# Patient Record
Sex: MALE | Race: BLACK OR AFRICAN AMERICAN | NOT HISPANIC OR LATINO | Employment: OTHER | URBAN - NONMETROPOLITAN AREA
[De-identification: names, ages, dates, MRNs, and addresses within clinical notes are randomized per-mention and may not be internally consistent; named-entity substitution may affect disease eponyms.]

---

## 2017-01-16 ENCOUNTER — HOSPITAL ENCOUNTER (OUTPATIENT)
Dept: GENERAL RADIOLOGY | Facility: HOSPITAL | Age: 62
Discharge: HOME | End: 2017-01-16
Admitting: ORTHOPAEDIC SURGERY

## 2017-01-16 ENCOUNTER — APPOINTMENT (OUTPATIENT)
Dept: PREADMISSION TESTING | Facility: HOSPITAL | Age: 62
End: 2017-01-16

## 2017-01-16 VITALS
BODY MASS INDEX: 38.04 KG/M2 | HEIGHT: 68 IN | SYSTOLIC BLOOD PRESSURE: 183 MMHG | HEART RATE: 73 BPM | WEIGHT: 251 LBS | OXYGEN SATURATION: 99 % | DIASTOLIC BLOOD PRESSURE: 92 MMHG

## 2017-01-16 LAB
ALBUMIN SERPL-MCNC: 4.2 G/DL (ref 3.5–5)
ALBUMIN/GLOB SERPL: 1.1 G/DL (ref 1.1–2.5)
ALP SERPL-CCNC: 77 U/L (ref 24–120)
ALT SERPL W P-5'-P-CCNC: 31 U/L (ref 0–54)
ANION GAP SERPL CALCULATED.3IONS-SCNC: 15 MMOL/L (ref 4–13)
APTT PPP: 30.9 SECONDS (ref 24.1–34.8)
AST SERPL-CCNC: 25 U/L (ref 7–45)
BACTERIA UR QL AUTO: ABNORMAL /HPF
BASOPHILS # BLD AUTO: 0.04 10*3/MM3 (ref 0–0.2)
BASOPHILS NFR BLD AUTO: 0.6 % (ref 0–2)
BILIRUB SERPL-MCNC: 0.5 MG/DL (ref 0.1–1)
BILIRUB UR QL STRIP: NEGATIVE
BUN BLD-MCNC: 27 MG/DL (ref 5–21)
BUN/CREAT SERPL: 12.9 (ref 7–25)
CALCIUM SPEC-SCNC: 9.5 MG/DL (ref 8.4–10.4)
CHLORIDE SERPL-SCNC: 97 MMOL/L (ref 98–110)
CLARITY UR: CLEAR
CO2 SERPL-SCNC: 25 MMOL/L (ref 24–31)
COLOR UR: ABNORMAL
CREAT BLD-MCNC: 2.09 MG/DL (ref 0.5–1.4)
DEPRECATED RDW RBC AUTO: 48.3 FL (ref 40–54)
EOSINOPHIL # BLD AUTO: 0.14 10*3/MM3 (ref 0–0.7)
EOSINOPHIL NFR BLD AUTO: 2.1 % (ref 0–4)
ERYTHROCYTE [DISTWIDTH] IN BLOOD BY AUTOMATED COUNT: 16 % (ref 12–15)
GFR SERPL CREATININE-BSD FRML MDRD: 39 ML/MIN/1.73
GLOBULIN UR ELPH-MCNC: 4 GM/DL
GLUCOSE BLD-MCNC: 152 MG/DL (ref 70–100)
GLUCOSE UR STRIP-MCNC: NEGATIVE MG/DL
HCT VFR BLD AUTO: 39.6 % (ref 40–52)
HGB BLD-MCNC: 13.2 G/DL (ref 14–18)
HGB UR QL STRIP.AUTO: NEGATIVE
HYALINE CASTS UR QL AUTO: ABNORMAL /LPF
HYPOCHROMIA BLD QL: NORMAL
IMM GRANULOCYTES # BLD: 0.05 10*3/MM3 (ref 0–0.03)
IMM GRANULOCYTES NFR BLD: 0.8 % (ref 0–5)
INR PPP: 0.91 (ref 0.91–1.09)
KETONES UR QL STRIP: ABNORMAL
LEUKOCYTE ESTERASE UR QL STRIP.AUTO: ABNORMAL
LYMPHOCYTES # BLD AUTO: 2.41 10*3/MM3 (ref 0.72–4.86)
LYMPHOCYTES NFR BLD AUTO: 36.8 % (ref 15–45)
MCH RBC QN AUTO: 27.4 PG (ref 28–32)
MCHC RBC AUTO-ENTMCNC: 33.3 G/DL (ref 33–36)
MCV RBC AUTO: 82.2 FL (ref 82–95)
MONOCYTES # BLD AUTO: 0.5 10*3/MM3 (ref 0.19–1.3)
MONOCYTES NFR BLD AUTO: 7.6 % (ref 4–12)
NEUTROPHILS # BLD AUTO: 3.41 10*3/MM3 (ref 1.87–8.4)
NEUTROPHILS NFR BLD AUTO: 52.1 % (ref 39–78)
NITRITE UR QL STRIP: NEGATIVE
NRBC BLD MANUAL-RTO: 0 /100 WBC (ref 0–0)
PH UR STRIP.AUTO: <=5 [PH] (ref 5–8)
PLAT MORPH BLD: NORMAL
PLATELET # BLD AUTO: 201 10*3/MM3 (ref 130–400)
POTASSIUM BLD-SCNC: 4.8 MMOL/L (ref 3.5–5.3)
PROT SERPL-MCNC: 8.2 G/DL (ref 6.3–8.7)
PROT UR QL STRIP: NEGATIVE
PROTHROMBIN TIME: 12.5 SECONDS (ref 11.9–14.6)
RBC # BLD AUTO: 4.82 10*6/MM3 (ref 4.8–5.9)
RBC # UR: ABNORMAL /HPF
REF LAB TEST METHOD: ABNORMAL
SCHISTOCYTES BLD QL SMEAR: NORMAL
SODIUM BLD-SCNC: 137 MMOL/L (ref 135–145)
SP GR UR STRIP: 1.02 (ref 1–1.03)
SQUAMOUS #/AREA URNS HPF: ABNORMAL /HPF
UROBILINOGEN UR QL STRIP: ABNORMAL
WBC MORPH BLD: NORMAL
WBC NRBC COR # BLD: 6.55 10*3/MM3 (ref 4.8–10.8)
WBC UR QL AUTO: ABNORMAL /HPF

## 2017-01-16 PROCEDURE — 87086 URINE CULTURE/COLONY COUNT: CPT | Performed by: ORTHOPAEDIC SURGERY

## 2017-01-16 PROCEDURE — 71010 HC CHEST PA OR AP: CPT

## 2017-01-16 PROCEDURE — 81001 URINALYSIS AUTO W/SCOPE: CPT | Performed by: ORTHOPAEDIC SURGERY

## 2017-01-16 PROCEDURE — 93010 ELECTROCARDIOGRAM REPORT: CPT | Performed by: INTERNAL MEDICINE

## 2017-01-16 PROCEDURE — 85730 THROMBOPLASTIN TIME PARTIAL: CPT | Performed by: ORTHOPAEDIC SURGERY

## 2017-01-16 PROCEDURE — 85610 PROTHROMBIN TIME: CPT | Performed by: ORTHOPAEDIC SURGERY

## 2017-01-16 PROCEDURE — 80053 COMPREHEN METABOLIC PANEL: CPT | Performed by: ORTHOPAEDIC SURGERY

## 2017-01-16 PROCEDURE — 36415 COLL VENOUS BLD VENIPUNCTURE: CPT

## 2017-01-16 PROCEDURE — 85025 COMPLETE CBC W/AUTO DIFF WBC: CPT | Performed by: ORTHOPAEDIC SURGERY

## 2017-01-16 PROCEDURE — 85007 BL SMEAR W/DIFF WBC COUNT: CPT | Performed by: ORTHOPAEDIC SURGERY

## 2017-01-16 PROCEDURE — 93005 ELECTROCARDIOGRAM TRACING: CPT

## 2017-01-16 RX ORDER — GABAPENTIN 600 MG/1
1800 TABLET ORAL 2 TIMES DAILY
COMMUNITY
Start: 2016-10-27 | End: 2020-09-09

## 2017-01-16 RX ORDER — HYDROCHLOROTHIAZIDE 12.5 MG/1
12.5 CAPSULE, GELATIN COATED ORAL DAILY
COMMUNITY

## 2017-01-16 RX ORDER — GLIMEPIRIDE 4 MG/1
TABLET ORAL
COMMUNITY
Start: 2016-07-01 | End: 2018-11-19

## 2017-01-16 RX ORDER — ASPIRIN 325 MG
TABLET ORAL
COMMUNITY
Start: 2010-09-21 | End: 2020-11-16

## 2017-01-16 RX ORDER — OMEPRAZOLE 40 MG/1
CAPSULE, DELAYED RELEASE ORAL
COMMUNITY
End: 2017-10-23

## 2017-01-16 RX ORDER — GABAPENTIN 600 MG/1
600 TABLET ORAL NIGHTLY
COMMUNITY
End: 2018-11-19 | Stop reason: SDUPTHER

## 2017-01-16 RX ORDER — LISINOPRIL 10 MG/1
TABLET ORAL
Status: ON HOLD | COMMUNITY
Start: 2016-10-20 | End: 2019-01-30

## 2017-01-16 RX ORDER — HYDROCODONE BITARTRATE AND ACETAMINOPHEN 10; 325 MG/1; MG/1
TABLET ORAL 4 TIMES DAILY
Status: ON HOLD | COMMUNITY
Start: 2016-11-24 | End: 2017-01-25

## 2017-01-16 NOTE — MR AVS SNAPSHOT
Edy Cosby   2017 11:30 AM   Appointment    Provider:  [facility] PAT 30 RM 1   Department:  [facility] PREADMISSION T   Dept Phone:  [phone]                Your Full Care Plan              Your Updated Medication List          This list is accurate as of: 17 12:13 PM.  Always use your most recent med list.                aspirin 325 MG tablet       carisoprodol 250 MG tablet   Commonly known as:  SOMA       * gabapentin 600 MG tablet   Commonly known as:  NEURONTIN       * gabapentin 600 MG tablet   Commonly known as:  NEURONTIN       glimepiride 4 MG tablet   Commonly known as:  AMARYL       hydrochlorothiazide 25 MG tablet   Commonly known as:  HYDRODIURIL       HYDROcodone-acetaminophen  MG per tablet   Commonly known as:  NORCO       lisinopril 10 MG tablet   Commonly known as:  PRINIVIL,ZESTRIL       metoprolol tartrate 50 MG tablet   Commonly known as:  LOPRESSOR       PRILOSEC 40 MG capsule   Generic drug:  omeprazole       ZOLPIDEM TARTRATE PO       * Notice:  This list has 2 medication(s) that are the same as other medications prescribed for you. Read the directions carefully, and ask your doctor or other care provider to review them with you.            [employer] Signup     [facility] [employer] allows you to send messages to your doctor, view your test results, renew your prescriptions, schedule appointments, and more. To sign up, go to [employer] and click on the Sign Up Now link in the New User? box. Enter your [employer] Activation Code exactly as it appears below along with the last four digits of your Social Security Number and your Date of Birth () to complete the sign-up process. If you do not sign up before the expiration date, you must request a new code.    [employer] Activation Code: [email or web address]  Expires: 2017 12:12 PM    If you have questions, you can email [email or web address] or call [phone] to  "talk to our MyChart staff. Remember, MyChart is NOT to be used for urgent needs. For medical emergencies, dial 911.               Other Info from Your Visit           Allergies     No Known Allergies      Vital Signs     Blood Pressure Pulse Height Weight Oxygen Saturation Body Mass Index    183/92 (BP Location: Left arm, Patient Position: Sitting) 73 67.75\" (172.1 cm) 251 lb (114 kg) 99% 38.45 kg/m2    Smoking Status                   Former Smoker             Discharge Instructions         DAY OF SURGERY INSTRUCTIONS        YOUR SURGEON: TONAE    PROCEDURE: SPINAL SURGERY WITH FUSION AND HARDWARE REMOVAL    DATE OF SURGERY: JAN 23RD    ARRIVAL TIME: AS DIRECTED BY OFFICE    DAY OF SURGERY TAKE ONLY THESE MEDICATIONS: METOPROLOL        BEFORE YOU COME TO THE HOSPITAL  (Pre-op instructions)  • Do not eat, drink, smoke or chew gum after midnight the night before surgery.  This also includes no mints.  • Morning of surgery take only the medicines you have been instructed with a sip of water unless otherwise instructed  by your physician.  • Do not shave, wear makeup or dark nail polish.  • Remove all jewelry including rings.  • Leave anything you consider valuable at home.  • Leave your suitcase in the car until after your surgery.  • Bring the following with you if applicable:  o Picture ID and insurance, Medicare or Medicaid cards  o Co-pay/deductible required by insurance (cash, check, credit card)  o Medications (no narcotics) in original bottles (not a list) including all over-the-counter medications.  o Copy of advance directive, living will or power-of- documents if not brought to PAT  o CPAP or BIPAP mask and tubing  o Skin prep instruction sheet  o Relaxation aids (MP3 player, book, magazine)  • Confirm your arrival time with you surgeon the day before your surgery (surgery times are subject to change)  • On the day of surgery check in at registration located at the main entrance of the hospital. "       Outpatient Surgery Guidelines, Adult  Outpatient procedures are those for which the person having the procedure is allowed to go home the same day as the procedure. Various procedures are done on an outpatient basis. You should follow some general guidelines if you will be having an outpatient procedure.  LET YOUR HEALTH CARE PROVIDER KNOW ABOUT:  · Any allergies you have.  · All medicines you are taking, including vitamins, herbs, eye drops, creams, and over-the-counter medicines.  · Previous problems you or members of your family have had with the use of anesthetics.  · Any blood disorders you have.  · Previous surgeries you have had.  · Medical conditions you have.  RISKS AND COMPLICATIONS  Your health care provider will discuss possible risks and complications with you before surgery. Common risks and complications include:    · Problems due to the use of anesthetics.  · Blood loss and replacement (does not apply to minor surgical procedures).  · Temporary increase in pain due to surgery.  · Uncorrected pain or problems that the surgery was meant to correct.  · Infection.  · New damage.  BEFORE THE PROCEDURE  · Ask your health care provider about changing or stopping your regular medicines. You may need to stop taking certain medicines in the days or weeks before the procedure.  · Stop smoking at least 2 weeks before surgery. This lowers your risk for complications during and after surgery. Ask your health care provider for help with this if needed.  · Eat your usual meals and a light supper the day before surgery. Continue fluid intake. Do not drink alcohol.  · Do not eat or drink after midnight the night before your surgery.   · Arrange for someone to take you home and to stay with you for 24 hours after the procedure. Medicine given for your procedure may affect your ability to drive or to care for yourself.  · Call your health care provider's office if you develop an illness or problem that may prevent  you from safely having your procedure.  AFTER THE PROCEDURE  After surgery, you will be taken to a recovery area, where your progress will be monitored. If there are no complications, you will be allowed to go home when you are awake, stable, and taking fluids well. You may have numbness around the surgical site. Healing will take some time. You will have tenderness at the surgical site and may have some swelling and bruising. You may also have some nausea.  HOME CARE INSTRUCTIONS  · Do not drive for 24 hours, or as directed by your health care provider. Do not drive while taking prescription pain medicines.  · Do not drink alcohol for 24 hours.  · Do not make important decisions or sign legal documents for 24 hours.  · You may resume a normal diet and activities as directed.  · Do not lift anything heavier than 10 pounds (4.5 kg) or play contact sports until your health care provider says it is okay.  · Change your bandages (dressings) as directed.  · Only take over-the-counter or prescription medicines as directed by your health care provider.  · Follow up with your health care provider as directed.  SEEK MEDICAL CARE IF:  · You have increased bleeding (more than a small spot) from the surgical site.  · You have redness, swelling, or increasing pain in the wound.  · You see pus coming from the wound.  · You have a fever.  · You notice a bad smell coming from the wound or dressing.  · You feel lightheaded or faint.  · You develop a rash.  · You have trouble breathing.  · You develop allergies.  MAKE SURE YOU:  · Understand these instructions.  · Will watch your condition.  · Will get help right away if you are not doing well or get worse.     This information is not intended to replace advice given to you by your health care provider. Make sure you discuss any questions you have with your health care provider.     Document Released: 09/12/2002 Document Revised: 05/03/2016 Document Reviewed: 05/22/2014  Elsevier  Interactive Patient Education ©2016 [employer]       Fall Prevention in Hospitals, Adult  As a hospital patient, your condition and the treatments you receive can increase your risk for falls. Some additional risk factors for falls in a hospital include:  · Being in an unfamiliar environment.  · Being on bed rest.  · Your surgery.  · Taking certain medicines.  · Your tubing requirements, such as intravenous (IV) therapy or catheters.  It is important that you learn how to decrease fall risks while at the hospital. Below are important tips that can help prevent falls.  SAFETY TIPS FOR PREVENTING FALLS  Talk about your risk of falling.  · Ask your health care provider why you are at risk for falling. Is it your medicine, illness, tubing placement, or something else?  · Make a plan with your health care provider to keep you safe from falls.  · Ask your health care provider or pharmacist about side effects of your medicines. Some medicines can make you dizzy or affect your coordination.  Ask for help.  · Ask for help before getting out of bed. You may need to press your call button.  · Ask for assistance in getting safely to the toilet.  · Ask for a walker or cane to be put at your bedside. Ask that most of the side rails on your bed be placed up before your health care provider leaves the room.  · Ask family or friends to sit with you.  · Ask for things that are out of your reach, such as your glasses, hearing aids, telephone, bedside table, or call button.  Follow these tips to avoid falling:  · Stay lying or seated, rather than standing, while waiting for help.  · Wear rubber-soled slippers or shoes whenever you walk in the hospital.  · Avoid quick, sudden movements.  ¨ Change positions slowly.  ¨ Sit on the side of your bed before standing.  ¨ Stand up slowly and wait before you start to walk.  · Let your health care provider know if there is a spill on the floor.  · Pay careful attention to the medical  equipment, electrical cords, and tubes around you.  · When you need help, use your call button by your bed or in the bathroom. Wait for one of your health care providers to help you.  · If you feel dizzy or unsure of your footing, return to bed and wait for assistance.  · Avoid being distracted by the TV, telephone, or another person in your room.  · Do not lean or support yourself on rolling objects, such as IV poles or bedside tables.     This information is not intended to replace advice given to you by your health care provider. Make sure you discuss any questions you have with your health care provider.     Document Released: 12/15/2001 Document Revised: 01/08/2016 Document Reviewed: 08/25/2013  [employer] Interactive Patient Education ©2016 Elsevier Inc.       Surgical Site Infections FAQs  What is a Surgical Site Infection (SSI)?  A surgical site infection is an infection that occurs after surgery in the part of the body where the surgery took place. Most patients who have surgery do not develop an infection. However, infections develop in about 1 to 3 out of every 100 patients who have surgery.  Some of the common symptoms of a surgical site infection are:  · Redness and pain around the area where you had surgery  · Drainage of cloudy fluid from your surgical wound  · Fever  Can SSIs be treated?  Yes. Most surgical site infections can be treated with antibiotics. The antibiotic given to you depends on the bacteria (germs) causing the infection. Sometimes patients with SSIs also need another surgery to treat the infection.  What are some of the things that hospitals are doing to prevent SSIs?  To prevent SSIs, doctors, nurses, and other healthcare providers:  · Clean their hands and arms up to their elbows with an antiseptic agent just before the surgery.  · Clean their hands with soap and water or an alcohol-based hand rub before and after caring for each patient.  · May remove some of your hair immediately  before your surgery using electric clippers if the hair is in the same area where the procedure will occur. They should not shave you with a razor.  · Wear special hair covers, masks, gowns, and gloves during surgery to keep the surgery area clean.  · Give you antibiotics before your surgery starts. In most cases, you should get antibiotics within 60 minutes before the surgery starts and the antibiotics should be stopped within 24 hours after surgery.  · Clean the skin at the site of your surgery with a special soap that kills germs.  What can I do to help prevent SSIs?  Before your surgery:  · Tell your doctor about other medical problems you may have. Health problems such as allergies, diabetes, and obesity could affect your surgery and your treatment.  · Quit smoking. Patients who smoke get more infections. Talk to your doctor about how you can quit before your surgery.  · Do not shave near where you will have surgery. Shaving with a razor can irritate your skin and make it easier to develop an infection.  At the time of your surgery:  · Speak up if someone tries to shave you with a razor before surgery. Ask why you need to be shaved and talk with your surgeon if you have any concerns.  · Ask if you will get antibiotics before surgery.  After your surgery:  · Make sure that your healthcare providers clean their hands before examining you, either with soap and water or an alcohol-based hand rub.  · If you do not see your providers clean their hands, please ask them to do so.  · Family and friends who visit you should not touch the surgical wound or dressings.  · Family and friends should clean their hands with soap and water or an alcohol-based hand rub before and after visiting you. If you do not see them clean their hands, ask them to clean their hands.  What do I need to do when I go home from the hospital?  · Before you go home, your doctor or nurse should explain everything you need to know about taking care  of your wound. Make sure you understand how to care for your wound before you leave the hospital.  · Always clean your hands before and after caring for your wound.  · Before you go home, make sure you know who to contact if you have questions or problems after you get home.  · If you have any symptoms of an infection, such as redness and pain at the surgery site, drainage, or fever, call your doctor immediately.  If you have additional questions, please ask your doctor or nurse.  Developed and co-sponsored by The Society for Healthcare Epidemiology of [place] (SHEA); Infectious Diseases Society of [place] (IDSA); American Hospital Association; Association for Professionals in Infection Control and Epidemiology (APIC); Centers for Disease Control and Prevention (CDC); and The Joint Commission.     This information is not intended to replace advice given to you by your health care provider. Make sure you discuss any questions you have with your health care provider.     Document Released: 12/23/2014 Document Revised: 01/08/2016 Document Reviewed: 03/02/2016  [employer] Interactive Patient Education ©2016 Elsevier Inc.       [facility]  CHG 4% Patient Instruction Sheet    Preparing the Skin Before Surgery  Preparing or “prepping” skin before surgery can reduce the risk of infection at the surgical site. To make the process easier,[facility] has chosen 4% Chlorhexidine Gluconate (CHG) antiseptic solution.   The steps below outline the prepping process and should be carefully followed.                                                                                                                                                      Prep the skin at the following time(s):                                                      We recommend you shower the night before surgery, and again the morning of surgery with the 4% CHG antiseptic solution using half of the bottle and a cloth each time.  Dress in clean  clothes/sleepwear after showering.  See instructions below for application.          Do not apply any lotions or moisturizers.       Do not shave the area to be prepped for at least 2 days prior to surgery.    Clipping the hair may be done immediately prior to your surgery at the hospital    if needed.    Directions:  Thoroughly rinse your body with water.  Apply 4% CHG to a cloth and wash skin gently, paying special attention to the operative site.  Rinse again thoroughly.  Once you have begun using this product do not apply anything else to your skin. If itching or redness persists, rinse affected areas and discontinue use.    When using this product:  • Keep out of eyes, ears, and mouth.  • If solution should contact these areas, rinse out promptly and thoroughly with water.  • For external use only.  • Do not use in genital area, on your face or head.      PATIENT/FAMILY/RESPONSIBLE PARTY VERBALIZES UNDERSTANDING OF ABOVE EDUCATION       SYMPTOMS OF A STROKE    Call 911 or have someone take you to the Emergency Department if you have any of the following:    · Sudden numbness or weakness of your face, arm or leg especially on one side of the body  · Sudden confusion, diffiiculty speaking or trouble understanding   · Changes in your vision or loss of sight in one eye  · Sudden severe headache with no known cause  · sudden dizziness, trouble walking, loss of balance or coordination    It is important to seek emergency care right away if you have further stroke symptoms. If you get emergency help quickly, the powerful clot-dissolving medicines can reduce the disabilities caused by a stroke.     For more information:    American Stroke Association  0-256-5-STROKE  www.strokeassociation.org           IF YOU SMOKE OR USE TOBACCO PLEASE READ THE FOLLOWING:    Why is smoking bad for me?  Smoking increases the risk of heart disease, lung disease, vascular disease, stroke, and cancer.     If you smoke, STOP!    If you  would like more information on quitting smoking, please visit the [employer] website: [email or web address]   This link will provide additional resources including the QUIT line and the [employer] support groups.     For more information:    American Cancer Society  ([phone] 281-7653    American Heart Association  1-446.830.1840

## 2017-01-16 NOTE — DISCHARGE INSTRUCTIONS
DAY OF SURGERY INSTRUCTIONS        YOUR SURGEON: ARIN    PROCEDURE: SPINAL SURGERY WITH FUSION AND HARDWARE REMOVAL    DATE OF SURGERY: JAN 23RD    ARRIVAL TIME: AS DIRECTED BY OFFICE    DAY OF SURGERY TAKE ONLY THESE MEDICATIONS: METOPROLOL        BEFORE YOU COME TO THE HOSPITAL  (Pre-op instructions)  • Do not eat, drink, smoke or chew gum after midnight the night before surgery.  This also includes no mints.  • Morning of surgery take only the medicines you have been instructed with a sip of water unless otherwise instructed  by your physician.  • Do not shave, wear makeup or dark nail polish.  • Remove all jewelry including rings.  • Leave anything you consider valuable at home.  • Leave your suitcase in the car until after your surgery.  • Bring the following with you if applicable:  o Picture ID and insurance, Medicare or Medicaid cards  o Co-pay/deductible required by insurance (cash, check, credit card)  o Medications (no narcotics) in original bottles (not a list) including all over-the-counter medications.  o Copy of advance directive, living will or power-of- documents if not brought to PAT  o CPAP or BIPAP mask and tubing  o Skin prep instruction sheet  o Relaxation aids (MP3 player, book, magazine)  • Confirm your arrival time with you surgeon the day before your surgery (surgery times are subject to change)  • On the day of surgery check in at registration located at the main entrance of the hospital.       Outpatient Surgery Guidelines, Adult  Outpatient procedures are those for which the person having the procedure is allowed to go home the same day as the procedure. Various procedures are done on an outpatient basis. You should follow some general guidelines if you will be having an outpatient procedure.  LET YOUR HEALTH CARE PROVIDER KNOW ABOUT:  · Any allergies you have.  · All medicines you are taking, including vitamins, herbs, eye drops, creams, and over-the-counter  medicines.  · Previous problems you or members of your family have had with the use of anesthetics.  · Any blood disorders you have.  · Previous surgeries you have had.  · Medical conditions you have.  RISKS AND COMPLICATIONS  Your health care provider will discuss possible risks and complications with you before surgery. Common risks and complications include:    · Problems due to the use of anesthetics.  · Blood loss and replacement (does not apply to minor surgical procedures).  · Temporary increase in pain due to surgery.  · Uncorrected pain or problems that the surgery was meant to correct.  · Infection.  · New damage.  BEFORE THE PROCEDURE  · Ask your health care provider about changing or stopping your regular medicines. You may need to stop taking certain medicines in the days or weeks before the procedure.  · Stop smoking at least 2 weeks before surgery. This lowers your risk for complications during and after surgery. Ask your health care provider for help with this if needed.  · Eat your usual meals and a light supper the day before surgery. Continue fluid intake. Do not drink alcohol.  · Do not eat or drink after midnight the night before your surgery.   · Arrange for someone to take you home and to stay with you for 24 hours after the procedure. Medicine given for your procedure may affect your ability to drive or to care for yourself.  · Call your health care provider's office if you develop an illness or problem that may prevent you from safely having your procedure.  AFTER THE PROCEDURE  After surgery, you will be taken to a recovery area, where your progress will be monitored. If there are no complications, you will be allowed to go home when you are awake, stable, and taking fluids well. You may have numbness around the surgical site. Healing will take some time. You will have tenderness at the surgical site and may have some swelling and bruising. You may also have some nausea.  HOME CARE  INSTRUCTIONS  · Do not drive for 24 hours, or as directed by your health care provider. Do not drive while taking prescription pain medicines.  · Do not drink alcohol for 24 hours.  · Do not make important decisions or sign legal documents for 24 hours.  · You may resume a normal diet and activities as directed.  · Do not lift anything heavier than 10 pounds (4.5 kg) or play contact sports until your health care provider says it is okay.  · Change your bandages (dressings) as directed.  · Only take over-the-counter or prescription medicines as directed by your health care provider.  · Follow up with your health care provider as directed.  SEEK MEDICAL CARE IF:  · You have increased bleeding (more than a small spot) from the surgical site.  · You have redness, swelling, or increasing pain in the wound.  · You see pus coming from the wound.  · You have a fever.  · You notice a bad smell coming from the wound or dressing.  · You feel lightheaded or faint.  · You develop a rash.  · You have trouble breathing.  · You develop allergies.  MAKE SURE YOU:  · Understand these instructions.  · Will watch your condition.  · Will get help right away if you are not doing well or get worse.     This information is not intended to replace advice given to you by your health care provider. Make sure you discuss any questions you have with your health care provider.     Document Released: 09/12/2002 Document Revised: 05/03/2016 Document Reviewed: 05/22/2014  [employer] Interactive Patient Education ©2016 [employer]       Fall Prevention in Hospitals, Adult  As a hospital patient, your condition and the treatments you receive can increase your risk for falls. Some additional risk factors for falls in a hospital include:  · Being in an unfamiliar environment.  · Being on bed rest.  · Your surgery.  · Taking certain medicines.  · Your tubing requirements, such as intravenous (IV) therapy or catheters.  It is important that you learn how  to decrease fall risks while at the hospital. Below are important tips that can help prevent falls.  SAFETY TIPS FOR PREVENTING FALLS  Talk about your risk of falling.  · Ask your health care provider why you are at risk for falling. Is it your medicine, illness, tubing placement, or something else?  · Make a plan with your health care provider to keep you safe from falls.  · Ask your health care provider or pharmacist about side effects of your medicines. Some medicines can make you dizzy or affect your coordination.  Ask for help.  · Ask for help before getting out of bed. You may need to press your call button.  · Ask for assistance in getting safely to the toilet.  · Ask for a walker or cane to be put at your bedside. Ask that most of the side rails on your bed be placed up before your health care provider leaves the room.  · Ask family or friends to sit with you.  · Ask for things that are out of your reach, such as your glasses, hearing aids, telephone, bedside table, or call button.  Follow these tips to avoid falling:  · Stay lying or seated, rather than standing, while waiting for help.  · Wear rubber-soled slippers or shoes whenever you walk in the hospital.  · Avoid quick, sudden movements.  ¨ Change positions slowly.  ¨ Sit on the side of your bed before standing.  ¨ Stand up slowly and wait before you start to walk.  · Let your health care provider know if there is a spill on the floor.  · Pay careful attention to the medical equipment, electrical cords, and tubes around you.  · When you need help, use your call button by your bed or in the bathroom. Wait for one of your health care providers to help you.  · If you feel dizzy or unsure of your footing, return to bed and wait for assistance.  · Avoid being distracted by the TV, telephone, or another person in your room.  · Do not lean or support yourself on rolling objects, such as IV poles or bedside tables.     This information is not intended to  replace advice given to you by your health care provider. Make sure you discuss any questions you have with your health care provider.     Document Released: 12/15/2001 Document Revised: 01/08/2016 Document Reviewed: 08/25/2013  [employer] Interactive Patient Education ©2016 [employer]       Surgical Site Infections FAQs  What is a Surgical Site Infection (SSI)?  A surgical site infection is an infection that occurs after surgery in the part of the body where the surgery took place. Most patients who have surgery do not develop an infection. However, infections develop in about 1 to 3 out of every 100 patients who have surgery.  Some of the common symptoms of a surgical site infection are:  · Redness and pain around the area where you had surgery  · Drainage of cloudy fluid from your surgical wound  · Fever  Can SSIs be treated?  Yes. Most surgical site infections can be treated with antibiotics. The antibiotic given to you depends on the bacteria (germs) causing the infection. Sometimes patients with SSIs also need another surgery to treat the infection.  What are some of the things that hospitals are doing to prevent SSIs?  To prevent SSIs, doctors, nurses, and other healthcare providers:  · Clean their hands and arms up to their elbows with an antiseptic agent just before the surgery.  · Clean their hands with soap and water or an alcohol-based hand rub before and after caring for each patient.  · May remove some of your hair immediately before your surgery using electric clippers if the hair is in the same area where the procedure will occur. They should not shave you with a razor.  · Wear special hair covers, masks, gowns, and gloves during surgery to keep the surgery area clean.  · Give you antibiotics before your surgery starts. In most cases, you should get antibiotics within 60 minutes before the surgery starts and the antibiotics should be stopped within 24 hours after surgery.  · Clean the skin at the  site of your surgery with a special soap that kills germs.  What can I do to help prevent SSIs?  Before your surgery:  · Tell your doctor about other medical problems you may have. Health problems such as allergies, diabetes, and obesity could affect your surgery and your treatment.  · Quit smoking. Patients who smoke get more infections. Talk to your doctor about how you can quit before your surgery.  · Do not shave near where you will have surgery. Shaving with a razor can irritate your skin and make it easier to develop an infection.  At the time of your surgery:  · Speak up if someone tries to shave you with a razor before surgery. Ask why you need to be shaved and talk with your surgeon if you have any concerns.  · Ask if you will get antibiotics before surgery.  After your surgery:  · Make sure that your healthcare providers clean their hands before examining you, either with soap and water or an alcohol-based hand rub.  · If you do not see your providers clean their hands, please ask them to do so.  · Family and friends who visit you should not touch the surgical wound or dressings.  · Family and friends should clean their hands with soap and water or an alcohol-based hand rub before and after visiting you. If you do not see them clean their hands, ask them to clean their hands.  What do I need to do when I go home from the hospital?  · Before you go home, your doctor or nurse should explain everything you need to know about taking care of your wound. Make sure you understand how to care for your wound before you leave the hospital.  · Always clean your hands before and after caring for your wound.  · Before you go home, make sure you know who to contact if you have questions or problems after you get home.  · If you have any symptoms of an infection, such as redness and pain at the surgery site, drainage, or fever, call your doctor immediately.  If you have additional questions, please ask your doctor or  nurse.  Developed and co-sponsored by The Society for Healthcare Epidemiology of [place] (SHEA); Infectious Diseases Society of [place] (IDSA); American Hospital Association; Association for Professionals in Infection Control and Epidemiology (APIC); Centers for Disease Control and Prevention (CDC); and The Joint Commission.     This information is not intended to replace advice given to you by your health care provider. Make sure you discuss any questions you have with your health care provider.     Document Released: 12/23/2014 Document Revised: 01/08/2016 Document Reviewed: 03/02/2016  [employer] Interactive Patient Education ©2016 [employer].       [facility]  CHG 4% Patient Instruction Sheet    Preparing the Skin Before Surgery  Preparing or “prepping” skin before surgery can reduce the risk of infection at the surgical site. To make the process easier,[facility] has chosen 4% Chlorhexidine Gluconate (CHG) antiseptic solution.   The steps below outline the prepping process and should be carefully followed.                                                                                                                                                      Prep the skin at the following time(s):                                                      We recommend you shower the night before surgery, and again the morning of surgery with the 4% CHG antiseptic solution using half of the bottle and a cloth each time.  Dress in clean clothes/sleepwear after showering.  See instructions below for application.          Do not apply any lotions or moisturizers.       Do not shave the area to be prepped for at least 2 days prior to surgery.    Clipping the hair may be done immediately prior to your surgery at the hospital    if needed.    Directions:  Thoroughly rinse your body with water.  Apply 4% CHG to a cloth and wash skin gently, paying special attention to the operative site.  Rinse again thoroughly.  Once you  have begun using this product do not apply anything else to your skin. If itching or redness persists, rinse affected areas and discontinue use.    When using this product:  • Keep out of eyes, ears, and mouth.  • If solution should contact these areas, rinse out promptly and thoroughly with water.  • For external use only.  • Do not use in genital area, on your face or head.      PATIENT/FAMILY/RESPONSIBLE PARTY VERBALIZES UNDERSTANDING OF ABOVE EDUCATION

## 2017-01-18 ENCOUNTER — OFFICE VISIT (OUTPATIENT)
Dept: PRIMARY CARE CLINIC | Age: 62
End: 2017-01-18
Payer: MEDICARE

## 2017-01-18 VITALS
RESPIRATION RATE: 22 BRPM | HEART RATE: 88 BPM | BODY MASS INDEX: 40.81 KG/M2 | SYSTOLIC BLOOD PRESSURE: 138 MMHG | OXYGEN SATURATION: 97 % | HEIGHT: 67 IN | TEMPERATURE: 98 F | WEIGHT: 260 LBS | DIASTOLIC BLOOD PRESSURE: 80 MMHG

## 2017-01-18 DIAGNOSIS — E66.01 MORBID OBESITY WITH BMI OF 40.0-44.9, ADULT (HCC): ICD-10-CM

## 2017-01-18 DIAGNOSIS — I10 ESSENTIAL HYPERTENSION: Primary | ICD-10-CM

## 2017-01-18 DIAGNOSIS — F17.200 TOBACCO USE DISORDER: ICD-10-CM

## 2017-01-18 LAB — BACTERIA SPEC AEROBE CULT: NORMAL

## 2017-01-18 PROCEDURE — 99407 BEHAV CHNG SMOKING > 10 MIN: CPT | Performed by: FAMILY MEDICINE

## 2017-01-18 PROCEDURE — G8427 DOCREV CUR MEDS BY ELIG CLIN: HCPCS | Performed by: FAMILY MEDICINE

## 2017-01-18 PROCEDURE — 1036F TOBACCO NON-USER: CPT | Performed by: FAMILY MEDICINE

## 2017-01-18 PROCEDURE — 99213 OFFICE O/P EST LOW 20 MIN: CPT | Performed by: FAMILY MEDICINE

## 2017-01-18 PROCEDURE — G8419 CALC BMI OUT NRM PARAM NOF/U: HCPCS | Performed by: FAMILY MEDICINE

## 2017-01-18 PROCEDURE — 3017F COLORECTAL CA SCREEN DOC REV: CPT | Performed by: FAMILY MEDICINE

## 2017-01-18 PROCEDURE — G8484 FLU IMMUNIZE NO ADMIN: HCPCS | Performed by: FAMILY MEDICINE

## 2017-01-18 RX ORDER — ATORVASTATIN CALCIUM 20 MG/1
20 TABLET, FILM COATED ORAL DAILY
Qty: 90 TABLET | Refills: 1 | Status: SHIPPED | OUTPATIENT
Start: 2017-01-18 | End: 2017-03-15 | Stop reason: SDUPTHER

## 2017-01-18 RX ORDER — HYDROCHLOROTHIAZIDE 25 MG/1
25 TABLET ORAL DAILY
Qty: 30 TABLET | Refills: 1 | Status: SHIPPED | OUTPATIENT
Start: 2017-01-18 | End: 2017-03-06 | Stop reason: SDUPTHER

## 2017-01-18 ASSESSMENT — ENCOUNTER SYMPTOMS
CHEST TIGHTNESS: 0
COUGH: 0
ABDOMINAL PAIN: 0
BACK PAIN: 1
SHORTNESS OF BREATH: 0
WHEEZING: 0
VOMITING: 0
CONSTIPATION: 0
DIARRHEA: 0
NAUSEA: 0

## 2017-01-23 ENCOUNTER — APPOINTMENT (OUTPATIENT)
Dept: GENERAL RADIOLOGY | Facility: HOSPITAL | Age: 62
End: 2017-01-23

## 2017-01-23 ENCOUNTER — ANESTHESIA EVENT (OUTPATIENT)
Dept: PERIOP | Facility: HOSPITAL | Age: 62
End: 2017-01-23

## 2017-01-23 ENCOUNTER — ANESTHESIA (OUTPATIENT)
Dept: PERIOP | Facility: HOSPITAL | Age: 62
End: 2017-01-23

## 2017-01-23 PROBLEM — E11.9 TYPE 2 DIABETES MELLITUS WITHOUT COMPLICATION, WITHOUT LONG-TERM CURRENT USE OF INSULIN (HCC): Chronic | Status: ACTIVE | Noted: 2017-01-23

## 2017-01-23 PROBLEM — T40.2X5A CONSTIPATION DUE TO OPIOID THERAPY: Chronic | Status: ACTIVE | Noted: 2017-01-23

## 2017-01-23 PROBLEM — K59.03 CONSTIPATION DUE TO OPIOID THERAPY: Chronic | Status: ACTIVE | Noted: 2017-01-23

## 2017-01-23 PROBLEM — M48.061 SPINAL STENOSIS, LUMBAR: Status: ACTIVE | Noted: 2017-01-23

## 2017-01-23 PROBLEM — I10 ESSENTIAL HYPERTENSION: Status: ACTIVE | Noted: 2017-01-18

## 2017-01-23 PROBLEM — K21.9 GASTROESOPHAGEAL REFLUX DISEASE WITHOUT ESOPHAGITIS: Chronic | Status: ACTIVE | Noted: 2017-01-23

## 2017-01-23 PROBLEM — J43.1 PANLOBULAR EMPHYSEMA: Chronic | Status: ACTIVE | Noted: 2017-01-23

## 2017-01-23 PROCEDURE — 25010000002 SUCCINYLCHOLINE PER 20 MG: Performed by: NURSE ANESTHETIST, CERTIFIED REGISTERED

## 2017-01-23 PROCEDURE — 76000 FLUOROSCOPY <1 HR PHYS/QHP: CPT

## 2017-01-23 PROCEDURE — 25010000002 ONDANSETRON PER 1 MG: Performed by: NURSE ANESTHETIST, CERTIFIED REGISTERED

## 2017-01-23 PROCEDURE — 25010000003 CEFAZOLIN PER 500 MG: Performed by: ORTHOPAEDIC SURGERY

## 2017-01-23 PROCEDURE — 25010000002 PROPOFOL 10 MG/ML EMULSION: Performed by: NURSE ANESTHETIST, CERTIFIED REGISTERED

## 2017-01-23 PROCEDURE — 25010000002 HYDROMORPHONE PER 4 MG: Performed by: NURSE ANESTHETIST, CERTIFIED REGISTERED

## 2017-01-23 PROCEDURE — 25010000002 NEOSTIGMINE PER 0.5 MG: Performed by: NURSE ANESTHETIST, CERTIFIED REGISTERED

## 2017-01-23 PROCEDURE — 72020 X-RAY EXAM OF SPINE 1 VIEW: CPT

## 2017-01-23 RX ORDER — ROCURONIUM BROMIDE 10 MG/ML
INJECTION, SOLUTION INTRAVENOUS AS NEEDED
Status: DISCONTINUED | OUTPATIENT
Start: 2017-01-23 | End: 2017-01-23 | Stop reason: SURG

## 2017-01-23 RX ORDER — SODIUM CHLORIDE, SODIUM LACTATE, POTASSIUM CHLORIDE, CALCIUM CHLORIDE 600; 310; 30; 20 MG/100ML; MG/100ML; MG/100ML; MG/100ML
INJECTION, SOLUTION INTRAVENOUS CONTINUOUS PRN
Status: DISCONTINUED | OUTPATIENT
Start: 2017-01-23 | End: 2017-01-23 | Stop reason: SURG

## 2017-01-23 RX ORDER — HYDROMORPHONE HCL 110MG/55ML
PATIENT CONTROLLED ANALGESIA SYRINGE INTRAVENOUS AS NEEDED
Status: DISCONTINUED | OUTPATIENT
Start: 2017-01-23 | End: 2017-01-23 | Stop reason: SURG

## 2017-01-23 RX ORDER — VECURONIUM BROMIDE 1 MG/ML
INJECTION, POWDER, LYOPHILIZED, FOR SOLUTION INTRAVENOUS AS NEEDED
Status: DISCONTINUED | OUTPATIENT
Start: 2017-01-23 | End: 2017-01-23 | Stop reason: SURG

## 2017-01-23 RX ORDER — SUFENTANIL CITRATE 50 UG/ML
INJECTION EPIDURAL; INTRAVENOUS AS NEEDED
Status: DISCONTINUED | OUTPATIENT
Start: 2017-01-23 | End: 2017-01-23 | Stop reason: SURG

## 2017-01-23 RX ORDER — GLYCOPYRROLATE 0.2 MG/ML
INJECTION INTRAMUSCULAR; INTRAVENOUS AS NEEDED
Status: DISCONTINUED | OUTPATIENT
Start: 2017-01-23 | End: 2017-01-23 | Stop reason: SURG

## 2017-01-23 RX ORDER — SUCCINYLCHOLINE CHLORIDE 20 MG/ML
INJECTION INTRAMUSCULAR; INTRAVENOUS AS NEEDED
Status: DISCONTINUED | OUTPATIENT
Start: 2017-01-23 | End: 2017-01-23 | Stop reason: SURG

## 2017-01-23 RX ORDER — LIDOCAINE HYDROCHLORIDE 20 MG/ML
INJECTION, SOLUTION INFILTRATION; PERINEURAL AS NEEDED
Status: DISCONTINUED | OUTPATIENT
Start: 2017-01-23 | End: 2017-01-23 | Stop reason: SURG

## 2017-01-23 RX ORDER — PROPOFOL 10 MG/ML
VIAL (ML) INTRAVENOUS AS NEEDED
Status: DISCONTINUED | OUTPATIENT
Start: 2017-01-23 | End: 2017-01-23 | Stop reason: SURG

## 2017-01-23 RX ORDER — ONDANSETRON 2 MG/ML
INJECTION INTRAMUSCULAR; INTRAVENOUS AS NEEDED
Status: DISCONTINUED | OUTPATIENT
Start: 2017-01-23 | End: 2017-01-23 | Stop reason: SURG

## 2017-01-23 RX ORDER — PHENYLEPHRINE HCL IN 0.9% NACL 0.8MG/10ML
SYRINGE (ML) INTRAVENOUS AS NEEDED
Status: DISCONTINUED | OUTPATIENT
Start: 2017-01-23 | End: 2017-01-23 | Stop reason: SURG

## 2017-01-23 RX ORDER — LIDOCAINE HYDROCHLORIDE 20 MG/ML
INJECTION, SOLUTION INFILTRATION; PERINEURAL AS NEEDED
Status: DISCONTINUED | OUTPATIENT
Start: 2017-01-23 | End: 2017-01-23

## 2017-01-23 RX ADMIN — ROCURONIUM BROMIDE 20 MG: 10 INJECTION INTRAVENOUS at 10:09

## 2017-01-23 RX ADMIN — HYDROMORPHONE HYDROCHLORIDE 0.5 MG: 2 INJECTION, SOLUTION INTRAMUSCULAR; INTRAVENOUS; SUBCUTANEOUS at 13:40

## 2017-01-23 RX ADMIN — SUFENTANIL CITRATE 10 MCG: 50 INJECTION, SOLUTION EPIDURAL; INTRAVENOUS at 09:50

## 2017-01-23 RX ADMIN — Medication 160 MCG: at 10:05

## 2017-01-23 RX ADMIN — Medication 160 MCG: at 10:16

## 2017-01-23 RX ADMIN — Medication 3 MG: at 13:17

## 2017-01-23 RX ADMIN — ROCURONIUM BROMIDE 40 MG: 10 INJECTION INTRAVENOUS at 09:28

## 2017-01-23 RX ADMIN — LIDOCAINE HYDROCHLORIDE 80 MG: 20 INJECTION, SOLUTION INFILTRATION; PERINEURAL at 09:13

## 2017-01-23 RX ADMIN — SODIUM CHLORIDE, POTASSIUM CHLORIDE, SODIUM LACTATE AND CALCIUM CHLORIDE: 600; 310; 30; 20 INJECTION, SOLUTION INTRAVENOUS at 11:20

## 2017-01-23 RX ADMIN — EPHEDRINE SULFATE 10 MG: 50 INJECTION INTRAMUSCULAR; INTRAVENOUS; SUBCUTANEOUS at 10:15

## 2017-01-23 RX ADMIN — VECURONIUM BROMIDE 1 MG: 1 INJECTION, POWDER, LYOPHILIZED, FOR SOLUTION INTRAVENOUS at 12:30

## 2017-01-23 RX ADMIN — Medication 160 MCG: at 09:35

## 2017-01-23 RX ADMIN — SUCCINYLCHOLINE CHLORIDE 160 MG: 20 INJECTION, SOLUTION INTRAMUSCULAR; INTRAVENOUS at 09:13

## 2017-01-23 RX ADMIN — HYDROMORPHONE HYDROCHLORIDE 0.5 MG: 2 INJECTION, SOLUTION INTRAMUSCULAR; INTRAVENOUS; SUBCUTANEOUS at 13:28

## 2017-01-23 RX ADMIN — VECURONIUM BROMIDE 1 MG: 1 INJECTION, POWDER, LYOPHILIZED, FOR SOLUTION INTRAVENOUS at 10:48

## 2017-01-23 RX ADMIN — Medication 160 MCG: at 09:26

## 2017-01-23 RX ADMIN — ROCURONIUM BROMIDE 10 MG: 10 INJECTION INTRAVENOUS at 09:13

## 2017-01-23 RX ADMIN — EPHEDRINE SULFATE 5 MG: 50 INJECTION INTRAMUSCULAR; INTRAVENOUS; SUBCUTANEOUS at 10:40

## 2017-01-23 RX ADMIN — Medication 100 MCG: at 11:37

## 2017-01-23 RX ADMIN — GLYCOPYRROLATE 0.2 MG: 0.2 INJECTION, SOLUTION INTRAMUSCULAR; INTRAVENOUS at 13:20

## 2017-01-23 RX ADMIN — SUFENTANIL CITRATE 10 MCG: 50 INJECTION, SOLUTION EPIDURAL; INTRAVENOUS at 09:58

## 2017-01-23 RX ADMIN — ONDANSETRON HYDROCHLORIDE 4 MG: 2 SOLUTION INTRAMUSCULAR; INTRAVENOUS at 13:06

## 2017-01-23 RX ADMIN — SUFENTANIL CITRATE 20 MCG: 50 INJECTION, SOLUTION EPIDURAL; INTRAVENOUS at 09:12

## 2017-01-23 RX ADMIN — PROPOFOL 200 MG: 10 INJECTION, EMULSION INTRAVENOUS at 09:13

## 2017-01-23 RX ADMIN — SODIUM CHLORIDE, POTASSIUM CHLORIDE, SODIUM LACTATE AND CALCIUM CHLORIDE: 600; 310; 30; 20 INJECTION, SOLUTION INTRAVENOUS at 09:55

## 2017-01-23 RX ADMIN — HYDROMORPHONE HYDROCHLORIDE 0.5 MG: 2 INJECTION, SOLUTION INTRAMUSCULAR; INTRAVENOUS; SUBCUTANEOUS at 13:35

## 2017-01-23 RX ADMIN — Medication 100 MCG: at 11:26

## 2017-01-23 RX ADMIN — Medication 100 MCG: at 11:52

## 2017-01-23 RX ADMIN — HYDROMORPHONE HYDROCHLORIDE 0.5 MG: 2 INJECTION, SOLUTION INTRAMUSCULAR; INTRAVENOUS; SUBCUTANEOUS at 13:25

## 2017-01-23 RX ADMIN — CEFAZOLIN 3 G: 1 INJECTION, POWDER, FOR SOLUTION INTRAVENOUS at 09:25

## 2017-01-23 RX ADMIN — Medication 100 MCG: at 12:13

## 2017-01-23 RX ADMIN — Medication 160 MCG: at 09:41

## 2017-01-23 RX ADMIN — SUFENTANIL CITRATE 10 MCG: 50 INJECTION, SOLUTION EPIDURAL; INTRAVENOUS at 09:59

## 2017-01-23 RX ADMIN — GLYCOPYRROLATE 0.3 MG: 0.2 INJECTION, SOLUTION INTRAMUSCULAR; INTRAVENOUS at 13:17

## 2017-01-23 NOTE — ANESTHESIA PROCEDURE NOTES
Airway    General Information and Staff    CRNA: ERICKSON SANFORD    Indications and Patient Condition  Indications for airway management: airway protection    Preoxygenated: yes  MILS maintained throughout  Mask difficulty assessment: 0 - not attempted    Final Airway Details  Final airway type: endotracheal airway      Successful airway: ETT  Cuffed: yes   Successful intubation technique: direct laryngoscopy  Facilitating devices/methods: cricoid pressure, intubating stylet and Bougie  Endotracheal tube insertion site: oral  Blade: Dougie  Blade size: #4 (3.5)  ETT size: 8.0 mm  Cormack-Lehane Classification: grade III - view of epiglottis only  Placement verified by: chest auscultation and capnometry   Cuff volume (mL): 8  Measured from: lips  ETT to lips (cm): 22  Number of attempts at approach: 1

## 2017-01-23 NOTE — ANESTHESIA PREPROCEDURE EVALUATION
Anesthesia Evaluation     Patient summary reviewed    No history of anesthetic complications   Airway   Mallampati: II  TM distance: >3 FB  Neck ROM: full  Dental      Pulmonary    (+) hx of smoking, COPD, sleep apnea,   Cardiovascular   (+) hypertension,   (-) pacemaker, past MI, angina, cardiac stents    ECG reviewed  Patient on routine beta blocker and Beta blocker given within 24 hours of surgery    Neuro/Psych  (-) seizures, TIA, CVA  GI/Hepatic/Renal/Endo    (+) obesity,  GERD (severe), chronic renal disease, diabetes mellitus,     Musculoskeletal     Abdominal    Substance History      OB/GYN          Other                             Anesthesia Plan    ASA 2     general     intravenous induction   Anesthetic plan and risks discussed with patient.

## 2017-01-24 ENCOUNTER — APPOINTMENT (OUTPATIENT)
Dept: CARDIOLOGY | Facility: HOSPITAL | Age: 62
End: 2017-01-24
Attending: INTERNAL MEDICINE

## 2017-01-24 PROCEDURE — 93306 TTE W/DOPPLER COMPLETE: CPT | Performed by: INTERNAL MEDICINE

## 2017-01-24 PROCEDURE — C8929 TTE W OR WO FOL WCON,DOPPLER: HCPCS

## 2017-01-25 NOTE — ANESTHESIA POSTPROCEDURE EVALUATION
Patient: Edy Cosby    Procedure Summary     Date Anesthesia Start Anesthesia Stop Room / Location    01/23/17 0908 1409 [facility] / [facility]       Procedure Diagnosis Surgeon Provider    REMOVAL OF INSTRUMENTATION, EXPLORATION OF FUSION L4-S1, REVISION LEFT L5-S1 HEMILAMINECTOMY, FACETECTOMY DECOMPRESSION, REVISION UNINSTRUMENTED POSTERIOR SPINAL FUSION L5-S1 (N/A Spine Lumbar) (M48.06) MD Wagner Anne CRNA          Anesthesia Type: general  Last vitals  BP      Temp      Pulse     Resp      SpO2        Post Anesthesia Care and Evaluation      Comments: Patient discharged from PACU without documented anesthesia post-evaluation

## 2017-02-27 ENCOUNTER — OFFICE VISIT (OUTPATIENT)
Dept: CARDIOLOGY | Facility: CLINIC | Age: 62
End: 2017-02-27

## 2017-02-27 VITALS
DIASTOLIC BLOOD PRESSURE: 85 MMHG | HEIGHT: 67 IN | HEART RATE: 101 BPM | RESPIRATION RATE: 18 BRPM | WEIGHT: 254 LBS | SYSTOLIC BLOOD PRESSURE: 150 MMHG | BODY MASS INDEX: 39.87 KG/M2

## 2017-02-27 DIAGNOSIS — I48.0 PAROXYSMAL ATRIAL FIBRILLATION (HCC): Primary | ICD-10-CM

## 2017-02-27 DIAGNOSIS — I10 ESSENTIAL HYPERTENSION: ICD-10-CM

## 2017-02-27 NOTE — PROGRESS NOTES
"    Subjective:     Encounter Date:02/27/2017      Patient ID: Edy Cosby is a 61 y.o. male.    Chief Complaint:  HPI Comments: Pt is feeling well. Reports he is working on strengthening his legs after recent surgery. Denies chest pain, edema, palpitations, dyspnea.     Atrial Fibrillation   Presents for follow-up visit. Symptoms include weakness (lower extremity weakness ). Symptoms are negative for chest pain, dizziness, palpitations, shortness of breath and syncope. The symptoms have been resolved. Past medical history includes atrial fibrillation.       The following portions of the patient's history were reviewed and updated as appropriate: allergies, current medications, past family history, past medical history, past social history, past surgical history and problem list.  Visit Vitals   • /85 (BP Location: Left arm, Patient Position: Sitting, Cuff Size: Large Adult)   • Pulse 101   • Resp 18   • Ht 67\" (170.2 cm)   • Wt 254 lb (115 kg)   • BMI 39.78 kg/m2     No Known Allergies    Current Outpatient Prescriptions:   •  aspirin 325 MG tablet, Take 325 mg by mouth daily., Disp: , Rfl:   •  carisoprodol (SOMA) 250 MG tablet, Take 1 tablet by mouth daily., Disp: , Rfl:   •  gabapentin (NEURONTIN) 600 MG tablet, Take 1,200 mg by mouth Daily., Disp: , Rfl:   •  gabapentin (NEURONTIN) 600 MG tablet, Take 600 mg by mouth Every Night., Disp: , Rfl:   •  glimepiride (AMARYL) 4 MG tablet, Take 1 tablet by mouth every morning (before breakfast), Disp: , Rfl:   •  hydrochlorothiazide (HYDRODIURIL) 25 MG tablet, Take 25 mg by mouth Daily., Disp: , Rfl:   •  HYDROcodone-acetaminophen (NORCO)  MG per tablet, Take 2 tablets by mouth Every 4 (Four) Hours As Needed for moderate pain (4-6)., Disp: , Rfl: 0  •  lisinopril (PRINIVIL,ZESTRIL) 10 MG tablet, Take 1 tablet by mouth 2 times daily, Disp: , Rfl:   •  metoprolol tartrate (LOPRESSOR) 50 MG tablet, Take 50 mg by mouth 2 (Two) Times a Day., Disp: , Rfl: "   •  omeprazole (PRILOSEC) 40 MG capsule, Take 40 mg by mouth 2 times daily, Disp: , Rfl:   •  ZOLPIDEM TARTRATE PO, Take 10 mg by mouth At Night As Needed., Disp: , Rfl:   Past Medical History   Diagnosis Date   • A-fib    • Chronic back pain    • Constipation    • Diabetes mellitus      TYPE II   • Dysphagia    • Emphysema of lung    • Gastroparesis    • GERD (gastroesophageal reflux disease)    • Hypertension      ESSENTIAL   • Leg pain    • Osteoarthritis    • PONV (postoperative nausea and vomiting)    • Visit for monitoring Plavix therapy      DOES NOT TAKE PLAVIX     Past Surgical History   Procedure Laterality Date   • Cholecystectomy     • Other surgical history       LUMBAR SACRAL SURGERY WITH FUSION X2   • Pilonidal cyst / sinus excision       PILONIDAL CYST REMOVAL   • Endoscopy  06/19/2012     [facility]   • Colonoscopy  09/01/2011     TICS RECALL 5YR   • Endoscopy  08/25/2009     HIATAL HERNIA, DILATED WITH 50 FR MASCORRO   • Colonoscopy  09/30/2008     ENTER RESULTS: STOOL THROUGHTOUT THE COLON POOR PREP REC 3 YEAR RECALL   • Endoscopy  06/19/2007     WITHIN NORMAL LIMITS UREA NEG   • Back surgery     • Colonoscopy N/A 11/9/2016     Procedure: COLONOSCOPY;  Surgeon: León Zepeda MD;  Location: [facility] ENDOSCOPY;  Service:    • Lumbar laminectomy with fusion N/A 1/23/2017     Procedure: REMOVAL OF INSTRUMENTATION, EXPLORATION OF FUSION L4-S1, REVISION LEFT L5-S1 HEMILAMINECTOMY, FACETECTOMY DECOMPRESSION, REVISION UNINSTRUMENTED POSTERIOR SPINAL FUSION L5-S1;  Surgeon: RHONDA Lopes MD;  Location: [facility] OR;  Service:      Social History     Social History   • Marital status: Single     Spouse name: N/A   • Number of children: N/A   • Years of education: N/A     Occupational History   • Not on file.     Social History Main Topics   • Smoking status: Former Smoker     Years: 30.00     Types: Cigarettes     Quit date: 1/9/2017   • Smokeless tobacco: Never Used   • Alcohol use No   • Drug use: No   • Sexual  activity: Defer     Other Topics Concern   • Not on file     Social History Narrative     Family History   Problem Relation Age of Onset   • Heart attack Father    • Diabetes Brother    • Stomach cancer Neg Hx      GI CNACERS OR DISEASE       Review of Systems   Constitution: Positive for weakness (lower extremity weakness ). Negative for chills, diaphoresis and fever.   HENT: Negative for nosebleeds.    Eyes: Negative for visual disturbance.   Cardiovascular: Negative for chest pain, claudication, cyanosis, dyspnea on exertion, irregular heartbeat, leg swelling, near-syncope, orthopnea, palpitations, paroxysmal nocturnal dyspnea and syncope.   Respiratory: Negative for cough, hemoptysis, shortness of breath, sputum production and wheezing.    Hematologic/Lymphatic: Negative for bleeding problem.   Skin: Negative for color change and flushing.   Musculoskeletal: Positive for back pain. Negative for falls.   Gastrointestinal: Negative for bloating, abdominal pain, hematemesis, hematochezia, melena, nausea and vomiting.   Genitourinary: Negative for hematuria.   Neurological: Negative for dizziness and light-headedness.   Psychiatric/Behavioral: Negative for altered mental status.         ECG 12 Lead  Date/Time: 2/27/2017 9:53 AM  Performed by: EMMANUEL DALTON  Authorized by: EMMANUEL DALTON   Comparison: compared with previous ECG from 1/23/2017  Similar to previous ECG  Rhythm: sinus tachycardia               Objective:     Physical Exam   Constitutional: He is oriented to person, place, and time. He appears well-developed and well-nourished. No distress.   HENT:   Head: Normocephalic and atraumatic.   Eyes: Pupils are equal, round, and reactive to light.   Neck: Normal range of motion. Neck supple. No JVD present. No thyromegaly present.   Cardiovascular: Normal rate, regular rhythm, normal heart sounds and intact distal pulses.  Exam reveals no gallop and no friction rub.    No murmur heard.  Pulses:       Dorsalis  pedis pulses are 2+ on the right side, and 2+ on the left side.   Pulmonary/Chest: Effort normal and breath sounds normal. No respiratory distress. He has no wheezes. He has no rales. He exhibits no tenderness.   Abdominal: Soft. Bowel sounds are normal. He exhibits no distension. There is no tenderness.   Musculoskeletal: Normal range of motion. He exhibits no edema.   Back brace in place    Neurological: He is alert and oriented to person, place, and time. No cranial nerve deficit.   Skin: Skin is warm and dry. He is not diaphoretic.   Psychiatric: He has a normal mood and affect. His behavior is normal.       Lab Review:       Assessment:          Diagnosis Plan   1. Paroxysmal atrial fibrillation  NSR today; reported intra operative afib during recent back surgery in January. None since. No evidence of afib on EKGs or telemetry strips. Continue beta blocker   2. Essential hypertension  Elevated today; Pt reports he just took his bp medications and his bp is normally WNL. Instructed to monitor and call pcp with persistent elevations.           Plan:       Follow up 1 year.

## 2017-03-06 RX ORDER — HYDROCHLOROTHIAZIDE 25 MG/1
25 TABLET ORAL DAILY
Qty: 30 TABLET | Refills: 11 | Status: SHIPPED | OUTPATIENT
Start: 2017-03-06 | End: 2017-03-10 | Stop reason: SDUPTHER

## 2017-03-07 ENCOUNTER — OFFICE VISIT (OUTPATIENT)
Dept: NEUROLOGY | Age: 62
End: 2017-03-07
Payer: MEDICARE

## 2017-03-07 VITALS
HEIGHT: 67 IN | RESPIRATION RATE: 20 BRPM | SYSTOLIC BLOOD PRESSURE: 134 MMHG | HEART RATE: 84 BPM | BODY MASS INDEX: 40.18 KG/M2 | DIASTOLIC BLOOD PRESSURE: 74 MMHG | WEIGHT: 256 LBS

## 2017-03-07 DIAGNOSIS — R06.81 WITNESSED APNEIC SPELLS: ICD-10-CM

## 2017-03-07 DIAGNOSIS — G47.33 OBSTRUCTIVE SLEEP APNEA (ADULT) (PEDIATRIC): Primary | ICD-10-CM

## 2017-03-07 DIAGNOSIS — R06.83 SNORING: ICD-10-CM

## 2017-03-07 PROCEDURE — 1036F TOBACCO NON-USER: CPT | Performed by: PHYSICIAN ASSISTANT

## 2017-03-07 PROCEDURE — G8427 DOCREV CUR MEDS BY ELIG CLIN: HCPCS | Performed by: PHYSICIAN ASSISTANT

## 2017-03-07 PROCEDURE — G8417 CALC BMI ABV UP PARAM F/U: HCPCS | Performed by: PHYSICIAN ASSISTANT

## 2017-03-07 PROCEDURE — 99203 OFFICE O/P NEW LOW 30 MIN: CPT | Performed by: PHYSICIAN ASSISTANT

## 2017-03-07 PROCEDURE — G8484 FLU IMMUNIZE NO ADMIN: HCPCS | Performed by: PHYSICIAN ASSISTANT

## 2017-03-07 PROCEDURE — 3017F COLORECTAL CA SCREEN DOC REV: CPT | Performed by: PHYSICIAN ASSISTANT

## 2017-03-10 RX ORDER — HYDROCHLOROTHIAZIDE 25 MG/1
25 TABLET ORAL DAILY
Qty: 30 TABLET | Refills: 11 | Status: SHIPPED | OUTPATIENT
Start: 2017-03-10 | End: 2017-03-15 | Stop reason: SDUPTHER

## 2017-03-14 ENCOUNTER — HOSPITAL ENCOUNTER (OUTPATIENT)
Dept: SLEEP CENTER | Age: 62
Discharge: HOME | End: 2017-03-14
Payer: MEDICARE

## 2017-03-14 PROCEDURE — 95811 POLYSOM 6/>YRS CPAP 4/> PARM: CPT | Performed by: PSYCHIATRY & NEUROLOGY

## 2017-03-14 PROCEDURE — 95811 POLYSOM 6/>YRS CPAP 4/> PARM: CPT

## 2017-03-15 ENCOUNTER — OFFICE VISIT (OUTPATIENT)
Dept: PRIMARY CARE CLINIC | Age: 62
End: 2017-03-15
Payer: MEDICARE

## 2017-03-15 VITALS
RESPIRATION RATE: 22 BRPM | BODY MASS INDEX: 39.94 KG/M2 | OXYGEN SATURATION: 94 % | WEIGHT: 255 LBS | TEMPERATURE: 98 F | HEART RATE: 115 BPM | DIASTOLIC BLOOD PRESSURE: 80 MMHG | SYSTOLIC BLOOD PRESSURE: 148 MMHG

## 2017-03-15 DIAGNOSIS — Z11.59 NEED FOR HEPATITIS C SCREENING TEST: ICD-10-CM

## 2017-03-15 DIAGNOSIS — Z71.89 PAIN MEDICATION AGREEMENT DISCUSSED: ICD-10-CM

## 2017-03-15 DIAGNOSIS — E11.21 DIABETIC NEPHROPATHY ASSOCIATED WITH TYPE 2 DIABETES MELLITUS (HCC): ICD-10-CM

## 2017-03-15 DIAGNOSIS — E66.01 MORBID OBESITY DUE TO EXCESS CALORIES (HCC): ICD-10-CM

## 2017-03-15 DIAGNOSIS — E11.9 TYPE 2 DIABETES MELLITUS WITHOUT COMPLICATION, WITHOUT LONG-TERM CURRENT USE OF INSULIN (HCC): Primary | ICD-10-CM

## 2017-03-15 DIAGNOSIS — N18.30 CHRONIC KIDNEY DISEASE, STAGE III (MODERATE) (HCC): ICD-10-CM

## 2017-03-15 DIAGNOSIS — M51.16 LUMBAR DISC DISEASE WITH RADICULOPATHY: Chronic | ICD-10-CM

## 2017-03-15 LAB — HBA1C MFR BLD: 7.2 %

## 2017-03-15 PROCEDURE — 3045F PR MOST RECENT HEMOGLOBIN A1C LEVEL 7.0-9.0%: CPT | Performed by: FAMILY MEDICINE

## 2017-03-15 PROCEDURE — 3017F COLORECTAL CA SCREEN DOC REV: CPT | Performed by: FAMILY MEDICINE

## 2017-03-15 PROCEDURE — G8427 DOCREV CUR MEDS BY ELIG CLIN: HCPCS | Performed by: FAMILY MEDICINE

## 2017-03-15 PROCEDURE — G8484 FLU IMMUNIZE NO ADMIN: HCPCS | Performed by: FAMILY MEDICINE

## 2017-03-15 PROCEDURE — 83036 HEMOGLOBIN GLYCOSYLATED A1C: CPT | Performed by: FAMILY MEDICINE

## 2017-03-15 PROCEDURE — G8417 CALC BMI ABV UP PARAM F/U: HCPCS | Performed by: FAMILY MEDICINE

## 2017-03-15 PROCEDURE — 1036F TOBACCO NON-USER: CPT | Performed by: FAMILY MEDICINE

## 2017-03-15 PROCEDURE — 99214 OFFICE O/P EST MOD 30 MIN: CPT | Performed by: FAMILY MEDICINE

## 2017-03-15 RX ORDER — METOPROLOL TARTRATE 50 MG/1
TABLET, FILM COATED ORAL
Qty: 270 TABLET | Refills: 1 | Status: SHIPPED | OUTPATIENT
Start: 2017-03-15 | End: 2017-05-01

## 2017-03-15 RX ORDER — ATORVASTATIN CALCIUM 40 MG/1
40 TABLET, FILM COATED ORAL DAILY
Qty: 90 TABLET | Refills: 3 | Status: SHIPPED | OUTPATIENT
Start: 2017-03-15 | End: 2018-03-27 | Stop reason: SDUPTHER

## 2017-03-15 RX ORDER — HYDROCHLOROTHIAZIDE 25 MG/1
25 TABLET ORAL DAILY
Qty: 90 TABLET | Refills: 3 | Status: SHIPPED | OUTPATIENT
Start: 2017-03-15 | End: 2018-03-12 | Stop reason: SDUPTHER

## 2017-03-16 ENCOUNTER — CARE COORDINATION (OUTPATIENT)
Dept: PRIMARY CARE CLINIC | Age: 62
End: 2017-03-16

## 2017-03-16 PROBLEM — R06.83 SNORING: Status: RESOLVED | Noted: 2017-03-07 | Resolved: 2017-03-16

## 2017-03-16 PROBLEM — N18.30 CHRONIC KIDNEY DISEASE, STAGE III (MODERATE) (HCC): Status: ACTIVE | Noted: 2017-03-16

## 2017-03-16 PROBLEM — R06.81 WITNESSED APNEIC SPELLS: Status: RESOLVED | Noted: 2017-03-07 | Resolved: 2017-03-16

## 2017-03-21 ENCOUNTER — TELEPHONE (OUTPATIENT)
Dept: PRIMARY CARE CLINIC | Age: 62
End: 2017-03-21

## 2017-03-22 ENCOUNTER — CARE COORDINATION (OUTPATIENT)
Dept: PRIMARY CARE CLINIC | Age: 62
End: 2017-03-22

## 2017-03-26 DIAGNOSIS — G47.33 SLEEP APNEA, OBSTRUCTIVE: Primary | ICD-10-CM

## 2017-04-19 ENCOUNTER — TELEPHONE (OUTPATIENT)
Dept: NEUROLOGY | Age: 62
End: 2017-04-19

## 2017-05-01 ENCOUNTER — OFFICE VISIT (OUTPATIENT)
Dept: PRIMARY CARE CLINIC | Age: 62
End: 2017-05-01
Payer: MEDICARE

## 2017-05-01 VITALS
BODY MASS INDEX: 39.08 KG/M2 | WEIGHT: 249 LBS | RESPIRATION RATE: 20 BRPM | OXYGEN SATURATION: 91 % | DIASTOLIC BLOOD PRESSURE: 70 MMHG | SYSTOLIC BLOOD PRESSURE: 146 MMHG | TEMPERATURE: 97.3 F | HEIGHT: 67 IN | HEART RATE: 68 BPM

## 2017-05-01 DIAGNOSIS — N18.30 CHRONIC KIDNEY DISEASE, STAGE III (MODERATE) (HCC): ICD-10-CM

## 2017-05-01 DIAGNOSIS — E11.59 TYPE 2 DIABETES MELLITUS WITH OTHER CIRCULATORY COMPLICATION, WITHOUT LONG-TERM CURRENT USE OF INSULIN (HCC): ICD-10-CM

## 2017-05-01 DIAGNOSIS — G47.33 OBSTRUCTIVE SLEEP APNEA (ADULT) (PEDIATRIC): ICD-10-CM

## 2017-05-01 DIAGNOSIS — I10 ESSENTIAL HYPERTENSION: ICD-10-CM

## 2017-05-01 DIAGNOSIS — M54.10 BACK PAIN WITH LEFT-SIDED RADICULOPATHY: Chronic | ICD-10-CM

## 2017-05-01 DIAGNOSIS — Z00.00 ROUTINE GENERAL MEDICAL EXAMINATION AT A HEALTH CARE FACILITY: Primary | ICD-10-CM

## 2017-05-01 PROCEDURE — G0009 ADMIN PNEUMOCOCCAL VACCINE: HCPCS | Performed by: FAMILY MEDICINE

## 2017-05-01 PROCEDURE — 1036F TOBACCO NON-USER: CPT | Performed by: FAMILY MEDICINE

## 2017-05-01 PROCEDURE — G0439 PPPS, SUBSEQ VISIT: HCPCS | Performed by: FAMILY MEDICINE

## 2017-05-01 PROCEDURE — 3017F COLORECTAL CA SCREEN DOC REV: CPT | Performed by: FAMILY MEDICINE

## 2017-05-01 PROCEDURE — G8417 CALC BMI ABV UP PARAM F/U: HCPCS | Performed by: FAMILY MEDICINE

## 2017-05-01 PROCEDURE — 90732 PPSV23 VACC 2 YRS+ SUBQ/IM: CPT | Performed by: FAMILY MEDICINE

## 2017-05-01 PROCEDURE — G8427 DOCREV CUR MEDS BY ELIG CLIN: HCPCS | Performed by: FAMILY MEDICINE

## 2017-05-01 PROCEDURE — 99214 OFFICE O/P EST MOD 30 MIN: CPT | Performed by: FAMILY MEDICINE

## 2017-05-01 PROCEDURE — 3045F PR MOST RECENT HEMOGLOBIN A1C LEVEL 7.0-9.0%: CPT | Performed by: FAMILY MEDICINE

## 2017-05-01 RX ORDER — LISINOPRIL 10 MG/1
10 TABLET ORAL 2 TIMES DAILY
Qty: 180 TABLET | Refills: 3 | Status: SHIPPED | OUTPATIENT
Start: 2017-05-01 | End: 2017-09-06 | Stop reason: SDUPTHER

## 2017-05-01 RX ORDER — METOPROLOL TARTRATE 100 MG/1
100 TABLET ORAL 2 TIMES DAILY
Qty: 60 TABLET | Refills: 3 | Status: SHIPPED | OUTPATIENT
Start: 2017-05-01 | End: 2018-04-25 | Stop reason: SDUPTHER

## 2017-05-01 RX ORDER — GLIMEPIRIDE 4 MG/1
4 TABLET ORAL
Qty: 90 TABLET | Refills: 5 | Status: SHIPPED | OUTPATIENT
Start: 2017-05-01 | End: 2017-07-27 | Stop reason: SDUPTHER

## 2017-05-01 ASSESSMENT — ENCOUNTER SYMPTOMS
VOMITING: 0
SHORTNESS OF BREATH: 0
SORE THROAT: 0
DIARRHEA: 0
WHEEZING: 0
ABDOMINAL PAIN: 0
COUGH: 0
CHEST TIGHTNESS: 0
TROUBLE SWALLOWING: 0
COLOR CHANGE: 0
CONSTIPATION: 0
PHOTOPHOBIA: 0
BACK PAIN: 1
EYE PAIN: 0
RHINORRHEA: 0
NAUSEA: 0

## 2017-06-02 ENCOUNTER — TELEPHONE (OUTPATIENT)
Dept: NEUROLOGY | Age: 62
End: 2017-06-02

## 2017-06-23 RX ORDER — CYCLOBENZAPRINE HCL 5 MG
TABLET ORAL
Qty: 90 TABLET | Refills: 0 | Status: SHIPPED | OUTPATIENT
Start: 2017-06-23 | End: 2017-08-11 | Stop reason: SDUPTHER

## 2017-06-29 ENCOUNTER — TELEPHONE (OUTPATIENT)
Dept: NEUROLOGY | Age: 62
End: 2017-06-29

## 2017-07-27 DIAGNOSIS — K21.9 GASTROESOPHAGEAL REFLUX DISEASE, ESOPHAGITIS PRESENCE NOT SPECIFIED: Primary | ICD-10-CM

## 2017-07-27 RX ORDER — GLIMEPIRIDE 4 MG/1
4 TABLET ORAL
Qty: 90 TABLET | Refills: 5 | Status: SHIPPED | OUTPATIENT
Start: 2017-07-27 | End: 2017-08-04

## 2017-07-27 RX ORDER — OMEPRAZOLE 20 MG/1
20 CAPSULE, DELAYED RELEASE ORAL 2 TIMES DAILY
Qty: 60 CAPSULE | Refills: 11 | Status: SHIPPED | OUTPATIENT
Start: 2017-07-27 | End: 2017-10-23 | Stop reason: SDUPTHER

## 2017-08-02 ENCOUNTER — OFFICE VISIT (OUTPATIENT)
Dept: PRIMARY CARE CLINIC | Age: 62
End: 2017-08-02
Payer: MEDICARE

## 2017-08-02 VITALS
SYSTOLIC BLOOD PRESSURE: 136 MMHG | TEMPERATURE: 95.8 F | HEART RATE: 112 BPM | OXYGEN SATURATION: 98 % | WEIGHT: 240.6 LBS | BODY MASS INDEX: 36.46 KG/M2 | HEIGHT: 68 IN | DIASTOLIC BLOOD PRESSURE: 76 MMHG

## 2017-08-02 DIAGNOSIS — Z11.59 NEED FOR HEPATITIS C SCREENING TEST: ICD-10-CM

## 2017-08-02 DIAGNOSIS — Z91.81 AT HIGH RISK FOR FALLS: ICD-10-CM

## 2017-08-02 DIAGNOSIS — N18.30 CHRONIC KIDNEY DISEASE, STAGE III (MODERATE) (HCC): ICD-10-CM

## 2017-08-02 DIAGNOSIS — E11.51 TYPE 2 DIABETES MELLITUS WITH DIABETIC PERIPHERAL ANGIOPATHY WITHOUT GANGRENE, WITHOUT LONG-TERM CURRENT USE OF INSULIN (HCC): ICD-10-CM

## 2017-08-02 DIAGNOSIS — E66.01 MORBID OBESITY DUE TO EXCESS CALORIES (HCC): ICD-10-CM

## 2017-08-02 DIAGNOSIS — M51.16 LUMBAR DISC DISEASE WITH RADICULOPATHY: Chronic | ICD-10-CM

## 2017-08-02 DIAGNOSIS — E11.51 TYPE 2 DIABETES MELLITUS WITH DIABETIC PERIPHERAL ANGIOPATHY WITHOUT GANGRENE, WITHOUT LONG-TERM CURRENT USE OF INSULIN (HCC): Primary | ICD-10-CM

## 2017-08-02 PROBLEM — G47.33 OBSTRUCTIVE SLEEP APNEA (ADULT) (PEDIATRIC): Chronic | Status: ACTIVE | Noted: 2017-03-07

## 2017-08-02 PROBLEM — E11.59 TYPE 2 DIABETES MELLITUS WITH CIRCULATORY DISORDER, WITHOUT LONG-TERM CURRENT USE OF INSULIN (HCC): Chronic | Status: ACTIVE | Noted: 2017-05-01

## 2017-08-02 LAB
ALBUMIN SERPL-MCNC: 3.7 G/DL (ref 3.5–5.2)
ALP BLD-CCNC: 86 U/L (ref 40–130)
ALT SERPL-CCNC: 12 U/L (ref 5–41)
ANION GAP SERPL CALCULATED.3IONS-SCNC: 21 MMOL/L (ref 7–19)
AST SERPL-CCNC: 15 U/L (ref 5–40)
BILIRUB SERPL-MCNC: 0.3 MG/DL (ref 0.2–1.2)
BUN BLDV-MCNC: 23 MG/DL (ref 8–23)
CALCIUM SERPL-MCNC: 10.2 MG/DL (ref 8.8–10.2)
CHLORIDE BLD-SCNC: 96 MMOL/L (ref 98–111)
CO2: 22 MMOL/L (ref 22–29)
CREAT SERPL-MCNC: 1.9 MG/DL (ref 0.5–1.2)
GFR NON-AFRICAN AMERICAN: 36
GLUCOSE BLD-MCNC: 332 MG/DL (ref 74–109)
HBA1C MFR BLD: 14.1 %
HEPATITIS C ANTIBODY INTERPRETATION: NORMAL
POTASSIUM SERPL-SCNC: 4.2 MMOL/L (ref 3.5–5)
SODIUM BLD-SCNC: 139 MMOL/L (ref 136–145)
TOTAL PROTEIN: 8 G/DL (ref 6.6–8.7)

## 2017-08-02 PROCEDURE — G8417 CALC BMI ABV UP PARAM F/U: HCPCS | Performed by: FAMILY MEDICINE

## 2017-08-02 PROCEDURE — 99214 OFFICE O/P EST MOD 30 MIN: CPT | Performed by: FAMILY MEDICINE

## 2017-08-02 PROCEDURE — 1036F TOBACCO NON-USER: CPT | Performed by: FAMILY MEDICINE

## 2017-08-02 PROCEDURE — 3046F HEMOGLOBIN A1C LEVEL >9.0%: CPT | Performed by: FAMILY MEDICINE

## 2017-08-02 PROCEDURE — G8427 DOCREV CUR MEDS BY ELIG CLIN: HCPCS | Performed by: FAMILY MEDICINE

## 2017-08-02 PROCEDURE — 3017F COLORECTAL CA SCREEN DOC REV: CPT | Performed by: FAMILY MEDICINE

## 2017-08-02 RX ORDER — PIOGLITAZONEHYDROCHLORIDE 30 MG/1
30 TABLET ORAL DAILY
Qty: 30 TABLET | Refills: 3 | Status: SHIPPED | OUTPATIENT
Start: 2017-08-02 | End: 2017-09-06

## 2017-08-02 RX ORDER — LANCETS 30 GAUGE
EACH MISCELLANEOUS
Qty: 100 EACH | Refills: 3 | Status: SHIPPED | OUTPATIENT
Start: 2017-08-02 | End: 2017-10-10 | Stop reason: SDUPTHER

## 2017-08-02 ASSESSMENT — ENCOUNTER SYMPTOMS
NAUSEA: 0
TROUBLE SWALLOWING: 0
EYE ITCHING: 0
RHINORRHEA: 0
COUGH: 0
DIARRHEA: 0
SHORTNESS OF BREATH: 0
BACK PAIN: 1
CONSTIPATION: 0
SORE THROAT: 0
ABDOMINAL PAIN: 0
COLOR CHANGE: 0

## 2017-08-07 ENCOUNTER — TELEPHONE (OUTPATIENT)
Dept: PRIMARY CARE CLINIC | Age: 62
End: 2017-08-07

## 2017-08-11 ENCOUNTER — OFFICE VISIT (OUTPATIENT)
Dept: NEUROLOGY | Age: 62
End: 2017-08-11
Payer: MEDICARE

## 2017-08-11 VITALS
HEIGHT: 68 IN | DIASTOLIC BLOOD PRESSURE: 81 MMHG | WEIGHT: 242 LBS | OXYGEN SATURATION: 96 % | HEART RATE: 99 BPM | BODY MASS INDEX: 36.68 KG/M2 | SYSTOLIC BLOOD PRESSURE: 158 MMHG

## 2017-08-11 DIAGNOSIS — G47.33 OBSTRUCTIVE SLEEP APNEA: Primary | ICD-10-CM

## 2017-08-11 DIAGNOSIS — Z99.89 BIPAP (BIPHASIC POSITIVE AIRWAY PRESSURE) DEPENDENCE: ICD-10-CM

## 2017-08-11 PROCEDURE — 99213 OFFICE O/P EST LOW 20 MIN: CPT | Performed by: PHYSICIAN ASSISTANT

## 2017-08-11 PROCEDURE — G8417 CALC BMI ABV UP PARAM F/U: HCPCS | Performed by: PHYSICIAN ASSISTANT

## 2017-08-11 PROCEDURE — 1036F TOBACCO NON-USER: CPT | Performed by: PHYSICIAN ASSISTANT

## 2017-08-11 PROCEDURE — 3017F COLORECTAL CA SCREEN DOC REV: CPT | Performed by: PHYSICIAN ASSISTANT

## 2017-08-11 PROCEDURE — G8427 DOCREV CUR MEDS BY ELIG CLIN: HCPCS | Performed by: PHYSICIAN ASSISTANT

## 2017-09-06 ENCOUNTER — OFFICE VISIT (OUTPATIENT)
Dept: PRIMARY CARE CLINIC | Age: 62
End: 2017-09-06
Payer: MEDICARE

## 2017-09-06 VITALS
WEIGHT: 242 LBS | TEMPERATURE: 98 F | BODY MASS INDEX: 36.68 KG/M2 | DIASTOLIC BLOOD PRESSURE: 80 MMHG | OXYGEN SATURATION: 95 % | HEART RATE: 102 BPM | RESPIRATION RATE: 20 BRPM | SYSTOLIC BLOOD PRESSURE: 148 MMHG | HEIGHT: 68 IN

## 2017-09-06 DIAGNOSIS — I10 ESSENTIAL HYPERTENSION: ICD-10-CM

## 2017-09-06 DIAGNOSIS — G47.33 OSA (OBSTRUCTIVE SLEEP APNEA): ICD-10-CM

## 2017-09-06 DIAGNOSIS — E11.51 TYPE 2 DIABETES MELLITUS WITH DIABETIC PERIPHERAL ANGIOPATHY WITHOUT GANGRENE, WITHOUT LONG-TERM CURRENT USE OF INSULIN (HCC): Primary | Chronic | ICD-10-CM

## 2017-09-06 PROCEDURE — G8417 CALC BMI ABV UP PARAM F/U: HCPCS | Performed by: FAMILY MEDICINE

## 2017-09-06 PROCEDURE — G8427 DOCREV CUR MEDS BY ELIG CLIN: HCPCS | Performed by: FAMILY MEDICINE

## 2017-09-06 PROCEDURE — 3017F COLORECTAL CA SCREEN DOC REV: CPT | Performed by: FAMILY MEDICINE

## 2017-09-06 PROCEDURE — 1036F TOBACCO NON-USER: CPT | Performed by: FAMILY MEDICINE

## 2017-09-06 PROCEDURE — 3046F HEMOGLOBIN A1C LEVEL >9.0%: CPT | Performed by: FAMILY MEDICINE

## 2017-09-06 PROCEDURE — 99214 OFFICE O/P EST MOD 30 MIN: CPT | Performed by: FAMILY MEDICINE

## 2017-09-06 RX ORDER — LANCETS 30 GAUGE
EACH MISCELLANEOUS
Qty: 100 EACH | Refills: 3 | Status: SHIPPED | OUTPATIENT
Start: 2017-09-06 | End: 2017-11-01 | Stop reason: SDUPTHER

## 2017-09-06 RX ORDER — LISINOPRIL 20 MG/1
20 TABLET ORAL 2 TIMES DAILY
Qty: 60 TABLET | Refills: 1 | Status: SHIPPED | OUTPATIENT
Start: 2017-09-06 | End: 2017-10-04 | Stop reason: SDUPTHER

## 2017-09-06 ASSESSMENT — ENCOUNTER SYMPTOMS
WHEEZING: 0
SHORTNESS OF BREATH: 0
COUGH: 0
CHEST TIGHTNESS: 0
VOMITING: 0
ABDOMINAL PAIN: 0
CONSTIPATION: 0
NAUSEA: 0
DIARRHEA: 0

## 2017-09-07 ENCOUNTER — TELEPHONE (OUTPATIENT)
Dept: PRIMARY CARE CLINIC | Age: 62
End: 2017-09-07

## 2017-09-12 ENCOUNTER — CARE COORDINATION (OUTPATIENT)
Dept: PRIMARY CARE CLINIC | Age: 62
End: 2017-09-12

## 2017-09-12 ENCOUNTER — CARE COORDINATION (OUTPATIENT)
Dept: CARE COORDINATION | Age: 62
End: 2017-09-12

## 2017-09-13 ENCOUNTER — CARE COORDINATOR VISIT (OUTPATIENT)
Dept: PRIMARY CARE CLINIC | Age: 62
End: 2017-09-13

## 2017-09-13 NOTE — CARE COORDINATION
PATIENT ATTEND APPOINTMENT SCHEDULED WITH ME AT 10:00AM.  REVIEWED [employer] PATIENT ASSISTANCE APPLICATION WITH THE PATIENT. PATIENT SIGNED THE APPLICATION.     Submitted by Mary/DEBBIE

## 2017-10-04 ENCOUNTER — OFFICE VISIT (OUTPATIENT)
Dept: PRIMARY CARE CLINIC | Age: 62
End: 2017-10-04
Payer: MEDICARE

## 2017-10-04 VITALS
HEART RATE: 68 BPM | SYSTOLIC BLOOD PRESSURE: 122 MMHG | HEIGHT: 68 IN | RESPIRATION RATE: 20 BRPM | BODY MASS INDEX: 36.07 KG/M2 | TEMPERATURE: 96 F | WEIGHT: 238 LBS | DIASTOLIC BLOOD PRESSURE: 60 MMHG | OXYGEN SATURATION: 98 %

## 2017-10-04 DIAGNOSIS — R06.6 HICCUP: ICD-10-CM

## 2017-10-04 DIAGNOSIS — R49.0 HOARSENESS: ICD-10-CM

## 2017-10-04 DIAGNOSIS — I10 ESSENTIAL HYPERTENSION: ICD-10-CM

## 2017-10-04 DIAGNOSIS — E11.51 TYPE 2 DIABETES MELLITUS WITH DIABETIC PERIPHERAL ANGIOPATHY WITHOUT GANGRENE, WITHOUT LONG-TERM CURRENT USE OF INSULIN (HCC): Primary | Chronic | ICD-10-CM

## 2017-10-04 PROCEDURE — 3017F COLORECTAL CA SCREEN DOC REV: CPT | Performed by: FAMILY MEDICINE

## 2017-10-04 PROCEDURE — 3046F HEMOGLOBIN A1C LEVEL >9.0%: CPT | Performed by: FAMILY MEDICINE

## 2017-10-04 PROCEDURE — 99214 OFFICE O/P EST MOD 30 MIN: CPT | Performed by: FAMILY MEDICINE

## 2017-10-04 PROCEDURE — 1036F TOBACCO NON-USER: CPT | Performed by: FAMILY MEDICINE

## 2017-10-04 PROCEDURE — G8417 CALC BMI ABV UP PARAM F/U: HCPCS | Performed by: FAMILY MEDICINE

## 2017-10-04 PROCEDURE — G8484 FLU IMMUNIZE NO ADMIN: HCPCS | Performed by: FAMILY MEDICINE

## 2017-10-04 PROCEDURE — G8427 DOCREV CUR MEDS BY ELIG CLIN: HCPCS | Performed by: FAMILY MEDICINE

## 2017-10-04 RX ORDER — BLOOD-GLUCOSE METER
1 KIT MISCELLANEOUS DAILY PRN
Qty: 1 KIT | Refills: 0 | Status: SHIPPED | OUTPATIENT
Start: 2017-10-04 | End: 2017-10-10 | Stop reason: SDUPTHER

## 2017-10-04 RX ORDER — LANCETS 30 GAUGE
EACH MISCELLANEOUS
Qty: 100 EACH | Refills: 3 | Status: SHIPPED | OUTPATIENT
Start: 2017-10-04

## 2017-10-04 RX ORDER — LISINOPRIL 20 MG/1
20 TABLET ORAL 2 TIMES DAILY
Qty: 60 TABLET | Refills: 1 | Status: SHIPPED | OUTPATIENT
Start: 2017-10-04 | End: 2018-03-27

## 2017-10-04 NOTE — MR AVS SNAPSHOT
What changed:  Another medication with the same name was added. Make sure you understand how and when to take each. Changed by:  Megan Tejeda MD       * Insulin Pen Needle 31G X 6 MM Misc   Instructions:  1 each by Does not apply route daily May substitute to generic for insurance   Quantity:  100 each   Refills:  3   What changed: You were already taking a medication with the same name, and this prescription was added. Make sure you understand how and when to take each. Changed by:  Megan Tejeda MD       * Lancets Misc   Instructions:  Daily Accu-Chek   Quantity:  100 each   Refills:  3   What changed:  Another medication with the same name was added. Make sure you understand how and when to take each. Changed by:  MD Penny Olson Misc   Instructions:  Daily Accu-Chek   Quantity:  100 each   Refills:  3   What changed:  Another medication with the same name was added. Make sure you understand how and when to take each. Changed by:  MD Penny Olson Misc   Instructions:  Daily Accu-Chek   Quantity:  100 each   Refills:  3   What changed: You were already taking a medication with the same name, and this prescription was added. Make sure you understand how and when to take each. Changed by:  Megan Tejeda MD       * Notice: This list has 5 medication(s) that are the same as other medications prescribed for you. Read the directions carefully, and ask your doctor or other care provider to review them with you.       STOP taking these medications           buPROPion 150 MG extended release tablet   Commonly known as:  WELLBUTRIN SR   Stopped by:  Megan Tejeda MD       HYDROcodone-acetaminophen  MG per tablet   Commonly known as:  [address]   Stopped by:  Megan Tejeda MD       NEURONTIN 600 MG tablet   Generic drug:  gabapentin   Stopped by:  Megan Tejeda MD       nystatin 176730 UNIT/GM cream   Commonly known as:  MYCOSTATIN   Stopped by:  Megan Tejeda MD [employer] allows you to send messages to your doctor, view your test results, renew your prescriptions, schedule appointments, view visit notes, and more. How Do I Sign Up? 1. In your Internet browser, go to [email or web address]  2. Click on the Sign Up Now link in the Sign In box. You will see the New Member Sign Up page. 3. Enter your [employer] Access Code exactly as it appears below. You will not need to use this code after youve completed the sign-up process. If you do not sign up before the expiration date, you must request a new code. End[employer] Access Code: [email or web address]  Expires: 12/3/2017 11:42 AM    4. Enter your Social Security Number ([id number]) and Date of Birth (mm/dd/yyyy) as indicated and click Submit. You will be taken to the next sign-up page. 5. Create a EndPlay ID. This will be your EndPlay login ID and cannot be changed, so think of one that is secure and easy to remember. 6. Create a EndPlay password. You can change your password at any time. 7. Enter your Password Reset Question and Answer. This can be used at a later time if you forget your password. 8. Enter your e-mail address. You will receive e-mail notification when new information is available in [address] 9. Click Sign Up. You can now view your medical record. Additional Information  If you have questions, please contact the physician practice where you receive care. Remember, [employer] is NOT to be used for urgent needs. For medical emergencies, dial 911. For questions regarding your End[employer] account call [phone]. If you have a clinical question, please call your doctor's office.

## 2017-10-04 NOTE — PATIENT INSTRUCTIONS
Take your insulin 40 units at night even though in the morning. Then check your blood sugar in the morning before you eat. Follow up in 1 months or sooner if needed.

## 2017-10-06 ASSESSMENT — ENCOUNTER SYMPTOMS
CONSTIPATION: 0
WHEEZING: 0
CHEST TIGHTNESS: 0
NAUSEA: 0
DIARRHEA: 0
VOMITING: 0
ABDOMINAL PAIN: 0
COUGH: 0
SHORTNESS OF BREATH: 0

## 2017-10-06 NOTE — PROGRESS NOTES
these and if these will not be available by the end of the day. I will continue him on Cipro for hypertension in the setting of diabetes  Orders Placed This Encounter   Procedures    [facility] - Georgette Olivarez MD     Referral Priority:   Routine     Referral Type:   Consult for Advice and Opinion     Referral Reason:   Specialty Services Required     Referred to Provider:   Johnnie Tafoya MD     Requested Specialty:   Otolaryngology     Number of Visits Requested:   1     Orders Placed This Encounter   Medications    glucose blood VI test strips (EXACTECH TEST) strip     Si each by In Vitro route daily As needed. Dispense:  100 each     Refill:  3    lisinopril (PRINIVIL;ZESTRIL) 20 MG tablet     Sig: Take 1 tablet by mouth 2 times daily     Dispense:  60 tablet     Refill:  1    glucose monitoring kit (FREESTYLE) monitoring kit     Si kit by Does not apply route daily as needed (morning before breakfast)     Dispense:  1 kit     Refill:  0    Lancets MISC     Sig: Daily Accu-Chek     Dispense:  100 each     Refill:  3    Insulin Pen Needle 31G X 6 MM MISC     Si each by Does not apply route daily May substitute to generic for insurance     Dispense:  100 each     Refill:  3    insulin glargine (LANTUS SOLOSTAR) 100 UNIT/ML injection pen     Sig: Inject 40 Units into the skin nightly     Dispense:  5 Pen     Refill:  3     Medications Discontinued During This Encounter   Medication Reason    NEURONTIN 600 MG tablet     nystatin (MYCOSTATIN) 201015 UNIT/GM cream     HYDROcodone-acetaminophen (NORCO)  MG per tablet     buPROPion (WELLBUTRIN SR) 150 MG extended release tablet     glucose blood VI test strips (EXACTECH TEST) strip Reorder    lisinopril (PRINIVIL;ZESTRIL) 20 MG tablet Reorder    insulin glargine (LANTUS SOLOSTAR) 100 UNIT/ML injection pen Reorder     Patient Instructions   Take your insulin 40 units at night even though in the morning.   Then check your blood sugar

## 2017-10-10 RX ORDER — LANCETS 30 GAUGE
EACH MISCELLANEOUS
Qty: 100 EACH | Refills: 3 | Status: SHIPPED
Start: 2017-10-10 | End: 2020-03-16 | Stop reason: SDUPTHER

## 2017-10-10 RX ORDER — BLOOD-GLUCOSE METER
KIT MISCELLANEOUS
Qty: 1 KIT | Refills: 0 | Status: SHIPPED | OUTPATIENT
Start: 2017-10-10 | End: 2017-11-01 | Stop reason: SDUPTHER

## 2017-10-13 ENCOUNTER — CARE COORDINATION (OUTPATIENT)
Dept: CARE COORDINATION | Age: 62
End: 2017-10-13

## 2017-10-13 NOTE — CARE COORDINATION
PER NELLY, ONLY THE SUMMARY OF BENEFITS HAS BEEN COMPLETED. THE PATIENT HAS PAID $449 OUT OF POCKET. IF HE HAS SPENT 5% OF HIS YEARLY INCOME ON MEDICATION, HE \"SHOULD BE APPROVED\". A FINAL DETERMINATION WILL BE MADE BY Wednesday, 10.18.2017, OF NEXT WEEK. NELLY STATED DR FAGAN WILL RECEIVE A FAX FROM [facility] WHEN DETERMINATION MADE. THE PATIENT WILL RECEIVE A LETTER.     Submitted by Mary/DEBBIE

## 2017-10-16 ENCOUNTER — OFFICE VISIT (OUTPATIENT)
Dept: OTOLARYNGOLOGY | Age: 62
End: 2017-10-16
Payer: MEDICARE

## 2017-10-16 VITALS
OXYGEN SATURATION: 99 % | RESPIRATION RATE: 20 BRPM | HEART RATE: 108 BPM | DIASTOLIC BLOOD PRESSURE: 72 MMHG | WEIGHT: 237 LBS | TEMPERATURE: 93.9 F | SYSTOLIC BLOOD PRESSURE: 130 MMHG | HEIGHT: 67 IN | BODY MASS INDEX: 37.2 KG/M2

## 2017-10-16 DIAGNOSIS — R49.9 CHANGE IN VOICE: Primary | ICD-10-CM

## 2017-10-16 DIAGNOSIS — K21.9 GASTROESOPHAGEAL REFLUX DISEASE, ESOPHAGITIS PRESENCE NOT SPECIFIED: ICD-10-CM

## 2017-10-16 DIAGNOSIS — J38.00 VOCAL CORD PARESIS: ICD-10-CM

## 2017-10-16 PROCEDURE — 1036F TOBACCO NON-USER: CPT | Performed by: OTOLARYNGOLOGY

## 2017-10-16 PROCEDURE — G8484 FLU IMMUNIZE NO ADMIN: HCPCS | Performed by: OTOLARYNGOLOGY

## 2017-10-16 PROCEDURE — G8427 DOCREV CUR MEDS BY ELIG CLIN: HCPCS | Performed by: OTOLARYNGOLOGY

## 2017-10-16 PROCEDURE — 3017F COLORECTAL CA SCREEN DOC REV: CPT | Performed by: OTOLARYNGOLOGY

## 2017-10-16 PROCEDURE — G8417 CALC BMI ABV UP PARAM F/U: HCPCS | Performed by: OTOLARYNGOLOGY

## 2017-10-16 PROCEDURE — 99203 OFFICE O/P NEW LOW 30 MIN: CPT | Performed by: OTOLARYNGOLOGY

## 2017-10-16 NOTE — PROGRESS NOTES
SOLOSTAR) 100 UNIT/ML injection pen Inject 40 Units into the skin nightly 5 Pen 3    Insulin Pen Needle 31G X 6 MM MISC 1 each by Does not apply route daily May substitute to generic for insurance 100 each 3    Lancets MISC Daily Accu-Chek 100 each 3    glucose blood VI test strips (EXACTECH TEST) strip 1 each by In Vitro route daily As needed. 100 each 3    cyclobenzaprine (FLEXERIL) 5 MG tablet TAKE 1 TABLET BY MOUTH EVERY 8 HOURS AS NEEDED FOR MUSCLE SPASMS 90 tablet 5    metoprolol (LOPRESSOR) 100 MG tablet Take 1 tablet by mouth 2 times daily 60 tablet 3    hydrochlorothiazide (HYDRODIURIL) 25 MG tablet Take 1 tablet by mouth daily 90 tablet 3    atorvastatin (LIPITOR) 40 MG tablet Take 1 tablet by mouth daily 90 tablet 3    omeprazole (PRILOSEC) 40 MG delayed release capsule Take 40 mg by mouth 2 times daily      aspirin 325 MG tablet Take 325 mg by mouth daily. No current facility-administered medications for this visit. No Known Allergies    Subjective:    Voice changes over months in prior smoker. No wt loss with history bad GERD. No real dysphagia complaint but sometimes GERD symptoms. Review of Systems  A 12 point review of systems was completed, reviewed, and scanned to chart per staff. Patient medical history reviewed. Objective:     Physical Exam   Constitutional: He is oriented to person, place, and time. He appears well-developed and well-nourished. No distress. HENT:   Head: Normocephalic and atraumatic. Right Ear: Hearing, tympanic membrane, external ear and ear canal normal.   Left Ear: Hearing, tympanic membrane, external ear and ear canal normal.   Nose: Nose normal. No mucosal edema, rhinorrhea or nasal deformity. Right sinus exhibits no maxillary sinus tenderness and no frontal sinus tenderness. Left sinus exhibits no maxillary sinus tenderness and no frontal sinus tenderness.    Mouth/Throat: Oropharynx is clear and moist and mucous membranes are normal. No oropharyngeal exudate. Due to the nature of the patient's complaints and/or differential diagnosis, a fiberoptic exam to evaluate the nasopharynx, hypopharynx and larynx is warranted. Anesthesia of topical lidocaine and aracelis-synephrine was administered intranasally and a fiberoptic scope was introduced transnasnasally to evaluate the anatomy. The nasopharynx, vallecula, tongue base, and pharyngeal mucosa were visualized as well. The patient was asked to phonate and perform maneuvers to facilitate the exam.    Fiberoptic findings:  Interarytenoid and post glottic edema consistent with LPR. Also left true vocal cord paresis and secretion pooling hypopharynx. Neck: Neck supple. No thyromegaly present. Pulmonary/Chest: No stridor. No respiratory distress. He has no wheezes. Abdominal: He exhibits no distension. Lymphadenopathy:     He has no cervical adenopathy. Neurological: He is alert and oriented to person, place, and time. No cranial nerve deficit. Skin: Skin is warm and dry. Psychiatric: He has a normal mood and affect. His behavior is normal. Judgment normal.     /72   Pulse 108   Temp 93.9 °F (34.4 °C) (Temporal)   Resp 20   Ht 5' 7\" (1.702 m)   Wt 237 lb (107.5 kg)   SpO2 99%   BMI 37.12 kg/m²     Assessment:       ICD-10-CM ICD-9-CM    1. Change in voice R49.9 784.49    2. Vocal cord paresis J38.00 478.30    3. Gastroesophageal reflux disease, esophagitis presence not specified K21.9 530.81          Plan:     History:  Voice complaints    The patient presents with vocal changes. Fiberoptic Exam:  Due to the nature of the patient's complaints and/or differential diagnosis, a fiberoptic exam to evaluate the nasopharynx, hypopharynx and larynx is warranted. Anesthesia of topical lidocaine and aracelis-synephrine was administered intranasally and a fiberoptic scope was introduced transnasnasally to evaluate the anatomy.   The nasopharynx, vallecula, tongue base, and

## 2017-10-18 ENCOUNTER — HOSPITAL ENCOUNTER (OUTPATIENT)
Dept: CT IMAGING | Age: 62
Discharge: HOME | End: 2017-10-18
Payer: MEDICARE

## 2017-10-18 DIAGNOSIS — R49.9 CHANGE IN VOICE: ICD-10-CM

## 2017-10-18 DIAGNOSIS — J38.00 VOCAL CORD PARESIS: ICD-10-CM

## 2017-10-18 LAB
GFR NON-AFRICAN AMERICAN: 29
PERFORMED ON: ABNORMAL
POC CREATININE: 2.3 MG/DL (ref 0.3–1.3)
POC SAMPLE TYPE: ABNORMAL

## 2017-10-18 PROCEDURE — 70490 CT SOFT TISSUE NECK W/O DYE: CPT

## 2017-10-18 PROCEDURE — 82565 ASSAY OF CREATININE: CPT

## 2017-10-18 PROCEDURE — 71250 CT THORAX DX C-: CPT

## 2017-10-23 ENCOUNTER — OFFICE VISIT (OUTPATIENT)
Dept: GASTROENTEROLOGY | Facility: CLINIC | Age: 62
End: 2017-10-23

## 2017-10-23 VITALS
HEIGHT: 67 IN | DIASTOLIC BLOOD PRESSURE: 72 MMHG | HEART RATE: 84 BPM | OXYGEN SATURATION: 96 % | WEIGHT: 237 LBS | SYSTOLIC BLOOD PRESSURE: 120 MMHG | BODY MASS INDEX: 37.2 KG/M2

## 2017-10-23 DIAGNOSIS — Z86.010 HX OF COLONIC POLYPS: Primary | ICD-10-CM

## 2017-10-23 DIAGNOSIS — K21.9 GASTROESOPHAGEAL REFLUX DISEASE, ESOPHAGITIS PRESENCE NOT SPECIFIED: ICD-10-CM

## 2017-10-23 DIAGNOSIS — I10 HTN (HYPERTENSION), BENIGN: ICD-10-CM

## 2017-10-23 PROBLEM — Z86.0100 HX OF COLONIC POLYPS: Status: ACTIVE | Noted: 2017-10-23

## 2017-10-23 PROCEDURE — 99212 OFFICE O/P EST SF 10 MIN: CPT | Performed by: CLINICAL NURSE SPECIALIST

## 2017-10-23 RX ORDER — OMEPRAZOLE 20 MG/1
20 CAPSULE, DELAYED RELEASE ORAL 2 TIMES DAILY
Qty: 60 CAPSULE | Refills: 11 | Status: SHIPPED | OUTPATIENT
Start: 2017-10-23 | End: 2018-11-08 | Stop reason: SDUPTHER

## 2017-10-23 NOTE — PROGRESS NOTES
Edy Cosby  1955      10/23/2017  Chief Complaint   Patient presents with   • Colonoscopy         HPI    Edy Cosby is a  62 y.o. male here for a follow up visit for hx of colon polyps with poor prep last colonoscopy in 2016. He has no current problems. No change in bowels. No rectal bleeding. No wt loss. He denies any fever chills or sweats. He is aware that he had stool throughout his colon the last exam. His reflux is stable ongoing for years. He says it is stable at  Current with his Omeprazole. He denies any burning nausea or vomiting. No difficulty swallowing.     Past Medical History:   Diagnosis Date   • A-fib    • Chronic back pain    • Constipation    • Diabetes mellitus     TYPE II   • Dysphagia    • Emphysema of lung    • Gastroparesis    • GERD (gastroesophageal reflux disease)    • Hypertension     ESSENTIAL   • Leg pain    • Osteoarthritis    • PONV (postoperative nausea and vomiting)    • Visit for monitoring Plavix therapy     DOES NOT TAKE PLAVIX     Past Surgical History:   Procedure Laterality Date   • BACK SURGERY     • CHOLECYSTECTOMY     • COLONOSCOPY  09/01/2011    TICS RECALL 5YR   • COLONOSCOPY  09/30/2008    ENTER RESULTS: STOOL THROUGHTOUT THE COLON POOR PREP REC 3 YEAR RECALL   • COLONOSCOPY N/A 11/9/2016    Procedure: COLONOSCOPY;  Surgeon: León Zepeda MD;  Location: [facility] ENDOSCOPY;  Service:    • ENDOSCOPY  06/19/2012    HH   • ENDOSCOPY  08/25/2009    HIATAL HERNIA, DILATED WITH 50 FR MASCORRO   • ENDOSCOPY  06/19/2007    WITHIN NORMAL LIMITS UREA NEG   • LUMBAR LAMINECTOMY WITH FUSION N/A 1/23/2017    Procedure: REMOVAL OF INSTRUMENTATION, EXPLORATION OF FUSION L4-S1, REVISION LEFT L5-S1 HEMILAMINECTOMY, FACETECTOMY DECOMPRESSION, REVISION UNINSTRUMENTED POSTERIOR SPINAL FUSION L5-S1;  Surgeon: RHONDA Lopes MD;  Location: [facility] OR;  Service:    • OTHER SURGICAL HISTORY      LUMBAR SACRAL SURGERY WITH FUSION X2   • PILONIDAL CYST / SINUS EXCISION       PILONIDAL CYST REMOVAL       Outpatient Prescriptions Marked as Taking for the 10/23/17 encounter (Office Visit) with PRATIMA Rodriguez   Medication Sig Dispense Refill   • aspirin 325 MG tablet Take 325 mg by mouth daily.     • carisoprodol (SOMA) 250 MG tablet Take 1 tablet by mouth daily.     • gabapentin (NEURONTIN) 600 MG tablet Take 1,200 mg by mouth Daily.     • gabapentin (NEURONTIN) 600 MG tablet Take 600 mg by mouth Every Night.     • glimepiride (AMARYL) 4 MG tablet Take 1 tablet by mouth every morning (before breakfast)     • hydrochlorothiazide (HYDRODIURIL) 25 MG tablet Take 25 mg by mouth Daily.     • lisinopril (PRINIVIL,ZESTRIL) 10 MG tablet Take 1 tablet by mouth 2 times daily     • metoprolol tartrate (LOPRESSOR) 50 MG tablet Take 50 mg by mouth 2 (Two) Times a Day.     • omeprazole (priLOSEC) 20 MG capsule Take 1 capsule by mouth 2 (Two) Times a Day. 60 capsule 11   • ZOLPIDEM TARTRATE PO Take 10 mg by mouth At Night As Needed.     • [DISCONTINUED] omeprazole (priLOSEC) 20 MG capsule Take 1 capsule by mouth 2 (Two) Times a Day. 60 capsule 11       No Known Allergies    Social History     Social History   • Marital status: Single     Spouse name: N/A   • Number of children: N/A   • Years of education: N/A     Occupational History   • Not on file.     Social History Main Topics   • Smoking status: Former Smoker     Years: 30.00     Types: Cigarettes     Quit date: 1/9/2017   • Smokeless tobacco: Never Used   • Alcohol use No   • Drug use: No   • Sexual activity: Defer     Other Topics Concern   • Not on file     Social History Narrative       Family History   Problem Relation Age of Onset   • Heart attack Father    • Diabetes Brother    • Colon polyps Sister    • Stomach cancer Neg Hx      GI CNACERS OR DISEASE   • Colon cancer Neg Hx        Review of Systems   Constitutional: Negative for chills, diaphoresis and fever.   Respiratory: Negative for cough, choking, chest tightness and  "shortness of breath.    Cardiovascular: Negative for chest pain and palpitations.   Gastrointestinal: Negative for abdominal pain, nausea and vomiting.   Musculoskeletal: Negative for joint swelling.   Skin: Negative for color change and rash.   Neurological: Negative for headaches.   Psychiatric/Behavioral: Negative for confusion.       /72  Pulse 84  Ht 67\" (170.2 cm)  Wt 237 lb (108 kg)  SpO2 96%  BMI 37.12 kg/m2  Body mass index is 37.12 kg/(m^2).    Physical Exam   Constitutional: He is oriented to person, place, and time. He appears well-developed and well-nourished.   HENT:   Head: Normocephalic and atraumatic.   Eyes: Pupils are equal, round, and reactive to light.   Neck: Normal range of motion. Neck supple. No tracheal deviation present.   Cardiovascular: Normal rate, regular rhythm and normal heart sounds.  Exam reveals no gallop and no friction rub.    No murmur heard.  Pulmonary/Chest: Effort normal and breath sounds normal. No respiratory distress. He has no wheezes. He has no rales. He exhibits no tenderness.   Abdominal: Soft. Bowel sounds are normal. He exhibits no distension. There is no hepatosplenomegaly. There is no tenderness. There is no rigidity, no rebound and no guarding.   Musculoskeletal: Normal range of motion. He exhibits no edema, tenderness or deformity.   Neurological: He is alert and oriented to person, place, and time. He has normal reflexes.   Skin: Skin is warm and dry. No rash noted. No pallor.   Psychiatric: He has a normal mood and affect. His behavior is normal. Judgment and thought content normal.       ASSESSMENT AND PLAN    Edy was seen today for colonoscopy.    Diagnoses and all orders for this visit:    Hx of colonic polyps  Comments:  poor prep in 11/2016 stool throughout colon hx of polyps prior  Orders:  -     polyethylene glycol (GoLYTELY) 236 g solution; Take as directed by office instructions.  -     Case Request; Standing  -     Implement " Anesthesia Orders Day of Procedure; Standing  -     Obtain Informed Consent; Standing  -     Verify bowel prep was successful; Standing  -     Case Request    Gastroesophageal reflux disease, esophagitis presence not specified  -     omeprazole (priLOSEC) 20 MG capsule; Take 1 capsule by mouth 2 (Two) Times a Day.    HTN (hypertension), benign  Comments:  cont BP medication the day of procedure      All risks, benefits, alternatives, and indications of colonoscopy procedure have been discussed with the patient. Risks to include perforation of the colon requiring possible surgery or colostomy, risk of bleeding from biopsies or removal of colon tissue, possibility of missing a colon polyp or cancer, or adverse drug reaction.  Benefits to include the diagnosis and management of disease of the colon and rectum. Alternatives to include barium enema, radiographic evaluation, lab testing or no intervention. Pt verbalizes understanding and agrees.         There are no Patient Instructions on file for this visit.

## 2017-10-25 ENCOUNTER — HOSPITAL ENCOUNTER (OUTPATIENT)
Dept: PREADMISSION TESTING | Age: 62
Setting detail: OUTPATIENT SURGERY
Discharge: HOME | End: 2017-10-25

## 2017-10-25 ENCOUNTER — HOSPITAL ENCOUNTER (OUTPATIENT)
Dept: NON INVASIVE DIAGNOSTICS | Age: 62
Discharge: HOME | End: 2017-10-25
Payer: MEDICARE

## 2017-10-25 ENCOUNTER — OFFICE VISIT (OUTPATIENT)
Dept: OTOLARYNGOLOGY | Age: 62
End: 2017-10-25
Payer: MEDICARE

## 2017-10-25 ENCOUNTER — HOSPITAL ENCOUNTER (OUTPATIENT)
Dept: LAB | Age: 62
Discharge: HOME | End: 2017-10-25
Payer: MEDICARE

## 2017-10-25 ENCOUNTER — ANESTHESIA EVENT (OUTPATIENT)
Dept: OPERATING ROOM | Age: 62
End: 2017-10-25

## 2017-10-25 VITALS
HEART RATE: 96 BPM | BODY MASS INDEX: 37.2 KG/M2 | HEIGHT: 67 IN | DIASTOLIC BLOOD PRESSURE: 76 MMHG | OXYGEN SATURATION: 98 % | TEMPERATURE: 96.2 F | RESPIRATION RATE: 20 BRPM | WEIGHT: 237 LBS | SYSTOLIC BLOOD PRESSURE: 130 MMHG

## 2017-10-25 DIAGNOSIS — R49.9 CHANGE IN VOICE: Primary | ICD-10-CM

## 2017-10-25 DIAGNOSIS — J38.00 VOCAL CORD WEAKNESS: ICD-10-CM

## 2017-10-25 DIAGNOSIS — E11.9 TYPE 2 DIABETES MELLITUS WITHOUT COMPLICATION, WITHOUT LONG-TERM CURRENT USE OF INSULIN (HCC): ICD-10-CM

## 2017-10-25 DIAGNOSIS — Z11.4 SCREENING FOR HIV WITHOUT PRESENCE OF RISK FACTORS: ICD-10-CM

## 2017-10-25 DIAGNOSIS — Z11.59 NEED FOR HEPATITIS C SCREENING TEST: ICD-10-CM

## 2017-10-25 LAB
ANION GAP SERPL CALCULATED.3IONS-SCNC: 12 MMOL/L (ref 7–19)
BUN BLDV-MCNC: 29 MG/DL (ref 8–23)
CALCIUM SERPL-MCNC: 9.8 MG/DL (ref 8.8–10.2)
CHLORIDE BLD-SCNC: 99 MMOL/L (ref 98–111)
CO2: 28 MMOL/L (ref 22–29)
CREAT SERPL-MCNC: 1.8 MG/DL (ref 0.5–1.2)
GFR NON-AFRICAN AMERICAN: 38
GLUCOSE BLD-MCNC: 229 MG/DL (ref 74–109)
HEPATITIS C ANTIBODY INTERPRETATION: NORMAL
POTASSIUM SERPL-SCNC: 5.1 MMOL/L (ref 3.5–5)
RAPID HIV 1&2: NORMAL
SODIUM BLD-SCNC: 139 MMOL/L (ref 136–145)

## 2017-10-25 PROCEDURE — 93005 ELECTROCARDIOGRAM TRACING: CPT

## 2017-10-25 PROCEDURE — G8427 DOCREV CUR MEDS BY ELIG CLIN: HCPCS | Performed by: OTOLARYNGOLOGY

## 2017-10-25 PROCEDURE — 1036F TOBACCO NON-USER: CPT | Performed by: OTOLARYNGOLOGY

## 2017-10-25 PROCEDURE — 3017F COLORECTAL CA SCREEN DOC REV: CPT | Performed by: OTOLARYNGOLOGY

## 2017-10-25 PROCEDURE — G8484 FLU IMMUNIZE NO ADMIN: HCPCS | Performed by: OTOLARYNGOLOGY

## 2017-10-25 PROCEDURE — G8417 CALC BMI ABV UP PARAM F/U: HCPCS | Performed by: OTOLARYNGOLOGY

## 2017-10-25 PROCEDURE — 99214 OFFICE O/P EST MOD 30 MIN: CPT | Performed by: OTOLARYNGOLOGY

## 2017-10-25 NOTE — PROGRESS NOTES
Subjective:      Patient ID: Kamari Durham is a 65 y.o. male. HPI   Persistent hoarseness in prior smoker with ? TVC mobility issue Paresis andnegative CT neck and chest.    Review of Systems    Objective:   Physical Exam    Assessment:        1. Change in voice     2. Vocal cord weakness             Plan: To OR for eval with direct Lx Bx possible esophagoscopy. Risk benefits explained.

## 2017-11-01 ENCOUNTER — CARE COORDINATOR VISIT (OUTPATIENT)
Dept: CARE COORDINATION | Age: 62
End: 2017-11-01

## 2017-11-01 ENCOUNTER — OFFICE VISIT (OUTPATIENT)
Dept: PRIMARY CARE CLINIC | Age: 62
End: 2017-11-01
Payer: MEDICARE

## 2017-11-01 VITALS
DIASTOLIC BLOOD PRESSURE: 78 MMHG | RESPIRATION RATE: 20 BRPM | WEIGHT: 237 LBS | BODY MASS INDEX: 37.12 KG/M2 | OXYGEN SATURATION: 92 % | TEMPERATURE: 98 F | SYSTOLIC BLOOD PRESSURE: 140 MMHG | HEART RATE: 82 BPM

## 2017-11-01 DIAGNOSIS — E11.51 TYPE 2 DIABETES MELLITUS WITH DIABETIC PERIPHERAL ANGIOPATHY WITHOUT GANGRENE, WITHOUT LONG-TERM CURRENT USE OF INSULIN (HCC): Primary | Chronic | ICD-10-CM

## 2017-11-01 DIAGNOSIS — J38.00 VOCAL CORD WEAKNESS: ICD-10-CM

## 2017-11-01 DIAGNOSIS — I10 BENIGN ESSENTIAL HTN: ICD-10-CM

## 2017-11-01 DIAGNOSIS — Z59.86 DIFFICULTY WITH MONEY MANAGEMENT: ICD-10-CM

## 2017-11-01 DIAGNOSIS — Z71.89 DIABETES EDUCATION, ENCOUNTER FOR: ICD-10-CM

## 2017-11-01 LAB — HBA1C MFR BLD: 14 %

## 2017-11-01 PROCEDURE — G8427 DOCREV CUR MEDS BY ELIG CLIN: HCPCS | Performed by: FAMILY MEDICINE

## 2017-11-01 PROCEDURE — 1036F TOBACCO NON-USER: CPT | Performed by: FAMILY MEDICINE

## 2017-11-01 PROCEDURE — G8417 CALC BMI ABV UP PARAM F/U: HCPCS | Performed by: FAMILY MEDICINE

## 2017-11-01 PROCEDURE — 3046F HEMOGLOBIN A1C LEVEL >9.0%: CPT | Performed by: FAMILY MEDICINE

## 2017-11-01 PROCEDURE — 83036 HEMOGLOBIN GLYCOSYLATED A1C: CPT | Performed by: FAMILY MEDICINE

## 2017-11-01 PROCEDURE — 99401 PREV MED CNSL INDIV APPRX 15: CPT | Performed by: FAMILY MEDICINE

## 2017-11-01 PROCEDURE — 99214 OFFICE O/P EST MOD 30 MIN: CPT | Performed by: FAMILY MEDICINE

## 2017-11-01 PROCEDURE — 3017F COLORECTAL CA SCREEN DOC REV: CPT | Performed by: FAMILY MEDICINE

## 2017-11-01 PROCEDURE — G8484 FLU IMMUNIZE NO ADMIN: HCPCS | Performed by: FAMILY MEDICINE

## 2017-11-01 RX ORDER — AMLODIPINE BESYLATE 5 MG/1
5 TABLET ORAL DAILY
Qty: 30 TABLET | Refills: 3 | Status: SHIPPED | OUTPATIENT
Start: 2017-11-01 | End: 2018-03-21 | Stop reason: SDUPTHER

## 2017-11-01 SDOH — ECONOMIC STABILITY - INCOME SECURITY: FINANCIAL INSECURITY: Z59.86

## 2017-11-01 NOTE — CARE COORDINATION
Ambulatory Care Coordination Note  11/1/2017  CM Risk Score: 4  Oskar Mortality Risk Score:      ACC: Rachael Moore RN    Summary Note: Introduction into the [facility] program, review of DM including diet, checking blood sugars, as well as dosing and giving insulin    Ambulatory Care Coordination Assessment    Care Coordination Protocol  Program Enrollment:  Rising Risk  Referral from Primary Care Provider:  Yes  Week 1 - Initial Assessment     Do you have all of your prescriptions and are they filled?:  Yes  Barriers to medication adherence:  Forgets to take, Other  Other barriers to medication adherence:  Affording some of the higher priced medications,  Are you able to afford your medications?:  With Assistance  Medication Assistance Program:  Other  Other Assistance Program:   pt given phone number to MIL[facility] [address]  How often do you have trouble taking your medications the way you have been told to take them?:  Sometimes I take them as prescribed. Do you have Home O2 Therapy?:  No      Is patient able to live independently?:  Yes                 Are you experiencing loss of meaning?:  No  Are you experiencing loss of hope and peace?:  No     Thinking about your patient's physical health needs, are there any symptoms or problems (risk indicators) you are unsure about that require further investigation?:  Mild vague physical symptoms or problems; but do not impact on daily life or are not of concern to patient   Are the patients physical health problems impacting on their mental well-being?:  Mild impact on mental well-being e.g. \"\"feeling fed-up\"\", \"\"reduced enjoyment\"\"   Are there any problems with your patients lifestyle behaviors (alcohol, drugs, diet, exercise) that are impacting on physical or mental well-being?:  Some mild concern of potential negative impact on well-being   Do you have any other concerns about your patients mental well-being?  How would you rate their severity and impact on the patient?: a1c, diet, how to self-administer insulin, how to check and record blood sugar, pt given food idea handouts from the ADA website    11/1/17-Pt to set an alarm to go off every evening to remind him to take Lantus               Prior to Admission medications    Medication Sig Start Date End Date Taking? Authorizing Provider   Insulin Pen Needle 31G X 6 MM MISC 1 each by Does not apply route 4 times daily (before meals and nightly) May substitute to generic for insurance 11/1/17   Karlo Mansfield MD   amLODIPine (NORVASC) 5 MG tablet Take 1 tablet by mouth daily 11/1/17   Karlo Mansfield MD   Lancets MISC Accu-Chek TID,  DX: E11.59 10/10/17   Karlo Mansfield MD   glucose blood VI test strips (EXACTECH TEST) strip 1 each by In Vitro route 3 times daily As needed. E11.59 10/10/17   Karlo Mansfield MD   lisinopril (PRINIVIL;ZESTRIL) 20 MG tablet Take 1 tablet by mouth 2 times daily 10/4/17   Karlo Mansfield MD   Lancets MISC Daily Accu-Chek 10/4/17   Karlo Mansfield MD   Insulin Pen Needle 31G X 6 MM MISC 1 each by Does not apply route daily May substitute to generic for insurance 10/4/17   Karlo Mansfield MD   insulin glargine (LANTUS SOLOSTAR) 100 UNIT/ML injection pen Inject 40 Units into the skin nightly 10/4/17   Karlo Mansfield MD   cyclobenzaprine (FLEXERIL) 5 MG tablet TAKE 1 TABLET BY MOUTH EVERY 8 HOURS AS NEEDED FOR MUSCLE SPASMS 8/11/17   Karlo Mansfield MD   metoprolol (LOPRESSOR) 100 MG tablet Take 1 tablet by mouth 2 times daily 5/1/17   Karlo Mansfield MD   hydrochlorothiazide (HYDRODIURIL) 25 MG tablet Take 1 tablet by mouth daily 3/15/17   Karlo Mansfield MD   atorvastatin (LIPITOR) 40 MG tablet Take 1 tablet by mouth daily 3/15/17   Karlo Mansfield MD   omeprazole (PRILOSEC) 40 MG delayed release capsule Take 40 mg by mouth 2 times daily    Historical Provider, MD   aspirin 325 MG tablet Take 325 mg by mouth daily.  9/21/10   Historical Provider, MD       Future Appointments  Date Time Provider Diego Pepper

## 2017-11-01 NOTE — PROGRESS NOTES
Delories Mohs is a 65 y.o. male who presents today for   Chief Complaint   Patient presents with    Diabetes    Foot Swelling     left leg    Other     ENT is doing surgery on Nov 10 @ 8       HPI  Patient is here for f/u for insulin and diabetes. He has been trying to lose weight. He is seeing Dr. Travis Benavides for hoarseness. He is going to have surgery on November 10 for biopsy of vocal cords. Patient is unable to afford insulin as it has been more expensive than metformin. His A1c drastically changed from controlled uncontrolled substantially and a short period of time. We have been trying to use free samples to supply him. No change in PMH, family, social, or surgical history unless mentioned above. Review of Systems   Constitutional: Negative for chills and fever. Respiratory: Negative for cough, chest tightness, shortness of breath and wheezing. Cardiovascular: Negative for chest pain, palpitations and leg swelling. Gastrointestinal: Negative for abdominal pain, constipation, diarrhea, nausea and vomiting. Genitourinary: Negative for difficulty urinating, dysuria and frequency.        Past Medical History:   Diagnosis Date    Arthritis     BiPAP (biphasic positive airway pressure) dependence     10cm to 20cm    Chronic back pain     Emphysema/COPD (HCC)     GERD (gastroesophageal reflux disease)     History of anemia     History of blood transfusion     Hyperlipidemia     Hypertension     Neuropathy (HCC)     Obstructive sleep apnea     AHI:  95.8 per PSG, 3/2017    Peripheral vascular disease ([facility] [address]     S/P angioplasty     Type II or unspecified type diabetes mellitus without mention of complication, not stated as uncontrolled        Current Outpatient Prescriptions   Medication Sig Dispense Refill    Insulin Pen Needle 31G X 6 MM MISC 1 each by Does not apply route 4 times daily (before meals and nightly) May substitute to generic for insurance 120 each 3    mashed potatoes, etc)     Learning About Diabetes Food Guidelines  Your Care Instructions  Meal planning is important to manage diabetes. It helps keep your blood sugar at a target level (which you set with your doctor). You don't have to eat special foods. You can eat what your family eats, including sweets once in a while. But you do have to pay attention to how often you eat and how much you eat of certain foods. You may want to work with a dietitian or a certified diabetes educator (CDE) to help you plan meals and snacks. A dietitian or CDE can also help you lose weight if that is one of your goals. What should you know about eating carbs? Managing the amount of carbohydrate (carbs) you eat is an important part of healthy meals when you have diabetes. Carbohydrate is found in many foods. · Learn which foods have carbs. And learn the amounts of carbs in different foods. ¨ Bread, cereal, pasta, and rice have about 15 grams of carbs in a serving. A serving is 1 slice of bread (1 ounce), ½ cup of cooked cereal, or 1/3 cup of cooked pasta or rice. ¨ Fruits have 15 grams of carbs in a serving. A serving is 1 small fresh fruit, such as an apple or orange; ½ of a banana; ½ cup of cooked or canned fruit; ½ cup of fruit juice; 1 cup of melon or raspberries; or 2 tablespoons of dried fruit. ¨ Milk and no-sugar-added yogurt have 15 grams of carbs in a serving. A serving is 1 cup of milk or 2/3 cup of no-sugar-added yogurt. ¨ Starchy vegetables have 15 grams of carbs in a serving. A serving is ½ cup of mashed potatoes or sweet potato; 1 cup winter squash; ½ of a small baked potato; ½ cup of cooked beans; or ½ cup cooked corn or green peas. · Learn how much carbs to eat each day and at each meal. A dietitian or CDE can teach you how to keep track of the amount of carbs you eat. This is called carbohydrate counting. · If you are not sure how to count carbohydrate grams, use the Plate Method to plan meals.  It is a

## 2017-11-01 NOTE — PATIENT INSTRUCTIONS
2/3 cup of no-sugar-added yogurt. ¨ Starchy vegetables have 15 grams of carbs in a serving. A serving is ½ cup of mashed potatoes or sweet potato; 1 cup winter squash; ½ of a small baked potato; ½ cup of cooked beans; or ½ cup cooked corn or green peas. · Learn how much carbs to eat each day and at each meal. A dietitian or CDE can teach you how to keep track of the amount of carbs you eat. This is called carbohydrate counting. · If you are not sure how to count carbohydrate grams, use the Plate Method to plan meals. It is a good, quick way to make sure that you have a balanced meal. It also helps you spread carbs throughout the day. ¨ Divide your plate by types of foods. Put non-starchy vegetables on half the plate, meat or other protein food on one-quarter of the plate, and a grain or starchy vegetable in the final quarter of the plate. To this you can add a small piece of fruit and 1 cup of milk or yogurt, depending on how many carbs you are supposed to eat at a meal.  · Try to eat about the same amount of carbs at each meal. Do not \"save up\" your daily allowance of carbs to eat at one meal.  · Proteins have very little or no carbs per serving. Examples of proteins are beef, chicken, turkey, fish, eggs, tofu, cheese, cottage cheese, and peanut butter. A serving size of meat is 3 ounces, which is about the size of a deck of cards. Examples of meat substitute serving sizes (equal to 1 ounce of meat) are 1/4 cup of cottage cheese, 1 egg, 1 tablespoon of peanut butter, and ½ cup of tofu. How can you eat out and still eat healthy? · Learn to estimate the serving sizes of foods that have carbohydrate. If you measure food at home, it will be easier to estimate the amount in a serving of restaurant food. · If the meal you order has too much carbohydrate (such as potatoes, corn, or baked beans), ask to have a low-carbohydrate food instead. Ask for a salad or green vegetables.   · If you use insulin, check your blood

## 2017-11-06 ENCOUNTER — CARE COORDINATION (OUTPATIENT)
Dept: CARE COORDINATION | Age: 62
End: 2017-11-06

## 2017-11-06 ASSESSMENT — ENCOUNTER SYMPTOMS
COUGH: 0
CHEST TIGHTNESS: 0
VOMITING: 0
WHEEZING: 0
CONSTIPATION: 0
DIARRHEA: 0
NAUSEA: 0
ABDOMINAL PAIN: 0
SHORTNESS OF BREATH: 0

## 2017-11-06 NOTE — CARE COORDINATION
Ambulatory Care Coordination Note  11/6/2017  CM Risk Score: 4  Oskar Mortality Risk Score:      ACC: [place], RN    Summary Note: Follow up call with pt, reviewed blood sugars, pt was unable to check sugar on 11/3, 11/4 because he did not have any testing strips, when questioned about the amount of insulin he gives pt correctly stated 15units before meals and 45 units at hs, he states he has not missed any doses of insulin,   Reviewed his diet, he said he has not mad any real changes in diet because he hasn't been to the store to get better food options, pt was encouraged to review the food suggestions he received during his last visit. He did state that sometimes he just does not feel like eating much. Blood Sugars reviewed with Dr Wendy Phipps, medication changes were made. .... Lantus 60 units at bed time          Chart below is patient reported blood sugars  Blood Glucose Tracker 11/6/2017 11/5/2017 11/2/2017   Before breakfast blood glucose [phone]   Insulin dose - Breakfast 15 15 15   Before lunch blood glucose 218 243 264   Insulin dose - Lunch 15 15 15   Before dinner blood glucose - [phone]   Insulin dose - Dinner - 15 15   Before bed blood glucose - 340 407   Evening insulin dose - 45 45           Care Coordination Interventions    Program Enrollment:  Rising Risk  Referral from Primary Care Provider:  Yes  Suggested Interventions and Community Resources  Medi Set or Pill Pack:  Declined  Pharmacist:  Declined  Zone Management Tools: In Process (Comment: 11/6/17-Review of diabetes, diet, insulin dose, record blood sugar, pt ran out of test strips for a few days and was unable to check sugar, but has strips available now.   Pt states he has taken all insulin like he was suppose to, dose was reviewed   )  Other Services or Interventions:  11/7/17-Blood sugar was reviewed with Dr Wendy Phipps, he ordered the patient to increase is meal time insulin to 20 units with each meal, and increased Lantus to 60units (PRILOSEC) 40 MG delayed release capsule Take 40 mg by mouth 2 times daily    Historical Provider, MD   aspirin 325 MG tablet Take 325 mg by mouth daily.  9/21/10   Historical Provider, MD       Future Appointments  Date Time Provider Diego Pepper   11/13/2017 10:30 AM ASHLEY Cole [facility] NEURO [facility]   11/22/2017 10:00 AM Berna Maldonado MD [facility] ENT [facility]   11/27/2017 8:45 AM Megan Tejeda MD [facility]   11/29/2017 9:00 PM [facility] SLEEP BED 5 [facility]

## 2017-11-09 ENCOUNTER — CARE COORDINATION (OUTPATIENT)
Dept: CARE COORDINATION | Age: 62
End: 2017-11-09

## 2017-11-10 ENCOUNTER — ANESTHESIA (OUTPATIENT)
Dept: OPERATING ROOM | Age: 62
End: 2017-11-10

## 2017-11-10 ENCOUNTER — HOSPITAL ENCOUNTER (OUTPATIENT)
Age: 62
Setting detail: OUTPATIENT SURGERY
Discharge: HOME | End: 2017-11-10
Attending: OTOLARYNGOLOGY | Admitting: OTOLARYNGOLOGY
Payer: MEDICARE

## 2017-11-10 VITALS
WEIGHT: 237 LBS | DIASTOLIC BLOOD PRESSURE: 68 MMHG | TEMPERATURE: 97.5 F | HEIGHT: 68 IN | HEART RATE: 72 BPM | BODY MASS INDEX: 35.92 KG/M2 | OXYGEN SATURATION: 95 % | RESPIRATION RATE: 18 BRPM | SYSTOLIC BLOOD PRESSURE: 145 MMHG

## 2017-11-10 VITALS — SYSTOLIC BLOOD PRESSURE: 129 MMHG | OXYGEN SATURATION: 99 % | DIASTOLIC BLOOD PRESSURE: 73 MMHG

## 2017-11-10 PROCEDURE — G8907 PT DOC NO EVENTS ON DISCHARG: HCPCS | Performed by: NURSE PRACTITIONER

## 2017-11-10 PROCEDURE — G8918 PT W/O PREOP ORDER IV AB PRO: HCPCS | Performed by: NURSE PRACTITIONER

## 2017-11-10 PROCEDURE — G8918 PT W/O PREOP ORDER IV AB PRO: HCPCS

## 2017-11-10 PROCEDURE — G8907 PT DOC NO EVENTS ON DISCHARG: HCPCS

## 2017-11-10 PROCEDURE — 31575 DIAGNOSTIC LARYNGOSCOPY: CPT | Performed by: OTOLARYNGOLOGY

## 2017-11-10 PROCEDURE — 31525 DX LARYNGOSCOPY EXCL NB: CPT

## 2017-11-10 RX ORDER — PROPOFOL 10 MG/ML
INJECTION, EMULSION INTRAVENOUS PRN
Status: DISCONTINUED | OUTPATIENT
Start: 2017-11-10 | End: 2017-11-10 | Stop reason: SDUPTHER

## 2017-11-10 RX ORDER — SODIUM CHLORIDE, SODIUM LACTATE, POTASSIUM CHLORIDE, CALCIUM CHLORIDE 600; 310; 30; 20 MG/100ML; MG/100ML; MG/100ML; MG/100ML
INJECTION, SOLUTION INTRAVENOUS CONTINUOUS
Status: DISCONTINUED | OUTPATIENT
Start: 2017-11-10 | End: 2017-11-10 | Stop reason: HOSPADM

## 2017-11-10 RX ORDER — FENTANYL CITRATE 50 UG/ML
INJECTION, SOLUTION INTRAMUSCULAR; INTRAVENOUS PRN
Status: DISCONTINUED | OUTPATIENT
Start: 2017-11-10 | End: 2017-11-10 | Stop reason: SDUPTHER

## 2017-11-10 RX ORDER — SUCCINYLCHOLINE CHLORIDE 20 MG/ML
INJECTION INTRAMUSCULAR; INTRAVENOUS PRN
Status: DISCONTINUED | OUTPATIENT
Start: 2017-11-10 | End: 2017-11-10 | Stop reason: SDUPTHER

## 2017-11-10 RX ORDER — LIDOCAINE HYDROCHLORIDE 10 MG/ML
INJECTION, SOLUTION INFILTRATION; PERINEURAL PRN
Status: DISCONTINUED | OUTPATIENT
Start: 2017-11-10 | End: 2017-11-10 | Stop reason: SDUPTHER

## 2017-11-10 RX ORDER — ROCURONIUM BROMIDE 10 MG/ML
INJECTION, SOLUTION INTRAVENOUS PRN
Status: DISCONTINUED | OUTPATIENT
Start: 2017-11-10 | End: 2017-11-10 | Stop reason: SDUPTHER

## 2017-11-10 RX ADMIN — SODIUM CHLORIDE, SODIUM LACTATE, POTASSIUM CHLORIDE, CALCIUM CHLORIDE: 600; 310; 30; 20 INJECTION, SOLUTION INTRAVENOUS at 08:30

## 2017-11-10 RX ADMIN — LIDOCAINE HYDROCHLORIDE 5 ML: 10 INJECTION, SOLUTION INFILTRATION; PERINEURAL at 11:13

## 2017-11-10 RX ADMIN — PROPOFOL 200 MG: 10 INJECTION, EMULSION INTRAVENOUS at 11:13

## 2017-11-10 RX ADMIN — ROCURONIUM BROMIDE 5 MG: 10 INJECTION, SOLUTION INTRAVENOUS at 11:13

## 2017-11-10 RX ADMIN — FENTANYL CITRATE 50 MCG: 50 INJECTION, SOLUTION INTRAMUSCULAR; INTRAVENOUS at 11:13

## 2017-11-10 RX ADMIN — FENTANYL CITRATE 50 MCG: 50 INJECTION, SOLUTION INTRAMUSCULAR; INTRAVENOUS at 11:29

## 2017-11-10 RX ADMIN — SUCCINYLCHOLINE CHLORIDE 120 MG: 20 INJECTION INTRAMUSCULAR; INTRAVENOUS at 11:13

## 2017-11-10 ASSESSMENT — PAIN SCALES - GENERAL: PAINLEVEL_OUTOF10: 0

## 2017-11-10 ASSESSMENT — COPD QUESTIONNAIRES: CAT_SEVERITY: MODERATE

## 2017-11-10 NOTE — BRIEF OP NOTE
Brief Postoperative Note  ______________________________________________________________    Patient: Kati Sullivan  YOB: 1955  MRN: [id number]  Date of Procedure: 11/10/2017    Pre-Op Diagnosis: HORSENESS VOICE CHANGES    Post-Op Diagnosis: Same       Procedure(s):  LARYNGOSCOPY    Anesthesia: General    Surgeon(s):  Merlin Seats, MD    Staff:  [employer]: Nita Hernandez     Estimated Blood Loss: * No values recorded between 11/10/2017 11:07 AM and 11/10/2017 22:66 AM *    Complications: None    Specimens: none  * No specimens in log *      Findings: no lesions with LPR    Merlin Seats, MD  Date: 11/10/2017  Time: 11:43 AM

## 2017-11-10 NOTE — ANESTHESIA POSTPROCEDURE EVALUATION
Department of Anesthesiology  Postprocedure Note    Patient: Matthew Gomez  MRN: [id number]  YOB: 1955  Date of evaluation: 11/10/2017  Time:  11:53 AM     Procedure Summary     Date:  11/10/17 Room / Location:  [facility] OR  / [facility] OR    Anesthesia Start:  1107 Anesthesia Stop:      Procedure:  LARYNGOSCOPY (N/A Throat) Diagnosis:  (HORSENESS VOICE CHANGES)    Surgeon:  Afia Booth MD Responsible Provider:  Kristel Quintana CRNA    Anesthesia Type:  general ASA Status:  3          Anesthesia Type: general    Shilpa Phase I: Shilpa Score: 9    Shilpa Phase II:      Last vitals: Reviewed and per EMR flowsheets.        Anesthesia Post Evaluation    Patient location during evaluation: PACU  Patient participation: waiting for patient participation  Level of consciousness: awake and lethargic  Pain score: 0  Airway patency: patent  Nausea & Vomiting: no nausea and no vomiting  Complications: no  Cardiovascular status: blood pressure returned to baseline  Respiratory status: acceptable  Hydration status: euvolemic  Comments: Report to RN

## 2017-11-10 NOTE — ANESTHESIA PRE PROCEDURE
Department of Anesthesiology  Preprocedure Note       Name:  Nayely Sharma   Age:  65 y.o.  :  1955                                          MRN:  [id number]         Date:  11/10/2017      Surgeon: Agnieszka Rose):  Carly De Leon MD    Procedure: Procedure(s):  LARYNGOSCOPY MICRO POSSIBLE BIOPSY POSSIBLE ESOPHAGOSCOPY    Medications prior to admission:   Prior to Admission medications    Medication Sig Start Date End Date Taking? Authorizing Provider   amLODIPine (NORVASC) 5 MG tablet Take 1 tablet by mouth daily 17  Yes Stephanie Bajwa MD   lisinopril (PRINIVIL;ZESTRIL) 20 MG tablet Take 1 tablet by mouth 2 times daily 10/4/17  Yes Stephanie Bajwa MD   metoprolol (LOPRESSOR) 100 MG tablet Take 1 tablet by mouth 2 times daily 17  Yes Stephanie Bajwa MD   Insulin Glargine (TOUJEO SOLOSTAR SC) Inject 60 Units into the skin nightly    Historical Provider, MD   Insulin Lispro (HUMALOG KWIKPEN SC) Inject 20 Units into the skin 3 times daily (with meals)    Historical Provider, MD   glucose blood VI test strips (EXACTECH TEST) strip Test BS three times daily .  E11.59 11/3/17   Stephanie Bajwa MD   Insulin Pen Needle 31G X 6 MM MISC 1 each by Does not apply route 4 times daily (before meals and nightly) May substitute to generic for insurance 17   Stephanie Bajwa MD   Lancets MISC Accu-Chek TID,  DX: E11.59 10/10/17   Stephanie Bajwa MD   Lancets MISC Daily Accu-Chek 10/4/17   Stephanie Bajwa MD   Insulin Pen Needle 31G X 6 MM MISC 1 each by Does not apply route daily May substitute to generic for insurance 10/4/17   Stephanie Bajwa MD   cyclobenzaprine (FLEXERIL) 5 MG tablet TAKE 1 TABLET BY MOUTH EVERY 8 HOURS AS NEEDED FOR MUSCLE SPASMS 17   Stephanie Bajwa MD   hydrochlorothiazide (HYDRODIURIL) 25 MG tablet Take 1 tablet by mouth daily 3/15/17   Stephanie Bajwa MD   atorvastatin (LIPITOR) 40 MG tablet Take 1 tablet by mouth daily 3/15/17   Stephanie Bajwa MD   omeprazole (PRILOSEC) 40 MG delayed release capsule Take 40 mg by mouth 2 times daily    Historical Provider, MD   aspirin 325 MG tablet Take 325 mg by mouth daily.  9/21/10   Historical Provider, MD       Current medications:    Current Facility-Administered Medications   Medication Dose Route Frequency Provider Last Rate Last Dose    lactated ringers infusion   Intravenous Continuous Merlin Seats,  mL/hr at 11/10/17 0830         Allergies:  No Known Allergies    Problem List:    Patient Active Problem List   Diagnosis Code    Spontaneous pneumothorax J93.83    Degeneration of lumbar or lumbosacral intervertebral disc M51.37    Spinal stenosis, lumbar region, without neurogenic claudication M48.061    Back pain with left-sided radiculopathy M54.10    Lumbar disc disease with radiculopathy M51.16    Essential hypertension I10    Tobacco use disorder F17.200    Obstructive sleep apnea G47.33    Chronic kidney disease, stage III (moderate) N18.3    Type 2 diabetes mellitus with circulatory disorder, without long-term current use of insulin ([facility]) E11.59    Obstructive sleep apnea G47.33    BiPAP (biphasic positive airway pressure) dependence Z99.89    Morbid obesity due to excess calories ([facility]) E66.01       Past Medical History:        Diagnosis Date    Arthritis     BiPAP (biphasic positive airway pressure) dependence     10cm to 20cm    Chronic back pain     Emphysema/COPD ([facility])     GERD (gastroesophageal reflux disease)     History of anemia     History of blood transfusion     Hyperlipidemia     Hypertension     Neuropathy ([facility] [address]     Obstructive sleep apnea     AHI:  95.8 per PSG, 3/2017    Peripheral vascular disease ([facility] [address]     S/P angioplasty     Type II or unspecified type diabetes mellitus without mention of complication, not stated as uncontrolled        Past Surgical History:        Procedure Laterality Date    CHOLECYSTECTOMY      SPINE SURGERY      x 2 in past, around 47       Social History:    Social History   Substance Use Topics    Smoking status: Former Smoker     Packs/day: 0.25     Types: Cigarettes     Quit date: 1/9/2017    Smokeless tobacco: Never Used    Alcohol use No                                Counseling given: Not Answered      Vital Signs (Current):   Vitals:    11/10/17 0821   BP: 135/68   Pulse: 75   Resp: 20   Temp: 97 °F (36.1 °C)   TempSrc: Temporal   SpO2: 95%   Weight: 237 lb (107.5 kg)   Height: 5' 7.5\" (1.715 m)                                              BP Readings from Last 3 Encounters:   11/10/17 135/68   11/01/17 (!) 140/78   10/25/17 130/76       NPO Status: Time of last liquid consumption: 1830                        Time of last solid consumption: 1830                        Date of last liquid consumption: 11/09/17                        Date of last solid food consumption: 11/09/17    BMI:   Wt Readings from Last 3 Encounters:   11/10/17 237 lb (107.5 kg)   11/01/17 237 lb (107.5 kg)   10/25/17 237 lb (107.5 kg)     Body mass index is 36.57 kg/m². CBC:   Lab Results   Component Value Date    WBC 8.0 09/23/2016    RBC 4.57 09/23/2016    HGB 12.7 09/23/2016    HCT 39.5 09/23/2016    MCV 86.4 09/23/2016    RDW 15.9 09/23/2016     09/23/2016       CMP:   Lab Results   Component Value Date     10/25/2017    K 5.1 10/25/2017    CL 99 10/25/2017    CO2 28 10/25/2017    BUN 29 10/25/2017    CREATININE 1.8 10/25/2017    LABGLOM 38 10/25/2017    GLUCOSE 229 10/25/2017    PROT 8.0 08/02/2017    CALCIUM 9.8 10/25/2017    BILITOT 0.3 08/02/2017    ALKPHOS 86 08/02/2017    AST 15 08/02/2017    ALT 12 08/02/2017       POC Tests: No results for input(s): POCGLU, POCNA, POCK, POCCL, POCBUN, POCHEMO, POCHCT in the last 72 hours.     Coags: No results found for: PROTIME, INR, APTT    HCG (If Applicable): No results found for: PREGTESTUR, PREGSERUM, HCG, HCGQUANT     ABGs: No results found for: PHART, PO2ART, [id number], [id number], BEART, [id number]     Type & Screen (If Applicable):  No results found for: [facility]    Anesthesia Evaluation  Patient summary reviewed and Nursing notes reviewed no history of anesthetic complications:   Airway: Mallampati: II  TM distance: >3 FB   Neck ROM: full  Mouth opening: > = 3 FB Dental:          Pulmonary:normal exam    (+) COPD: moderate,  sleep apnea: on CPAP,                             Cardiovascular:    (+) hypertension: moderate,         Rhythm: regular             Beta Blocker:  Dose within 24 Hrs         Neuro/Psych:   {neg ROS              GI/Hepatic/Renal:   (+) GERD: well controlled,           Endo/Other: negative ROS   (+) Type II DM, poorly controlled, using insulin,                  Abdominal:       Abdomen: soft. Vascular:                                    Anesthesia Plan      general     ASA 3       Induction: intravenous. MIPS: Prophylactic antiemetics administered. Anesthetic plan and risks discussed with patient.                       Asia Parada CRNA   11/10/2017

## 2017-11-10 NOTE — H&P
H&P Validation  I have reviewed chief complaint, present illness, and indications for planned procedure. Physical findings and indications for procedure remain unchanged from those previously recorded.

## 2017-11-13 ENCOUNTER — OFFICE VISIT (OUTPATIENT)
Dept: NEUROLOGY | Age: 62
End: 2017-11-13
Payer: MEDICARE

## 2017-11-13 VITALS
WEIGHT: 239.2 LBS | DIASTOLIC BLOOD PRESSURE: 66 MMHG | HEART RATE: 102 BPM | BODY MASS INDEX: 36.25 KG/M2 | HEIGHT: 68 IN | SYSTOLIC BLOOD PRESSURE: 121 MMHG

## 2017-11-13 DIAGNOSIS — G47.33 OBSTRUCTIVE SLEEP APNEA: Primary | ICD-10-CM

## 2017-11-13 DIAGNOSIS — Z78.9 DIFFICULTY WITH BIPAP USE: ICD-10-CM

## 2017-11-13 PROCEDURE — G8427 DOCREV CUR MEDS BY ELIG CLIN: HCPCS | Performed by: PHYSICIAN ASSISTANT

## 2017-11-13 PROCEDURE — G8484 FLU IMMUNIZE NO ADMIN: HCPCS | Performed by: PHYSICIAN ASSISTANT

## 2017-11-13 PROCEDURE — 99213 OFFICE O/P EST LOW 20 MIN: CPT | Performed by: PHYSICIAN ASSISTANT

## 2017-11-13 PROCEDURE — G8417 CALC BMI ABV UP PARAM F/U: HCPCS | Performed by: PHYSICIAN ASSISTANT

## 2017-11-13 PROCEDURE — 3017F COLORECTAL CA SCREEN DOC REV: CPT | Performed by: PHYSICIAN ASSISTANT

## 2017-11-13 PROCEDURE — 1036F TOBACCO NON-USER: CPT | Performed by: PHYSICIAN ASSISTANT

## 2017-11-13 NOTE — PROGRESS NOTES
[facility] Sleep Follow Up Encounter      Information:   Patient Name: Kaye Zapata  :   1955  Age:   65 y.o. MRN:   [id number]  Account #:  [de-identified]  Today:                17    Provider:  Youlanda Babinski, PA-C    Chief Complaint   Patient presents with    Sleep Apnea     patiet has a new study 17 for new machine        Subjective:   Kaye Zapata is a 65 y.o. man  with a history of severe CASIS who comes in for a sleep clinic follow up. The PSG, 3/14/2017 revealed an AHI of 95.8. He is prescribed auto BiPAP therapy with a pressure range of 10cm to 20cm. He had to return the BiPAP due to non-compliance. He averaged 9/30 days usage at the last office visit, 2017. He has been advised per Chucky De Luna, ELIGIO at [facility] that he will need to have a repeat PSG to obtain a new device. He was intolerant to the FFM. He only tried 1 FFM. He does open his mouth when he sleeps. He is scheduled for a split-night PSG, 2017. Location or symptom:  CASSI  Onset:  PSG:  3/2017  Timing:  q hs  Severity:  Severe  Associated:  Snoring, witnessed apneas, and excessive daytime somnolence  Alleviated:   BiPAP      Objective:     Past Medical History:   Diagnosis Date    Arthritis     BiPAP (biphasic positive airway pressure) dependence     10cm to 20cm    Chronic back pain     Emphysema/COPD (HCC)     GERD (gastroesophageal reflux disease)     History of anemia     History of blood transfusion     Hyperlipidemia     Hypertension     Neuropathy ([facility] [address]     Obstructive sleep apnea     AHI:  95.8 per PSG, 3/2017    Peripheral vascular disease ([facility] [address]     S/P angioplasty     Type II or unspecified type diabetes mellitus without mention of complication, not stated as uncontrolled        Past Surgical History:   Procedure Laterality Date    CHOLECYSTECTOMY      LARYNGOSCOPY N/A 11/10/2017    Direct laryngoscopy with assessment of hypopharynx, larynx, and post-cricoid areas performed by Yogesh OCHOA Darren Chris MD at [address]      x 2 in past, around 1989       Recent Hospitalizations  ·     Significant Injuries  ·     Family History   Problem Relation Age of Onset    Diabetes Father     Heart Disease Maternal Grandmother        Social History  History   Smoking Status    Former Smoker    Packs/day: 0.25    Types: Cigarettes    Quit date: 1/9/2017   Smokeless Tobacco    Never Used     History   Alcohol Use No     History   Drug Use No         Current Outpatient Prescriptions   Medication Sig Dispense Refill    Insulin Glargine (TOUJEO SOLOSTAR SC) Inject 60 Units into the skin nightly      Insulin Lispro (HUMALOG KWIKPEN SC) Inject 20 Units into the skin 3 times daily (with meals)      glucose blood VI test strips (EXACTECH TEST) strip Test BS three times daily . E11.59 100 each 3    Insulin Pen Needle 31G X 6 MM MISC 1 each by Does not apply route 4 times daily (before meals and nightly) May substitute to generic for insurance 120 each 3    amLODIPine (NORVASC) 5 MG tablet Take 1 tablet by mouth daily 30 tablet 3    Lancets MISC Accu-Chek TID,  DX: E11.59 100 each 3    lisinopril (PRINIVIL;ZESTRIL) 20 MG tablet Take 1 tablet by mouth 2 times daily 60 tablet 1    Lancets MISC Daily Accu-Chek 100 each 3    Insulin Pen Needle 31G X 6 MM MISC 1 each by Does not apply route daily May substitute to generic for insurance 100 each 3    cyclobenzaprine (FLEXERIL) 5 MG tablet TAKE 1 TABLET BY MOUTH EVERY 8 HOURS AS NEEDED FOR MUSCLE SPASMS 90 tablet 5    metoprolol (LOPRESSOR) 100 MG tablet Take 1 tablet by mouth 2 times daily 60 tablet 3    hydrochlorothiazide (HYDRODIURIL) 25 MG tablet Take 1 tablet by mouth daily 90 tablet 3    atorvastatin (LIPITOR) 40 MG tablet Take 1 tablet by mouth daily 90 tablet 3    omeprazole (PRILOSEC) 40 MG delayed release capsule Take 40 mg by mouth 2 times daily      aspirin 325 MG tablet Take 325 mg by mouth daily.        No current facility-administered medications for this visit. Allergies:  Review of patient's allergies indicates no known allergies.     REVIEW OF SYSTEMS     Constitutional: []Fever []Sweats []Chills [] Recent Injury   [x] Denies all unless marked  HENT:[]Headache  [] Head Injury  [] Sore Throat  [] Ear Pain  [] Dizziness [] Hearing Loss   [x] Denies all unless marked  Spine:  [] Neck pain  [x] Back pain  [] Sciaticia  [] Denies all unless marked  Cardiovascular:[]Chest Pain []Palpitations [] Heart Disease  [x] Denies all unless marked  Pulmonary: []Shortness of Breath []Cough   [x] Denies all unless marked  Gastrointestinal:  []Abdominal Pain  []Blood in Stool  []Diarrhea [x]Constipation []Nausea  []Vomiting  [x] Denies all unless marked  Genitourinary:  [] Dysuria [] Frequency  [] Incontinence [] Urgency   [x] Denies all unless marked  Musculoskeletal: [] Arthralgia  [] Myalgias [] Muscle cramps  [] Muscle twitches   [x] Denies all unless marked   Extremities:   [] Pain   [] Swelling   [x] Denies all unless marked  Skin:[] Rash  [] Color Change  [x] Denies all unless marked  Neurological:[] Visual Disturbance [] Double Vision [] Slurred Speech [] Trouble swallowing  [] Vertigo [] Tingling [] Numbness [] Weakness [] Loss of Balance   [] Loss of Consciousness [] Memory Loss  [x] Denies all unless marked  Psychiatric/Behavioral:[] Depression [] Anxiety  [x] Denies all unless marked  Sleep: []  Insomnia [] Sleep Disturbance [] Snoring [] Restless Legs [] Daytime Sleepiness [x] Sleep Apnea  [] Denies all unless marked    Examination:  Vitals:  /66   Pulse 102   Ht 5' 7.5\" (1.715 m)   Wt 239 lb 3.2 oz (108.5 kg)   BMI 36.91 kg/m²   General appearance:  alert and cooperative with exam  HEENT:  PERRLA, EOMI and Neck supple with midline trachea  Heart[de-identified]  regular rate and rhythm, S1, S2 normal, no murmur, click, rub or gallop  Lungs:  clear to auscultation bilaterally  Extremities:  extremities normal, atraumatic, no cyanosis or edema  Neurologic:  Extraocular movements are intact without nystagmus. Visual fields are full to confrontation. Facial movements are symmetrical and normal.  Speech is precise. Extremity strength is normal in both uppers and lowers. Deep tendon reflexes are intact and symmetrical.  Rapid alternating movements are unimpaired. Finger-to-nose testing is performed well, without dysmetria. Gait is normal.    I reviewed the following studies:  N/A      Assessment:       ICD-10-CM ICD-9-CM    1. Obstructive sleep apnea G47.33 327.23    2. Difficulty with BiPAP use Z78.9 V49.89           []  :  Stable     []  :  Improved                       []  :  Well controlled              []  :  Resolving     []  :  Resolved     [x]  :  Inadequately controlled     []  :  Worsening      Plan:     No orders of the defined types were placed in this encounter. 1.   Patient advised of the etiology,  pathophysiology, diagnosis, treatment options, and risks of untreated CASSI. Risks may include, but are not limited to  hypertension, coronary artery disease, diabetes, stroke, weight gain, impaired cognition, daytime somnolence,  and motor vehicle accidents. Advised to abstain from driving or operating heavy machinery when drowsy and the use of respiratory suppressants. Encouraged PAP device use. 2.  The following educational material has been included in this visit after visit summary for your review: CASSI/PAP guidelines/CPAP/sleep studies-Discussed with the patient and all questions fully answered. 3.  Proceed with pre-scheduled PS2017  4. Try an WatchGuard FFM  5. Follow up per protocol      Note:  A total of >50% (>8 minutes) of 15 minutes was spent discussing the pathophysiology and treatment and/or coordination of care of the above diagnoses.

## 2017-11-13 NOTE — PROGRESS NOTES
REVIEW OF SYSTEMS    Constitutional: []Fever []Sweats []Chills [] Recent Injury   [x] Denies all unless marked  HENT:[]Headache  [] Head Injury  [] Sore Throat  [] Ear Pain  [] Dizziness [] Hearing Loss   [x] Denies all unless marked  Spine:  [] Neck pain  [x] Back pain  [] Sciaticia  [] Denies all unless marked  Cardiovascular:[]Chest Pain []Palpitations [] Heart Disease  [x] Denies all unless marked  Pulmonary: []Shortness of Breath []Cough   [x] Denies all unless marked  Gastrointestinal:  []Abdominal Pain  []Blood in Stool  []Diarrhea [x]Constipation []Nausea  []Vomiting  [x] Denies all unless marked  Genitourinary:  [] Dysuria [] Frequency  [] Incontinence [] Urgency   [x] Denies all unless marked  Musculoskeletal: [] Arthralgia  [] Myalgias [] Muscle cramps  [] Muscle twitches   [x] Denies all unless marked   Extremities:   [] Pain   [] Swelling   [x] Denies all unless marked  Skin:[] Rash  [] Color Change  [x] Denies all unless marked  Neurological:[] Visual Disturbance [] Double Vision [] Slurred Speech [] Trouble swallowing  [] Vertigo [] Tingling [] Numbness [] Weakness [] Loss of Balance   [] Loss of Consciousness [] Memory Loss  [x] Denies all unless marked  Psychiatric/Behavioral:[] Depression [] Anxiety  [x] Denies all unless marked  Sleep: []  Insomnia [] Sleep Disturbance [] Snoring [] Restless Legs [] Daytime Sleepiness [x] Sleep Apnea  [] Denies all unless marked

## 2017-11-13 NOTE — PATIENT INSTRUCTIONS
Instructions:  Try an [employer] or one of the other masks listed below. What is sleep apnea? -- Sleep apnea is a condition that makes you stop breathing for short periods while you are asleep. There are 2 types of sleep apnea. One is called \"obstructive sleep apnea,\" and the other is called \"central sleep apnea. \"  In obstructive sleep apnea, you stop breathing because your throat narrows or closes. In central sleep apnea, you stop breathing because your brain does not send the right signals to your muscles to make you breathe. When people talk about sleep apnea, they are usually referring to obstructive sleep apnea, which is what this article is about. People with sleep apnea do not know that they stop breathing when they are asleep. But they do sometimes wake up startled or gasping for breath. They also often hear from loved ones that they snore. What are the symptoms of sleep apnea? -- The main symptoms of sleep apnea are loud snoring, tiredness, and daytime sleepiness. Other symptoms can include:  ?Restless sleep  ? Waking up choking or gasping  ? Morning headaches, dry mouth, or sore throat  ? Waking up often to urinate  ? Waking up feeling unrested or groggy  ? Trouble thinking clearly or remembering things  Some people with sleep apnea don't have symptoms, or they don't know they have them. They might figure that it's normal to be tired or to snore a lot. Should I see a doctor or nurse? -- Yes. If you think you might have sleep apnea, see your doctor. Is there a test for sleep apnea? -- Yes. If your doctor or nurse suspects you have sleep apnea, he or she might send you for a \"sleep study. \" Sleep studies can sometimes be done at home, but they are usually done in a sleep lab. For the study, you spend the night in the lab, and you are hooked up to different machines that monitor your heart rate, breathing, and other body functions.  The results of the test will tell your doctor or nurse if you marks on the bridge of your nose when compared to other Full Face Masks        Organizations  American Sleep Apnea Association  Provides information about sleep apnea to the public, publishes a newsletter, and serves as an advocate for people with the disorder. Lexie, [address]   Mari@[employer]. org   [email or web address].[email or web address]. org   Tel: [phone]   Fax: [facility] organization that works to [employer] and safety by promoting public understanding of sleep and sleep disorders. Supports sleep-related education, research, and advocacy; produces and distributes educational materials to the public and healthcare professionals; and offers postdoctoral fellowships and grants for sleep researchers. [address]   Nymphas@[employer]. org   [email or web address].[email or web address] org   Tel: [phone]   Fax: [phone]  Patient Education   Patient Education        Learning About CPAP for Sleep Apnea  What is CPAP? CPAP is a small machine that you use at home every night while you sleep. It increases air pressure in your throat to keep your airway open. When you have sleep apnea, this can help you sleep better so you feel much better. CPAP stands for \"continuous positive airway pressure. \"  The CPAP machine will have one of the following:  · A mask that covers your nose and mouth  · Prongs that fit into your nose  · A mask that covers your nose only, the most common type. This type is called NCPAP. The N stands for \"nasal.\"  Why is it done? CPAP is usually the best treatment for obstructive sleep apnea. It is the first treatment choice and the most widely used. Your doctor may suggest CPAP if you have:  · Moderate to severe sleep apnea. · Sleep apnea and coronary artery disease (CAD) or heart failure. How does it help?   · CPAP can help you have more normal sleep, so you feel less during sleep. These studies usually are done in a sleep lab. Sleep labs are often located in hospitals. But sleep studies also can be done with portable equipment that you use at home. Why is this test done? Sleep studies are done to find sleep problems, including:  · Sleep apnea or excessive snoring. · Narcolepsy. · Nocturnal seizures. · REM behavior disorder (RBD). · Repeated muscle twitching of the feet, arms, or legs while you sleep. How can you prepare for the test?  · You may be asked to keep a sleep diary for 1 to 2 weeks before your sleep study. · Don't take any naps for 2 to 3 days before your test.  · You may be asked to avoid food or drinks with caffeine for a day or two before your test.  · Take a shower or bath before your test, but don't use sprays, oils, or gels on your hair. Don't wear makeup, fingernail polish, or fake nails. · Pack and take along a small overnight bag with personal items, such as a toothbrush, a comb, favorite pillows or blankets, and a book. You can wear your own nightclothes. What happens during the test?  · In the sleep lab, you will be in a private room, much like a hotel room. · Small pads or patches called electrodes will be placed on your head and body with a small amount of glue and tape. These will record things like brain activity, eye movement, oxygen levels, and snoring. · Soft elastic belts will be placed around your chest and belly to measure your breathing. · Your blood oxygen levels will be checked by a small clip (oximeter) placed either on the tip of your index finger or on your earlobe. · If you have sleep apnea, you may wear a mask that is connected to a continuous positive airway pressure (CPAP) machine. · Depending on the type of test, you will be allowed to sleep through the night or you will be awakened periodically and asked to stay awake for a while.   What else should you know about the test?  · It may feel odd to be hooked to the sleep

## 2017-11-16 ENCOUNTER — CARE COORDINATION (OUTPATIENT)
Dept: CARE COORDINATION | Age: 62
End: 2017-11-16

## 2017-11-20 ENCOUNTER — CARE COORDINATION (OUTPATIENT)
Dept: CARE COORDINATION | Age: 62
End: 2017-11-20

## 2017-11-27 ENCOUNTER — CARE COORDINATOR VISIT (OUTPATIENT)
Dept: CARE COORDINATION | Age: 62
End: 2017-11-27

## 2017-11-27 ENCOUNTER — OFFICE VISIT (OUTPATIENT)
Dept: PRIMARY CARE CLINIC | Age: 62
End: 2017-11-27
Payer: MEDICARE

## 2017-11-27 VITALS
WEIGHT: 239 LBS | BODY MASS INDEX: 37.51 KG/M2 | RESPIRATION RATE: 20 BRPM | DIASTOLIC BLOOD PRESSURE: 70 MMHG | HEART RATE: 88 BPM | OXYGEN SATURATION: 99 % | SYSTOLIC BLOOD PRESSURE: 132 MMHG | TEMPERATURE: 98 F | HEIGHT: 67 IN

## 2017-11-27 DIAGNOSIS — E11.51 TYPE 2 DIABETES MELLITUS WITH DIABETIC PERIPHERAL ANGIOPATHY WITHOUT GANGRENE, WITHOUT LONG-TERM CURRENT USE OF INSULIN (HCC): Primary | Chronic | ICD-10-CM

## 2017-11-27 PROCEDURE — G8484 FLU IMMUNIZE NO ADMIN: HCPCS | Performed by: FAMILY MEDICINE

## 2017-11-27 PROCEDURE — G8427 DOCREV CUR MEDS BY ELIG CLIN: HCPCS | Performed by: FAMILY MEDICINE

## 2017-11-27 PROCEDURE — 3017F COLORECTAL CA SCREEN DOC REV: CPT | Performed by: FAMILY MEDICINE

## 2017-11-27 PROCEDURE — G8417 CALC BMI ABV UP PARAM F/U: HCPCS | Performed by: FAMILY MEDICINE

## 2017-11-27 PROCEDURE — 99213 OFFICE O/P EST LOW 20 MIN: CPT | Performed by: FAMILY MEDICINE

## 2017-11-27 PROCEDURE — 1036F TOBACCO NON-USER: CPT | Performed by: FAMILY MEDICINE

## 2017-11-27 PROCEDURE — 3046F HEMOGLOBIN A1C LEVEL >9.0%: CPT | Performed by: FAMILY MEDICINE

## 2017-11-27 ASSESSMENT — ENCOUNTER SYMPTOMS
NAUSEA: 0
CONSTIPATION: 0
SHORTNESS OF BREATH: 0
WHEEZING: 0
CHEST TIGHTNESS: 0
ABDOMINAL PAIN: 0
DIARRHEA: 0
VOMITING: 0
COUGH: 0

## 2017-11-27 NOTE — PATIENT INSTRUCTIONS
Still continue nighttime insulin at 60 units. Check your fasting blood sugar before breakfast, lunch, and dinner. Take 20 units of meal time/short acting insulin plus the additional sliding scale amount for your blood sugar before that meal.    <150: 0  151-200:  2  201-250: 4  251-300: 6  301-350: 8  351-400: 10  >400: 12  Example: If your pre-meal blood sugar is 175, take 20 + 2 unit = 22 units. Keep a log of your sugars.

## 2017-11-28 NOTE — CARE COORDINATION
Ambulatory Care Coordination Note  11/27/2017  CM Risk Score: 4  Oskar Mortality Risk Score:      ACC: Radha Vazquez, RN    Summary Note ACC saw pt during PCP visit, pt to start on sliding scale meal insulin, pt wants to get back to swimming as a form of exercise, pt is currently in the process of moving and has not made any adjustments to his diet yet        Care Coordination Interventions    Program Enrollment:  Rising Risk  Referral from Primary Care Provider:  Yes  Suggested Interventions and Community Resources  Medi Set or Pill Pack:  Declined  Pharmacist:  Declined  Other Services: In Process (Comment: 11/27/17-pt is going to try to get back to swimming as a form of exercies)  Zone Management Tools: In Process (Comment: 11/27/17-Review of diabetes, diet, insulin dose, record blood sugar,Pt states he has taken all insulin like he was suppose to, dose was reviewed   )  Other Services or Interventions:  pt to call 11/28 with blood sugar record         Goals Addressed     None          Prior to Admission medications    Medication Sig Start Date End Date Taking? Authorizing Provider   Insulin Glargine (TOUJEO SOLOSTAR SC) Inject 60 Units into the skin nightly    Historical Provider, MD   Insulin Lispro (HUMALOG KWIKPEN SC) Inject 20 Units into the skin 3 times daily (with meals)    Historical Provider, MD   glucose blood VI test strips (EXACTECH TEST) strip Test BS three times daily .  E11.59 11/3/17   Annetta Israel MD   Insulin Pen Needle 31G X 6 MM MISC 1 each by Does not apply route 4 times daily (before meals and nightly) May substitute to generic for insurance 11/1/17   Annetta Israel MD   [facility]) 5 MG tablet Take 1 tablet by mouth daily 11/1/17   MD Lisa Tamayo [facility] Accu-Chek TID,  DX: E11.59 10/10/17   Annetta Israel MD   lisinopril (PRINIVIL;ZESTRIL) 20 MG tablet Take 1 tablet by mouth 2 times daily 10/4/17   MD Lisa Tamayo [facility] Daily Accu-Chek 10/4/17   Saul Forrest Cain Thomas MD   Insulin Pen Needle 31G X 6 MM MISC 1 each by Does not apply route daily May substitute to generic for insurance 10/4/17   Bravo Zamora MD   cyclobenzaprine (FLEXERIL) 5 MG tablet TAKE 1 TABLET BY MOUTH EVERY 8 HOURS AS NEEDED FOR MUSCLE SPASMS 8/11/17   Bravo Zamora MD   metoprolol (LOPRESSOR) 100 MG tablet Take 1 tablet by mouth 2 times daily 5/1/17   Bravo Zamora MD   hydrochlorothiazide (HYDRODIURIL) 25 MG tablet Take 1 tablet by mouth daily 3/15/17   Bravo Zamora MD   atorvastatin (LIPITOR) 40 MG tablet Take 1 tablet by mouth daily 3/15/17   Bravo Zamora MD   omeprazole (PRILOSEC) 40 MG delayed release capsule Take 40 mg by mouth 2 times daily    Historical Provider, MD   aspirin 325 MG tablet Take 325 mg by mouth daily.  9/21/10   Historical Provider, MD       Future Appointments  Date Time Provider Diego Pepper   11/29/2017 8:45 AM Sonia Morelos MD [facility] [place]   11/29/2017 9:00 PM [facility] SLEEP BED 5 [facility] SLEEP [facility]   1/15/2018 9:45 AM Bravo Zamora MD [address]

## 2017-11-28 NOTE — CARE COORDINATION
Blood Glucose Tracker 11/28/2017 11/27/2017 11/26/2017 11/25/2017 11/24/2017 11/23/2017 11/22/2017   Before breakfast blood glucose 366 233 240 175 235 253 233   Insulin dose - Breakfast - - - - - - -   Before lunch blood glucose - - - - - - -   Insulin dose - Lunch - - - - - - -   Before dinner blood glucose - - - - - - -   Insulin dose - Dinner - - - - - - -   Before bed blood glucose - 243 341 231 200 200 243   Evening insulin dose - - - - - - -

## 2017-11-29 ENCOUNTER — HOSPITAL ENCOUNTER (OUTPATIENT)
Dept: SLEEP CENTER | Age: 62
Discharge: HOME | End: 2017-11-29
Payer: MEDICARE

## 2017-11-29 PROCEDURE — 95811 POLYSOM 6/>YRS CPAP 4/> PARM: CPT

## 2017-11-29 PROCEDURE — 95811 POLYSOM 6/>YRS CPAP 4/> PARM: CPT | Performed by: PSYCHIATRY & NEUROLOGY

## 2017-11-30 PROCEDURE — 95811 POLYSOM 6/>YRS CPAP 4/> PARM: CPT

## 2017-11-30 NOTE — PROCEDURES
[facility]  [address]  Phone [phone]  Fax [phone]     Sleep Study Technician Review    Patient Name:  Tiarra Diaz  :   1955  Referring Provider: Rea Fournier MD    Brief History:  Tiarra Diaz is a 65 y.o. man with a history of diabetes and severe CASSI who comes to the sleep lab for a repeat PSG due to CPAP non-compliance. The PSG, 3/14/2017 revealed an AHI of 95.8. He is prescribed auto BiPAP therapy with a pressure range of 10cm to 20cm. He had to return the BiPAP due to non-compliance. He averaged 9/ days usage at the last office visit, 2017. He has been advised per Rozina Henderson CRT at [facility] that he will need to have a repeat PSG to obtain a new device. He was intolerant to the FFM. He only tried 1 FFM. He does open his mouth when he sleeps. Pt reports a 20 lb weigh loss since his last PSG. Height:  5' 7\"  Weight: 239 lbs  BMI:  37.4  Neck Circ: 18.0\"  Mallampati  4  ESS:  10/24    Type of Study: Split Night PSG  Time Stage Position Snore Hypopnea Obs Apnea Catrina Apnea PAP O2   2200 2 left lateral Yes No No No 0 97   2300 2 left lateral Yes No Yes No 0 81   2400 1 left lateral Yes No Yes No 7 96   0100 2 left lateral Yes No No No  96   0200 W left lateral No No No No 19/15 97   0300 W left lateral No No No No  98   0400 REM left lateral No No No No  92   0500        RA   0600        RA     Summary:  Difficult titration due to pt's severe coughing and heavy mucus. Pt reports that it started after his vocal cord surgery 2 weeks prior to his PSG. Tech may have over titrated. Severe respiratory events with O2 desat's to the low 80's. Moderate snoring. Limb movements observed. Began CPAP at 4 cmH2O. Switched to BIPAP @2441 for pt intolerance to pressure. A final BIPAP pressure of 23/19 cmH2O. No arrhythmia.       DME:  [facility]  Mask Type: FFM  Mask:  Anny View  Mask Size: Large, pt needs

## 2017-12-03 DIAGNOSIS — G47.33 OBSTRUCTIVE SLEEP APNEA: Primary | ICD-10-CM

## 2017-12-03 NOTE — PROGRESS NOTES
[facility]  [place] [address]  [address]  Phone [phone]  Fax [phone]     Patient Name: Sukumar Roger 2017  : 1955  Age: 65 y.o.   Patient Address: [place]  [address] [place] [id number]       Patient Phone: [phone] (home)     REFERRAL  Referred to: DME provider of patient's choice  Sukumar Roger is referred for the following:    DME Equipment HPCPS Code Setting   Auto Adjusting BiPAP device with flex or comparable pressure relief per comfort  Min EPAP: 8cm to   Max IPAP: 20cm   Heated Humidifier  Patient Choice       Replinishible PAP Supplies, 1 year supply  Item HPCPS Code Frequency   Mask of choice  or  1 per 3 months   Nasal Mask cushion/pillows  or  2 per 30 days   Full Face Mask Interface  1 per 30 days   Headgear  1 per 6 months   Tubing, length of choice  or  1 per 3 months   Water Chamber  1 per 6 months   Chinstrap  1 per 6 months   Disposable Filters  2 per 30 days   Reusable Filters  1 per 6 months     Diagnoses:  Obstructive sleep apnea (G47.33)  Length of Need: Lifetime, 99    Ordering Provider: VENTURA Haynes Ill: [id number]         Signature:       Date: 2017      Electronically Signed by Marita Ramires M.D.

## 2017-12-06 ENCOUNTER — OFFICE VISIT (OUTPATIENT)
Dept: OTOLARYNGOLOGY | Age: 62
End: 2017-12-06
Payer: MEDICARE

## 2017-12-06 VITALS
BODY MASS INDEX: 35.63 KG/M2 | RESPIRATION RATE: 20 BRPM | OXYGEN SATURATION: 99 % | HEIGHT: 67 IN | WEIGHT: 227 LBS | TEMPERATURE: 96.5 F | DIASTOLIC BLOOD PRESSURE: 64 MMHG | SYSTOLIC BLOOD PRESSURE: 122 MMHG | HEART RATE: 95 BPM

## 2017-12-06 DIAGNOSIS — R49.9 CHANGE IN VOICE: Primary | ICD-10-CM

## 2017-12-06 PROCEDURE — 3017F COLORECTAL CA SCREEN DOC REV: CPT | Performed by: OTOLARYNGOLOGY

## 2017-12-06 PROCEDURE — G8427 DOCREV CUR MEDS BY ELIG CLIN: HCPCS | Performed by: OTOLARYNGOLOGY

## 2017-12-06 PROCEDURE — 1036F TOBACCO NON-USER: CPT | Performed by: OTOLARYNGOLOGY

## 2017-12-06 PROCEDURE — 99214 OFFICE O/P EST MOD 30 MIN: CPT | Performed by: OTOLARYNGOLOGY

## 2017-12-06 PROCEDURE — G8484 FLU IMMUNIZE NO ADMIN: HCPCS | Performed by: OTOLARYNGOLOGY

## 2017-12-06 PROCEDURE — G8417 CALC BMI ABV UP PARAM F/U: HCPCS | Performed by: OTOLARYNGOLOGY

## 2017-12-06 NOTE — PROGRESS NOTES
PATIENT NOTE:    Wallace Liu is a 65 y.o. male who presents today for Post-Op Check (pt is here for post-op microlaryngoscopy w/poss biopsy )      Past Medical History:   Diagnosis Date    Arthritis     BiPAP (biphasic positive airway pressure) dependence     10cm to 20cm    Chronic back pain     Emphysema/COPD (HCC)     GERD (gastroesophageal reflux disease)     History of anemia     History of blood transfusion     Hyperlipidemia     Hypertension     Neuropathy ([facility] [address]     Obstructive sleep apnea     AHI:  95.8 per PSG, 3/2017    Peripheral vascular disease ([facility] [address]     S/P angioplasty     Type II or unspecified type diabetes mellitus without mention of complication, not stated as uncontrolled       Past Surgical History:   Procedure Laterality Date    CHOLECYSTECTOMY      LARYNGOSCOPY N/A 11/10/2017    Direct laryngoscopy with assessment of hypopharynx, larynx, and post-cricoid areas performed by Margarita Perkins MD at [address]      x 2 in past, around 47       Family History   Problem Relation Age of Onset    Diabetes Father     Heart Disease Maternal Grandmother        Social History   Substance Use Topics    Smoking status: Former Smoker     Packs/day: 0.25     Types: Cigarettes     Quit date: 1/9/2017    Smokeless tobacco: Never Used    Alcohol use No      Current Outpatient Prescriptions   Medication Sig Dispense Refill    Insulin Glargine (TOUJEO SOLOSTAR SC) Inject 60 Units into the skin nightly      Insulin Lispro (HUMALOG KWIKPEN SC) Inject 20 Units into the skin 3 times daily (with meals)      glucose blood VI test strips (EXACTECH TEST) strip Test BS three times daily .  E11.59 100 each 3    Insulin Pen Needle 31G X 6 MM MISC 1 each by Does not apply route 4 times daily (before meals and nightly) May substitute to generic for insurance 120 each 3    amLODIPine (NORVASC) 5 MG tablet Take 1 tablet by mouth daily 30 tablet 3    Lancets MISC Accu-Chek

## 2017-12-07 ENCOUNTER — CARE COORDINATION (OUTPATIENT)
Dept: CARE COORDINATION | Age: 62
End: 2017-12-07

## 2017-12-20 ENCOUNTER — CARE COORDINATION (OUTPATIENT)
Dept: CARE COORDINATION | Age: 62
End: 2017-12-20

## 2018-01-22 ENCOUNTER — CARE COORDINATION (OUTPATIENT)
Dept: CARE COORDINATION | Age: 63
End: 2018-01-22

## 2018-01-29 ENCOUNTER — CARE COORDINATION (OUTPATIENT)
Dept: CARE COORDINATION | Age: 63
End: 2018-01-29

## 2018-02-07 ENCOUNTER — OFFICE VISIT (OUTPATIENT)
Dept: OTOLARYNGOLOGY | Age: 63
End: 2018-02-07
Payer: MEDICARE

## 2018-02-07 VITALS
TEMPERATURE: 97.9 F | BODY MASS INDEX: 35.16 KG/M2 | SYSTOLIC BLOOD PRESSURE: 130 MMHG | DIASTOLIC BLOOD PRESSURE: 85 MMHG | HEIGHT: 67 IN | HEART RATE: 70 BPM | OXYGEN SATURATION: 16 % | WEIGHT: 224 LBS

## 2018-02-07 DIAGNOSIS — J38.00 VOCAL CORD PARESIS: ICD-10-CM

## 2018-02-07 DIAGNOSIS — K21.9 GASTROESOPHAGEAL REFLUX DISEASE, ESOPHAGITIS PRESENCE NOT SPECIFIED: ICD-10-CM

## 2018-02-07 DIAGNOSIS — J38.00 VOCAL CORD WEAKNESS: Primary | ICD-10-CM

## 2018-02-07 DIAGNOSIS — R49.9 CHANGE IN VOICE: ICD-10-CM

## 2018-02-07 PROCEDURE — G8484 FLU IMMUNIZE NO ADMIN: HCPCS | Performed by: OTOLARYNGOLOGY

## 2018-02-07 PROCEDURE — G8427 DOCREV CUR MEDS BY ELIG CLIN: HCPCS | Performed by: OTOLARYNGOLOGY

## 2018-02-07 PROCEDURE — 31575 DIAGNOSTIC LARYNGOSCOPY: CPT | Performed by: OTOLARYNGOLOGY

## 2018-02-07 PROCEDURE — 1036F TOBACCO NON-USER: CPT | Performed by: OTOLARYNGOLOGY

## 2018-02-07 PROCEDURE — 99214 OFFICE O/P EST MOD 30 MIN: CPT | Performed by: OTOLARYNGOLOGY

## 2018-02-07 PROCEDURE — 3017F COLORECTAL CA SCREEN DOC REV: CPT | Performed by: OTOLARYNGOLOGY

## 2018-02-07 PROCEDURE — G8417 CALC BMI ABV UP PARAM F/U: HCPCS | Performed by: OTOLARYNGOLOGY

## 2018-02-12 ENCOUNTER — CARE COORDINATOR VISIT (OUTPATIENT)
Dept: CARE COORDINATION | Age: 63
End: 2018-02-12

## 2018-02-12 ENCOUNTER — OFFICE VISIT (OUTPATIENT)
Dept: PRIMARY CARE CLINIC | Age: 63
End: 2018-02-12
Payer: MEDICARE

## 2018-02-12 VITALS
WEIGHT: 212 LBS | SYSTOLIC BLOOD PRESSURE: 123 MMHG | HEART RATE: 85 BPM | TEMPERATURE: 97.6 F | RESPIRATION RATE: 18 BRPM | BODY MASS INDEX: 33.27 KG/M2 | HEIGHT: 67 IN | DIASTOLIC BLOOD PRESSURE: 68 MMHG | OXYGEN SATURATION: 97 %

## 2018-02-12 DIAGNOSIS — E11.59 TYPE 2 DIABETES MELLITUS WITH OTHER CIRCULATORY COMPLICATION, WITH LONG-TERM CURRENT USE OF INSULIN (HCC): ICD-10-CM

## 2018-02-12 DIAGNOSIS — E11.51 TYPE 2 DIABETES MELLITUS WITH DIABETIC PERIPHERAL ANGIOPATHY WITHOUT GANGRENE, WITHOUT LONG-TERM CURRENT USE OF INSULIN (HCC): Primary | Chronic | ICD-10-CM

## 2018-02-12 DIAGNOSIS — N18.30 DIABETES MELLITUS DUE TO UNDERLYING CONDITION WITH STAGE 3 CHRONIC KIDNEY DISEASE, WITH LONG-TERM CURRENT USE OF INSULIN (HCC): ICD-10-CM

## 2018-02-12 DIAGNOSIS — E08.22 DIABETES MELLITUS DUE TO UNDERLYING CONDITION WITH STAGE 3 CHRONIC KIDNEY DISEASE, WITH LONG-TERM CURRENT USE OF INSULIN (HCC): ICD-10-CM

## 2018-02-12 DIAGNOSIS — Z79.4 TYPE 2 DIABETES MELLITUS WITH OTHER CIRCULATORY COMPLICATION, WITH LONG-TERM CURRENT USE OF INSULIN (HCC): ICD-10-CM

## 2018-02-12 DIAGNOSIS — Z71.89 GOALS OF CARE, COUNSELING/DISCUSSION: ICD-10-CM

## 2018-02-12 DIAGNOSIS — R25.2 MUSCLE CRAMPS: ICD-10-CM

## 2018-02-12 DIAGNOSIS — Z79.4 DIABETES MELLITUS DUE TO UNDERLYING CONDITION WITH STAGE 3 CHRONIC KIDNEY DISEASE, WITH LONG-TERM CURRENT USE OF INSULIN (HCC): ICD-10-CM

## 2018-02-12 DIAGNOSIS — J38.3 VOCAL CORD DYSFUNCTION: ICD-10-CM

## 2018-02-12 LAB — HBA1C MFR BLD: 14.7 %

## 2018-02-12 PROCEDURE — 1036F TOBACCO NON-USER: CPT | Performed by: FAMILY MEDICINE

## 2018-02-12 PROCEDURE — 99214 OFFICE O/P EST MOD 30 MIN: CPT | Performed by: FAMILY MEDICINE

## 2018-02-12 PROCEDURE — G8417 CALC BMI ABV UP PARAM F/U: HCPCS | Performed by: FAMILY MEDICINE

## 2018-02-12 PROCEDURE — 3046F HEMOGLOBIN A1C LEVEL >9.0%: CPT | Performed by: FAMILY MEDICINE

## 2018-02-12 PROCEDURE — G8484 FLU IMMUNIZE NO ADMIN: HCPCS | Performed by: FAMILY MEDICINE

## 2018-02-12 PROCEDURE — 3017F COLORECTAL CA SCREEN DOC REV: CPT | Performed by: FAMILY MEDICINE

## 2018-02-12 PROCEDURE — G8427 DOCREV CUR MEDS BY ELIG CLIN: HCPCS | Performed by: FAMILY MEDICINE

## 2018-02-12 RX ORDER — CYCLOBENZAPRINE HCL 5 MG
TABLET ORAL
Qty: 90 TABLET | Refills: 0 | Status: SHIPPED | OUTPATIENT
Start: 2018-02-12 | End: 2018-02-12 | Stop reason: SDUPTHER

## 2018-02-12 RX ORDER — CYCLOBENZAPRINE HCL 5 MG
TABLET ORAL
Qty: 90 TABLET | Refills: 0 | Status: SHIPPED | OUTPATIENT
Start: 2018-02-12 | End: 2018-03-18 | Stop reason: SDUPTHER

## 2018-02-12 NOTE — PROGRESS NOTES
Tim Richmond is a 65 y.o. male who presents today for   Chief Complaint   Patient presents with    Diabetes       HPI  Patient is here for f/u DM. He has not been compliant with meds. He lost his needles and hasn't been using any meal time insulin. His A1c has worsened to 14.7 from 14. He has underlying chronic kidney disease with his most recent GFR being chronic kidney disease stage IIIB. He also has worsening neuropathy. No change in PMH, family, social, or surgical history unless mentioned above. Review of Systems   Constitutional: Positive for unexpected weight change. Negative for chills and fever. Respiratory: Negative for cough, chest tightness, shortness of breath and wheezing. Cardiovascular: Negative for chest pain, palpitations and leg swelling. Gastrointestinal: Negative for abdominal pain, constipation, diarrhea, nausea and vomiting. Genitourinary: Negative for difficulty urinating, dysuria and frequency. Musculoskeletal: Positive for myalgias.        Past Medical History:   Diagnosis Date    Arthritis     BiPAP (biphasic positive airway pressure) dependence     10cm to 20cm    Chronic back pain     Emphysema/COPD (HCC)     GERD (gastroesophageal reflux disease)     History of anemia     History of blood transfusion     Hyperlipidemia     Hypertension     Neuropathy (HCC)     Obstructive sleep apnea     AHI:  95.8 per PSG, 3/2017    Peripheral vascular disease ([facility] [address]     S/P angioplasty     Type II or unspecified type diabetes mellitus without mention of complication, not stated as uncontrolled        Current Outpatient Prescriptions   Medication Sig Dispense Refill    cyclobenzaprine (FLEXERIL) 5 MG tablet TAKE 1 TABLET BY MOUTH EVERY 8 HOURS AS NEEDED FOR MUSCLE SPASMS 90 tablet 0    insulin lispro (HUMALOG KWIKPEN) 100 UNIT/ML pen Inject 20 Units into the skin 3 times daily (with meals) 5 pen 3    insulin glargine (TOUJEO SOLOSTAR) 300 UNIT/ML injection BMI 33.20 kg/m²     Physical Exam   Constitutional: He is oriented to person, place, and time. He appears well-developed and well-nourished. Non-toxic appearance. No distress. HENT:   Head: Normocephalic and atraumatic. Cardiovascular: Normal rate, regular rhythm, normal heart sounds and intact distal pulses. Exam reveals no gallop and no friction rub. No murmur heard. Pulmonary/Chest: Effort normal and breath sounds normal. No respiratory distress. He has no wheezes. He has no rales. He exhibits no tenderness. Abdominal: Soft. Bowel sounds are normal. He exhibits no distension and no mass. There is no tenderness. There is no rebound and no guarding. Musculoskeletal:        Right lower leg: He exhibits no edema. Left lower leg: He exhibits no edema. Neurological: He is alert and oriented to person, place, and time. Coordination and gait normal.   Skin: Skin is warm and dry. He is not diaphoretic. No cyanosis. Nails show no clubbing. Psychiatric: His speech is not rapid and/or pressured and not slurred (speech has improved with current therapy with your nose and throat for paralysis of vocal cord). Nursing note and vitals reviewed. Assessment:    ICD-10-CM ICD-9-CM    1. Type 2 diabetes mellitus with diabetic peripheral angiopathy without gangrene, without long-term current use of insulin ([facility]) E11.51 250.70 Hemoglobin A1C     443.81 CANCELED: POCT glycosylated hemoglobin (Hb A1C)   2. Muscle cramps R25.2 729.82    3. Diabetes mellitus due to underlying condition with stage 3 chronic kidney disease, with long-term current use of insulin ([facility]) E08.22 249.40     N18.3 585.3     Z79.4 V58.67    4. Type 2 diabetes mellitus with other circulatory complication, with long-term current use of insulin ([facility]) E11.59 250.70     Z79.4 V58.67    5. Vocal cord dysfunction J38.3 478.5    6. Goals of care, counseling/discussion Z71.89 V65.49        Plan:   Goals to have patient just use insulin.

## 2018-02-13 ENCOUNTER — CARE COORDINATION (OUTPATIENT)
Dept: CARE COORDINATION | Age: 63
End: 2018-02-13

## 2018-02-14 ENCOUNTER — CARE COORDINATION (OUTPATIENT)
Dept: CARE COORDINATION | Age: 63
End: 2018-02-14

## 2018-02-15 ASSESSMENT — ENCOUNTER SYMPTOMS
NAUSEA: 0
SHORTNESS OF BREATH: 0
WHEEZING: 0
CHEST TIGHTNESS: 0
COUGH: 0
ABDOMINAL PAIN: 0
VOMITING: 0
CONSTIPATION: 0
DIARRHEA: 0

## 2018-02-19 ENCOUNTER — CARE COORDINATION (OUTPATIENT)
Dept: CARE COORDINATION | Age: 63
End: 2018-02-19

## 2018-02-22 ENCOUNTER — CARE COORDINATION (OUTPATIENT)
Dept: CARE COORDINATION | Age: 63
End: 2018-02-22

## 2018-02-27 ENCOUNTER — TELEPHONE (OUTPATIENT)
Dept: NEUROLOGY | Age: 63
End: 2018-02-27

## 2018-03-12 ENCOUNTER — CARE COORDINATION (OUTPATIENT)
Dept: CARE COORDINATION | Age: 63
End: 2018-03-12

## 2018-03-12 ENCOUNTER — OFFICE VISIT (OUTPATIENT)
Dept: PRIMARY CARE CLINIC | Age: 63
End: 2018-03-12
Payer: MEDICARE

## 2018-03-12 VITALS
DIASTOLIC BLOOD PRESSURE: 78 MMHG | TEMPERATURE: 97.6 F | WEIGHT: 224 LBS | OXYGEN SATURATION: 94 % | HEIGHT: 68 IN | SYSTOLIC BLOOD PRESSURE: 124 MMHG | BODY MASS INDEX: 33.95 KG/M2 | HEART RATE: 94 BPM

## 2018-03-12 DIAGNOSIS — E11.22 CKD STAGE 3 DUE TO TYPE 2 DIABETES MELLITUS (HCC): ICD-10-CM

## 2018-03-12 DIAGNOSIS — E11.22 TYPE 2 DIABETES MELLITUS WITH STAGE 3 CHRONIC KIDNEY DISEASE, WITH LONG-TERM CURRENT USE OF INSULIN (HCC): Primary | ICD-10-CM

## 2018-03-12 DIAGNOSIS — I10 ESSENTIAL HYPERTENSION: ICD-10-CM

## 2018-03-12 DIAGNOSIS — N18.30 CKD STAGE 3 DUE TO TYPE 2 DIABETES MELLITUS (HCC): ICD-10-CM

## 2018-03-12 DIAGNOSIS — R20.8 DECREASED SENSATION OF FOOT: ICD-10-CM

## 2018-03-12 DIAGNOSIS — Z79.4 TYPE 2 DIABETES MELLITUS WITH STAGE 3 CHRONIC KIDNEY DISEASE, WITH LONG-TERM CURRENT USE OF INSULIN (HCC): Primary | ICD-10-CM

## 2018-03-12 DIAGNOSIS — N18.30 TYPE 2 DIABETES MELLITUS WITH STAGE 3 CHRONIC KIDNEY DISEASE, WITH LONG-TERM CURRENT USE OF INSULIN (HCC): Primary | ICD-10-CM

## 2018-03-12 LAB
ANION GAP SERPL CALCULATED.3IONS-SCNC: 15 MMOL/L (ref 7–19)
BUN BLDV-MCNC: 38 MG/DL (ref 8–23)
CALCIUM SERPL-MCNC: 9.6 MG/DL (ref 8.8–10.2)
CHLORIDE BLD-SCNC: 106 MMOL/L (ref 98–111)
CO2: 24 MMOL/L (ref 22–29)
CREAT SERPL-MCNC: 1.9 MG/DL (ref 0.5–1.2)
GFR NON-AFRICAN AMERICAN: 36
GLUCOSE BLD-MCNC: 117 MG/DL (ref 74–109)
POTASSIUM SERPL-SCNC: 4.5 MMOL/L (ref 3.5–5)
SODIUM BLD-SCNC: 145 MMOL/L (ref 136–145)

## 2018-03-12 PROCEDURE — G8484 FLU IMMUNIZE NO ADMIN: HCPCS | Performed by: FAMILY MEDICINE

## 2018-03-12 PROCEDURE — 1036F TOBACCO NON-USER: CPT | Performed by: FAMILY MEDICINE

## 2018-03-12 PROCEDURE — 99214 OFFICE O/P EST MOD 30 MIN: CPT | Performed by: FAMILY MEDICINE

## 2018-03-12 PROCEDURE — G8427 DOCREV CUR MEDS BY ELIG CLIN: HCPCS | Performed by: FAMILY MEDICINE

## 2018-03-12 PROCEDURE — 3046F HEMOGLOBIN A1C LEVEL >9.0%: CPT | Performed by: FAMILY MEDICINE

## 2018-03-12 PROCEDURE — 3017F COLORECTAL CA SCREEN DOC REV: CPT | Performed by: FAMILY MEDICINE

## 2018-03-12 PROCEDURE — G8417 CALC BMI ABV UP PARAM F/U: HCPCS | Performed by: FAMILY MEDICINE

## 2018-03-12 RX ORDER — HYDROCHLOROTHIAZIDE 25 MG/1
25 TABLET ORAL DAILY
Qty: 90 TABLET | Refills: 3 | Status: SHIPPED | OUTPATIENT
Start: 2018-03-12 | End: 2018-05-22 | Stop reason: SDUPTHER

## 2018-03-12 ASSESSMENT — ENCOUNTER SYMPTOMS
DIARRHEA: 0
COUGH: 0
CHEST TIGHTNESS: 0
CONSTIPATION: 0
SHORTNESS OF BREATH: 0
WHEEZING: 0
VOMITING: 0
NAUSEA: 0
ABDOMINAL PAIN: 0

## 2018-03-12 ASSESSMENT — PATIENT HEALTH QUESTIONNAIRE - PHQ9
SUM OF ALL RESPONSES TO PHQ9 QUESTIONS 1 & 2: 0
2. FEELING DOWN, DEPRESSED OR HOPELESS: 0
1. LITTLE INTEREST OR PLEASURE IN DOING THINGS: 0
SUM OF ALL RESPONSES TO PHQ QUESTIONS 1-9: 0

## 2018-03-12 NOTE — PROGRESS NOTES
Dipti Reaves is a 65 y.o. male who presents today for   Chief Complaint   Patient presents with    Follow-up     4 wks       HPI  Patient is here for f/u for DM. Has had issues for compliance but we had grave discussion about his health and DM. He is now doing DM education classes. He has had some sugars from 61 to 190s but improving. He does have a history of peripheral neuropathy as well as a callus on the left large toe. He is seeing a podiatrist for this. He has a history of anesthesia of the left lower extremity and foot drop from prior injury. Still unable to afford his medications. He is a  and he is going to start looking into veterans benefits. No change in PMH, family, social, or surgical history unless mentioned above. Review of Systems   Constitutional: Negative for chills and fever. Respiratory: Negative for cough, chest tightness, shortness of breath and wheezing. Cardiovascular: Negative for chest pain, palpitations and leg swelling. Gastrointestinal: Negative for abdominal pain, constipation, diarrhea, nausea and vomiting. Genitourinary: Negative for difficulty urinating, dysuria and frequency. Neurological: Positive for numbness (with burning). Psychiatric/Behavioral: Negative for dysphoric mood, self-injury, sleep disturbance and suicidal ideas. The patient is not nervous/anxious.         Past Medical History:   Diagnosis Date    Arthritis     BiPAP (biphasic positive airway pressure) dependence     10cm to 20cm    Chronic back pain     Emphysema/COPD (HCC)     GERD (gastroesophageal reflux disease)     History of anemia     History of blood transfusion     Hyperlipidemia     Hypertension     Neuropathy ([facility] [address]     Obstructive sleep apnea     AHI:  95.8 per PSG, 3/2017    Peripheral vascular disease ([facility] [address]     S/P angioplasty     Type II or unspecified type diabetes mellitus without mention of complication, not stated as uncontrolled Current Outpatient Prescriptions   Medication Sig Dispense Refill    hydrochlorothiazide (HYDRODIURIL) 25 MG tablet Take 1 tablet by mouth daily 90 tablet 3    glucose blood VI test strips (EXACTECH TEST) strip Test BS three times daily . E11.59 Use Easy Touch 100 each 3    cyclobenzaprine (FLEXERIL) 5 MG tablet TAKE 1 TABLET BY MOUTH EVERY 8 HOURS AS NEEDED FOR MUSCLE SPASMS 90 tablet 0    insulin lispro (HUMALOG KWIKPEN) 100 UNIT/ML pen Inject 20 Units into the skin 3 times daily (with meals) 5 pen 3    insulin glargine (TOUJEO SOLOSTAR) 300 UNIT/ML injection pen Inject 60 Units into the skin nightly 3 pen 3    Insulin Pen Needle 31G X 6 MM MISC 1 each by Does not apply route 4 times daily (before meals and nightly) May substitute to generic for insurance 120 each 3    amLODIPine (NORVASC) 5 MG tablet Take 1 tablet by mouth daily 30 tablet 3    Lancets MISC Accu-Chek TID,  DX: E11.59 100 each 3    lisinopril (PRINIVIL;ZESTRIL) 20 MG tablet Take 1 tablet by mouth 2 times daily 60 tablet 1    Lancets MISC Daily Accu-Chek 100 each 3    Insulin Pen Needle 31G X 6 MM MISC 1 each by Does not apply route daily May substitute to generic for insurance 100 each 3    metoprolol (LOPRESSOR) 100 MG tablet Take 1 tablet by mouth 2 times daily 60 tablet 3    atorvastatin (LIPITOR) 40 MG tablet Take 1 tablet by mouth daily 90 tablet 3    omeprazole (PRILOSEC) 40 MG delayed release capsule Take 40 mg by mouth 2 times daily      aspirin 325 MG tablet Take 325 mg by mouth daily. No current facility-administered medications for this visit.         No Known Allergies    Past Surgical History:   Procedure Laterality Date    CHOLECYSTECTOMY      LARYNGOSCOPY N/A 11/10/2017    Direct laryngoscopy with assessment of hypopharynx, larynx, and post-cricoid areas performed by Carolina Paul MD at [address]      x 2 in past, around 1989       Social History   Substance Use Topics    Smoking status: Former Smoker     Packs/day: 0.25     Types: Cigarettes     Quit date: 1/9/2017    Smokeless tobacco: Never Used    Alcohol use No       Family History   Problem Relation Age of Onset    Diabetes Father     Heart Disease Maternal Grandmother        /78   Pulse 94   Temp 97.6 °F (36.4 °C)   Ht 5' 7.5\" (1.715 m)   Wt 224 lb (101.6 kg)   SpO2 94%   BMI 34.57 kg/m²      Monofilament Exam Reveals:  Pulses: normal  Edema:normal  Skin Lesions:normal  Right Foot:    Left Foot:  Normal sensation at 0   Normal sensation at 0   Diminished sensation at 1-10   Diminished sensation at 0   No sensation at 0    No sensation at 1-10           Physical Exam   Constitutional: He is oriented to person, place, and time. He appears well-developed and well-nourished. Non-toxic appearance. No distress. HENT:   Head: Normocephalic and atraumatic. Cardiovascular: Normal rate, regular rhythm, normal heart sounds and intact distal pulses. Exam reveals no gallop and no friction rub. No murmur heard. Pulmonary/Chest: Effort normal and breath sounds normal. No respiratory distress. He has no wheezes. He has no rales. He exhibits no tenderness. Abdominal: Soft. Bowel sounds are normal. He exhibits no distension and no mass. There is no tenderness. There is no rebound and no guarding. Musculoskeletal:        Right lower leg: He exhibits no edema. Left lower leg: He exhibits no edema. Neurological: He is alert and oriented to person, place, and time. Coordination and gait normal.   Skin: Skin is warm and dry. He is not diaphoretic. No cyanosis. Nails show no clubbing. Psychiatric: His speech is normal and behavior is normal. Judgment normal. His mood appears not anxious. Cognition and memory are normal. He does not exhibit a depressed mood. He expresses no homicidal and no suicidal ideation. Nursing note and vitals reviewed. Assessment:    ICD-10-CM ICD-9-CM    1.  Type 2 diabetes mellitus with stage 3 chronic kidney disease, with long-term current use of insulin (HCC) E11.22 250.40  DIABETES FOOT EXAM    N18.3 585.3     Z79.4 V58.67    2. CKD stage 3 due to type 2 diabetes mellitus (HCC) E11.22 790.69 Basic Metabolic Panel    G51.9 250.1    3. Decreased sensation of foot R20.8 782.0    4. Essential hypertension I10 401.9        Plan:   Improving DM today. Continue current DM regimen. We'll follow-up in 6 weeks for A1c. He is to continue seeing podiatry. Patient would benefit from diabetic shoes if he needs these. Patient needs to have kidney labs checked today. Hypertension controlled, refill hydrochlorothiazide  Orders Placed This Encounter   Procedures    Basic Metabolic Panel     Standing Status:   Future     Standing Expiration Date:   3/12/2019     DIABETES FOOT EXAM     Orders Placed This Encounter   Medications    hydrochlorothiazide (HYDRODIURIL) 25 MG tablet     Sig: Take 1 tablet by mouth daily     Dispense:  90 tablet     Refill:  3     Medications Discontinued During This Encounter   Medication Reason    hydrochlorothiazide (HYDRODIURIL) 25 MG tablet Reorder     Patient Instructions   Get orange slices and eat just 1 when your sugar is under 70 or under 100 with symptoms. Do not use if over 100. Take meal time insulin even if you missed before meal, just don't take more than 15 minutes after meal    Get your labs checked in [address] of [address]. KEEP UP THE GOODWORK!!! I AM VERY PROUD OF YOU     Patient given educational handouts and has had all questions answered. Patient voices understanding and agrees to plans along with risks and benefits of plan. Patient is instructed to continue prior meds, diet, and exercise plans unless instructed otherwise. Patient agrees to follow up as instructed and sooner if needed. Patient agrees to go to ER if condition becomes emergent. Notes may be completed with dictation device and spelling errors may occur.       Return in

## 2018-03-21 RX ORDER — AMLODIPINE BESYLATE 5 MG/1
TABLET ORAL
Qty: 30 TABLET | Refills: 5 | Status: SHIPPED | OUTPATIENT
Start: 2018-03-21 | End: 2018-09-23 | Stop reason: SDUPTHER

## 2018-03-27 DIAGNOSIS — I10 ESSENTIAL HYPERTENSION: ICD-10-CM

## 2018-03-27 RX ORDER — ATORVASTATIN CALCIUM 40 MG/1
TABLET, FILM COATED ORAL
Qty: 90 TABLET | Refills: 3 | Status: SHIPPED | OUTPATIENT
Start: 2018-03-27 | End: 2019-05-02 | Stop reason: SDUPTHER

## 2018-03-27 RX ORDER — LISINOPRIL 20 MG/1
TABLET ORAL
Qty: 60 TABLET | Refills: 1 | Status: SHIPPED | OUTPATIENT
Start: 2018-03-27 | End: 2018-07-09 | Stop reason: SDUPTHER

## 2018-04-04 ENCOUNTER — CARE COORDINATION (OUTPATIENT)
Dept: CARE COORDINATION | Age: 63
End: 2018-04-04

## 2018-04-19 ENCOUNTER — HOSPITAL ENCOUNTER (OUTPATIENT)
Facility: HOSPITAL | Age: 63
Setting detail: HOSPITAL OUTPATIENT SURGERY
Discharge: HOME | End: 2018-04-19
Attending: INTERNAL MEDICINE | Admitting: INTERNAL MEDICINE

## 2018-04-19 ENCOUNTER — ANESTHESIA (OUTPATIENT)
Dept: GASTROENTEROLOGY | Facility: HOSPITAL | Age: 63
End: 2018-04-19

## 2018-04-19 ENCOUNTER — ANESTHESIA EVENT (OUTPATIENT)
Dept: GASTROENTEROLOGY | Facility: HOSPITAL | Age: 63
End: 2018-04-19

## 2018-04-19 VITALS
HEIGHT: 67 IN | WEIGHT: 220 LBS | OXYGEN SATURATION: 97 % | DIASTOLIC BLOOD PRESSURE: 65 MMHG | SYSTOLIC BLOOD PRESSURE: 146 MMHG | RESPIRATION RATE: 22 BRPM | BODY MASS INDEX: 34.53 KG/M2 | TEMPERATURE: 97.3 F | HEART RATE: 108 BPM

## 2018-04-19 DIAGNOSIS — Z86.010 HX OF COLONIC POLYPS: ICD-10-CM

## 2018-04-19 LAB — GLUCOSE BLDC GLUCOMTR-MCNC: 185 MG/DL (ref 70–130)

## 2018-04-19 PROCEDURE — 88305 TISSUE EXAM BY PATHOLOGIST: CPT | Performed by: INTERNAL MEDICINE

## 2018-04-19 PROCEDURE — 45385 COLONOSCOPY W/LESION REMOVAL: CPT | Performed by: INTERNAL MEDICINE

## 2018-04-19 PROCEDURE — 82962 GLUCOSE BLOOD TEST: CPT

## 2018-04-19 PROCEDURE — 25010000002 PROPOFOL 10 MG/ML EMULSION: Performed by: NURSE ANESTHETIST, CERTIFIED REGISTERED

## 2018-04-19 DEVICE — DEV CLIP ENDO RESOLUTION360 CONTRL ROT 235CM: Type: IMPLANTABLE DEVICE | Status: FUNCTIONAL

## 2018-04-19 RX ORDER — PROPOFOL 10 MG/ML
VIAL (ML) INTRAVENOUS AS NEEDED
Status: DISCONTINUED | OUTPATIENT
Start: 2018-04-19 | End: 2018-04-19 | Stop reason: SURG

## 2018-04-19 RX ORDER — SODIUM CHLORIDE 0.9 % (FLUSH) 0.9 %
1-10 SYRINGE (ML) INJECTION AS NEEDED
Status: CANCELLED | OUTPATIENT
Start: 2018-04-19

## 2018-04-19 RX ORDER — SODIUM CHLORIDE 9 MG/ML
100 INJECTION, SOLUTION INTRAVENOUS CONTINUOUS
Status: CANCELLED | OUTPATIENT
Start: 2018-04-19

## 2018-04-19 RX ORDER — METOPROLOL TARTRATE 5 MG/5ML
2 INJECTION INTRAVENOUS ONCE AS NEEDED
Status: COMPLETED | OUTPATIENT
Start: 2018-04-19 | End: 2018-04-19

## 2018-04-19 RX ORDER — SODIUM CHLORIDE 0.9 % (FLUSH) 0.9 %
3 SYRINGE (ML) INJECTION AS NEEDED
Status: DISCONTINUED | OUTPATIENT
Start: 2018-04-19 | End: 2018-04-19 | Stop reason: HOSPADM

## 2018-04-19 RX ORDER — SODIUM CHLORIDE 9 MG/ML
500 INJECTION, SOLUTION INTRAVENOUS CONTINUOUS PRN
Status: DISCONTINUED | OUTPATIENT
Start: 2018-04-19 | End: 2018-04-19 | Stop reason: HOSPADM

## 2018-04-19 RX ADMIN — PROPOFOL 20 MG: 10 INJECTION, EMULSION INTRAVENOUS at 10:29

## 2018-04-19 RX ADMIN — PROPOFOL 60 MG: 10 INJECTION, EMULSION INTRAVENOUS at 10:28

## 2018-04-19 RX ADMIN — METOROPROLOL TARTRATE 2 MG: 5 INJECTION, SOLUTION INTRAVENOUS at 10:06

## 2018-04-19 RX ADMIN — SODIUM CHLORIDE: 0.9 INJECTION, SOLUTION INTRAVENOUS at 10:15

## 2018-04-19 RX ADMIN — PROPOFOL 170 MG: 10 INJECTION, EMULSION INTRAVENOUS at 10:30

## 2018-04-19 NOTE — ANESTHESIA PREPROCEDURE EVALUATION
Anesthesia Evaluation     Patient summary reviewed   no history of anesthetic complications:  NPO Solid Status: > 8 hours             Airway   Mallampati: II  TM distance: >3 FB  Neck ROM: full  Dental      Pulmonary    (+) COPD, sleep apnea on CPAP,   Cardiovascular     Patient on routine beta blocker and Beta blocker given within 24 hours of surgery    (+) hypertension, dysrhythmias Atrial Fib,       Neuro/Psych- negative ROS  GI/Hepatic/Renal/Endo    (+) obesity,  GERD,  diabetes mellitus using insulin,     Musculoskeletal     Abdominal    Substance History      OB/GYN          Other                        Anesthesia Plan    ASA 3     general     intravenous induction   Anesthetic plan and risks discussed with patient.

## 2018-04-19 NOTE — H&P
[facility] Gastroenterology  Pre Procedure History & Physical    Chief Complaint:   History of polyps    Subjective     HPI:   Here for colonoscopy.  History of polyps.    Past Medical History:   Past Medical History:   Diagnosis Date   • A-fib    • Chronic back pain    • Constipation    • Diabetes mellitus     TYPE II   • Dysphagia    • Emphysema of lung    • Gastroparesis    • GERD (gastroesophageal reflux disease)    • Hypertension     ESSENTIAL   • Leg pain    • Osteoarthritis    • Visit for monitoring Plavix therapy     DOES NOT TAKE PLAVIX       Past Surgical History:  Past Surgical History:   Procedure Laterality Date   • BACK SURGERY     • CHOLECYSTECTOMY     • COLONOSCOPY  09/01/2011    TICS RECALL 5YR   • COLONOSCOPY  09/30/2008    ENTER RESULTS: STOOL THROUGHTOUT THE COLON POOR PREP REC 3 YEAR RECALL   • COLONOSCOPY N/A 11/9/2016    Procedure: COLONOSCOPY;  Surgeon: León Zepeda MD;  Location: [facility] ENDOSCOPY;  Service:    • ENDOSCOPY  06/19/2012    HH   • ENDOSCOPY  08/25/2009    HIATAL HERNIA, DILATED WITH 50 FR MASCORRO   • ENDOSCOPY  06/19/2007    WITHIN NORMAL LIMITS UREA NEG   • LUMBAR LAMINECTOMY WITH FUSION N/A 1/23/2017    Procedure: REMOVAL OF INSTRUMENTATION, EXPLORATION OF FUSION L4-S1, REVISION LEFT L5-S1 HEMILAMINECTOMY, FACETECTOMY DECOMPRESSION, REVISION UNINSTRUMENTED POSTERIOR SPINAL FUSION L5-S1;  Surgeon: RHONDA Lopes MD;  Location: [facility] OR;  Service:    • OTHER SURGICAL HISTORY      LUMBAR SACRAL SURGERY WITH FUSION X2   • PILONIDAL CYST / SINUS EXCISION      PILONIDAL CYST REMOVAL       Family History:  Family History   Problem Relation Age of Onset   • Heart attack Father    • Diabetes Brother    • Colon polyps Sister    • Stomach cancer Neg Hx      GI CNACERS OR DISEASE   • Colon cancer Neg Hx        Social History:   reports that he quit smoking about 15 months ago. His smoking use included Cigarettes. He quit after 30.00 years of use. He has never used smokeless  "tobacco. He reports that he does not drink alcohol or use drugs.    Medications:   Prior to Admission medications    Medication Sig Start Date End Date Taking? Authorizing Provider   aspirin 325 MG tablet Take 325 mg by mouth daily. 9/21/10  Yes Historical Provider, MD   carisoprodol (SOMA) 250 MG tablet Take 1 tablet by mouth daily.   Yes Historical Provider, MD   glimepiride (AMARYL) 4 MG tablet Take 1 tablet by mouth every morning (before breakfast) 7/1/16  Yes Historical Provider, MD   hydrochlorothiazide (HYDRODIURIL) 25 MG tablet Take 25 mg by mouth Daily.   Yes Historical Provider, MD   HYDROcodone-acetaminophen (NORCO)  MG per tablet Take 2 tablets by mouth Every 4 (Four) Hours As Needed for moderate pain (4-6). 1/25/17  Yes RHONDA Lopes MD   lisinopril (PRINIVIL,ZESTRIL) 10 MG tablet Take 1 tablet by mouth 2 times daily 10/20/16  Yes Historical Provider, MD   metoprolol tartrate (LOPRESSOR) 50 MG tablet Take 50 mg by mouth 2 (Two) Times a Day.   Yes Historical Provider, MD   omeprazole (priLOSEC) 20 MG capsule Take 1 capsule by mouth 2 (Two) Times a Day. 10/23/17  Yes PRATIMA Rodriguez   ZOLPIDEM TARTRATE PO Take 10 mg by mouth At Night As Needed.   Yes Historical Provider, MD   gabapentin (NEURONTIN) 600 MG tablet Take 1,200 mg by mouth Daily. 10/27/16   Historical Provider, MD   gabapentin (NEURONTIN) 600 MG tablet Take 600 mg by mouth Every Night.    Historical Provider, MD   polyethylene glycol (GoLYTELY) 236 g solution Take as directed by office instructions. 10/23/17   PRATIMA Rodriguez       Allergies:  Review of patient's allergies indicates no known allergies.    Objective     Blood pressure 165/74, pulse 118, temperature 97.3 °F (36.3 °C), temperature source Temporal Artery , resp. rate 20, height 170.2 cm (67\"), weight 99.8 kg (220 lb), SpO2 97 %.    Physical Exam   Constitutional: Pt is oriented to person, place, and in no distress.   HENT: Mouth/Throat: Oropharynx is " clear.   Cardiovascular: Normal rate, regular rhythm.    Pulmonary/Chest: Effort normal. No respiratory distress. No  wheezes.   Abdominal: Soft. Non-distended.  Skin: Skin is warm and dry.   Psychiatric: Mood, memory, affect and judgment appear normal.     Assessment/Plan     Diagnosis:  History of polyps    Anticipated Surgical Procedure:    Proceed with colonoscopy as scheduled    The following major R/B/A were discussed with the patient, however the list is not all inclusive . Risk:  Bleeding (immediate and delayed), perforation (rupture or tear), reaction to medication, missed lesion/cancer, pain during the procedure, infection, need for surgery, need for ostomy, need for mechanical ventilation (breathing machine), death.  Benefits: removal of polyp/tissue, burn/clip/or inject to stop bleeding, removal of foreign body, dilate any stricture.  Alternatives: Xray or CT, surgery, do nothing with associated risk   The patient was given time to ask question and received explanation, and agrees to proceed as per History and Physical.   No guarantee given or expressed.    EMR Dragon/transcription disclaimer: Much of this encounter note is an electronic transcription/translation of spoken language to printed text.  The electronic translation of spoken language may permit erroneous, or at times, nonsensical words or phrases to be inadvertently transcribed.  Although I have reviewed the note for such errors, some may still exist.    León Zepeda MD  10:22 AM  4/19/2018

## 2018-04-19 NOTE — ANESTHESIA POSTPROCEDURE EVALUATION
"Patient: Edy Cosby    Procedure Summary     Date:  04/19/18 Room / Location:   [facility] /  [facility]    Anesthesia Start:  1026 Anesthesia Stop:  1054    Procedure:  COLONOSCOPY WITH ANESTHESIA (N/A ) Diagnosis:       Hx of colonic polyps      (Hx of colonic polyps [Z86.010])    Surgeon:  León Zepeda MD Provider:  Junito Augustin CRNA    Anesthesia Type:  general ASA Status:  3          Anesthesia Type: general  Last vitals  BP   165/74 (04/19/18 0937)   Temp   97.3 °F (36.3 °C) (04/19/18 0937)   Pulse   118 (04/19/18 0937)   Resp   20 (04/19/18 0937)     SpO2   97 % (04/19/18 0937)     Post Anesthesia Care and Evaluation    Patient location during evaluation: PACU  Patient participation: complete - patient participated  Level of consciousness: awake and awake and alert  Pain score: 0  Pain management: adequate  Airway patency: patent  Anesthetic complications: No anesthetic complications    Cardiovascular status: acceptable and stable  Respiratory status: acceptable and unassisted  Hydration status: acceptable    Comments: Blood pressure 165/74, pulse 118, temperature 97.3 °F (36.3 °C), temperature source Temporal Artery , resp. rate 20, height 170.2 cm (67\"), weight 99.8 kg (220 lb), SpO2 97 %.      "

## 2018-04-20 LAB
CYTO UR: NORMAL
LAB AP CASE REPORT: NORMAL
LAB AP CLINICAL INFORMATION: NORMAL
Lab: NORMAL
PATH REPORT.FINAL DX SPEC: NORMAL
PATH REPORT.GROSS SPEC: NORMAL

## 2018-04-25 ENCOUNTER — CARE COORDINATION (OUTPATIENT)
Dept: CARE COORDINATION | Age: 63
End: 2018-04-25

## 2018-04-25 ENCOUNTER — OFFICE VISIT (OUTPATIENT)
Dept: PRIMARY CARE CLINIC | Age: 63
End: 2018-04-25
Payer: MEDICARE

## 2018-04-25 VITALS
WEIGHT: 220 LBS | SYSTOLIC BLOOD PRESSURE: 140 MMHG | DIASTOLIC BLOOD PRESSURE: 80 MMHG | TEMPERATURE: 98 F | HEART RATE: 68 BPM | HEIGHT: 68 IN | RESPIRATION RATE: 18 BRPM | BODY MASS INDEX: 33.34 KG/M2 | OXYGEN SATURATION: 99 %

## 2018-04-25 DIAGNOSIS — E11.21 DIABETIC NEPHROPATHY ASSOCIATED WITH TYPE 2 DIABETES MELLITUS (HCC): ICD-10-CM

## 2018-04-25 DIAGNOSIS — E11.51 TYPE 2 DIABETES MELLITUS WITH DIABETIC PERIPHERAL ANGIOPATHY WITHOUT GANGRENE, WITHOUT LONG-TERM CURRENT USE OF INSULIN (HCC): Chronic | ICD-10-CM

## 2018-04-25 DIAGNOSIS — Z13.220 SCREENING FOR HYPERLIPIDEMIA: ICD-10-CM

## 2018-04-25 DIAGNOSIS — N18.30 CHRONIC KIDNEY DISEASE, STAGE III (MODERATE) (HCC): Chronic | ICD-10-CM

## 2018-04-25 DIAGNOSIS — E11.51 TYPE 2 DIABETES MELLITUS WITH DIABETIC PERIPHERAL ANGIOPATHY WITHOUT GANGRENE, WITHOUT LONG-TERM CURRENT USE OF INSULIN (HCC): Primary | Chronic | ICD-10-CM

## 2018-04-25 LAB
ALBUMIN SERPL-MCNC: 4.3 G/DL (ref 3.5–5.2)
ALP BLD-CCNC: 83 U/L (ref 40–130)
ALT SERPL-CCNC: 9 U/L (ref 5–41)
ANION GAP SERPL CALCULATED.3IONS-SCNC: 17 MMOL/L (ref 7–19)
AST SERPL-CCNC: 12 U/L (ref 5–40)
BILIRUB SERPL-MCNC: 0.3 MG/DL (ref 0.2–1.2)
BUN BLDV-MCNC: 27 MG/DL (ref 8–23)
CALCIUM SERPL-MCNC: 10.1 MG/DL (ref 8.8–10.2)
CHLORIDE BLD-SCNC: 100 MMOL/L (ref 98–111)
CHOLESTEROL, FASTING: 141 MG/DL (ref 160–199)
CO2: 26 MMOL/L (ref 22–29)
CREAT SERPL-MCNC: 1.4 MG/DL (ref 0.5–1.2)
GFR NON-AFRICAN AMERICAN: 51
GLUCOSE BLD-MCNC: 259 MG/DL (ref 74–109)
HBA1C MFR BLD: 8.9 %
HDLC SERPL-MCNC: 44 MG/DL (ref 55–121)
LDL CHOLESTEROL CALCULATED: 79 MG/DL
POTASSIUM SERPL-SCNC: 4.8 MMOL/L (ref 3.5–5)
SODIUM BLD-SCNC: 143 MMOL/L (ref 136–145)
TOTAL PROTEIN: 8 G/DL (ref 6.6–8.7)
TRIGLYCERIDE, FASTING: 90 MG/DL (ref 0–149)

## 2018-04-25 PROCEDURE — 3045F PR MOST RECENT HEMOGLOBIN A1C LEVEL 7.0-9.0%: CPT | Performed by: FAMILY MEDICINE

## 2018-04-25 PROCEDURE — G8417 CALC BMI ABV UP PARAM F/U: HCPCS | Performed by: FAMILY MEDICINE

## 2018-04-25 PROCEDURE — 2022F DILAT RTA XM EVC RTNOPTHY: CPT | Performed by: FAMILY MEDICINE

## 2018-04-25 PROCEDURE — 1036F TOBACCO NON-USER: CPT | Performed by: FAMILY MEDICINE

## 2018-04-25 PROCEDURE — 99214 OFFICE O/P EST MOD 30 MIN: CPT | Performed by: FAMILY MEDICINE

## 2018-04-25 PROCEDURE — G8427 DOCREV CUR MEDS BY ELIG CLIN: HCPCS | Performed by: FAMILY MEDICINE

## 2018-04-25 PROCEDURE — 3017F COLORECTAL CA SCREEN DOC REV: CPT | Performed by: FAMILY MEDICINE

## 2018-04-25 RX ORDER — METOPROLOL TARTRATE 100 MG/1
TABLET ORAL
Qty: 270 TABLET | Refills: 3 | Status: SHIPPED | OUTPATIENT
Start: 2018-04-25 | End: 2019-02-11

## 2018-04-27 ENCOUNTER — TELEPHONE (OUTPATIENT)
Dept: PRIMARY CARE CLINIC | Age: 63
End: 2018-04-27

## 2018-05-03 PROBLEM — E11.21 DIABETIC NEPHROPATHY ASSOCIATED WITH TYPE 2 DIABETES MELLITUS (HCC): Status: ACTIVE | Noted: 2018-05-03

## 2018-05-03 ASSESSMENT — ENCOUNTER SYMPTOMS
VOMITING: 0
CONSTIPATION: 0
NAUSEA: 0
WHEEZING: 0
SHORTNESS OF BREATH: 0
DIARRHEA: 0
COUGH: 0
CHEST TIGHTNESS: 0
ABDOMINAL PAIN: 0

## 2018-05-07 ENCOUNTER — OFFICE VISIT (OUTPATIENT)
Dept: OTOLARYNGOLOGY | Age: 63
End: 2018-05-07
Payer: MEDICARE

## 2018-05-07 VITALS
HEIGHT: 68 IN | RESPIRATION RATE: 20 BRPM | TEMPERATURE: 97.3 F | HEART RATE: 95 BPM | OXYGEN SATURATION: 99 % | BODY MASS INDEX: 33.04 KG/M2 | DIASTOLIC BLOOD PRESSURE: 74 MMHG | SYSTOLIC BLOOD PRESSURE: 138 MMHG | WEIGHT: 218 LBS

## 2018-05-07 DIAGNOSIS — J38.00 VOCAL CORD PARESIS: ICD-10-CM

## 2018-05-07 DIAGNOSIS — J38.00 VOCAL CORD WEAKNESS: Primary | ICD-10-CM

## 2018-05-07 PROCEDURE — 31575 DIAGNOSTIC LARYNGOSCOPY: CPT | Performed by: OTOLARYNGOLOGY

## 2018-05-07 PROCEDURE — G8427 DOCREV CUR MEDS BY ELIG CLIN: HCPCS | Performed by: OTOLARYNGOLOGY

## 2018-05-07 PROCEDURE — 1036F TOBACCO NON-USER: CPT | Performed by: OTOLARYNGOLOGY

## 2018-05-07 PROCEDURE — 3017F COLORECTAL CA SCREEN DOC REV: CPT | Performed by: OTOLARYNGOLOGY

## 2018-05-07 PROCEDURE — G8417 CALC BMI ABV UP PARAM F/U: HCPCS | Performed by: OTOLARYNGOLOGY

## 2018-05-07 PROCEDURE — 99215 OFFICE O/P EST HI 40 MIN: CPT | Performed by: OTOLARYNGOLOGY

## 2018-05-07 RX ORDER — HYDROCODONE BITARTRATE AND ACETAMINOPHEN 10; 325 MG/1; MG/1
TABLET ORAL
Refills: 0 | COMMUNITY
Start: 2018-04-20 | End: 2022-01-20 | Stop reason: ALTCHOICE

## 2018-05-07 RX ORDER — OMEPRAZOLE 20 MG/1
CAPSULE, DELAYED RELEASE ORAL
COMMUNITY
Start: 2018-04-25 | End: 2018-05-07

## 2018-05-15 ENCOUNTER — HOSPITAL ENCOUNTER (OUTPATIENT)
Dept: PREADMISSION TESTING | Age: 63
Setting detail: OUTPATIENT SURGERY
Discharge: HOME | End: 2018-05-19

## 2018-05-15 VITALS — BODY MASS INDEX: 33.04 KG/M2 | WEIGHT: 218 LBS | HEIGHT: 68 IN

## 2018-05-16 ENCOUNTER — HOSPITAL ENCOUNTER (OUTPATIENT)
Dept: LAB | Age: 63
Discharge: HOME | End: 2018-05-16
Payer: MEDICARE

## 2018-05-16 ENCOUNTER — HOSPITAL ENCOUNTER (OUTPATIENT)
Dept: NON INVASIVE DIAGNOSTICS | Age: 63
Discharge: HOME | End: 2018-05-16
Payer: MEDICARE

## 2018-05-16 LAB
BASOPHILS ABSOLUTE: 0 K/UL (ref 0–0.2)
BASOPHILS RELATIVE PERCENT: 0.6 % (ref 0–1)
EOSINOPHILS ABSOLUTE: 0.1 K/UL (ref 0–0.6)
EOSINOPHILS RELATIVE PERCENT: 2 % (ref 0–5)
HCT VFR BLD CALC: 33.2 % (ref 42–52)
HEMOGLOBIN: 10.5 G/DL (ref 14–18)
LYMPHOCYTES ABSOLUTE: 2.1 K/UL (ref 1.1–4.5)
LYMPHOCYTES RELATIVE PERCENT: 32.9 % (ref 20–40)
MCH RBC QN AUTO: 27.4 PG (ref 27–31)
MCHC RBC AUTO-ENTMCNC: 31.6 G/DL (ref 33–37)
MCV RBC AUTO: 86.7 FL (ref 80–94)
MONOCYTES ABSOLUTE: 0.4 K/UL (ref 0–0.9)
MONOCYTES RELATIVE PERCENT: 6.1 % (ref 0–10)
NEUTROPHILS ABSOLUTE: 3.7 K/UL (ref 1.5–7.5)
NEUTROPHILS RELATIVE PERCENT: 57.8 % (ref 50–65)
PDW BLD-RTO: 15.2 % (ref 11.5–14.5)
PLATELET # BLD: 202 K/UL (ref 130–400)
PMV BLD AUTO: 12.8 FL (ref 9.4–12.4)
RBC # BLD: 3.83 M/UL (ref 4.7–6.1)
WBC # BLD: 6.4 K/UL (ref 4.8–10.8)

## 2018-05-16 PROCEDURE — 93005 ELECTROCARDIOGRAM TRACING: CPT

## 2018-05-17 ENCOUNTER — ANESTHESIA EVENT (OUTPATIENT)
Dept: OPERATING ROOM | Age: 63
End: 2018-05-17

## 2018-05-22 ENCOUNTER — CARE COORDINATION (OUTPATIENT)
Dept: CARE COORDINATION | Age: 63
End: 2018-05-22

## 2018-05-22 RX ORDER — HYDROCHLOROTHIAZIDE 25 MG/1
25 TABLET ORAL DAILY
Qty: 90 TABLET | Refills: 3 | Status: SHIPPED | OUTPATIENT
Start: 2018-05-22 | End: 2020-07-05

## 2018-05-24 ENCOUNTER — CARE COORDINATION (OUTPATIENT)
Dept: CARE COORDINATION | Age: 63
End: 2018-05-24

## 2018-05-25 ENCOUNTER — ANESTHESIA (OUTPATIENT)
Dept: OPERATING ROOM | Age: 63
End: 2018-05-25

## 2018-05-25 ENCOUNTER — HOSPITAL ENCOUNTER (OUTPATIENT)
Age: 63
Setting detail: OUTPATIENT SURGERY
Discharge: HOME | End: 2018-05-25
Attending: OTOLARYNGOLOGY | Admitting: OTOLARYNGOLOGY
Payer: MEDICARE

## 2018-05-25 ENCOUNTER — HOSPITAL ENCOUNTER (OUTPATIENT)
Age: 63
Setting detail: SPECIMEN
Discharge: HOME | End: 2018-05-25
Payer: MEDICARE

## 2018-05-25 VITALS
SYSTOLIC BLOOD PRESSURE: 139 MMHG | BODY MASS INDEX: 33.04 KG/M2 | WEIGHT: 218 LBS | RESPIRATION RATE: 20 BRPM | OXYGEN SATURATION: 94 % | TEMPERATURE: 96.9 F | DIASTOLIC BLOOD PRESSURE: 72 MMHG | HEIGHT: 68 IN | HEART RATE: 69 BPM

## 2018-05-25 VITALS
SYSTOLIC BLOOD PRESSURE: 84 MMHG | OXYGEN SATURATION: 99 % | DIASTOLIC BLOOD PRESSURE: 31 MMHG | RESPIRATION RATE: 1 BRPM

## 2018-05-25 PROCEDURE — 31541 LARYNSCOP W/TUMR EXC + SCOPE: CPT | Performed by: OTOLARYNGOLOGY

## 2018-05-25 PROCEDURE — 31535 LARYNGOSCOPY W/BIOPSY: CPT

## 2018-05-25 PROCEDURE — G8907 PT DOC NO EVENTS ON DISCHARG: HCPCS

## 2018-05-25 PROCEDURE — G8918 PT W/O PREOP ORDER IV AB PRO: HCPCS

## 2018-05-25 PROCEDURE — 88305 TISSUE EXAM BY PATHOLOGIST: CPT

## 2018-05-25 RX ORDER — HYDROMORPHONE HCL 110MG/55ML
0.5 PATIENT CONTROLLED ANALGESIA SYRINGE INTRAVENOUS EVERY 5 MIN PRN
Status: DISCONTINUED | OUTPATIENT
Start: 2018-05-25 | End: 2018-05-25 | Stop reason: HOSPADM

## 2018-05-25 RX ORDER — SODIUM CHLORIDE, SODIUM LACTATE, POTASSIUM CHLORIDE, CALCIUM CHLORIDE 600; 310; 30; 20 MG/100ML; MG/100ML; MG/100ML; MG/100ML
INJECTION, SOLUTION INTRAVENOUS CONTINUOUS
Status: DISCONTINUED | OUTPATIENT
Start: 2018-05-25 | End: 2018-05-25 | Stop reason: HOSPADM

## 2018-05-25 RX ORDER — LABETALOL HYDROCHLORIDE 5 MG/ML
5 INJECTION, SOLUTION INTRAVENOUS EVERY 10 MIN PRN
Status: DISCONTINUED | OUTPATIENT
Start: 2018-05-25 | End: 2018-05-25 | Stop reason: HOSPADM

## 2018-05-25 RX ORDER — LIDOCAINE HYDROCHLORIDE 10 MG/ML
1 INJECTION, SOLUTION EPIDURAL; INFILTRATION; INTRACAUDAL; PERINEURAL
Status: DISCONTINUED | OUTPATIENT
Start: 2018-05-25 | End: 2018-05-25 | Stop reason: HOSPADM

## 2018-05-25 RX ORDER — METOCLOPRAMIDE HYDROCHLORIDE 5 MG/ML
10 INJECTION INTRAMUSCULAR; INTRAVENOUS
Status: DISCONTINUED | OUTPATIENT
Start: 2018-05-25 | End: 2018-05-25 | Stop reason: HOSPADM

## 2018-05-25 RX ORDER — ENALAPRILAT 2.5 MG/2ML
1.25 INJECTION INTRAVENOUS
Status: DISCONTINUED | OUTPATIENT
Start: 2018-05-25 | End: 2018-05-25 | Stop reason: HOSPADM

## 2018-05-25 RX ORDER — PROPOFOL 10 MG/ML
INJECTION, EMULSION INTRAVENOUS PRN
Status: DISCONTINUED | OUTPATIENT
Start: 2018-05-25 | End: 2018-05-25 | Stop reason: SDUPTHER

## 2018-05-25 RX ORDER — MORPHINE SULFATE 10 MG/ML
4 INJECTION, SOLUTION INTRAMUSCULAR; INTRAVENOUS EVERY 5 MIN PRN
Status: DISCONTINUED | OUTPATIENT
Start: 2018-05-25 | End: 2018-05-25 | Stop reason: HOSPADM

## 2018-05-25 RX ORDER — DEXAMETHASONE SODIUM PHOSPHATE 4 MG/ML
INJECTION, SOLUTION INTRA-ARTICULAR; INTRALESIONAL; INTRAMUSCULAR; INTRAVENOUS; SOFT TISSUE PRN
Status: DISCONTINUED | OUTPATIENT
Start: 2018-05-25 | End: 2018-05-25 | Stop reason: SDUPTHER

## 2018-05-25 RX ORDER — GLYCOPYRROLATE 0.2 MG/ML
INJECTION INTRAMUSCULAR; INTRAVENOUS PRN
Status: DISCONTINUED | OUTPATIENT
Start: 2018-05-25 | End: 2018-05-25 | Stop reason: SDUPTHER

## 2018-05-25 RX ORDER — MORPHINE SULFATE 10 MG/ML
2 INJECTION, SOLUTION INTRAMUSCULAR; INTRAVENOUS EVERY 5 MIN PRN
Status: DISCONTINUED | OUTPATIENT
Start: 2018-05-25 | End: 2018-05-25 | Stop reason: HOSPADM

## 2018-05-25 RX ORDER — EPHEDRINE SULFATE 50 MG/ML
INJECTION, SOLUTION INTRAVENOUS PRN
Status: DISCONTINUED | OUTPATIENT
Start: 2018-05-25 | End: 2018-05-25 | Stop reason: SDUPTHER

## 2018-05-25 RX ORDER — SUCCINYLCHOLINE CHLORIDE 20 MG/ML
INJECTION INTRAMUSCULAR; INTRAVENOUS PRN
Status: DISCONTINUED | OUTPATIENT
Start: 2018-05-25 | End: 2018-05-25 | Stop reason: SDUPTHER

## 2018-05-25 RX ORDER — LIDOCAINE HYDROCHLORIDE 10 MG/ML
INJECTION, SOLUTION INFILTRATION; PERINEURAL PRN
Status: DISCONTINUED | OUTPATIENT
Start: 2018-05-25 | End: 2018-05-25 | Stop reason: SDUPTHER

## 2018-05-25 RX ORDER — HYDRALAZINE HYDROCHLORIDE 20 MG/ML
5 INJECTION INTRAMUSCULAR; INTRAVENOUS EVERY 10 MIN PRN
Status: DISCONTINUED | OUTPATIENT
Start: 2018-05-25 | End: 2018-05-25 | Stop reason: HOSPADM

## 2018-05-25 RX ORDER — PROMETHAZINE HYDROCHLORIDE 25 MG/ML
6.25 INJECTION, SOLUTION INTRAMUSCULAR; INTRAVENOUS
Status: DISCONTINUED | OUTPATIENT
Start: 2018-05-25 | End: 2018-05-25 | Stop reason: HOSPADM

## 2018-05-25 RX ORDER — ROCURONIUM BROMIDE 10 MG/ML
INJECTION, SOLUTION INTRAVENOUS PRN
Status: DISCONTINUED | OUTPATIENT
Start: 2018-05-25 | End: 2018-05-25 | Stop reason: SDUPTHER

## 2018-05-25 RX ORDER — HYDROMORPHONE HCL 110MG/55ML
0.25 PATIENT CONTROLLED ANALGESIA SYRINGE INTRAVENOUS EVERY 5 MIN PRN
Status: DISCONTINUED | OUTPATIENT
Start: 2018-05-25 | End: 2018-05-25 | Stop reason: HOSPADM

## 2018-05-25 RX ORDER — DIPHENHYDRAMINE HYDROCHLORIDE 50 MG/ML
12.5 INJECTION INTRAMUSCULAR; INTRAVENOUS
Status: DISCONTINUED | OUTPATIENT
Start: 2018-05-25 | End: 2018-05-25 | Stop reason: HOSPADM

## 2018-05-25 RX ORDER — MEPERIDINE HYDROCHLORIDE 25 MG/ML
12.5 INJECTION INTRAMUSCULAR; INTRAVENOUS; SUBCUTANEOUS EVERY 5 MIN PRN
Status: DISCONTINUED | OUTPATIENT
Start: 2018-05-25 | End: 2018-05-25 | Stop reason: HOSPADM

## 2018-05-25 RX ADMIN — EPHEDRINE SULFATE 10 MG: 50 INJECTION, SOLUTION INTRAVENOUS at 11:00

## 2018-05-25 RX ADMIN — ROCURONIUM BROMIDE 20 MG: 10 INJECTION, SOLUTION INTRAVENOUS at 10:58

## 2018-05-25 RX ADMIN — ROCURONIUM BROMIDE 5 MG: 10 INJECTION, SOLUTION INTRAVENOUS at 10:36

## 2018-05-25 RX ADMIN — SODIUM CHLORIDE, SODIUM LACTATE, POTASSIUM CHLORIDE, CALCIUM CHLORIDE: 600; 310; 30; 20 INJECTION, SOLUTION INTRAVENOUS at 11:04

## 2018-05-25 RX ADMIN — Medication 0.5 MG: at 10:54

## 2018-05-25 RX ADMIN — SUCCINYLCHOLINE CHLORIDE 140 MG: 20 INJECTION INTRAMUSCULAR; INTRAVENOUS at 10:36

## 2018-05-25 RX ADMIN — LIDOCAINE HYDROCHLORIDE 50 MG: 10 INJECTION, SOLUTION INFILTRATION; PERINEURAL at 10:36

## 2018-05-25 RX ADMIN — SODIUM CHLORIDE, SODIUM LACTATE, POTASSIUM CHLORIDE, CALCIUM CHLORIDE: 600; 310; 30; 20 INJECTION, SOLUTION INTRAVENOUS at 07:48

## 2018-05-25 RX ADMIN — GLYCOPYRROLATE 0.8 MG: 0.2 INJECTION INTRAMUSCULAR; INTRAVENOUS at 11:07

## 2018-05-25 RX ADMIN — DEXAMETHASONE SODIUM PHOSPHATE 8 MG: 4 INJECTION, SOLUTION INTRA-ARTICULAR; INTRALESIONAL; INTRAMUSCULAR; INTRAVENOUS; SOFT TISSUE at 10:52

## 2018-05-25 RX ADMIN — PROPOFOL 100 MG: 10 INJECTION, EMULSION INTRAVENOUS at 10:42

## 2018-05-25 RX ADMIN — Medication 0.5 MG: at 10:52

## 2018-05-25 RX ADMIN — GLYCOPYRROLATE 0.2 MG: 0.2 INJECTION INTRAMUSCULAR; INTRAVENOUS at 10:55

## 2018-05-25 RX ADMIN — PROPOFOL 200 MG: 10 INJECTION, EMULSION INTRAVENOUS at 10:36

## 2018-05-25 ASSESSMENT — LIFESTYLE VARIABLES: SMOKING_STATUS: 0

## 2018-05-25 ASSESSMENT — COPD QUESTIONNAIRES: CAT_SEVERITY: MODERATE

## 2018-06-06 ENCOUNTER — OFFICE VISIT (OUTPATIENT)
Dept: OTOLARYNGOLOGY | Age: 63
End: 2018-06-06

## 2018-06-06 VITALS
WEIGHT: 218 LBS | SYSTOLIC BLOOD PRESSURE: 138 MMHG | HEIGHT: 68 IN | OXYGEN SATURATION: 98 % | DIASTOLIC BLOOD PRESSURE: 70 MMHG | BODY MASS INDEX: 33.04 KG/M2 | TEMPERATURE: 97.7 F | HEART RATE: 86 BPM

## 2018-06-06 DIAGNOSIS — Z98.890 POST-OPERATIVE STATE: ICD-10-CM

## 2018-06-06 DIAGNOSIS — J38.00 VOCAL CORD PARESIS: ICD-10-CM

## 2018-06-06 DIAGNOSIS — J38.00 VOCAL CORD WEAKNESS: Primary | ICD-10-CM

## 2018-06-06 PROCEDURE — 99024 POSTOP FOLLOW-UP VISIT: CPT | Performed by: OTOLARYNGOLOGY

## 2018-06-27 ENCOUNTER — CARE COORDINATION (OUTPATIENT)
Dept: CARE COORDINATION | Age: 63
End: 2018-06-27

## 2018-06-27 ENCOUNTER — OFFICE VISIT (OUTPATIENT)
Dept: PRIMARY CARE CLINIC | Age: 63
End: 2018-06-27
Payer: MEDICARE

## 2018-06-27 VITALS
HEART RATE: 88 BPM | SYSTOLIC BLOOD PRESSURE: 150 MMHG | BODY MASS INDEX: 33.19 KG/M2 | DIASTOLIC BLOOD PRESSURE: 70 MMHG | TEMPERATURE: 98 F | WEIGHT: 219 LBS | RESPIRATION RATE: 20 BRPM | OXYGEN SATURATION: 94 % | HEIGHT: 68 IN

## 2018-06-27 DIAGNOSIS — E11.42 DIABETIC PERIPHERAL NEUROPATHY ASSOCIATED WITH TYPE 2 DIABETES MELLITUS (HCC): ICD-10-CM

## 2018-06-27 DIAGNOSIS — Z23 NEED FOR PROPHYLACTIC VACCINATION AND INOCULATION AGAINST VARICELLA: ICD-10-CM

## 2018-06-27 DIAGNOSIS — Z00.00 ROUTINE GENERAL MEDICAL EXAMINATION AT A HEALTH CARE FACILITY: Primary | ICD-10-CM

## 2018-06-27 DIAGNOSIS — E11.51 TYPE 2 DIABETES MELLITUS WITH DIABETIC PERIPHERAL ANGIOPATHY WITHOUT GANGRENE, WITHOUT LONG-TERM CURRENT USE OF INSULIN (HCC): Chronic | ICD-10-CM

## 2018-06-27 LAB — HBA1C MFR BLD: 9.3 %

## 2018-06-27 PROCEDURE — 2022F DILAT RTA XM EVC RTNOPTHY: CPT | Performed by: FAMILY MEDICINE

## 2018-06-27 PROCEDURE — 3046F HEMOGLOBIN A1C LEVEL >9.0%: CPT | Performed by: FAMILY MEDICINE

## 2018-06-27 PROCEDURE — G0438 PPPS, INITIAL VISIT: HCPCS | Performed by: FAMILY MEDICINE

## 2018-06-27 PROCEDURE — 3017F COLORECTAL CA SCREEN DOC REV: CPT | Performed by: FAMILY MEDICINE

## 2018-06-27 PROCEDURE — 83036 HEMOGLOBIN GLYCOSYLATED A1C: CPT | Performed by: FAMILY MEDICINE

## 2018-06-27 PROCEDURE — G8427 DOCREV CUR MEDS BY ELIG CLIN: HCPCS | Performed by: FAMILY MEDICINE

## 2018-06-27 PROCEDURE — 99213 OFFICE O/P EST LOW 20 MIN: CPT | Performed by: FAMILY MEDICINE

## 2018-06-27 PROCEDURE — 1036F TOBACCO NON-USER: CPT | Performed by: FAMILY MEDICINE

## 2018-06-27 PROCEDURE — G8417 CALC BMI ABV UP PARAM F/U: HCPCS | Performed by: FAMILY MEDICINE

## 2018-06-27 ASSESSMENT — ENCOUNTER SYMPTOMS
VOMITING: 0
DIARRHEA: 0
CONSTIPATION: 0
CHEST TIGHTNESS: 0
ABDOMINAL PAIN: 0
NAUSEA: 0
WHEEZING: 0
COUGH: 0
SHORTNESS OF BREATH: 0

## 2018-06-27 ASSESSMENT — LIFESTYLE VARIABLES: HOW OFTEN DO YOU HAVE A DRINK CONTAINING ALCOHOL: 0

## 2018-06-27 ASSESSMENT — PATIENT HEALTH QUESTIONNAIRE - PHQ9: SUM OF ALL RESPONSES TO PHQ QUESTIONS 1-9: 0

## 2018-06-27 ASSESSMENT — ANXIETY QUESTIONNAIRES: GAD7 TOTAL SCORE: 0

## 2018-07-09 DIAGNOSIS — I10 ESSENTIAL HYPERTENSION: ICD-10-CM

## 2018-07-09 RX ORDER — LISINOPRIL 20 MG/1
TABLET ORAL
Qty: 60 TABLET | Refills: 1 | Status: SHIPPED | OUTPATIENT
Start: 2018-07-09 | End: 2018-10-09 | Stop reason: SDUPTHER

## 2018-07-11 NOTE — CARE COORDINATION
Ambulatory Care Coordination Note  6/27/2018  CM Risk Score: 3  Oskar Mortality Risk Score:      ACC: Mimi Slaughter RN    Summary Note: ACC met with the pt while in the office to see provider. Pt is now receiving his insulin from the [place], this will help the pt to be able to be more compliant with medications as he now can afford the medications. Now that the pt has them routinely will work with pt on remembering to take daily and to monitor blood sugar levels daily. Review of exercise, reminded pt that exercise is good and will help to lower his blood sugar, pt states he knows he needs to exercise and will look into getting back to [facility] Coordination Interventions    Program Enrollment:  Rising Risk  Referral from Primary Care Provider:  Yes  Suggested Interventions and Community Resources  Diabetes Education: In Process (Comment: 5/22/18-DM-diet )  Medi Set or Pill Pack:  Declined  Pharmacist:  Declined  Zone Management Tools: In Process (Comment: 6/27/18-DM review of insulin and diet-[facility] covering cost of insulin now)  Other Services or Interventions:  6/27/18 Pt is getting medications from [place] now         Goals Addressed             Most Recent     Self Monitoring   No change (5/24/2018)             Self-Monitored Blood Glucose - I will check my blood sugar Fasting blood sugar    Patient Reported Blood Sugar No flowsheet data found. Barriers: financial  Plan for overcoming my barriers: Call MILIND Nayak to see if he qualifies for medication help  Confidence: 5/10  Anticipated Goal Completion Date: 9/1/18              Prior to Admission medications    Medication Sig Start Date End Date Taking?  Authorizing Provider   lisinopril (PRINIVIL;ZESTRIL) 20 MG tablet TAKE 1 TABLET BY MOUTH TWICE DAILY 7/9/18   Maria Alejandra Pruitt MD   insulin glargine (LANTUS SOLOSTAR) 100 UNIT/ML injection pen Inject 80 Units into the skin nightly 6/27/18   Maria Alejandra Pruitt MD   insulin aspart (Jim Spanglerks

## 2018-08-13 ENCOUNTER — OFFICE VISIT (OUTPATIENT)
Dept: OTOLARYNGOLOGY | Age: 63
End: 2018-08-13
Payer: MEDICARE

## 2018-08-13 VITALS
BODY MASS INDEX: 32.13 KG/M2 | OXYGEN SATURATION: 98 % | TEMPERATURE: 97.2 F | SYSTOLIC BLOOD PRESSURE: 134 MMHG | WEIGHT: 212 LBS | RESPIRATION RATE: 18 BRPM | HEART RATE: 74 BPM | HEIGHT: 68 IN | DIASTOLIC BLOOD PRESSURE: 70 MMHG

## 2018-08-13 DIAGNOSIS — J38.00 VOCAL CORD PARESIS: ICD-10-CM

## 2018-08-13 DIAGNOSIS — J38.00 VOCAL CORD WEAKNESS: Primary | ICD-10-CM

## 2018-08-13 PROCEDURE — 99214 OFFICE O/P EST MOD 30 MIN: CPT | Performed by: OTOLARYNGOLOGY

## 2018-08-13 PROCEDURE — G8427 DOCREV CUR MEDS BY ELIG CLIN: HCPCS | Performed by: OTOLARYNGOLOGY

## 2018-08-13 PROCEDURE — 1036F TOBACCO NON-USER: CPT | Performed by: OTOLARYNGOLOGY

## 2018-08-13 PROCEDURE — 3017F COLORECTAL CA SCREEN DOC REV: CPT | Performed by: OTOLARYNGOLOGY

## 2018-08-13 PROCEDURE — G8417 CALC BMI ABV UP PARAM F/U: HCPCS | Performed by: OTOLARYNGOLOGY

## 2018-08-13 PROCEDURE — 31575 DIAGNOSTIC LARYNGOSCOPY: CPT | Performed by: OTOLARYNGOLOGY

## 2018-08-13 NOTE — PROGRESS NOTES
meals and nightly) May substitute to generic for insurance 120 each 3    Lancets MISC Accu-Chek TID,  DX: E11.59 100 each 3    Lancets MISC Daily Accu-Chek 100 each 3    Insulin Pen Needle 31G X 6 MM MISC 1 each by Does not apply route daily May substitute to generic for insurance 100 each 3    omeprazole (PRILOSEC) 40 MG delayed release capsule Take 40 mg by mouth 2 times daily      aspirin 325 MG tablet Take 325 mg by mouth daily. No current facility-administered medications for this visit. No Known Allergies    Subjective:      FINAL DIAGNOSIS:   Larynx, left true vocal cord, biopsy:  1.  Consistent with vocal cord polyp. 2.  No histologic evidence of malignancy.      Previous laryngoscopy for vocal paresis negative for malignancy but continued muffled voice changes cause concern etiology unclear. Diabetes x 8 years. Questionable neurologic. Patient feels better than 5 months ago. 5/7/18. Last note above prior to Lx Bx. Laryngoscopy negative and no new clinical complaints. No dysphagia. CT chest and neck negative and part of work up for [employer]. Also can have EGD recommended after CT chest but had only colonoscopy when at [facility] per GI. May return for EGD to endo esophagus but no new or progression of any symptoms over last 10 months but still advised. PREOP DIAGNOSIS: [address] PARESIS    Patient medical history reviewed. Objective:     Physical Exam   Constitutional: He is oriented to person, place, and time. He appears well-developed and well-nourished. HENT:   Head: Normocephalic and atraumatic. Right Ear: Tympanic membrane, external ear and ear canal normal. No drainage. No decreased hearing is noted. Left Ear: Tympanic membrane, external ear and ear canal normal. No drainage. No decreased hearing is noted. Nose: Nose normal. No mucosal edema, rhinorrhea or septal deviation.  Right sinus exhibits no maxillary sinus tenderness and no frontal

## 2018-09-05 ENCOUNTER — OFFICE VISIT (OUTPATIENT)
Dept: PRIMARY CARE CLINIC | Age: 63
End: 2018-09-05
Payer: MEDICARE

## 2018-09-05 ENCOUNTER — CARE COORDINATION (OUTPATIENT)
Dept: CARE COORDINATION | Age: 63
End: 2018-09-05

## 2018-09-05 VITALS
SYSTOLIC BLOOD PRESSURE: 138 MMHG | WEIGHT: 212 LBS | TEMPERATURE: 97.3 F | HEART RATE: 88 BPM | HEIGHT: 67 IN | BODY MASS INDEX: 33.27 KG/M2 | RESPIRATION RATE: 20 BRPM | DIASTOLIC BLOOD PRESSURE: 68 MMHG

## 2018-09-05 DIAGNOSIS — F17.200 TOBACCO USE DISORDER: ICD-10-CM

## 2018-09-05 DIAGNOSIS — E11.51 TYPE 2 DIABETES MELLITUS WITH DIABETIC PERIPHERAL ANGIOPATHY WITHOUT GANGRENE, WITHOUT LONG-TERM CURRENT USE OF INSULIN (HCC): Primary | Chronic | ICD-10-CM

## 2018-09-05 DIAGNOSIS — Z71.3 DIETARY COUNSELING: ICD-10-CM

## 2018-09-05 DIAGNOSIS — M51.37 DEGENERATION OF LUMBAR OR LUMBOSACRAL INTERVERTEBRAL DISC: ICD-10-CM

## 2018-09-05 DIAGNOSIS — E66.01 MORBID OBESITY DUE TO EXCESS CALORIES (HCC): ICD-10-CM

## 2018-09-05 LAB — HBA1C MFR BLD: 10 %

## 2018-09-05 PROCEDURE — 83036 HEMOGLOBIN GLYCOSYLATED A1C: CPT | Performed by: FAMILY MEDICINE

## 2018-09-05 PROCEDURE — 99406 BEHAV CHNG SMOKING 3-10 MIN: CPT | Performed by: FAMILY MEDICINE

## 2018-09-05 PROCEDURE — 3046F HEMOGLOBIN A1C LEVEL >9.0%: CPT | Performed by: FAMILY MEDICINE

## 2018-09-05 PROCEDURE — G8427 DOCREV CUR MEDS BY ELIG CLIN: HCPCS | Performed by: FAMILY MEDICINE

## 2018-09-05 PROCEDURE — 2022F DILAT RTA XM EVC RTNOPTHY: CPT | Performed by: FAMILY MEDICINE

## 2018-09-05 PROCEDURE — 99214 OFFICE O/P EST MOD 30 MIN: CPT | Performed by: FAMILY MEDICINE

## 2018-09-05 PROCEDURE — G8417 CALC BMI ABV UP PARAM F/U: HCPCS | Performed by: FAMILY MEDICINE

## 2018-09-05 PROCEDURE — 1036F TOBACCO NON-USER: CPT | Performed by: FAMILY MEDICINE

## 2018-09-05 PROCEDURE — 3017F COLORECTAL CA SCREEN DOC REV: CPT | Performed by: FAMILY MEDICINE

## 2018-09-05 PROCEDURE — 99401 PREV MED CNSL INDIV APPRX 15: CPT | Performed by: FAMILY MEDICINE

## 2018-09-05 RX ORDER — MELOXICAM 15 MG/1
15 TABLET ORAL DAILY
COMMUNITY
End: 2020-09-30

## 2018-09-05 RX ORDER — GABAPENTIN 300 MG/1
900 CAPSULE ORAL 3 TIMES DAILY
COMMUNITY
End: 2022-01-20 | Stop reason: ALTCHOICE

## 2018-09-05 ASSESSMENT — ENCOUNTER SYMPTOMS
CHEST TIGHTNESS: 0
DIARRHEA: 0
NAUSEA: 0
BACK PAIN: 1
VOMITING: 0
SHORTNESS OF BREATH: 0
ABDOMINAL PAIN: 0
COUGH: 0
CONSTIPATION: 0
WHEEZING: 0

## 2018-09-05 NOTE — PROGRESS NOTES
Dotty Owen is a 59 y.o. male who presents today for   Chief Complaint   Patient presents with    1 Month Follow-Up    Diabetes       HPI  Patient is here for f/u DM. Doing well he states. Has lost some weight with walking. He states he has issues remembering to eat meals and to use meal time insulin. Pt notes as well that he relapsed on smoking and that he is trying to get back to no smoking. Still has back pain that is uncontrolled and needs pain tx. Sees pain mgt tomorrow. No change in PMH, family, social, or surgical history unless mentioned above. Review of Systems   Constitutional: Negative for chills, fever and unexpected weight change. Respiratory: Negative for cough, chest tightness, shortness of breath and wheezing. Cardiovascular: Negative for chest pain, palpitations and leg swelling. Gastrointestinal: Negative for abdominal pain, constipation, diarrhea, nausea and vomiting. Genitourinary: Negative for difficulty urinating, dysuria, enuresis and frequency. Musculoskeletal: Positive for back pain. Negative for gait problem. Neurological: Negative for weakness and numbness. Past Medical History:   Diagnosis Date    Arthritis     BiPAP (biphasic positive airway pressure) dependence     8cm to 20cm    Chronic back pain     Emphysema/COPD (HCC)     GERD (gastroesophageal reflux disease)     History of anemia     History of blood transfusion     Hyperlipidemia     Hypertension     Neuropathy     Obstructive sleep apnea     AHI:  95.8 per PSG, 3/2017; repeat PSG, 11/2017 revealed an AHI of 65.5    Peripheral vascular disease ([facility] [address]     S/P angioplasty     Type II or unspecified type diabetes mellitus without mention of complication, not stated as uncontrolled        Current Outpatient Prescriptions   Medication Sig Dispense Refill    gabapentin (NEURONTIN) 300 MG capsule Take 900 mg by mouth 3 times daily. Jorge L Catena meloxicam (MOBIC) 15 MG tablet Take 15 mg tablet Take 325 mg by mouth daily. No current facility-administered medications for this visit. No Known Allergies    Past Surgical History:   Procedure Laterality Date    CHOLECYSTECTOMY      LARYNGOSCOPY N/A 11/10/2017    Direct laryngoscopy with assessment of hypopharynx, larynx, and post-cricoid areas performed by Danyelle Cowan MD at [facility] N/A 5/25/2018    MICRO DIRECT LARYNGOSCOPY WITH BIOPSY performed by Danyelle Cowan MD at [address]      x 2 in past, around 1989       Social History   Substance Use Topics    Smoking status: Former Smoker     Packs/day: 0.25     Years: 20.00     Types: Cigarettes     Quit date: 1/9/2017    Smokeless tobacco: Never Used    Alcohol use No       Family History   Problem Relation Age of Onset    Diabetes Father     Heart Disease Maternal Grandmother        /68   Pulse 88   Temp 97.3 °F (36.3 °C) (Temporal)   Resp 20   Ht 5' 7\" (1.702 m)   Wt 212 lb (96.2 kg)   BMI 33.20 kg/m²     Physical Exam   Constitutional: He is oriented to person, place, and time. He appears well-developed and well-nourished. Non-toxic appearance. No distress. HENT:   Head: Normocephalic and atraumatic. Not macrocephalic and not microcephalic. Chronic hoarseness   Cardiovascular: Normal rate, regular rhythm, normal heart sounds and intact distal pulses. Exam reveals no gallop and no friction rub. No murmur heard. Pulmonary/Chest: Effort normal and breath sounds normal. No respiratory distress. He has no wheezes. He has no rales. He exhibits no tenderness. Abdominal: Soft. Bowel sounds are normal. He exhibits no distension and no mass. There is no tenderness. There is no rebound and no guarding. Musculoskeletal:        Lumbar back: He exhibits decreased range of motion and tenderness. He exhibits no bony tenderness. Right lower leg: He exhibits no edema.         Left lower leg: He exhibits no edema. Neurological: He is alert and oriented to person, place, and time. Coordination and gait normal.   Skin: Skin is warm and dry. He is not diaphoretic. No cyanosis. Nails show no clubbing. Psychiatric: His speech is normal and behavior is normal. Judgment normal. His mood appears not anxious. Cognition and memory are normal. He does not exhibit a depressed mood. He expresses no homicidal and no suicidal ideation. Nursing note and vitals reviewed. Assessment:    ICD-10-CM ICD-9-CM    1. Type 2 diabetes mellitus with diabetic peripheral angiopathy without gangrene, without long-term current use of insulin ([facility]) E11.51 250.70 POCT glycosylated hemoglobin (Hb A1C)     443.81    2. Morbid obesity due to excess calories ([facility]) E66.01 278.01    3. Dietary counseling Z71.3 V65.3    4. Tobacco use disorder F17.200 305.1    5. Degeneration of lumbar or lumbosacral intervertebral disc M51.37 722.52        Plan:   Obesity is improving, cont exercise and add GLP1 to help unstable and worsening DM and to assist in weight loss. Pt was given approximately 15 minutes of counseling about diet and exercise including education on what calories are, where calories come from, the need for portion control, and healthy snacks along side an active lifestyle with supplementary exercise of approx 30 minutes a week, 5 days a week of moderate to high intensity exercise for weight loss. The patient voiced increased understanding of the topics discussed. Approximately 3 minutes of education was provided about quit smoking and the harms of tobacco.  Patient does show understanding. Patient has  the desire to quit smoking in the near future. Continue meds and tapering and counseling.   Pt tofollow up for stable but uncontrolled back pain w/ pain meanagement       Orders Placed This Encounter   Procedures    POCT glycosylated hemoglobin (Hb A1C)     Orders Placed This Encounter   Medications    Exenatide ER (BYDUREON BCISE) 2 MG/0.85ML AUIJ     Sig: Inject 2 mg into the skin every morning (before breakfast)     Dispense:  4 pen     Refill:  5     Medications Discontinued During This Encounter   Medication Reason    insulin glargine (TOUJEO SOLOSTAR) 300 UNIT/ML injection pen      Patient Instructions   Start bydureon once per week on Wednesday morning. Continue 80 units of lantus at night. Check your fasting blood sugar before breakfast, lunch, and dinner. Take 25 units of humalog/novolgo plus the additional sliding scale amount for your blood sugar before that meal.    <150: 0  151-200:  2  201-250: 4  251-300: 6  301-350: 8  351-400: 10  >400: 12   Example: If your pre-meal blood sugar is 175, take 25 + 2 unit = 27 units. Keep a log of your sugars. Patient given educational handouts and has had all questions answered. Patient voices understanding and agrees to plans along with risks and benefits of plan. Patient is instructed to continue prior meds, diet, and exercise plans unless instructed otherwise. Patient agrees to follow up as instructed and sooner if needed. Patient agrees to go to ER if condition becomes emergent. Notes may be completed with dictation device and spelling errors may occur. Return in about 2 months (around 11/5/2018) for medicare AWV, POCT A1C.

## 2018-09-06 NOTE — CARE COORDINATION
Ambulatory Care Coordination Note  9/5/2018  CM Risk Score: 3  Oskar Mortality Risk Score:      ACC: Thi Odell RN    Summary Note: ACC met with the pt while in the office to see PCP,  Pt reports that sometimes he does forget to take his medication/insulin, pt does not have a log of blood sugars to review, states when he checks its always 100-200  Review of needing to incorporate exercise, pt states he has not started swimming yet and states he knows he needs to, states he is not able to walk long distances  Review of diet, foods he is eating and drinking, pt states that he is only usually eating 1 meal a day        Care Coordination Interventions    Program Enrollment:  Rising Risk  Referral from Primary Care Provider:  Yes  Suggested Interventions and Community Resources  Diabetes Education: In Process (Comment: 9/5/18-DM-diet )  Medi Set or Pill Pack:  Declined  Pharmacist:  Declined  Zone Management Tools: In Process (Comment: 9/6/18-DM review of insulin and diet)  Other Services or Interventions:  9/5/18 Glucerna samples given          Goals Addressed             Most Recent     Self Monitoring   No change (9/5/2018)             Self-Monitored Blood Glucose - I will check my blood sugar Fasting blood sugar    Patient Reported Blood Sugar No flowsheet data found. Barriers: financial  Plan for overcoming my barriers: Call MILIND Nayak to see if he qualifies for medication help  Confidence: 5/10  Anticipated Goal Completion Date: 9/1/18              Prior to Admission medications    Medication Sig Start Date End Date Taking? Authorizing Provider   gabapentin (NEURONTIN) 300 MG capsule Take 900 mg by mouth 3 times daily. Eddie Brown     Historical Provider, MD   meloxicam (MOBIC) 15 MG tablet Take 15 mg by mouth daily    Historical Provider, MD   Exenatide ER (BYDUREON BCISE) 2 MG/0.85ML AURAULITO Inject 2 mg into the skin every morning (before breakfast) 9/5/18   Aliyah Charlton MD   lisinopril (PRINIVIL;ZESTRIL) 20 MG

## 2018-09-20 ENCOUNTER — CARE COORDINATION (OUTPATIENT)
Dept: CARE COORDINATION | Age: 63
End: 2018-09-20

## 2018-09-20 NOTE — CARE COORDINATION
I hope this letter finds you doing well. Danielle Talley just wanted to take a moment and reach out to you as a quick reminder that I care about your overall health and I am here to support you.  Please don't hesitate to reach out to me for any type of assistance you may need. As a reminder here are the different ways you can contact me:     Viv Baca RN [address] Office   [phone] [email or web address]   [place]

## 2018-09-23 RX ORDER — AMLODIPINE BESYLATE 5 MG/1
TABLET ORAL
Qty: 30 TABLET | Refills: 5 | Status: SHIPPED | OUTPATIENT
Start: 2018-09-23 | End: 2019-03-31 | Stop reason: SDUPTHER

## 2018-10-09 DIAGNOSIS — I10 ESSENTIAL HYPERTENSION: ICD-10-CM

## 2018-10-09 RX ORDER — LISINOPRIL 20 MG/1
TABLET ORAL
Qty: 60 TABLET | Refills: 1 | Status: SHIPPED | OUTPATIENT
Start: 2018-10-09 | End: 2018-12-03 | Stop reason: SDUPTHER

## 2018-10-17 ENCOUNTER — OFFICE VISIT (OUTPATIENT)
Dept: OTOLARYNGOLOGY | Age: 63
End: 2018-10-17
Payer: MEDICARE

## 2018-10-17 VITALS
SYSTOLIC BLOOD PRESSURE: 142 MMHG | BODY MASS INDEX: 32.13 KG/M2 | DIASTOLIC BLOOD PRESSURE: 74 MMHG | TEMPERATURE: 97.4 F | WEIGHT: 212 LBS | RESPIRATION RATE: 16 BRPM | OXYGEN SATURATION: 98 % | HEART RATE: 103 BPM | HEIGHT: 68 IN

## 2018-10-17 DIAGNOSIS — J38.00 VOCAL CORD PARESIS: Primary | ICD-10-CM

## 2018-10-17 PROCEDURE — G8417 CALC BMI ABV UP PARAM F/U: HCPCS | Performed by: OTOLARYNGOLOGY

## 2018-10-17 PROCEDURE — G8484 FLU IMMUNIZE NO ADMIN: HCPCS | Performed by: OTOLARYNGOLOGY

## 2018-10-17 PROCEDURE — 3017F COLORECTAL CA SCREEN DOC REV: CPT | Performed by: OTOLARYNGOLOGY

## 2018-10-17 PROCEDURE — 99213 OFFICE O/P EST LOW 20 MIN: CPT | Performed by: OTOLARYNGOLOGY

## 2018-10-17 PROCEDURE — 1036F TOBACCO NON-USER: CPT | Performed by: OTOLARYNGOLOGY

## 2018-10-17 PROCEDURE — 31575 DIAGNOSTIC LARYNGOSCOPY: CPT | Performed by: OTOLARYNGOLOGY

## 2018-10-17 PROCEDURE — G8427 DOCREV CUR MEDS BY ELIG CLIN: HCPCS | Performed by: OTOLARYNGOLOGY

## 2018-10-21 RX ORDER — CYCLOBENZAPRINE HCL 5 MG
TABLET ORAL
Qty: 90 TABLET | Refills: 0 | Status: SHIPPED | OUTPATIENT
Start: 2018-10-21 | End: 2018-11-21 | Stop reason: SDUPTHER

## 2018-11-01 ENCOUNTER — TELEPHONE (OUTPATIENT)
Dept: PODIATRY | Facility: CLINIC | Age: 63
End: 2018-11-01

## 2018-11-01 NOTE — PROGRESS NOTES
[facility] - PODIATRY    Today's Date: 11/05/18    Patient Name: Edy Cosby  MRN: [id number]  CSN: [id number]  PCP: Mark Villela MD  Referring Provider: No ref. provider found    SUBJECTIVE     Chief Complaint   Patient presents with   • Establish Care     Patient is here to establish care. PT c/o callus on great left toe, thickened nails, drop foot and bilateral foot pain. Admits to callus bleeding. Pain scale: 5/10.   • Diabetes     190mg/dl. PCP: Dr. Mark Villela last visit 09/05/2018     HPI: Edy Cosby, a 63 y.o.male, comes to clinic as a(n) new patient presenting for diabetic foot exam and complaining of thickened toenails and large bleeding callus on bottom of his left foot. Patient has h/o AFib, back pain, DM2, Emphysema lung, GERD, HTN, OA. Patient is IDDM with last stated BG level of 190mg/dl. Admits to numbness and tingling in feet. Admits to left drop foot. Relates thick, discolored, fungal toenails to both feet. He is unable to cut them himself. Notes a thick callus to the bottom of his left great toe. Notes occasional bleeding and drainage. Denies pus. Admits pain at 5/10 level and described as burning, aching, numbness and tingling. Relates previous treatment(s) including foot care by podiatrist. Denies any constitutional symptoms. No other pedal complaints at this time.    Past Medical History:   Diagnosis Date   • A-fib (CMS/HCC)    • Chronic back pain    • Constipation    • Diabetes mellitus (CMS/HCC)     TYPE II   • Dysphagia    • Emphysema of lung (CMS/HCC)    • Gastroparesis    • GERD (gastroesophageal reflux disease)    • Hypertension     ESSENTIAL   • Leg pain    • Osteoarthritis    • Visit for monitoring Plavix therapy     DOES NOT TAKE PLAVIX     Past Surgical History:   Procedure Laterality Date   • BACK SURGERY     • CHOLECYSTECTOMY     • COLONOSCOPY  09/01/2011    TICS RECALL 5YR   • COLONOSCOPY  09/30/2008    ENTER RESULTS: STOOL THROUGHTOUT THE COLON  POOR PREP REC 3 YEAR RECALL   • COLONOSCOPY N/A 11/9/2016    Procedure: COLONOSCOPY;  Surgeon: León Zepeda MD;  Location:  [facility];  Service:    • COLONOSCOPY N/A 4/19/2018    Procedure: COLONOSCOPY WITH ANESTHESIA;  Surgeon: León Zepeda MD;  Location:  [facility];  Service: Gastroenterology   • ENDOSCOPY  06/19/2012    HH   • ENDOSCOPY  08/25/2009    HIATAL HERNIA, DILATED WITH 50 FR MASCORRO   • ENDOSCOPY  06/19/2007    WITHIN NORMAL LIMITS UREA NEG   • LUMBAR LAMINECTOMY WITH FUSION N/A 1/23/2017    Procedure: REMOVAL OF INSTRUMENTATION, EXPLORATION OF FUSION L4-S1, REVISION LEFT L5-S1 HEMILAMINECTOMY, FACETECTOMY DECOMPRESSION, REVISION UNINSTRUMENTED POSTERIOR SPINAL FUSION L5-S1;  Surgeon: RHONDA Lopes MD;  Location: [facility] OR;  Service:    • OTHER SURGICAL HISTORY      LUMBAR SACRAL SURGERY WITH FUSION X2   • PILONIDAL CYST / SINUS EXCISION      PILONIDAL CYST REMOVAL     Family History   Problem Relation Age of Onset   • Heart attack Father    • Diabetes Brother    • Colon polyps Sister    • Stomach cancer Neg Hx         GI CNACERS OR DISEASE   • Colon cancer Neg Hx      Social History     Social History   • Marital status:      Spouse name: N/A   • Number of children: N/A   • Years of education: N/A     Occupational History   • Not on file.     Social History Main Topics   • Smoking status: Former Smoker     Years: 30.00     Types: Cigarettes     Quit date: 1/9/2017   • Smokeless tobacco: Never Used   • Alcohol use No   • Drug use: No   • Sexual activity: Defer     Other Topics Concern   • Not on file     Social History Narrative   • No narrative on file     No Known Allergies  Current Outpatient Prescriptions   Medication Sig Dispense Refill   • aspirin 325 MG tablet Take 325 mg by mouth daily.     • gabapentin (NEURONTIN) 600 MG tablet Take 1,200 mg by mouth Daily.     • gabapentin (NEURONTIN) 600 MG tablet Take 600 mg by mouth Every Night.     • hydrochlorothiazide  (HYDRODIURIL) 25 MG tablet Take 25 mg by mouth Daily.     • HYDROcodone-acetaminophen (NORCO)  MG per tablet Take 2 tablets by mouth Every 4 (Four) Hours As Needed for moderate pain (4-6).  0   • lisinopril (PRINIVIL,ZESTRIL) 10 MG tablet Take 1 tablet by mouth 2 times daily     • METFORMIN HCL PO Take  by mouth.     • metoprolol tartrate (LOPRESSOR) 50 MG tablet Take 50 mg by mouth 2 (Two) Times a Day.     • omeprazole (priLOSEC) 20 MG capsule Take 1 capsule by mouth 2 (Two) Times a Day. 60 capsule 11   • polyethylene glycol (GoLYTELY) 236 g solution Take as directed by office instructions. 4000 mL 0   • ZOLPIDEM TARTRATE PO Take 10 mg by mouth At Night As Needed.     • carisoprodol (SOMA) 250 MG tablet Take 1 tablet by mouth daily.     • glimepiride (AMARYL) 4 MG tablet Take 1 tablet by mouth every morning (before breakfast)       No current facility-administered medications for this visit.      Review of Systems   Constitutional: Negative for chills and fever.   HENT: Negative for congestion.    Respiratory: Negative for shortness of breath.    Cardiovascular: Negative for chest pain and leg swelling.   Gastrointestinal: Negative for constipation, diarrhea, nausea and vomiting.   Musculoskeletal: Positive for gait problem.   Skin: Negative for wound.   Neurological: Positive for numbness.       OBJECTIVE     Vitals:    11/05/18 0824   BP: 150/88   Pulse: 99   SpO2: 95%       PHYSICAL EXAM  GEN:   Accompanied by none.     General Exam  Diabetic Foot Exam: performed  Appearance: appears stated age and healthy   Orientation: alert and oriented to person, place, and time   Affect: appropriate   Gait: (Drop foot left)  Shoe Gear: casual shoes (Left AFO)      Foot/Ankle Exam  Inspection and Palpation  Ecchymosis - Right: none Left: none  Tenderness - Right: none   Left: none   Arch - Right: normal Left: normal  Hammertoes - Right: absent Left: absent  Claw Toes - Right: absent Left: absent  Mallet Toes - Right:  absent Left: absent  Hallux Valgus - Right: no Left: no  Hallux Limitus - Right: no Left: no  Skin - Right: skin intact  Left: callus   Nails -  Right: abnormally thick and onychomycosis Left: abnormally thick and onychomycosis     Vascular  Dorsalis Pedis - Right: 2+ Left: 2+  Posterior Tibial - Right: 2+ Left: 2+  Skin Temperature -  Right: warm  Left: warm    CFT - Right: 3 Left: 3  Pedal Hair Growth - Right: present Left: present  Varicosities -  Right: no varicosities  Left: no varicosities      Neurologic  Protective Sensation using [place] Monofilament - Right: 0 Left: 0    Muscle Strength  Dorsiflexion - Right: 5 Left: 2  Plantar Flexion - Right: 5 Left: 3  Eversion - Right: 5 Left: 3  Inversion - Right: 5 Left: 3  Great Toe Extension - Right: 5   Great Toe Flexion - Right: 5     Range of Motion  Normal right ankle ROM  Normal left ankle ROM  Passive  Passive 1st IP Extension - Left: 0  Passive 1st IP Flexion - Left: 10     Left Foot/Ankle Comments  After debridement of hyperkeratotic tissue, small ulceration with macerated border and light biofilm remains. Mostly granular wound base with healthy bleeding. No probe to bone.        RADIOLOGY/NUCLEAR:  No results found.    LABORATORY/CULTURE RESULTS:      PATHOLOGY RESULTS:       ASSESSMENT/PLAN     Edy was seen today for establish care and diabetes.    Diagnoses and all orders for this visit:    Onychomycosis    Diabetes mellitus type 2 with neurological manifestations (CMS/HCC)    Foot drop, left    Non-pressure chronic ulcer of other part of left foot with fat layer exposed (CMS/HCC)  -     Ambulatory Referral to Wound Clinic      Comprehensive lower extremity examination and evaluation was performed.  Discussed findings and treatment plan including risks, benefits, and treatment options with patient in detail. Patient agreed with treatment plan.  After verbal consent obtained, nail(s) x10 debrided of length and thickness with nail nipper  without incidence  Patient may maintain nails and calluses at home utilizing emery board or pumice stone between visits as needed  Reviewed at home diabetic foot care including daily foot checks  After verbal consent obtained, excisional debridement performed to remove skin, slough, non-viable, and subQ tissue down to level of healthy bleeding tissue with dermal curette and/or sharp instrumentation. Hemostasis achieved with compression.   Applied betadine and gauze dressing. To be reapplied daily.    Referral to [facility] for continued healing of wound.   An After Visit Summary was printed and given to the patient at discharge, including (if requested) any available informative/educational handouts regarding diagnosis, treatment, or medications. All questions were answered to patient/family satisfaction. Should symptoms fail to improve or worsen they agree to call or return to clinic or to go to the Emergency Department. Discussed the importance of following up with any needed screening tests/labs/specialist appointments and any requested follow-up recommended by me today. Importance of maintaining follow-up discussed and patient accepts that missed appointments can delay diagnosis and potentially lead to worsening of conditions.  Return in about 3 months (around 2/5/2019)., or sooner if acute issues arise.    Lab Frequency Next Occurrence   Diet: Once 04/19/2018   Advance Diet as Tolerated Once 04/19/2018       This document has been electronically signed by Mandeep Beyer DPM on November 5, 2018 9:26 AM

## 2018-11-01 NOTE — TELEPHONE ENCOUNTER
Called and reminded patient of appointment with Dr. Dain Beyer on November 5, 2018 at 8:30 am. Patient gave verbal confirmation of appointment at this time.

## 2018-11-05 ENCOUNTER — CARE COORDINATION (OUTPATIENT)
Dept: CARE COORDINATION | Age: 63
End: 2018-11-05

## 2018-11-05 ENCOUNTER — OFFICE VISIT (OUTPATIENT)
Dept: PRIMARY CARE CLINIC | Age: 63
End: 2018-11-05
Payer: MEDICARE

## 2018-11-05 ENCOUNTER — OFFICE VISIT (OUTPATIENT)
Dept: PODIATRY | Facility: CLINIC | Age: 63
End: 2018-11-05

## 2018-11-05 VITALS
HEART RATE: 98 BPM | DIASTOLIC BLOOD PRESSURE: 78 MMHG | OXYGEN SATURATION: 98 % | RESPIRATION RATE: 20 BRPM | SYSTOLIC BLOOD PRESSURE: 128 MMHG | TEMPERATURE: 96.7 F | WEIGHT: 222 LBS | HEIGHT: 68 IN | BODY MASS INDEX: 33.65 KG/M2

## 2018-11-05 VITALS
HEART RATE: 99 BPM | DIASTOLIC BLOOD PRESSURE: 88 MMHG | OXYGEN SATURATION: 95 % | BODY MASS INDEX: 37.2 KG/M2 | SYSTOLIC BLOOD PRESSURE: 150 MMHG | HEIGHT: 67 IN | WEIGHT: 237 LBS

## 2018-11-05 DIAGNOSIS — L97.522 NON-PRESSURE CHRONIC ULCER OF OTHER PART OF LEFT FOOT WITH FAT LAYER EXPOSED (HCC): ICD-10-CM

## 2018-11-05 DIAGNOSIS — B35.1 ONYCHOMYCOSIS: Primary | ICD-10-CM

## 2018-11-05 DIAGNOSIS — E11.49 DIABETES MELLITUS TYPE 2 WITH NEUROLOGICAL MANIFESTATIONS (HCC): ICD-10-CM

## 2018-11-05 DIAGNOSIS — M21.372 FOOT DROP, LEFT: ICD-10-CM

## 2018-11-05 DIAGNOSIS — E66.09 CLASS 1 OBESITY DUE TO EXCESS CALORIES WITH SERIOUS COMORBIDITY AND BODY MASS INDEX (BMI) OF 34.0 TO 34.9 IN ADULT: ICD-10-CM

## 2018-11-05 DIAGNOSIS — E11.51 TYPE 2 DIABETES MELLITUS WITH DIABETIC PERIPHERAL ANGIOPATHY WITHOUT GANGRENE, WITHOUT LONG-TERM CURRENT USE OF INSULIN (HCC): Primary | Chronic | ICD-10-CM

## 2018-11-05 PROCEDURE — G8482 FLU IMMUNIZE ORDER/ADMIN: HCPCS | Performed by: FAMILY MEDICINE

## 2018-11-05 PROCEDURE — 3017F COLORECTAL CA SCREEN DOC REV: CPT | Performed by: FAMILY MEDICINE

## 2018-11-05 PROCEDURE — 11042 DBRDMT SUBQ TIS 1ST 20SQCM/<: CPT | Performed by: PODIATRIST

## 2018-11-05 PROCEDURE — 1036F TOBACCO NON-USER: CPT | Performed by: FAMILY MEDICINE

## 2018-11-05 PROCEDURE — 3046F HEMOGLOBIN A1C LEVEL >9.0%: CPT | Performed by: FAMILY MEDICINE

## 2018-11-05 PROCEDURE — 90686 IIV4 VACC NO PRSV 0.5 ML IM: CPT | Performed by: FAMILY MEDICINE

## 2018-11-05 PROCEDURE — G8427 DOCREV CUR MEDS BY ELIG CLIN: HCPCS | Performed by: FAMILY MEDICINE

## 2018-11-05 PROCEDURE — 2022F DILAT RTA XM EVC RTNOPTHY: CPT | Performed by: FAMILY MEDICINE

## 2018-11-05 PROCEDURE — 99203 OFFICE O/P NEW LOW 30 MIN: CPT | Performed by: PODIATRIST

## 2018-11-05 PROCEDURE — 99213 OFFICE O/P EST LOW 20 MIN: CPT | Performed by: FAMILY MEDICINE

## 2018-11-05 PROCEDURE — 11721 DEBRIDE NAIL 6 OR MORE: CPT | Performed by: PODIATRIST

## 2018-11-05 PROCEDURE — G8417 CALC BMI ABV UP PARAM F/U: HCPCS | Performed by: FAMILY MEDICINE

## 2018-11-05 PROCEDURE — G0008 ADMIN INFLUENZA VIRUS VAC: HCPCS | Performed by: FAMILY MEDICINE

## 2018-11-05 ASSESSMENT — ENCOUNTER SYMPTOMS
WHEEZING: 0
DIARRHEA: 0
SHORTNESS OF BREATH: 0
ABDOMINAL PAIN: 0
VOMITING: 0
CONSTIPATION: 0
NAUSEA: 0
CHEST TIGHTNESS: 0
COUGH: 0

## 2018-11-05 NOTE — PROGRESS NOTES
3 times daily. Aime Roque meloxicam (MOBIC) 15 MG tablet Take 15 mg by mouth daily      Exenatide ER (BYDUREON BCISE) 2 MG/0.85ML AUIJ Inject 2 mg into the skin every morning (before breakfast) 4 pen 5    insulin glargine (LANTUS SOLOSTAR) 100 UNIT/ML injection pen Inject 80 Units into the skin nightly 5 pen 3    insulin aspart (NOVOLOG FLEXPEN) 100 UNIT/ML injection pen 20 units breakfast, 30 units lunch, 30 units dinner + sliding scale each meal (Total dose is 20-32 breakfast, 30-42 lunch and dinner) 5 pen 3    hydrochlorothiazide (HYDRODIURIL) 25 MG tablet Take 1 tablet by mouth daily 90 tablet 3    HYDROcodone-acetaminophen (NORCO)  MG per tablet TK 1 T PO Q 6 H PRN FOR PAIN  0    insulin lispro (HUMALOG KWIKPEN) 100 UNIT/ML pen Inject 25 Units into the skin 3 times daily (with meals) 5 pen 3    metoprolol (LOPRESSOR) 100 MG tablet 1 tab in am, 2 in pm 270 tablet 3    atorvastatin (LIPITOR) 40 MG tablet TAKE ONE TABLET BY MOUTH ONCE DAILY 90 tablet 3    Insulin Syringe-Needle U-100 30G X 1/2\" 0.5 ML MISC 1 each by Does not apply route 3 times daily 100 each 3    glucose blood VI test strips (EXACTECH TEST) strip Test BS three times daily . E11.59 Use Easy Touch 100 each 3    Insulin Pen Needle 31G X 6 MM MISC 1 each by Does not apply route 4 times daily (before meals and nightly) May substitute to generic for insurance 120 each 3    Lancets MISC Accu-Chek TID,  DX: E11.59 100 each 3    Lancets MISC Daily Accu-Chek 100 each 3    Insulin Pen Needle 31G X 6 MM MISC 1 each by Does not apply route daily May substitute to generic for insurance 100 each 3    omeprazole (PRILOSEC) 40 MG delayed release capsule Take 40 mg by mouth 2 times daily      aspirin 325 MG tablet Take 325 mg by mouth daily. No current facility-administered medications for this visit.         No Known Allergies    Past Surgical History:   Procedure Laterality Date    CHOLECYSTECTOMY      LARYNGOSCOPY N/A 11/10/2017

## 2018-11-08 DIAGNOSIS — K21.9 GASTROESOPHAGEAL REFLUX DISEASE, ESOPHAGITIS PRESENCE NOT SPECIFIED: ICD-10-CM

## 2018-11-08 RX ORDER — OMEPRAZOLE 20 MG/1
CAPSULE, DELAYED RELEASE ORAL
Qty: 60 CAPSULE | Refills: 11 | Status: ON HOLD | OUTPATIENT
Start: 2018-11-08 | End: 2019-01-30

## 2018-11-09 ENCOUNTER — OFFICE VISIT (OUTPATIENT)
Dept: WOUND CARE | Facility: HOSPITAL | Age: 63
End: 2018-11-09
Attending: NURSE PRACTITIONER

## 2018-11-09 ENCOUNTER — CARE COORDINATION (OUTPATIENT)
Dept: CARE COORDINATION | Age: 63
End: 2018-11-09

## 2018-11-09 PROCEDURE — G0463 HOSPITAL OUTPT CLINIC VISIT: HCPCS

## 2018-11-12 ENCOUNTER — HOSPITAL ENCOUNTER (OUTPATIENT)
Dept: GENERAL RADIOLOGY | Facility: HOSPITAL | Age: 63
Discharge: HOME | End: 2018-11-12
Attending: NURSE PRACTITIONER | Admitting: NURSE PRACTITIONER

## 2018-11-12 ENCOUNTER — TRANSCRIBE ORDERS (OUTPATIENT)
Dept: ADMINISTRATIVE | Facility: HOSPITAL | Age: 63
End: 2018-11-12

## 2018-11-12 DIAGNOSIS — E11.621 DIABETIC FOOT ULCER WITH OSTEOMYELITIS (HCC): ICD-10-CM

## 2018-11-12 DIAGNOSIS — E11.69 DIABETIC FOOT ULCER WITH OSTEOMYELITIS (HCC): ICD-10-CM

## 2018-11-12 DIAGNOSIS — M86.9 OSTEOMYELITIS OF ANKLE AND FOOT (HCC): ICD-10-CM

## 2018-11-12 DIAGNOSIS — M86.9 DIABETIC FOOT ULCER WITH OSTEOMYELITIS (HCC): ICD-10-CM

## 2018-11-12 DIAGNOSIS — L97.509 DIABETIC FOOT ULCER WITH OSTEOMYELITIS (HCC): ICD-10-CM

## 2018-11-12 DIAGNOSIS — M86.9 OSTEOMYELITIS OF ANKLE AND FOOT (HCC): Primary | ICD-10-CM

## 2018-11-12 PROCEDURE — 73630 X-RAY EXAM OF FOOT: CPT

## 2018-11-13 ENCOUNTER — CARE COORDINATION (OUTPATIENT)
Dept: CARE COORDINATION | Age: 63
End: 2018-11-13

## 2018-11-15 ENCOUNTER — TELEPHONE (OUTPATIENT)
Dept: PRIMARY CARE CLINIC | Age: 63
End: 2018-11-15

## 2018-11-15 ENCOUNTER — OFFICE VISIT (OUTPATIENT)
Dept: WOUND CARE | Facility: HOSPITAL | Age: 63
End: 2018-11-15
Attending: PODIATRIST

## 2018-11-15 ENCOUNTER — CARE COORDINATION (OUTPATIENT)
Dept: CARE COORDINATION | Age: 63
End: 2018-11-15

## 2018-11-15 DIAGNOSIS — E11.21 DIABETIC NEPHROPATHY ASSOCIATED WITH TYPE 2 DIABETES MELLITUS (HCC): Primary | ICD-10-CM

## 2018-11-16 ENCOUNTER — CARE COORDINATION (OUTPATIENT)
Dept: CARE COORDINATION | Age: 63
End: 2018-11-16

## 2018-11-16 NOTE — CARE COORDINATION
them.   Trini Spire your nose and mouth with a tissue when you cough or sneeze. Throw the tissue in the trash after you use it. [facility] your hands often with soap and water. If soap and water are not available, use an alcohol-based hand rub. Avoid touching your eyes, nose and mouth. Germs spread this way. Clean and disinfect surfaces and objects that may be contaminated with germs like the flu. If an outbreak of flu or another illness occurs, follow public health advice. This may include information about how to increase distance between people and other measures.      For more information, visit:    cdc.gov/flu   or call 7-911-RBW-INFO      If you have any questions or have flu-like symptoms please call your Care Coordinator, Reynold, [phone]

## 2018-11-19 ENCOUNTER — OFFICE VISIT (OUTPATIENT)
Dept: VASCULAR SURGERY | Facility: CLINIC | Age: 63
End: 2018-11-19

## 2018-11-19 VITALS
BODY MASS INDEX: 33.65 KG/M2 | HEIGHT: 68 IN | DIASTOLIC BLOOD PRESSURE: 66 MMHG | HEART RATE: 97 BPM | WEIGHT: 222 LBS | SYSTOLIC BLOOD PRESSURE: 148 MMHG | OXYGEN SATURATION: 99 %

## 2018-11-19 DIAGNOSIS — E66.01 MORBIDLY OBESE (HCC): ICD-10-CM

## 2018-11-19 DIAGNOSIS — I70.25 ATHEROSCLEROSIS OF NATIVE ARTERIES OF THE EXTREMITIES WITH ULCERATION (HCC): Primary | ICD-10-CM

## 2018-11-19 DIAGNOSIS — I10 HTN (HYPERTENSION), BENIGN: ICD-10-CM

## 2018-11-19 DIAGNOSIS — E11.9 TYPE 2 DIABETES MELLITUS WITHOUT COMPLICATION, WITHOUT LONG-TERM CURRENT USE OF INSULIN (HCC): Chronic | ICD-10-CM

## 2018-11-19 DIAGNOSIS — I10 ESSENTIAL HYPERTENSION: ICD-10-CM

## 2018-11-19 PROCEDURE — 99204 OFFICE O/P NEW MOD 45 MIN: CPT | Performed by: SURGERY

## 2018-11-19 RX ORDER — MELOXICAM 15 MG/1
15 TABLET ORAL AS NEEDED
COMMUNITY
End: 2020-11-16

## 2018-11-19 RX ORDER — ATORVASTATIN CALCIUM 40 MG/1
40 TABLET, FILM COATED ORAL 2 TIMES DAILY
COMMUNITY
Start: 2018-10-12 | End: 2021-05-14

## 2018-11-19 NOTE — PATIENT INSTRUCTIONS
For more information:    Quit Now [place]  1-800-QUIT-NOW  https://[place].quitlogix.org/en-[place]/  Steps to Quit Smoking  Smoking tobacco can be harmful to your health and can affect almost every organ in your body. Smoking puts you, and those around you, at risk for developing many serious chronic diseases. Quitting smoking is difficult, but it is one of the best things that you can do for your health. It is never too late to quit.  What are the benefits of quitting smoking?  When you quit smoking, you lower your risk of developing serious diseases and conditions, such as:  · Lung cancer or lung disease, such as COPD.  · Heart disease.  · Stroke.  · Heart attack.  · Infertility.  · Osteoporosis and bone fractures.  Additionally, symptoms such as coughing, wheezing, and shortness of breath may get better when you quit. You may also find that you get sick less often because your body is stronger at fighting off colds and infections. If you are pregnant, quitting smoking can help to reduce your chances of having a baby of low birth weight.  How do I get ready to quit?  When you decide to quit smoking, create a plan to make sure that you are successful. Before you quit:  · Pick a date to quit. Set a date within the next two weeks to give you time to prepare.  · Write down the reasons why you are quitting. Keep this list in places where you will see it often, such as on your bathroom mirror or in your car or wallet.  · Identify the people, places, things, and activities that make you want to smoke (triggers) and avoid them. Make sure to take these actions:  ¨ Throw away all cigarettes at home, at work, and in your car.  ¨ Throw away smoking accessories, such as ashtrays and lighters.  ¨ Clean your car and make sure to empty the ashtray.  ¨ Clean your home, including curtains and carpets.  · Tell your family, friends, and coworkers that you are quitting. Support from your loved ones can make quitting easier.  · Talk with  your health care provider about your options for quitting smoking.  · Find out what treatment options are covered by your health insurance.  What strategies can I use to quit smoking?  Talk with your healthcare provider about different strategies to quit smoking. Some strategies include:  · Quitting smoking altogether instead of gradually lessening how much you smoke over a period of time. Research shows that quitting “cold turkey” is more successful than gradually quitting.  · Attending in-person counseling to help you build problem-solving skills. You are more likely to have success in quitting if you attend several counseling sessions. Even short sessions of 10 minutes can be effective.  · Finding resources and support systems that can help you to quit smoking and remain smoke-free after you quit. These resources are most helpful when you use them often. They can include:  ¨ Online chats with a counselor.  ¨ Telephone quitlines.  ¨ Printed self-help materials.  ¨ Support groups or group counseling.  ¨ Text messaging programs.  ¨ Mobile phone applications.  · Taking medicines to help you quit smoking. (If you are pregnant or breastfeeding, talk with your health care provider first.) Some medicines contain nicotine and some do not. Both types of medicines help with cravings, but the medicines that include nicotine help to relieve withdrawal symptoms. Your health care provider may recommend:  ¨ Nicotine patches, gum, or lozenges.  ¨ Nicotine inhalers or sprays.  ¨ Non-nicotine medicine that is taken by mouth.  Talk with your health care provider about combining strategies, such as taking medicines while you are also receiving in-person counseling. Using these two strategies together makes you more likely to succeed in quitting than if you used either strategy on its own.  If you are pregnant or breastfeeding, talk with your health care provider about finding counseling or other support strategies to quit smoking. Do  not take medicine to help you quit smoking unless told to do so by your health care provider.  What things can I do to make it easier to quit?  Quitting smoking might feel overwhelming at first, but there is a lot that you can do to make it easier. Take these important actions:  · Reach out to your family and friends and ask that they support and encourage you during this time. Call telephone quitlines, reach out to support groups, or work with a counselor for support.  · Ask people who smoke to avoid smoking around you.  · Avoid places that trigger you to smoke, such as bars, parties, or smoke-break areas at work.  · Spend time around people who do not smoke.  · Lessen stress in your life, because stress can be a smoking trigger for some people. To lessen stress, try:  ¨ Exercising regularly.  ¨ Deep-breathing exercises.  ¨ Yoga.  ¨ Meditating.  ¨ Performing a body scan. This involves closing your eyes, scanning your body from head to toe, and noticing which parts of your body are particularly tense. Purposefully relax the muscles in those areas.  · Download or purchase mobile phone or tablet apps (applications) that can help you stick to your quit plan by providing reminders, tips, and encouragement. There are many free apps, such as QuitGuide from the CDC (Centers for Disease Control and Prevention). You can find other support for quitting smoking (smoking cessation) through smokefree.gov and other websites.  How will I feel when I quit smoking?  Within the first 24 hours of quitting smoking, you may start to feel some withdrawal symptoms. These symptoms are usually most noticeable 2-3 days after quitting, but they usually do not last beyond 2-3 weeks. Changes or symptoms that you might experience include:  · Mood swings.  · Restlessness, anxiety, or irritation.  · Difficulty concentrating.  · Dizziness.  · Strong cravings for sugary foods in addition to nicotine.  · Mild weight  gain.  · Constipation.  · Nausea.  · Coughing or a sore throat.  · Changes in how your medicines work in your body.  · A depressed mood.  · Difficulty sleeping (insomnia).  After the first 2-3 weeks of quitting, you may start to notice more positive results, such as:  · Improved sense of smell and taste.  · Decreased coughing and sore throat.  · Slower heart rate.  · Lower blood pressure.  · Clearer skin.  · The ability to breathe more easily.  · Fewer sick days.  Quitting smoking is very challenging for most people. Do not get discouraged if you are not successful the first time. Some people need to make many attempts to quit before they achieve long-term success. Do your best to stick to your quit plan, and talk with your health care provider if you have any questions or concerns.  This information is not intended to replace advice given to you by your health care provider. Make sure you discuss any questions you have with your health care provider.  Document Released: 12/12/2002 Document Revised: 08/15/2017   Document Reviewed: 05/03/2016  [employer] Interactive Patient Education © 2017 [employer]        BMI for Adults  Body mass index (BMI) is a number that is calculated from a person's weight and height. In most adults, the number is used to find how much of an adult's weight is made up of fat. BMI is not as accurate as a direct measure of body fat.  How is BMI calculated?  BMI is calculated by dividing weight in kilograms by height in meters squared. It can also be calculated by dividing weight in pounds by height in inches squared, then multiplying the resulting number by 703. Charts are available to help you find your BMI quickly and easily without doing this calculation.  How is BMI interpreted?  Health care professionals use BMI charts to identify whether an adult is underweight, at a normal weight, or overweight based on the following guidelines:  · Underweight: BMI less than 18.5.  · Normal weight: BMI  between 18.5 and 24.9.  · Overweight: BMI between 25 and 29.9.  · Obese: BMI of 30 and above.    BMI is usually interpreted the same for males and females.  Weight includes both fat and muscle, so someone with a muscular build, such as an athlete, may have a BMI that is higher than 24.9. In cases like these, BMI may not accurately depict body fat. To determine if excess body fat is the cause of a BMI of 25 or higher, further assessments may need to be done by a health care provider.  Why is BMI a useful tool?  BMI is used to identify a possible weight problem that may be related to a medical problem or may increase the risk for medical problems. BMI can also be used to promote changes to reach a healthy weight.  This information is not intended to replace advice given to you by your health care provider. Make sure you discuss any questions you have with your health care provider.  Document Released: 08/29/2005 Document Revised: 04/27/2017 Document Reviewed: 05/15/2015  [employer] Interactive Patient Education © 2018 [employer]

## 2018-11-19 NOTE — PROGRESS NOTES
11/19/2018      Mandeep Beyer DPM  [address]  [place] [address]    Edy Cosby  1955    Chief Complaint   Patient presents with   • Establish Care     Abnormal NIC in office in wound care.  Referred by Dr. Dain Beyer.  Pt has shooting pains in both feet       Dear Mandeep Beyer DPM:      HPI  I had the pleasure of seeing your patient Edy Cosby in the office today.  Thank you kindly for this consultation.  As you recall, Edy Cosby is a 63 y.o.  male who you are currently following for left foot plantar wound at the base of the first toe.  He now follows up at the wound care center and as per reports was noted to have an abnormal NIC which was done there.  The patient reports the wound started as a callus that was chronic in nature at the base of the first toe was recently debrided.  On further discussion he does report a history of one block claudication to bilateral lower extremities and also reports intermittent cramping feelings of the bilateral lower extremities more so at night than during the day.  However he does deny any foot pain and these findings are not consistent with ischemic rest pain.  He does recall what he believes to be a left leg angiogram that was performed in the distant past however is unsure of the details of this procedure and I am unable to find any documentation.  He otherwise denies any fevers or chills.  He is otherwise without complaint.    Past Medical History:   Diagnosis Date   • A-fib (CMS/HCC)    • Chronic back pain    • Constipation    • Diabetes mellitus (CMS/HCC)     TYPE II   • Dysphagia    • Emphysema of lung (CMS/HCC)    • Gastroparesis    • GERD (gastroesophageal reflux disease)    • Hypertension     ESSENTIAL   • Leg pain    • Osteoarthritis    • Visit for monitoring Plavix therapy     DOES NOT TAKE PLAVIX       Past Surgical History:   Procedure Laterality Date   • BACK SURGERY     • CHOLECYSTECTOMY     • COLONOSCOPY   2011    TICS RECALL 5YR   • COLONOSCOPY  2008    ENTER RESULTS: STOOL THROUGHTOUT THE COLON POOR PREP REC 3 YEAR RECALL   • ENDOSCOPY  2012    [facility]   • ENDOSCOPY  2009    HIATAL HERNIA, DILATED WITH 50 FR MASCORRO   • ENDOSCOPY  2007    WITHIN NORMAL LIMITS UREA NEG   • OTHER SURGICAL HISTORY      LUMBAR SACRAL SURGERY WITH FUSION X2   • PILONIDAL CYST / SINUS EXCISION      PILONIDAL CYST REMOVAL       Family History   Problem Relation Age of Onset   • Heart attack Father    • Diabetes Brother    • Colon polyps Sister    • Stomach cancer Neg Hx         GI CNACERS OR DISEASE   • Colon cancer Neg Hx        Social History     Socioeconomic History   • Marital status:      Spouse name: Not on file   • Number of children: Not on file   • Years of education: Not on file   • Highest education level: Not on file   Social Needs   • Financial resource strain: Not on file   • Food insecurity - worry: Not on file   • Food insecurity - inability: Not on file   • Transportation needs - medical: Not on file   • Transportation needs - non-medical: Not on file   Occupational History   • Not on file   Tobacco Use   • Smoking status: Former Smoker     Years: 30.00     Types: Cigarettes     Last attempt to quit: 2017     Years since quittin.8   • Smokeless tobacco: Never Used   Substance and Sexual Activity   • Alcohol use: No   • Drug use: No   • Sexual activity: Defer   Other Topics Concern   • Not on file   Social History Narrative   • Not on file       No Known Allergies    Prior to Admission medications    Medication Sig Start Date End Date Taking? Authorizing Provider   aspirin 325 MG tablet Take 325 mg by mouth daily. 9/21/10  Yes ProviderAntonella MD   carisoprodol (SOMA) 250 MG tablet Take 1 tablet by mouth daily.   Yes ProviderAntonella MD   gabapentin (NEURONTIN) 600 MG tablet Take 1,200 mg by mouth Daily. 10/27/16  Yes ProviderAntonella MD   hydrochlorothiazide  (HYDRODIURIL) 25 MG tablet Take 25 mg by mouth Daily.   Yes Antonella Thomason MD   HYDROcodone-acetaminophen (NORCO)  MG per tablet Take 2 tablets by mouth Every 4 (Four) Hours As Needed for moderate pain (4-6). 1/25/17  Yes RHONDA Lopes MD   insulin aspart (novoLOG FLEXPEN) 100 UNIT/ML solution pen-injector sc pen 20 units breakfast, 30 units lunch, 30 units dinner + sliding scale each meal (Total dose is 20-32 breakfast, 30-42 lunch and dinner) 6/27/18  Yes Antonella Thomason MD   Insulin Glargine (LANTUS SOLOSTAR) 100 UNIT/ML injection pen Inject  under the skin into the appropriate area as directed. 6/27/18  Yes Antonella Thomason MD   Insulin Lispro (HUMALOG) 100 UNIT/ML solution pen-injector Inject  under the skin into the appropriate area as directed. 4/27/18  Yes Antonella Thomason MD   lisinopril (PRINIVIL,ZESTRIL) 10 MG tablet Take 1 tablet by mouth 2 times daily 10/20/16  Yes Antonella Thomason MD   metoprolol tartrate (LOPRESSOR) 50 MG tablet Take 50 mg by mouth 2 (Two) Times a Day.   Yes Antonella Thomason MD   omeprazole (priLOSEC) 20 MG capsule TAKE ONE CAPSULE BY MOUTH TWICE DAILY 11/8/18  Yes Norah Carey APRN   polyethylene glycol (GoLYTELY) 236 g solution Take as directed by office instructions. 10/23/17  Yes Norah Carey APRN   ZOLPIDEM TARTRATE PO Take 10 mg by mouth At Night As Needed.   Yes Antonella Thomason MD   atorvastatin (LIPITOR) 40 MG tablet  10/12/18   Antonella Thomason MD   meloxicam (MOBIC) 15 MG tablet Take 15 mg by mouth.    Antonella Thomason MD   gabapentin (NEURONTIN) 600 MG tablet Take 600 mg by mouth Every Night.  11/19/18  Antonella Thomason MD   glimepiride (AMARYL) 4 MG tablet Take 1 tablet by mouth every morning (before breakfast) 7/1/16 11/19/18  Antonella Thomason MD   METFORMIN HCL PO Take  by mouth.  11/19/18  Antonella Thomason MD       Review of Systems   Constitutional: Negative.  Negative for  "activity change, appetite change, chills and fever.   HENT: Negative.  Negative for congestion, sneezing and sore throat.    Eyes: Negative.    Respiratory: Negative.  Negative for chest tightness and shortness of breath.    Cardiovascular: Negative.  Negative for chest pain, palpitations and leg swelling.   Gastrointestinal: Negative.    Endocrine: Negative.    Genitourinary: Negative.    Musculoskeletal: Negative.         He complains of claudication to the bilateral lower extremities, left worse than right at 1 block distance   Skin:        He has a wound on the plantar aspect of the foot at the base of the first toe which was recently debrided by Dr. Beyer and is now followed at the wound care center   Allergic/Immunologic: Negative.    Neurological: Negative.    Hematological: Negative.    Psychiatric/Behavioral: Negative.        Ht 171.5 cm (67.5\")   Wt 101 kg (222 lb)   BMI 34.26 kg/m²   Physical Exam   Constitutional: He is oriented to person, place, and time. He appears well-developed and well-nourished.   HENT:   Head: Normocephalic and atraumatic.   Eyes: EOM are normal. Pupils are equal, round, and reactive to light.   Neck: Normal range of motion. Neck supple. No JVD present.   Cardiovascular: Normal rate and regular rhythm.   Pulses:       Carotid pulses are 2+ on the right side, and 2+ on the left side.       Radial pulses are 2+ on the right side, and 2+ on the left side.        Femoral pulses are 2+ on the right side, and 2+ on the left side.       Popliteal pulses are 0 on the right side, and 0 on the left side.        Dorsalis pedis pulses are 0 on the right side, and 0 on the left side.        Posterior tibial pulses are 0 on the right side, and 0 on the left side.   He has Doppler signals in the bilateral popliteal and dorsalis pedis areas.  The bilateral posterior tibial Doppler signals are monophasic and quite weak.   Pulmonary/Chest: Effort normal. No respiratory distress.   Abdominal: " Soft. He exhibits no distension and no mass. There is no tenderness.   Musculoskeletal: He exhibits no edema or tenderness.   He has a left footdrop present due to a previous spine surgery.   Neurological: He is alert and oriented to person, place, and time. He exhibits abnormal muscle tone (Left footdrop present).   Skin: Skin is warm and dry. Capillary refill takes 2 to 3 seconds.   There is a wound present at the base of the first toe on the plantar aspect of the left foot.  Has a dressing in place which is clean and dry.  No wounds present on the right foot   Psychiatric: He has a normal mood and affect. His behavior is normal. Judgment and thought content normal.   Vitals reviewed.      Xr Foot 3+ View Left    Result Date: 11/12/2018  Narrative: EXAMINATION: XR FOOT 3+ VW LEFT- 11/12/2018 9:37 AM CST  HISTORY: Chronic plantar ulcer of the great toe, possible osteomyelitis  COMPARISON: None  FINDINGS: There is normal bony alignment throughout the foot without evidence of acute fracture or dislocation. When allowing for limitations of nonweight bearing images, the Lisfranc joint appears grossly normal. The base of the fifth metatarsal appears intact. Lucent areas are noted within the proximal and distal phalanges of the great toe without definite cortical disruption or resorption. These findings may be due to resorption along the plantar aspect, particularly at the distal aspect of the proximal phalanx, though evaluation on the lateral view is limited. Minor degenerative plantar calcaneal spurring is noted. Degenerative spurring is also seen in the mid foot. If flatfoot deformity is suspected.      Impression: Cannot exclude osteomyelitis at the plantar aspect of the proximal phalanx of the great toe. See above for discussion. Consider MRI exam for further evaluation. This report was finalized on 11/12/2018 09:41 by Dr. Junito Adams MD.      Patient Active Problem List   Diagnosis   • Spinal stenosis of lumbar  region   • Back pain   • Degeneration of intervertebral disc of lumbosacral region   • Essential hypertension   • Lumbar disc herniation with radiculopathy   • Type 2 diabetes mellitus without complication, without long-term current use of insulin (CMS/HCC)   • Constipation due to opioid therapy   • Gastroesophageal reflux disease without esophagitis   • Panlobular emphysema (CMS/HCC)   • A-fib (CMS/HCC)   • Paroxysmal atrial fibrillation (CMS/HCC)   • Gastroesophageal reflux disease   • Hx of colonic polyps   • HTN (hypertension), benign       No diagnosis found.    Lab Frequency Next Occurrence   Diet: Once 04/19/2018   Advance Diet as Tolerated Once 04/19/2018       Plan: After thoroughly evaluating Edy Cosby, I believe the best course of action is to proceed with formal noninvasive arterial testing of the bilateral lower extremities.  Given his clinical exam of nonpalpable pedal pulses as well as this new wound to the plantar aspect of the left first toe we will check NIC/PVR.  Based on findings of this study the patient may ultimately require an angiogram if perfusion defect is noted.  I will see the patient back in the office in 2 weeks following the NIC to further discuss the results and further recommendations. I did discuss vascular risk factors as they pertain to the progression of vascular disease including controlling BMI, hypertension, and cholesterol. I did  extensively on smoking cessation, and the patient was advised of the continued risks of smoking.  I provided over 10 minutes counseling on this matter.  The patient can continue taking their current medication regimen as previously planned.  This was all discussed in full with complete understanding.    Thank you for allowing me to participate in the care of your patient.  Please do not hesitate with any questions or concerns.  I will keep you aware of any further encounters with Edy Cosby.        Sincerely yours,          Duncan Lucio MD

## 2018-11-21 ENCOUNTER — OFFICE VISIT (OUTPATIENT)
Dept: WOUND CARE | Facility: HOSPITAL | Age: 63
End: 2018-11-21
Attending: SURGERY

## 2018-11-21 PROCEDURE — G0463 HOSPITAL OUTPT CLINIC VISIT: HCPCS

## 2018-11-26 ENCOUNTER — TELEPHONE (OUTPATIENT)
Dept: VASCULAR SURGERY | Facility: CLINIC | Age: 63
End: 2018-11-26

## 2018-11-26 ENCOUNTER — HOSPITAL ENCOUNTER (OUTPATIENT)
Dept: ULTRASOUND IMAGING | Facility: HOSPITAL | Age: 63
Discharge: HOME | End: 2018-11-26
Attending: SURGERY | Admitting: SURGERY

## 2018-11-26 DIAGNOSIS — I70.25 ATHEROSCLEROSIS OF NATIVE ARTERIES OF THE EXTREMITIES WITH ULCERATION (HCC): ICD-10-CM

## 2018-11-26 PROCEDURE — 93924 LWR XTR VASC STDY BILAT: CPT | Performed by: SURGERY

## 2018-11-26 PROCEDURE — 93923 UPR/LXTR ART STDY 3+ LVLS: CPT

## 2018-11-26 NOTE — TELEPHONE ENCOUNTER
LEFT VOICEMAIL ON PTS PHONE STATING DR. CABRERA WOULD BE OUT OF THE OFFICE ON 12/03. I DID LET THE PT KNOW THEY WERE RESCHEDULED FOR 12/07 AT 1:15 PM, AND IF THERE WERE ANY QUESTIONS, THEY COULD GIVE US A CALL.

## 2018-12-04 ENCOUNTER — CARE COORDINATION (OUTPATIENT)
Dept: CARE COORDINATION | Age: 63
End: 2018-12-04

## 2018-12-05 ENCOUNTER — TELEPHONE (OUTPATIENT)
Dept: VASCULAR SURGERY | Facility: CLINIC | Age: 63
End: 2018-12-05

## 2018-12-05 ENCOUNTER — TELEPHONE (OUTPATIENT)
Dept: PRIMARY CARE CLINIC | Age: 63
End: 2018-12-05

## 2018-12-05 NOTE — TELEPHONE ENCOUNTER
Pt was contacted reminding him of his appt on Friday with Dr. Lucio.  He was aware of this appt and confirmed.

## 2018-12-05 NOTE — CARE COORDINATION
Ambulatory Care Coordination Note  12/4/2018  CM Risk Score: 3  Oskar Mortality Risk Score:      ACC: Jane Rice RN    Summary Note: Pt called stating that he is having issues with getting diabetic shoes. Will have to look into this further, I an see where everything has been mailed/faxed in for the shoes. Care Coordination Interventions    Program Enrollment:  Rising Risk  Referral from Primary Care Provider:  Yes  Suggested Interventions and Community Resources  Diabetes Education: In Process (Comment: 9/5/18-DM-diet )  Medi Set or Pill Pack:  Declined  Pharmacist:  Declined  Zone Management Tools: In Process (Comment: 9/6/18-DM review of insulin and diet)  Other Services or Interventions:  9/5/18 Glucerna samples given          Goals Addressed     None          Prior to Admission medications    Medication Sig Start Date End Date Taking? Authorizing Provider   lisinopril (PRINIVIL;ZESTRIL) 20 MG tablet TAKE 1 TABLET BY MOUTH TWICE DAILY 12/8/18 1/7/19  Matthew Baum MD   cyclobenzaprine (FLEXERIL) 5 MG tablet TAKE ONE TABLET BY MOUTH EVERY EIGHT HOURS AS NEEDED FOR MUSCLE SPASMS 11/21/18   Matthew Baum MD   Diabetic Shoe MISC by Does not apply route 11/15/18   Matthew Baum MD   Diabetic Shoe MISC by Does not apply route With inserts Ell.9 11/15/18   Matthew Baum MD   Insulin Pen Needle 31G X 6 MM MISC 1 each by Does not apply route daily May substitute to generic for insurance 11/13/18   Matthew Baum MD   amLODIPine (NORVASC) 5 MG tablet TAKE 1 TABLET BY MOUTH ONCE DAILY 9/23/18   Matthew Baum MD   gabapentin (NEURONTIN) 300 MG capsule Take 900 mg by mouth 3 times daily. Remigio Brothers     Historical Provider, MD   meloxicam (MOBIC) 15 MG tablet Take 15 mg by mouth daily    Historical Provider, MD   Exenatide ER (BYDUREON BCISE) 2 MG/0.85ML AUIJ Inject 2 mg into the skin every morning (before breakfast) 9/5/18   Matthew Baum MD   insulin glargine (LANTUS SOLOSTAR) 100 UNIT/ML injection pen Inject 80 Units into the skin nightly 6/27/18   Bridgette Saldana MD   insulin aspart (NOVOLOG FLEXPEN) 100 UNIT/ML injection pen 20 units breakfast, 30 units lunch, 30 units dinner + sliding scale each meal (Total dose is 20-32 breakfast, 30-42 lunch and dinner) 6/27/18   Bridgette Saldana MD   hydrochlorothiazide (HYDRODIURIL) 25 MG tablet Take 1 tablet by mouth daily 5/22/18   Bridgette Saldana MD   HYDROcodone-acetaminophen (NORCO)  MG per tablet TK 1 T PO Q 6 H PRN FOR PAIN 4/20/18   Historical Provider, MD   insulin lispro (HUMALOG KWIKPEN) 100 UNIT/ML pen Inject 25 Units into the skin 3 times daily (with meals) 4/27/18   Bridgette Saldana MD   metoprolol (LOPRESSOR) 100 MG tablet 1 tab in am, 2 in pm 4/25/18   Bridgette Saldana MD   atorvastatin (LIPITOR) 40 MG tablet TAKE ONE TABLET BY MOUTH ONCE DAILY 3/27/18   Bridgette Saldana MD   Insulin Syringe-Needle U-100 30G X 1/2\" 0.5 ML MISC 1 each by Does not apply route 3 times daily 3/12/18   Bridgette Saldana MD   glucose blood VI test strips (EXACTECH TEST) strip Test BS three times daily . E11.59 Use Easy Touch 3/1/18   Bridgette Saldana MD   Insulin Pen Needle 31G X 6 MM MISC 1 each by Does not apply route 4 times daily (before meals and nightly) May substitute to generic for insurance 11/1/17   MD Lisa Guadalupe MISC Accu-Chek TID,  DX: E11.59 10/10/17   Bridgette Saldana MD   Lancchad MISC Daily Accu-Chek 10/4/17   Bridgette Saldana MD   omeprazole (PRILOSEC) 40 MG delayed release capsule Take 40 mg by mouth 2 times daily    Historical Provider, MD   aspirin 325 MG tablet Take 325 mg by mouth daily.  9/21/10   Historical Provider, MD       Future Appointments  Date Time Provider Diego Pepper   1/7/2019 10:45 AM MD IVAN Guadalupe [id number]   1/17/2019 10:45 AM MD IVAN Le [id number]

## 2018-12-07 ENCOUNTER — OFFICE VISIT (OUTPATIENT)
Dept: VASCULAR SURGERY | Facility: CLINIC | Age: 63
End: 2018-12-07

## 2018-12-07 ENCOUNTER — TELEPHONE (OUTPATIENT)
Dept: PRIMARY CARE CLINIC | Age: 63
End: 2018-12-07

## 2018-12-07 VITALS
OXYGEN SATURATION: 96 % | HEART RATE: 89 BPM | HEIGHT: 68 IN | BODY MASS INDEX: 33.34 KG/M2 | DIASTOLIC BLOOD PRESSURE: 80 MMHG | SYSTOLIC BLOOD PRESSURE: 156 MMHG | WEIGHT: 220 LBS

## 2018-12-07 DIAGNOSIS — I70.245 ATHEROSCLEROSIS OF NATIVE ARTERY OF LEFT LOWER EXTREMITY WITH ULCERATION OF OTHER PART OF FOOT (HCC): Primary | ICD-10-CM

## 2018-12-07 PROCEDURE — 99214 OFFICE O/P EST MOD 30 MIN: CPT | Performed by: SURGERY

## 2018-12-07 NOTE — TELEPHONE ENCOUNTER
[facility] will [facility] it out and have Dr Young Crews sign

## 2018-12-07 NOTE — PROGRESS NOTES
12/7/2018      No referring provider defined for this encounter.    Edy Cosby  1955    Chief Complaint   Patient presents with   • Follow-up     2 wk f/u with NIC       Dear No ref. provider found:      HPI  I had the pleasure of seeing your patient Edy Cosby in the office today. As you recall, Edy Cosby is a 63 y.o.  male who you are currently following for left foot plantar wound at the base of the first toe.  He was previously seen here in the office and did not have palpable pedal pulses.  As such she was sent for NIC/PVR with a left leg showing an NIC of 0.7.  He also does report symptoms of severe claudication in the left lower extremity with walking less than 1 block.  He otherwise feels well and is without complaint.  He reports that he continues to be seen at the wound care center with some improvement in his wound although very slow.    Past Medical History:   Diagnosis Date   • A-fib (CMS/HCC)    • Chronic back pain    • Constipation    • Diabetes mellitus (CMS/HCC)     TYPE II   • Dysphagia    • Emphysema of lung (CMS/HCC)    • Gastroparesis    • GERD (gastroesophageal reflux disease)    • Hypertension     ESSENTIAL   • Leg pain    • Osteoarthritis    • Visit for monitoring Plavix therapy     DOES NOT TAKE PLAVIX       Past Surgical History:   Procedure Laterality Date   • BACK SURGERY     • CHOLECYSTECTOMY     • COLONOSCOPY  09/01/2011    TICS RECALL 5YR   • COLONOSCOPY  09/30/2008    ENTER RESULTS: STOOL THROUGHTOUT THE COLON POOR PREP REC 3 YEAR RECALL   • ENDOSCOPY  06/19/2012    [facility]   • ENDOSCOPY  08/25/2009    HIATAL HERNIA, DILATED WITH 50 FR MASCORRO   • ENDOSCOPY  06/19/2007    WITHIN NORMAL LIMITS UREA NEG   • OTHER SURGICAL HISTORY      LUMBAR SACRAL SURGERY WITH FUSION X2   • PILONIDAL CYST / SINUS EXCISION      PILONIDAL CYST REMOVAL       Family History   Problem Relation Age of Onset   • Heart attack Father    • Diabetes Brother    • Colon polyps Sister    •  Stomach cancer Neg Hx         GI CNACERS OR DISEASE   • Colon cancer Neg Hx        Social History     Socioeconomic History   • Marital status:      Spouse name: Not on file   • Number of children: Not on file   • Years of education: Not on file   • Highest education level: Not on file   Social Needs   • Financial resource strain: Not on file   • Food insecurity - worry: Not on file   • Food insecurity - inability: Not on file   • Transportation needs - medical: Not on file   • Transportation needs - non-medical: Not on file   Occupational History   • Not on file   Tobacco Use   • Smoking status: Former Smoker     Packs/day: 0.25     Years: 30.00     Pack years: 7.50     Types: Cigarettes   • Smokeless tobacco: Never Used   Substance and Sexual Activity   • Alcohol use: No   • Drug use: No   • Sexual activity: Defer   Other Topics Concern   • Not on file   Social History Narrative   • Not on file       No Known Allergies    Prior to Admission medications    Medication Sig Start Date End Date Taking? Authorizing Provider   aspirin 325 MG tablet Take 325 mg by mouth daily. 9/21/10  Yes ProviderAntonella MD   carisoprodol (SOMA) 250 MG tablet Take 1 tablet by mouth daily.   Yes ProviderAntonella MD   gabapentin (NEURONTIN) 600 MG tablet Take 1,200 mg by mouth Daily. 10/27/16  Yes Antonella Thomason MD   hydrochlorothiazide (HYDRODIURIL) 25 MG tablet Take 25 mg by mouth Daily.   Yes ProviderAntonella MD   HYDROcodone-acetaminophen (NORCO)  MG per tablet Take 2 tablets by mouth Every 4 (Four) Hours As Needed for moderate pain (4-6). 1/25/17  Yes RHONDA Lopes MD   insulin aspart (novoLOG FLEXPEN) 100 UNIT/ML solution pen-injector sc pen 20 units breakfast, 30 units lunch, 30 units dinner + sliding scale each meal (Total dose is 20-32 breakfast, 30-42 lunch and dinner) 6/27/18  Yes Antonella Thomason MD   Insulin Glargine (LANTUS SOLOSTAR) 100 UNIT/ML injection pen Inject  under the  skin into the appropriate area as directed. 6/27/18  Yes Antonella Thomason MD   Insulin Lispro (HUMALOG) 100 UNIT/ML solution pen-injector Inject  under the skin into the appropriate area as directed. 4/27/18  Yes Antonella Thomason MD   lisinopril (PRINIVIL,ZESTRIL) 10 MG tablet Take 1 tablet by mouth 2 times daily 10/20/16  Yes Antonella Thomason MD   metoprolol tartrate (LOPRESSOR) 50 MG tablet Take 50 mg by mouth 2 (Two) Times a Day.   Yes Antonella Thomason MD   omeprazole (priLOSEC) 20 MG capsule TAKE ONE CAPSULE BY MOUTH TWICE DAILY 11/8/18  Yes Norah Carey APRN   polyethylene glycol (GoLYTELY) 236 g solution Take as directed by office instructions. 10/23/17  Yes Norah Carey APRN   ZOLPIDEM TARTRATE PO Take 10 mg by mouth At Night As Needed.   Yes Antonella Thomason MD   atorvastatin (LIPITOR) 40 MG tablet  10/12/18   Antonella Thomason MD   meloxicam (MOBIC) 15 MG tablet Take 15 mg by mouth.    Antonella Thomason MD   gabapentin (NEURONTIN) 600 MG tablet Take 600 mg by mouth Every Night.  11/19/18  Antonella Thomason MD   glimepiride (AMARYL) 4 MG tablet Take 1 tablet by mouth every morning (before breakfast) 7/1/16 11/19/18  Antonella Thomason MD   METFORMIN HCL PO Take  by mouth.  11/19/18  Antonella Thomason MD       Review of Systems   Constitutional: Negative.  Negative for activity change, appetite change, chills and fever.   HENT: Negative.  Negative for congestion, sneezing and sore throat.    Eyes: Negative.    Respiratory: Negative.  Negative for chest tightness and shortness of breath.    Cardiovascular: Negative.  Negative for chest pain, palpitations and leg swelling.   Gastrointestinal: Negative.    Endocrine: Negative.    Genitourinary: Negative.    Musculoskeletal: Negative.         He complains of claudication to the bilateral lower extremities, left worse than right at 1 block distance   Skin:        He has a wound on the plantar aspect of  "the foot at the base of the first toe   Allergic/Immunologic: Negative.    Neurological: Negative.    Hematological: Negative.    Psychiatric/Behavioral: Negative.        /80   Pulse 89   Ht 171.5 cm (67.5\")   Wt 99.8 kg (220 lb)   SpO2 96%   BMI 33.95 kg/m²   Physical Exam   Constitutional: He is oriented to person, place, and time. He appears well-developed and well-nourished.   HENT:   Head: Normocephalic and atraumatic.   Eyes: EOM are normal. Pupils are equal, round, and reactive to light.   Neck: Normal range of motion. Neck supple. No JVD present.   Cardiovascular: Normal rate and regular rhythm.   Pulses:       Carotid pulses are 2+ on the right side, and 2+ on the left side.       Radial pulses are 2+ on the right side, and 2+ on the left side.        Femoral pulses are 2+ on the right side, and 2+ on the left side.       Popliteal pulses are 0 on the right side, and 0 on the left side.        Dorsalis pedis pulses are 0 on the right side, and 0 on the left side.        Posterior tibial pulses are 0 on the right side, and 0 on the left side.   He has Doppler signals in the bilateral popliteal and dorsalis pedis areas.  The bilateral posterior tibial Doppler signals are monophasic and quite weak.   Pulmonary/Chest: Effort normal. No respiratory distress.   Abdominal: Soft. He exhibits no distension and no mass. There is no tenderness.   Musculoskeletal: He exhibits no edema or tenderness.   He has a left footdrop present due to a previous spine surgery.   Neurological: He is alert and oriented to person, place, and time. He exhibits abnormal muscle tone (Left footdrop present).   Skin: Skin is warm and dry. Capillary refill takes 2 to 3 seconds.   There is a wound present at the base of the first toe on the plantar aspect of the left foot.  At present the wound is open with no drainage.  No wounds present on the right foot   Psychiatric: He has a normal mood and affect. His behavior is normal. " Judgment and thought content normal.   Vitals reviewed.      Xr Foot 3+ View Left    Result Date: 11/12/2018  Narrative: EXAMINATION: XR FOOT 3+ VW LEFT- 11/12/2018 9:37 AM CST  HISTORY: Chronic plantar ulcer of the great toe, possible osteomyelitis  COMPARISON: None  FINDINGS: There is normal bony alignment throughout the foot without evidence of acute fracture or dislocation. When allowing for limitations of nonweight bearing images, the Lisfranc joint appears grossly normal. The base of the fifth metatarsal appears intact. Lucent areas are noted within the proximal and distal phalanges of the great toe without definite cortical disruption or resorption. These findings may be due to resorption along the plantar aspect, particularly at the distal aspect of the proximal phalanx, though evaluation on the lateral view is limited. Minor degenerative plantar calcaneal spurring is noted. Degenerative spurring is also seen in the mid foot. If flatfoot deformity is suspected.      Impression: Cannot exclude osteomyelitis at the plantar aspect of the proximal phalanx of the great toe. See above for discussion. Consider MRI exam for further evaluation. This report was finalized on 11/12/2018 09:41 by Dr. Junito Adams MD.    Us Ankle / Brachial Indices Extremity Complete    Result Date: 11/26/2018  Narrative:  History: PAD  Comments: Bilateral lower extremity arterial with multi-level pulse volume recordings and segmental pressures were performed at rest and stress.  The right ankle/brachial index is 0.84. The waveforms are biphasic and dampened at the ankle.These findings are consistent with mild arterial insufficiency of the right lower extremity at rest.  The toe brachial index is 0.57.  The left ankle/brachial index is 0.72. The waveforms are biphasic and dampened at the ankle. These findings are consistent with moderate arterial insufficiency of the left lower extremity at rest.    The toe brachial index is 0.56.       Impression: Impression: 1. Mild arterial insufficiency of the right lower extremity at rest. 2. Moderate arterial insufficiency of the left lower extremity at rest. 3. There is evidence of distal forefoot small vessel disease bilaterally.   This report was finalized on 11/26/2018 17:30 by Dr. Constantino Metzger MD.      Patient Active Problem List   Diagnosis   • Spinal stenosis of lumbar region   • Back pain   • Degeneration of intervertebral disc of lumbosacral region   • Essential hypertension   • Lumbar disc herniation with radiculopathy   • Type 2 diabetes mellitus without complication, without long-term current use of insulin (CMS/[facility])   • Constipation due to opioid therapy   • Gastroesophageal reflux disease without esophagitis   • Panlobular emphysema (CMS/[facility])   • A-fib (CMS/[facility])   • Paroxysmal atrial fibrillation (CMS/[facility])   • Gastroesophageal reflux disease   • Hx of colonic polyps   • HTN (hypertension), benign   • Morbidly obese (CMS/[facility])         ICD-10-CM ICD-9-CM   1. Atherosclerosis of native artery of left lower extremity with ulceration of other part of foot (CMS/[facility]) I70.245 440.23     707.9       Lab Frequency Next Occurrence   Diet: Once 04/19/2018   Advance Diet as Tolerated Once 04/19/2018       Plan: After thoroughly evaluating Edy Cosby, I believe the best course of action is to proceed with left lower extremity angiogram.  Given his decreased NIC as well as present wound at the base of the left first toe that is slow to heal I believe this is indicated.  Risks of angiogram were discussed.  These include, but are not limited to, bleeding, infection, vessel damage, nerve damage, embolus, and loss of limb.  The patient understands these risks and wishes to proceed with procedure.  He should otherwise continue to follow at the wound care center for further care of this wound. I did discuss vascular risk factors as they pertain to the progression of vascular disease including  controlling diabetes, hypertension, hyperlipidemia, and BMI. I did  extensively on smoking cessation, and the patient was advised of the continued risks of smoking.  I provided over 10 minutes counseling on this matter. The patient can continue taking their current medication regimen as previously planned.  This was all discussed in full with complete understanding.    Thank you for allowing me to participate in the care of your patient.  Please do not hesitate with any questions or concerns.  I will keep you aware of any further encounters with Edy Cosby.        Sincerely yours,         Duncan Lucio MD

## 2018-12-07 NOTE — PATIENT INSTRUCTIONS
For more information:    Quit Now [place]  1-800-QUIT-NOW  https://[place].quitlogix.org/en-[place]/  Steps to Quit Smoking  Smoking tobacco can be harmful to your health and can affect almost every organ in your body. Smoking puts you, and those around you, at risk for developing many serious chronic diseases. Quitting smoking is difficult, but it is one of the best things that you can do for your health. It is never too late to quit.  What are the benefits of quitting smoking?  When you quit smoking, you lower your risk of developing serious diseases and conditions, such as:  · Lung cancer or lung disease, such as COPD.  · Heart disease.  · Stroke.  · Heart attack.  · Infertility.  · Osteoporosis and bone fractures.  Additionally, symptoms such as coughing, wheezing, and shortness of breath may get better when you quit. You may also find that you get sick less often because your body is stronger at fighting off colds and infections. If you are pregnant, quitting smoking can help to reduce your chances of having a baby of low birth weight.  How do I get ready to quit?  When you decide to quit smoking, create a plan to make sure that you are successful. Before you quit:  · Pick a date to quit. Set a date within the next two weeks to give you time to prepare.  · Write down the reasons why you are quitting. Keep this list in places where you will see it often, such as on your bathroom mirror or in your car or wallet.  · Identify the people, places, things, and activities that make you want to smoke (triggers) and avoid them. Make sure to take these actions:  ¨ Throw away all cigarettes at home, at work, and in your car.  ¨ Throw away smoking accessories, such as ashtrays and lighters.  ¨ Clean your car and make sure to empty the ashtray.  ¨ Clean your home, including curtains and carpets.  · Tell your family, friends, and coworkers that you are quitting. Support from your loved ones can make quitting easier.  · Talk with  your health care provider about your options for quitting smoking.  · Find out what treatment options are covered by your health insurance.  What strategies can I use to quit smoking?  Talk with your healthcare provider about different strategies to quit smoking. Some strategies include:  · Quitting smoking altogether instead of gradually lessening how much you smoke over a period of time. Research shows that quitting “cold turkey” is more successful than gradually quitting.  · Attending in-person counseling to help you build problem-solving skills. You are more likely to have success in quitting if you attend several counseling sessions. Even short sessions of 10 minutes can be effective.  · Finding resources and support systems that can help you to quit smoking and remain smoke-free after you quit. These resources are most helpful when you use them often. They can include:  ¨ Online chats with a counselor.  ¨ Telephone quitlines.  ¨ Printed self-help materials.  ¨ Support groups or group counseling.  ¨ Text messaging programs.  ¨ Mobile phone applications.  · Taking medicines to help you quit smoking. (If you are pregnant or breastfeeding, talk with your health care provider first.) Some medicines contain nicotine and some do not. Both types of medicines help with cravings, but the medicines that include nicotine help to relieve withdrawal symptoms. Your health care provider may recommend:  ¨ Nicotine patches, gum, or lozenges.  ¨ Nicotine inhalers or sprays.  ¨ Non-nicotine medicine that is taken by mouth.  Talk with your health care provider about combining strategies, such as taking medicines while you are also receiving in-person counseling. Using these two strategies together makes you more likely to succeed in quitting than if you used either strategy on its own.  If you are pregnant or breastfeeding, talk with your health care provider about finding counseling or other support strategies to quit smoking. Do  not take medicine to help you quit smoking unless told to do so by your health care provider.  What things can I do to make it easier to quit?  Quitting smoking might feel overwhelming at first, but there is a lot that you can do to make it easier. Take these important actions:  · Reach out to your family and friends and ask that they support and encourage you during this time. Call telephone quitlines, reach out to support groups, or work with a counselor for support.  · Ask people who smoke to avoid smoking around you.  · Avoid places that trigger you to smoke, such as bars, parties, or smoke-break areas at work.  · Spend time around people who do not smoke.  · Lessen stress in your life, because stress can be a smoking trigger for some people. To lessen stress, try:  ¨ Exercising regularly.  ¨ Deep-breathing exercises.  ¨ Yoga.  ¨ Meditating.  ¨ Performing a body scan. This involves closing your eyes, scanning your body from head to toe, and noticing which parts of your body are particularly tense. Purposefully relax the muscles in those areas.  · Download or purchase mobile phone or tablet apps (applications) that can help you stick to your quit plan by providing reminders, tips, and encouragement. There are many free apps, such as QuitGuide from the CDC (Centers for Disease Control and Prevention). You can find other support for quitting smoking (smoking cessation) through smokefree.gov and other websites.  How will I feel when I quit smoking?  Within the first 24 hours of quitting smoking, you may start to feel some withdrawal symptoms. These symptoms are usually most noticeable 2-3 days after quitting, but they usually do not last beyond 2-3 weeks. Changes or symptoms that you might experience include:  · Mood swings.  · Restlessness, anxiety, or irritation.  · Difficulty concentrating.  · Dizziness.  · Strong cravings for sugary foods in addition to nicotine.  · Mild weight  gain.  · Constipation.  · Nausea.  · Coughing or a sore throat.  · Changes in how your medicines work in your body.  · A depressed mood.  · Difficulty sleeping (insomnia).  After the first 2-3 weeks of quitting, you may start to notice more positive results, such as:  · Improved sense of smell and taste.  · Decreased coughing and sore throat.  · Slower heart rate.  · Lower blood pressure.  · Clearer skin.  · The ability to breathe more easily.  · Fewer sick days.  Quitting smoking is very challenging for most people. Do not get discouraged if you are not successful the first time. Some people need to make many attempts to quit before they achieve long-term success. Do your best to stick to your quit plan, and talk with your health care provider if you have any questions or concerns.  This information is not intended to replace advice given to you by your health care provider. Make sure you discuss any questions you have with your health care provider.  Document Released: 12/12/2002 Document Revised: 08/15/2017 Document Reviewed: 05/03/2016  [employer] Interactive Patient Education © 2017 [employer]      BMI for Adults  Body mass index (BMI) is a number that is calculated from a person's weight and height. In most adults, the number is used to find how much of an adult's weight is made up of fat. BMI is not as accurate as a direct measure of body fat.  How is BMI calculated?  BMI is calculated by dividing weight in kilograms by height in meters squared. It can also be calculated by dividing weight in pounds by height in inches squared, then multiplying the resulting number by 703. Charts are available to help you find your BMI quickly and easily without doing this calculation.  How is BMI interpreted?  Health care professionals use BMI charts to identify whether an adult is underweight, at a normal weight, or overweight based on the following guidelines:  · Underweight: BMI less than 18.5.  · Normal weight: BMI  between 18.5 and 24.9.  · Overweight: BMI between 25 and 29.9.  · Obese: BMI of 30 and above.    BMI is usually interpreted the same for males and females.  Weight includes both fat and muscle, so someone with a muscular build, such as an athlete, may have a BMI that is higher than 24.9. In cases like these, BMI may not accurately depict body fat. To determine if excess body fat is the cause of a BMI of 25 or higher, further assessments may need to be done by a health care provider.  Why is BMI a useful tool?  BMI is used to identify a possible weight problem that may be related to a medical problem or may increase the risk for medical problems. BMI can also be used to promote changes to reach a healthy weight.  This information is not intended to replace advice given to you by your health care provider. Make sure you discuss any questions you have with your health care provider.  Document Released: 08/29/2005 Document Revised: 04/27/2017 Document Reviewed: 05/15/2015  [employer] Interactive Patient Education © 2018 [employer]

## 2018-12-07 NOTE — H&P
HPI  I had the pleasure of seeing your patient Edy Cosby in the office today. As you recall, Edy Cosby is a 63 y.o.  male who you are currently following for left foot plantar wound at the base of the first toe.  He was previously seen here in the office and did not have palpable pedal pulses.  As such she was sent for NIC/PVR with a left leg showing an NIC of 0.7.  He also does report symptoms of severe claudication in the left lower extremity with walking less than 1 block.  He otherwise feels well and is without complaint.  He reports that he continues to be seen at the wound care center with some improvement in his wound although very slow.    Past Medical History:   Diagnosis Date   • A-fib (CMS/HCC)    • Chronic back pain    • Constipation    • Diabetes mellitus (CMS/HCC)     TYPE II   • Dysphagia    • Emphysema of lung (CMS/HCC)    • Gastroparesis    • GERD (gastroesophageal reflux disease)    • Hypertension     ESSENTIAL   • Leg pain    • Osteoarthritis    • Visit for monitoring Plavix therapy     DOES NOT TAKE PLAVIX       Past Surgical History:   Procedure Laterality Date   • BACK SURGERY     • CHOLECYSTECTOMY     • COLONOSCOPY  09/01/2011    TICS RECALL 5YR   • COLONOSCOPY  09/30/2008    ENTER RESULTS: STOOL THROUGHTOUT THE COLON POOR PREP REC 3 YEAR RECALL   • ENDOSCOPY  06/19/2012    [facility]   • ENDOSCOPY  08/25/2009    HIATAL HERNIA, DILATED WITH 50 FR MASCORRO   • ENDOSCOPY  06/19/2007    WITHIN NORMAL LIMITS UREA NEG   • OTHER SURGICAL HISTORY      LUMBAR SACRAL SURGERY WITH FUSION X2   • PILONIDAL CYST / SINUS EXCISION      PILONIDAL CYST REMOVAL       Family History   Problem Relation Age of Onset   • Heart attack Father    • Diabetes Brother    • Colon polyps Sister    • Stomach cancer Neg Hx         GI CNACERS OR DISEASE   • Colon cancer Neg Hx        Social History     Socioeconomic History   • Marital status:      Spouse name: Not on file   • Number of children: Not on file    • Years of education: Not on file   • Highest education level: Not on file   Social Needs   • Financial resource strain: Not on file   • Food insecurity - worry: Not on file   • Food insecurity - inability: Not on file   • Transportation needs - medical: Not on file   • Transportation needs - non-medical: Not on file   Occupational History   • Not on file   Tobacco Use   • Smoking status: Former Smoker     Packs/day: 0.25     Years: 30.00     Pack years: 7.50     Types: Cigarettes   • Smokeless tobacco: Never Used   Substance and Sexual Activity   • Alcohol use: No   • Drug use: No   • Sexual activity: Defer   Other Topics Concern   • Not on file   Social History Narrative   • Not on file       No Known Allergies    Prior to Admission medications    Medication Sig Start Date End Date Taking? Authorizing Provider   aspirin 325 MG tablet Take 325 mg by mouth daily. 9/21/10  Yes Antonella Thomason MD   carisoprodol (SOMA) 250 MG tablet Take 1 tablet by mouth daily.   Yes Antonella Thomason MD   gabapentin (NEURONTIN) 600 MG tablet Take 1,200 mg by mouth Daily. 10/27/16  Yes Antonella Thomason MD   hydrochlorothiazide (HYDRODIURIL) 25 MG tablet Take 25 mg by mouth Daily.   Yes ProviderAntonella MD   HYDROcodone-acetaminophen (NORCO)  MG per tablet Take 2 tablets by mouth Every 4 (Four) Hours As Needed for moderate pain (4-6). 1/25/17  Yes RHONDA Lopes MD   insulin aspart (novoLOG FLEXPEN) 100 UNIT/ML solution pen-injector sc pen 20 units breakfast, 30 units lunch, 30 units dinner + sliding scale each meal (Total dose is 20-32 breakfast, 30-42 lunch and dinner) 6/27/18  Yes Antonella Thomason MD   Insulin Glargine (LANTUS SOLOSTAR) 100 UNIT/ML injection pen Inject  under the skin into the appropriate area as directed. 6/27/18  Yes Antonella Thomason MD   Insulin Lispro (HUMALOG) 100 UNIT/ML solution pen-injector Inject  under the skin into the appropriate area as directed. 4/27/18  Yes  "Antonella Thomason MD   lisinopril (PRINIVIL,ZESTRIL) 10 MG tablet Take 1 tablet by mouth 2 times daily 10/20/16  Yes Antonella Thomason MD   metoprolol tartrate (LOPRESSOR) 50 MG tablet Take 50 mg by mouth 2 (Two) Times a Day.   Yes Antonella Thomason MD   omeprazole (priLOSEC) 20 MG capsule TAKE ONE CAPSULE BY MOUTH TWICE DAILY 11/8/18  Yes Norah Carey APRN   polyethylene glycol (GoLYTELY) 236 g solution Take as directed by office instructions. 10/23/17  Yes Norah Carey APRN   ZOLPIDEM TARTRATE PO Take 10 mg by mouth At Night As Needed.   Yes Antonella Thomason MD   atorvastatin (LIPITOR) 40 MG tablet  10/12/18   Antonella Thomason MD   meloxicam (MOBIC) 15 MG tablet Take 15 mg by mouth.    Antonella Thomason MD   gabapentin (NEURONTIN) 600 MG tablet Take 600 mg by mouth Every Night.  11/19/18  Antonella Thomason MD   glimepiride (AMARYL) 4 MG tablet Take 1 tablet by mouth every morning (before breakfast) 7/1/16 11/19/18  Antonella Thomason MD   METFORMIN HCL PO Take  by mouth.  11/19/18  Antonella Thomason MD       Review of Systems   Constitutional: Negative.  Negative for activity change, appetite change, chills and fever.   HENT: Negative.  Negative for congestion, sneezing and sore throat.    Eyes: Negative.    Respiratory: Negative.  Negative for chest tightness and shortness of breath.    Cardiovascular: Negative.  Negative for chest pain, palpitations and leg swelling.   Gastrointestinal: Negative.    Endocrine: Negative.    Genitourinary: Negative.    Musculoskeletal: Negative.         He complains of claudication to the bilateral lower extremities, left worse than right at 1 block distance   Skin:        He has a wound on the plantar aspect of the foot at the base of the first toe   Allergic/Immunologic: Negative.    Neurological: Negative.    Hematological: Negative.    Psychiatric/Behavioral: Negative.        /80   Pulse 89   Ht 171.5 cm (67.5\")   " Wt 99.8 kg (220 lb)   SpO2 96%   BMI 33.95 kg/m²   Physical Exam   Constitutional: He is oriented to person, place, and time. He appears well-developed and well-nourished.   HENT:   Head: Normocephalic and atraumatic.   Eyes: EOM are normal. Pupils are equal, round, and reactive to light.   Neck: Normal range of motion. Neck supple. No JVD present.   Cardiovascular: Normal rate and regular rhythm.   Pulses:       Carotid pulses are 2+ on the right side, and 2+ on the left side.       Radial pulses are 2+ on the right side, and 2+ on the left side.        Femoral pulses are 2+ on the right side, and 2+ on the left side.       Popliteal pulses are 0 on the right side, and 0 on the left side.        Dorsalis pedis pulses are 0 on the right side, and 0 on the left side.        Posterior tibial pulses are 0 on the right side, and 0 on the left side.   He has Doppler signals in the bilateral popliteal and dorsalis pedis areas.  The bilateral posterior tibial Doppler signals are monophasic and quite weak.   Pulmonary/Chest: Effort normal. No respiratory distress.   Abdominal: Soft. He exhibits no distension and no mass. There is no tenderness.   Musculoskeletal: He exhibits no edema or tenderness.   He has a left footdrop present due to a previous spine surgery.   Neurological: He is alert and oriented to person, place, and time. He exhibits abnormal muscle tone (Left footdrop present).   Skin: Skin is warm and dry. Capillary refill takes 2 to 3 seconds.   There is a wound present at the base of the first toe on the plantar aspect of the left foot.  At present the wound is open with no drainage.  No wounds present on the right foot   Psychiatric: He has a normal mood and affect. His behavior is normal. Judgment and thought content normal.   Vitals reviewed.      Xr Foot 3+ View Left    Result Date: 11/12/2018  Narrative: EXAMINATION: XR FOOT 3+ VW LEFT- 11/12/2018 9:37 AM CST  HISTORY: Chronic plantar ulcer of the great  toe, possible osteomyelitis  COMPARISON: None  FINDINGS: There is normal bony alignment throughout the foot without evidence of acute fracture or dislocation. When allowing for limitations of nonweight bearing images, the Lisfranc joint appears grossly normal. The base of the fifth metatarsal appears intact. Lucent areas are noted within the proximal and distal phalanges of the great toe without definite cortical disruption or resorption. These findings may be due to resorption along the plantar aspect, particularly at the distal aspect of the proximal phalanx, though evaluation on the lateral view is limited. Minor degenerative plantar calcaneal spurring is noted. Degenerative spurring is also seen in the mid foot. If flatfoot deformity is suspected.      Impression: Cannot exclude osteomyelitis at the plantar aspect of the proximal phalanx of the great toe. See above for discussion. Consider MRI exam for further evaluation. This report was finalized on 11/12/2018 09:41 by Dr. Junito Adams MD.    Us Ankle / Brachial Indices Extremity Complete    Result Date: 11/26/2018  Narrative:  History: PAD  Comments: Bilateral lower extremity arterial with multi-level pulse volume recordings and segmental pressures were performed at rest and stress.  The right ankle/brachial index is 0.84. The waveforms are biphasic and dampened at the ankle.These findings are consistent with mild arterial insufficiency of the right lower extremity at rest.  The toe brachial index is 0.57.  The left ankle/brachial index is 0.72. The waveforms are biphasic and dampened at the ankle. These findings are consistent with moderate arterial insufficiency of the left lower extremity at rest.    The toe brachial index is 0.56.      Impression: Impression: 1. Mild arterial insufficiency of the right lower extremity at rest. 2. Moderate arterial insufficiency of the left lower extremity at rest. 3. There is evidence of distal forefoot small vessel  disease bilaterally.   This report was finalized on 11/26/2018 17:30 by Dr. Constantino Metzger MD.      Patient Active Problem List   Diagnosis   • Spinal stenosis of lumbar region   • Back pain   • Degeneration of intervertebral disc of lumbosacral region   • Essential hypertension   • Lumbar disc herniation with radiculopathy   • Type 2 diabetes mellitus without complication, without long-term current use of insulin (CMS/[facility])   • Constipation due to opioid therapy   • Gastroesophageal reflux disease without esophagitis   • Panlobular emphysema (CMS/HCC)   • A-fib (CMS/HCC)   • Paroxysmal atrial fibrillation (CMS/HCC)   • Gastroesophageal reflux disease   • Hx of colonic polyps   • HTN (hypertension), benign   • Morbidly obese (CMS/[facility])         ICD-10-CM ICD-9-CM   1. Atherosclerosis of native artery of left lower extremity with ulceration of other part of foot (CMS/[facility]) I70.245 440.23     707.9       Lab Frequency Next Occurrence   Diet: Once 04/19/2018   Advance Diet as Tolerated Once 04/19/2018       Plan: After thoroughly evaluating Edy Cosby, I believe the best course of action is to proceed with left lower extremity angiogram.  Given his decreased NIC as well as present wound at the base of the left first toe that is slow to heal I believe this is indicated.  Risks of angiogram were discussed.  These include, but are not limited to, bleeding, infection, vessel damage, nerve damage, embolus, and loss of limb.  The patient understands these risks and wishes to proceed with procedure.  He should otherwise continue to follow at the wound care center for further care of this wound. I did discuss vascular risk factors as they pertain to the progression of vascular disease including controlling diabetes, hypertension, hyperlipidemia, and BMI. I did  extensively on smoking cessation, and the patient was advised of the continued risks of smoking.  I provided over 10 minutes counseling on this matter. The  patient can continue taking their current medication regimen as previously planned.  This was all discussed in full with complete understanding.

## 2018-12-07 NOTE — H&P (VIEW-ONLY)
HPI  I had the pleasure of seeing your patient Edy Cosby in the office today. As you recall, Edy Cosby is a 63 y.o.  male who you are currently following for left foot plantar wound at the base of the first toe.  He was previously seen here in the office and did not have palpable pedal pulses.  As such she was sent for NIC/PVR with a left leg showing an NIC of 0.7.  He also does report symptoms of severe claudication in the left lower extremity with walking less than 1 block.  He otherwise feels well and is without complaint.  He reports that he continues to be seen at the wound care center with some improvement in his wound although very slow.    Past Medical History:   Diagnosis Date   • A-fib (CMS/HCC)    • Chronic back pain    • Constipation    • Diabetes mellitus (CMS/HCC)     TYPE II   • Dysphagia    • Emphysema of lung (CMS/HCC)    • Gastroparesis    • GERD (gastroesophageal reflux disease)    • Hypertension     ESSENTIAL   • Leg pain    • Osteoarthritis    • Visit for monitoring Plavix therapy     DOES NOT TAKE PLAVIX       Past Surgical History:   Procedure Laterality Date   • BACK SURGERY     • CHOLECYSTECTOMY     • COLONOSCOPY  09/01/2011    TICS RECALL 5YR   • COLONOSCOPY  09/30/2008    ENTER RESULTS: STOOL THROUGHTOUT THE COLON POOR PREP REC 3 YEAR RECALL   • ENDOSCOPY  06/19/2012    HH   • ENDOSCOPY  08/25/2009    HIATAL HERNIA, DILATED WITH 50 FR MASCORRO   • ENDOSCOPY  06/19/2007    WITHIN NORMAL LIMITS UREA NEG   • OTHER SURGICAL HISTORY      LUMBAR SACRAL SURGERY WITH FUSION X2   • PILONIDAL CYST / SINUS EXCISION      PILONIDAL CYST REMOVAL       Family History   Problem Relation Age of Onset   • Heart attack Father    • Diabetes Brother    • Colon polyps Sister    • Stomach cancer Neg Hx         GI CNACERS OR DISEASE   • Colon cancer Neg Hx        Social History     Socioeconomic History   • Marital status:      Spouse name: Not on file   • Number of children: Not on file    • Years of education: Not on file   • Highest education level: Not on file   Social Needs   • Financial resource strain: Not on file   • Food insecurity - worry: Not on file   • Food insecurity - inability: Not on file   • Transportation needs - medical: Not on file   • Transportation needs - non-medical: Not on file   Occupational History   • Not on file   Tobacco Use   • Smoking status: Former Smoker     Packs/day: 0.25     Years: 30.00     Pack years: 7.50     Types: Cigarettes   • Smokeless tobacco: Never Used   Substance and Sexual Activity   • Alcohol use: No   • Drug use: No   • Sexual activity: Defer   Other Topics Concern   • Not on file   Social History Narrative   • Not on file       No Known Allergies    Prior to Admission medications    Medication Sig Start Date End Date Taking? Authorizing Provider   aspirin 325 MG tablet Take 325 mg by mouth daily. 9/21/10  Yes Antonella Thomason MD   carisoprodol (SOMA) 250 MG tablet Take 1 tablet by mouth daily.   Yes Antonella Thomason MD   gabapentin (NEURONTIN) 600 MG tablet Take 1,200 mg by mouth Daily. 10/27/16  Yes Antonella Thomason MD   hydrochlorothiazide (HYDRODIURIL) 25 MG tablet Take 25 mg by mouth Daily.   Yes ProviderAntonella MD   HYDROcodone-acetaminophen (NORCO)  MG per tablet Take 2 tablets by mouth Every 4 (Four) Hours As Needed for moderate pain (4-6). 1/25/17  Yes RHONDA Lopes MD   insulin aspart (novoLOG FLEXPEN) 100 UNIT/ML solution pen-injector sc pen 20 units breakfast, 30 units lunch, 30 units dinner + sliding scale each meal (Total dose is 20-32 breakfast, 30-42 lunch and dinner) 6/27/18  Yes Antonella Thomason MD   Insulin Glargine (LANTUS SOLOSTAR) 100 UNIT/ML injection pen Inject  under the skin into the appropriate area as directed. 6/27/18  Yes Antonella Thomason MD   Insulin Lispro (HUMALOG) 100 UNIT/ML solution pen-injector Inject  under the skin into the appropriate area as directed. 4/27/18  Yes  "Antonella Thomason MD   lisinopril (PRINIVIL,ZESTRIL) 10 MG tablet Take 1 tablet by mouth 2 times daily 10/20/16  Yes Antonella Thomason MD   metoprolol tartrate (LOPRESSOR) 50 MG tablet Take 50 mg by mouth 2 (Two) Times a Day.   Yes Antonella Thomason MD   omeprazole (priLOSEC) 20 MG capsule TAKE ONE CAPSULE BY MOUTH TWICE DAILY 11/8/18  Yes Norah Carey APRN   polyethylene glycol (GoLYTELY) 236 g solution Take as directed by office instructions. 10/23/17  Yes Norah Carey APRN   ZOLPIDEM TARTRATE PO Take 10 mg by mouth At Night As Needed.   Yes Antonella Thomason MD   atorvastatin (LIPITOR) 40 MG tablet  10/12/18   Antonella Thomason MD   meloxicam (MOBIC) 15 MG tablet Take 15 mg by mouth.    Antonella Thomason MD   gabapentin (NEURONTIN) 600 MG tablet Take 600 mg by mouth Every Night.  11/19/18  Antonella Thomason MD   glimepiride (AMARYL) 4 MG tablet Take 1 tablet by mouth every morning (before breakfast) 7/1/16 11/19/18  Antonella Thomason MD   METFORMIN HCL PO Take  by mouth.  11/19/18  Antonella Thomason MD       Review of Systems   Constitutional: Negative.  Negative for activity change, appetite change, chills and fever.   HENT: Negative.  Negative for congestion, sneezing and sore throat.    Eyes: Negative.    Respiratory: Negative.  Negative for chest tightness and shortness of breath.    Cardiovascular: Negative.  Negative for chest pain, palpitations and leg swelling.   Gastrointestinal: Negative.    Endocrine: Negative.    Genitourinary: Negative.    Musculoskeletal: Negative.         He complains of claudication to the bilateral lower extremities, left worse than right at 1 block distance   Skin:        He has a wound on the plantar aspect of the foot at the base of the first toe   Allergic/Immunologic: Negative.    Neurological: Negative.    Hematological: Negative.    Psychiatric/Behavioral: Negative.        /80   Pulse 89   Ht 171.5 cm (67.5\")   " Wt 99.8 kg (220 lb)   SpO2 96%   BMI 33.95 kg/m²   Physical Exam   Constitutional: He is oriented to person, place, and time. He appears well-developed and well-nourished.   HENT:   Head: Normocephalic and atraumatic.   Eyes: EOM are normal. Pupils are equal, round, and reactive to light.   Neck: Normal range of motion. Neck supple. No JVD present.   Cardiovascular: Normal rate and regular rhythm.   Pulses:       Carotid pulses are 2+ on the right side, and 2+ on the left side.       Radial pulses are 2+ on the right side, and 2+ on the left side.        Femoral pulses are 2+ on the right side, and 2+ on the left side.       Popliteal pulses are 0 on the right side, and 0 on the left side.        Dorsalis pedis pulses are 0 on the right side, and 0 on the left side.        Posterior tibial pulses are 0 on the right side, and 0 on the left side.   He has Doppler signals in the bilateral popliteal and dorsalis pedis areas.  The bilateral posterior tibial Doppler signals are monophasic and quite weak.   Pulmonary/Chest: Effort normal. No respiratory distress.   Abdominal: Soft. He exhibits no distension and no mass. There is no tenderness.   Musculoskeletal: He exhibits no edema or tenderness.   He has a left footdrop present due to a previous spine surgery.   Neurological: He is alert and oriented to person, place, and time. He exhibits abnormal muscle tone (Left footdrop present).   Skin: Skin is warm and dry. Capillary refill takes 2 to 3 seconds.   There is a wound present at the base of the first toe on the plantar aspect of the left foot.  At present the wound is open with no drainage.  No wounds present on the right foot   Psychiatric: He has a normal mood and affect. His behavior is normal. Judgment and thought content normal.   Vitals reviewed.      Xr Foot 3+ View Left    Result Date: 11/12/2018  Narrative: EXAMINATION: XR FOOT 3+ VW LEFT- 11/12/2018 9:37 AM CST  HISTORY: Chronic plantar ulcer of the great  toe, possible osteomyelitis  COMPARISON: None  FINDINGS: There is normal bony alignment throughout the foot without evidence of acute fracture or dislocation. When allowing for limitations of nonweight bearing images, the Lisfranc joint appears grossly normal. The base of the fifth metatarsal appears intact. Lucent areas are noted within the proximal and distal phalanges of the great toe without definite cortical disruption or resorption. These findings may be due to resorption along the plantar aspect, particularly at the distal aspect of the proximal phalanx, though evaluation on the lateral view is limited. Minor degenerative plantar calcaneal spurring is noted. Degenerative spurring is also seen in the mid foot. If flatfoot deformity is suspected.      Impression: Cannot exclude osteomyelitis at the plantar aspect of the proximal phalanx of the great toe. See above for discussion. Consider MRI exam for further evaluation. This report was finalized on 11/12/2018 09:41 by Dr. Junito Adams MD.    Us Ankle / Brachial Indices Extremity Complete    Result Date: 11/26/2018  Narrative:  History: PAD  Comments: Bilateral lower extremity arterial with multi-level pulse volume recordings and segmental pressures were performed at rest and stress.  The right ankle/brachial index is 0.84. The waveforms are biphasic and dampened at the ankle.These findings are consistent with mild arterial insufficiency of the right lower extremity at rest.  The toe brachial index is 0.57.  The left ankle/brachial index is 0.72. The waveforms are biphasic and dampened at the ankle. These findings are consistent with moderate arterial insufficiency of the left lower extremity at rest.    The toe brachial index is 0.56.      Impression: Impression: 1. Mild arterial insufficiency of the right lower extremity at rest. 2. Moderate arterial insufficiency of the left lower extremity at rest. 3. There is evidence of distal forefoot small vessel  disease bilaterally.   This report was finalized on 11/26/2018 17:30 by Dr. Constantino Metzger MD.      Patient Active Problem List   Diagnosis   • Spinal stenosis of lumbar region   • Back pain   • Degeneration of intervertebral disc of lumbosacral region   • Essential hypertension   • Lumbar disc herniation with radiculopathy   • Type 2 diabetes mellitus without complication, without long-term current use of insulin (CMS/[facility])   • Constipation due to opioid therapy   • Gastroesophageal reflux disease without esophagitis   • Panlobular emphysema (CMS/HCC)   • A-fib (CMS/HCC)   • Paroxysmal atrial fibrillation (CMS/HCC)   • Gastroesophageal reflux disease   • Hx of colonic polyps   • HTN (hypertension), benign   • Morbidly obese (CMS/[facility])         ICD-10-CM ICD-9-CM   1. Atherosclerosis of native artery of left lower extremity with ulceration of other part of foot (CMS/[facility]) I70.245 440.23     707.9       Lab Frequency Next Occurrence   Diet: Once 04/19/2018   Advance Diet as Tolerated Once 04/19/2018       Plan: After thoroughly evaluating Edy Cosby, I believe the best course of action is to proceed with left lower extremity angiogram.  Given his decreased NIC as well as present wound at the base of the left first toe that is slow to heal I believe this is indicated.  Risks of angiogram were discussed.  These include, but are not limited to, bleeding, infection, vessel damage, nerve damage, embolus, and loss of limb.  The patient understands these risks and wishes to proceed with procedure.  He should otherwise continue to follow at the wound care center for further care of this wound. I did discuss vascular risk factors as they pertain to the progression of vascular disease including controlling diabetes, hypertension, hyperlipidemia, and BMI. I did  extensively on smoking cessation, and the patient was advised of the continued risks of smoking.  I provided over 10 minutes counseling on this matter. The  patient can continue taking their current medication regimen as previously planned.  This was all discussed in full with complete understanding.

## 2018-12-10 ENCOUNTER — TELEPHONE (OUTPATIENT)
Dept: VASCULAR SURGERY | Facility: CLINIC | Age: 63
End: 2018-12-10

## 2018-12-10 PROBLEM — I70.209 ATHEROSCLEROSIS OF NATIVE ARTERY OF EXTREMITY (HCC): Status: ACTIVE | Noted: 2018-12-10

## 2018-12-10 NOTE — CARE COORDINATION
Ambulatory Care Coordination Note  12/10/2018  CM Risk Score: 3  Oskar Mortality Risk Score:      ACC: Francia Castro RN    Summary Note: ACC met with the pt while in the office to see provider, pt reports he has been having ongoing issues with sinuses, he has been to see Dr Sudhir Gallardo today, and he wants pt to go to wound care about foot. Review of blood sugars, pt did not have a log, but states his sugars are usually under 140, seldom will he see one over 250. Review of medications, all questions answered        Care Coordination Interventions    Program Enrollment:  Rising Risk  Referral from Primary Care Provider:  Yes  Suggested Interventions and Community Resources  Diabetes Education: In Process (Comment: 9/5/18-DM-diet )  Medi Set or Pill Pack:  Declined  Pharmacist:  Declined  Zone Management Tools: In Process (Comment: 9/6/18-DM review of insulin and diet)  Other Services or Interventions:  9/5/18 Glucerna samples given          Goals Addressed     None          Prior to Admission medications    Medication Sig Start Date End Date Taking? Authorizing Provider   lisinopril (PRINIVIL;ZESTRIL) 20 MG tablet TAKE 1 TABLET BY MOUTH TWICE DAILY 12/8/18 1/7/19  Juana Hills MD   cyclobenzaprine (FLEXERIL) 5 MG tablet TAKE ONE TABLET BY MOUTH EVERY EIGHT HOURS AS NEEDED FOR MUSCLE SPASMS 11/21/18   Juana Hills MD   Diabetic Shoe MISC by Does not apply route 11/15/18   Juana Hills MD   Diabetic Shoe MISC by Does not apply route With inserts Ell.9 11/15/18   Juana Hills MD   Insulin Pen Needle 31G X 6 MM MISC 1 each by Does not apply route daily May substitute to generic for insurance 11/13/18   Juana Hills MD   amLODIPine (NORVASC) 5 MG tablet TAKE 1 TABLET BY MOUTH ONCE DAILY 9/23/18   Juana Hills MD   gabapentin (NEURONTIN) 300 MG capsule Take 900 mg by mouth 3 times daily. [place] Route     Historical Provider, MD   meloxicam (MOBIC) 15 MG tablet Take 15 mg by mouth daily    Historical Provider, MD   Exenatide

## 2018-12-10 NOTE — TELEPHONE ENCOUNTER
Spoke with patient and advised of upcoming procedure and prep work.  Patient is scheduled for 12/13/2018 with an arrival time of 6:00 am.  His pre work is scheduled for 12/11/2018 with an arrival time of 8:15 am.  Patient advised of location, time and prep.  Patient expressed understanding for all that was discussed.

## 2018-12-11 ENCOUNTER — APPOINTMENT (OUTPATIENT)
Dept: PREADMISSION TESTING | Facility: HOSPITAL | Age: 63
End: 2018-12-11

## 2018-12-11 ENCOUNTER — CARE COORDINATION (OUTPATIENT)
Dept: CARE COORDINATION | Age: 63
End: 2018-12-11

## 2018-12-11 ENCOUNTER — HOSPITAL ENCOUNTER (OUTPATIENT)
Dept: GENERAL RADIOLOGY | Facility: HOSPITAL | Age: 63
Discharge: HOME | End: 2018-12-11
Admitting: SURGERY

## 2018-12-11 VITALS
OXYGEN SATURATION: 95 % | DIASTOLIC BLOOD PRESSURE: 69 MMHG | WEIGHT: 223.33 LBS | HEART RATE: 92 BPM | BODY MASS INDEX: 33.85 KG/M2 | HEIGHT: 68 IN | SYSTOLIC BLOOD PRESSURE: 178 MMHG

## 2018-12-11 DIAGNOSIS — I70.245 ATHEROSCLEROSIS OF NATIVE ARTERY OF LEFT LOWER EXTREMITY WITH ULCERATION OF OTHER PART OF FOOT (HCC): ICD-10-CM

## 2018-12-11 LAB
ANION GAP SERPL CALCULATED.3IONS-SCNC: 10 MMOL/L (ref 4–13)
BASOPHILS # BLD AUTO: 0.04 10*3/MM3 (ref 0–0.2)
BASOPHILS NFR BLD AUTO: 0.6 % (ref 0–2)
BUN BLD-MCNC: 35 MG/DL (ref 5–21)
BUN/CREAT SERPL: 25 (ref 7–25)
CALCIUM SPEC-SCNC: 9.4 MG/DL (ref 8.4–10.4)
CHLORIDE SERPL-SCNC: 103 MMOL/L (ref 98–110)
CO2 SERPL-SCNC: 29 MMOL/L (ref 24–31)
CREAT BLD-MCNC: 1.4 MG/DL (ref 0.5–1.4)
DEPRECATED RDW RBC AUTO: 49.7 FL (ref 40–54)
EOSINOPHIL # BLD AUTO: 0.16 10*3/MM3 (ref 0–0.7)
EOSINOPHIL NFR BLD AUTO: 2.2 % (ref 0–4)
ERYTHROCYTE [DISTWIDTH] IN BLOOD BY AUTOMATED COUNT: 16.5 % (ref 12–15)
GFR SERPL CREATININE-BSD FRML MDRD: 62 ML/MIN/1.73
GLUCOSE BLD-MCNC: 156 MG/DL (ref 70–100)
HCT VFR BLD AUTO: 33.6 % (ref 40–52)
HGB BLD-MCNC: 11 G/DL (ref 14–18)
IMM GRANULOCYTES # BLD: 0.03 10*3/MM3 (ref 0–0.03)
IMM GRANULOCYTES NFR BLD: 0.4 % (ref 0–5)
INR PPP: 0.94 (ref 0.91–1.09)
LYMPHOCYTES # BLD AUTO: 2.14 10*3/MM3 (ref 0.72–4.86)
LYMPHOCYTES NFR BLD AUTO: 29.7 % (ref 15–45)
MCH RBC QN AUTO: 27.4 PG (ref 28–32)
MCHC RBC AUTO-ENTMCNC: 32.7 G/DL (ref 33–36)
MCV RBC AUTO: 83.6 FL (ref 82–95)
MONOCYTES # BLD AUTO: 0.41 10*3/MM3 (ref 0.19–1.3)
MONOCYTES NFR BLD AUTO: 5.7 % (ref 4–12)
NEUTROPHILS # BLD AUTO: 4.42 10*3/MM3 (ref 1.87–8.4)
NEUTROPHILS NFR BLD AUTO: 61.4 % (ref 39–78)
NRBC BLD MANUAL-RTO: 0 /100 WBC (ref 0–0)
PLATELET # BLD AUTO: 164 10*3/MM3 (ref 130–400)
PMV BLD AUTO: 12.2 FL (ref 6–12)
POTASSIUM BLD-SCNC: 4.1 MMOL/L (ref 3.5–5.3)
PROTHROMBIN TIME: 12.8 SECONDS (ref 11.9–14.6)
RBC # BLD AUTO: 4.02 10*6/MM3 (ref 4.8–5.9)
SODIUM BLD-SCNC: 142 MMOL/L (ref 135–145)
WBC NRBC COR # BLD: 7.2 10*3/MM3 (ref 4.8–10.8)

## 2018-12-11 PROCEDURE — 80048 BASIC METABOLIC PNL TOTAL CA: CPT | Performed by: SURGERY

## 2018-12-11 PROCEDURE — 36415 COLL VENOUS BLD VENIPUNCTURE: CPT

## 2018-12-11 PROCEDURE — 85610 PROTHROMBIN TIME: CPT | Performed by: SURGERY

## 2018-12-11 PROCEDURE — 93005 ELECTROCARDIOGRAM TRACING: CPT

## 2018-12-11 PROCEDURE — 93010 ELECTROCARDIOGRAM REPORT: CPT | Performed by: INTERNAL MEDICINE

## 2018-12-11 PROCEDURE — 85025 COMPLETE CBC W/AUTO DIFF WBC: CPT | Performed by: SURGERY

## 2018-12-11 PROCEDURE — 71045 X-RAY EXAM CHEST 1 VIEW: CPT

## 2018-12-11 NOTE — DISCHARGE INSTRUCTIONS
DAY OF SURGERY INSTRUCTIONS    Aortogram, Left leg angiogram, possible angioplasty, possible stent    DR CABRERA    DATE OF SURGERY: DEC 13 2018    ARRIVAL TIME: AS DIRECTED BY OFFICE    YOU MAY TAKE THE FOLLOWING MEDICATION(S) THE MORNING OF SURGERY WITH A SIP OF WATER: GABAPENTIN, HYDROCODONE AND METOPROLOL                MANAGING PAIN AFTER SURGERY    We know you are probably wondering what your pain will be like after surgery.  Following surgery it is unrealistic to expect you will not have pain.   Pain is how our bodies let us know that something is wrong or cautions us to be careful.  That said, our goal is to make your pain tolerable.    Methods we may use to treat your pain include (oral or IV medications, PCAs, epidurals, nerve blocks, etc.)   While some procedures require IV pain medications for a short time after surgery, transitioning to pain medications by mouth allows for better management of pain.   Your nurse will encourage you to take oral pain medications whenever possible.  IV medications work almost immediately, but only last a short while.  Taking medications by mouth allows for a more constant level of medication in your blood stream for a longer period of time.      Once your pain is out of control it is harder to get back under control.  It is important you are aware when your next dose of pain medication is due.  If you are admitted, your nurse may write the time of your next dose on the white board in your room to help you remember.      We are interested in your pain and encourage you to inform us about aggravating factors during your visit.   Many times a simple repositioning every few hours can make a big difference.    If your physician says it is okay, do not let your pain prevent you from getting out of bed. Be sure to call your nurse for assistance prior to getting up so you do not fall.      Before surgery, please decide your tolerable pain goal.  These faces help describe the  pain ratings we use on a 0-10 scale.   Be prepared to tell us your goal and whether or not you take pain or anxiety medications at home.          BEFORE YOU COME TO THE HOSPITAL  (Pre-op instructions)  • Do not eat, drink, smoke or chew gum after midnight the night before surgery.  This also includes no mints.  • Morning of surgery take only the medicines you have been instructed with a sip of water unless otherwise instructed  by your physician.  • Do not shave, wear makeup or dark nail polish.  • Remove all jewelry including rings.  • Leave anything you consider valuable at home.  • Leave your suitcase in the car until after your surgery.  • Bring the following with you if applicable:  o Picture ID and insurance, Medicare or Medicaid cards  o Co-pay/deductible required by insurance (cash, check, credit card)  o Copy of advance directive, living will or power-of- documents if not brought to PAT  o CPAP or BIPAP mask and tubing  o Relaxation aids (MP3 player, book, magazine)  • On the day of surgery check in at registration located at the main entrance of the hospital.       Outpatient Surgery Guidelines, Adult  Outpatient procedures are those for which the person having the procedure is allowed to go home the same day as the procedure. Various procedures are done on an outpatient basis. You should follow some general guidelines if you will be having an outpatient procedure.  LET YOUR HEALTH CARE PROVIDER KNOW ABOUT:  · Any allergies you have.  · All medicines you are taking, including vitamins, herbs, eye drops, creams, and over-the-counter medicines.  · Previous problems you or members of your family have had with the use of anesthetics.  · Any blood disorders you have.  · Previous surgeries you have had.  · Medical conditions you have.  RISKS AND COMPLICATIONS  Your health care provider will discuss possible risks and complications with you before surgery. Common risks and complications include:     · Problems due to the use of anesthetics.  · Blood loss and replacement (does not apply to minor surgical procedures).  · Temporary increase in pain due to surgery.  · Uncorrected pain or problems that the surgery was meant to correct.  · Infection.  · New damage.  BEFORE THE PROCEDURE  · Ask your health care provider about changing or stopping your regular medicines. You may need to stop taking certain medicines in the days or weeks before the procedure.  · Stop smoking at least 2 weeks before surgery. This lowers your risk for complications during and after surgery. Ask your health care provider for help with this if needed.  · Eat your usual meals and a light supper the day before surgery. Continue fluid intake. Do not drink alcohol.  · Do not eat or drink after midnight the night before your surgery.   · Arrange for someone to take you home and to stay with you for 24 hours after the procedure. Medicine given for your procedure may affect your ability to drive or to care for yourself.  · Call your health care provider's office if you develop an illness or problem that may prevent you from safely having your procedure.  AFTER THE PROCEDURE  After surgery, you will be taken to a recovery area, where your progress will be monitored. If there are no complications, you will be allowed to go home when you are awake, stable, and taking fluids well. You may have numbness around the surgical site. Healing will take some time. You will have tenderness at the surgical site and may have some swelling and bruising. You may also have some nausea.  HOME CARE INSTRUCTIONS  · Do not drive for 24 hours, or as directed by your health care provider. Do not drive while taking prescription pain medicines.  · Do not drink alcohol for 24 hours.  · Do not make important decisions or sign legal documents for 24 hours.  · You may resume a normal diet and activities as directed.  · Do not lift anything heavier than 10 pounds (4.5 kg) or  play contact sports until your health care provider says it is okay.  · Change your bandages (dressings) as directed.  · Only take over-the-counter or prescription medicines as directed by your health care provider.  · Follow up with your health care provider as directed.  SEEK MEDICAL CARE IF:  · You have increased bleeding (more than a small spot) from the surgical site.  · You have redness, swelling, or increasing pain in the wound.  · You see pus coming from the wound.  · You have a fever.  · You notice a bad smell coming from the wound or dressing.  · You feel lightheaded or faint.  · You develop a rash.  · You have trouble breathing.  · You develop allergies.  MAKE SURE YOU:  · Understand these instructions.  · Will watch your condition.  · Will get help right away if you are not doing well or get worse.     This information is not intended to replace advice given to you by your health care provider. Make sure you discuss any questions you have with your health care provider.     Document Released: 09/12/2002 Document Revised: 05/03/2016 Document Reviewed: 05/22/2014  [employer] Interactive Patient Education ©2016 [employer]       Fall Prevention in Hospitals, Adult  As a hospital patient, your condition and the treatments you receive can increase your risk for falls. Some additional risk factors for falls in a hospital include:  · Being in an unfamiliar environment.  · Being on bed rest.  · Your surgery.  · Taking certain medicines.  · Your tubing requirements, such as intravenous (IV) therapy or catheters.  It is important that you learn how to decrease fall risks while at the hospital. Below are important tips that can help prevent falls.  SAFETY TIPS FOR PREVENTING FALLS  Talk about your risk of falling.  · Ask your health care provider why you are at risk for falling. Is it your medicine, illness, tubing placement, or something else?  · Make a plan with your health care provider to keep you safe from  falls.  · Ask your health care provider or pharmacist about side effects of your medicines. Some medicines can make you dizzy or affect your coordination.  Ask for help.  · Ask for help before getting out of bed. You may need to press your call button.  · Ask for assistance in getting safely to the toilet.  · Ask for a walker or cane to be put at your bedside. Ask that most of the side rails on your bed be placed up before your health care provider leaves the room.  · Ask family or friends to sit with you.  · Ask for things that are out of your reach, such as your glasses, hearing aids, telephone, bedside table, or call button.  Follow these tips to avoid falling:  · Stay lying or seated, rather than standing, while waiting for help.  · Wear rubber-soled slippers or shoes whenever you walk in the hospital.  · Avoid quick, sudden movements.  ¨ Change positions slowly.  ¨ Sit on the side of your bed before standing.  ¨ Stand up slowly and wait before you start to walk.  · Let your health care provider know if there is a spill on the floor.  · Pay careful attention to the medical equipment, electrical cords, and tubes around you.  · When you need help, use your call button by your bed or in the bathroom. Wait for one of your health care providers to help you.  · If you feel dizzy or unsure of your footing, return to bed and wait for assistance.  · Avoid being distracted by the TV, telephone, or another person in your room.  · Do not lean or support yourself on rolling objects, such as IV poles or bedside tables.     This information is not intended to replace advice given to you by your health care provider. Make sure you discuss any questions you have with your health care provider.     Document Released: 12/15/2001 Document Revised: 01/08/2016 Document Reviewed: 08/25/2013  [employer] Interactive Patient Education ©2016 [employer]       Surgical Site Infections FAQs  What is a Surgical Site Infection (SSI)?  A  surgical site infection is an infection that occurs after surgery in the part of the body where the surgery took place. Most patients who have surgery do not develop an infection. However, infections develop in about 1 to 3 out of every 100 patients who have surgery.  Some of the common symptoms of a surgical site infection are:  · Redness and pain around the area where you had surgery  · Drainage of cloudy fluid from your surgical wound  · Fever  Can SSIs be treated?  Yes. Most surgical site infections can be treated with antibiotics. The antibiotic given to you depends on the bacteria (germs) causing the infection. Sometimes patients with SSIs also need another surgery to treat the infection.  What are some of the things that hospitals are doing to prevent SSIs?  To prevent SSIs, doctors, nurses, and other healthcare providers:  · Clean their hands and arms up to their elbows with an antiseptic agent just before the surgery.  · Clean their hands with soap and water or an alcohol-based hand rub before and after caring for each patient.  · May remove some of your hair immediately before your surgery using electric clippers if the hair is in the same area where the procedure will occur. They should not shave you with a razor.  · Wear special hair covers, masks, gowns, and gloves during surgery to keep the surgery area clean.  · Give you antibiotics before your surgery starts. In most cases, you should get antibiotics within 60 minutes before the surgery starts and the antibiotics should be stopped within 24 hours after surgery.  · Clean the skin at the site of your surgery with a special soap that kills germs.  What can I do to help prevent SSIs?  Before your surgery:  · Tell your doctor about other medical problems you may have. Health problems such as allergies, diabetes, and obesity could affect your surgery and your treatment.  · Quit smoking. Patients who smoke get more infections. Talk to your doctor about how  you can quit before your surgery.  · Do not shave near where you will have surgery. Shaving with a razor can irritate your skin and make it easier to develop an infection.  At the time of your surgery:  · Speak up if someone tries to shave you with a razor before surgery. Ask why you need to be shaved and talk with your surgeon if you have any concerns.  · Ask if you will get antibiotics before surgery.  After your surgery:  · Make sure that your healthcare providers clean their hands before examining you, either with soap and water or an alcohol-based hand rub.  · If you do not see your providers clean their hands, please ask them to do so.  · Family and friends who visit you should not touch the surgical wound or dressings.  · Family and friends should clean their hands with soap and water or an alcohol-based hand rub before and after visiting you. If you do not see them clean their hands, ask them to clean their hands.  What do I need to do when I go home from the hospital?  · Before you go home, your doctor or nurse should explain everything you need to know about taking care of your wound. Make sure you understand how to care for your wound before you leave the hospital.  · Always clean your hands before and after caring for your wound.  · Before you go home, make sure you know who to contact if you have questions or problems after you get home.  · If you have any symptoms of an infection, such as redness and pain at the surgery site, drainage, or fever, call your doctor immediately.  If you have additional questions, please ask your doctor or nurse.  Developed and co-sponsored by The Society for Healthcare Epidemiology of [place] (SHEA); Infectious Diseases Society of [place] (IDSA); American Hospital Association; Association for Professionals in Infection Control and Epidemiology (APIC); Centers for Disease Control and Prevention (CDC); and The Joint Commission.     This information is not intended to replace  advice given to you by your health care provider. Make sure you discuss any questions you have with your health care provider.     Document Released: 12/23/2014 Document Revised: 01/08/2016 Document Reviewed: 03/02/2016  [employer] Interactive Patient Education ©2016 [employer]       [facility]  CHG 4% Patient Instruction Sheet    Preparing the Skin Before Surgery  Preparing or “prepping” skin before surgery can reduce the risk of infection at the surgical site. To make the process easier,[facility] has chosen 4% Chlorhexidine Gluconate (CHG) antiseptic solution.   The steps below outline the prepping process and should be carefully followed.                                                                                                                                                      Prep the skin at the following time(s):                                                      We recommend you shower the night before surgery, and again the morning of surgery with the 4% CHG antiseptic solution using half of the bottle and a cloth each time.  Dress in clean clothes/sleepwear after showering.  See instructions below for application.          Do not apply any lotions or moisturizers.       Do not shave the area to be prepped for at least 2 days prior to surgery.    Clipping the hair may be done immediately prior to your surgery at the hospital    if needed.    Directions:  Thoroughly rinse your body with water.  Apply 4% CHG to a cloth and wash skin gently, paying special attention to the operative site.  Rinse again thoroughly.  Once you have begun using this product do not apply anything else to your skin. If itching or redness persists, rinse affected areas and discontinue use.    When using this product:  • Keep out of eyes, ears, and mouth.  • If solution should contact these areas, rinse out promptly and thoroughly with water.  • For external use only.  • Do not use in genital area, on your face or  head.      PATIENT/FAMILY/RESPONSIBLE PARTY VERBALIZES UNDERSTANDING OF ABOVE EDUCATION.  COPY OF PAIN SCALE GIVEN AND REVIEWED WITH VERBALIZED UNDERSTANDING.

## 2018-12-12 ENCOUNTER — TELEPHONE (OUTPATIENT)
Dept: PRIMARY CARE CLINIC | Age: 63
End: 2018-12-12

## 2018-12-12 ENCOUNTER — CARE COORDINATION (OUTPATIENT)
Dept: CARE COORDINATION | Age: 63
End: 2018-12-12

## 2018-12-13 ENCOUNTER — APPOINTMENT (OUTPATIENT)
Dept: INTERVENTIONAL RADIOLOGY/VASCULAR | Facility: HOSPITAL | Age: 63
End: 2018-12-13

## 2018-12-13 ENCOUNTER — HOSPITAL ENCOUNTER (OUTPATIENT)
Facility: HOSPITAL | Age: 63
Discharge: HOME | End: 2018-12-13
Attending: SURGERY | Admitting: SURGERY

## 2018-12-13 VITALS
TEMPERATURE: 97.7 F | OXYGEN SATURATION: 98 % | DIASTOLIC BLOOD PRESSURE: 59 MMHG | HEART RATE: 72 BPM | SYSTOLIC BLOOD PRESSURE: 166 MMHG | RESPIRATION RATE: 16 BRPM

## 2018-12-13 DIAGNOSIS — I70.245 ATHEROSCLEROSIS OF NATIVE ARTERY OF LEFT LOWER EXTREMITY WITH ULCERATION OF OTHER PART OF FOOT (HCC): ICD-10-CM

## 2018-12-13 PROBLEM — I70.25: Status: ACTIVE | Noted: 2018-12-13

## 2018-12-13 LAB — GLUCOSE BLDC GLUCOMTR-MCNC: 303 MG/DL (ref 70–130)

## 2018-12-13 PROCEDURE — G0463 HOSPITAL OUTPT CLINIC VISIT: HCPCS | Performed by: SURGERY

## 2018-12-13 PROCEDURE — 82962 GLUCOSE BLOOD TEST: CPT

## 2018-12-13 RX ORDER — SODIUM CHLORIDE 0.9 % (FLUSH) 0.9 %
3 SYRINGE (ML) INJECTION AS NEEDED
Status: DISCONTINUED | OUTPATIENT
Start: 2018-12-13 | End: 2018-12-14 | Stop reason: HOSPADM

## 2018-12-13 RX ORDER — SODIUM CHLORIDE, SODIUM LACTATE, POTASSIUM CHLORIDE, CALCIUM CHLORIDE 600; 310; 30; 20 MG/100ML; MG/100ML; MG/100ML; MG/100ML
1000 INJECTION, SOLUTION INTRAVENOUS CONTINUOUS
Status: DISCONTINUED | OUTPATIENT
Start: 2018-12-13 | End: 2018-12-14 | Stop reason: HOSPADM

## 2018-12-13 RX ORDER — BUPIVACAINE HCL/0.9 % NACL/PF 0.1 %
2 PLASTIC BAG, INJECTION (ML) EPIDURAL ONCE
Status: DISCONTINUED | OUTPATIENT
Start: 2018-12-13 | End: 2018-12-14 | Stop reason: HOSPADM

## 2018-12-13 RX ADMIN — LIDOCAINE HYDROCHLORIDE 0.5 ML: 10 INJECTION, SOLUTION EPIDURAL; INFILTRATION; INTRACAUDAL; PERINEURAL at 06:40

## 2018-12-13 RX ADMIN — SODIUM CHLORIDE, POTASSIUM CHLORIDE, SODIUM LACTATE AND CALCIUM CHLORIDE 1000 ML: 600; 310; 30; 20 INJECTION, SOLUTION INTRAVENOUS at 06:41

## 2018-12-17 ENCOUNTER — PREP FOR SURGERY (OUTPATIENT)
Dept: OTHER | Facility: HOSPITAL | Age: 63
End: 2018-12-17

## 2018-12-17 DIAGNOSIS — I70.25 ATHEROSCLEROSIS OF NATIVE ARTERIES OF THE EXTREMITIES WITH ULCERATION (HCC): Primary | ICD-10-CM

## 2018-12-18 ENCOUNTER — APPOINTMENT (OUTPATIENT)
Dept: INTERVENTIONAL RADIOLOGY/VASCULAR | Facility: HOSPITAL | Age: 63
End: 2018-12-18

## 2018-12-18 ENCOUNTER — HOSPITAL ENCOUNTER (OUTPATIENT)
Facility: HOSPITAL | Age: 63
Setting detail: HOSPITAL OUTPATIENT SURGERY
Discharge: HOME | End: 2018-12-18
Attending: SURGERY | Admitting: SURGERY

## 2018-12-18 ENCOUNTER — ANESTHESIA (OUTPATIENT)
Dept: PERIOP | Facility: HOSPITAL | Age: 63
End: 2018-12-18

## 2018-12-18 ENCOUNTER — ANESTHESIA EVENT (OUTPATIENT)
Dept: PERIOP | Facility: HOSPITAL | Age: 63
End: 2018-12-18

## 2018-12-18 VITALS
OXYGEN SATURATION: 96 % | DIASTOLIC BLOOD PRESSURE: 79 MMHG | RESPIRATION RATE: 16 BRPM | TEMPERATURE: 98 F | HEART RATE: 90 BPM | SYSTOLIC BLOOD PRESSURE: 161 MMHG

## 2018-12-18 LAB
GLUCOSE BLDC GLUCOMTR-MCNC: 154 MG/DL (ref 70–130)
GLUCOSE BLDC GLUCOMTR-MCNC: 259 MG/DL (ref 70–130)

## 2018-12-18 PROCEDURE — 25010000002 HEPARIN (PORCINE) PER 1000 UNITS: Performed by: SURGERY

## 2018-12-18 PROCEDURE — 94640 AIRWAY INHALATION TREATMENT: CPT

## 2018-12-18 PROCEDURE — 76000 FLUOROSCOPY <1 HR PHYS/QHP: CPT

## 2018-12-18 PROCEDURE — 36246 INS CATH ABD/L-EXT ART 2ND: CPT | Performed by: SURGERY

## 2018-12-18 PROCEDURE — 75710 ARTERY X-RAYS ARM/LEG: CPT | Performed by: SURGERY

## 2018-12-18 PROCEDURE — 25010000002 HEPARIN (PORCINE) PER 1000 UNITS: Performed by: NURSE ANESTHETIST, CERTIFIED REGISTERED

## 2018-12-18 PROCEDURE — 25010000002 PROPOFOL 10 MG/ML EMULSION: Performed by: NURSE ANESTHETIST, CERTIFIED REGISTERED

## 2018-12-18 PROCEDURE — 25010000002 PROPOFOL 1000 MG/ML EMULSION: Performed by: NURSE ANESTHETIST, CERTIFIED REGISTERED

## 2018-12-18 PROCEDURE — 63710000001 INSULIN REGULAR HUMAN PER 5 UNITS: Performed by: ANESTHESIOLOGY

## 2018-12-18 PROCEDURE — 25010000002 IOPAMIDOL 61 % SOLUTION: Performed by: SURGERY

## 2018-12-18 PROCEDURE — C1894 INTRO/SHEATH, NON-LASER: HCPCS | Performed by: SURGERY

## 2018-12-18 PROCEDURE — 36140 INTRO NDL ICATH UPR/LXTR ART: CPT | Performed by: SURGERY

## 2018-12-18 PROCEDURE — C1769 GUIDE WIRE: HCPCS | Performed by: SURGERY

## 2018-12-18 PROCEDURE — C1760 CLOSURE DEV, VASC: HCPCS | Performed by: SURGERY

## 2018-12-18 PROCEDURE — 82962 GLUCOSE BLOOD TEST: CPT

## 2018-12-18 PROCEDURE — 25010000002 SUCCINYLCHOLINE PER 20 MG: Performed by: NURSE ANESTHETIST, CERTIFIED REGISTERED

## 2018-12-18 PROCEDURE — 25010000002 FENTANYL CITRATE (PF) 250 MCG/5ML SOLUTION: Performed by: NURSE ANESTHETIST, CERTIFIED REGISTERED

## 2018-12-18 PROCEDURE — 75716 ARTERY X-RAYS ARMS/LEGS: CPT

## 2018-12-18 RX ORDER — IPRATROPIUM BROMIDE AND ALBUTEROL SULFATE 2.5; .5 MG/3ML; MG/3ML
3 SOLUTION RESPIRATORY (INHALATION) ONCE AS NEEDED
Status: COMPLETED | OUTPATIENT
Start: 2018-12-18 | End: 2018-12-18

## 2018-12-18 RX ORDER — MEPERIDINE HYDROCHLORIDE 25 MG/ML
12.5 INJECTION INTRAMUSCULAR; INTRAVENOUS; SUBCUTANEOUS
Status: DISCONTINUED | OUTPATIENT
Start: 2018-12-18 | End: 2018-12-18 | Stop reason: HOSPADM

## 2018-12-18 RX ORDER — SODIUM CHLORIDE, SODIUM LACTATE, POTASSIUM CHLORIDE, CALCIUM CHLORIDE 600; 310; 30; 20 MG/100ML; MG/100ML; MG/100ML; MG/100ML
100 INJECTION, SOLUTION INTRAVENOUS CONTINUOUS
Status: DISCONTINUED | OUTPATIENT
Start: 2018-12-18 | End: 2018-12-18 | Stop reason: HOSPADM

## 2018-12-18 RX ORDER — FAMOTIDINE 10 MG/ML
20 INJECTION, SOLUTION INTRAVENOUS
Status: DISCONTINUED | OUTPATIENT
Start: 2018-12-18 | End: 2018-12-18 | Stop reason: HOSPADM

## 2018-12-18 RX ORDER — ONDANSETRON 2 MG/ML
4 INJECTION INTRAMUSCULAR; INTRAVENOUS ONCE AS NEEDED
Status: DISCONTINUED | OUTPATIENT
Start: 2018-12-18 | End: 2018-12-18 | Stop reason: HOSPADM

## 2018-12-18 RX ORDER — SODIUM CHLORIDE 0.9 % (FLUSH) 0.9 %
3 SYRINGE (ML) INJECTION AS NEEDED
Status: DISCONTINUED | OUTPATIENT
Start: 2018-12-18 | End: 2018-12-18 | Stop reason: HOSPADM

## 2018-12-18 RX ORDER — BUPIVACAINE HCL/0.9 % NACL/PF 0.1 %
2 PLASTIC BAG, INJECTION (ML) EPIDURAL ONCE
Status: COMPLETED | OUTPATIENT
Start: 2018-12-18 | End: 2018-12-18

## 2018-12-18 RX ORDER — LABETALOL HYDROCHLORIDE 5 MG/ML
5 INJECTION, SOLUTION INTRAVENOUS
Status: DISCONTINUED | OUTPATIENT
Start: 2018-12-18 | End: 2018-12-18 | Stop reason: HOSPADM

## 2018-12-18 RX ORDER — SODIUM CHLORIDE, SODIUM LACTATE, POTASSIUM CHLORIDE, CALCIUM CHLORIDE 600; 310; 30; 20 MG/100ML; MG/100ML; MG/100ML; MG/100ML
1000 INJECTION, SOLUTION INTRAVENOUS CONTINUOUS
Status: DISCONTINUED | OUTPATIENT
Start: 2018-12-18 | End: 2018-12-18 | Stop reason: HOSPADM

## 2018-12-18 RX ORDER — LIDOCAINE HYDROCHLORIDE 10 MG/ML
INJECTION, SOLUTION INFILTRATION; PERINEURAL AS NEEDED
Status: DISCONTINUED | OUTPATIENT
Start: 2018-12-18 | End: 2018-12-18 | Stop reason: HOSPADM

## 2018-12-18 RX ORDER — IBUPROFEN 600 MG/1
600 TABLET ORAL ONCE AS NEEDED
Status: DISCONTINUED | OUTPATIENT
Start: 2018-12-18 | End: 2018-12-18 | Stop reason: HOSPADM

## 2018-12-18 RX ORDER — NALOXONE HCL 0.4 MG/ML
0.4 VIAL (ML) INJECTION AS NEEDED
Status: DISCONTINUED | OUTPATIENT
Start: 2018-12-18 | End: 2018-12-18 | Stop reason: HOSPADM

## 2018-12-18 RX ORDER — IPRATROPIUM BROMIDE AND ALBUTEROL SULFATE 2.5; .5 MG/3ML; MG/3ML
3 SOLUTION RESPIRATORY (INHALATION) ONCE
Status: COMPLETED | OUTPATIENT
Start: 2018-12-18 | End: 2018-12-18

## 2018-12-18 RX ORDER — LIDOCAINE HYDROCHLORIDE 20 MG/ML
INJECTION, SOLUTION INFILTRATION; PERINEURAL AS NEEDED
Status: DISCONTINUED | OUTPATIENT
Start: 2018-12-18 | End: 2018-12-18 | Stop reason: SURG

## 2018-12-18 RX ORDER — SODIUM CHLORIDE 0.9 % (FLUSH) 0.9 %
3 SYRINGE (ML) INJECTION EVERY 12 HOURS SCHEDULED
Status: DISCONTINUED | OUTPATIENT
Start: 2018-12-18 | End: 2018-12-18 | Stop reason: HOSPADM

## 2018-12-18 RX ORDER — SODIUM CHLORIDE 0.9 % (FLUSH) 0.9 %
1-10 SYRINGE (ML) INJECTION AS NEEDED
Status: DISCONTINUED | OUTPATIENT
Start: 2018-12-18 | End: 2018-12-18 | Stop reason: HOSPADM

## 2018-12-18 RX ORDER — ROCURONIUM BROMIDE 10 MG/ML
INJECTION, SOLUTION INTRAVENOUS AS NEEDED
Status: DISCONTINUED | OUTPATIENT
Start: 2018-12-18 | End: 2018-12-18 | Stop reason: SURG

## 2018-12-18 RX ORDER — OXYCODONE HYDROCHLORIDE AND ACETAMINOPHEN 5; 325 MG/1; MG/1
1 TABLET ORAL ONCE AS NEEDED
Status: DISCONTINUED | OUTPATIENT
Start: 2018-12-18 | End: 2018-12-18 | Stop reason: HOSPADM

## 2018-12-18 RX ORDER — SUCCINYLCHOLINE CHLORIDE 20 MG/ML
INJECTION INTRAMUSCULAR; INTRAVENOUS AS NEEDED
Status: DISCONTINUED | OUTPATIENT
Start: 2018-12-18 | End: 2018-12-18 | Stop reason: SURG

## 2018-12-18 RX ORDER — PROPOFOL 10 MG/ML
VIAL (ML) INTRAVENOUS AS NEEDED
Status: DISCONTINUED | OUTPATIENT
Start: 2018-12-18 | End: 2018-12-18 | Stop reason: SURG

## 2018-12-18 RX ORDER — OXYCODONE AND ACETAMINOPHEN 10; 325 MG/1; MG/1
1 TABLET ORAL ONCE AS NEEDED
Status: DISCONTINUED | OUTPATIENT
Start: 2018-12-18 | End: 2018-12-18 | Stop reason: HOSPADM

## 2018-12-18 RX ORDER — FENTANYL CITRATE 50 UG/ML
25 INJECTION, SOLUTION INTRAMUSCULAR; INTRAVENOUS AS NEEDED
Status: DISCONTINUED | OUTPATIENT
Start: 2018-12-18 | End: 2018-12-18 | Stop reason: HOSPADM

## 2018-12-18 RX ORDER — METOCLOPRAMIDE HYDROCHLORIDE 5 MG/ML
5 INJECTION INTRAMUSCULAR; INTRAVENOUS
Status: DISCONTINUED | OUTPATIENT
Start: 2018-12-18 | End: 2018-12-18 | Stop reason: HOSPADM

## 2018-12-18 RX ORDER — HEPARIN SODIUM 1000 [USP'U]/ML
INJECTION, SOLUTION INTRAVENOUS; SUBCUTANEOUS AS NEEDED
Status: DISCONTINUED | OUTPATIENT
Start: 2018-12-18 | End: 2018-12-18 | Stop reason: SURG

## 2018-12-18 RX ORDER — OXYCODONE AND ACETAMINOPHEN 7.5; 325 MG/1; MG/1
2 TABLET ORAL EVERY 4 HOURS PRN
Status: DISCONTINUED | OUTPATIENT
Start: 2018-12-18 | End: 2018-12-18 | Stop reason: HOSPADM

## 2018-12-18 RX ORDER — FENTANYL CITRATE 50 UG/ML
INJECTION, SOLUTION INTRAMUSCULAR; INTRAVENOUS AS NEEDED
Status: DISCONTINUED | OUTPATIENT
Start: 2018-12-18 | End: 2018-12-18 | Stop reason: SURG

## 2018-12-18 RX ADMIN — FENTANYL CITRATE 150 MCG: 50 INJECTION INTRAMUSCULAR; INTRAVENOUS at 10:20

## 2018-12-18 RX ADMIN — FENTANYL CITRATE 100 MCG: 50 INJECTION INTRAMUSCULAR; INTRAVENOUS at 10:12

## 2018-12-18 RX ADMIN — FAMOTIDINE 20 MG: 10 INJECTION, SOLUTION INTRAVENOUS at 09:46

## 2018-12-18 RX ADMIN — IPRATROPIUM BROMIDE AND ALBUTEROL SULFATE 3 ML: 2.5; .5 SOLUTION RESPIRATORY (INHALATION) at 09:46

## 2018-12-18 RX ADMIN — SUCCINYLCHOLINE CHLORIDE 140 MG: 20 INJECTION, SOLUTION INTRAMUSCULAR; INTRAVENOUS at 10:17

## 2018-12-18 RX ADMIN — PROPOFOL 150 MCG/KG/MIN: 10 INJECTION, EMULSION INTRAVENOUS at 10:12

## 2018-12-18 RX ADMIN — IPRATROPIUM BROMIDE AND ALBUTEROL SULFATE 3 ML: 2.5; .5 SOLUTION RESPIRATORY (INHALATION) at 11:12

## 2018-12-18 RX ADMIN — ROCURONIUM BROMIDE 30 MG: 10 INJECTION INTRAVENOUS at 10:24

## 2018-12-18 RX ADMIN — PROPOFOL 50 MG: 10 INJECTION, EMULSION INTRAVENOUS at 10:17

## 2018-12-18 RX ADMIN — HEPARIN SODIUM 1000 UNITS: 1000 INJECTION, SOLUTION INTRAVENOUS; SUBCUTANEOUS at 10:28

## 2018-12-18 RX ADMIN — SUGAMMADEX 200 MG: 100 INJECTION, SOLUTION INTRAVENOUS at 10:49

## 2018-12-18 RX ADMIN — INSULIN HUMAN 5 UNITS: 100 INJECTION, SOLUTION PARENTERAL at 09:57

## 2018-12-18 RX ADMIN — LIDOCAINE HYDROCHLORIDE 0.5 ML: 10 INJECTION, SOLUTION EPIDURAL; INFILTRATION; INTRACAUDAL; PERINEURAL at 08:06

## 2018-12-18 RX ADMIN — Medication 2 G: at 10:15

## 2018-12-18 RX ADMIN — LIDOCAINE HYDROCHLORIDE 50 MG: 20 INJECTION, SOLUTION INFILTRATION; PERINEURAL at 10:12

## 2018-12-18 RX ADMIN — SODIUM CHLORIDE, POTASSIUM CHLORIDE, SODIUM LACTATE AND CALCIUM CHLORIDE 1000 ML: 600; 310; 30; 20 INJECTION, SOLUTION INTRAVENOUS at 08:07

## 2018-12-18 NOTE — DISCHARGE INSTRUCTIONS

## 2018-12-18 NOTE — PERIOPERATIVE NURSING NOTE
Right groin puncture site, guaze, tegaderm dry and intact, MYNX closure, surrounding tissue soft. Right leg straight, HOB up 15 degrees.

## 2018-12-18 NOTE — ANESTHESIA PREPROCEDURE EVALUATION
Anesthesia Evaluation     Patient summary reviewed and Nursing notes reviewed   no history of anesthetic complications:  NPO Solid Status: > 8 hours  NPO Liquid Status: > 8 hours           Airway   Mallampati: III  TM distance: >3 FB  Possible difficult intubation  Dental      Pulmonary    (+) a smoker (down to 3-4 cigarettes per day) Current, COPD,   Cardiovascular   Exercise tolerance: poor (<4 METS)    ECG reviewed  Patient on routine beta blocker and Beta blocker given within 24 hours of surgery    (+) hypertension, dysrhythmias Atrial Fib, PVD, hyperlipidemia,     ROS comment: Echo 1/2017  · All left ventricular wall segments contract normally.  · Left ventricular wall thickness is consistent with moderate concentric hypertrophy.     GROSSLY NORMAL LV AND RV SYSTOLIC FUNCTION  GROSSLY NORMAL VALVE FUNCTION  MODERATE LVH    Neuro/Psych  (-) seizures, TIA, CVA  GI/Hepatic/Renal/Endo    (+) obesity,  GERD,  diabetes mellitus type 2 using insulin,   (-) liver disease, no renal disease    Musculoskeletal     Abdominal    Substance History      OB/GYN          Other   (+) arthritis                     Anesthesia Plan    ASA 3     general   (Pt with new t wave inversion lead 3. Reports no chest pain, no dyspnea. He has pain in legs which limits functional status. 1 risk factor for RCRI. Pt on beta blocker .Will proceed without any further w/u. He took bb today.     Will order iv insulin for preop. )  intravenous induction   Anesthetic plan, all risks, benefits, and alternatives have been provided, discussed and informed consent has been obtained with: patient.

## 2018-12-18 NOTE — OP NOTE
Edy Cosby  12/18/2018     PREOPERATIVE DIAGNOSIS: Atherosclerosis 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 (CMS/HCC) [I70.25]     POSTOPERATIVE DIAGNOSIS: Post-Op Diagnosis Codes:     * Atherosclerosis 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 (CMS/HCC) [I70.25]     PROCEDURE PERFORMED:   1.  Ultrasound-guided right common femoral artery access  2.  Placement of catheter in abdominal aorta  3.  Aortoiliac angiogram  4.  Placement of catheter in the left common femoral artery  5.  Left lower extremity angiogram  6.  Deployment of the 5 Prydeinig Mynx closure device in the right common femoral artery  7.  Radiologic supervision and interpretation     SURGEON: Duncan Lucio MD     ANESTHESIA: General.    PREPARATION: Routine.    STAFF: Circulator: Jade Hsu RN  Scrub Person: Priyanka Antonio  Assistant: Cecy Guzman  Vascular Radiology Technician: Mitra Jenkins    ESTIMATED BLOOD LOSS: Less than 10 mL    SPECIMENS: None    COMPLICATIONS: None apparent    INDICATIONS: Edy Cosby is a 63 y.o. male who initially was seen in the wound care center with a poorly healing wound to the base of the left first toe.  Subsequent noninvasive arterial studies were done which showed a perfusion defect.  As such she was indicated for angiogram. The indications, risks, and possible complications of the procedure were explained to the patient, who voiced understanding and wished to proceed with surgery.     PROCEDURE IN DETAIL: The patient was taken to the operating room and placed on the operating table in a supine position. After adequate sedation was obtained, the bilateral groins were prepped and draped in a sterile manner.  Following this under direct ultrasound guidance the right common femoral artery was accessed using a micropuncture needle.  Microwire was advanced into the distal aorta.  Over the wire the micro-sheath was inserted.  1000 units of IV heparin were then  given.  Following this a Glidewire advantage was advanced through the micro-sheath and placed in the proximal abdominal aorta.  The micro-sheath was then exchanged over the wire for a 5 [place] 11 cm sheath.  Omni Flush catheter was advanced over the wire and placed in the proximal abdominal aorta.  Aortoiliac angiogram performed at this point did show a patent aorta.  The right iliac system was noted to be widely patent with no significant stenosis.  There was noted to be an approximately 80% stenosis of the proximal left common iliac artery.  At this point the Glidewire advantage and the Omni Flush catheter were used to traverse the bifurcation and the tip of the catheter was placed in the left common femoral artery.  Angiogram of the left lower extreme he performed from this point showed a significant approximately 80-90% stenosis of the common femoral artery.  The profunda appeared to be patent.  The superficial femoral artery had diffuse areas of disease throughout its course but was patent.  There is also noted to be a significant stenosis of approximately 80-90% in the mid popliteal artery.  The anterior tibial artery appeared to be occluded at its origin and runoff to the ankle was via a peroneal artery.  The anterior tibial artery reconstituted at the ankle from collaterals from the peroneal and filled into the foot.  The posterior tibial appeared to be occluded.  At this point given the multiple levels of disease as well as the significant disease in the left common femoral artery no further intervention was deemed prudent at this time.  The patient will likely need to return for left groin exploration and common femoral artery endarterectomy with retrograde stenting of the common iliac artery lesion as well as possible distal intervention.  As such at this point the Glidewire was reintroduced and the Omni Flush catheter pulled back to the bifurcation.  Further more detailed views of the iliac artery  stenosis were obtained at this point which again showed approximately 80-90% stenosis of the proximal common iliac artery on the left.  The external iliac and hypogastric appeared to be patent with no flow limiting stenosis present.  At this point over the wire the Omni Flush catheter was removed and the wire was also removed.  A limited right lower extremity angiogram was performed via the 5 Lebanese sheath which showed an artery suitable for closure device.  As such a 5 Lebanese minx closure device was deployed in the right common femoral artery.  Manual pressure was held for 10 minutes following device deployment to ensure hemostasis.  Sterile dressings were applied. The patient tolerated the procedure well. Sponge and needle counts were correct. The patient was then awakened and extubated in the operating room and taken to the recovery room in good condition.    Duncan Lucio MD  Date: 12/18/2018 Time: 10:47 AM

## 2018-12-18 NOTE — ANESTHESIA POSTPROCEDURE EVALUATION
Patient: Edy Cosby    Procedure Summary     Date:  12/18/18 Room / Location:  [facility] OR  /  PAD HYBRID OR 12    Anesthesia Start:  1009 Anesthesia Stop:  1109    Procedure:  AORTOGRAM, LEFT LEG ANGIOGRAM, MYNX CLOSURE (Left Leg Lower) Diagnosis:       Atherosclerosis 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 (CMS/HCC)      (Atherosclerosis 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 (CMS/HCC) [I70.25])    Surgeon:  Duncan Lucio MD Provider:  Anish Rogers CRNA    Anesthesia Type:  general ASA Status:  3          Anesthesia Type: general  Last vitals  BP   133/64 (12/18/18 1139)   Temp   98 °F (36.7 °C) (12/18/18 1130)   Pulse   87 (12/18/18 1139)   Resp   16 (12/18/18 1139)     SpO2   94 % (12/18/18 1139)     Post Anesthesia Care and Evaluation    Patient location during evaluation: PACU  Patient participation: complete - patient participated  Level of consciousness: awake and alert  Pain management: adequate  Airway patency: patent  Anesthetic complications: No anesthetic complications    Cardiovascular status: acceptable  Respiratory status: acceptable  Hydration status: acceptable    Comments: Blood pressure 133/64, pulse 87, temperature 98 °F (36.7 °C), temperature source Temporal, resp. rate 16, SpO2 94 %.    Pt discharged from PACU based on dino score >8

## 2018-12-18 NOTE — INTERVAL H&P NOTE
H&P reviewed. The patient was examined and there are no changes to the H&P.   For LLE angio. All questions answered.

## 2019-01-03 ENCOUNTER — TELEPHONE (OUTPATIENT)
Dept: VASCULAR SURGERY | Facility: CLINIC | Age: 64
End: 2019-01-03

## 2019-01-03 NOTE — PROGRESS NOTES
01/04/2019      Mark Villela MD  [address] [place] [address] [address]  [place] [address]        Edy A Harjeet  1955    Chief Complaint   Patient presents with   • Post-op     2 wk po f/u.  Pt states that he has been doing okay.       Dear Mark Villela MD:    HPI     I had the pleasure of seeing you patient in the office today for follow up.  As you recall, the patient is a 63 y.o. male who we are currently following for left lower extremity ischemia with third toe ulcer.  He recently underwent aortogram with left lower extremity angiogram which showed multilevel disease including left common iliac artery stenosis as well as common femoral artery focal stenosis, superficial femoral artery diffuse disease and single-vessel runoff to the foot via a peroneal artery.  There was no option for endovascular treatment given the multilevel nature of the disease and so angiogram was only diagnostic.  Today patient returns to discuss further options for revascularization.  He feels well after the procedure no swelling or pain in the right groin access site.  He is otherwise without complaint.      Review of Systems   Constitutional: Negative.  Negative for activity change, appetite change, chills and fever.   HENT: Negative.  Negative for congestion, sneezing and sore throat.    Eyes: Negative.    Respiratory: Negative.  Negative for chest tightness and shortness of breath.    Cardiovascular: Negative.  Negative for chest pain, palpitations and leg swelling.   Gastrointestinal: Negative.    Endocrine: Negative.    Genitourinary: Negative.    Musculoskeletal: Negative.         He complains of claudication to the bilateral lower extremities, left worse than right at 1 block distance   Skin:        He has a wound on the plantar aspect of the foot at the base of the first toe   Allergic/Immunologic: Negative.    Neurological: Negative.    Hematological: Negative.    Psychiatric/Behavioral: Negative.        /80    "Pulse 89   Ht 171.5 cm (67.5\")   Wt 102 kg (225 lb)   SpO2 99%   BMI 34.72 kg/m²   Physical Exam   Constitutional: He is oriented to person, place, and time. He appears well-developed and well-nourished.   HENT:   Head: Normocephalic and atraumatic.   Eyes: EOM are normal. Pupils are equal, round, and reactive to light.   Neck: Normal range of motion. Neck supple. No JVD present.   Cardiovascular: Normal rate and regular rhythm.   Pulses:       Carotid pulses are 2+ on the right side, and 2+ on the left side.       Radial pulses are 2+ on the right side, and 2+ on the left side.        Femoral pulses are 2+ on the right side, and 2+ on the left side.       Popliteal pulses are 0 on the right side, and 0 on the left side.        Dorsalis pedis pulses are 0 on the right side, and 0 on the left side.        Posterior tibial pulses are 0 on the right side, and 0 on the left side.   He has Doppler signals in the bilateral popliteal and dorsalis pedis areas.  The bilateral posterior tibial Doppler signals are monophasic and quite weak.   Pulmonary/Chest: Effort normal. No respiratory distress.   Abdominal: Soft. He exhibits no distension and no mass. There is no tenderness.   Musculoskeletal: He exhibits no edema or tenderness.   He has a left footdrop present due to a previous spine surgery.   Neurological: He is alert and oriented to person, place, and time. He exhibits abnormal muscle tone (Left footdrop present).   Skin: Skin is warm and dry. Capillary refill takes 2 to 3 seconds.   There is a wound present at the base of the first toe on the plantar aspect of the left foot.  At present the wound is open with no drainage.  No wounds present on the right foot   Psychiatric: He has a normal mood and affect. His behavior is normal. Judgment and thought content normal.   Vitals reviewed.      DIAGNOSTIC DATA:    Ir Angiogram Extremity Bilateral    Result Date: 12/18/2018  Narrative: Performed by Dr. Lucio. Please " see procedure note. This report was finalized on 12/18/2018 14:00 by Dr. Duncan Lucio MD.    Xr Chest 1 View    Result Date: 12/11/2018  Narrative: EXAMINATION: XR CHEST 1 VW-. 12/11/2018 9:43 AM CST  CHEST, ONE VIEW:  HISTORY: Hypertension  COMPARISON: 1/16/2017  A single frontal chest radiograph was obtained.  FINDINGS:  Calcified mediastinal and hilar lymph nodes appreciated. Calcified pulmonary granuloma noted. Lungs are clear without acute infiltrates.  The heart is within the upper limits of normal in size. The pulmonary circulation appropriate, without heart failure.  Bony structures are intact without acute osseous                                  Impression: 1. No acute cardiopulmonary process.  This report was finalized on 12/11/2018 09:44 by Dr. Jerry Antonio MD.    Fl C Arm During Surgery    Result Date: 12/18/2018  Narrative: Performed by Dr. Lucio. Please see procedure note. This report was finalized on 12/18/2018 14:00 by Dr. Duncan Lucio MD.      Patient Active Problem List   Diagnosis   • Spinal stenosis of lumbar region   • Back pain   • Degeneration of intervertebral disc of lumbosacral region   • Essential hypertension   • Lumbar disc herniation with radiculopathy   • Type 2 diabetes mellitus without complication, without long-term current use of insulin (CMS/HCC)   • Constipation due to opioid therapy   • Gastroesophageal reflux disease without esophagitis   • Panlobular emphysema (CMS/HCC)   • A-fib (CMS/HCC)   • Paroxysmal atrial fibrillation (CMS/HCC)   • Gastroesophageal reflux disease   • Hx of colonic polyps   • HTN (hypertension), benign   • Morbidly obese (CMS/HCC)   • Atherosclerosis of native artery of extremity (CMS/HCC)   • Atherosclerosis of lower extremity with ulceration (CMS/HCC)   • 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 Atherosclerosis 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 (CMS/HCC)  I70.25 440.23     707.9   2. Essential hypertension I10 401.9       Lab Frequency Next Occurrence   Diet: Once 04/19/2018   Advance Diet as Tolerated Once 04/19/2018   Follow Anesthesia Guidelines / Standing Orders Once 12/07/2018   Obtain Informed Consent Once 12/12/2018   Follow Anesthesia Guidelines / Standing Orders Once 12/17/2018   Obtain Informed Consent Once 12/22/2018       PLAN: After thoroughly evaluating Edy Cosby, I believe the best course of action is to proceed with left common femoral artery endarterectomy with retrograde angioplasty and stenting of the left common iliac artery.  Given the focal atherosclerotic disease noted in the common femoral artery I believe an open approach is prudent.  At this time we can also address the common iliac artery stenosis as well as his distal SFA disease to ultimately improve his perfusion to the foot.  As such I will arrange for Mr. Cosby to undergo cardiac evaluation for risk stratification prior to the OR.  Once this is complete we will plan to book him for the OR as soon as possible. Risks of the procedure were discussed.  These include, but are not limited to, bleeding, infection, vessel damage, nerve damage, embolus, and loss of limb.  The patient understands these risks and wishes to proceed with procedure.  The patient is to continue taking their medications as previously discussed.   I did discuss vascular risk factors as they pertain to the progression of vascular disease including controlling diabetes, hypertension, and hyperlipidemia. This was all discussed in full with complete understanding.  Thank you for allowing me to participate in the care of your patient.  Please do not hesitate to call with any questions or concerns.  We will keep you aware of any further encounters with Edy Cosby.      Sincerely Yours,      Duncan Lucio MD

## 2019-01-04 ENCOUNTER — OFFICE VISIT (OUTPATIENT)
Dept: VASCULAR SURGERY | Facility: CLINIC | Age: 64
End: 2019-01-04

## 2019-01-04 VITALS
OXYGEN SATURATION: 99 % | DIASTOLIC BLOOD PRESSURE: 80 MMHG | HEIGHT: 68 IN | BODY MASS INDEX: 34.1 KG/M2 | SYSTOLIC BLOOD PRESSURE: 132 MMHG | HEART RATE: 89 BPM | WEIGHT: 225 LBS

## 2019-01-04 DIAGNOSIS — I10 ESSENTIAL HYPERTENSION: ICD-10-CM

## 2019-01-04 DIAGNOSIS — I70.25 ATHEROSCLEROSIS OF NATIVE ARTERIES OF THE EXTREMITIES WITH ULCERATION (HCC): Primary | ICD-10-CM

## 2019-01-04 PROCEDURE — 99214 OFFICE O/P EST MOD 30 MIN: CPT | Performed by: SURGERY

## 2019-01-04 NOTE — PATIENT INSTRUCTIONS

## 2019-01-07 ENCOUNTER — OFFICE VISIT (OUTPATIENT)
Dept: PRIMARY CARE CLINIC | Age: 64
End: 2019-01-07
Payer: MEDICARE

## 2019-01-07 ENCOUNTER — CARE COORDINATION (OUTPATIENT)
Dept: PRIMARY CARE CLINIC | Age: 64
End: 2019-01-07

## 2019-01-07 VITALS
DIASTOLIC BLOOD PRESSURE: 80 MMHG | WEIGHT: 229 LBS | HEART RATE: 48 BPM | OXYGEN SATURATION: 88 % | HEIGHT: 67 IN | BODY MASS INDEX: 35.94 KG/M2 | SYSTOLIC BLOOD PRESSURE: 136 MMHG | TEMPERATURE: 97.3 F

## 2019-01-07 DIAGNOSIS — F17.210 CIGARETTE SMOKER: ICD-10-CM

## 2019-01-07 DIAGNOSIS — I73.9 PVD (PERIPHERAL VASCULAR DISEASE) WITH CLAUDICATION (HCC): ICD-10-CM

## 2019-01-07 DIAGNOSIS — E11.51 TYPE 2 DIABETES MELLITUS WITH DIABETIC PERIPHERAL ANGIOPATHY WITHOUT GANGRENE, WITHOUT LONG-TERM CURRENT USE OF INSULIN (HCC): Primary | Chronic | ICD-10-CM

## 2019-01-07 PROCEDURE — G8482 FLU IMMUNIZE ORDER/ADMIN: HCPCS | Performed by: FAMILY MEDICINE

## 2019-01-07 PROCEDURE — G8417 CALC BMI ABV UP PARAM F/U: HCPCS | Performed by: FAMILY MEDICINE

## 2019-01-07 PROCEDURE — 83036 HEMOGLOBIN GLYCOSYLATED A1C: CPT | Performed by: FAMILY MEDICINE

## 2019-01-07 PROCEDURE — 3017F COLORECTAL CA SCREEN DOC REV: CPT | Performed by: FAMILY MEDICINE

## 2019-01-07 PROCEDURE — G8427 DOCREV CUR MEDS BY ELIG CLIN: HCPCS | Performed by: FAMILY MEDICINE

## 2019-01-07 PROCEDURE — 99214 OFFICE O/P EST MOD 30 MIN: CPT | Performed by: FAMILY MEDICINE

## 2019-01-07 PROCEDURE — 1036F TOBACCO NON-USER: CPT | Performed by: FAMILY MEDICINE

## 2019-01-07 PROCEDURE — 3046F HEMOGLOBIN A1C LEVEL >9.0%: CPT | Performed by: FAMILY MEDICINE

## 2019-01-07 PROCEDURE — 2022F DILAT RTA XM EVC RTNOPTHY: CPT | Performed by: FAMILY MEDICINE

## 2019-01-07 ASSESSMENT — ENCOUNTER SYMPTOMS
SHORTNESS OF BREATH: 0
WHEEZING: 0
COUGH: 0
ABDOMINAL PAIN: 0
CHEST TIGHTNESS: 0
CONSTIPATION: 0
VOMITING: 0
DIARRHEA: 0
NAUSEA: 0

## 2019-01-17 ENCOUNTER — OFFICE VISIT (OUTPATIENT)
Dept: CARDIOLOGY | Facility: CLINIC | Age: 64
End: 2019-01-17

## 2019-01-17 VITALS
HEIGHT: 68 IN | WEIGHT: 220 LBS | OXYGEN SATURATION: 99 % | HEART RATE: 93 BPM | BODY MASS INDEX: 33.34 KG/M2 | DIASTOLIC BLOOD PRESSURE: 72 MMHG | SYSTOLIC BLOOD PRESSURE: 160 MMHG

## 2019-01-17 DIAGNOSIS — Z01.818 PRE-OP EVALUATION: ICD-10-CM

## 2019-01-17 DIAGNOSIS — I48.0 PAROXYSMAL ATRIAL FIBRILLATION (HCC): Primary | ICD-10-CM

## 2019-01-17 DIAGNOSIS — I10 ESSENTIAL HYPERTENSION: ICD-10-CM

## 2019-01-17 PROCEDURE — 99214 OFFICE O/P EST MOD 30 MIN: CPT | Performed by: INTERNAL MEDICINE

## 2019-01-17 PROCEDURE — 93000 ELECTROCARDIOGRAM COMPLETE: CPT | Performed by: INTERNAL MEDICINE

## 2019-01-21 ENCOUNTER — OFFICE VISIT (OUTPATIENT)
Dept: OTOLARYNGOLOGY | Age: 64
End: 2019-01-21
Payer: MEDICARE

## 2019-01-21 ENCOUNTER — PREP FOR SURGERY (OUTPATIENT)
Dept: OTHER | Facility: HOSPITAL | Age: 64
End: 2019-01-21

## 2019-01-21 VITALS
RESPIRATION RATE: 18 BRPM | BODY MASS INDEX: 35.94 KG/M2 | HEIGHT: 67 IN | TEMPERATURE: 96.9 F | OXYGEN SATURATION: 99 % | DIASTOLIC BLOOD PRESSURE: 78 MMHG | HEART RATE: 89 BPM | WEIGHT: 229 LBS | SYSTOLIC BLOOD PRESSURE: 150 MMHG

## 2019-01-21 DIAGNOSIS — I73.9: ICD-10-CM

## 2019-01-21 DIAGNOSIS — I70.25 ATHEROSCLEROSIS OF NATIVE ARTERIES OF THE EXTREMITIES WITH ULCERATION (HCC): Primary | ICD-10-CM

## 2019-01-21 DIAGNOSIS — J38.00 VOCAL CORD PARESIS: Primary | ICD-10-CM

## 2019-01-21 PROCEDURE — 1036F TOBACCO NON-USER: CPT | Performed by: OTOLARYNGOLOGY

## 2019-01-21 PROCEDURE — G8427 DOCREV CUR MEDS BY ELIG CLIN: HCPCS | Performed by: OTOLARYNGOLOGY

## 2019-01-21 PROCEDURE — 31575 DIAGNOSTIC LARYNGOSCOPY: CPT | Performed by: OTOLARYNGOLOGY

## 2019-01-21 PROCEDURE — 3017F COLORECTAL CA SCREEN DOC REV: CPT | Performed by: OTOLARYNGOLOGY

## 2019-01-21 PROCEDURE — G8482 FLU IMMUNIZE ORDER/ADMIN: HCPCS | Performed by: OTOLARYNGOLOGY

## 2019-01-21 PROCEDURE — 99214 OFFICE O/P EST MOD 30 MIN: CPT | Performed by: OTOLARYNGOLOGY

## 2019-01-21 PROCEDURE — G8417 CALC BMI ABV UP PARAM F/U: HCPCS | Performed by: OTOLARYNGOLOGY

## 2019-01-21 RX ORDER — BUPIVACAINE HCL/0.9 % NACL/PF 0.1 %
2 PLASTIC BAG, INJECTION (ML) EPIDURAL ONCE
Status: CANCELLED | OUTPATIENT
Start: 2019-01-21 | End: 2019-01-21

## 2019-01-21 NOTE — H&P
"HPI      I had the pleasure of seeing you patient in the office today for follow up.  As you recall, the patient is a 63 y.o. male who we are currently following for left lower extremity ischemia with third toe ulcer.  He recently underwent aortogram with left lower extremity angiogram which showed multilevel disease including left common iliac artery stenosis as well as common femoral artery focal stenosis, superficial femoral artery diffuse disease and single-vessel runoff to the foot via a peroneal artery.  There was no option for endovascular treatment given the multilevel nature of the disease and so angiogram was only diagnostic.  Today patient returns to discuss further options for revascularization.  He feels well after the procedure no swelling or pain in the right groin access site.  He is otherwise without complaint.       Review of Systems   Constitutional: Negative.  Negative for activity change, appetite change, chills and fever.   HENT: Negative.  Negative for congestion, sneezing and sore throat.    Eyes: Negative.    Respiratory: Negative.  Negative for chest tightness and shortness of breath.    Cardiovascular: Negative.  Negative for chest pain, palpitations and leg swelling.   Gastrointestinal: Negative.    Endocrine: Negative.    Genitourinary: Negative.    Musculoskeletal: Negative.         He complains of claudication to the bilateral lower extremities, left worse than right at 1 block distance   Skin:        He has a wound on the plantar aspect of the foot at the base of the first toe   Allergic/Immunologic: Negative.    Neurological: Negative.    Hematological: Negative.    Psychiatric/Behavioral: Negative.          /80   Pulse 89   Ht 171.5 cm (67.5\")   Wt 102 kg (225 lb)   SpO2 99%   BMI 34.72 kg/m²   Physical Exam   Constitutional: He is oriented to person, place, and time. He appears well-developed and well-nourished.   HENT:   Head: Normocephalic and atraumatic.   Eyes: EOM " are normal. Pupils are equal, round, and reactive to light.   Neck: Normal range of motion. Neck supple. No JVD present.   Cardiovascular: Normal rate and regular rhythm.   Pulses:       Carotid pulses are 2+ on the right side, and 2+ on the left side.       Radial pulses are 2+ on the right side, and 2+ on the left side.        Femoral pulses are 2+ on the right side, and 2+ on the left side.       Popliteal pulses are 0 on the right side, and 0 on the left side.        Dorsalis pedis pulses are 0 on the right side, and 0 on the left side.        Posterior tibial pulses are 0 on the right side, and 0 on the left side.   He has Doppler signals in the bilateral popliteal and dorsalis pedis areas.  The bilateral posterior tibial Doppler signals are monophasic and quite weak.   Pulmonary/Chest: Effort normal. No respiratory distress.   Abdominal: Soft. He exhibits no distension and no mass. There is no tenderness.   Musculoskeletal: He exhibits no edema or tenderness.   He has a left footdrop present due to a previous spine surgery.   Neurological: He is alert and oriented to person, place, and time. He exhibits abnormal muscle tone (Left footdrop present).   Skin: Skin is warm and dry. Capillary refill takes 2 to 3 seconds.   There is a wound present at the base of the first toe on the plantar aspect of the left foot.  At present the wound is open with no drainage.  No wounds present on the right foot   Psychiatric: He has a normal mood and affect. His behavior is normal. Judgment and thought content normal.   Vitals reviewed.        DIAGNOSTIC DATA:     Ir Angiogram Extremity Bilateral     Result Date: 12/18/2018  Narrative: Performed by Dr. Lucio. Please see procedure note. This report was finalized on 12/18/2018 14:00 by Dr. uDncan Lucio MD.     Xr Chest 1 View     Result Date: 12/11/2018  Narrative: EXAMINATION: XR CHEST 1 VW-. 12/11/2018 9:43 AM CST  CHEST, ONE VIEW:  HISTORY: Hypertension  COMPARISON:  1/16/2017  A single frontal chest radiograph was obtained.  FINDINGS:  Calcified mediastinal and hilar lymph nodes appreciated. Calcified pulmonary granuloma noted. Lungs are clear without acute infiltrates.  The heart is within the upper limits of normal in size. The pulmonary circulation appropriate, without heart failure.  Bony structures are intact without acute osseous                                   Impression: 1. No acute cardiopulmonary process.  This report was finalized on 12/11/2018 09:44 by Dr. Jerry Antonio MD.     Fl C Arm During Surgery     Result Date: 12/18/2018  Narrative: Performed by Dr. Lucio. Please see procedure note. This report was finalized on 12/18/2018 14:00 by Dr. Duncan Lucio MD.            Patient Active Problem List   Diagnosis   • Spinal stenosis of lumbar region   • Back pain   • Degeneration of intervertebral disc of lumbosacral region   • Essential hypertension   • Lumbar disc herniation with radiculopathy   • Type 2 diabetes mellitus without complication, without long-term current use of insulin (CMS/HCC)   • Constipation due to opioid therapy   • Gastroesophageal reflux disease without esophagitis   • Panlobular emphysema (CMS/HCC)   • A-fib (CMS/HCC)   • Paroxysmal atrial fibrillation (CMS/HCC)   • Gastroesophageal reflux disease   • Hx of colonic polyps   • HTN (hypertension), benign   • Morbidly obese (CMS/HCC)   • Atherosclerosis of native artery of extremity (CMS/HCC)   • Atherosclerosis of lower extremity with ulceration (CMS/HCC)   • 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 Atherosclerosis 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 (CMS/HCC) I70.25 440.23       707.9   2. Essential hypertension I10 401.9         Lab Frequency Next Occurrence   Diet: Once 04/19/2018   Advance Diet as Tolerated Once 04/19/2018   Follow Anesthesia Guidelines / Standing Orders Once 12/07/2018   Obtain  Informed Consent Once 12/12/2018   Follow Anesthesia Guidelines / Standing Orders Once 12/17/2018   Obtain Informed Consent Once 12/22/2018         PLAN: After thoroughly evaluating Edy Cosby, I believe the best course of action is to proceed with left common femoral artery endarterectomy with retrograde angioplasty and stenting of the left common iliac artery.  Given the focal atherosclerotic disease noted in the common femoral artery I believe an open approach is prudent.  At this time we can also address the common iliac artery stenosis as well as his distal SFA disease to ultimately improve his perfusion to the foot.  He has undergone cardiac risk stratification and is cleared for surgery with no further testing required. Risks of the procedure were discussed.  These include, but are not limited to, bleeding, infection, vessel damage, nerve damage, embolus, and loss of limb.  The patient understands these risks and wishes to proceed with procedure.  The patient is to continue taking their medications as previously discussed.   I did discuss vascular risk factors as they pertain to the progression of vascular disease

## 2019-01-22 ENCOUNTER — TELEPHONE (OUTPATIENT)
Dept: VASCULAR SURGERY | Facility: CLINIC | Age: 64
End: 2019-01-22

## 2019-01-22 NOTE — TELEPHONE ENCOUNTER
Patient returned my phone call in reference to his upcoming procedure.  Patient is scheduled for pre work on 1/23/2019 with an arrival time of 11:45 am.  His procedure is scheduled for 1/29/2019 with an arrival time of 6:00 am.  Patient was advised of time, location and prep.  Patient advised that he would stay at least one night over night.  Patient expressed understanding for all that was discussed.

## 2019-01-22 NOTE — TELEPHONE ENCOUNTER
Left message requesting that patient return my call in reference to his upcoming procedure.  Left all my contact information .

## 2019-01-23 ENCOUNTER — APPOINTMENT (OUTPATIENT)
Dept: PREADMISSION TESTING | Facility: HOSPITAL | Age: 64
End: 2019-01-23

## 2019-01-23 VITALS
HEIGHT: 69 IN | DIASTOLIC BLOOD PRESSURE: 68 MMHG | WEIGHT: 233.25 LBS | HEART RATE: 91 BPM | SYSTOLIC BLOOD PRESSURE: 161 MMHG | OXYGEN SATURATION: 97 % | BODY MASS INDEX: 34.55 KG/M2

## 2019-01-23 DIAGNOSIS — I70.25 ATHEROSCLEROSIS OF NATIVE ARTERIES OF THE EXTREMITIES WITH ULCERATION (HCC): ICD-10-CM

## 2019-01-23 DIAGNOSIS — I73.9: ICD-10-CM

## 2019-01-23 LAB
ANION GAP SERPL CALCULATED.3IONS-SCNC: 8 MMOL/L (ref 4–13)
APTT PPP: 28.8 SECONDS (ref 24.1–34.8)
BASOPHILS # BLD AUTO: 0.05 10*3/MM3 (ref 0–0.2)
BASOPHILS NFR BLD AUTO: 0.8 % (ref 0–2)
BUN BLD-MCNC: 34 MG/DL (ref 5–21)
BUN/CREAT SERPL: 21.8 (ref 7–25)
CALCIUM SPEC-SCNC: 9.3 MG/DL (ref 8.4–10.4)
CHLORIDE SERPL-SCNC: 99 MMOL/L (ref 98–110)
CO2 SERPL-SCNC: 31 MMOL/L (ref 24–31)
CREAT BLD-MCNC: 1.56 MG/DL (ref 0.5–1.4)
DEPRECATED RDW RBC AUTO: 54.4 FL (ref 40–54)
EOSINOPHIL # BLD AUTO: 0.25 10*3/MM3 (ref 0–0.7)
EOSINOPHIL NFR BLD AUTO: 3.8 % (ref 0–4)
ERYTHROCYTE [DISTWIDTH] IN BLOOD BY AUTOMATED COUNT: 17 % (ref 12–15)
GFR SERPL CREATININE-BSD FRML MDRD: 55 ML/MIN/1.73
GLUCOSE BLD-MCNC: 211 MG/DL (ref 70–100)
HCT VFR BLD AUTO: 35.7 % (ref 40–52)
HGB BLD-MCNC: 11.3 G/DL (ref 14–18)
IMM GRANULOCYTES # BLD AUTO: 0.03 10*3/MM3 (ref 0–0.03)
IMM GRANULOCYTES NFR BLD AUTO: 0.5 % (ref 0–5)
LYMPHOCYTES # BLD AUTO: 2.23 10*3/MM3 (ref 0.72–4.86)
LYMPHOCYTES NFR BLD AUTO: 33.8 % (ref 15–45)
MCH RBC QN AUTO: 27.7 PG (ref 28–32)
MCHC RBC AUTO-ENTMCNC: 31.7 G/DL (ref 33–36)
MCV RBC AUTO: 87.5 FL (ref 82–95)
MONOCYTES # BLD AUTO: 0.45 10*3/MM3 (ref 0.19–1.3)
MONOCYTES NFR BLD AUTO: 6.8 % (ref 4–12)
NEUTROPHILS # BLD AUTO: 3.59 10*3/MM3 (ref 1.87–8.4)
NEUTROPHILS NFR BLD AUTO: 54.3 % (ref 39–78)
NRBC BLD AUTO-RTO: 0 /100 WBC (ref 0–0)
PLATELET # BLD AUTO: 192 10*3/MM3 (ref 130–400)
PMV BLD AUTO: 12.3 FL (ref 6–12)
POTASSIUM BLD-SCNC: 4.9 MMOL/L (ref 3.5–5.3)
RBC # BLD AUTO: 4.08 10*6/MM3 (ref 4.8–5.9)
SODIUM BLD-SCNC: 138 MMOL/L (ref 135–145)
WBC NRBC COR # BLD: 6.6 10*3/MM3 (ref 4.8–10.8)

## 2019-01-23 PROCEDURE — 85730 THROMBOPLASTIN TIME PARTIAL: CPT | Performed by: SURGERY

## 2019-01-23 PROCEDURE — 85025 COMPLETE CBC W/AUTO DIFF WBC: CPT | Performed by: SURGERY

## 2019-01-23 PROCEDURE — 36415 COLL VENOUS BLD VENIPUNCTURE: CPT

## 2019-01-23 PROCEDURE — 80048 BASIC METABOLIC PNL TOTAL CA: CPT | Performed by: SURGERY

## 2019-01-23 NOTE — DISCHARGE INSTRUCTIONS
DAY OF SURGERY INSTRUCTIONS        YOUR SURGEON: ***    PROCEDURE: ***left femoral endarterectomy, left lower extremity angiogram possible stent    DATE OF SURGERY: ***1/29/19    ARRIVAL TIME: AS DIRECTED BY OFFICE    YOU MAY TAKE THE FOLLOWING MEDICATION(S) THE MORNING OF SURGERY WITH A SIP OF WATER: ***norco, metoprolol, gabapentin or as directed by the doctor                MANAGING PAIN AFTER SURGERY    We know you are probably wondering what your pain will be like after surgery.  Following surgery it is unrealistic to expect you will not have pain.   Pain is how our bodies let us know that something is wrong or cautions us to be careful.  That said, our goal is to make your pain tolerable.    Methods we may use to treat your pain include (oral or IV medications, PCAs, epidurals, nerve blocks, etc.)   While some procedures require IV pain medications for a short time after surgery, transitioning to pain medications by mouth allows for better management of pain.   Your nurse will encourage you to take oral pain medications whenever possible.  IV medications work almost immediately, but only last a short while.  Taking medications by mouth allows for a more constant level of medication in your blood stream for a longer period of time.      Once your pain is out of control it is harder to get back under control.  It is important you are aware when your next dose of pain medication is due.  If you are admitted, your nurse may write the time of your next dose on the white board in your room to help you remember.      We are interested in your pain and encourage you to inform us about aggravating factors during your visit.   Many times a simple repositioning every few hours can make a big difference.    If your physician says it is okay, do not let your pain prevent you from getting out of bed. Be sure to call your nurse for assistance prior to getting up so you do not fall.      Before surgery, please  decide your tolerable pain goal.  These faces help describe the pain ratings we use on a 0-10 scale.   Be prepared to tell us your goal and whether or not you take pain or anxiety medications at home.          BEFORE YOU COME TO THE HOSPITAL  (Pre-op instructions)  • Do not eat, drink, smoke or chew gum after midnight the night before surgery.  This also includes no mints.  • Morning of surgery take only the medicines you have been instructed with a sip of water unless otherwise instructed  by your physician.  • Do not shave, wear makeup or dark nail polish.  • Remove all jewelry including rings.  • Leave anything you consider valuable at home.  • Leave your suitcase in the car until after your surgery.  • Bring the following with you if applicable:  o Picture ID and insurance, Medicare or Medicaid cards  o Co-pay/deductible required by insurance (cash, check, credit card)  o Copy of advance directive, living will or power-of- documents if not brought to PAT  o CPAP or BIPAP mask and tubing  o Relaxation aids (MP3 player, book, magazine)  • On the day of surgery check in at registration located at the main entrance of the hospital.       Outpatient Surgery Guidelines, Adult  Outpatient procedures are those for which the person having the procedure is allowed to go home the same day as the procedure. Various procedures are done on an outpatient basis. You should follow some general guidelines if you will be having an outpatient procedure.  LET YOUR HEALTH CARE PROVIDER KNOW ABOUT:  · Any allergies you have.  · All medicines you are taking, including vitamins, herbs, eye drops, creams, and over-the-counter medicines.  · Previous problems you or members of your family have had with the use of anesthetics.  · Any blood disorders you have.  · Previous surgeries you have had.  · Medical conditions you have.  RISKS AND COMPLICATIONS  Your health care provider will discuss possible risks and complications with you  before surgery. Common risks and complications include:    · Problems due to the use of anesthetics.  · Blood loss and replacement (does not apply to minor surgical procedures).  · Temporary increase in pain due to surgery.  · Uncorrected pain or problems that the surgery was meant to correct.  · Infection.  · New damage.  BEFORE THE PROCEDURE  · Ask your health care provider about changing or stopping your regular medicines. You may need to stop taking certain medicines in the days or weeks before the procedure.  · Stop smoking at least 2 weeks before surgery. This lowers your risk for complications during and after surgery. Ask your health care provider for help with this if needed.  · Eat your usual meals and a light supper the day before surgery. Continue fluid intake. Do not drink alcohol.  · Do not eat or drink after midnight the night before your surgery.   · Arrange for someone to take you home and to stay with you for 24 hours after the procedure. Medicine given for your procedure may affect your ability to drive or to care for yourself.  · Call your health care provider's office if you develop an illness or problem that may prevent you from safely having your procedure.  AFTER THE PROCEDURE  After surgery, you will be taken to a recovery area, where your progress will be monitored. If there are no complications, you will be allowed to go home when you are awake, stable, and taking fluids well. You may have numbness around the surgical site. Healing will take some time. You will have tenderness at the surgical site and may have some swelling and bruising. You may also have some nausea.  HOME CARE INSTRUCTIONS  · Do not drive for 24 hours, or as directed by your health care provider. Do not drive while taking prescription pain medicines.  · Do not drink alcohol for 24 hours.  · Do not make important decisions or sign legal documents for 24 hours.  · You may resume a normal diet and activities as  directed.  · Do not lift anything heavier than 10 pounds (4.5 kg) or play contact sports until your health care provider says it is okay.  · Change your bandages (dressings) as directed.  · Only take over-the-counter or prescription medicines as directed by your health care provider.  · Follow up with your health care provider as directed.  SEEK MEDICAL CARE IF:  · You have increased bleeding (more than a small spot) from the surgical site.  · You have redness, swelling, or increasing pain in the wound.  · You see pus coming from the wound.  · You have a fever.  · You notice a bad smell coming from the wound or dressing.  · You feel lightheaded or faint.  · You develop a rash.  · You have trouble breathing.  · You develop allergies.  MAKE SURE YOU:  · Understand these instructions.  · Will watch your condition.  · Will get help right away if you are not doing well or get worse.     This information is not intended to replace advice given to you by your health care provider. Make sure you discuss any questions you have with your health care provider.     Document Released: 09/12/2002 Document Revised: 05/03/2016 Document Reviewed: 05/22/2014  [employer] Interactive Patient Education ©2016 [employer]       Fall Prevention in Hospitals, Adult  As a hospital patient, your condition and the treatments you receive can increase your risk for falls. Some additional risk factors for falls in a hospital include:  · Being in an unfamiliar environment.  · Being on bed rest.  · Your surgery.  · Taking certain medicines.  · Your tubing requirements, such as intravenous (IV) therapy or catheters.  It is important that you learn how to decrease fall risks while at the hospital. Below are important tips that can help prevent falls.  SAFETY TIPS FOR PREVENTING FALLS  Talk about your risk of falling.  · Ask your health care provider why you are at risk for falling. Is it your medicine, illness, tubing placement, or something  else?  · Make a plan with your health care provider to keep you safe from falls.  · Ask your health care provider or pharmacist about side effects of your medicines. Some medicines can make you dizzy or affect your coordination.  Ask for help.  · Ask for help before getting out of bed. You may need to press your call button.  · Ask for assistance in getting safely to the toilet.  · Ask for a walker or cane to be put at your bedside. Ask that most of the side rails on your bed be placed up before your health care provider leaves the room.  · Ask family or friends to sit with you.  · Ask for things that are out of your reach, such as your glasses, hearing aids, telephone, bedside table, or call button.  Follow these tips to avoid falling:  · Stay lying or seated, rather than standing, while waiting for help.  · Wear rubber-soled slippers or shoes whenever you walk in the hospital.  · Avoid quick, sudden movements.  ¨ Change positions slowly.  ¨ Sit on the side of your bed before standing.  ¨ Stand up slowly and wait before you start to walk.  · Let your health care provider know if there is a spill on the floor.  · Pay careful attention to the medical equipment, electrical cords, and tubes around you.  · When you need help, use your call button by your bed or in the bathroom. Wait for one of your health care providers to help you.  · If you feel dizzy or unsure of your footing, return to bed and wait for assistance.  · Avoid being distracted by the TV, telephone, or another person in your room.  · Do not lean or support yourself on rolling objects, such as IV poles or bedside tables.     This information is not intended to replace advice given to you by your health care provider. Make sure you discuss any questions you have with your health care provider.     Document Released: 12/15/2001 Document Revised: 01/08/2016 Document Reviewed: 08/25/2013  [employer] Interactive Patient Education ©2016 [employer]       Surgical Site Infections FAQs  What is a Surgical Site Infection (SSI)?  A surgical site infection is an infection that occurs after surgery in the part of the body where the surgery took place. Most patients who have surgery do not develop an infection. However, infections develop in about 1 to 3 out of every 100 patients who have surgery.  Some of the common symptoms of a surgical site infection are:  · Redness and pain around the area where you had surgery  · Drainage of cloudy fluid from your surgical wound  · Fever  Can SSIs be treated?  Yes. Most surgical site infections can be treated with antibiotics. The antibiotic given to you depends on the bacteria (germs) causing the infection. Sometimes patients with SSIs also need another surgery to treat the infection.  What are some of the things that hospitals are doing to prevent SSIs?  To prevent SSIs, doctors, nurses, and other healthcare providers:  · Clean their hands and arms up to their elbows with an antiseptic agent just before the surgery.  · Clean their hands with soap and water or an alcohol-based hand rub before and after caring for each patient.  · May remove some of your hair immediately before your surgery using electric clippers if the hair is in the same area where the procedure will occur. They should not shave you with a razor.  · Wear special hair covers, masks, gowns, and gloves during surgery to keep the surgery area clean.  · Give you antibiotics before your surgery starts. In most cases, you should get antibiotics within 60 minutes before the surgery starts and the antibiotics should be stopped within 24 hours after surgery.  · Clean the skin at the site of your surgery with a special soap that kills germs.  What can I do to help prevent SSIs?  Before your surgery:  · Tell your doctor about other medical problems you may have. Health problems such as allergies, diabetes, and obesity could affect your surgery and your  treatment.  · Quit smoking. Patients who smoke get more infections. Talk to your doctor about how you can quit before your surgery.  · Do not shave near where you will have surgery. Shaving with a razor can irritate your skin and make it easier to develop an infection.  At the time of your surgery:  · Speak up if someone tries to shave you with a razor before surgery. Ask why you need to be shaved and talk with your surgeon if you have any concerns.  · Ask if you will get antibiotics before surgery.  After your surgery:  · Make sure that your healthcare providers clean their hands before examining you, either with soap and water or an alcohol-based hand rub.  · If you do not see your providers clean their hands, please ask them to do so.  · Family and friends who visit you should not touch the surgical wound or dressings.  · Family and friends should clean their hands with soap and water or an alcohol-based hand rub before and after visiting you. If you do not see them clean their hands, ask them to clean their hands.  What do I need to do when I go home from the hospital?  · Before you go home, your doctor or nurse should explain everything you need to know about taking care of your wound. Make sure you understand how to care for your wound before you leave the hospital.  · Always clean your hands before and after caring for your wound.  · Before you go home, make sure you know who to contact if you have questions or problems after you get home.  · If you have any symptoms of an infection, such as redness and pain at the surgery site, drainage, or fever, call your doctor immediately.  If you have additional questions, please ask your doctor or nurse.  Developed and co-sponsored by The Society for Healthcare Epidemiology of [place] (SHEA); Infectious Diseases Society of [place] (IDSA); American Hospital Association; Association for Professionals in Infection Control and Epidemiology (APIC); Centers for Disease  Control and Prevention (CDC); and The Joint Commission.     This information is not intended to replace advice given to you by your health care provider. Make sure you discuss any questions you have with your health care provider.     Document Released: 12/23/2014 Document Revised: 01/08/2016 Document Reviewed: 03/02/2016  [employer] Interactive Patient Education ©2016 [employer].       [facility]  CHG 4% Patient Instruction Sheet    Preparing the Skin Before Surgery  Preparing or “prepping” skin before surgery can reduce the risk of infection at the surgical site. To make the process easier,[facility] has chosen 4% Chlorhexidine Gluconate (CHG) antiseptic solution.   The steps below outline the prepping process and should be carefully followed.                                                                                                                                                      Prep the skin at the following time(s):                                                      We recommend you shower the night before surgery, and again the morning of surgery with the 4% CHG antiseptic solution using half of the bottle and a cloth each time.  Dress in clean clothes/sleepwear after showering.  See instructions below for application.          Do not apply any lotions or moisturizers.       Do not shave the area to be prepped for at least 2 days prior to surgery.    Clipping the hair may be done immediately prior to your surgery at the hospital    if needed.    Directions:  Thoroughly rinse your body with water.  Apply 4% CHG to a cloth and wash skin gently, paying special attention to the operative site.  Rinse again thoroughly.  Once you have begun using this product do not apply anything else to your skin. If itching or redness persists, rinse affected areas and discontinue use.    When using this product:  • Keep out of eyes, ears, and mouth.  • If solution should contact these areas, rinse out promptly  and thoroughly with water.  • For external use only.  • Do not use in genital area, on your face or head.      PATIENT/FAMILY/RESPONSIBLE PARTY VERBALIZES UNDERSTANDING OF ABOVE EDUCATION.  COPY OF PAIN SCALE GIVEN AND REVIEWED WITH VERBALIZED UNDERSTANDING.

## 2019-01-28 ENCOUNTER — ANESTHESIA EVENT (OUTPATIENT)
Dept: PERIOP | Facility: HOSPITAL | Age: 64
End: 2019-01-28

## 2019-01-29 ENCOUNTER — ANESTHESIA (OUTPATIENT)
Dept: PERIOP | Facility: HOSPITAL | Age: 64
End: 2019-01-29

## 2019-01-29 ENCOUNTER — APPOINTMENT (OUTPATIENT)
Dept: INTERVENTIONAL RADIOLOGY/VASCULAR | Facility: HOSPITAL | Age: 64
End: 2019-01-29

## 2019-01-29 ENCOUNTER — HOSPITAL ENCOUNTER (INPATIENT)
Facility: HOSPITAL | Age: 64
LOS: 2 days | Discharge: HOME | End: 2019-01-31
Attending: SURGERY | Admitting: SURGERY

## 2019-01-29 DIAGNOSIS — I70.25 ATHEROSCLEROSIS OF NATIVE ARTERIES OF THE EXTREMITIES WITH ULCERATION (HCC): ICD-10-CM

## 2019-01-29 PROBLEM — I99.8 ISCHEMIA OF EXTREMITY: Status: ACTIVE | Noted: 2019-01-29

## 2019-01-29 LAB
ABO GROUP BLD: NORMAL
BLD GP AB SCN SERPL QL: NEGATIVE
GLUCOSE BLDC GLUCOMTR-MCNC: 210 MG/DL (ref 70–130)
GLUCOSE BLDC GLUCOMTR-MCNC: 226 MG/DL (ref 70–130)
GLUCOSE BLDC GLUCOMTR-MCNC: 250 MG/DL (ref 70–130)
GLUCOSE BLDC GLUCOMTR-MCNC: 266 MG/DL (ref 70–130)
RH BLD: POSITIVE
T&S EXPIRATION DATE: NORMAL

## 2019-01-29 PROCEDURE — 25010000002 PROPOFOL 10 MG/ML EMULSION: Performed by: NURSE ANESTHETIST, CERTIFIED REGISTERED

## 2019-01-29 PROCEDURE — 82962 GLUCOSE BLOOD TEST: CPT

## 2019-01-29 PROCEDURE — 25010000002 FENTANYL CITRATE (PF) 100 MCG/2ML SOLUTION: Performed by: NURSE ANESTHETIST, CERTIFIED REGISTERED

## 2019-01-29 PROCEDURE — 25010000002 ONDANSETRON PER 1 MG: Performed by: NURSE ANESTHETIST, CERTIFIED REGISTERED

## 2019-01-29 PROCEDURE — 25010000002 HEPARIN (PORCINE) PER 1000 UNITS: Performed by: NURSE ANESTHETIST, CERTIFIED REGISTERED

## 2019-01-29 PROCEDURE — C1769 GUIDE WIRE: HCPCS | Performed by: SURGERY

## 2019-01-29 PROCEDURE — 63710000001 INSULIN LISPRO (HUMAN) PER 5 UNITS: Performed by: SURGERY

## 2019-01-29 PROCEDURE — 25010000002 SUCCINYLCHOLINE PER 20 MG: Performed by: NURSE ANESTHETIST, CERTIFIED REGISTERED

## 2019-01-29 PROCEDURE — P9041 ALBUMIN (HUMAN),5%, 50ML: HCPCS | Performed by: NURSE ANESTHETIST, CERTIFIED REGISTERED

## 2019-01-29 PROCEDURE — 88311 DECALCIFY TISSUE: CPT | Performed by: SURGERY

## 2019-01-29 PROCEDURE — 88304 TISSUE EXAM BY PATHOLOGIST: CPT | Performed by: SURGERY

## 2019-01-29 PROCEDURE — 25010000002 MORPHINE PER 10 MG: Performed by: SURGERY

## 2019-01-29 PROCEDURE — 94760 N-INVAS EAR/PLS OXIMETRY 1: CPT

## 2019-01-29 PROCEDURE — 75710 ARTERY X-RAYS ARM/LEG: CPT | Performed by: SURGERY

## 2019-01-29 PROCEDURE — 63710000001 INSULIN REGULAR HUMAN PER 5 UNITS: Performed by: ANESTHESIOLOGY

## 2019-01-29 PROCEDURE — 86901 BLOOD TYPING SEROLOGIC RH(D): CPT | Performed by: SURGERY

## 2019-01-29 PROCEDURE — 76000 FLUOROSCOPY <1 HR PHYS/QHP: CPT

## 2019-01-29 PROCEDURE — C1876 STENT, NON-COA/NON-COV W/DEL: HCPCS | Performed by: SURGERY

## 2019-01-29 PROCEDURE — 86900 BLOOD TYPING SEROLOGIC ABO: CPT | Performed by: SURGERY

## 2019-01-29 PROCEDURE — 36245 INS CATH ABD/L-EXT ART 1ST: CPT | Performed by: SURGERY

## 2019-01-29 PROCEDURE — C1894 INTRO/SHEATH, NON-LASER: HCPCS | Performed by: SURGERY

## 2019-01-29 PROCEDURE — 04UL0KZ SUPPLEMENT LEFT FEMORAL ARTERY WITH NONAUTOLOGOUS TISSUE SUBSTITUTE, OPEN APPROACH: ICD-10-PCS | Performed by: SURGERY

## 2019-01-29 PROCEDURE — 25010000002 HEPARIN (PORCINE) PER 1000 UNITS: Performed by: SURGERY

## 2019-01-29 PROCEDURE — 25010000002 ALBUMIN HUMAN 5% PER 50 ML: Performed by: NURSE ANESTHETIST, CERTIFIED REGISTERED

## 2019-01-29 PROCEDURE — 04CL0ZZ EXTIRPATION OF MATTER FROM LEFT FEMORAL ARTERY, OPEN APPROACH: ICD-10-PCS | Performed by: SURGERY

## 2019-01-29 PROCEDURE — 75710 ARTERY X-RAYS ARM/LEG: CPT

## 2019-01-29 PROCEDURE — 37221 PR REVSC OPN/PRQ ILIAC ART W/STNT PLMT & ANGIOPLSTY: CPT | Performed by: SURGERY

## 2019-01-29 PROCEDURE — 25010000002 PHENYLEPHRINE PER 1 ML: Performed by: NURSE ANESTHETIST, CERTIFIED REGISTERED

## 2019-01-29 PROCEDURE — 94799 UNLISTED PULMONARY SVC/PX: CPT

## 2019-01-29 PROCEDURE — 25010000002 NEOSTIGMINE PER 0.5 MG: Performed by: NURSE ANESTHETIST, CERTIFIED REGISTERED

## 2019-01-29 PROCEDURE — 047D0DZ DILATION OF LEFT COMMON ILIAC ARTERY WITH INTRALUMINAL DEVICE, OPEN APPROACH: ICD-10-PCS | Performed by: SURGERY

## 2019-01-29 PROCEDURE — 86850 RBC ANTIBODY SCREEN: CPT | Performed by: SURGERY

## 2019-01-29 PROCEDURE — 25010000002 IOPAMIDOL 61 % SOLUTION: Performed by: SURGERY

## 2019-01-29 PROCEDURE — C1768 GRAFT, VASCULAR: HCPCS | Performed by: SURGERY

## 2019-01-29 PROCEDURE — 25010000002 FENTANYL CITRATE (PF) 100 MCG/2ML SOLUTION: Performed by: ANESTHESIOLOGY

## 2019-01-29 PROCEDURE — 35371 RECHANNELING OF ARTERY: CPT | Performed by: SURGERY

## 2019-01-29 DEVICE — PTCH VASC XENOSURE BIO 0.8X8CM: Type: IMPLANTABLE DEVICE | Site: GROIN | Status: FUNCTIONAL

## 2019-01-29 DEVICE — OMNILINK ELITE VASCULAR BALLOON-EXPANDABLE STENT SYSTEM 9.0 MM X 29 MM X 80 CM OVER-THE-WIRE
Type: IMPLANTABLE DEVICE | Site: GROIN | Status: FUNCTIONAL
Brand: OMNILINK ELITE

## 2019-01-29 RX ORDER — SUCCINYLCHOLINE CHLORIDE 20 MG/ML
INJECTION INTRAMUSCULAR; INTRAVENOUS AS NEEDED
Status: DISCONTINUED | OUTPATIENT
Start: 2019-01-29 | End: 2019-01-29 | Stop reason: SURG

## 2019-01-29 RX ORDER — GABAPENTIN 400 MG/1
800 CAPSULE ORAL EVERY 12 HOURS SCHEDULED
Status: DISCONTINUED | OUTPATIENT
Start: 2019-01-29 | End: 2019-01-31 | Stop reason: HOSPADM

## 2019-01-29 RX ORDER — SODIUM CHLORIDE, SODIUM LACTATE, POTASSIUM CHLORIDE, CALCIUM CHLORIDE 600; 310; 30; 20 MG/100ML; MG/100ML; MG/100ML; MG/100ML
100 INJECTION, SOLUTION INTRAVENOUS CONTINUOUS
Status: DISCONTINUED | OUTPATIENT
Start: 2019-01-29 | End: 2019-01-31 | Stop reason: HOSPADM

## 2019-01-29 RX ORDER — GLYCOPYRROLATE 0.2 MG/ML
INJECTION INTRAMUSCULAR; INTRAVENOUS AS NEEDED
Status: DISCONTINUED | OUTPATIENT
Start: 2019-01-29 | End: 2019-01-29 | Stop reason: SURG

## 2019-01-29 RX ORDER — MEPERIDINE HYDROCHLORIDE 25 MG/ML
12.5 INJECTION INTRAMUSCULAR; INTRAVENOUS; SUBCUTANEOUS
Status: DISCONTINUED | OUTPATIENT
Start: 2019-01-29 | End: 2019-01-29 | Stop reason: HOSPADM

## 2019-01-29 RX ORDER — METOCLOPRAMIDE HYDROCHLORIDE 5 MG/ML
5 INJECTION INTRAMUSCULAR; INTRAVENOUS
Status: DISCONTINUED | OUTPATIENT
Start: 2019-01-29 | End: 2019-01-29 | Stop reason: HOSPADM

## 2019-01-29 RX ORDER — ATORVASTATIN CALCIUM 40 MG/1
40 TABLET, FILM COATED ORAL NIGHTLY
Status: DISCONTINUED | OUTPATIENT
Start: 2019-01-29 | End: 2019-01-31 | Stop reason: HOSPADM

## 2019-01-29 RX ORDER — ALBUMIN, HUMAN INJ 5% 5 %
SOLUTION INTRAVENOUS CONTINUOUS PRN
Status: DISCONTINUED | OUTPATIENT
Start: 2019-01-29 | End: 2019-01-29 | Stop reason: SURG

## 2019-01-29 RX ORDER — FENTANYL CITRATE 50 UG/ML
INJECTION, SOLUTION INTRAMUSCULAR; INTRAVENOUS AS NEEDED
Status: DISCONTINUED | OUTPATIENT
Start: 2019-01-29 | End: 2019-01-29 | Stop reason: SURG

## 2019-01-29 RX ORDER — NALOXONE HCL 0.4 MG/ML
0.4 VIAL (ML) INJECTION
Status: DISCONTINUED | OUTPATIENT
Start: 2019-01-29 | End: 2019-01-31 | Stop reason: HOSPADM

## 2019-01-29 RX ORDER — NALOXONE HCL 0.4 MG/ML
0.04 VIAL (ML) INJECTION AS NEEDED
Status: DISCONTINUED | OUTPATIENT
Start: 2019-01-29 | End: 2019-01-29 | Stop reason: HOSPADM

## 2019-01-29 RX ORDER — LIDOCAINE HYDROCHLORIDE 40 MG/ML
SOLUTION TOPICAL AS NEEDED
Status: DISCONTINUED | OUTPATIENT
Start: 2019-01-29 | End: 2019-01-29 | Stop reason: SURG

## 2019-01-29 RX ORDER — IPRATROPIUM BROMIDE AND ALBUTEROL SULFATE 2.5; .5 MG/3ML; MG/3ML
3 SOLUTION RESPIRATORY (INHALATION) ONCE
Status: COMPLETED | OUTPATIENT
Start: 2019-01-29 | End: 2019-01-29

## 2019-01-29 RX ORDER — HYDRALAZINE HYDROCHLORIDE 20 MG/ML
5 INJECTION INTRAMUSCULAR; INTRAVENOUS
Status: DISCONTINUED | OUTPATIENT
Start: 2019-01-29 | End: 2019-01-29 | Stop reason: HOSPADM

## 2019-01-29 RX ORDER — MORPHINE SULFATE 2 MG/ML
2 INJECTION, SOLUTION INTRAMUSCULAR; INTRAVENOUS
Status: DISCONTINUED | OUTPATIENT
Start: 2019-01-29 | End: 2019-01-29 | Stop reason: HOSPADM

## 2019-01-29 RX ORDER — OXYCODONE AND ACETAMINOPHEN 10; 325 MG/1; MG/1
1 TABLET ORAL EVERY 4 HOURS PRN
Status: DISCONTINUED | OUTPATIENT
Start: 2019-01-29 | End: 2019-01-31 | Stop reason: HOSPADM

## 2019-01-29 RX ORDER — VASOPRESSIN 20 U/ML
INJECTION PARENTERAL AS NEEDED
Status: DISCONTINUED | OUTPATIENT
Start: 2019-01-29 | End: 2019-01-29 | Stop reason: SURG

## 2019-01-29 RX ORDER — SODIUM CHLORIDE 0.9 % (FLUSH) 0.9 %
3 SYRINGE (ML) INJECTION EVERY 12 HOURS SCHEDULED
Status: DISCONTINUED | OUTPATIENT
Start: 2019-01-29 | End: 2019-01-29 | Stop reason: HOSPADM

## 2019-01-29 RX ORDER — METOPROLOL TARTRATE 50 MG/1
50 TABLET, FILM COATED ORAL EVERY 12 HOURS SCHEDULED
Status: DISCONTINUED | OUTPATIENT
Start: 2019-01-29 | End: 2019-01-31 | Stop reason: HOSPADM

## 2019-01-29 RX ORDER — ONDANSETRON 2 MG/ML
4 INJECTION INTRAMUSCULAR; INTRAVENOUS EVERY 6 HOURS PRN
Status: DISCONTINUED | OUTPATIENT
Start: 2019-01-29 | End: 2019-01-31 | Stop reason: HOSPADM

## 2019-01-29 RX ORDER — SODIUM CHLORIDE 0.9 % (FLUSH) 0.9 %
3 SYRINGE (ML) INJECTION AS NEEDED
Status: DISCONTINUED | OUTPATIENT
Start: 2019-01-29 | End: 2019-01-29 | Stop reason: HOSPADM

## 2019-01-29 RX ORDER — BUPIVACAINE HCL/0.9 % NACL/PF 0.1 %
2 PLASTIC BAG, INJECTION (ML) EPIDURAL ONCE
Status: COMPLETED | OUTPATIENT
Start: 2019-01-29 | End: 2019-01-29

## 2019-01-29 RX ORDER — BUPIVACAINE HYDROCHLORIDE 5 MG/ML
INJECTION, SOLUTION EPIDURAL; INTRACAUDAL AS NEEDED
Status: DISCONTINUED | OUTPATIENT
Start: 2019-01-29 | End: 2019-01-29 | Stop reason: HOSPADM

## 2019-01-29 RX ORDER — FENTANYL CITRATE 50 UG/ML
25 INJECTION, SOLUTION INTRAMUSCULAR; INTRAVENOUS AS NEEDED
Status: DISCONTINUED | OUTPATIENT
Start: 2019-01-29 | End: 2019-01-29 | Stop reason: HOSPADM

## 2019-01-29 RX ORDER — ACETAMINOPHEN 500 MG
1000 TABLET ORAL ONCE
Status: COMPLETED | OUTPATIENT
Start: 2019-01-29 | End: 2019-01-29

## 2019-01-29 RX ORDER — HYDROCHLOROTHIAZIDE 25 MG/1
25 TABLET ORAL DAILY
Status: DISCONTINUED | OUTPATIENT
Start: 2019-01-29 | End: 2019-01-31 | Stop reason: HOSPADM

## 2019-01-29 RX ORDER — FLUMAZENIL 0.1 MG/ML
0.2 INJECTION INTRAVENOUS AS NEEDED
Status: DISCONTINUED | OUTPATIENT
Start: 2019-01-29 | End: 2019-01-29 | Stop reason: HOSPADM

## 2019-01-29 RX ORDER — LISINOPRIL 10 MG/1
10 TABLET ORAL 2 TIMES DAILY
Status: DISCONTINUED | OUTPATIENT
Start: 2019-01-29 | End: 2019-01-31 | Stop reason: HOSPADM

## 2019-01-29 RX ORDER — DEXTROSE MONOHYDRATE 25 G/50ML
25 INJECTION, SOLUTION INTRAVENOUS
Status: DISCONTINUED | OUTPATIENT
Start: 2019-01-29 | End: 2019-01-31 | Stop reason: HOSPADM

## 2019-01-29 RX ORDER — HEPARIN SODIUM 1000 [USP'U]/ML
INJECTION, SOLUTION INTRAVENOUS; SUBCUTANEOUS AS NEEDED
Status: DISCONTINUED | OUTPATIENT
Start: 2019-01-29 | End: 2019-01-29 | Stop reason: SURG

## 2019-01-29 RX ORDER — SODIUM CHLORIDE, SODIUM LACTATE, POTASSIUM CHLORIDE, CALCIUM CHLORIDE 600; 310; 30; 20 MG/100ML; MG/100ML; MG/100ML; MG/100ML
1000 INJECTION, SOLUTION INTRAVENOUS CONTINUOUS
Status: DISCONTINUED | OUTPATIENT
Start: 2019-01-29 | End: 2019-01-29

## 2019-01-29 RX ORDER — SODIUM CHLORIDE, SODIUM LACTATE, POTASSIUM CHLORIDE, CALCIUM CHLORIDE 600; 310; 30; 20 MG/100ML; MG/100ML; MG/100ML; MG/100ML
100 INJECTION, SOLUTION INTRAVENOUS CONTINUOUS
Status: DISCONTINUED | OUTPATIENT
Start: 2019-01-29 | End: 2019-01-29

## 2019-01-29 RX ORDER — ASPIRIN 325 MG
325 TABLET ORAL DAILY
Status: DISCONTINUED | OUTPATIENT
Start: 2019-01-29 | End: 2019-01-31 | Stop reason: HOSPADM

## 2019-01-29 RX ORDER — IPRATROPIUM BROMIDE AND ALBUTEROL SULFATE 2.5; .5 MG/3ML; MG/3ML
3 SOLUTION RESPIRATORY (INHALATION) ONCE AS NEEDED
Status: DISCONTINUED | OUTPATIENT
Start: 2019-01-29 | End: 2019-01-29 | Stop reason: HOSPADM

## 2019-01-29 RX ORDER — PROPOFOL 10 MG/ML
VIAL (ML) INTRAVENOUS AS NEEDED
Status: DISCONTINUED | OUTPATIENT
Start: 2019-01-29 | End: 2019-01-29 | Stop reason: SURG

## 2019-01-29 RX ORDER — SODIUM CHLORIDE 0.9 % (FLUSH) 0.9 %
3-10 SYRINGE (ML) INJECTION AS NEEDED
Status: DISCONTINUED | OUTPATIENT
Start: 2019-01-29 | End: 2019-01-29 | Stop reason: HOSPADM

## 2019-01-29 RX ORDER — NICOTINE POLACRILEX 4 MG
15 LOZENGE BUCCAL
Status: DISCONTINUED | OUTPATIENT
Start: 2019-01-29 | End: 2019-01-31 | Stop reason: HOSPADM

## 2019-01-29 RX ORDER — MORPHINE SULFATE 2 MG/ML
2 INJECTION, SOLUTION INTRAMUSCULAR; INTRAVENOUS EVERY 4 HOURS PRN
Status: DISCONTINUED | OUTPATIENT
Start: 2019-01-29 | End: 2019-01-31 | Stop reason: HOSPADM

## 2019-01-29 RX ORDER — ONDANSETRON 2 MG/ML
INJECTION INTRAMUSCULAR; INTRAVENOUS AS NEEDED
Status: DISCONTINUED | OUTPATIENT
Start: 2019-01-29 | End: 2019-01-29 | Stop reason: SURG

## 2019-01-29 RX ORDER — PANTOPRAZOLE SODIUM 40 MG/1
40 TABLET, DELAYED RELEASE ORAL EVERY MORNING
Status: DISCONTINUED | OUTPATIENT
Start: 2019-01-30 | End: 2019-01-31 | Stop reason: HOSPADM

## 2019-01-29 RX ORDER — OXYCODONE AND ACETAMINOPHEN 10; 325 MG/1; MG/1
1 TABLET ORAL ONCE AS NEEDED
Status: COMPLETED | OUTPATIENT
Start: 2019-01-29 | End: 2019-01-29

## 2019-01-29 RX ORDER — ONDANSETRON 4 MG/1
4 TABLET, FILM COATED ORAL EVERY 6 HOURS PRN
Status: DISCONTINUED | OUTPATIENT
Start: 2019-01-29 | End: 2019-01-31 | Stop reason: HOSPADM

## 2019-01-29 RX ORDER — ONDANSETRON 4 MG/1
4 TABLET, ORALLY DISINTEGRATING ORAL EVERY 6 HOURS PRN
Status: DISCONTINUED | OUTPATIENT
Start: 2019-01-29 | End: 2019-01-31 | Stop reason: HOSPADM

## 2019-01-29 RX ORDER — LABETALOL HYDROCHLORIDE 5 MG/ML
5 INJECTION, SOLUTION INTRAVENOUS
Status: DISCONTINUED | OUTPATIENT
Start: 2019-01-29 | End: 2019-01-29 | Stop reason: HOSPADM

## 2019-01-29 RX ORDER — SODIUM CHLORIDE 0.9 % (FLUSH) 0.9 %
1-10 SYRINGE (ML) INJECTION AS NEEDED
Status: DISCONTINUED | OUTPATIENT
Start: 2019-01-29 | End: 2019-01-29 | Stop reason: HOSPADM

## 2019-01-29 RX ORDER — ROCURONIUM BROMIDE 10 MG/ML
INJECTION, SOLUTION INTRAVENOUS AS NEEDED
Status: DISCONTINUED | OUTPATIENT
Start: 2019-01-29 | End: 2019-01-29 | Stop reason: SURG

## 2019-01-29 RX ORDER — SODIUM CHLORIDE, SODIUM LACTATE, POTASSIUM CHLORIDE, CALCIUM CHLORIDE 600; 310; 30; 20 MG/100ML; MG/100ML; MG/100ML; MG/100ML
30 INJECTION, SOLUTION INTRAVENOUS CONTINUOUS
Status: DISCONTINUED | OUTPATIENT
Start: 2019-01-29 | End: 2019-01-29

## 2019-01-29 RX ORDER — ONDANSETRON 2 MG/ML
4 INJECTION INTRAMUSCULAR; INTRAVENOUS AS NEEDED
Status: DISCONTINUED | OUTPATIENT
Start: 2019-01-29 | End: 2019-01-29 | Stop reason: HOSPADM

## 2019-01-29 RX ADMIN — MORPHINE SULFATE 2 MG: 2 INJECTION, SOLUTION INTRAMUSCULAR; INTRAVENOUS at 21:12

## 2019-01-29 RX ADMIN — LISINOPRIL 10 MG: 10 TABLET ORAL at 21:12

## 2019-01-29 RX ADMIN — SODIUM CHLORIDE, POTASSIUM CHLORIDE, SODIUM LACTATE AND CALCIUM CHLORIDE 1000 ML: 600; 310; 30; 20 INJECTION, SOLUTION INTRAVENOUS at 06:30

## 2019-01-29 RX ADMIN — DESMOPRESSIN ACETATE 40 MG: 0.2 TABLET ORAL at 21:12

## 2019-01-29 RX ADMIN — ONDANSETRON HYDROCHLORIDE 4 MG: 2 SOLUTION INTRAMUSCULAR; INTRAVENOUS at 13:55

## 2019-01-29 RX ADMIN — HEPARIN SODIUM 1000 UNITS: 1000 INJECTION, SOLUTION INTRAVENOUS; SUBCUTANEOUS at 12:20

## 2019-01-29 RX ADMIN — FENTANYL CITRATE 25 MCG: 50 INJECTION, SOLUTION INTRAMUSCULAR; INTRAVENOUS at 14:46

## 2019-01-29 RX ADMIN — ALBUMIN HUMAN: 0.05 INJECTION, SOLUTION INTRAVENOUS at 10:48

## 2019-01-29 RX ADMIN — FENTANYL CITRATE 25 MCG: 50 INJECTION, SOLUTION INTRAMUSCULAR; INTRAVENOUS at 14:53

## 2019-01-29 RX ADMIN — INSULIN HUMAN 5 UNITS: 100 INJECTION, SOLUTION PARENTERAL at 08:46

## 2019-01-29 RX ADMIN — Medication 4 MG: at 13:55

## 2019-01-29 RX ADMIN — METOPROLOL TARTRATE 50 MG: 50 TABLET ORAL at 17:30

## 2019-01-29 RX ADMIN — PHENYLEPHRINE HYDROCHLORIDE 0.3 MCG/KG/MIN: 10 INJECTION INTRAVENOUS at 10:19

## 2019-01-29 RX ADMIN — HYDROCHLOROTHIAZIDE 25 MG: 25 TABLET ORAL at 17:30

## 2019-01-29 RX ADMIN — FENTANYL CITRATE 25 MCG: 50 INJECTION, SOLUTION INTRAMUSCULAR; INTRAVENOUS at 14:50

## 2019-01-29 RX ADMIN — IPRATROPIUM BROMIDE AND ALBUTEROL SULFATE 3 ML: 2.5; .5 SOLUTION RESPIRATORY (INHALATION) at 08:46

## 2019-01-29 RX ADMIN — HEPARIN SODIUM 1000 UNITS: 1000 INJECTION, SOLUTION INTRAVENOUS; SUBCUTANEOUS at 13:20

## 2019-01-29 RX ADMIN — GLYCOPYRROLATE 0.4 MG: 0.2 INJECTION, SOLUTION INTRAMUSCULAR; INTRAVENOUS at 13:55

## 2019-01-29 RX ADMIN — ROCURONIUM BROMIDE 20 MG: 10 INJECTION INTRAVENOUS at 12:55

## 2019-01-29 RX ADMIN — VASOPRESSIN 0.5 UNITS: 20 INJECTION INTRAVENOUS at 10:36

## 2019-01-29 RX ADMIN — OXYCODONE HYDROCHLORIDE AND ACETAMINOPHEN 1 TABLET: 10; 325 TABLET ORAL at 14:40

## 2019-01-29 RX ADMIN — LIDOCAINE HYDROCHLORIDE 1 EACH: 40 SOLUTION TOPICAL at 10:20

## 2019-01-29 RX ADMIN — SODIUM CHLORIDE, POTASSIUM CHLORIDE, SODIUM LACTATE AND CALCIUM CHLORIDE 30 ML/HR: 600; 310; 30; 20 INJECTION, SOLUTION INTRAVENOUS at 07:08

## 2019-01-29 RX ADMIN — VASOPRESSIN 0.25 UNITS: 20 INJECTION INTRAVENOUS at 10:29

## 2019-01-29 RX ADMIN — METOPROLOL TARTRATE 50 MG: 50 TABLET ORAL at 21:12

## 2019-01-29 RX ADMIN — GABAPENTIN 800 MG: 400 CAPSULE ORAL at 16:29

## 2019-01-29 RX ADMIN — FENTANYL CITRATE 100 MCG: 50 INJECTION, SOLUTION INTRAMUSCULAR; INTRAVENOUS at 10:20

## 2019-01-29 RX ADMIN — HEPARIN SODIUM 6000 UNITS: 1000 INJECTION, SOLUTION INTRAVENOUS; SUBCUTANEOUS at 11:29

## 2019-01-29 RX ADMIN — ROCURONIUM BROMIDE 10 MG: 10 INJECTION INTRAVENOUS at 13:27

## 2019-01-29 RX ADMIN — SUCCINYLCHOLINE CHLORIDE 120 MG: 20 INJECTION, SOLUTION INTRAMUSCULAR; INTRAVENOUS at 10:20

## 2019-01-29 RX ADMIN — SODIUM CHLORIDE, POTASSIUM CHLORIDE, SODIUM LACTATE AND CALCIUM CHLORIDE 100 ML/HR: 600; 310; 30; 20 INJECTION, SOLUTION INTRAVENOUS at 16:30

## 2019-01-29 RX ADMIN — ROCURONIUM BROMIDE 30 MG: 10 INJECTION INTRAVENOUS at 10:40

## 2019-01-29 RX ADMIN — FENTANYL CITRATE 25 MCG: 50 INJECTION, SOLUTION INTRAMUSCULAR; INTRAVENOUS at 14:43

## 2019-01-29 RX ADMIN — MORPHINE SULFATE 2 MG: 2 INJECTION, SOLUTION INTRAMUSCULAR; INTRAVENOUS at 16:30

## 2019-01-29 RX ADMIN — Medication 2 G: at 10:24

## 2019-01-29 RX ADMIN — PROPOFOL 100 MG: 10 INJECTION, EMULSION INTRAVENOUS at 10:20

## 2019-01-29 RX ADMIN — ASPIRIN 325 MG: 325 TABLET ORAL at 16:29

## 2019-01-29 RX ADMIN — INSULIN LISPRO 4 UNITS: 100 INJECTION, SOLUTION INTRAVENOUS; SUBCUTANEOUS at 17:51

## 2019-01-29 RX ADMIN — INSULIN LISPRO 2 UNITS: 100 INJECTION, SOLUTION INTRAVENOUS; SUBCUTANEOUS at 21:12

## 2019-01-29 RX ADMIN — ROCURONIUM BROMIDE 20 MG: 10 INJECTION INTRAVENOUS at 10:30

## 2019-01-29 RX ADMIN — ACETAMINOPHEN 1000 MG: 500 TABLET, FILM COATED ORAL at 08:46

## 2019-01-29 RX ADMIN — GABAPENTIN 800 MG: 400 CAPSULE ORAL at 21:12

## 2019-01-29 NOTE — ANESTHESIA POSTPROCEDURE EVALUATION
Patient: Edy Cosby    Procedure Summary     Date:  01/29/19 Room / Location:  [facility] OR  /  PAD HYBRID OR 12    Anesthesia Start:  1013 Anesthesia Stop:      Procedures:       LEFT FEMORAL ENDARTERECTOMY (Left Groin)      ANGIOPLASTY ILIAC ARTERY, STENT PLACEMENT (Left Thigh) Diagnosis:       Atherosclerosis 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 (CMS/HCC)      (Atherosclerosis 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 (CMS/HCC) [I70.25])    Surgeon:  Duncan Lucio MD Provider:  Anish Riley CRNA    Anesthesia Type:  general ASA Status:  3          Anesthesia Type: general  Last vitals  BP   120/55 (01/29/19 1530)   Temp   98.7 °F (37.1 °C) (01/29/19 1515)   Pulse   79 (01/29/19 1530)   Resp   15 (01/29/19 1530)     SpO2   100 % (01/29/19 1530)     Post Anesthesia Care and Evaluation    Patient location during evaluation: PACU  Patient participation: complete - patient participated  Level of consciousness: awake and alert  Pain management: adequate  Airway patency: patent  Anesthetic complications: No anesthetic complications    Cardiovascular status: acceptable  Respiratory status: acceptable  Hydration status: acceptable    Comments: Blood pressure 120/55, pulse 79, temperature 98.7 °F (37.1 °C), temperature source Temporal, resp. rate 15, SpO2 100 %.    Pt discharged from PACU based on dino score >8

## 2019-01-29 NOTE — ANESTHESIA PROCEDURE NOTES
Arterial Line    Pre-sedation assessment completed: 1/29/2019 8:57 AM    Patient reassessed immediately prior to procedure    Patient location during procedure: pre-op  Start time: 1/29/2019 8:57 AM  Stop Time:1/29/2019 8:57 AM       Line placed for hemodynamic monitoring.  Performed By   Anesthesiologist: Christina Soria MD  Preanesthetic Checklist  Completed: patient identified, site marked, surgical consent, pre-op evaluation, timeout performed, IV checked, risks and benefits discussed and monitors and equipment checked  Arterial Line Prep   Sterile Tech: cap and gloves  Prep: ChloraPrep  Patient monitoring: blood pressure monitoring, continuous pulse oximetry and EKG  Arterial Line Procedure   Laterality:right  Location:  radial artery  Catheter size: 20 G   Guidance: ultrasound guided  PROCEDURE NOTE/ULTRASOUND INTERPRETATION.  Using ultrasound guidance the potential vascular sites for insertion of the catheter were visualized to determine the patency of the vessel to be used for vascular access.  After selecting the appropriate site for insertion, the needle was visualized under ultrasound being inserted into the radial artery, followed by ultrasound confirmation of wire and catheter placement. There were no abnormalities seen on ultrasound; an image was taken; and the patient tolerated the procedure with no complications.   Number of attempts: 2  Successful placement: yes          Post Assessment   Dressing Type: secured with tape and wrist guard applied.   Complications no  Circ/Move/Sens Assessment: normal and unchanged.   Patient Tolerance: patient tolerated the procedure well with no apparent complications

## 2019-01-29 NOTE — ANESTHESIA PROCEDURE NOTES
Airway  Difficult airway: Glidescope with #4 blade recommended but has been intubted by DL with bougie.    General Information and Staff    Patient location during procedure: OR  CRNA: Anish Riley CRNA    Indications and Patient Condition  Indications for airway management: airway protection    Preoxygenated: yes  Mask difficulty assessment: 0 - not attempted    Final Airway Details  Final airway type: endotracheal airway      Successful airway: ETT  Cuffed: yes   Successful intubation technique: video laryngoscopy  Facilitating devices/methods: intubating stylet  Blade size: 3 (Glidescope )  ETT size (mm): 8.0  Cormack-Lehane Classification: grade IIa - partial view of glottis  Placement verified by: chest auscultation and capnometry   Cuff volume (mL): 6  Measured from: lips  ETT to lips (cm): 23  Number of attempts at approach: 1    Additional Comments  ATRAUMATIC INTUBATION, pt has large tongue and excessive tissue around glottic opening with lots of secretions, large epiglottis. Suggest using Glidescope #4 blade on next anesthetic, DL not suggested.

## 2019-01-29 NOTE — ANESTHESIA POSTPROCEDURE EVALUATION
Patient: Edy Cosby    Procedure Summary     Date:  01/29/19 Room / Location:  [facility] OR  /  PAD HYBRID OR 12    Anesthesia Start:  1013 Anesthesia Stop:  1411    Procedures:       LEFT FEMORAL ENDARTERECTOMY (Left Groin)      ANGIOPLASTY ILIAC ARTERY, STENT PLACEMENT (Left Thigh) Diagnosis:       Atherosclerosis 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 (CMS/HCC)      (Atherosclerosis 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 (CMS/HCC) [I70.25])    Surgeon:  Duncan Lucio MD Provider:  Anish Riley CRNA    Anesthesia Type:  general ASA Status:  3          Anesthesia Type: general  Last vitals  BP   127/48 (01/29/19 1550)   Temp   98.7 °F (37.1 °C) (01/29/19 1550)   Pulse   84 (01/29/19 1550)   Resp   16 (01/29/19 1550)     SpO2   100 % (01/29/19 1550)     Post Anesthesia Care and Evaluation    PONV Status: none  Comments: Patient d/c from PACU prior to anes eval based on Stefanie score.  Please see RN notes for details of d/c criteria.    Blood pressure 127/48, pulse 84, temperature 98.7 °F (37.1 °C), temperature source Oral, resp. rate 16, SpO2 100 %.

## 2019-01-29 NOTE — ANESTHESIA PREPROCEDURE EVALUATION
Anesthesia Evaluation     Patient summary reviewed and Nursing notes reviewed   no history of anesthetic complications:  NPO Solid Status: > 8 hours  NPO Liquid Status: > 8 hours           Airway   Mallampati: III  TM distance: >3 FB  Possible difficult intubation  Dental      Pulmonary    (+) a smoker (down to 1 per day) Current, COPD,   Cardiovascular   Exercise tolerance: poor (<4 METS)    ECG reviewed  Patient on routine beta blocker and Beta blocker given within 24 hours of surgery    (+) hypertension, dysrhythmias Atrial Fib, PVD, hyperlipidemia,     ROS comment: Echo 1/2017  · All left ventricular wall segments contract normally.  · Left ventricular wall thickness is consistent with moderate concentric hypertrophy.     GROSSLY NORMAL LV AND RV SYSTOLIC FUNCTION  GROSSLY NORMAL VALVE FUNCTION  MODERATE LVH    Neuro/Psych  (-) seizures, TIA, CVA  GI/Hepatic/Renal/Endo    (+) obesity,  GERD,  renal disease CRI, diabetes mellitus type 2 using insulin,   (-) liver disease    Musculoskeletal     Abdominal    Substance History      OB/GYN          Other   (+) arthritis         Phys Exam Other: Previously gr 3 view with mac 4, used bougie                  Anesthesia Plan    ASA 3     general     intravenous induction   Anesthetic plan, all risks, benefits, and alternatives have been provided, discussed and informed consent has been obtained with: patient.

## 2019-01-30 LAB
ANION GAP SERPL CALCULATED.3IONS-SCNC: 7 MMOL/L (ref 4–13)
BASOPHILS # BLD AUTO: 0.03 10*3/MM3 (ref 0–0.2)
BASOPHILS NFR BLD AUTO: 0.4 % (ref 0–2)
BUN BLD-MCNC: 28 MG/DL (ref 5–21)
BUN/CREAT SERPL: 18.8 (ref 7–25)
CALCIUM SPEC-SCNC: 9.1 MG/DL (ref 8.4–10.4)
CHLORIDE SERPL-SCNC: 99 MMOL/L (ref 98–110)
CO2 SERPL-SCNC: 30 MMOL/L (ref 24–31)
CREAT BLD-MCNC: 1.49 MG/DL (ref 0.5–1.4)
DEPRECATED RDW RBC AUTO: 50 FL (ref 40–54)
EOSINOPHIL # BLD AUTO: 0.13 10*3/MM3 (ref 0–0.7)
EOSINOPHIL NFR BLD AUTO: 1.6 % (ref 0–4)
ERYTHROCYTE [DISTWIDTH] IN BLOOD BY AUTOMATED COUNT: 16.3 % (ref 12–15)
GFR SERPL CREATININE-BSD FRML MDRD: 58 ML/MIN/1.73
GLUCOSE BLD-MCNC: 173 MG/DL (ref 70–100)
GLUCOSE BLDC GLUCOMTR-MCNC: 195 MG/DL (ref 70–130)
GLUCOSE BLDC GLUCOMTR-MCNC: 279 MG/DL (ref 70–130)
GLUCOSE BLDC GLUCOMTR-MCNC: 285 MG/DL (ref 70–130)
GLUCOSE BLDC GLUCOMTR-MCNC: 362 MG/DL (ref 70–130)
HCT VFR BLD AUTO: 27.1 % (ref 40–52)
HGB BLD-MCNC: 8.7 G/DL (ref 14–18)
IMM GRANULOCYTES # BLD AUTO: 0.02 10*3/MM3 (ref 0–0.03)
IMM GRANULOCYTES NFR BLD AUTO: 0.3 % (ref 0–5)
LYMPHOCYTES # BLD AUTO: 1.9 10*3/MM3 (ref 0.72–4.86)
LYMPHOCYTES NFR BLD AUTO: 24.1 % (ref 15–45)
MCH RBC QN AUTO: 26.8 PG (ref 28–32)
MCHC RBC AUTO-ENTMCNC: 32.1 G/DL (ref 33–36)
MCV RBC AUTO: 83.4 FL (ref 82–95)
MONOCYTES # BLD AUTO: 0.62 10*3/MM3 (ref 0.19–1.3)
MONOCYTES NFR BLD AUTO: 7.8 % (ref 4–12)
NEUTROPHILS # BLD AUTO: 5.2 10*3/MM3 (ref 1.87–8.4)
NEUTROPHILS NFR BLD AUTO: 65.8 % (ref 39–78)
NRBC BLD AUTO-RTO: 0 /100 WBC (ref 0–0)
PLATELET # BLD AUTO: 123 10*3/MM3 (ref 130–400)
PMV BLD AUTO: 12.8 FL (ref 6–12)
POTASSIUM BLD-SCNC: 4.5 MMOL/L (ref 3.5–5.3)
RBC # BLD AUTO: 3.25 10*6/MM3 (ref 4.8–5.9)
SODIUM BLD-SCNC: 136 MMOL/L (ref 135–145)
WBC NRBC COR # BLD: 7.9 10*3/MM3 (ref 4.8–10.8)

## 2019-01-30 PROCEDURE — 63710000001 INSULIN LISPRO (HUMAN) PER 5 UNITS: Performed by: SURGERY

## 2019-01-30 PROCEDURE — 25010000002 ENOXAPARIN PER 10 MG: Performed by: SURGERY

## 2019-01-30 PROCEDURE — 85025 COMPLETE CBC W/AUTO DIFF WBC: CPT | Performed by: SURGERY

## 2019-01-30 PROCEDURE — 94760 N-INVAS EAR/PLS OXIMETRY 1: CPT

## 2019-01-30 PROCEDURE — 82962 GLUCOSE BLOOD TEST: CPT

## 2019-01-30 PROCEDURE — 80048 BASIC METABOLIC PNL TOTAL CA: CPT | Performed by: SURGERY

## 2019-01-30 PROCEDURE — 97161 PT EVAL LOW COMPLEX 20 MIN: CPT | Performed by: PHYSICAL THERAPIST

## 2019-01-30 PROCEDURE — 99024 POSTOP FOLLOW-UP VISIT: CPT | Performed by: SURGERY

## 2019-01-30 PROCEDURE — 25010000002 MORPHINE PER 10 MG: Performed by: SURGERY

## 2019-01-30 PROCEDURE — 94799 UNLISTED PULMONARY SVC/PX: CPT

## 2019-01-30 RX ORDER — OMEPRAZOLE 20 MG/1
20 CAPSULE, DELAYED RELEASE ORAL 2 TIMES DAILY
COMMUNITY
End: 2020-05-15

## 2019-01-30 RX ORDER — CYCLOBENZAPRINE HCL 5 MG
5 TABLET ORAL 3 TIMES DAILY PRN
COMMUNITY

## 2019-01-30 RX ORDER — LISINOPRIL 40 MG/1
40 TABLET ORAL DAILY
COMMUNITY

## 2019-01-30 RX ADMIN — ENOXAPARIN SODIUM 40 MG: 40 INJECTION SUBCUTANEOUS at 08:12

## 2019-01-30 RX ADMIN — SODIUM CHLORIDE, POTASSIUM CHLORIDE, SODIUM LACTATE AND CALCIUM CHLORIDE 100 ML/HR: 600; 310; 30; 20 INJECTION, SOLUTION INTRAVENOUS at 01:23

## 2019-01-30 RX ADMIN — METOPROLOL TARTRATE 50 MG: 50 TABLET ORAL at 08:11

## 2019-01-30 RX ADMIN — INSULIN LISPRO 4 UNITS: 100 INJECTION, SOLUTION INTRAVENOUS; SUBCUTANEOUS at 12:18

## 2019-01-30 RX ADMIN — OXYCODONE HYDROCHLORIDE AND ACETAMINOPHEN 1 TABLET: 10; 325 TABLET ORAL at 16:22

## 2019-01-30 RX ADMIN — ASPIRIN 325 MG: 325 TABLET ORAL at 08:11

## 2019-01-30 RX ADMIN — INSULIN LISPRO 6 UNITS: 100 INJECTION, SOLUTION INTRAVENOUS; SUBCUTANEOUS at 20:49

## 2019-01-30 RX ADMIN — DESMOPRESSIN ACETATE 40 MG: 0.2 TABLET ORAL at 20:42

## 2019-01-30 RX ADMIN — GABAPENTIN 800 MG: 400 CAPSULE ORAL at 20:42

## 2019-01-30 RX ADMIN — SODIUM CHLORIDE, POTASSIUM CHLORIDE, SODIUM LACTATE AND CALCIUM CHLORIDE 100 ML/HR: 600; 310; 30; 20 INJECTION, SOLUTION INTRAVENOUS at 20:40

## 2019-01-30 RX ADMIN — INSULIN LISPRO 2 UNITS: 100 INJECTION, SOLUTION INTRAVENOUS; SUBCUTANEOUS at 08:11

## 2019-01-30 RX ADMIN — SODIUM CHLORIDE, POTASSIUM CHLORIDE, SODIUM LACTATE AND CALCIUM CHLORIDE 100 ML/HR: 600; 310; 30; 20 INJECTION, SOLUTION INTRAVENOUS at 10:50

## 2019-01-30 RX ADMIN — OXYCODONE HYDROCHLORIDE AND ACETAMINOPHEN 1 TABLET: 10; 325 TABLET ORAL at 06:07

## 2019-01-30 RX ADMIN — HYDROCHLOROTHIAZIDE 25 MG: 25 TABLET ORAL at 08:11

## 2019-01-30 RX ADMIN — GABAPENTIN 800 MG: 400 CAPSULE ORAL at 08:11

## 2019-01-30 RX ADMIN — OXYCODONE HYDROCHLORIDE AND ACETAMINOPHEN 1 TABLET: 10; 325 TABLET ORAL at 20:42

## 2019-01-30 RX ADMIN — OXYCODONE HYDROCHLORIDE AND ACETAMINOPHEN 1 TABLET: 10; 325 TABLET ORAL at 10:44

## 2019-01-30 RX ADMIN — PANTOPRAZOLE SODIUM 40 MG: 40 TABLET, DELAYED RELEASE ORAL at 06:07

## 2019-01-30 RX ADMIN — METOPROLOL TARTRATE 50 MG: 50 TABLET ORAL at 20:42

## 2019-01-30 RX ADMIN — MORPHINE SULFATE 2 MG: 2 INJECTION, SOLUTION INTRAMUSCULAR; INTRAVENOUS at 01:22

## 2019-01-30 RX ADMIN — INSULIN LISPRO 4 UNITS: 100 INJECTION, SOLUTION INTRAVENOUS; SUBCUTANEOUS at 17:33

## 2019-01-30 RX ADMIN — LISINOPRIL 10 MG: 10 TABLET ORAL at 20:42

## 2019-01-30 RX ADMIN — LISINOPRIL 10 MG: 10 TABLET ORAL at 08:11

## 2019-01-31 VITALS
HEART RATE: 99 BPM | TEMPERATURE: 98.7 F | RESPIRATION RATE: 16 BRPM | HEIGHT: 69 IN | WEIGHT: 233 LBS | OXYGEN SATURATION: 91 % | SYSTOLIC BLOOD PRESSURE: 150 MMHG | BODY MASS INDEX: 34.51 KG/M2 | DIASTOLIC BLOOD PRESSURE: 64 MMHG

## 2019-01-31 LAB
CYTO UR: NORMAL
GLUCOSE BLDC GLUCOMTR-MCNC: 229 MG/DL (ref 70–130)
GLUCOSE BLDC GLUCOMTR-MCNC: 347 MG/DL (ref 70–130)
LAB AP CASE REPORT: NORMAL
PATH REPORT.FINAL DX SPEC: NORMAL
PATH REPORT.GROSS SPEC: NORMAL

## 2019-01-31 PROCEDURE — 63710000001 INSULIN LISPRO (HUMAN) PER 5 UNITS: Performed by: SURGERY

## 2019-01-31 PROCEDURE — 25010000002 ENOXAPARIN PER 10 MG: Performed by: SURGERY

## 2019-01-31 PROCEDURE — 99024 POSTOP FOLLOW-UP VISIT: CPT | Performed by: SURGERY

## 2019-01-31 PROCEDURE — 97116 GAIT TRAINING THERAPY: CPT

## 2019-01-31 PROCEDURE — 82962 GLUCOSE BLOOD TEST: CPT

## 2019-01-31 PROCEDURE — 97110 THERAPEUTIC EXERCISES: CPT

## 2019-01-31 PROCEDURE — 94799 UNLISTED PULMONARY SVC/PX: CPT

## 2019-01-31 PROCEDURE — 94760 N-INVAS EAR/PLS OXIMETRY 1: CPT

## 2019-01-31 RX ORDER — HYDROCODONE BITARTRATE AND ACETAMINOPHEN 10; 325 MG/1; MG/1
1 TABLET ORAL EVERY 6 HOURS PRN
Qty: 12 TABLET | Refills: 0 | Status: SHIPPED | OUTPATIENT
Start: 2019-01-31 | End: 2019-02-15 | Stop reason: SDUPTHER

## 2019-01-31 RX ADMIN — ASPIRIN 325 MG: 325 TABLET ORAL at 08:03

## 2019-01-31 RX ADMIN — LISINOPRIL 10 MG: 10 TABLET ORAL at 08:02

## 2019-01-31 RX ADMIN — HYDROCHLOROTHIAZIDE 25 MG: 25 TABLET ORAL at 08:02

## 2019-01-31 RX ADMIN — ENOXAPARIN SODIUM 40 MG: 40 INJECTION SUBCUTANEOUS at 08:03

## 2019-01-31 RX ADMIN — OXYCODONE HYDROCHLORIDE AND ACETAMINOPHEN 1 TABLET: 10; 325 TABLET ORAL at 08:03

## 2019-01-31 RX ADMIN — SODIUM CHLORIDE, POTASSIUM CHLORIDE, SODIUM LACTATE AND CALCIUM CHLORIDE 100 ML/HR: 600; 310; 30; 20 INJECTION, SOLUTION INTRAVENOUS at 06:00

## 2019-01-31 RX ADMIN — PANTOPRAZOLE SODIUM 40 MG: 40 TABLET, DELAYED RELEASE ORAL at 06:33

## 2019-01-31 RX ADMIN — METOPROLOL TARTRATE 50 MG: 50 TABLET ORAL at 08:02

## 2019-01-31 RX ADMIN — OXYCODONE HYDROCHLORIDE AND ACETAMINOPHEN 1 TABLET: 10; 325 TABLET ORAL at 01:40

## 2019-01-31 RX ADMIN — INSULIN LISPRO 5 UNITS: 100 INJECTION, SOLUTION INTRAVENOUS; SUBCUTANEOUS at 11:19

## 2019-01-31 RX ADMIN — GABAPENTIN 800 MG: 400 CAPSULE ORAL at 08:03

## 2019-01-31 RX ADMIN — INSULIN LISPRO 3 UNITS: 100 INJECTION, SOLUTION INTRAVENOUS; SUBCUTANEOUS at 08:02

## 2019-02-01 ENCOUNTER — READMISSION MANAGEMENT (OUTPATIENT)
Dept: CALL CENTER | Facility: HOSPITAL | Age: 64
End: 2019-02-01

## 2019-02-01 NOTE — OUTREACH NOTE
Prep Survey      Responses   Facility patient discharged from?  [facility]   Is patient eligible?  Yes   Discharge diagnosis  LEFT FEMORAL ENDARTERECTOMY ANGIOPLASTY ILIAC ARTERY, STENT PLACEMENT        Does the patient have one of the following disease processes/diagnoses(primary or secondary)?  Other   Does the patient have Home health ordered?  No   Is there a DME ordered?  No   Prep survey completed?  Yes          Ana Hardy RN

## 2019-02-01 NOTE — PAYOR COMM NOTE
"[place] home 1/31/19  [id number]  medicare primary  Edy Oneill (63 y.o. Male)     Date of Birth Social Security Number Address Home Phone MRN    1955  [address] [facility] [address] [phone] [id number]    [employer] Marital Status          Congregation        Admission Date Admission Type Admitting Provider Attending Provider Department, Room/Bed    1/29/19 Elective Duncan Lucio MD  [facility] 3C, 370/1    Discharge Date Discharge Disposition Discharge Destination        1/31/2019 Home or Self Care              Attending Provider:  (none)   Allergies:  No Known Allergies    Isolation:  None   Infection:  None   Code Status:  Prior    Ht:  175.3 cm (69\")   Wt:  106 kg (233 lb)    Admission Cmt:  None   Principal Problem:  Atherosclerosis 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 (CMS/[facility]) [I70.25] More...                 Active Insurance as of 1/29/2019     Primary Coverage     Payor Plan Insurance Group Employer/Plan Group    MEDICARE MEDICARE A & B      Payor Plan Address Payor Plan Phone Number Payor Plan Fax Number Effective Dates    [address] [phone]  7/1/2005 - None Entered    [facility] [place] [address]       Subscriber Name Subscriber Birth Date Member ID       EDY ONEILL 1955 [id number]           Secondary Coverage     Payor Plan Insurance Group Employer/Plan Group    HUMANA HUMANA [id number]     Payor Plan Address Payor Plan Phone Number Payor Plan Fax Number Effective Dates    [address] [address] [phone]  1/1/2013 - None Entered    [facility] [address]       Subscriber Name Subscriber Birth Date Member ID       EDY ONEILL 1955 [id number]                 Emergency Contacts      (Rel.) Home Phone Work Phone Mobile Phone    Kirti Oneill (Spouse) [phone] -- [phone]    René Garland (Relative) [phone] -- --               Discharge Summary      Duncan Lucio MD at 1/31/2019 10:12 AM      "       Date of Discharge:  1/31/2019    Discharge Diagnosis: Left foot nonhealing wound    Presenting Problem/History of Present Illness  Atherosclerosis 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 (CMS/HCC) [I70.25]  Atherosclerosis 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 (CMS/HCC) [I70.25]  Ischemia of extremity [I99.8]       Hospital Course  Patient is a 63 y.o. male presented with history of left foot nonhealing wound at the base of the first toe.  Ultimately noninvasive imaging showed an arterial perfusion sit to the left lower extremity.  He underwent angiogram which showed a left common iliac artery stenosis as well as severe left common femoral artery stenosis with extensive plaque.  Given these findings no intervention was performed at the time of angiogram and patient was indicated for left femoral endarterectomy with subsequent retrograde iliac stent.  He underwent cardiac clearance and was deemed moderate risk with no further testing needed.  Therefore on this admission he underwent left common femoral artery endarterectomy with retrograde angiogram and left common iliac artery angioplasty and stent.  This was performed on 1/29.  He tolerated the procedure well with no apparent complications.  Postoperative day #1 his Puckett catheter was removed and he was voiding freely.  He does have a history of a left foot drop from a prior spine procedure however was seen by physical therapy on this admission and noted to be ambulating well at his baseline.  He continues to tolerate a regular diet and has no other issues.  He is stable for discharge.      Procedures Performed  Procedure(s):  LEFT FEMORAL ENDARTERECTOMY  ANGIOPLASTY ILIAC ARTERY, STENT PLACEMENT       Consults:   Consults     No orders found from 12/31/2018 to 1/30/2019.            Condition on Discharge:  Stable    Discharge Medications     Discharge Medications      Changes to Medications      Instructions Start Date    HYDROcodone-acetaminophen  MG per tablet  Commonly known as:  NORCO  What changed:  when to take this   2 tablets, Oral, Every 4 Hours PRN      HYDROcodone-acetaminophen  MG per tablet  Commonly known as:  NORCO  What changed:  You were already taking a medication with the same name, and this prescription was added. Make sure you understand how and when to take each.   1 tablet, Oral, Every 6 Hours PRN         Continue These Medications      Instructions Start Date   aspirin 325 MG tablet   Take 325 mg by mouth daily.      atorvastatin 40 MG tablet  Commonly known as:  LIPITOR   40 mg, Oral, 2 Times Daily      Dulaglutide 0.75 MG/0.5ML solution pen-injector   0.75 mg, Subcutaneous, Every 7 Days      gabapentin 600 MG tablet  Commonly known as:  NEURONTIN   1,800 mg, Oral, 3 Times Daily      hydrochlorothiazide 25 MG tablet  Commonly known as:  HYDRODIURIL   25 mg, Oral, Daily      insulin aspart 100 UNIT/ML solution pen-injector sc pen  Commonly known as:  novoLOG FLEXPEN   25 units breakfast, 25units lunch, 25 units dinner + sliding scale each meal (Total dose is 20-32 breakfast, 30-42 lunch and dinner)      Insulin Glargine 100 UNIT/ML injection pen  Commonly known as:  LANTUS SOLOSTAR   75 Units, Subcutaneous, Nightly      lisinopril 20 MG tablet  Commonly known as:  PRINIVIL,ZESTRIL   20 mg, Oral, 2 Times Daily      meloxicam 15 MG tablet  Commonly known as:  MOBIC   15 mg, Oral, Daily      metoprolol tartrate 50 MG tablet  Commonly known as:  LOPRESSOR   50 mg, Oral, 2 Times Daily      zolpidem 10 MG tablet  Commonly known as:  AMBIEN   10 mg, Oral, Nightly PRN         ASK your doctor about these medications      Instructions Start Date   cyclobenzaprine 5 MG tablet  Commonly known as:  FLEXERIL   5 mg, Oral, 3 Times Daily PRN      omeprazole 20 MG capsule  Commonly known as:  priLOSEC   20 mg, Oral, 2 Times Daily             Discharge Diet:   Diet Instructions     Advance Diet as Tolerated       Diet:      Diet Texture / Consistency:  Regular    Common Modifiers:  Consistent Carbohydrate          Activity at Discharge:   Activity Instructions     Activity as Tolerated      Other Restrictions (Specify)      No heavy lifting over 10lbs for 2 weeks          Follow-up Appointments  Future Appointments   Date Time Provider Department Center   2/25/2019  9:30 AM Mandeep Beyer DPM [facility] POD PAD None   1/20/2020  1:30 PM Diego Price MD [facility] CD PAD [facility]     Additional Instructions for the Follow-ups that You Need to Schedule     Discharge Follow-up with Specialty: Vascular-Dr. Lucio; 2 Weeks   As directed      Specialty:  Vascular-Dr. Lucio    Follow Up:  2 Weeks                Duncan Lucio MD  01/31/19  10:13 AM              Electronically signed by Duncan Lucio MD at 1/31/2019 10:15 AM

## 2019-02-01 NOTE — THERAPY DISCHARGE NOTE
Acute Care - Physical Therapy Discharge Summary  [facility]       Patient Name: Edy Cosby  : 1955  MRN: [id number]    Today's Date: 2019  Onset of Illness/Injury or Date of Surgery: 19    Date of Referral to PT: 19  Referring Physician: Dr. Lucio      Admit Date: 2019      PT Recommendation and Plan    Visit Dx:    ICD-10-CM ICD-9-CM   1. Atherosclerosis 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 (CMS/[facility]) I70.25 440.23     707.9       Outcome Measures     Row Name 19 1600             How much help from another person do you currently need...    Turning from your back to your side while in flat bed without using bedrails?  3  -SB      Moving from lying on back to sitting on the side of a flat bed without bedrails?  3  -SB      Moving to and from a bed to a chair (including a wheelchair)?  4  -SB      Standing up from a chair using your arms (e.g., wheelchair, bedside chair)?  4  -SB      Climbing 3-5 steps with a railing?  3  -SB      To walk in hospital room?  3  -SB      AM-PAC 6 Clicks Score  20  -SB         Functional Assessment    Outcome Measure Options  AM-PAC 6 Clicks Basic Mobility (PT)  -SB        User Key  (r) = Recorded By, (t) = Taken By, (c) = Cosigned By    Initials Name Provider Type    Natividad Yoon PT Physical Therapist            Therapy Suggested Charges     Code   Minutes Charges    None             Rehab Goal Summary     Row Name 19 0838             Physical Therapy Goals    Bed Mobility Goal Selection (PT)  bed mobility, PT goal 1  -CW      Transfer Goal Selection (PT)  transfer, PT goal 1  -CW      Gait Training Goal Selection (PT)  gait training, PT goal 1  -CW         Bed Mobility Goal 1 (PT)    Activity/Assistive Device (Bed Mobility Goal 1, PT)  bed mobility activities, all  -CW      Arnold Level/Cues Needed (Bed Mobility Goal 1, PT)  conditional independence  -CW      Time Frame (Bed Mobility Goal 1, PT)  by  discharge  -CW      Progress/Outcomes (Bed Mobility Goal 1, PT)  goal met  -CW         Transfer Goal 1 (PT)    Activity/Assistive Device (Transfer Goal 1, PT)  sit-to-stand/stand-to-sit;walker, rolling  -CW      Lyle Level/Cues Needed (Transfer Goal 1, PT)  conditional independence  -CW      Time Frame (Transfer Goal 1, PT)  by discharge  -CW      Progress/Outcome (Transfer Goal 1, PT)  goal not met  -CW         Gait Training Goal 1 (PT)    Activity/Assistive Device (Gait Training Goal 1, PT)  gait (walking locomotion);walker, rolling  -CW      Lyle Level (Gait Training Goal 1, PT)  conditional independence  -CW      Distance (Gait Goal 1, PT)  300  -CW      Time Frame (Gait Training Goal 1, PT)  by discharge  -CW      Progress/Outcome (Gait Training Goal 1, PT)  goal not met  -CW        User Key  (r) = Recorded By, (t) = Taken By, (c) = Cosigned By    Initials Name Provider Type Discipline    Molly Armstrong PTA Physical Therapy Assistant PT              PT Discharge Summary  Reason for Discharge: Discharge from facility  Outcomes Achieved: Refer to plan of care for updates on goals achieved  Discharge Destination: Home      Molly Villela PTA   2/1/2019

## 2019-02-02 ENCOUNTER — APPOINTMENT (OUTPATIENT)
Dept: ULTRASOUND IMAGING | Facility: HOSPITAL | Age: 64
End: 2019-02-02

## 2019-02-02 ENCOUNTER — HOSPITAL ENCOUNTER (EMERGENCY)
Facility: HOSPITAL | Age: 64
Discharge: HOME | End: 2019-02-02
Attending: EMERGENCY MEDICINE | Admitting: EMERGENCY MEDICINE

## 2019-02-02 VITALS
TEMPERATURE: 98 F | OXYGEN SATURATION: 96 % | BODY MASS INDEX: 35.4 KG/M2 | HEART RATE: 91 BPM | HEIGHT: 69 IN | WEIGHT: 239 LBS | SYSTOLIC BLOOD PRESSURE: 107 MMHG | RESPIRATION RATE: 16 BRPM | DIASTOLIC BLOOD PRESSURE: 57 MMHG

## 2019-02-02 DIAGNOSIS — M79.89 SWELLING OF LEFT LOWER EXTREMITY: Primary | ICD-10-CM

## 2019-02-02 LAB
ALBUMIN SERPL-MCNC: 4 G/DL (ref 3.5–5)
ALBUMIN/GLOB SERPL: 1.2 G/DL (ref 1.1–2.5)
ALP SERPL-CCNC: 57 U/L (ref 24–120)
ALT SERPL W P-5'-P-CCNC: <15 U/L (ref 0–54)
ANION GAP SERPL CALCULATED.3IONS-SCNC: 7 MMOL/L (ref 4–13)
APTT PPP: 36.1 SECONDS (ref 24.1–34.8)
AST SERPL-CCNC: 38 U/L (ref 7–45)
BASOPHILS # BLD AUTO: 0.03 10*3/MM3 (ref 0–0.2)
BASOPHILS NFR BLD AUTO: 0.5 % (ref 0–2)
BILIRUB SERPL-MCNC: 0.5 MG/DL (ref 0.1–1)
BUN BLD-MCNC: 33 MG/DL (ref 5–21)
BUN/CREAT SERPL: 18.5 (ref 7–25)
CALCIUM SPEC-SCNC: 9.1 MG/DL (ref 8.4–10.4)
CHLORIDE SERPL-SCNC: 102 MMOL/L (ref 98–110)
CO2 SERPL-SCNC: 31 MMOL/L (ref 24–31)
CREAT BLD-MCNC: 1.78 MG/DL (ref 0.5–1.4)
DEPRECATED RDW RBC AUTO: 51.6 FL (ref 40–54)
EOSINOPHIL # BLD AUTO: 0.2 10*3/MM3 (ref 0–0.7)
EOSINOPHIL NFR BLD AUTO: 3.3 % (ref 0–4)
ERYTHROCYTE [DISTWIDTH] IN BLOOD BY AUTOMATED COUNT: 16.5 % (ref 12–15)
GFR SERPL CREATININE-BSD FRML MDRD: 47 ML/MIN/1.73
GLOBULIN UR ELPH-MCNC: 3.4 GM/DL
GLUCOSE BLD-MCNC: 64 MG/DL (ref 70–100)
GLUCOSE BLDC GLUCOMTR-MCNC: 37 MG/DL (ref 70–130)
GLUCOSE BLDC GLUCOMTR-MCNC: 41 MG/DL (ref 70–130)
HCT VFR BLD AUTO: 25.3 % (ref 40–52)
HGB BLD-MCNC: 8.2 G/DL (ref 14–18)
IMM GRANULOCYTES # BLD AUTO: 0.03 10*3/MM3 (ref 0–0.03)
IMM GRANULOCYTES NFR BLD AUTO: 0.5 % (ref 0–5)
INR PPP: 0.9 (ref 0.91–1.09)
LYMPHOCYTES # BLD AUTO: 1.66 10*3/MM3 (ref 0.72–4.86)
LYMPHOCYTES NFR BLD AUTO: 27.7 % (ref 15–45)
MCH RBC QN AUTO: 27.7 PG (ref 28–32)
MCHC RBC AUTO-ENTMCNC: 32.4 G/DL (ref 33–36)
MCV RBC AUTO: 85.5 FL (ref 82–95)
MONOCYTES # BLD AUTO: 0.46 10*3/MM3 (ref 0.19–1.3)
MONOCYTES NFR BLD AUTO: 7.7 % (ref 4–12)
NEUTROPHILS # BLD AUTO: 3.61 10*3/MM3 (ref 1.87–8.4)
NEUTROPHILS NFR BLD AUTO: 60.3 % (ref 39–78)
NRBC BLD AUTO-RTO: 0 /100 WBC (ref 0–0)
NT-PROBNP SERPL-MCNC: 211 PG/ML (ref 0–900)
PLATELET # BLD AUTO: 173 10*3/MM3 (ref 130–400)
PMV BLD AUTO: 12.5 FL (ref 6–12)
POTASSIUM BLD-SCNC: 4.2 MMOL/L (ref 3.5–5.3)
PROT SERPL-MCNC: 7.4 G/DL (ref 6.3–8.7)
PROTHROMBIN TIME: 12.4 SECONDS (ref 11.9–14.6)
RBC # BLD AUTO: 2.96 10*6/MM3 (ref 4.8–5.9)
SODIUM BLD-SCNC: 140 MMOL/L (ref 135–145)
WBC NRBC COR # BLD: 5.99 10*3/MM3 (ref 4.8–10.8)

## 2019-02-02 PROCEDURE — 80053 COMPREHEN METABOLIC PANEL: CPT | Performed by: EMERGENCY MEDICINE

## 2019-02-02 PROCEDURE — 99284 EMERGENCY DEPT VISIT MOD MDM: CPT

## 2019-02-02 PROCEDURE — 85025 COMPLETE CBC W/AUTO DIFF WBC: CPT | Performed by: EMERGENCY MEDICINE

## 2019-02-02 PROCEDURE — 93971 EXTREMITY STUDY: CPT

## 2019-02-02 PROCEDURE — 85610 PROTHROMBIN TIME: CPT | Performed by: EMERGENCY MEDICINE

## 2019-02-02 PROCEDURE — 93971 EXTREMITY STUDY: CPT | Performed by: SURGERY

## 2019-02-02 PROCEDURE — 85730 THROMBOPLASTIN TIME PARTIAL: CPT | Performed by: EMERGENCY MEDICINE

## 2019-02-02 PROCEDURE — 82962 GLUCOSE BLOOD TEST: CPT

## 2019-02-02 PROCEDURE — 83880 ASSAY OF NATRIURETIC PEPTIDE: CPT | Performed by: EMERGENCY MEDICINE

## 2019-02-02 RX ORDER — SODIUM CHLORIDE 0.9 % (FLUSH) 0.9 %
10 SYRINGE (ML) INJECTION AS NEEDED
Status: DISCONTINUED | OUTPATIENT
Start: 2019-02-02 | End: 2019-02-02 | Stop reason: HOSPADM

## 2019-02-02 NOTE — ED PROVIDER NOTES
Subjective   This is a 63-year-old male who presents to the emergency department for evaluation of swelling in his left lower extremity.  Patient describes that he just noticed it today.  Patient notes that he had left lower extremity vascular surgery just this last week here at [facility].  He denies any known complications.  No fall or injury.  No fever or shaking chills.  He reports intermittent, mild discomfort to his left groin area in the area of his surgical incision but indicates that he has no pain at this current time.  No bleeding, drainage, swelling, or redness at the surgical site.  No fever or shaking chills.  He does not feel short of breath.  No cough or wheezing.  No chest pain, heart racing, or dizziness.  No GI/ complaints.  No change in color of his foot or leg compared to the other side.  He has a history of left sided foot drop which is at baseline.  No new neurologic deficits.  Review of systems otherwise negative.  He has no additional concerns.            Review of Systems   All other systems reviewed and are negative.      Past Medical History:   Diagnosis Date   • A-fib (CMS/HCC)    • Atherosclerosis    • Chronic back pain    • Constipation    • Diabetes mellitus (CMS/HCC)     TYPE II   • Dysphagia    • Emphysema of lung (CMS/HCC)    • Gastroparesis    • GERD (gastroesophageal reflux disease)    • Hyperlipidemia    • Hypertension     ESSENTIAL   • Leg pain    • Muscle spasm    • Nerve pain    • Osteoarthritis    • Sleeping difficulty    • Ulcer of toe due to diabetes mellitus (CMS/HCC)        No Known Allergies    Past Surgical History:   Procedure Laterality Date   • ANGIOPLASTY ILIAC ARTERY Left 1/29/2019    Procedure: ANGIOPLASTY ILIAC ARTERY, STENT PLACEMENT;  Surgeon: Duncan Lucio MD;  Location: [facility] OR 12;  Service: Vascular   • AORTAGRAM Left 12/18/2018    Procedure: AORTOGRAM, LEFT LEG ANGIOGRAM, MYNX CLOSURE;  Surgeon: Duncan Lucio MD;  Location: [facility]  HYBRID OR 12;  Service: Vascular   • BACK SURGERY     • CHOLECYSTECTOMY     • COLONOSCOPY  09/01/2011    TICS RECALL 5YR   • COLONOSCOPY  09/30/2008    ENTER RESULTS: STOOL THROUGHTOUT THE COLON POOR PREP REC 3 YEAR RECALL   • COLONOSCOPY N/A 11/9/2016    Procedure: COLONOSCOPY;  Surgeon: León Zepeda MD;  Location: [facility] ENDOSCOPY;  Service:    • COLONOSCOPY N/A 4/19/2018    Procedure: COLONOSCOPY WITH ANESTHESIA;  Surgeon: León Zepeda MD;  Location: [facility] ENDOSCOPY;  Service: Gastroenterology   • ENDOSCOPY  06/19/2012    HH   • ENDOSCOPY  08/25/2009    HIATAL HERNIA, DILATED WITH 50 FR MASCORRO   • ENDOSCOPY  06/19/2007    WITHIN NORMAL LIMITS UREA NEG   • FEMORAL ENDARTERECTOMY Left 1/29/2019    Procedure: LEFT FEMORAL ENDARTERECTOMY;  Surgeon: Duncan Lucio MD;  Location: [facility] HYBRID OR 12;  Service: Vascular   • LUMBAR LAMINECTOMY WITH FUSION N/A 1/23/2017    Procedure: REMOVAL OF INSTRUMENTATION, EXPLORATION OF FUSION L4-S1, REVISION LEFT L5-S1 HEMILAMINECTOMY, FACETECTOMY DECOMPRESSION, REVISION UNINSTRUMENTED POSTERIOR SPINAL FUSION L5-S1;  Surgeon: RHONDA Lopes MD;  Location: [facility] OR;  Service:    • OTHER SURGICAL HISTORY      LUMBAR SACRAL SURGERY WITH FUSION X2   • PILONIDAL CYST / SINUS EXCISION      PILONIDAL CYST REMOVAL       Family History   Problem Relation Age of Onset   • Heart attack Father    • Diabetes Brother    • Colon polyps Sister    • Stomach cancer Neg Hx         GI CNACERS OR DISEASE   • Colon cancer Neg Hx        Social History     Socioeconomic History   • Marital status:      Spouse name: Not on file   • Number of children: Not on file   • Years of education: Not on file   • Highest education level: Not on file   Tobacco Use   • Smoking status: Current Every Day Smoker     Packs/day: 0.25     Years: 30.00     Pack years: 7.50     Types: Cigarettes   • Smokeless tobacco: Never Used   • Tobacco comment: 1-2 cigarettes daily   Substance and Sexual  Activity   • Alcohol use: No   • Drug use: No   • Sexual activity: Defer           Objective   Physical Exam   Constitutional: He appears well-developed and well-nourished.   HENT:   Head: Normocephalic and atraumatic.   Eyes: EOM are normal. Pupils are equal, round, and reactive to light.   Neck: Normal range of motion. Neck supple. No JVD present. No tracheal deviation present.   Cardiovascular: Regular rhythm and normal heart sounds.   Tachycardic   Pulmonary/Chest: Effort normal and breath sounds normal. No respiratory distress. He has no wheezes. He has no rales. He exhibits no tenderness.   Abdominal: Soft. Bowel sounds are normal. There is no tenderness. There is no guarding.   Musculoskeletal: He exhibits no deformity.   Trace left sided pretibial pitting edema.  1+ left pedal edema.  Normal color.  Left inguinal incision appears clean, dry, and well healing.  Staples intact.   Neurological: He is alert.   Left foot droop.  No sensory deficit in the bilateral lower extremities.   Skin: Skin is warm and dry. Capillary refill takes less than 2 seconds.   Psychiatric: He has a normal mood and affect. His behavior is normal.   Nursing note and vitals reviewed.      Procedures           ED Course      Recent discharge summary reviewed  On January 29th patient underwent left common femoral artery endarterectomy with retrograde angiogram and left common iliac artery angioplasty/stent  He tolerated the procedure well with no apparent complications   He was discharged on January 31st    Dopplerable pulses both PT and DP bilaterally    Labs reviewed  Known history of anemia and renal insufficiency  Normal LFTs and BNP    Patient sent for venous doppler    Doppler with mildly limited visualization on Doppler  Case and imaging report reviewed with Dr. Metzger  He is okay for discharge  Patient with improvement of heart rate  He indicates that he usually runs high    Blood glucose dropped  Patient fed feeling better  He  was updated on all results and is comfortable with the plan of care for discharge home            MDM  Labs and imaging reviewed  He has normal arterial dopplerable pulses  No visualized DVT  Dr. Metzger consulted and indicates that a mild amount of edema is common postop  He is comfortable with the plan of care for discharge      Final diagnoses:   Swelling of left lower extremity            Anish Durham,   02/02/19 1640

## 2019-02-02 NOTE — DISCHARGE INSTRUCTIONS
Please read and follow all directions.  Tylenol or ibuprofen for discomfort as needed.  Take all home medications as previously prescribed.  Please contact your primary care provider and Dr. Lucio in 2-3 days to arrange for outpatient follow-up.  If you do not have one you may use the list below.  Return to the emergency department sooner if symptoms worsen or for any additional concerns.      Follow up with one of the [facility] physician groups below to setup primary care. If you have trouble following up, please call the [facility] Nurse Line at [phone]    (Dr. Diego Brooks, DO, Dr. Wilbert Taylor DO,  Lesli Thorpe APRN, and Mary Ruth APRN)  [facility], Primary Care   [address] [address], [address], [place] [address] [phone]     (Dr. Aziza Quick MD, Annalise Farmer, APRN, and Duncan Munoz APRN)  [facility], Primary Care   [address] [place] [address], [place] [address] [phone]    (Dr. Ady Milner MD and Dr. Aurelio Wilhelm MD)  [facility], Primary Care  [address], [address] [phone]    (Dr. Anival Aly MD)  [facility], Primary Care  [address] [facility] [facility] [address], [place], [place], [address] [phone]

## 2019-02-04 ENCOUNTER — READMISSION MANAGEMENT (OUTPATIENT)
Dept: CALL CENTER | Facility: HOSPITAL | Age: 64
End: 2019-02-04

## 2019-02-04 NOTE — OUTREACH NOTE
Medical Week 1 Survey      Responses   Facility patient discharged from?  [facility]   Does the patient have one of the following disease processes/diagnoses(primary or secondary)?  Other   Is there a successful TCM telephone encounter documented?  No   Week 1 attempt successful?  Yes   Call start time  1430   Call end time  1440   Meds reviewed with patient/caregiver?  Yes   Is the patient having any side effects they believe may be caused by any medication additions or changes?  No   Does the patient have all medications ordered at discharge?  Yes   Does the patient have an appointment with their PCP within 7 days of discharge?  Yes   Has the patient kept scheduled appointments due by today?  N/A   Psychosocial issues?  No   Did the patient receive a copy of their discharge instructions?  Yes   Nursing interventions  Reviewed instructions with patient   What is the patient's perception of their health status since discharge?  Improving   Is the patient/caregiver able to teach back signs and symptoms related to disease process for when to call PCP?  Yes   Is the patient/caregiver able to teach back signs and symptoms related to disease process for when to call 911?  Yes   Is the patient/caregiver able to teach back the hierarchy of who to call/visit for symptoms/problems? PCP, Specialist, Home health nurse, Urgent Care, ED, 911  Yes   Week 1 call completed?  Yes          Juliana Jefferson RN

## 2019-02-08 ENCOUNTER — CARE COORDINATION (OUTPATIENT)
Dept: CARE COORDINATION | Age: 64
End: 2019-02-08

## 2019-02-11 ENCOUNTER — CARE COORDINATION (OUTPATIENT)
Dept: PRIMARY CARE CLINIC | Age: 64
End: 2019-02-11

## 2019-02-11 ENCOUNTER — OFFICE VISIT (OUTPATIENT)
Dept: PRIMARY CARE CLINIC | Age: 64
End: 2019-02-11
Payer: MEDICARE

## 2019-02-11 VITALS
DIASTOLIC BLOOD PRESSURE: 70 MMHG | TEMPERATURE: 98 F | BODY MASS INDEX: 33.82 KG/M2 | SYSTOLIC BLOOD PRESSURE: 160 MMHG | HEIGHT: 69 IN | RESPIRATION RATE: 22 BRPM | HEART RATE: 68 BPM

## 2019-02-11 DIAGNOSIS — F17.210 CIGARETTE SMOKER: ICD-10-CM

## 2019-02-11 DIAGNOSIS — I10 ESSENTIAL HYPERTENSION: ICD-10-CM

## 2019-02-11 DIAGNOSIS — I73.9 PVD (PERIPHERAL VASCULAR DISEASE) (HCC): ICD-10-CM

## 2019-02-11 DIAGNOSIS — E11.51 TYPE 2 DIABETES MELLITUS WITH DIABETIC PERIPHERAL ANGIOPATHY WITHOUT GANGRENE, WITHOUT LONG-TERM CURRENT USE OF INSULIN (HCC): Primary | Chronic | ICD-10-CM

## 2019-02-11 PROCEDURE — G8417 CALC BMI ABV UP PARAM F/U: HCPCS | Performed by: FAMILY MEDICINE

## 2019-02-11 PROCEDURE — 2022F DILAT RTA XM EVC RTNOPTHY: CPT | Performed by: FAMILY MEDICINE

## 2019-02-11 PROCEDURE — 3017F COLORECTAL CA SCREEN DOC REV: CPT | Performed by: FAMILY MEDICINE

## 2019-02-11 PROCEDURE — G8427 DOCREV CUR MEDS BY ELIG CLIN: HCPCS | Performed by: FAMILY MEDICINE

## 2019-02-11 PROCEDURE — 3046F HEMOGLOBIN A1C LEVEL >9.0%: CPT | Performed by: FAMILY MEDICINE

## 2019-02-11 PROCEDURE — 1111F DSCHRG MED/CURRENT MED MERGE: CPT | Performed by: FAMILY MEDICINE

## 2019-02-11 PROCEDURE — 99406 BEHAV CHNG SMOKING 3-10 MIN: CPT | Performed by: FAMILY MEDICINE

## 2019-02-11 PROCEDURE — G8482 FLU IMMUNIZE ORDER/ADMIN: HCPCS | Performed by: FAMILY MEDICINE

## 2019-02-11 PROCEDURE — 99214 OFFICE O/P EST MOD 30 MIN: CPT | Performed by: FAMILY MEDICINE

## 2019-02-11 PROCEDURE — 1036F TOBACCO NON-USER: CPT | Performed by: FAMILY MEDICINE

## 2019-02-11 RX ORDER — LABETALOL 100 MG/1
100 TABLET, FILM COATED ORAL 2 TIMES DAILY
Qty: 60 TABLET | Refills: 3 | Status: SHIPPED | OUTPATIENT
Start: 2019-02-11 | End: 2019-04-10

## 2019-02-11 RX ORDER — LISINOPRIL 40 MG/1
40 TABLET ORAL DAILY
Qty: 90 TABLET | Refills: 1 | Status: SHIPPED | OUTPATIENT
Start: 2019-02-11 | End: 2019-03-01

## 2019-02-11 ASSESSMENT — ENCOUNTER SYMPTOMS
VOMITING: 0
NAUSEA: 0
SHORTNESS OF BREATH: 0
CONSTIPATION: 0
COLOR CHANGE: 0
COUGH: 0
ABDOMINAL PAIN: 0
CHEST TIGHTNESS: 0
WHEEZING: 0
DIARRHEA: 0

## 2019-02-11 ASSESSMENT — PATIENT HEALTH QUESTIONNAIRE - PHQ9
SUM OF ALL RESPONSES TO PHQ9 QUESTIONS 1 & 2: 0
1. LITTLE INTEREST OR PLEASURE IN DOING THINGS: 0
SUM OF ALL RESPONSES TO PHQ QUESTIONS 1-9: 0
2. FEELING DOWN, DEPRESSED OR HOPELESS: 0
SUM OF ALL RESPONSES TO PHQ QUESTIONS 1-9: 0

## 2019-02-12 ENCOUNTER — READMISSION MANAGEMENT (OUTPATIENT)
Dept: CALL CENTER | Facility: HOSPITAL | Age: 64
End: 2019-02-12

## 2019-02-12 NOTE — OUTREACH NOTE
Medical Week 2 Survey      Responses   Facility patient discharged from?  [facility]   Does the patient have one of the following disease processes/diagnoses(primary or secondary)?  Other   Week 2 attempt successful?  Yes   Call start time  1517   Call end time  1522   Meds reviewed with patient/caregiver?  Yes   Is the patient taking all medications as directed (includes completed medication regime)?  Yes   Does the patient have a primary care provider?   Yes   Has the patient kept scheduled appointments due by today?  N/A   Comments  Has appt with Dr. Lucio on 2/15/19 for staple removal    Psychosocial issues?  No   What is the patient's perception of their health status since discharge?  Improving   Week 2 Call Completed?  Yes          Juliana Jefferson RN

## 2019-02-14 ENCOUNTER — TELEPHONE (OUTPATIENT)
Dept: VASCULAR SURGERY | Facility: CLINIC | Age: 64
End: 2019-02-14

## 2019-02-15 ENCOUNTER — OFFICE VISIT (OUTPATIENT)
Dept: VASCULAR SURGERY | Facility: CLINIC | Age: 64
End: 2019-02-15

## 2019-02-15 VITALS
DIASTOLIC BLOOD PRESSURE: 62 MMHG | HEIGHT: 69 IN | WEIGHT: 225 LBS | OXYGEN SATURATION: 97 % | SYSTOLIC BLOOD PRESSURE: 150 MMHG | BODY MASS INDEX: 33.33 KG/M2 | HEART RATE: 76 BPM

## 2019-02-15 DIAGNOSIS — I70.25 ATHEROSCLEROSIS OF NATIVE ARTERIES OF THE EXTREMITIES WITH ULCERATION (HCC): Primary | ICD-10-CM

## 2019-02-15 PROCEDURE — 99024 POSTOP FOLLOW-UP VISIT: CPT | Performed by: SURGERY

## 2019-02-15 RX ORDER — AMLODIPINE BESYLATE 10 MG/1
10 TABLET ORAL DAILY
COMMUNITY
Start: 2019-01-17

## 2019-02-15 RX ORDER — LABETALOL 100 MG/1
100 TABLET, FILM COATED ORAL 2 TIMES DAILY
COMMUNITY
Start: 2019-02-11

## 2019-02-15 NOTE — PATIENT INSTRUCTIONS

## 2019-02-15 NOTE — PROGRESS NOTES
02/15/2019      Mark Villela MD  [address] [place] [address] DR MOSQUEDA Alexandra  [place] [address]        Edy A Harjeet  1955    Chief Complaint   Patient presents with   • Follow-up     2 wk hfu/po f/u of L femoral endarterectomy.  Pt states that the only issue he has had is trying to get comfortable with the shira.       Dear Mark Villela MD:    HPI     I had the pleasure of seeing you patient in the office today for follow up.  As you recall, the patient is a 63 y.o. male who we are currently following for prior left plantar foot wound.  He was noted to have common femoral artery disease as well as left common iliac artery stenosis on a previous angiogram and so recently underwent left common femoral artery endarterectomy with retrograde stenting of the left common iliac artery 2 weeks ago.  He tolerated the procedure well with no apparent complications.  He returns today for follow-up.  He reports that his left lower extremity feels well with no claudication or rest pain.  He reports some mild tenderness in the left groin at the incision site but otherwise feels well.  He is ambulating without issues at his baseline.  His left foot wound has healed and he has no further wounds present.  He continues on aspirin and statin his previous.      Review of Systems   Constitutional: Negative.  Negative for activity change, appetite change, chills and fever.   HENT: Negative.  Negative for congestion, sneezing and sore throat.    Eyes: Negative.    Respiratory: Negative.  Negative for chest tightness and shortness of breath.    Cardiovascular: Negative.  Negative for chest pain, palpitations and leg swelling.   Gastrointestinal: Negative.  Negative for abdominal distention and abdominal pain.   Endocrine: Negative.    Genitourinary: Negative.    Musculoskeletal: Negative.         He complains of claudication to the bilateral lower extremities, left worse than right at 1 block distance   Skin: Negative.   "  Allergic/Immunologic: Negative.    Neurological: Negative.    Hematological: Negative.    Psychiatric/Behavioral: Negative.        /62   Pulse 76   Ht 175.3 cm (69\")   Wt 102 kg (225 lb)   SpO2 97%   BMI 33.23 kg/m²   Physical Exam   Constitutional: He is oriented to person, place, and time. He appears well-developed and well-nourished.   HENT:   Head: Normocephalic and atraumatic.   Eyes: EOM are normal. Pupils are equal, round, and reactive to light.   Neck: Normal range of motion. Neck supple. No JVD present.   Cardiovascular: Normal rate and regular rhythm.   Pulses:       Carotid pulses are 2+ on the right side, and 2+ on the left side.       Radial pulses are 2+ on the right side, and 2+ on the left side.        Femoral pulses are 2+ on the right side, and 2+ on the left side.       Dorsalis pedis pulses are 0 on the right side, and 2+ on the left side.        Posterior tibial pulses are 0 on the right side, and 0 on the left side.   On the right he has Doppler signals in the DP and PT.  On the left he has a palpable DP and a Doppler signal at the PT.   Pulmonary/Chest: Effort normal. No respiratory distress.   Abdominal: Soft. He exhibits no distension and no mass. There is no tenderness.   Musculoskeletal: He exhibits no edema or tenderness.   He has a left footdrop present due to a previous spine surgery.   Neurological: He is alert and oriented to person, place, and time. He exhibits abnormal muscle tone (Left footdrop present).   Skin: Skin is warm and dry. Capillary refill takes 2 to 3 seconds.   Right groin incision is clean and intact.  Staples that were in place were removed today.  He does have some kirstin-incisional induration noted but no other evidence of erythema or hematoma.    The prior wound at the base of the left first toe is now healed.   Psychiatric: He has a normal mood and affect. His behavior is normal. Judgment and thought content normal.   Vitals reviewed.      DIAGNOSTIC " DATA:    Ir Angiogram Extremity Left    Result Date: 1/30/2019  Narrative: Performed by Dr. Lucio. Please see procedure note. This report was finalized on 01/30/2019 11:37 by Dr. Duncan Lucio MD.    Us Venous Doppler Lower Extremity Left (duplex)    Result Date: 2/3/2019  Narrative: History: Pain and swelling      Impression: Impression: 1. There is no evidence of deep venous thrombosis or superficial thrombophlebitis of the left lower extremity. 2. This is a limited exam due to patient's body habitus and swelling. Also, unable to demonstrate compression of common femoral vein due to patient discomfort. 3. There is a small hematoma in the left groin measuring 3.1 x 2.3 x 1.67 cm.  Comments: Left lower extremity venous duplex exam was performed using color Doppler flow, Doppler wave form analysis, and grayscale imaging, with and without compression. There is no evidence of deep venous thrombosis of the common femoral, superficial femoral, popliteal, and posterior tibial veins. There is no thrombus identified in the saphenofemoral junction or the greater saphenous vein. There is no evidence of clot in the right common femoral vein.  This report was finalized on 02/03/2019 11:22 by Dr. Constantino Metzger MD.    Fl C Arm During Surgery    Result Date: 1/30/2019  Narrative: Performed by Dr. Lucio. Please see procedure note. This report was finalized on 01/30/2019 11:37 by Dr. Duncan Lucio MD.      Patient Active Problem List   Diagnosis   • Spinal stenosis of lumbar region   • Back pain   • Degeneration of intervertebral disc of lumbosacral region   • Essential hypertension   • Lumbar disc herniation with radiculopathy   • Type 2 diabetes mellitus without complication, without long-term current use of insulin (CMS/[facility])   • Constipation due to opioid therapy   • Gastroesophageal reflux disease without esophagitis   • Panlobular emphysema (CMS/HCC)   • A-fib (CMS/HCC)   • Paroxysmal atrial fibrillation (CMS/HCC)   •  Gastroesophageal reflux disease   • Hx of colonic polyps   • HTN (hypertension), benign   • Morbidly obese (CMS/HCC)   • Atherosclerosis of native artery of extremity (CMS/HCC)   • Atherosclerosis of lower extremity with ulceration (CMS/HCC)   • Atherosclerosis 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 (CMS/HCC)   • Pre-op evaluation   • Ischemia of extremity         ICD-10-CM ICD-9-CM   1. Atherosclerosis 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 (CMS/HCC) I70.25 440.23     707.9       Lab Frequency Next Occurrence   Diet: Once 04/19/2018   Advance Diet as Tolerated Once 04/19/2018   Follow Anesthesia Guidelines / Standing Orders Once 12/07/2018   Obtain Informed Consent Once 12/12/2018   Follow Anesthesia Guidelines / Standing Orders Once 12/17/2018   Obtain Informed Consent Once 12/22/2018   Follow Anesthesia Guidelines / Standing Orders Once 01/21/2019   Obtain Informed Consent Once 01/26/2019   Provide NPO Instructions to Patient Once 01/26/2019   XR Chest 1 View Once 01/26/2019   US Ankle / Brachial Indices Extremity Complete Once 05/14/2019       PLAN: After thoroughly evaluating Edy Cosby, I believe the best course of action is to remain conservative from a vascular standpoint.  He has done very well since his left femoral endarterectomy and iliac stent and has healed his left foot wound.  He is ambulating at his baseline without any issue.  I did remove his staples in the office today and his wound is healing well.  He does have some induration as the wound heals however this is improving with time.  I will plan to see him back in the office in 3 months for repeat noninvasive testing for surveillance.  Given his prior NIC on the right lower extremity of 0.5 he may at some point require intervention on that limb and we will continue to follow him closely..  The patient is to continue taking their medications as previously discussed.   I did discuss vascular risk factors as  they pertain to the progression of vascular disease including controlling hypertension, and diabetes. Patient's Body mass index is 33.23 kg/m². BMI is above normal parameters. Recommendations include: educational material.   This was all discussed in full with complete understanding.  Thank you for allowing me to participate in the care of your patient.  Please do not hesitate to call with any questions or concerns.  We will keep you aware of any further encounters with Edy Cosby.      Sincerely Yours,      Duncan Lucio MD

## 2019-02-19 ENCOUNTER — READMISSION MANAGEMENT (OUTPATIENT)
Dept: CALL CENTER | Facility: HOSPITAL | Age: 64
End: 2019-02-19

## 2019-02-19 NOTE — OUTREACH NOTE
Medical Week 3 Survey      Responses   Facility patient discharged from?  [facility]   Does the patient have one of the following disease processes/diagnoses(primary or secondary)?  Other   Week 3 attempt successful?  No   Unsuccessful attempts  Attempt 1          Norah Gonsales RN

## 2019-02-20 ENCOUNTER — READMISSION MANAGEMENT (OUTPATIENT)
Dept: CALL CENTER | Facility: HOSPITAL | Age: 64
End: 2019-02-20

## 2019-02-20 NOTE — OUTREACH NOTE
"Medical Week 3 Survey      Responses   Facility patient discharged from?  [facility]   Does the patient have one of the following disease processes/diagnoses(primary or secondary)?  Other   Week 3 attempt successful?  Yes   Call start time  1235   Call end time  1238   Discharge diagnosis  LEFT FEMORAL ENDARTERECTOMY ANGIOPLASTY ILIAC ARTERY, STENT PLACEMENT        Meds reviewed with patient/caregiver?  Yes   Is the patient taking all medications as directed (includes completed medication regime)?  Yes   Has the patient kept scheduled appointments due by today?  Yes   What is the patient's perception of their health status since discharge?  Improving   Additional teach back comments  Pt says his Toe is \"better\". He is walking without difficulity. So swelling in leg. Has seen Dr   Week 3 Call Completed?  Yes   Revoked  No further contact(revokes)-requires comment          Tracy Denney RN  "

## 2019-02-21 ENCOUNTER — TELEPHONE (OUTPATIENT)
Dept: PODIATRY | Facility: CLINIC | Age: 64
End: 2019-02-21

## 2019-02-21 NOTE — PROGRESS NOTES
[facility] - PODIATRY    Today's Date: 02/25/19    Patient Name: Edy Cosby  MRN: [id number]  CSN: [id number]  PCP: Mark Villela MD  Referring Provider: No ref. provider found    SUBJECTIVE     Chief Complaint   Patient presents with   • Follow-up     3 month f/u for diabetic foot and nail care. left foot drop, thickened toe nails and callus on the bottom of left foot,   pt c/o of left foot pain.  pain scale 2/10   • Diabetes     last blood sugar 292mg/dl - PCP Dr. Mark Villela last visit 2/11/19     HPI: Edy Cosby, a 63 y.o.male, comes to clinic as a(n) established patient presenting for diabetic foot exam and complaining of thickened toenails and possible reoccurrence of ulceration to left great toe. Patient has h/o AFib, back pain, DM2, Emphysema lung, GERD, HTN, OA. Patient is IDDM with last stated BG level of 292mg/dl. Admits to numbness and tingling in feet. Admits to left drop foot which he uses AFO. Relates thick, discolored, fungal toenails to both feet. He is unable to cut them himself. Notes a thick callus to the bottom of his left great toe. Notes occasional bleeding and drainage. Denies pus. Admits pain at 2/10 level and described as burning, aching, numbness and tingling. Relates previous treatment(s) including foot care by podiatrist. Denies any constitutional symptoms. No other pedal complaints at this time.    Past Medical History:   Diagnosis Date   • A-fib (CMS/HCC)    • Atherosclerosis    • Chronic back pain    • Constipation    • Diabetes mellitus (CMS/HCC)     TYPE II   • Dysphagia    • Emphysema of lung (CMS/HCC)    • Gastroparesis    • GERD (gastroesophageal reflux disease)    • Hyperlipidemia    • Hypertension     ESSENTIAL   • Leg pain    • Muscle spasm    • Nerve pain    • Osteoarthritis    • Sleeping difficulty    • Ulcer of toe due to diabetes mellitus (CMS/HCC)      Past Surgical History:   Procedure Laterality Date   • ANGIOPLASTY ILIAC ARTERY Left  1/29/2019    Procedure: ANGIOPLASTY ILIAC ARTERY, STENT PLACEMENT;  Surgeon: Duncan Lucio MD;  Location: [facility] HYBRID OR 12;  Service: Vascular   • AORTAGRAM Left 12/18/2018    Procedure: AORTOGRAM, LEFT LEG ANGIOGRAM, MYNX CLOSURE;  Surgeon: Duncan Lucio MD;  Location: [facility] HYBRID OR 12;  Service: Vascular   • BACK SURGERY     • CHOLECYSTECTOMY     • COLONOSCOPY  09/01/2011    TICS RECALL 5YR   • COLONOSCOPY  09/30/2008    ENTER RESULTS: STOOL THROUGHTOUT THE COLON POOR PREP REC 3 YEAR RECALL   • COLONOSCOPY N/A 11/9/2016    Procedure: COLONOSCOPY;  Surgeon: León Zepeda MD;  Location: [facility] ENDOSCOPY;  Service:    • COLONOSCOPY N/A 4/19/2018    Procedure: COLONOSCOPY WITH ANESTHESIA;  Surgeon: León Zepeda MD;  Location: [facility] ENDOSCOPY;  Service: Gastroenterology   • ENDOSCOPY  06/19/2012    HH   • ENDOSCOPY  08/25/2009    HIATAL HERNIA, DILATED WITH 50 FR MASCORRO   • ENDOSCOPY  06/19/2007    WITHIN NORMAL LIMITS UREA NEG   • FEMORAL ENDARTERECTOMY Left 1/29/2019    Procedure: LEFT FEMORAL ENDARTERECTOMY;  Surgeon: Duncan Lucio MD;  Location: [facility] HYBRID OR 12;  Service: Vascular   • LUMBAR LAMINECTOMY WITH FUSION N/A 1/23/2017    Procedure: REMOVAL OF INSTRUMENTATION, EXPLORATION OF FUSION L4-S1, REVISION LEFT L5-S1 HEMILAMINECTOMY, FACETECTOMY DECOMPRESSION, REVISION UNINSTRUMENTED POSTERIOR SPINAL FUSION L5-S1;  Surgeon: RHONDA Lopes MD;  Location: [facility] OR;  Service:    • OTHER SURGICAL HISTORY      LUMBAR SACRAL SURGERY WITH FUSION X2   • PILONIDAL CYST / SINUS EXCISION      PILONIDAL CYST REMOVAL     Family History   Problem Relation Age of Onset   • Heart attack Father    • Diabetes Brother    • Colon polyps Sister    • Stomach cancer Neg Hx         GI CNACERS OR DISEASE   • Colon cancer Neg Hx      Social History     Socioeconomic History   • Marital status:      Spouse name: Not on file   • Number of children: Not on file   • Years of education:  Not on file   • Highest education level: Not on file   Social Needs   • Financial resource strain: Not on file   • Food insecurity - worry: Not on file   • Food insecurity - inability: Not on file   • Transportation needs - medical: Not on file   • Transportation needs - non-medical: Not on file   Occupational History   • Not on file   Tobacco Use   • Smoking status: Former Smoker     Years: 30.00     Types: Cigarettes   • Smokeless tobacco: Never Used   Substance and Sexual Activity   • Alcohol use: No   • Drug use: No   • Sexual activity: Defer   Other Topics Concern   • Not on file   Social History Narrative   • Not on file     No Known Allergies  Current Outpatient Medications   Medication Sig Dispense Refill   • amLODIPine (NORVASC) 5 MG tablet      • aspirin 325 MG tablet Take 325 mg by mouth daily.     • atorvastatin (LIPITOR) 40 MG tablet Take 40 mg by mouth 2 (Two) Times a Day.     • cyclobenzaprine (FLEXERIL) 5 MG tablet Take 5 mg by mouth 3 (Three) Times a Day As Needed for Muscle Spasms.     • Dulaglutide 0.75 MG/0.5ML solution pen-injector Inject 0.75 mg under the skin into the appropriate area as directed Every 7 (Seven) Days.     • gabapentin (NEURONTIN) 600 MG tablet Take 1,800 mg by mouth 3 (Three) Times a Day.     • hydrochlorothiazide (HYDRODIURIL) 25 MG tablet Take 25 mg by mouth Daily.     • HYDROcodone-acetaminophen (NORCO)  MG per tablet Take 2 tablets by mouth Every 4 (Four) Hours As Needed for moderate pain (4-6). (Patient taking differently: Take 2 tablets by mouth Every 6 (Six) Hours As Needed for Moderate Pain .)  0   • insulin aspart (novoLOG FLEXPEN) 100 UNIT/ML solution pen-injector sc pen 25 units breakfast, 25units lunch, 25 units dinner + sliding scale each meal (Total dose is 20-32 breakfast, 30-42 lunch and dinner)     • Insulin Glargine (LANTUS SOLOSTAR) 100 UNIT/ML injection pen Inject 75 Units under the skin into the appropriate area as directed Every Night.     •  labetalol (NORMODYNE) 100 MG tablet Take 100 mg by mouth 2 (Two) Times a Day.     • lisinopril (PRINIVIL,ZESTRIL) 20 MG tablet Take 20 mg by mouth 2 (Two) Times a Day.     • meloxicam (MOBIC) 15 MG tablet Take 15 mg by mouth Daily.     • nicotine polacrilex (NICORETTE) 4 MG gum Take 4 mg by mouth.     • omeprazole (priLOSEC) 20 MG capsule Take 20 mg by mouth 2 (Two) Times a Day.     • zolpidem (AMBIEN) 10 MG tablet Take 10 mg by mouth At Night As Needed (FOR SLEEP).       No current facility-administered medications for this visit.      Review of Systems   Constitutional: Negative for chills and fever.   HENT: Negative for congestion.    Respiratory: Negative for shortness of breath.    Cardiovascular: Negative for chest pain and leg swelling.   Gastrointestinal: Negative for constipation, diarrhea, nausea and vomiting.   Musculoskeletal: Positive for gait problem.   Skin: Negative for wound.   Neurological: Positive for numbness.       OBJECTIVE     Vitals:    02/25/19 0917   BP: 140/70   Pulse: 62   SpO2: 96%       PHYSICAL EXAM  GEN:   Accompanied by none.     General Exam  Diabetic Foot Exam: performed  Appearance: appears stated age and healthy   Orientation: alert and oriented to person, place, and time   Affect: appropriate   Gait: (Drop foot left)  Shoe Gear: casual shoes (Left AFO)      Foot/Ankle Exam  Inspection and Palpation  Ecchymosis - Right: none Left: none  Tenderness - Right: none   Left: none   Arch - Right: normal Left: normal  Hammertoes - Right: absent Left: absent  Claw Toes - Right: absent Left: absent  Mallet Toes - Right: absent Left: absent  Hallux Valgus - Right: no Left: no  Hallux Limitus - Right: no Left: no  Skin - Right: skin intact  Left: maceration and ulcer   Nails -  Right: abnormally thick and onychomycosis Left: abnormally thick and onychomycosis     Vascular  Dorsalis Pedis - Right: 2+ Left: 2+  Posterior Tibial - Right: 2+ Left: 2+  Skin Temperature -  Right: warm  Left: warm     CFT - Right: 3 Left: 3  Pedal Hair Growth - Right: present Left: present  Varicosities -  Right: no varicosities  Left: no varicosities      Neurologic  Protective Sensation using [place] Monofilament - Right: 0 Left: 0    Muscle Strength  Dorsiflexion - Right: 5 Left: 2  Plantar Flexion - Right: 5 Left: 3  Eversion - Right: 5 Left: 3  Inversion - Right: 5 Left: 3  Great Toe Extension - Right: 5   Great Toe Flexion - Right: 5     Range of Motion  Normal right ankle ROM  Normal left ankle ROM  Passive  Passive 1st IP Extension - Left: 0  Passive 1st IP Flexion - Left: 10     Left Foot/Ankle Comments  After debridement of hyperkeratotic tissue, small ulceration with macerated border and light biofilm remains. Mostly granular wound base with healthy bleeding. No probe to bone.        RADIOLOGY/NUCLEAR:  Ir Angiogram Extremity Left    Result Date: 1/30/2019  Narrative: Performed by Dr. Lucio. Please see procedure note. This report was finalized on 01/30/2019 11:37 by Dr. Duncan Lucio MD.    Us Venous Doppler Lower Extremity Left (duplex)    Result Date: 2/3/2019  Narrative: History: Pain and swelling      Impression: Impression: 1. There is no evidence of deep venous thrombosis or superficial thrombophlebitis of the left lower extremity. 2. This is a limited exam due to patient's body habitus and swelling. Also, unable to demonstrate compression of common femoral vein due to patient discomfort. 3. There is a small hematoma in the left groin measuring 3.1 x 2.3 x 1.67 cm.  Comments: Left lower extremity venous duplex exam was performed using color Doppler flow, Doppler wave form analysis, and grayscale imaging, with and without compression. There is no evidence of deep venous thrombosis of the common femoral, superficial femoral, popliteal, and posterior tibial veins. There is no thrombus identified in the saphenofemoral junction or the greater saphenous vein. There is no evidence of clot in the right  common femoral vein.  This report was finalized on 02/03/2019 11:22 by Dr. Constantino Metzger MD.    Fl C Arm During Surgery    Result Date: 1/30/2019  Narrative: Performed by Dr. Lucio. Please see procedure note. This report was finalized on 01/30/2019 11:37 by Dr. Duncan Lucio MD.      LABORATORY/CULTURE RESULTS:      PATHOLOGY RESULTS:       ASSESSMENT/PLAN     Edy was seen today for follow-up and diabetes.    Diagnoses and all orders for this visit:    Onychomycosis    Diabetes mellitus type 2 with neurological manifestations (CMS/[facility])  -     Ambulatory Referral to Wound Clinic    Foot drop, left    Non-pressure chronic ulcer of other part of left foot with fat layer exposed (CMS/[facility])  -     Ambulatory Referral to Wound Clinic      Comprehensive lower extremity examination and evaluation was performed.  Discussed findings and treatment plan including risks, benefits, and treatment options with patient in detail. Patient agreed with treatment plan.  After verbal consent obtained, nail(s) x10 debrided of length and thickness with nail nipper without incidence  Patient may maintain nails and calluses at home utilizing emery board or pumice stone between visits as needed  Reviewed at home diabetic foot care including daily foot checks  After verbal consent obtained, excisional debridement performed to remove skin, slough, non-viable, and subQ tissue down to level of healthy bleeding tissue with dermal curette and/or sharp instrumentation. Hemostasis achieved with compression.   Applied neosporing and gauze dressing. To be reapplied daily.    Referral to [facility] for continued healing of wound.   An After Visit Summary was printed and given to the patient at discharge, including (if requested) any available informative/educational handouts regarding diagnosis, treatment, or medications. All questions were answered to patient/family satisfaction. Should symptoms fail to improve or worsen they agree to call or return  to clinic or to go to the Emergency Department. Discussed the importance of following up with any needed screening tests/labs/specialist appointments and any requested follow-up recommended by me today. Importance of maintaining follow-up discussed and patient accepts that missed appointments can delay diagnosis and potentially lead to worsening of conditions.  Return in about 3 months (around 5/25/2019)., or sooner if acute issues arise.    Lab Frequency Next Occurrence   Diet: Once 04/19/2018   Advance Diet as Tolerated Once 04/19/2018       This document has been electronically signed by Mandeep Beyer DPM on February 25, 2019 9:36 AM

## 2019-02-25 ENCOUNTER — OFFICE VISIT (OUTPATIENT)
Dept: PODIATRY | Facility: CLINIC | Age: 64
End: 2019-02-25

## 2019-02-25 VITALS
SYSTOLIC BLOOD PRESSURE: 140 MMHG | WEIGHT: 229 LBS | OXYGEN SATURATION: 96 % | DIASTOLIC BLOOD PRESSURE: 70 MMHG | HEIGHT: 69 IN | BODY MASS INDEX: 33.92 KG/M2 | HEART RATE: 62 BPM

## 2019-02-25 DIAGNOSIS — M21.372 FOOT DROP, LEFT: ICD-10-CM

## 2019-02-25 DIAGNOSIS — B35.1 ONYCHOMYCOSIS: Primary | ICD-10-CM

## 2019-02-25 DIAGNOSIS — E11.49 DIABETES MELLITUS TYPE 2 WITH NEUROLOGICAL MANIFESTATIONS (HCC): ICD-10-CM

## 2019-02-25 DIAGNOSIS — L97.522 NON-PRESSURE CHRONIC ULCER OF OTHER PART OF LEFT FOOT WITH FAT LAYER EXPOSED (HCC): ICD-10-CM

## 2019-02-25 PROCEDURE — 99213 OFFICE O/P EST LOW 20 MIN: CPT | Performed by: PODIATRIST

## 2019-02-25 PROCEDURE — 11042 DBRDMT SUBQ TIS 1ST 20SQCM/<: CPT | Performed by: PODIATRIST

## 2019-02-25 PROCEDURE — 11721 DEBRIDE NAIL 6 OR MORE: CPT | Performed by: PODIATRIST

## 2019-02-26 ENCOUNTER — OFFICE VISIT (OUTPATIENT)
Dept: WOUND CARE | Facility: HOSPITAL | Age: 64
End: 2019-02-26

## 2019-02-26 PROCEDURE — G0463 HOSPITAL OUTPT CLINIC VISIT: HCPCS

## 2019-03-01 DIAGNOSIS — I10 ESSENTIAL HYPERTENSION: ICD-10-CM

## 2019-03-01 RX ORDER — LISINOPRIL 20 MG/1
TABLET ORAL
Qty: 60 TABLET | Refills: 1 | Status: SHIPPED | OUTPATIENT
Start: 2019-03-01 | End: 2019-04-10 | Stop reason: SDUPTHER

## 2019-03-06 ENCOUNTER — OFFICE VISIT (OUTPATIENT)
Dept: WOUND CARE | Facility: HOSPITAL | Age: 64
End: 2019-03-06

## 2019-03-06 PROCEDURE — G0463 HOSPITAL OUTPT CLINIC VISIT: HCPCS

## 2019-03-13 ENCOUNTER — OFFICE VISIT (OUTPATIENT)
Dept: PRIMARY CARE CLINIC | Age: 64
End: 2019-03-13
Payer: MEDICARE

## 2019-03-13 ENCOUNTER — CARE COORDINATION (OUTPATIENT)
Dept: CARE COORDINATION | Age: 64
End: 2019-03-13

## 2019-03-13 VITALS
BODY MASS INDEX: 34.8 KG/M2 | SYSTOLIC BLOOD PRESSURE: 138 MMHG | HEIGHT: 69 IN | WEIGHT: 235 LBS | RESPIRATION RATE: 18 BRPM | DIASTOLIC BLOOD PRESSURE: 78 MMHG | HEART RATE: 72 BPM | TEMPERATURE: 98.7 F | OXYGEN SATURATION: 96 %

## 2019-03-13 DIAGNOSIS — R09.82 POSTNASAL DRIP: ICD-10-CM

## 2019-03-13 DIAGNOSIS — I73.9 PVD (PERIPHERAL VASCULAR DISEASE) (HCC): ICD-10-CM

## 2019-03-13 DIAGNOSIS — E11.51 TYPE 2 DIABETES MELLITUS WITH DIABETIC PERIPHERAL ANGIOPATHY WITHOUT GANGRENE, WITHOUT LONG-TERM CURRENT USE OF INSULIN (HCC): Primary | ICD-10-CM

## 2019-03-13 DIAGNOSIS — E66.09 CLASS 1 OBESITY DUE TO EXCESS CALORIES WITH SERIOUS COMORBIDITY AND BODY MASS INDEX (BMI) OF 34.0 TO 34.9 IN ADULT: ICD-10-CM

## 2019-03-13 DIAGNOSIS — F17.210 CIGARETTE SMOKER: ICD-10-CM

## 2019-03-13 DIAGNOSIS — I10 ESSENTIAL HYPERTENSION: ICD-10-CM

## 2019-03-13 DIAGNOSIS — N18.30 CHRONIC KIDNEY DISEASE, STAGE III (MODERATE) (HCC): ICD-10-CM

## 2019-03-13 LAB — HBA1C MFR BLD: 7.5 %

## 2019-03-13 PROCEDURE — 3017F COLORECTAL CA SCREEN DOC REV: CPT | Performed by: FAMILY MEDICINE

## 2019-03-13 PROCEDURE — 99214 OFFICE O/P EST MOD 30 MIN: CPT | Performed by: FAMILY MEDICINE

## 2019-03-13 PROCEDURE — 1036F TOBACCO NON-USER: CPT | Performed by: FAMILY MEDICINE

## 2019-03-13 PROCEDURE — 3045F PR MOST RECENT HEMOGLOBIN A1C LEVEL 7.0-9.0%: CPT | Performed by: FAMILY MEDICINE

## 2019-03-13 PROCEDURE — G8417 CALC BMI ABV UP PARAM F/U: HCPCS | Performed by: FAMILY MEDICINE

## 2019-03-13 PROCEDURE — G8482 FLU IMMUNIZE ORDER/ADMIN: HCPCS | Performed by: FAMILY MEDICINE

## 2019-03-13 PROCEDURE — 83036 HEMOGLOBIN GLYCOSYLATED A1C: CPT | Performed by: FAMILY MEDICINE

## 2019-03-13 PROCEDURE — 99406 BEHAV CHNG SMOKING 3-10 MIN: CPT | Performed by: FAMILY MEDICINE

## 2019-03-13 PROCEDURE — G8427 DOCREV CUR MEDS BY ELIG CLIN: HCPCS | Performed by: FAMILY MEDICINE

## 2019-03-13 PROCEDURE — 2022F DILAT RTA XM EVC RTNOPTHY: CPT | Performed by: FAMILY MEDICINE

## 2019-03-13 ASSESSMENT — ENCOUNTER SYMPTOMS
COUGH: 0
CONSTIPATION: 0
CHEST TIGHTNESS: 0
NAUSEA: 0
ABDOMINAL PAIN: 0
VOMITING: 0
WHEEZING: 0
SHORTNESS OF BREATH: 0
DIARRHEA: 0

## 2019-03-31 RX ORDER — AMLODIPINE BESYLATE 5 MG/1
TABLET ORAL
Qty: 30 TABLET | Refills: 5 | Status: SHIPPED | OUTPATIENT
Start: 2019-03-31 | End: 2019-09-12 | Stop reason: SDUPTHER

## 2019-04-10 ENCOUNTER — CARE COORDINATION (OUTPATIENT)
Dept: CARE COORDINATION | Age: 64
End: 2019-04-10

## 2019-04-10 ENCOUNTER — OFFICE VISIT (OUTPATIENT)
Dept: PRIMARY CARE CLINIC | Age: 64
End: 2019-04-10
Payer: MEDICARE

## 2019-04-10 VITALS
BODY MASS INDEX: 34.39 KG/M2 | HEIGHT: 69 IN | OXYGEN SATURATION: 98 % | HEART RATE: 71 BPM | WEIGHT: 232.2 LBS | RESPIRATION RATE: 14 BRPM | DIASTOLIC BLOOD PRESSURE: 74 MMHG | SYSTOLIC BLOOD PRESSURE: 132 MMHG | TEMPERATURE: 97.8 F

## 2019-04-10 DIAGNOSIS — E11.59 TYPE 2 DIABETES MELLITUS WITH OTHER CIRCULATORY COMPLICATION, WITHOUT LONG-TERM CURRENT USE OF INSULIN (HCC): Primary | Chronic | ICD-10-CM

## 2019-04-10 DIAGNOSIS — R20.8 DECREASED SENSATION OF FOOT: ICD-10-CM

## 2019-04-10 DIAGNOSIS — I73.9 PVD (PERIPHERAL VASCULAR DISEASE) WITH CLAUDICATION (HCC): ICD-10-CM

## 2019-04-10 DIAGNOSIS — I10 ESSENTIAL HYPERTENSION: ICD-10-CM

## 2019-04-10 DIAGNOSIS — Z23 NEED FOR PROPHYLACTIC VACCINATION AND INOCULATION AGAINST VARICELLA: ICD-10-CM

## 2019-04-10 LAB — HBA1C MFR BLD: 7.8 %

## 2019-04-10 PROCEDURE — G8427 DOCREV CUR MEDS BY ELIG CLIN: HCPCS | Performed by: FAMILY MEDICINE

## 2019-04-10 PROCEDURE — 3045F PR MOST RECENT HEMOGLOBIN A1C LEVEL 7.0-9.0%: CPT | Performed by: FAMILY MEDICINE

## 2019-04-10 PROCEDURE — 1036F TOBACCO NON-USER: CPT | Performed by: FAMILY MEDICINE

## 2019-04-10 PROCEDURE — 2022F DILAT RTA XM EVC RTNOPTHY: CPT | Performed by: FAMILY MEDICINE

## 2019-04-10 PROCEDURE — 83036 HEMOGLOBIN GLYCOSYLATED A1C: CPT | Performed by: FAMILY MEDICINE

## 2019-04-10 PROCEDURE — 99214 OFFICE O/P EST MOD 30 MIN: CPT | Performed by: FAMILY MEDICINE

## 2019-04-10 PROCEDURE — G8417 CALC BMI ABV UP PARAM F/U: HCPCS | Performed by: FAMILY MEDICINE

## 2019-04-10 PROCEDURE — 3017F COLORECTAL CA SCREEN DOC REV: CPT | Performed by: FAMILY MEDICINE

## 2019-04-10 RX ORDER — LABETALOL 100 MG/1
100 TABLET, FILM COATED ORAL 2 TIMES DAILY
Qty: 60 TABLET | Refills: 0 | Status: SHIPPED | OUTPATIENT
Start: 2019-04-10 | End: 2019-05-15 | Stop reason: SDUPTHER

## 2019-04-10 RX ORDER — LISINOPRIL 40 MG/1
40 TABLET ORAL DAILY
Qty: 30 TABLET | Refills: 1 | Status: SHIPPED | OUTPATIENT
Start: 2019-04-10 | End: 2019-06-12 | Stop reason: SDUPTHER

## 2019-04-10 ASSESSMENT — ENCOUNTER SYMPTOMS
SHORTNESS OF BREATH: 0
VOMITING: 0
CHEST TIGHTNESS: 0
DIARRHEA: 0
CONSTIPATION: 0
NAUSEA: 0
WHEEZING: 0
ABDOMINAL PAIN: 0
COUGH: 0

## 2019-04-10 NOTE — PATIENT INSTRUCTIONS
Check your fasting blood sugar before breakfast, lunch, and dinner. Take 25 units of meal time insulin plus the additional sliding scale amount for your blood sugar before that meal.    <150: 0  151-200:  2  201-250: 4  251-300: 6  301-350: 8  351-400: 10  >400: 12  Example: If your pre-meal blood sugar is 175, take 25 + 2 unit = 27 units. Keep a log of your sugars.

## 2019-04-10 NOTE — PROGRESS NOTES
MOUTH ONCE DAILY 30 tablet 5    nicotine polacrilex (NICORETTE) 4 MG gum Take 1 each by mouth as needed for Smoking cessation 110 each 1    cyclobenzaprine (FLEXERIL) 5 MG tablet TAKE ONE TABLET BY MOUTH EVERY EIGHT HOURS AS NEEDED FOR MUSCLE SPASMS 90 tablet 5    Diabetic Shoe MISC by Does not apply route 1 each 0    Diabetic Shoe MISC by Does not apply route With inserts Ell. 9 1 each 0    Insulin Pen Needle 31G X 6 MM MISC 1 each by Does not apply route daily May substitute to generic for insurance 100 each 3    gabapentin (NEURONTIN) 300 MG capsule Take 900 mg by mouth 3 times daily. Arlander Kinnier meloxicam (MOBIC) 15 MG tablet Take 15 mg by mouth daily      insulin glargine (LANTUS SOLOSTAR) 100 UNIT/ML injection pen Inject 80 Units into the skin nightly 5 pen 3    hydrochlorothiazide (HYDRODIURIL) 25 MG tablet Take 1 tablet by mouth daily 90 tablet 3    HYDROcodone-acetaminophen (NORCO)  MG per tablet TK 1 T PO Q 6 H PRN FOR PAIN  0    insulin lispro (HUMALOG KWIKPEN) 100 UNIT/ML pen Inject 25 Units into the skin 3 times daily (with meals) 5 pen 3    atorvastatin (LIPITOR) 40 MG tablet TAKE ONE TABLET BY MOUTH ONCE DAILY 90 tablet 3    Insulin Syringe-Needle U-100 30G X 1/2\" 0.5 ML MISC 1 each by Does not apply route 3 times daily 100 each 3    glucose blood VI test strips (EXACTECH TEST) strip Test BS three times daily . E11.59 Use Easy Touch 100 each 3    Insulin Pen Needle 31G X 6 MM MISC 1 each by Does not apply route 4 times daily (before meals and nightly) May substitute to generic for insurance 120 each 3    Lancets MISC Accu-Chek TID,  DX: E11.59 100 each 3    Lancets MISC Daily Accu-Chek 100 each 3    omeprazole (PRILOSEC) 40 MG delayed release capsule Take 40 mg by mouth 2 times daily      aspirin 325 MG tablet Take 325 mg by mouth daily.       insulin aspart (NOVOLOG FLEXPEN) 100 UNIT/ML injection pen 20 units breakfast, 30 units lunch, 30 units dinner + sliding scale each meal (Total dose is 20-32 breakfast, 30-42 lunch and dinner) 5 pen 3     No current facility-administered medications for this visit. No Known Allergies    Past Surgical History:   Procedure Laterality Date    CHOLECYSTECTOMY      LARYNGOSCOPY N/A 11/10/2017    Direct laryngoscopy with assessment of hypopharynx, larynx, and post-cricoid areas performed by Jeovanny Ramirez MD at [address] [facility] N/A 2018    MICRO DIRECT LARYNGOSCOPY WITH BIOPSY performed by Jeovanny Ramirez MD at [address]      x 2 in past, around        Social History     Tobacco Use    Smoking status: Former Smoker     Packs/day: 0.25     Years: 20.00     Pack years: 5.00     Types: Cigarettes     Last attempt to quit: 2017     Years since quittin.2    Smokeless tobacco: Never Used   Substance Use Topics    Alcohol use: No    Drug use: No       Family History   Problem Relation Age of Onset    Diabetes Father     Heart Disease Maternal Grandmother        /74   Pulse 71   Temp 97.8 °F (36.6 °C) (Temporal)   Resp 14   Ht 5' 9\" (1.753 m)   Wt 232 lb 3.2 oz (105.3 kg)   SpO2 98%   BMI 34.29 kg/m²     Physical Exam   Constitutional: He is oriented to person, place, and time. He appears well-developed and well-nourished. Non-toxic appearance. No distress. HENT:   Head: Normocephalic and atraumatic. Cardiovascular: Normal rate, regular rhythm, normal heart sounds and intact distal pulses. Exam reveals no gallop and no friction rub. No murmur heard. Pulses:       Dorsalis pedis pulses are 1+ on the right side, and 1+ on the left side. Posterior tibial pulses are 1+ on the right side, and 1+ on the left side. Pulmonary/Chest: Effort normal and breath sounds normal. No respiratory distress. He has no wheezes. He has no rales. He exhibits no tenderness. Abdominal: Soft. Bowel sounds are normal. He exhibits no distension and no mass.  There is no tenderness. There is no rebound and no guarding. Central obesity   Musculoskeletal:        Right lower leg: He exhibits no edema. Left lower leg: He exhibits no edema. Feet:   Right Foot:   Protective Sensation: 10 sites tested. 0 sites sensed. Left Foot:   Protective Sensation: 10 sites tested. 0 sites sensed. Neurological: He is alert and oriented to person, place, and time. Coordination and gait normal.   Skin: Skin is warm and dry. He is not diaphoretic. No cyanosis. Nails show no clubbing. Nursing note and vitals reviewed. Assessment:    ICD-10-CM    1. Type 2 diabetes mellitus with other circulatory complication, without long-term current use of insulin ([facility]) E11.59 POCT glycosylated hemoglobin (Hb A1C)      DIABETES FOOT EXAM     Microalbumin / creatinine urine ratio   2. Need for prophylactic vaccination and inoculation against varicella Z23 zoster recombinant adjuvanted vaccine (SHINGRIX) 50 MCG/0.5ML SUSR injection   3. Essential hypertension I10 lisinopril (PRINIVIL;ZESTRIL) 40 MG tablet   4. Decreased sensation of foot R20.8    5. PVD (peripheral vascular disease) with claudication ([facility]) I73.9        Plan:   Pad improving post procedure and med changes w/ insulin. Increase exercise post procedure will help on a1c and weight loss. Education on both today. Still no sensation in feet, cont DM shoes. Change to labetolol and once daily lisinopril for HTN. Orders Placed This Encounter   Procedures    Microalbumin / creatinine urine ratio     Standing Status:   Future     Standing Expiration Date:   4/9/2020    POCT glycosylated hemoglobin (Hb A1C)     DIABETES FOOT EXAM     Orders Placed This Encounter   Medications    zoster recombinant adjuvanted vaccine (SHINGRIX) 50 MCG/0.5ML SUSR injection     Sig: Inject 0.5 mLs into the muscle once for 1 dose 50 MCG IM then repeat 2-6 months.      Dispense:  1 each     Refill:  1    labetalol (NORMODYNE) 100 MG tablet     Sig: Take 1

## 2019-04-17 ENCOUNTER — CARE COORDINATION (OUTPATIENT)
Dept: CARE COORDINATION | Age: 64
End: 2019-04-17

## 2019-04-17 NOTE — CARE COORDINATION
Ambulatory Care Coordination Note  4/17/2019  CM Risk Score: 6  Oskar Mortality Risk Score:      ACC: Andrea Crowell, RN    Summary Note:  Diabetes Assessment    Medic Alert ID:  No  Meal Planning:  None   How often do you test your blood sugar?:  Daily, Meals   Do you have hyperglycemia symptoms?:  No   Do you have hypoglycemia symptoms?:  No   Blood Sugar Trends:  Steady Decrease        Care Coordination Interventions    Program Enrollment:  Rising Risk  Referral from Primary Care Provider:  Yes  Suggested Interventions and Community Resources  Diabetes Education: In Process (Comment: New sliding scale. )  Medi Set or Pill Pack:  Declined  Pharmacist:  Declined  Zone Management Tools: In Process (Comment: DM)    Called pt to f/u on his new sliding scale and DM readings. He states his that his FBS have been under 100. Has been doing the lantus 75 units at night and it's been in the 100's for fasting. He has not had any symptoms of hypoglycemia. Stating no problems at this time. Pt needs records from Dr. Carissa Blandon. He needs to turn them in for part of his disability claims. I am going to call the office and see when these can be printed to give to the pt. He states that he has already signed a release form. Placed call to [facility]. Talked with Anna Horvath. She states that I need to talk to Diego Marsh, who is on another line. She is going to have Diego Marsh call me back. Jolie called me back. She stated that all records were sent to the hospital, and if the pt got a copy from them, that the office notes should be in there as well. If not, the pt needs to sign another release of records to himself so he can get a direct copy. I stated understanding. Called the pt and told him of the above. Also to check those records and see if the office notes are in there. He states that he never received a hard copy of his medical records from anywhere. They sent them in.  I told him he just needs to come fill out a release of records to himself and they will process. Then he can get his records. Pt stated understanding. Will f/u as indicated.         Future Appointments   Date Time Provider Diego Pepper   6/12/2019 10:00 AM Shaheen Newton MD [address]

## 2019-05-02 RX ORDER — ATORVASTATIN CALCIUM 40 MG/1
TABLET, FILM COATED ORAL
Qty: 90 TABLET | Refills: 3 | Status: SHIPPED | OUTPATIENT
Start: 2019-05-02 | End: 2020-05-21

## 2019-05-09 ENCOUNTER — TELEPHONE (OUTPATIENT)
Dept: VASCULAR SURGERY | Facility: CLINIC | Age: 64
End: 2019-05-09

## 2019-05-09 NOTE — TELEPHONE ENCOUNTER
Dr. Lucio will be in surgery on 05/13/19.  He wanted to see if the patient could go ahead and have their testing on 5/13, but change their appt here in the office to 5/15.  I called and had to leave a message for the patient to call back.

## 2019-05-13 ENCOUNTER — HOSPITAL ENCOUNTER (OUTPATIENT)
Dept: ULTRASOUND IMAGING | Facility: HOSPITAL | Age: 64
Discharge: HOME | End: 2019-05-13
Admitting: SURGERY

## 2019-05-13 DIAGNOSIS — I70.25 ATHEROSCLEROSIS OF NATIVE ARTERIES OF THE EXTREMITIES WITH ULCERATION (HCC): ICD-10-CM

## 2019-05-13 PROCEDURE — 93923 UPR/LXTR ART STDY 3+ LVLS: CPT

## 2019-05-13 PROCEDURE — 93924 LWR XTR VASC STDY BILAT: CPT | Performed by: SURGERY

## 2019-05-14 ENCOUNTER — TELEPHONE (OUTPATIENT)
Dept: VASCULAR SURGERY | Facility: CLINIC | Age: 64
End: 2019-05-14

## 2019-05-14 NOTE — TELEPHONE ENCOUNTER
I contacted the patient to remind him of his appt with Dr. Lucio tomorrow.  In addition, I asked if his appt could be moved to 11 am.  He said that would be fine.

## 2019-05-15 ENCOUNTER — OFFICE VISIT (OUTPATIENT)
Dept: VASCULAR SURGERY | Facility: CLINIC | Age: 64
End: 2019-05-15

## 2019-05-15 VITALS
OXYGEN SATURATION: 97 % | DIASTOLIC BLOOD PRESSURE: 66 MMHG | BODY MASS INDEX: 33.77 KG/M2 | HEART RATE: 88 BPM | HEIGHT: 69 IN | WEIGHT: 228 LBS | SYSTOLIC BLOOD PRESSURE: 142 MMHG

## 2019-05-15 DIAGNOSIS — I70.213 ATHEROSCLEROSIS OF NATIVE ARTERY OF BOTH LOWER EXTREMITIES WITH INTERMITTENT CLAUDICATION (HCC): Primary | ICD-10-CM

## 2019-05-15 PROCEDURE — 99214 OFFICE O/P EST MOD 30 MIN: CPT | Performed by: SURGERY

## 2019-05-15 RX ORDER — LABETALOL 100 MG/1
TABLET, FILM COATED ORAL
Qty: 60 TABLET | Refills: 0 | Status: SHIPPED | OUTPATIENT
Start: 2019-05-15 | End: 2019-06-12 | Stop reason: SDUPTHER

## 2019-05-15 NOTE — PATIENT INSTRUCTIONS

## 2019-05-15 NOTE — PROGRESS NOTES
05/15/2019      Mark Villela MD  [address] [place] [address] [address]  [place] [address]        Edy A Cosby  1955    Chief Complaint   Patient presents with   • Follow-up     3 month f/u with NIC.  Pt states that his legs still hurt, but he can't walk a little farther.       Dear Mark Villela MD:    HPI     I had the pleasure of seeing your patient in the office today for follow up.  As you recall, the patient is a 64 y.o. male who we are currently following for left leg arterial insufficiency and he is status post left femoral endarterectomy and left common iliac artery angioplasty and stent at the end of January.  He has done very well postoperatively.  He reports that the wound to his left first toe is healed and he has improvement in his claudication symptoms in his left leg.  He continues to have some claudication in the right leg which he notices more now because he can walk further than he did previously.  He continues on aspirin and statin daily and is otherwise doing well.  He did have a repeat NIC/PVR done just prior to this office visit which I did review.  It shows an NIC of 0.74 on the left and a slight decrease in his NIC on the right to 0.68 which was previously 0.84.      Review of Systems   Constitutional: Negative.  Negative for activity change, appetite change, chills and fever.   HENT: Negative.  Negative for congestion, sneezing and sore throat.    Eyes: Negative.    Respiratory: Negative.  Negative for chest tightness and shortness of breath.    Cardiovascular: Negative.  Negative for chest pain, palpitations and leg swelling.   Gastrointestinal: Negative.  Negative for abdominal distention, abdominal pain, nausea and vomiting.   Endocrine: Negative.    Genitourinary: Negative.    Musculoskeletal: Negative.         Claudication in the left lower extremity is much improved postop.  He still does have claudication in the right lower extremity at about 2-3 blocks.   Skin: Negative.   "  Allergic/Immunologic: Negative.    Neurological: Negative.    Hematological: Negative.    Psychiatric/Behavioral: Negative.        /66   Pulse 88   Ht 175.3 cm (69\")   Wt 103 kg (228 lb)   SpO2 97%   BMI 33.67 kg/m²   Physical Exam   Constitutional: He is oriented to person, place, and time. He appears well-developed and well-nourished.   HENT:   Head: Normocephalic and atraumatic.   Eyes: EOM are normal. Pupils are equal, round, and reactive to light.   Neck: Normal range of motion. Neck supple. No JVD present.   Cardiovascular: Normal rate and regular rhythm.   Pulses:       Carotid pulses are 2+ on the right side, and 2+ on the left side.       Radial pulses are 2+ on the right side, and 2+ on the left side.        Femoral pulses are 2+ on the right side, and 2+ on the left side.       Dorsalis pedis pulses are 0 on the right side, and 1+ on the left side.        Posterior tibial pulses are 0 on the right side, and 0 on the left side.   On the right he has Doppler signals in the DP but no signal at the PT.  On the left he has a weakly palpable DP and a Doppler signal at the PT.   Pulmonary/Chest: Effort normal. No respiratory distress.   Abdominal: Soft. He exhibits no distension and no mass. There is no tenderness.   Musculoskeletal: He exhibits no edema or tenderness.   He has a left footdrop present due to a previous spine surgery.   Neurological: He is alert and oriented to person, place, and time. He exhibits abnormal muscle tone (Left footdrop present).   Skin: Skin is warm and dry. Capillary refill takes 2 to 3 seconds.   His right groin incision is well-healed with no further induration.    The prior wound at the base of the left first toe is now healed.   Psychiatric: He has a normal mood and affect. His behavior is normal. Judgment and thought content normal.   Vitals reviewed.      DIAGNOSTIC DATA:    Us Ankle / Brachial Indices Extremity Complete    Result Date: 5/13/2019  Narrative:  " History: PAD  Comments: Bilateral lower extremity arterial with multi-level pulse volume recordings and segmental pressures were performed at rest and stress.  The right ankle/brachial index is 0.68. The waveforms are biphasic and dampened at the ankle. The posterior tibial waveform is flat line.These findings are consistent with moderate arterial insufficiency of the right lower extremity at rest.  The toe brachial index is 0.40. The postexercise ABIs 0.54.  The left ankle/brachial index is 0.74. The waveforms are biphasic and dampened at the ankle. These findings are consistent with mild to moderate arterial insufficiency of the left lower extremity at rest.   The toe brachial index is 0.57. The postexercise ABIs 1.0.      Impression: Impression: 1. Moderate arterial insufficiency of the right lower extremity at rest. 2. Mild to moderate arterial insufficiency of the left lower extremity at rest. 3. There is evidence of distal forefoot small vessel disease bilaterally.   This report was finalized on 05/13/2019 14:49 by Dr. Constantino Metzger MD.      Patient Active Problem List   Diagnosis   • Spinal stenosis of lumbar region   • Back pain   • Degeneration of intervertebral disc of lumbosacral region   • Essential hypertension   • Lumbar disc herniation with radiculopathy   • Type 2 diabetes mellitus without complication, without long-term current use of insulin (CMS/HCC)   • Constipation due to opioid therapy   • Gastroesophageal reflux disease without esophagitis   • Panlobular emphysema (CMS/HCC)   • A-fib (CMS/HCC)   • Paroxysmal atrial fibrillation (CMS/HCC)   • Gastroesophageal reflux disease   • Hx of colonic polyps   • HTN (hypertension), benign   • Morbidly obese (CMS/HCC)   • Atherosclerosis of native artery of extremity (CMS/HCC)   • Atherosclerosis of lower extremity with ulceration (CMS/HCC)   • Atherosclerosis 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 (CMS/HCC)   • Pre-op evaluation   •  Ischemia of extremity         ICD-10-CM ICD-9-CM   1. Atherosclerosis of native artery of both lower extremities with intermittent claudication (CMS/[facility]) I70.213 440.21       Lab Frequency Next Occurrence   Follow Anesthesia Guidelines / Standing Orders Once 12/07/2018   Obtain Informed Consent Once 12/12/2018   Follow Anesthesia Guidelines / Standing Orders Once 12/17/2018   Obtain Informed Consent Once 12/22/2018   Follow Anesthesia Guidelines / Standing Orders Once 01/21/2019   Obtain Informed Consent Once 01/26/2019   Provide NPO Instructions to Patient Once 01/26/2019   XR Chest 1 View Once 01/26/2019   US Ankle / Brachial Indices Extremity Complete Once 11/11/2019       PLAN: After thoroughly evaluating Edy Cosby, I believe the best course of action is to remain conservative from a vascular standpoint.  He is overall done well status post left common femoral endarterectomy with retrograde angioplasty and stent of the left common iliac artery.  The wound at the base of the left first toe has healed and his claudication symptoms of the left lower extremity have definitely improved.  He continues to have some claudication symptoms in the right lower extremity which he notices more now that the left leg has improved.  However he has no further wounds and his claudication is not limiting at this point.  As such I recommended that he continue medical therapy with aspirin and a statin as well as to continue walking as much as possible to improve the claudication.  I will plan to see him back in 6 months with repeat NIC/PVR for continued surveillance.  The patient is to continue taking their medications as previously discussed.   I did discuss vascular risk factors as they pertain to the progression of vascular disease including controlling hypertension, hyperlipidemia, and diabetes. Patient's Body mass index is 33.67 kg/m². BMI is above normal parameters. Recommendations include: educational material.   This  was all discussed in full with complete understanding.  Thank you for allowing me to participate in the care of your patient.  Please do not hesitate to call with any questions or concerns.  We will keep you aware of any further encounters with Edy Cosby.      Sincerely Yours,      Duncan Lucio MD

## 2019-05-20 NOTE — CARE COORDINATION
Ambulatory Care Coordination Note  5/20/2019  CM Risk Score: 6  Oskar Mortality Risk Score:      ACC: Jerrica Akins RN    Summary Note:  Met with pt while in to see PCP. Introduced myself and let him know I will be his new ACC and gave him my contact info. Pt's A1C went up to 7.8 from 7.5. PCP started him on a sliding scale for meals. See below. Check your fasting blood sugar before breakfast, lunch, and dinner. Take 25 units of meal time insulin plus the additional sliding scale amount for your blood sugar before that meal.    <150: 0  151-200:  2  201-250: 4  251-300: 6  301-350: 8  351-400: 10  >400: 12  Example: If your pre-meal blood sugar is 175, take 25 + 2 unit = 27 units.     Keep a log of your sugars. Will f/u as indicated. Diabetes Assessment    Medic Alert ID:  No  Meal Planning:  None   How often do you test your blood sugar?:  Daily, Meals   Do you have barriers with adherence to non-pharmacologic self-management interventions? (Nutrition/Exercise/Self-Monitoring):  No   Have you ever had to go to the ED for symptoms of low blood sugar?:  No       Increase trend in Blood Sugars     Blood Sugar Monitoring Regimen:  Morning Fasting, Before Meals          Care Coordination Interventions    Program Enrollment:  Rising Risk  Referral from Primary Care Provider:  Yes  Suggested Interventions and Community Resources  Diabetes Education: In Process (Comment: New sliding scale. )  Medi Set or Pill Pack:  Declined  Pharmacist:  Declined  Zone Management Tools:   In Process (Comment: DM)         Future Appointments   Date Time Provider Diego Pepper   6/12/2019 10:00 AM MD IVAN Nathan [facility]   7/22/2019 11:00 AM Negrito Morley MD [id number]

## 2019-05-22 ENCOUNTER — CARE COORDINATION (OUTPATIENT)
Dept: CARE COORDINATION | Age: 64
End: 2019-05-22

## 2019-06-03 ENCOUNTER — OFFICE VISIT (OUTPATIENT)
Dept: PODIATRY | Facility: CLINIC | Age: 64
End: 2019-06-03

## 2019-06-03 VITALS
OXYGEN SATURATION: 94 % | BODY MASS INDEX: 35.4 KG/M2 | WEIGHT: 239 LBS | HEIGHT: 69 IN | SYSTOLIC BLOOD PRESSURE: 166 MMHG | HEART RATE: 102 BPM | DIASTOLIC BLOOD PRESSURE: 80 MMHG

## 2019-06-03 DIAGNOSIS — L84 FOOT CALLUS: ICD-10-CM

## 2019-06-03 DIAGNOSIS — B35.1 ONYCHOMYCOSIS: Primary | ICD-10-CM

## 2019-06-03 DIAGNOSIS — E11.49 DIABETES MELLITUS TYPE 2 WITH NEUROLOGICAL MANIFESTATIONS (HCC): ICD-10-CM

## 2019-06-03 DIAGNOSIS — M21.372 FOOT DROP, LEFT: ICD-10-CM

## 2019-06-03 PROCEDURE — 11055 PARING/CUTG B9 HYPRKER LES 1: CPT | Performed by: PODIATRIST

## 2019-06-03 PROCEDURE — 11721 DEBRIDE NAIL 6 OR MORE: CPT | Performed by: PODIATRIST

## 2019-06-12 ENCOUNTER — CARE COORDINATION (OUTPATIENT)
Dept: CARE COORDINATION | Age: 64
End: 2019-06-12

## 2019-06-12 ENCOUNTER — OFFICE VISIT (OUTPATIENT)
Dept: PRIMARY CARE CLINIC | Age: 64
End: 2019-06-12
Payer: MEDICARE

## 2019-06-12 VITALS
TEMPERATURE: 96 F | BODY MASS INDEX: 34.86 KG/M2 | HEIGHT: 68 IN | DIASTOLIC BLOOD PRESSURE: 78 MMHG | OXYGEN SATURATION: 96 % | SYSTOLIC BLOOD PRESSURE: 140 MMHG | RESPIRATION RATE: 20 BRPM | WEIGHT: 230 LBS | HEART RATE: 85 BPM

## 2019-06-12 DIAGNOSIS — E11.59 TYPE 2 DIABETES MELLITUS WITH OTHER CIRCULATORY COMPLICATION, WITHOUT LONG-TERM CURRENT USE OF INSULIN (HCC): Primary | ICD-10-CM

## 2019-06-12 DIAGNOSIS — E66.01 MORBID OBESITY DUE TO EXCESS CALORIES (HCC): ICD-10-CM

## 2019-06-12 DIAGNOSIS — I73.9 PVD (PERIPHERAL VASCULAR DISEASE) WITH CLAUDICATION (HCC): ICD-10-CM

## 2019-06-12 DIAGNOSIS — I10 ESSENTIAL HYPERTENSION: ICD-10-CM

## 2019-06-12 LAB — HBA1C MFR BLD: 7.9 %

## 2019-06-12 PROCEDURE — 1036F TOBACCO NON-USER: CPT | Performed by: FAMILY MEDICINE

## 2019-06-12 PROCEDURE — 2022F DILAT RTA XM EVC RTNOPTHY: CPT | Performed by: FAMILY MEDICINE

## 2019-06-12 PROCEDURE — 99214 OFFICE O/P EST MOD 30 MIN: CPT | Performed by: FAMILY MEDICINE

## 2019-06-12 PROCEDURE — G8417 CALC BMI ABV UP PARAM F/U: HCPCS | Performed by: FAMILY MEDICINE

## 2019-06-12 PROCEDURE — G8427 DOCREV CUR MEDS BY ELIG CLIN: HCPCS | Performed by: FAMILY MEDICINE

## 2019-06-12 PROCEDURE — 3045F PR MOST RECENT HEMOGLOBIN A1C LEVEL 7.0-9.0%: CPT | Performed by: FAMILY MEDICINE

## 2019-06-12 PROCEDURE — 83036 HEMOGLOBIN GLYCOSYLATED A1C: CPT | Performed by: FAMILY MEDICINE

## 2019-06-12 PROCEDURE — 3017F COLORECTAL CA SCREEN DOC REV: CPT | Performed by: FAMILY MEDICINE

## 2019-06-12 RX ORDER — LISINOPRIL 40 MG/1
40 TABLET ORAL DAILY
Qty: 90 TABLET | Refills: 1 | Status: SHIPPED | OUTPATIENT
Start: 2019-06-12 | End: 2020-01-16

## 2019-06-12 RX ORDER — LABETALOL 100 MG/1
100 TABLET, FILM COATED ORAL 2 TIMES DAILY
Qty: 180 TABLET | Refills: 1 | Status: SHIPPED | OUTPATIENT
Start: 2019-06-12 | End: 2020-01-28

## 2019-06-12 ASSESSMENT — ENCOUNTER SYMPTOMS
DIARRHEA: 0
WHEEZING: 0
NAUSEA: 0
ABDOMINAL PAIN: 0
VOMITING: 0
SHORTNESS OF BREATH: 0
CONSTIPATION: 0
CHEST TIGHTNESS: 0
COUGH: 0

## 2019-06-12 NOTE — PATIENT INSTRUCTIONS
Increase your lisinopril to 40 mg daily. Check your fasting blood sugar before breakfast, lunch, and dinner. Take 25 units of meal time insulin plus the additional sliding scale amount for your blood sugar before that meal.    <150: 0  151-200:  2  201-250: 4  251-300: 6  301-350: 8  351-400: 10  >400: 12   Example: If your pre-meal blood sugar is 175, take 25 + 2 unit = 27 units. Keep a log of your sugars.

## 2019-06-12 NOTE — CARE COORDINATION
Ambulatory Care Coordination Note  6/12/2019  CM Risk Score: 6  Oskar Mortality Risk Score:      ACC: Sam Quinonez, RN    Summary Note:  Met with pt and PCP today while he was in for a f/u appt. His A1C has went from 7.5, 7.8, and today at 7.9. He is missing some of his meal time insulin and states he lost his sliding scale parameters. We have printed him out a new one today. I will follow him more closely. BP increased today. PCP is increasing amlodipine and lisinopril. He is to continue his labetalol as is. I reiterated that he needs to call me if he has any questions or concerns about his sugar readings or insulin doses. Pt stated understanding. Will f/u as indicated. Care Coordination Interventions    Program Enrollment:  Rising Risk  Referral from Primary Care Provider:  Yes  Suggested Interventions and Community Resources  Diabetes Education: In Process (Comment: New sliding scale. A1C up to 7.9 ^ 7.8 ^ 7. 5. )  Medi Set or Pill Pack:  Declined  Pharmacist:  Declined  Zone Management Tools: In Process (Comment: DM)             Prior to Admission medications    Medication Sig Start Date End Date Taking?  Authorizing Provider   insulin aspart (NOVOLOG FLEXPEN) 100 UNIT/ML injection pen Inject 25-37 Units into the skin 3 times daily (before meals) 20 units breakfast, 30 units lunch, 30 units dinner + sliding scale each meal (Total dose is 20-32 breakfast, 30-42 lunch and dinner) 6/12/19   Shaheen Newton MD   lisinopril (PRINIVIL;ZESTRIL) 40 MG tablet Take 1 tablet by mouth daily 6/12/19 7/12/19  Shaheen Newton MD   labetalol (NORMODYNE) 100 MG tablet Take 1 tablet by mouth 2 times daily 6/12/19   Shaheen Newton MD   insulin glargine (LANTUS SOLOSTAR) 100 UNIT/ML injection pen Inject 80 Units into the skin nightly 6/27/18   Shaheen Newton MD       Future Appointments   Date Time Provider Diego Pepper   7/22/2019 11:00 AM Aby Angel MD [facility]   9/12/2019  8:30 AM Shaheen Newton

## 2019-06-12 NOTE — PROGRESS NOTES
Bowel sounds are normal. He exhibits no distension and no mass. There is no tenderness. There is no rebound and no guarding. Central obesity   Musculoskeletal:        Right lower leg: He exhibits no edema. Left lower leg: He exhibits no edema. Neurological: He is alert and oriented to person, place, and time. Coordination and gait normal.   Skin: Skin is warm and dry. He is not diaphoretic. No cyanosis. Nails show no clubbing. Nursing note and vitals reviewed. Assessment:    ICD-10-CM    1. Type 2 diabetes mellitus with other circulatory complication, without long-term current use of insulin ([facility]) E11.59 POCT glycosylated hemoglobin (Hb A1C)   2. Essential hypertension I10 lisinopril (PRINIVIL;ZESTRIL) 40 MG tablet   3. PVD (peripheral vascular disease) with claudication ([facility]) I73.9    4. Morbid obesity due to excess calories ([facility] [address] E66.01        Plan:   Patient continue to work with care coordinator for dietary changes. Patient is to use insulin for snacks or meals and we have explained the sliding scale to him. I will also increase his lisinopril as he did not  the 40 mg tablet previously and his blood pressure is uncontrolled today. Patient is doing increased exercise for his obesity and diabetes.   Orders Placed This Encounter   Procedures    POCT glycosylated hemoglobin (Hb A1C)     Orders Placed This Encounter   Medications    insulin aspart (NOVOLOG FLEXPEN) 100 UNIT/ML injection pen     Sig: Inject 25-37 Units into the skin 3 times daily (before meals) 20 units breakfast, 30 units lunch, 30 units dinner + sliding scale each meal (Total dose is 20-32 breakfast, 30-42 lunch and dinner)     Dispense:  5 pen     Refill:  3    lisinopril (PRINIVIL;ZESTRIL) 40 MG tablet     Sig: Take 1 tablet by mouth daily     Dispense:  90 tablet     Refill:  1     Stop lisinopril 20, replace with 40    labetalol (NORMODYNE) 100 MG tablet     Sig: Take 1 tablet by mouth 2 times daily     Dispense:

## 2019-06-19 ENCOUNTER — CARE COORDINATION (OUTPATIENT)
Dept: CARE COORDINATION | Age: 64
End: 2019-06-19

## 2019-06-19 NOTE — CARE COORDINATION
Ambulatory Care Coordination Note  6/19/2019  CM Risk Score: 6  Oskar Mortality Risk Score:      ACC: Rosalva Martin, RN    Summary Note:  Called pt to f/u on his BS's, sliding scale, and BP with med changes. His last A1C was 7.9, up from 7.8. He stated he was having trouble remembering to take each sliding scale with his meals. I am following more closely to make sure he is compliant as possible. He has been using his sliding scale more regularly. He states his sugars have been better. Fasting sugars have been under 200. Told him we would like it below 150. I asked him if he could give me some readings and how much insulin he gave himself for each meal. He told me he wasn't at home right now. I asked him if it was ok if I called tomorrow to get these. He stated yes, he would be home. Will call pt tomorrow. Care Coordination Interventions    Program Enrollment:  Rising Risk  Referral from Primary Care Provider:  Yes  Suggested Interventions and Community Resources  Diabetes Education: In Process (Comment: New sliding scale. A1C up to 7.9 ^ 7.8 ^ 7. 5. )  Medi Set or Pill Pack:  Declined  Pharmacist:  Declined  Zone Management Tools:   In Process (Comment: DM)         Future Appointments   Date Time Provider [facility] [place]   7/22/2019 11:00 AM Abner Anguiano MD [id number]   9/12/2019  8:30 AM Leilani Montgomery MD [address]

## 2019-06-20 ENCOUNTER — TELEPHONE (OUTPATIENT)
Dept: VASCULAR SURGERY | Facility: CLINIC | Age: 64
End: 2019-06-20

## 2019-06-20 ENCOUNTER — CARE COORDINATION (OUTPATIENT)
Dept: CARE COORDINATION | Age: 64
End: 2019-06-20

## 2019-06-20 NOTE — TELEPHONE ENCOUNTER
I tried to contact the patient to let him know that Dr. Lucio will be in surgery the morning of his appt on 11/15/19.  He didn't answer, so I left him a message letting him know that we had to change his appt to the afternoon.  In the message I told him that I would be sending a letter with all of the information.

## 2019-06-28 ENCOUNTER — CARE COORDINATION (OUTPATIENT)
Dept: CARE COORDINATION | Age: 64
End: 2019-06-28

## 2019-06-28 NOTE — CARE COORDINATION
Ambulatory Care Coordination Note  6/28/2019  CM Risk Score: 6  Bijan Mortality Risk Score: [unfilled]    ACC: Rosalva Martin, RN    Summary Note:  Called pt to see how his readings were doing. He had been forgetting his insulin at meals and his A1C went up to 7.9 from 7.8. I also want to know how much insulin he is taking for each reading. I had to leave a message the last time I called. Pt's states that his sugars have been under 200 and he hasn't needed his sliding scale insulin. States he has been doing more light exercise. He states that he hasn't been taking his lantus at night. He dropped down too low one time and stopped taking it. He is on 80 units at . I told him he needs a little, as his fasting is higher than it should be. Which is 170's+. Told him to try lantus 10 units at [facility] and we will talk Monday to see how they are. States his BP has been doing good. His last check was normal.     I will call him back Monday to see how this worked. If he drops low, he can hold until we talk. Pt stated understanding. Care Coordination Interventions    Program Enrollment:  Rising Risk  Referral from Primary Care Provider:  Yes  Suggested Interventions and Community Resources  Diabetes Education: In Process (Comment: New sliding scale. A1C up to 7.9 ^ 7.8 ^ 7. 5. )  Medi Set or Pill Pack:  Declined  Pharmacist:  Declined  Zone Management Tools:   In Process (Comment: DM)                 Future Appointments   Date Time Provider hospitals   7/22/2019 11:00 AM Abner Anguiano MD [facility]   9/12/2019  8:30 AM Leilani Montgomery MD [address]

## 2019-07-01 ENCOUNTER — CARE COORDINATION (OUTPATIENT)
Dept: CARE COORDINATION | Age: 64
End: 2019-07-01

## 2019-07-01 RX ORDER — HYDROCHLOROTHIAZIDE 25 MG/1
TABLET ORAL
Qty: 90 TABLET | Refills: 3 | Status: SHIPPED | OUTPATIENT
Start: 2019-07-01 | End: 2019-07-22 | Stop reason: SDUPTHER

## 2019-07-05 ENCOUNTER — CARE COORDINATION (OUTPATIENT)
Dept: CARE COORDINATION | Age: 64
End: 2019-07-05

## 2019-07-12 ENCOUNTER — CARE COORDINATION (OUTPATIENT)
Dept: CARE COORDINATION | Age: 64
End: 2019-07-12

## 2019-07-22 ENCOUNTER — OFFICE VISIT (OUTPATIENT)
Dept: OTOLARYNGOLOGY | Age: 64
End: 2019-07-22
Payer: MEDICARE

## 2019-07-22 VITALS
DIASTOLIC BLOOD PRESSURE: 68 MMHG | BODY MASS INDEX: 37.65 KG/M2 | TEMPERATURE: 97.7 F | HEART RATE: 88 BPM | SYSTOLIC BLOOD PRESSURE: 138 MMHG | WEIGHT: 244 LBS

## 2019-07-22 DIAGNOSIS — J38.00 VOCAL CORD PARESIS: ICD-10-CM

## 2019-07-22 PROCEDURE — G8417 CALC BMI ABV UP PARAM F/U: HCPCS | Performed by: OTOLARYNGOLOGY

## 2019-07-22 PROCEDURE — 3017F COLORECTAL CA SCREEN DOC REV: CPT | Performed by: OTOLARYNGOLOGY

## 2019-07-22 PROCEDURE — G8427 DOCREV CUR MEDS BY ELIG CLIN: HCPCS | Performed by: OTOLARYNGOLOGY

## 2019-07-22 PROCEDURE — 1036F TOBACCO NON-USER: CPT | Performed by: OTOLARYNGOLOGY

## 2019-07-22 PROCEDURE — 99212 OFFICE O/P EST SF 10 MIN: CPT | Performed by: OTOLARYNGOLOGY

## 2019-07-22 PROCEDURE — 31575 DIAGNOSTIC LARYNGOSCOPY: CPT | Performed by: OTOLARYNGOLOGY

## 2019-07-24 ENCOUNTER — HOSPITAL ENCOUNTER (OUTPATIENT)
Dept: CT IMAGING | Age: 64
Discharge: HOME | End: 2019-07-24
Payer: MEDICARE

## 2019-07-24 DIAGNOSIS — J38.00 VOCAL CORD PARESIS: ICD-10-CM

## 2019-07-24 LAB
GFR NON-AFRICAN AMERICAN: 36
PERFORMED ON: ABNORMAL
POC CREATININE: 1.9 MG/DL (ref 0.3–1.3)
POC SAMPLE TYPE: ABNORMAL

## 2019-07-24 PROCEDURE — 71250 CT THORAX DX C-: CPT

## 2019-07-24 PROCEDURE — 82565 ASSAY OF CREATININE: CPT

## 2019-07-24 PROCEDURE — 70490 CT SOFT TISSUE NECK W/O DYE: CPT

## 2019-07-31 ENCOUNTER — TELEPHONE (OUTPATIENT)
Dept: OTOLARYNGOLOGY | Age: 64
End: 2019-07-31

## 2019-09-12 ENCOUNTER — CARE COORDINATION (OUTPATIENT)
Dept: CARE COORDINATION | Age: 64
End: 2019-09-12

## 2019-09-12 ENCOUNTER — OFFICE VISIT (OUTPATIENT)
Dept: PRIMARY CARE CLINIC | Age: 64
End: 2019-09-12
Payer: MEDICARE

## 2019-09-12 VITALS
OXYGEN SATURATION: 98 % | RESPIRATION RATE: 20 BRPM | SYSTOLIC BLOOD PRESSURE: 160 MMHG | TEMPERATURE: 98 F | BODY MASS INDEX: 33.07 KG/M2 | HEIGHT: 70 IN | WEIGHT: 231 LBS | DIASTOLIC BLOOD PRESSURE: 70 MMHG | HEART RATE: 78 BPM

## 2019-09-12 DIAGNOSIS — Z00.00 ROUTINE GENERAL MEDICAL EXAMINATION AT A HEALTH CARE FACILITY: Primary | ICD-10-CM

## 2019-09-12 DIAGNOSIS — E11.59 TYPE 2 DIABETES MELLITUS WITH OTHER CIRCULATORY COMPLICATION, WITHOUT LONG-TERM CURRENT USE OF INSULIN (HCC): ICD-10-CM

## 2019-09-12 DIAGNOSIS — G89.29 CHRONIC RIGHT-SIDED LOW BACK PAIN WITH RIGHT-SIDED SCIATICA: ICD-10-CM

## 2019-09-12 DIAGNOSIS — I10 ESSENTIAL HYPERTENSION: ICD-10-CM

## 2019-09-12 DIAGNOSIS — M21.372 LEFT FOOT DROP: ICD-10-CM

## 2019-09-12 DIAGNOSIS — M54.41 CHRONIC RIGHT-SIDED LOW BACK PAIN WITH RIGHT-SIDED SCIATICA: ICD-10-CM

## 2019-09-12 DIAGNOSIS — Z71.83 ENCOUNTER FOR NONPROCREATIVE GENETIC COUNSELING: ICD-10-CM

## 2019-09-12 LAB — HBA1C MFR BLD: 8 %

## 2019-09-12 PROCEDURE — G8417 CALC BMI ABV UP PARAM F/U: HCPCS | Performed by: FAMILY MEDICINE

## 2019-09-12 PROCEDURE — 3045F PR MOST RECENT HEMOGLOBIN A1C LEVEL 7.0-9.0%: CPT | Performed by: FAMILY MEDICINE

## 2019-09-12 PROCEDURE — 2022F DILAT RTA XM EVC RTNOPTHY: CPT | Performed by: FAMILY MEDICINE

## 2019-09-12 PROCEDURE — 3017F COLORECTAL CA SCREEN DOC REV: CPT | Performed by: FAMILY MEDICINE

## 2019-09-12 PROCEDURE — G8427 DOCREV CUR MEDS BY ELIG CLIN: HCPCS | Performed by: FAMILY MEDICINE

## 2019-09-12 PROCEDURE — 99214 OFFICE O/P EST MOD 30 MIN: CPT | Performed by: FAMILY MEDICINE

## 2019-09-12 PROCEDURE — G0438 PPPS, INITIAL VISIT: HCPCS | Performed by: FAMILY MEDICINE

## 2019-09-12 PROCEDURE — 1036F TOBACCO NON-USER: CPT | Performed by: FAMILY MEDICINE

## 2019-09-12 PROCEDURE — 83036 HEMOGLOBIN GLYCOSYLATED A1C: CPT | Performed by: FAMILY MEDICINE

## 2019-09-12 PROCEDURE — G0008 ADMIN INFLUENZA VIRUS VAC: HCPCS | Performed by: FAMILY MEDICINE

## 2019-09-12 PROCEDURE — 90686 IIV4 VACC NO PRSV 0.5 ML IM: CPT | Performed by: FAMILY MEDICINE

## 2019-09-12 RX ORDER — AMLODIPINE BESYLATE 10 MG/1
10 TABLET ORAL DAILY
Qty: 90 TABLET | Refills: 1 | Status: SHIPPED | OUTPATIENT
Start: 2019-09-12 | End: 2020-03-23

## 2019-09-12 ASSESSMENT — ENCOUNTER SYMPTOMS
ABDOMINAL PAIN: 0
COUGH: 0
DIARRHEA: 0
NAUSEA: 0
CHEST TIGHTNESS: 0
VOMITING: 0
SHORTNESS OF BREATH: 0
BACK PAIN: 1
CONSTIPATION: 0
WHEEZING: 0

## 2019-09-12 ASSESSMENT — PATIENT HEALTH QUESTIONNAIRE - PHQ9
SUM OF ALL RESPONSES TO PHQ QUESTIONS 1-9: 0
SUM OF ALL RESPONSES TO PHQ QUESTIONS 1-9: 0

## 2019-09-12 ASSESSMENT — LIFESTYLE VARIABLES: HOW OFTEN DO YOU HAVE A DRINK CONTAINING ALCOHOL: 0

## 2019-09-12 NOTE — PROGRESS NOTES
SPINE SURGERY      x 2 in past, around        Social History     Tobacco Use    Smoking status: Former Smoker     Packs/day: 0.25     Years: 20.00     Pack years: 5.00     Types: Cigarettes     Last attempt to quit: 2017     Years since quittin.6    Smokeless tobacco: Never Used   Substance Use Topics    Alcohol use: No    Drug use: No       Family History   Problem Relation Age of Onset    Diabetes Father     Heart Disease Maternal Grandmother        BP (!) 160/70   Pulse 78   Temp 98 °F (36.7 °C)   Resp 20   Ht 5' 9.5\" (1.765 m)   Wt 231 lb (104.8 kg)   SpO2 98%   BMI 33.62 kg/m²     Physical Exam   Constitutional: He is oriented to person, place, and time. He appears well-developed and well-nourished. Non-toxic appearance. No distress. HENT:   Head: Normocephalic and atraumatic. Cardiovascular: Normal rate, regular rhythm, normal heart sounds and intact distal pulses. Exam reveals no gallop and no friction rub. No murmur heard. Pulmonary/Chest: Effort normal and breath sounds normal. No respiratory distress. He has no wheezes. He has no rales. He exhibits no tenderness. Abdominal: Soft. Bowel sounds are normal. He exhibits no distension and no mass. There is no tenderness. There is no rebound and no guarding. Musculoskeletal:        Right lower leg: He exhibits no edema. Left lower leg: He exhibits no edema. Neurological: He is alert and oriented to person, place, and time. Coordination and gait normal.   Skin: Skin is warm and dry. He is not diaphoretic. No cyanosis. Nails show no clubbing. Nursing note and vitals reviewed. Assessment:    ICD-10-CM    1. Type 2 diabetes mellitus with other circulatory complication, without long-term current use of insulin (HCC) E11.59 POCT glycosylated hemoglobin (Hb A1C)       Plan:  Plan #1: Medicare annual wellness visit  See below note.   The below encounter contains the health risk assessment questions and verbal and

## 2019-09-12 NOTE — PROGRESS NOTES
Yes  Do you have a Living Will?: (!) No  General Health Risk Interventions:  · No Living Will: provided the state-specific advance directive document to the patient    Health Habits/Nutrition:  Health Habits/Nutrition  Do you exercise for at least 20 minutes 2-3 times per week?: Yes  Have you lost any weight without trying in the past 3 months?: No  Do you eat fewer than 2 meals per day?: No  Have you seen a dentist within the past year?: (!) No  Body mass index is 33.62 kg/m².   Health Habits/Nutrition Interventions:  · Dental exam overdue:  patient encouraged to make appointment with his/her dentist    Safety:  Safety  Do you have working smoke detectors?: Yes  Have all throw rugs been removed or fastened?: Yes  Do you have non-slip mats or surfaces in all bathtubs/showers?: (!) No  Do all of your stairways have a railing or banister?: (!) No  Are your doorways, halls and stairs free of clutter?: Yes  Do you always fasten your seatbelt when you are in a car?: Yes  Safety Interventions:  · Home safety tips provided    Personalized Preventive Plan   Current Health Maintenance Status  Immunization History   Administered Date(s) Administered    Influenza, Quadv, IM, PF (6 mo and older Fluzone, Flulaval, Fluarix, and 3 yrs and older Afluria) 11/05/2018    Pneumococcal Polysaccharide (Ruylegleb10) 05/01/2017        Health Maintenance   Topic Date Due    Diabetic retinal exam  03/26/1966    DTaP/Tdap/Td vaccine (1 - Tdap) 03/26/1975    Shingles Vaccine (1 of 2) 03/26/2006    Colon cancer screen colonoscopy  04/19/2019    Lipid screen  04/25/2019    Potassium monitoring  04/25/2019    Creatinine monitoring  04/25/2019    Annual Wellness Visit (AWV)  05/29/2019    Flu vaccine (1) 09/01/2019    Diabetic foot exam  04/10/2020    A1C test (Diabetic or Prediabetic)  06/12/2020    Pneumococcal 0-64 years Vaccine  Completed    Hepatitis C screen  Completed    HIV screen  Completed     Recommendations for Preventive Services Due: see orders and patient instructions/AVS.  . Recommended screening schedule for the next 5-10 years is provided to the patient in written form: see Patient Instructions/AVS.    Ally Miller was seen today for medicare awv, 3 month follow-up and diabetes.     Diagnoses and all orders for this visit:    Type 2 diabetes mellitus with other circulatory complication, without long-term current use of insulin (HCC)  -     POCT glycosylated hemoglobin (Hb A1C)

## 2019-09-12 NOTE — PROGRESS NOTES
After obtaining consent, and per orders of Dr Alexandr Christiansen , injection of influenza  was given in the Left Deltoid  by Aly Ambrosio  Pt tolerated well. Pt had no reaction. Pt was released from our office.

## 2019-09-17 NOTE — PROGRESS NOTES
[facility] - PODIATRY    Today's Date: 09/23/19    Patient Name: Edy Cosby  MRN: [id number]  CSN: [id number]  PCP: Mark Villela MD  Referring Provider: No ref. provider found    SUBJECTIVE     Chief Complaint   Patient presents with   • Follow-up     pt c/o long, thickened toenails- left foot drop, denies pain at present time , occasional pain, burning and tingling    • Diabetes     last stated blood sugar 220 mg/dl- PCP Dr. Villela last visit 4/10/19     HPI: Edy Cosby, a 64 y.o.male, comes to clinic as a(n) established patient presenting for diabetic foot exam and complaining of thickened toenails. Patient has h/o AFib, back pain, DM2, Emphysema lung, GERD, HTN, OA. Patient is IDDM with last stated BG level of 220mg/dl. Admits to numbness and tingling in feet. Admits to left drop foot which he uses AFO. Relates thick, discolored, fungal toenails to both feet. He is unable to cut them himself. Previous ulceration at left great toe remains healed. Denies open wound or sores. Denies pain today. Relates previous treatment(s) including foot care by podiatrist. Denies any constitutional symptoms. No other pedal complaints at this time.    Past Medical History:   Diagnosis Date   • A-fib (CMS/HCC)    • Atherosclerosis    • Chronic back pain    • Constipation    • Diabetes mellitus (CMS/HCC)     TYPE II   • Dysphagia    • Emphysema of lung (CMS/HCC)    • Gastroparesis    • GERD (gastroesophageal reflux disease)    • Hyperlipidemia    • Hypertension     ESSENTIAL   • Leg pain    • Muscle spasm    • Nerve pain    • Osteoarthritis    • Sleeping difficulty    • Ulcer of toe due to diabetes mellitus (CMS/HCC)      Past Surgical History:   Procedure Laterality Date   • ANGIOPLASTY ILIAC ARTERY Left 1/29/2019    Procedure: ANGIOPLASTY ILIAC ARTERY, STENT PLACEMENT;  Surgeon: Duncan Lucio MD;  Location: [place] [address];  Service: Vascular   • AORTAGRAM Left 12/18/2018     Procedure: AORTOGRAM, LEFT LEG ANGIOGRAM, MYNX CLOSURE;  Surgeon: Duncan Lucio MD;  Location:  [facility];  Service: Vascular   • BACK SURGERY     • CHOLECYSTECTOMY     • COLONOSCOPY  09/01/2011    TICS RECALL 5YR   • COLONOSCOPY  09/30/2008    ENTER RESULTS: STOOL THROUGHTOUT THE COLON POOR PREP REC 3 YEAR RECALL   • COLONOSCOPY N/A 11/9/2016    Procedure: COLONOSCOPY;  Surgeon: León Zepeda MD;  Location: [facility]COPY;  Service:    • COLONOSCOPY N/A 4/19/2018    Procedure: COLONOSCOPY WITH ANESTHESIA;  Surgeon: León Zepeda MD;  Location: [facility] ENDOSCOPY;  Service: Gastroenterology   • ENDOSCOPY  06/19/2012    HH   • ENDOSCOPY  08/25/2009    HIATAL HERNIA, DILATED WITH 50 FR MASCORRO   • ENDOSCOPY  06/19/2007    WITHIN NORMAL LIMITS UREA NEG   • FEMORAL ENDARTERECTOMY Left 1/29/2019    Procedure: LEFT FEMORAL ENDARTERECTOMY;  Surgeon: Duncan Lucio MD;  Location:  [facility];  Service: Vascular   • LUMBAR LAMINECTOMY WITH FUSION N/A 1/23/2017    Procedure: REMOVAL OF INSTRUMENTATION, EXPLORATION OF FUSION L4-S1, REVISION LEFT L5-S1 HEMILAMINECTOMY, FACETECTOMY DECOMPRESSION, REVISION UNINSTRUMENTED POSTERIOR SPINAL FUSION L5-S1;  Surgeon: RHONDA Lopes MD;  Location: [facility] OR;  Service:    • OTHER SURGICAL HISTORY      LUMBAR SACRAL SURGERY WITH FUSION X2   • PILONIDAL CYST / SINUS EXCISION      PILONIDAL CYST REMOVAL     Family History   Problem Relation Age of Onset   • Heart attack Father    • Diabetes Brother    • Colon polyps Sister    • Stomach cancer Neg Hx         GI CNACERS OR DISEASE   • Colon cancer Neg Hx      Social History     Socioeconomic History   • Marital status:      Spouse name: Not on file   • Number of children: Not on file   • Years of education: Not on file   • Highest education level: Not on file   Tobacco Use   • Smoking status: Former Smoker     Years: 30.00     Types: Cigarettes   • Smokeless tobacco: Never Used   Substance and  Sexual Activity   • Alcohol use: No   • Drug use: No   • Sexual activity: Defer     No Known Allergies  Current Outpatient Medications   Medication Sig Dispense Refill   • amLODIPine (NORVASC) 5 MG tablet      • aspirin 325 MG tablet Take 325 mg by mouth daily.     • atorvastatin (LIPITOR) 40 MG tablet Take 40 mg by mouth 2 (Two) Times a Day.     • cyclobenzaprine (FLEXERIL) 5 MG tablet Take 5 mg by mouth 3 (Three) Times a Day As Needed for Muscle Spasms.     • gabapentin (NEURONTIN) 600 MG tablet Take 1,800 mg by mouth 3 (Three) Times a Day.     • hydrochlorothiazide (HYDRODIURIL) 25 MG tablet Take 25 mg by mouth Daily.     • HYDROcodone-acetaminophen (NORCO)  MG per tablet Take 2 tablets by mouth Every 4 (Four) Hours As Needed for moderate pain (4-6). (Patient taking differently: Take 2 tablets by mouth Every 6 (Six) Hours As Needed for Moderate Pain .)  0   • insulin aspart (novoLOG FLEXPEN) 100 UNIT/ML solution pen-injector sc pen 25 units breakfast, 25units lunch, 25 units dinner + sliding scale each meal (Total dose is 20-32 breakfast, 30-42 lunch and dinner)     • Insulin Glargine (LANTUS SOLOSTAR) 100 UNIT/ML injection pen Inject 75 Units under the skin into the appropriate area as directed Every Night.     • labetalol (NORMODYNE) 100 MG tablet Take 100 mg by mouth 2 (Two) Times a Day.     • lisinopril (PRINIVIL,ZESTRIL) 20 MG tablet Take 20 mg by mouth 2 (Two) Times a Day.     • meloxicam (MOBIC) 15 MG tablet Take 15 mg by mouth Daily.     • omeprazole (priLOSEC) 20 MG capsule Take 20 mg by mouth 2 (Two) Times a Day.     • zolpidem (AMBIEN) 10 MG tablet Take 10 mg by mouth At Night As Needed (FOR SLEEP).       No current facility-administered medications for this visit.      Review of Systems   Constitutional: Negative for chills and fever.   HENT: Negative for congestion.    Respiratory: Negative for shortness of breath.    Cardiovascular: Negative for chest pain and leg swelling.   Gastrointestinal:  Negative for constipation, diarrhea, nausea and vomiting.   Musculoskeletal: Positive for gait problem.   Skin: Negative for wound.   Neurological: Positive for numbness.       OBJECTIVE     Vitals:    09/23/19 1042   BP: 130/76   Pulse: 90   SpO2: 94%       PHYSICAL EXAM  GEN:   Accompanied by none.     General Exam  Diabetic Foot Exam: performed  Appearance: appears stated age and healthy   Orientation: alert and oriented to person, place, and time   Affect: appropriate   Gait: (Drop foot left)  Shoe Gear: diabetic shoes (Left AFO)    Foot/Ankle Exam  Inspection and Palpation  Ecchymosis - Right: none Left: none  Tenderness - Right: none   Left: none   Arch - Right: normal Left: normal  Hammertoes - Right: absent Left: absent  Claw Toes - Right: absent Left: absent  Mallet Toes - Right: absent Left: absent  Hallux Valgus - Right: no Left: no  Hallux Limitus - Right: no Left: no  Skin - Right: skin intact  Left: skin intact no ulcer   Nails -  Right: abnormally thick, dystrophic nails and onychomycosis Left: abnormally thick, dystrophic nails and onychomycosis     Vascular  Dorsalis Pedis - Right: 2+ Left: 2+  Posterior Tibial - Right: 2+ Left: 2+  Skin Temperature -  Right: warm  Left: warm    CFT - Right: 3 Left: 3  Pedal Hair Growth - Right: present Left: present  Varicosities -  Right: no varicosities  Left: no varicosities      Neurologic  Saphenous Nerve Sensation  - Right: diminished Left: diminished  Tibial Nerve Sensation - Right: diminished Left: diminished  Superficial Peroneal Nerve Sensation - Right: diminished Left: diminished  Deep Peroneal Nerve Sensation - Right: diminished Left: diminished  Sural Nerve Sensation - Right: diminished Left: diminished  Protective Sensation using [place]a Monofilament - Right: 0 Left: 0    Muscle Strength  Dorsiflexion - Right: 5 Left: 2  Plantar Flexion - Right: 5 Left: 3  Eversion - Right: 5 Left: 3  Inversion - Right: 5 Left: 3    Range of Motion  Normal  right ankle ROM  Normal left ankle ROM  Passive  Passive 1st IP Extension - Left: 0  Passive 1st IP Flexion - Left: 10             RADIOLOGY/NUCLEAR:  No results found.    LABORATORY/CULTURE RESULTS:      PATHOLOGY RESULTS:       ASSESSMENT/PLAN     Edy was seen today for follow-up and diabetes.    Diagnoses and all orders for this visit:    Onychomycosis    Diabetes mellitus type 2 with neurological manifestations (CMS/HCC)    Foot drop, left    H/O diabetic foot ulcer      Comprehensive lower extremity examination and evaluation was performed.  Discussed findings and treatment plan including risks, benefits, and treatment options with patient in detail. Patient agreed with treatment plan.  After verbal consent obtained, nail(s) x10 debrided of length and thickness with nail nipper without incidence  Patient may maintain nails and calluses at home utilizing emery board or pumice stone between visits as needed  Reviewed at home diabetic foot care including daily foot checks   Continue use of AFO   An After Visit Summary was printed and given to the patient at discharge, including (if requested) any available informative/educational handouts regarding diagnosis, treatment, or medications. All questions were answered to patient/family satisfaction. Should symptoms fail to improve or worsen they agree to call or return to clinic or to go to the Emergency Department. Discussed the importance of following up with any needed screening tests/labs/specialist appointments and any requested follow-up recommended by me today. Importance of maintaining follow-up discussed and patient accepts that missed appointments can delay diagnosis and potentially lead to worsening of conditions.  Return in about 3 months (around 12/23/2019)., or sooner if acute issues arise.    Lab Frequency Next Occurrence   Diet: Once 04/19/2018   Advance Diet as Tolerated Once 04/19/2018       This document has been electronically signed by  Mandeep Beyer DPM on September 23, 2019 11:02 AM

## 2019-09-19 ENCOUNTER — TELEPHONE (OUTPATIENT)
Dept: PODIATRY | Facility: CLINIC | Age: 64
End: 2019-09-19

## 2019-09-19 ENCOUNTER — CARE COORDINATION (OUTPATIENT)
Dept: CARE COORDINATION | Age: 64
End: 2019-09-19

## 2019-09-23 ENCOUNTER — OFFICE VISIT (OUTPATIENT)
Dept: PODIATRY | Facility: CLINIC | Age: 64
End: 2019-09-23

## 2019-09-23 VITALS
BODY MASS INDEX: 36.29 KG/M2 | HEIGHT: 69 IN | WEIGHT: 245 LBS | DIASTOLIC BLOOD PRESSURE: 76 MMHG | SYSTOLIC BLOOD PRESSURE: 130 MMHG | HEART RATE: 90 BPM | OXYGEN SATURATION: 94 %

## 2019-09-23 DIAGNOSIS — B35.1 ONYCHOMYCOSIS: Primary | ICD-10-CM

## 2019-09-23 DIAGNOSIS — M21.372 FOOT DROP, LEFT: ICD-10-CM

## 2019-09-23 DIAGNOSIS — E11.49 DIABETES MELLITUS TYPE 2 WITH NEUROLOGICAL MANIFESTATIONS (HCC): ICD-10-CM

## 2019-09-23 DIAGNOSIS — Z86.31 H/O DIABETIC FOOT ULCER: ICD-10-CM

## 2019-09-23 PROCEDURE — 11721 DEBRIDE NAIL 6 OR MORE: CPT | Performed by: PODIATRIST

## 2019-09-23 PROCEDURE — 99213 OFFICE O/P EST LOW 20 MIN: CPT | Performed by: PODIATRIST

## 2019-09-30 RX ORDER — CYCLOBENZAPRINE HCL 5 MG
TABLET ORAL
Qty: 90 TABLET | Refills: 0 | Status: SHIPPED | OUTPATIENT
Start: 2019-09-30 | End: 2019-10-30 | Stop reason: SDUPTHER

## 2019-10-04 ENCOUNTER — TELEPHONE (OUTPATIENT)
Dept: PRIMARY CARE CLINIC | Age: 64
End: 2019-10-04

## 2019-10-30 RX ORDER — CYCLOBENZAPRINE HCL 5 MG
TABLET ORAL
Qty: 90 TABLET | Refills: 0 | Status: SHIPPED | OUTPATIENT
Start: 2019-10-30 | End: 2019-11-28 | Stop reason: SDUPTHER

## 2019-11-11 DIAGNOSIS — K21.9 GASTROESOPHAGEAL REFLUX DISEASE, ESOPHAGITIS PRESENCE NOT SPECIFIED: ICD-10-CM

## 2019-11-11 RX ORDER — OMEPRAZOLE 20 MG/1
CAPSULE, DELAYED RELEASE ORAL
Qty: 60 CAPSULE | Refills: 11 | Status: SHIPPED | OUTPATIENT
Start: 2019-11-11 | End: 2019-11-15 | Stop reason: SDUPTHER

## 2019-11-13 ENCOUNTER — TELEPHONE (OUTPATIENT)
Dept: VASCULAR SURGERY | Facility: CLINIC | Age: 64
End: 2019-11-13

## 2019-11-13 NOTE — TELEPHONE ENCOUNTER
I called the patient to remind him of his appt for testing and to see Dr. Lucio on Friday.  He was aware and confirmed.

## 2019-11-15 ENCOUNTER — APPOINTMENT (OUTPATIENT)
Dept: ULTRASOUND IMAGING | Facility: HOSPITAL | Age: 64
End: 2019-11-15

## 2019-11-15 ENCOUNTER — OFFICE VISIT (OUTPATIENT)
Dept: VASCULAR SURGERY | Facility: CLINIC | Age: 64
End: 2019-11-15

## 2019-11-15 ENCOUNTER — HOSPITAL ENCOUNTER (OUTPATIENT)
Dept: ULTRASOUND IMAGING | Facility: HOSPITAL | Age: 64
Discharge: HOME | End: 2019-11-15
Admitting: SURGERY

## 2019-11-15 VITALS
SYSTOLIC BLOOD PRESSURE: 144 MMHG | DIASTOLIC BLOOD PRESSURE: 62 MMHG | WEIGHT: 244 LBS | HEIGHT: 69 IN | HEART RATE: 105 BPM | BODY MASS INDEX: 36.14 KG/M2 | OXYGEN SATURATION: 98 %

## 2019-11-15 DIAGNOSIS — I70.213 ATHEROSCLEROSIS OF NATIVE ARTERY OF BOTH LOWER EXTREMITIES WITH INTERMITTENT CLAUDICATION (HCC): ICD-10-CM

## 2019-11-15 DIAGNOSIS — I70.213 ATHEROSCLEROSIS OF NATIVE ARTERY OF BOTH LOWER EXTREMITIES WITH INTERMITTENT CLAUDICATION (HCC): Primary | ICD-10-CM

## 2019-11-15 PROCEDURE — 99214 OFFICE O/P EST MOD 30 MIN: CPT | Performed by: SURGERY

## 2019-11-15 PROCEDURE — 93924 LWR XTR VASC STDY BILAT: CPT | Performed by: SURGERY

## 2019-11-15 PROCEDURE — 93923 UPR/LXTR ART STDY 3+ LVLS: CPT

## 2019-11-15 NOTE — PATIENT INSTRUCTIONS

## 2019-11-15 NOTE — PROGRESS NOTES
"11/15/2019      Mark Villela MD  [address] [place] [address] [address]  [place] [address]        Edy Cosby  1955    Chief Complaint   Patient presents with   • Follow-up     6 month f/u with ABIs. S/p previous L CFA endarterectomy and L iliac angioplasty and stent.       Dear Mark Villela MD:    HPI     I had the pleasure of seeing your patient in the office today for follow up.  As you recall, the patient is a 64 y.o. male who we are currently following for peripheral vascular disease.  He returns today after having undergone repeat NIC/PVR for continued surveillance.  I did review this image in the office today and it does show an NIC on the left of 0.65, and 0.59 on the right.  These are both mildly diminished from his previous NIC/PVR.  Clinically he remains with some mild claudication to the right lower extremity but denies any claudication on the left.  He had previously undergone a left common femoral endarterectomy with retrograde angioplasty and stent of the left common iliac artery due to a wound on the left foot.  He otherwise overall feels well today and is without any new complaints.      Review of Systems   Constitutional: Negative.  Negative for activity change, appetite change, chills and fever.   HENT: Negative.  Negative for congestion, sneezing and sore throat.    Eyes: Negative.    Respiratory: Negative.  Negative for chest tightness and shortness of breath.    Cardiovascular: Negative.  Negative for chest pain, palpitations and leg swelling.   Gastrointestinal: Negative.  Negative for abdominal distention, abdominal pain, nausea and vomiting.   Endocrine: Negative.    Genitourinary: Negative.    Musculoskeletal: Negative.         Mild RLE claudication at ~ 3 blocks. Unchanged from previous   Skin: Negative.    Allergic/Immunologic: Negative.    Neurological: Negative.    Hematological: Negative.    Psychiatric/Behavioral: Negative.        /62   Pulse 105   Ht 175.3 cm (69\")   " Wt 111 kg (244 lb)   SpO2 98%   BMI 36.03 kg/m²   Physical Exam   Constitutional: He is oriented to person, place, and time. He appears well-developed and well-nourished.   HENT:   Head: Normocephalic and atraumatic.   Eyes: EOM are normal. Pupils are equal, round, and reactive to light.   Neck: Normal range of motion. Neck supple. No JVD present.   Cardiovascular: Normal rate and regular rhythm.   Pulses:       Carotid pulses are 2+ on the right side, and 2+ on the left side.       Radial pulses are 2+ on the right side, and 2+ on the left side.        Femoral pulses are 2+ on the right side, and 2+ on the left side.       Dorsalis pedis pulses are 0 on the right side, and 1+ on the left side.        Posterior tibial pulses are 0 on the right side, and 0 on the left side.   On the right he has Doppler signals in the DP but no signal at the PT.  On the left he has a weakly palpable DP and a Doppler signal at the PT.   Pulmonary/Chest: Effort normal. No respiratory distress.   Abdominal: Soft. He exhibits no distension and no mass. There is no tenderness.   Musculoskeletal: He exhibits no edema or tenderness.   He has a left footdrop present due to a previous spine surgery.   Neurological: He is alert and oriented to person, place, and time. He exhibits abnormal muscle tone (Left footdrop present).   Skin: Skin is warm and dry. Capillary refill takes 2 to 3 seconds.   His right groin incision is well-healed with no further induration.    The prior wound at the base of the left first toe is now healed.   Psychiatric: He has a normal mood and affect. His behavior is normal. Judgment and thought content normal.   Vitals reviewed.      DIAGNOSTIC DATA:        No results found.    Patient Active Problem List   Diagnosis   • Spinal stenosis of lumbar region   • Back pain   • Degeneration of intervertebral disc of lumbosacral region   • Essential hypertension   • Lumbar disc herniation with radiculopathy   • Type 2  diabetes mellitus without complication, without long-term current use of insulin (CMS/HCC)   • Constipation due to opioid therapy   • Gastroesophageal reflux disease without esophagitis   • Panlobular emphysema (CMS/HCC)   • A-fib (CMS/HCC)   • Paroxysmal atrial fibrillation (CMS/HCC)   • Gastroesophageal reflux disease   • Hx of colonic polyps   • HTN (hypertension), benign   • Morbidly obese (CMS/HCC)   • Atherosclerosis of native artery of extremity (CMS/HCC)   • Atherosclerosis of lower extremity with ulceration (CMS/HCC)   • Atherosclerosis 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 (CMS/HCC)   • Pre-op evaluation   • Ischemia of extremity   • H/O diabetic foot ulcer         ICD-10-CM ICD-9-CM   1. Atherosclerosis of native artery of both lower extremities with intermittent claudication (CMS/[facility]) I70.213 440.21       Lab Frequency Next Occurrence   Follow Anesthesia Guidelines / Standing Orders Once 12/07/2018   Obtain Informed Consent Once 12/12/2018   Follow Anesthesia Guidelines / Standing Orders Once 12/17/2018   Obtain Informed Consent Once 12/22/2018   Follow Anesthesia Guidelines / Standing Orders Once 01/21/2019   Obtain Informed Consent Once 01/26/2019   Provide NPO Instructions to Patient Once 01/26/2019   XR Chest 1 View Once 01/26/2019       PLAN: After thoroughly evaluating Edy Cosby, I believe the best course of action is to remain conservative from a vascular standpoint.  Overall from a peripheral vascular standpoint he remains stable.  Continue to have mild claudication in the right lower extremity but this is not lifestyle limiting and denies any claudication on the left after having previously undergone left common femoral endarterectomy with angioplasty and stenting of the left common iliac artery.  His previous left foot wound has healed and he has no other new wounds at this point.  I will plan to see him back in the office in 6 months with a repeat NIC/PVR for continued  surveillance.  The patient is to continue taking their medications as previously discussed.   I did discuss vascular risk factors as they pertain to the progression of vascular disease including controlling diabetes, hypertension, and hyperlipidemia. These factors remain stable. Patient's Body mass index is 36.03 kg/m². BMI is above normal parameters. Recommendations include: educational material.   This was all discussed in full with complete understanding.  Thank you for allowing me to participate in the care of your patient.  Please do not hesitate to call with any questions or concerns.  We will keep you aware of any further encounters with Edy Cosby.      Sincerely Yours,      Duncan Lucio MD

## 2019-11-29 RX ORDER — CYCLOBENZAPRINE HCL 5 MG
TABLET ORAL
Qty: 90 TABLET | Refills: 0 | Status: SHIPPED | OUTPATIENT
Start: 2019-11-29 | End: 2019-12-26

## 2019-12-12 ENCOUNTER — OFFICE VISIT (OUTPATIENT)
Dept: PRIMARY CARE CLINIC | Age: 64
End: 2019-12-12
Payer: MEDICARE

## 2019-12-12 ENCOUNTER — CARE COORDINATION (OUTPATIENT)
Dept: CARE COORDINATION | Age: 64
End: 2019-12-12

## 2019-12-12 ENCOUNTER — OFFICE VISIT (OUTPATIENT)
Dept: OTOLARYNGOLOGY | Age: 64
End: 2019-12-12
Payer: MEDICARE

## 2019-12-12 VITALS
DIASTOLIC BLOOD PRESSURE: 70 MMHG | HEIGHT: 70 IN | BODY MASS INDEX: 35.07 KG/M2 | WEIGHT: 245 LBS | SYSTOLIC BLOOD PRESSURE: 138 MMHG

## 2019-12-12 VITALS
HEART RATE: 97 BPM | DIASTOLIC BLOOD PRESSURE: 62 MMHG | WEIGHT: 245 LBS | SYSTOLIC BLOOD PRESSURE: 128 MMHG | TEMPERATURE: 97.8 F | OXYGEN SATURATION: 98 % | HEIGHT: 69 IN | BODY MASS INDEX: 36.29 KG/M2

## 2019-12-12 DIAGNOSIS — I73.9 PERIPHERAL VASCULAR DISEASE (HCC): Primary | ICD-10-CM

## 2019-12-12 DIAGNOSIS — J38.00 VOCAL CORD PARESIS: ICD-10-CM

## 2019-12-12 DIAGNOSIS — I10 ESSENTIAL HYPERTENSION: ICD-10-CM

## 2019-12-12 DIAGNOSIS — I73.9 PVD (PERIPHERAL VASCULAR DISEASE) WITH CLAUDICATION (HCC): ICD-10-CM

## 2019-12-12 DIAGNOSIS — E11.59 TYPE 2 DIABETES MELLITUS WITH OTHER CIRCULATORY COMPLICATION, WITHOUT LONG-TERM CURRENT USE OF INSULIN (HCC): ICD-10-CM

## 2019-12-12 LAB — HBA1C MFR BLD: 5.9 %

## 2019-12-12 PROCEDURE — 2022F DILAT RTA XM EVC RTNOPTHY: CPT | Performed by: FAMILY MEDICINE

## 2019-12-12 PROCEDURE — 3017F COLORECTAL CA SCREEN DOC REV: CPT | Performed by: FAMILY MEDICINE

## 2019-12-12 PROCEDURE — 1036F TOBACCO NON-USER: CPT | Performed by: FAMILY MEDICINE

## 2019-12-12 PROCEDURE — 3044F HG A1C LEVEL LT 7.0%: CPT | Performed by: FAMILY MEDICINE

## 2019-12-12 PROCEDURE — 1036F TOBACCO NON-USER: CPT | Performed by: OTOLARYNGOLOGY

## 2019-12-12 PROCEDURE — G8417 CALC BMI ABV UP PARAM F/U: HCPCS | Performed by: OTOLARYNGOLOGY

## 2019-12-12 PROCEDURE — 99213 OFFICE O/P EST LOW 20 MIN: CPT | Performed by: OTOLARYNGOLOGY

## 2019-12-12 PROCEDURE — G8482 FLU IMMUNIZE ORDER/ADMIN: HCPCS | Performed by: FAMILY MEDICINE

## 2019-12-12 PROCEDURE — G8427 DOCREV CUR MEDS BY ELIG CLIN: HCPCS | Performed by: FAMILY MEDICINE

## 2019-12-12 PROCEDURE — G8482 FLU IMMUNIZE ORDER/ADMIN: HCPCS | Performed by: OTOLARYNGOLOGY

## 2019-12-12 PROCEDURE — G8427 DOCREV CUR MEDS BY ELIG CLIN: HCPCS | Performed by: OTOLARYNGOLOGY

## 2019-12-12 PROCEDURE — 99214 OFFICE O/P EST MOD 30 MIN: CPT | Performed by: FAMILY MEDICINE

## 2019-12-12 PROCEDURE — 83036 HEMOGLOBIN GLYCOSYLATED A1C: CPT | Performed by: FAMILY MEDICINE

## 2019-12-12 PROCEDURE — 31575 DIAGNOSTIC LARYNGOSCOPY: CPT | Performed by: OTOLARYNGOLOGY

## 2019-12-12 PROCEDURE — G8417 CALC BMI ABV UP PARAM F/U: HCPCS | Performed by: FAMILY MEDICINE

## 2019-12-12 PROCEDURE — 3017F COLORECTAL CA SCREEN DOC REV: CPT | Performed by: OTOLARYNGOLOGY

## 2019-12-12 RX ORDER — CILOSTAZOL 50 MG/1
50 TABLET ORAL 2 TIMES DAILY
Qty: 60 TABLET | Refills: 3 | Status: SHIPPED | OUTPATIENT
Start: 2019-12-12 | End: 2021-07-27 | Stop reason: SDUPTHER

## 2019-12-12 ASSESSMENT — ENCOUNTER SYMPTOMS
VOMITING: 0
ABDOMINAL PAIN: 0
SHORTNESS OF BREATH: 0
COUGH: 0
CHEST TIGHTNESS: 0
CONSTIPATION: 0
DIARRHEA: 0
NAUSEA: 0
WHEEZING: 0

## 2019-12-26 RX ORDER — CYCLOBENZAPRINE HCL 5 MG
TABLET ORAL
Qty: 90 TABLET | Refills: 0 | Status: SHIPPED | OUTPATIENT
Start: 2019-12-26 | End: 2020-01-31

## 2020-01-02 ENCOUNTER — CARE COORDINATION (OUTPATIENT)
Dept: CARE COORDINATION | Age: 65
End: 2020-01-02

## 2020-01-06 ENCOUNTER — TELEPHONE (OUTPATIENT)
Dept: PODIATRY | Facility: CLINIC | Age: 65
End: 2020-01-06

## 2020-01-09 ENCOUNTER — OFFICE VISIT (OUTPATIENT)
Dept: PODIATRY | Facility: CLINIC | Age: 65
End: 2020-01-09

## 2020-01-09 VITALS
DIASTOLIC BLOOD PRESSURE: 70 MMHG | HEIGHT: 69 IN | SYSTOLIC BLOOD PRESSURE: 120 MMHG | BODY MASS INDEX: 37.03 KG/M2 | OXYGEN SATURATION: 98 % | HEART RATE: 105 BPM | WEIGHT: 250 LBS

## 2020-01-09 DIAGNOSIS — M21.372 FOOT DROP, LEFT: ICD-10-CM

## 2020-01-09 DIAGNOSIS — Z86.31 H/O DIABETIC FOOT ULCER: ICD-10-CM

## 2020-01-09 DIAGNOSIS — B35.1 ONYCHOMYCOSIS: Primary | ICD-10-CM

## 2020-01-09 DIAGNOSIS — E11.49 DIABETES MELLITUS TYPE 2 WITH NEUROLOGICAL MANIFESTATIONS (HCC): ICD-10-CM

## 2020-01-09 DIAGNOSIS — L84 FOOT CALLUS: ICD-10-CM

## 2020-01-09 PROCEDURE — 11721 DEBRIDE NAIL 6 OR MORE: CPT | Performed by: PODIATRIST

## 2020-01-09 PROCEDURE — 11055 PARING/CUTG B9 HYPRKER LES 1: CPT | Performed by: PODIATRIST

## 2020-01-09 PROCEDURE — 99213 OFFICE O/P EST LOW 20 MIN: CPT | Performed by: PODIATRIST

## 2020-01-28 RX ORDER — LABETALOL 100 MG/1
TABLET, FILM COATED ORAL
Qty: 180 TABLET | Refills: 0 | Status: SHIPPED | OUTPATIENT
Start: 2020-01-28 | End: 2020-05-27

## 2020-02-05 ENCOUNTER — CARE COORDINATION (OUTPATIENT)
Dept: CARE COORDINATION | Age: 65
End: 2020-02-05

## 2020-02-05 NOTE — CARE COORDINATION
barriers: N/A  Confidence: 7/10  Anticipated Goal Completion Date: 10/12/2019            Prior to Admission medications    Medication Sig Start Date End Date Taking? Authorizing Provider   cyclobenzaprine (FLEXERIL) 5 MG tablet TAKE 1 TABLET BY MOUTH EVERY 8 HOURS AS NEEDED FOR MUSCLE SPASMS 1/31/20   Licha Don MD   labetalol (NORMODYNE) 100 MG tablet TAKE 1 TABLET BY MOUTH TWICE DAILY 1/28/20   Licha Don MD   blood glucose monitor kit and supplies Test 4 times a day & as needed for symptoms of irregular blood glucose.  1/27/20   Licha Don MD   lisinopril (PRINIVIL;ZESTRIL) 40 MG tablet TAKE 1 TABLET BY MOUTH ONCE DAILY 1/16/20   Licha Don MD   cilostazol (PLETAL) 50 MG tablet Take 1 tablet by mouth 2 times daily 12/12/19   Licha Don MD   blood glucose test strips ([facility] GLUCOSE TEST) strip TEST 3 TIMES DAILY Dx E11.49 12/9/19   Licha Don MD   Insulin Pen Needle 31G X 6 MM MISC 1 each by Does not apply route 4 times daily (before meals and nightly) May substitute to generic for insurance Dx E11.49 12/9/19   Licha Don MD   amLODIPine (NORVASC) 10 MG tablet Take 1 tablet by mouth daily 9/12/19   Licha Don MD   insulin aspart (NOVOLOG FLEXPEN) 100 UNIT/ML injection pen Inject 25-37 Units into the skin 3 times daily (before meals) 20 units breakfast, 30 units lunch, 30 units dinner + sliding scale each meal (Total dose is 20-32 breakfast, 30-42 lunch and dinner) 6/12/19   Licha Don MD   atorvastatin (LIPITOR) 40 MG tablet TAKE 1 TABLET BY MOUTH ONCE DAILY 5/2/19   Licha Don MD   nicotine polacrilex (NICORETTE) 4 MG gum Take 1 each by mouth as needed for Smoking cessation  Patient not taking: Reported on 12/12/2019 2/11/19   Licha Don MD   Diabetic Shoe MISC by Does not apply route 11/15/18   Licha Don MD   Diabetic Shoe MISC by Does not apply route With inserts Ell.9 11/15/18   Licha Don MD   gabapentin (NEURONTIN) 300 MG capsule Take 900 mg by mouth 3 times daily. Ryland Flores Historical Provider, MD   meloxicam (MOBIC) 15 MG tablet Take 15 mg by mouth daily    Historical Provider, MD   hydrochlorothiazide (HYDRODIURIL) 25 MG tablet Take 1 tablet by mouth daily 5/22/18   Justine Griffiths MD   HYDROcodone-acetaminophen [facility])  MG per tablet TK 1 T PO Q 6 H PRN FOR PAIN 4/20/18   Historical Provider, MD   Insulin Syringe-Needle U-100 30G X 1/2\" 0.5 ML MISC 1 each by Does not apply route 3 times daily 3/12/18   Justine Griffiths MD   Lancets MISC Accu-Chek TID,  DX: E11.59 10/10/17   Justine Griffiths MD   Lancets MISC Daily Accu-Chek 10/4/17   Justine Griffiths MD   omeprazole (PRILOSEC) 40 MG delayed release capsule Take 40 mg by mouth 2 times daily    Historical Provider, MD   aspirin 325 MG tablet Take 325 mg by mouth daily. 9/21/10   Historical Provider, MD       Future Appointments   Date Time Provider Diego Shantelle   3/16/2020  9:45 AM Justine Griffiths MD [address]   6/15/2020 10:00 AM Carolina Mcmillan MD [facility]      and   Diabetes Assessment    Medic Alert ID:  No  Meal Planning:  None   How often do you test your blood sugar?:  Daily, Meals, Bedtime   Do you have barriers with adherence to non-pharmacologic self-management interventions?  (Nutrition/Exercise/Self-Monitoring):  No   Have you ever had to go to the ED for symptoms of low blood sugar?:  No       No patient-reported symptoms

## 2020-02-17 ENCOUNTER — OFFICE VISIT (OUTPATIENT)
Dept: CARDIOLOGY | Facility: CLINIC | Age: 65
End: 2020-02-17

## 2020-02-17 VITALS
WEIGHT: 249 LBS | HEIGHT: 69 IN | HEART RATE: 88 BPM | OXYGEN SATURATION: 98 % | SYSTOLIC BLOOD PRESSURE: 140 MMHG | RESPIRATION RATE: 18 BRPM | BODY MASS INDEX: 36.88 KG/M2 | DIASTOLIC BLOOD PRESSURE: 62 MMHG

## 2020-02-17 DIAGNOSIS — I70.202 ATHEROSCLEROSIS OF NATIVE ARTERY OF LEFT LOWER EXTREMITY, WITH UNSPECIFIED PRESENCE OF CLINICAL MANIFESTATION (HCC): ICD-10-CM

## 2020-02-17 DIAGNOSIS — E11.9 TYPE 2 DIABETES MELLITUS WITHOUT COMPLICATION, WITHOUT LONG-TERM CURRENT USE OF INSULIN (HCC): Chronic | ICD-10-CM

## 2020-02-17 DIAGNOSIS — I48.0 PAROXYSMAL ATRIAL FIBRILLATION (HCC): Primary | ICD-10-CM

## 2020-02-17 DIAGNOSIS — I10 ESSENTIAL HYPERTENSION: ICD-10-CM

## 2020-02-17 DIAGNOSIS — E66.01 MORBIDLY OBESE (HCC): ICD-10-CM

## 2020-02-17 PROCEDURE — 93000 ELECTROCARDIOGRAM COMPLETE: CPT | Performed by: NURSE PRACTITIONER

## 2020-02-17 PROCEDURE — 99213 OFFICE O/P EST LOW 20 MIN: CPT | Performed by: NURSE PRACTITIONER

## 2020-02-17 RX ORDER — GLIMEPIRIDE 2 MG/1
2 TABLET ORAL
COMMUNITY
End: 2020-05-15

## 2020-02-17 RX ORDER — DEXLANSOPRAZOLE 60 MG/1
60 CAPSULE, DELAYED RELEASE ORAL DAILY
COMMUNITY
End: 2020-05-15

## 2020-02-17 NOTE — PROGRESS NOTES
Subjective:     Encounter Date:02/17/2020      Patient ID: Edy Cosby is a 64 y.o. male with a history of Paroxysmal atrial fibrillation following a surgery in 2017; patient is not anticoagulated due to no known recurrence, hypertension, diabetes mellitus (insulin dependence), stage III CKD, and CHARLA.    Chief Complaint: yearly follow up  Atrial Fibrillation   Presents for follow-up visit. Symptoms are negative for bradycardia, chest pain, dizziness, pacemaker problem, palpitations, shortness of breath, syncope, tachycardia and weakness. The symptoms have been stable. Past medical history includes atrial fibrillation. There are no medication compliance problems.   Hypertension   This is a chronic problem. The current episode started more than 1 year ago. The problem is controlled. Pertinent negatives include no blurred vision, chest pain, headaches, malaise/fatigue, neck pain, orthopnea, palpitations, peripheral edema, PND or shortness of breath. Risk factors for coronary artery disease include male gender, obesity, diabetes mellitus and dyslipidemia. Current antihypertension treatment includes ACE inhibitors, beta blockers, calcium channel blockers and diuretics. There are no compliance problems.  Hypertensive end-organ damage includes kidney disease.     Patient presents today     The following portions of the patient's history were reviewed and updated as appropriate: allergies, current medications, past family history, past medical history, past social history, past surgical history and problem list.    No Known Allergies    Current Outpatient Medications:   •  amLODIPine (NORVASC) 5 MG tablet, , Disp: , Rfl:   •  aspirin 325 MG tablet, Take 325 mg by mouth daily., Disp: , Rfl:   •  atorvastatin (LIPITOR) 40 MG tablet, Take 40 mg by mouth 2 (Two) Times a Day., Disp: , Rfl:   •  cyclobenzaprine (FLEXERIL) 5 MG tablet, Take 5 mg by mouth 3 (Three) Times a Day As Needed for Muscle Spasms., Disp: , Rfl:   •   dexlansoprazole (DEXILANT) 60 MG capsule, Take 60 mg by mouth Daily., Disp: , Rfl:   •  gabapentin (NEURONTIN) 600 MG tablet, Take 1,800 mg by mouth 2 (Two) Times a Day., Disp: , Rfl:   •  glimepiride (AMARYL) 2 MG tablet, Take 2 mg by mouth Every Morning Before Breakfast., Disp: , Rfl:   •  hydrochlorothiazide (HYDRODIURIL) 25 MG tablet, Take 25 mg by mouth Daily., Disp: , Rfl:   •  insulin aspart (novoLOG FLEXPEN) 100 UNIT/ML solution pen-injector sc pen, 25 units breakfast, 25units lunch, 25 units dinner + sliding scale each meal (Total dose is 20-32 breakfast, 30-42 lunch and dinner), Disp: , Rfl:   •  labetalol (NORMODYNE) 100 MG tablet, Take 100 mg by mouth 2 (Two) Times a Day., Disp: , Rfl:   •  lisinopril (PRINIVIL,ZESTRIL) 40 MG tablet, Take 40 mg by mouth Daily., Disp: , Rfl:   •  meloxicam (MOBIC) 15 MG tablet, Take 15 mg by mouth As Needed., Disp: , Rfl:   •  omeprazole (priLOSEC) 20 MG capsule, Take 20 mg by mouth 2 (Two) Times a Day., Disp: , Rfl:   •  zolpidem (AMBIEN) 10 MG tablet, Take 10 mg by mouth At Night As Needed (FOR SLEEP)., Disp: , Rfl:   Past Medical History:   Diagnosis Date   • A-fib (CMS/HCC)    • Atherosclerosis    • Chronic back pain    • Constipation    • Diabetes mellitus (CMS/HCC)     TYPE II   • Dysphagia    • Emphysema of lung (CMS/HCC)    • Gastroparesis    • GERD (gastroesophageal reflux disease)    • Hyperlipidemia    • Hypertension     ESSENTIAL   • Leg pain    • Muscle spasm    • Nerve pain    • Osteoarthritis    • Sleeping difficulty    • Ulcer of toe due to diabetes mellitus (CMS/HCC)      Social History     Socioeconomic History   • Marital status: Single     Spouse name: Not on file   • Number of children: Not on file   • Years of education: Not on file   • Highest education level: Not on file   Tobacco Use   • Smoking status: Former Smoker     Years: 30.00     Types: Cigarettes   • Smokeless tobacco: Never Used   Substance and Sexual Activity   • Alcohol use: No   •  Drug use: No   • Sexual activity: Defer       Review of Systems   Constitution: Negative for decreased appetite, diaphoresis, malaise/fatigue and night sweats.   HENT: Negative for congestion and nosebleeds.    Eyes: Negative for blurred vision and visual disturbance.   Cardiovascular: Negative for chest pain, dyspnea on exertion, irregular heartbeat, leg swelling, near-syncope, orthopnea, palpitations, paroxysmal nocturnal dyspnea and syncope.   Respiratory: Negative for cough, shortness of breath and wheezing.    Hematologic/Lymphatic: Does not bruise/bleed easily.   Musculoskeletal: Positive for joint swelling (left leg swelling intermittently ). Negative for arthritis, joint pain and neck pain.   Gastrointestinal: Negative for abdominal pain and change in bowel habit.   Genitourinary: Negative for hematuria and urgency.   Neurological: Negative for dizziness, headaches, light-headedness, loss of balance and weakness.   Psychiatric/Behavioral: Negative for altered mental status. The patient is not nervous/anxious.    All other systems reviewed and are negative.        ECG 12 Lead  Date/Time: 2/17/2020 9:30 AM  Performed by: Alexadner Clemente APRN  Authorized by: Alexander Clemente APRN   Comparison: compared with previous ECG from 1/17/2019  Rhythm: sinus rhythm  Rate: normal  BPM: 88                 Objective:     Physical Exam   Constitutional: He is oriented to person, place, and time. He appears well-developed and well-nourished. No distress.   HENT:   Head: Normocephalic and atraumatic.   Nose: Nose normal.   Eyes: Pupils are equal, round, and reactive to light.   Neck: Normal range of motion. Neck supple. Carotid bruit is not present.   Cardiovascular: Normal rate, regular rhythm and normal heart sounds.   No murmur heard.  Pulmonary/Chest: Effort normal and breath sounds normal. No respiratory distress. He has no wheezes. He has no rales.   Abdominal: Soft. Normal appearance. He exhibits no distension.  "  Musculoskeletal: Normal range of motion. He exhibits no edema.   Neurological: He is alert and oriented to person, place, and time.   Skin: Skin is warm. No rash noted. No erythema.   Psychiatric: He has a normal mood and affect. His speech is normal and behavior is normal. Judgment and thought content normal.   Vitals reviewed.      /62 (BP Location: Right arm, Patient Position: Sitting, Cuff Size: Adult)   Pulse 88   Resp 18   Ht 175.3 cm (69\")   Wt 113 kg (249 lb)   SpO2 98%   BMI 36.77 kg/m²     Lab Review:     Lipid panel:    Lab Results   Component Value Date    TRIG 90 04/25/2018    HDL 44 (L) 04/25/2018    LDL 79 04/25/2018     I have personally reviewed Past office notes, and labs  Assessment:          Diagnosis Plan   1. Paroxysmal atrial fibrillation (CMS/HCC)     2. Essential hypertension     3. Type 2 diabetes mellitus without complication, without long-term current use of insulin (CMS/HCC)     4. Morbidly obese (CMS/HCC)     5. Atherosclerosis of native artery of left lower extremity, with unspecified presence of clinical manifestation (CMS/HCC)            Plan:         1. PAF: Maintaining sinus rhythm on EKG. No known recurrence. Denies any heart racing or palpitations. Not anticoagulated     2. HTN: Slightly elevated in office. Reports better control at home. Patient on BB, CCB, ACEI and HCTZ.     3. Type 2 DM: managed by PCP    4. Morbidly obese: Patient's Body mass index is 36.77 kg/m². BMI is above normal parameters. Recommendations include: exercise counseling and nutrition counseling     5. Arthrosclerosis: followed with Vascular. On statin therapy.     Patient is to follow up in 1 year or sooner if needed         "

## 2020-02-17 NOTE — PATIENT INSTRUCTIONS
Atrial Fibrillation  Atrial fibrillation is a type of irregular or rapid heartbeat (arrhythmia). In atrial fibrillation, the top part of the heart (atria) quivers in a chaotic pattern. This makes the heart unable to pump blood normally. Having atrial fibrillation can increase your risk for other health problems, such as:  · Blood can pool in the atria and form clots. If a clot travels to the brain, it can cause a stroke.  · The heart muscle may weaken from the irregular blood flow. This can cause heart failure.  Atrial fibrillation may start suddenly and stop on its own, or it may become a long-lasting problem.  What are the causes?  This condition is caused by some heart-related conditions or procedures, including:  · High blood pressure. This is the most common cause.  · Heart failure.  · Heart valve conditions.  · Inflammation of the sac that surrounds the heart (pericarditis).  · Heart surgery.  · Coronary artery disease.  · Certain heart rhythm disorders, such as Lay-Parkinson-White syndrome.  Other causes include:  · Pneumonia.  · Obstructive sleep apnea.  · Lung cancer.  · Thyroid problems, especially if the thyroid is overactive (hyperthyroidism).  · Excessive alcohol or drug use.  Sometimes, the cause of this condition is not known.  What increases the risk?  This condition is more likely to develop in:  · Older people.  · People who smoke.  · People who have diabetes mellitus.  · People who are overweight (obese).  · Athletes who exercise vigorously.  · People who have a family history.  What are the signs or symptoms?  Symptoms of this condition include:  · A feeling that your heart is beating rapidly or irregularly.  · A feeling of discomfort or pain in your chest.  · Shortness of breath.  · Sudden light-headedness or weakness.  · Getting tired easily during exercise.  In some cases, there are no symptoms.  How is this diagnosed?  Your health care provider may be able to detect atrial fibrillation when  taking your pulse. If detected, this condition may be diagnosed with:  · Electrocardiogram (ECG).  · Ambulatory cardiac monitor. This device records your heartbeats for 24 hours or more.  · Transthoracic echocardiogram (TTE) to evaluate how blood flows through your heart.  · Transesophageal echocardiogram ([place]) to view more detailed images of your heart.  · A stress test.  · Imaging tests, such as a CT scan or chest X-ray.  · Blood tests.  How is this treated?  This condition may be treated with:  · Medicines to slow down the heart rate or bring the heart's rhythm back to normal.  · Medicines to prevent blood clots from forming.  · Electrical cardioversion. This delivers a low-energy shock to the heart to reset its rhythm.  · Ablation. This procedure destroys the part of the heart tissue that sends abnormal signals.  · Left atrial appendage occlusion/excision. This seals off a common place in the atria where blood clots can form (left atrial appendage).  The goal of treatment is to prevent blood clots from forming and to keep your heart beating at a normal rate and rhythm. Treatment depends on underlying medical conditions and how you feel when you are experiencing fibrillation.  Follow these instructions at home:  Medicines  · Take over-the counter and prescription medicines only as told by your health care provider.  · If your health care provider prescribed a blood-thinning medicine (anticoagulant), take it exactly as told. Taking too much blood-thinning medicine can cause bleeding. Taking too little can enable a blood clot to form and travel to the brain, causing a stroke.  Lifestyle         · Do not use any products that contain nicotine or tobacco, such as cigarettes and e-cigarettes. If you need help quitting, ask your health care provider.  · Do not drink beverages that contain caffeine, such as coffee, soda, and tea.  · Follow diet instructions as told by your health care provider.  · Exercise regularly as  "told by your health care provider.  · Do not drink alcohol.  General instructions  · If you have obstructive sleep apnea, manage your condition as told by your health care provider.  · Maintain a healthy weight. Do not use diet pills unless your health care provider approves. Diet pills may make heart problems worse.  · Keep all follow-up visits as told by your health care provider. This is important.  Contact a health care provider if you:  · Notice a change in the rate, rhythm, or strength of your heartbeat.  · Are taking an anticoagulant and you notice increased bruising.  · Tire more easily when you exercise or exert yourself.  · Have a sudden change in weight.  Get help right away if you have:    · Chest pain, abdominal pain, sweating, or weakness.  · Difficulty breathing.  · Blood in your vomit, stool (feces), or urine.  · Any symptoms of a stroke. \"BE FAST\" is an easy way to remember the main warning signs of a stroke:  ? B - Balance. Signs are dizziness, sudden trouble walking, or loss of balance.  ? E - Eyes. Signs are trouble seeing or a sudden change in vision.  ? F - Face. Signs are sudden weakness or numbness of the face, or the face or eyelid drooping on one side.  ? A - Arms. Signs are weakness or numbness in an arm. This happens suddenly and usually on one side of the body.  ? S - Speech. Signs are sudden trouble speaking, slurred speech, or trouble understanding what people say.  ? T - Time. Time to call emergency services. Write down what time symptoms started.  · Other signs of a stroke, such as:  ? A sudden, severe headache with no known cause.  ? Nausea or vomiting.  ? Seizure.  These symptoms may represent a serious problem that is an emergency. Do not wait to see if the symptoms will go away. Get medical help right away. Call your local emergency services (911 in the [place] Do not drive yourself to the hospital.  Summary  · Atrial fibrillation is a type of irregular or rapid heartbeat " (arrhythmia).  · Symptoms include a feeling that your heart is beating fast or irregularly. In some cases, you may not have symptoms.  · The condition is treated with medicines to slow down the heart rate or bring the heart's rhythm back to normal. You may also need blood-thinning medicines to prevent blood clots.  · Get help right away if you have symptoms or signs of a stroke.  This information is not intended to replace advice given to you by your health care provider. Make sure you discuss any questions you have with your health care provider.  Document Released: 12/18/2006 Document Revised: 02/08/2019 Document Reviewed: 02/08/2019  [employer] Patient Education © 2019 [employer]

## 2020-03-16 ENCOUNTER — OFFICE VISIT (OUTPATIENT)
Dept: PRIMARY CARE CLINIC | Age: 65
End: 2020-03-16
Payer: MEDICARE

## 2020-03-16 VITALS
HEIGHT: 67 IN | WEIGHT: 243 LBS | HEART RATE: 93 BPM | TEMPERATURE: 97.2 F | BODY MASS INDEX: 38.14 KG/M2 | OXYGEN SATURATION: 94 % | DIASTOLIC BLOOD PRESSURE: 68 MMHG | SYSTOLIC BLOOD PRESSURE: 132 MMHG

## 2020-03-16 PROBLEM — I48.0 PAROXYSMAL ATRIAL FIBRILLATION (HCC): Status: ACTIVE | Noted: 2020-03-16

## 2020-03-16 LAB — HBA1C MFR BLD: 8.4 %

## 2020-03-16 PROCEDURE — G8417 CALC BMI ABV UP PARAM F/U: HCPCS | Performed by: FAMILY MEDICINE

## 2020-03-16 PROCEDURE — 3052F HG A1C>EQUAL 8.0%<EQUAL 9.0%: CPT | Performed by: FAMILY MEDICINE

## 2020-03-16 PROCEDURE — 2022F DILAT RTA XM EVC RTNOPTHY: CPT | Performed by: FAMILY MEDICINE

## 2020-03-16 PROCEDURE — 1036F TOBACCO NON-USER: CPT | Performed by: FAMILY MEDICINE

## 2020-03-16 PROCEDURE — 83036 HEMOGLOBIN GLYCOSYLATED A1C: CPT | Performed by: FAMILY MEDICINE

## 2020-03-16 PROCEDURE — G8427 DOCREV CUR MEDS BY ELIG CLIN: HCPCS | Performed by: FAMILY MEDICINE

## 2020-03-16 PROCEDURE — 99214 OFFICE O/P EST MOD 30 MIN: CPT | Performed by: FAMILY MEDICINE

## 2020-03-16 PROCEDURE — G8482 FLU IMMUNIZE ORDER/ADMIN: HCPCS | Performed by: FAMILY MEDICINE

## 2020-03-16 PROCEDURE — 3017F COLORECTAL CA SCREEN DOC REV: CPT | Performed by: FAMILY MEDICINE

## 2020-03-16 RX ORDER — INSULIN GLARGINE 100 [IU]/ML
20 INJECTION, SOLUTION SUBCUTANEOUS NIGHTLY
Qty: 5 PEN | Refills: 0 | Status: SHIPPED | OUTPATIENT
Start: 2020-03-16 | End: 2020-10-20 | Stop reason: SDUPTHER

## 2020-03-16 ASSESSMENT — ENCOUNTER SYMPTOMS
VOMITING: 0
NAUSEA: 0
DIARRHEA: 0
COUGH: 0
CONSTIPATION: 0
CHEST TIGHTNESS: 0
ABDOMINAL PAIN: 0
SHORTNESS OF BREATH: 0
WHEEZING: 0

## 2020-03-16 NOTE — PROGRESS NOTES
Milton Sunshine is a 62 y.o. male who presents today for   Chief Complaint   Patient presents with    3 Month Follow-Up     A1c & Foot Exam       HPI  Patient is here for f/u DM and diabetic foot exam. Pt states he is doing well. Has a history of peripheral vascular disease and paroxysmal atrial fibrillation. He denies any recent chest pain or shortness of breath. He continues to have his nails trimmed with podiatry. His feet have remained rather stable and regards to wounds and ulcers. No change in PMH, family, social, or surgical history unless mentioned above. Review of Systems   Constitutional: Negative for chills and fever. Respiratory: Negative for cough, chest tightness, shortness of breath and wheezing. Cardiovascular: Negative for chest pain, palpitations and leg swelling. Gastrointestinal: Negative for abdominal pain, constipation, diarrhea, nausea and vomiting. Genitourinary: Negative for difficulty urinating, dysuria and frequency. Neurological: Positive for numbness.        Past Medical History:   Diagnosis Date    Arthritis     BiPAP (biphasic positive airway pressure) dependence     8cm to 20cm    Chronic back pain     Emphysema/COPD (HCC)     GERD (gastroesophageal reflux disease)     History of anemia     History of blood transfusion     Hyperlipidemia     Hypertension     Neuropathy     Obstructive sleep apnea     AHI:  95.8 per PSG, 3/2017; repeat PSG, 11/2017 revealed an AHI of 65.5    Peripheral vascular disease ([facility] [address]     S/P angioplasty     Type II or unspecified type diabetes mellitus without mention of complication, not stated as uncontrolled        Current Outpatient Medications   Medication Sig Dispense Refill    Diabetic Shoe MISC by Does not apply route DISPENSE ONE PAIR DIABETIC SHOES AND THREE PAIRS OF HEAT MOLDED INSERTS 1 each 0    insulin glargine (LANTUS SOLOSTAR) 100 UNIT/ML injection pen Inject 20 Units into the skin nightly 5 pen 0    is soft. There is no mass. Tenderness: There is no abdominal tenderness. There is no guarding or rebound. Musculoskeletal:      Right lower leg: No edema. Left lower leg: No edema. Skin:     General: Skin is warm and dry. Nails: There is no clubbing. Neurological:      Mental Status: He is alert and oriented to person, place, and time. Coordination: Coordination normal.      Gait: Gait normal.       Monofilament Exam Reveals:  Pulses: normal  Edema:normal  Skin Lesions: callus first metatarsal head b/l  Right Foot:    Left Foot:  Normal sensation at 0   Normal sensation at 0   Diminished sensation at 1-10   Diminished sensation at 1-10   No sensation at 0    No sensation at 0         Assessment:    ICD-10-CM    1. Type 2 diabetes mellitus with other circulatory complication, without long-term current use of insulin ([facility]) E11.59 POCT glycosylated hemoglobin (Hb A1C)     Lipid Panel     CBC     Comprehensive Metabolic Panel     Hemoglobin A1C     Microalbumin / Creatinine Urine Ratio      DIABETES FOOT EXAM   2. Peripheral vascular disease ([facility]) I73.9    3. Paroxysmal atrial fibrillation ([facility]) I48.0    4. Morbid obesity due to excess calories ([facility]) E66.01    5. Decreased sensation of foot R20.8  DIABETES FOOT EXAM       Plan:   Pt needs diabetic shoes based on Hx of Type 2 DM with peripheral neuropathy and decreased sensation and Hx of pre-ulcerative calluses.   Chronic vascular disease is currently stable, no changes indicated  Orders Placed This Encounter   Procedures    Lipid Panel     Standing Status:   Standing     Number of Occurrences:   15     Standing Expiration Date:   2/27/2031     Order Specific Question:   Is Patient Fasting?/# of Hours     Answer:   yes/8 hrs    CBC     Standing Status:   Standing     Number of Occurrences:   15     Standing Expiration Date:   2/27/2031    Comprehensive Metabolic Panel     Every 11 months     Standing Status:   Standing     Number of Occurrences:   15     Standing Expiration Date:   2/27/2031    Hemoglobin A1C     Standing Status:   Standing     Number of Occurrences:   40     Standing Expiration Date:   2/27/2031    Microalbumin / Creatinine Urine Ratio     Standing Status:   Standing     Number of Occurrences:   15     Standing Expiration Date:   2/27/2031    POCT glycosylated hemoglobin (Hb A1C)    HM DIABETES FOOT EXAM     Orders Placed This Encounter   Medications    Diabetic Shoe MISC     Sig: by Does not apply route DISPENSE ONE PAIR DIABETIC SHOES AND THREE PAIRS OF HEAT MOLDED INSERTS     Dispense:  1 each     Refill:  0    insulin glargine (LANTUS SOLOSTAR) 100 UNIT/ML injection pen     Sig: Inject 20 Units into the skin nightly     Dispense:  5 pen     Refill:  0     Medications Discontinued During This Encounter   Medication Reason    Lancets MISC DUPLICATE     Patient Instructions   Start 20 units night-time insulin. If you miss meal time insulin, check your sugar and just use sliding scale. Please arrive 15 minutes early to next follow up appointment in 3 months or schedule an appointment sooner if needed. Check your fasting blood sugar before breakfast, lunch, and dinner. Take 25 units of novolog  plus the additional sliding scale amount for your blood sugar before that meal.    <150: 0  151-200:  2  201-250: 4  251-300: 6  301-350: 8  351-400: 10  >400: 6   Example: If your pre-meal blood sugar is 175, take 25 + 2 unit = 27 units. Keep a log of your sugars. Patient given educational handouts and has had all questions answered. Patient voices understanding and agrees to plans along with risks and benefits of plan. Patient isinstructed to continue prior meds, diet, and exercise plans unless instructed otherwise. Patient agrees to follow up as instructed and sooner if needed. Patient agrees to go to ER if condition becomes emergent.     Notesmay be completed with dictation device and spelling errors

## 2020-03-19 ENCOUNTER — TELEPHONE (OUTPATIENT)
Dept: PRIMARY CARE CLINIC | Age: 65
End: 2020-03-19

## 2020-03-23 RX ORDER — AMLODIPINE BESYLATE 10 MG/1
TABLET ORAL
Qty: 90 TABLET | Refills: 0 | Status: SHIPPED | OUTPATIENT
Start: 2020-03-23 | End: 2020-06-30

## 2020-03-23 NOTE — TELEPHONE ENCOUNTER
Virginia Schwab called to request a refill on his medication.       Last office visit : 3/16/2020   Next office visit : 6/18/2020     Requested Prescriptions     Pending Prescriptions Disp Refills    amLODIPine (NORVASC) 10 MG tablet [Pharmacy Med Name: amLODIPine Besylate 10 MG Oral Tablet] 90 tablet 0     Sig: Take 1 tablet by mouth once daily            [address] [place] [address]

## 2020-04-06 ENCOUNTER — TELEPHONE (OUTPATIENT)
Dept: PODIATRY | Facility: CLINIC | Age: 65
End: 2020-04-06

## 2020-04-06 RX ORDER — CYCLOBENZAPRINE HCL 5 MG
TABLET ORAL
Qty: 90 TABLET | Refills: 1 | Status: SHIPPED | OUTPATIENT
Start: 2020-04-06 | End: 2020-06-07

## 2020-04-06 NOTE — TELEPHONE ENCOUNTER
Spoke with patient and advised that his upcoming appointment with Dr. Beyer has been cancelled.  Advised that we will contact him to RS.  Patient expressed understanding for all that was discussed.

## 2020-05-13 ENCOUNTER — TELEPHONE (OUTPATIENT)
Dept: VASCULAR SURGERY | Facility: CLINIC | Age: 65
End: 2020-05-13

## 2020-05-13 NOTE — TELEPHONE ENCOUNTER
Called the patient to remind him of his testing and to see Dr. Lucio on Friday.  I did go over protocol for the heart center and he will be coming alone.

## 2020-05-15 ENCOUNTER — HOSPITAL ENCOUNTER (OUTPATIENT)
Dept: ULTRASOUND IMAGING | Facility: HOSPITAL | Age: 65
Discharge: HOME | End: 2020-05-15
Admitting: SURGERY

## 2020-05-15 ENCOUNTER — OFFICE VISIT (OUTPATIENT)
Dept: VASCULAR SURGERY | Facility: CLINIC | Age: 65
End: 2020-05-15

## 2020-05-15 VITALS
HEART RATE: 100 BPM | OXYGEN SATURATION: 97 % | DIASTOLIC BLOOD PRESSURE: 48 MMHG | SYSTOLIC BLOOD PRESSURE: 122 MMHG | BODY MASS INDEX: 37.03 KG/M2 | WEIGHT: 250 LBS | HEIGHT: 69 IN

## 2020-05-15 DIAGNOSIS — I70.213 ATHEROSCLEROSIS OF NATIVE ARTERY OF BOTH LOWER EXTREMITIES WITH INTERMITTENT CLAUDICATION (HCC): Primary | ICD-10-CM

## 2020-05-15 DIAGNOSIS — I70.213 ATHEROSCLEROSIS OF NATIVE ARTERY OF BOTH LOWER EXTREMITIES WITH INTERMITTENT CLAUDICATION (HCC): ICD-10-CM

## 2020-05-15 PROCEDURE — 93923 UPR/LXTR ART STDY 3+ LVLS: CPT | Performed by: SURGERY

## 2020-05-15 PROCEDURE — 99214 OFFICE O/P EST MOD 30 MIN: CPT | Performed by: SURGERY

## 2020-05-15 PROCEDURE — 93923 UPR/LXTR ART STDY 3+ LVLS: CPT

## 2020-05-15 NOTE — PROGRESS NOTES
05/15/2020      Mark Villela MD  [address] [place] [address] [address] MOSQUEDA Alexandra  [place] [address]        Edy Cosby  1955    Chief Complaint   Patient presents with   • Follow-up     6 month f/u with ABIs.  Pt states that he is doing about the same.        Dear Mark Villela MD:    HPI     I had the pleasure of seeing your patient in the office today for follow up.  As you recall, the patient is a 65 y.o. male who we are currently following for peripheral vascular disease.  He had previously undergone a left common femoral endarterectomy with retrograde angioplasty and stenting of a left common iliac artery significant stenosis due to a wound on the left foot at the base of the first toe.  He returns today for continued surveillance.  He had a repeat NIC/PVR which I personally reviewed in the office.  It appears unchanged from prior with bilateral NIC values of 0.60.  This represents moderate arterial insufficiency of the bilateral lower extremities.  Overall he reports he is doing well today.  Continues to have mild claudication at the calf level bilaterally at about 3 blocks but this is not lifestyle limiting.  He otherwise has no new wounds and has no other complaints.  He continues on aspirin as well as a statin.      Review of Systems   Constitutional: Negative.  Negative for activity change, appetite change, chills and fever.   HENT: Negative.  Negative for congestion, sneezing and sore throat.    Eyes: Negative.    Respiratory: Negative.  Negative for chest tightness and shortness of breath.    Cardiovascular: Negative.  Negative for chest pain, palpitations and leg swelling.   Gastrointestinal: Negative.  Negative for abdominal distention, abdominal pain, nausea and vomiting.   Endocrine: Negative.    Genitourinary: Negative.    Musculoskeletal: Negative.         Mild RLE claudication at ~ 3 blocks. Unchanged from previous   Skin: Negative.    Allergic/Immunologic: Negative.    Neurological: Negative.   "  Hematological: Negative.    Psychiatric/Behavioral: Negative.        /48   Pulse 100   Ht 175.3 cm (69\")   Wt 113 kg (250 lb)   SpO2 97%   BMI 36.92 kg/m²   Physical Exam   Constitutional: He is oriented to person, place, and time. He appears well-developed and well-nourished.   HENT:   Head: Normocephalic and atraumatic.   Eyes: Pupils are equal, round, and reactive to light. EOM are normal.   Neck: Normal range of motion. Neck supple. No JVD present.   Cardiovascular: Normal rate and regular rhythm.   Pulses:       Carotid pulses are 2+ on the right side, and 2+ on the left side.       Radial pulses are 2+ on the right side, and 2+ on the left side.        Femoral pulses are 2+ on the right side, and 2+ on the left side.       Dorsalis pedis pulses are 0 on the right side, and 1+ on the left side.        Posterior tibial pulses are 0 on the right side, and 0 on the left side.   On the right he has Doppler signals in the DP but no signal at the PT.  On the left he has a weakly palpable DP and a Doppler signal at the PT.   Pulmonary/Chest: Effort normal. No respiratory distress.   Abdominal: Soft. He exhibits no distension and no mass. There is no tenderness.   Musculoskeletal: He exhibits no edema or tenderness.   He has a left footdrop present due to a previous spine surgery.   Neurological: He is alert and oriented to person, place, and time. He exhibits abnormal muscle tone (Left footdrop present).   Skin: Skin is warm and dry. Capillary refill takes 2 to 3 seconds.   Psychiatric: He has a normal mood and affect. His behavior is normal. Judgment and thought content normal.   Vitals reviewed.      DIAGNOSTIC DATA:    Us Ankle / Brachial Indices Extremity Complete    Result Date: 5/15/2020  Narrative:  History: PAD  Comments: Bilateral lower extremity arterial with multi-level pulse volume recordings and segmental pressures were performed at rest and stress.  The right ankle/brachial index is 0.60. " Doppler waveform at the dorsalis pedis is biphasic, and is absent and flat line at the posterior tibial. PVR waveform at the ankle is moderately dampened.These findings are consistent with moderate arterial insufficiency of the right lower extremity at rest.  The left ankle/brachial index is 0.60. Doppler waveforms are biphasic and PVR waveform at the ankle is mildly dampened. These findings are consistent with moderate arterial insufficiency of the left lower extremity at rest.      Impression: Impression: 1. Moderate arterial insufficiency of the right lower extremity at rest. 2. Moderate arterial insufficiency of the left lower extremity at rest.   This report was finalized on 05/15/2020 12:36 by Dr. Duncan Lucio MD.      Patient Active Problem List   Diagnosis   • Spinal stenosis of lumbar region   • Back pain   • Degeneration of intervertebral disc of lumbosacral region   • Essential hypertension   • Lumbar disc herniation with radiculopathy   • Type 2 diabetes mellitus without complication, without long-term current use of insulin (CMS/HCC)   • Constipation due to opioid therapy   • Gastroesophageal reflux disease without esophagitis   • Panlobular emphysema (CMS/HCC)   • A-fib (CMS/HCC)   • Paroxysmal atrial fibrillation (CMS/HCC)   • Gastroesophageal reflux disease   • Hx of colonic polyps   • HTN (hypertension), benign   • Morbidly obese (CMS/HCC)   • Atherosclerosis of native artery of extremity (CMS/HCC)   • Atherosclerosis of lower extremity with ulceration (CMS/HCC)   • Atherosclerosis 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 (CMS/HCC)   • Pre-op evaluation   • Ischemia of extremity   • H/O diabetic foot ulcer         ICD-10-CM ICD-9-CM   1. Atherosclerosis of native artery of both lower extremities with intermittent claudication (CMS/HCC) I70.213 440.21       Lab Frequency Next Occurrence   Follow Anesthesia Guidelines / Standing Orders Once 12/07/2018   Obtain Informed Consent Once  12/12/2018   Follow Anesthesia Guidelines / Standing Orders Once 12/17/2018   Obtain Informed Consent Once 12/22/2018   Follow Anesthesia Guidelines / Standing Orders Once 01/21/2019   Obtain Informed Consent Once 01/26/2019   Provide NPO Instructions to Patient Once 01/26/2019   US Ankle / Brachial Indices Extremity Complete Once 11/11/2020       PLAN: After thoroughly evaluating Edy Cosby, I believe the best course of action is to remain conservative from a vascular standpoint.  He continues to do well after his previous left common femoral endarterectomy with retrograde stenting of a left common iliac artery stenosis.  His previous left first toe wound had healed well and he has no new wounds since that time.  He does continue to have mild claudication symptoms at about 3 blocks but this is not limiting his lifestyle at all and is able to ambulate fairly well and take care of all of his normal activities.  As such at this point no further vascular intervention is warranted.  I recommended that he continue with walking is much as possible as well as continue on his regimen of 325 mg aspirin as well as a statin daily.  I will plan to see him back here in the office in 6 months with a repeat NIC/PVR for continued surveillance.  The patient is to otherwise continue taking their medications as previously discussed.   I did discuss vascular risk factors as they pertain to the progression of vascular disease including controlling diabetes, hypertension, and hyperlipidemia. These factors remain stable. Patient's Body mass index is 36.92 kg/m². BMI is above normal parameters. Recommendations include: educational material. This was all discussed in full with complete understanding.  Thank you for allowing me to participate in the care of your patient.  Please do not hesitate to call with any questions or concerns.  We will keep you aware of any further encounters with Edy Cosby.      Sincerely  Yours,      Duncan Lucio MD

## 2020-05-15 NOTE — PATIENT INSTRUCTIONS

## 2020-05-21 RX ORDER — ATORVASTATIN CALCIUM 40 MG/1
TABLET, FILM COATED ORAL
Qty: 90 TABLET | Refills: 1 | OUTPATIENT
Start: 2020-05-21 | End: 2020-11-22

## 2020-05-21 RX ORDER — ATORVASTATIN CALCIUM 40 MG/1
TABLET, FILM COATED ORAL
Qty: 30 TABLET | Refills: 0 | Status: SHIPPED | OUTPATIENT
Start: 2020-05-21 | End: 2020-05-21 | Stop reason: SDUPTHER

## 2020-05-21 NOTE — TELEPHONE ENCOUNTER
Spoke with David pharmicist at [facility] he asked patient medication could be a 90 day supply per insurance.  I gave verbal to dispense 90 tablets with 1 refill          Requested Prescriptions     Signed Prescriptions Disp Refills    atorvastatin (LIPITOR) 40 MG tablet 90 tablet 1     Sig: Take 1 tablet by mouth once daily     Authorizing Provider: Isauro Ospina     Ordering User: Effie King MA

## 2020-05-27 RX ORDER — LABETALOL 100 MG/1
TABLET, FILM COATED ORAL
Qty: 180 TABLET | Refills: 0 | Status: SHIPPED | OUTPATIENT
Start: 2020-05-27 | End: 2020-11-05

## 2020-06-07 RX ORDER — CYCLOBENZAPRINE HCL 5 MG
TABLET ORAL
Qty: 90 TABLET | Refills: 1 | Status: SHIPPED | OUTPATIENT
Start: 2020-06-07 | End: 2020-08-27 | Stop reason: SDUPTHER

## 2020-06-09 ENCOUNTER — TELEPHONE (OUTPATIENT)
Dept: PODIATRY | Facility: CLINIC | Age: 65
End: 2020-06-09

## 2020-06-10 ENCOUNTER — OFFICE VISIT (OUTPATIENT)
Dept: PODIATRY | Facility: CLINIC | Age: 65
End: 2020-06-10

## 2020-06-10 VITALS
DIASTOLIC BLOOD PRESSURE: 79 MMHG | OXYGEN SATURATION: 97 % | BODY MASS INDEX: 35.78 KG/M2 | WEIGHT: 241.6 LBS | SYSTOLIC BLOOD PRESSURE: 148 MMHG | HEART RATE: 96 BPM | HEIGHT: 69 IN

## 2020-06-10 DIAGNOSIS — E11.42 TYPE 2 DIABETES MELLITUS WITH DIABETIC POLYNEUROPATHY, WITH LONG-TERM CURRENT USE OF INSULIN (HCC): ICD-10-CM

## 2020-06-10 DIAGNOSIS — Z79.4 TYPE 2 DIABETES MELLITUS WITH DIABETIC POLYNEUROPATHY, WITH LONG-TERM CURRENT USE OF INSULIN (HCC): ICD-10-CM

## 2020-06-10 DIAGNOSIS — B35.1 ONYCHOMYCOSIS: Primary | ICD-10-CM

## 2020-06-10 DIAGNOSIS — M21.372 FOOT DROP, LEFT: ICD-10-CM

## 2020-06-10 DIAGNOSIS — B35.3 TINEA PEDIS OF BOTH FEET: ICD-10-CM

## 2020-06-10 PROCEDURE — 99213 OFFICE O/P EST LOW 20 MIN: CPT | Performed by: NURSE PRACTITIONER

## 2020-06-10 PROCEDURE — 11721 DEBRIDE NAIL 6 OR MORE: CPT | Performed by: NURSE PRACTITIONER

## 2020-06-10 RX ORDER — CLOTRIMAZOLE 1 %
CREAM (GRAM) TOPICAL 2 TIMES DAILY
Qty: 45 G | Refills: 6 | Status: SHIPPED | OUTPATIENT
Start: 2020-06-10 | End: 2020-06-24

## 2020-06-10 RX ORDER — TERBINAFINE HYDROCHLORIDE 250 MG/1
250 TABLET ORAL DAILY
Qty: 30 TABLET | Refills: 2 | Status: SHIPPED | OUTPATIENT
Start: 2020-06-10 | End: 2020-06-10

## 2020-06-10 RX ORDER — TERBINAFINE HYDROCHLORIDE 250 MG/1
250 TABLET ORAL DAILY
Qty: 30 TABLET | Refills: 2 | Status: SHIPPED | OUTPATIENT
Start: 2020-06-10 | End: 2020-09-02

## 2020-06-10 NOTE — PATIENT INSTRUCTIONS
Fungal Nail Infection  A fungal nail infection is a common infection of the toenails or fingernails. This condition affects toenails more often than fingernails. It often affects the great, or big, toes. More than one nail may be infected. The condition can be passed from person to person (is contagious).  What are the causes?  This condition is caused by a fungus. Several types of fungi can cause the infection. These fungi are common in moist and warm areas. If your hands or feet come into contact with the fungus, it may get into a crack in your fingernail or toenail and cause the infection.  What increases the risk?  The following factors may make you more likely to develop this condition:  · Being male.  · Being of older age.  · Living with someone who has the fungus.  · Walking barefoot in areas where the fungus thrives, such as showers or locker rooms.  · Wearing shoes and socks that cause your feet to sweat.  · Having a nail injury or a recent nail surgery.  · Having certain medical conditions, such as:  ? Athlete's foot.  ? Diabetes.  ? Psoriasis.  ? Poor circulation.  ? A weak body defense system (immune system).  What are the signs or symptoms?  Symptoms of this condition include:  · A pale spot on the nail.  · Thickening of the nail.  · A nail that becomes yellow or brown.  · A brittle or ragged nail edge.  · A crumbling nail.  · A nail that has lifted away from the nail bed.  How is this diagnosed?  This condition is diagnosed with a physical exam. Your health care provider may take a scraping or clipping from your nail to test for the fungus.  How is this treated?  Treatment is not needed for mild infections. If you have significant nail changes, treatment may include:  · Antifungal medicines taken by mouth (orally). You may need to take the medicine for several weeks or several months, and you may not see the results for a long time. These medicines can cause side effects. Ask your health care provider  what problems to watch for.  · Antifungal nail polish or nail cream. These may be used along with oral antifungal medicines.  · Laser treatment of the nail.  · Surgery to remove the nail. This may be needed for the most severe infections.  It can take a long time, usually up to a year, for the infection to go away. The infection may also come back.  Follow these instructions at home:  Medicines  · Take or apply over-the-counter and prescription medicines only as told by your health care provider.  · Ask your health care provider about using over-the-counter mentholated ointment on your nails.  Nail care  · Trim your nails often.  · Wash and dry your hands and feet every day.  · Keep your feet dry:  ? Wear absorbent socks, and change your socks frequently.  ? Wear shoes that allow air to circulate, such as sandals or canvas tennis shoes. Throw out old shoes.  · Do not use artificial nails.  · If you go to a nail salon, make sure you choose one that uses clean instruments.  · Use antifungal foot powder on your feet and in your shoes.  General instructions  · Do not share personal items, such as towels or nail clippers.  · Do not walk barefoot in shower rooms or locker rooms.  · Wear rubber gloves if you are working with your hands in wet areas.  · Keep all follow-up visits as told by your health care provider. This is important.  Contact a health care provider if:  Your infection is not getting better or it is getting worse after several months.  Summary  · A fungal nail infection is a common infection of the toenails or fingernails.  · Treatment is not needed for mild infections. If you have significant nail changes, treatment may include taking medicine orally and applying medicine to your nails.  · It can take a long time, usually up to a year, for the infection to go away. The infection may also come back.  · Take or apply over-the-counter and prescription medicines only as told by your health care  provider.  · Follow instructions for taking care of your nails to help prevent infection from coming back or spreading.  This information is not intended to replace advice given to you by your health care provider. Make sure you discuss any questions you have with your health care provider.  Document Released: 12/15/2001 Document Revised: 04/09/2020 Document Reviewed: 05/24/2019  Elsevier Patient Education © 2020 [employer]

## 2020-06-10 NOTE — PROGRESS NOTES
[facility] - PODIATRY    Today's Date: 06/10/20    Patient Name: Edy Cosby  MRN: [id number]  CSN: [id number]  PCP: Mark Villela MD  Referring Provider: No ref. provider found    SUBJECTIVE     Chief Complaint   Patient presents with   • Follow-up     Pt is here for a 3 month f/u on long, thickened toenails- left foot drop, denies pain at present time , occasional pain, burning and tingling - PCP 03/16/2020   • Diabetes     Pt last blood sugar reading is 185mg/dl.      HPI: Edy Cosby, a 65 y.o.male, comes to clinic as a(n) established patient presenting for diabetic foot exam and complaining of thickened toenails. Patient has h/o AFib, back pain, DM2, Emphysema lung, GERD, HTN, OA. Patient is IDDM with last stated BG level of 185mg/dl. Admits to numbness and tingling in feet. Continues use of AFO on left foot for foot drop. Relates thick, discolored, fungal toenails to both feet; is requesting information about possible treatments. Denies any previous liver dysfunction including elevated LFT, fatty liver, hepatitis.  He is unable to care for nails by himself. Relates dryness and flaking of skin of feet but denies any treatment with topical antifungals previously. Denies open wound or sores. Denies pain today. Relates previous treatment(s) including foot care by podiatrist. Denies any constitutional symptoms. No other pedal complaints at this time.    Past Medical History:   Diagnosis Date   • A-fib (CMS/HCC)    • Atherosclerosis    • Chronic back pain    • Constipation    • Diabetes mellitus (CMS/HCC)     TYPE II   • Dysphagia    • Emphysema of lung (CMS/HCC)    • Gastroparesis    • GERD (gastroesophageal reflux disease)    • Hyperlipidemia    • Hypertension     ESSENTIAL   • Leg pain    • Muscle spasm    • Nerve pain    • Osteoarthritis    • Sleeping difficulty    • Ulcer of toe due to diabetes mellitus (CMS/HCC)      Past Surgical History:   Procedure Laterality Date   •  ANGIOPLASTY ILIAC ARTERY Left 1/29/2019    Procedure: ANGIOPLASTY ILIAC ARTERY, STENT PLACEMENT;  Surgeon: Duncan Lucio MD;  Location:  [facility] HYBRID OR 12;  Service: Vascular   • AORTAGRAM Left 12/18/2018    Procedure: AORTOGRAM, LEFT LEG ANGIOGRAM, MYNX CLOSURE;  Surgeon: Duncan Lucio MD;  Location: [facility] HYBRID OR 12;  Service: Vascular   • BACK SURGERY     • CHOLECYSTECTOMY     • COLONOSCOPY  09/01/2011    TICS RECALL 5YR   • COLONOSCOPY  09/30/2008    ENTER RESULTS: STOOL THROUGHTOUT THE COLON POOR PREP REC 3 YEAR RECALL   • COLONOSCOPY N/A 11/9/2016    Procedure: COLONOSCOPY;  Surgeon: León Zepeda MD;  Location: [facility] ENDOSCOPY;  Service:    • COLONOSCOPY N/A 4/19/2018    Procedure: COLONOSCOPY WITH ANESTHESIA;  Surgeon: León Zepeda MD;  Location: [facility] ENDOSCOPY;  Service: Gastroenterology   • ENDOSCOPY  06/19/2012    HH   • ENDOSCOPY  08/25/2009    HIATAL HERNIA, DILATED WITH 50 FR MASCORRO   • ENDOSCOPY  06/19/2007    WITHIN NORMAL LIMITS UREA NEG   • FEMORAL ENDARTERECTOMY Left 1/29/2019    Procedure: LEFT FEMORAL ENDARTERECTOMY;  Surgeon: Duncan Lucio MD;  Location: [facility] HYBRID OR 12;  Service: Vascular   • LUMBAR LAMINECTOMY WITH FUSION N/A 1/23/2017    Procedure: REMOVAL OF INSTRUMENTATION, EXPLORATION OF FUSION L4-S1, REVISION LEFT L5-S1 HEMILAMINECTOMY, FACETECTOMY DECOMPRESSION, REVISION UNINSTRUMENTED POSTERIOR SPINAL FUSION L5-S1;  Surgeon: RHONDA Lopes MD;  Location: [facility] OR;  Service:    • OTHER SURGICAL HISTORY      LUMBAR SACRAL SURGERY WITH FUSION X2   • PILONIDAL CYST / SINUS EXCISION      PILONIDAL CYST REMOVAL     Family History   Problem Relation Age of Onset   • Heart attack Father    • Diabetes Brother    • Colon polyps Sister    • Stomach cancer Neg Hx         GI CNACERS OR DISEASE   • Colon cancer Neg Hx      Social History     Socioeconomic History   • Marital status: Single     Spouse name: Not on file   • Number of children: Not on  file   • Years of education: Not on file   • Highest education level: Not on file   Tobacco Use   • Smoking status: Former Smoker     Years: 30.00     Types: Cigarettes   • Smokeless tobacco: Never Used   Substance and Sexual Activity   • Alcohol use: No   • Drug use: No   • Sexual activity: Defer     No Known Allergies  Current Outpatient Medications   Medication Sig Dispense Refill   • amLODIPine (NORVASC) 5 MG tablet      • aspirin 325 MG tablet Take 325 mg by mouth daily.     • atorvastatin (LIPITOR) 40 MG tablet Take 40 mg by mouth 2 (Two) Times a Day.     • cyclobenzaprine (FLEXERIL) 5 MG tablet Take 5 mg by mouth 3 (Three) Times a Day As Needed for Muscle Spasms.     • gabapentin (NEURONTIN) 600 MG tablet Take 1,800 mg by mouth 2 (Two) Times a Day.     • hydrochlorothiazide (HYDRODIURIL) 25 MG tablet Take 25 mg by mouth Daily.     • insulin aspart (novoLOG FLEXPEN) 100 UNIT/ML solution pen-injector sc pen 25 units breakfast, 25units lunch, 25 units dinner + sliding scale each meal (Total dose is 20-32 breakfast, 30-42 lunch and dinner)     • labetalol (NORMODYNE) 100 MG tablet Take 100 mg by mouth 2 (Two) Times a Day.     • lisinopril (PRINIVIL,ZESTRIL) 40 MG tablet Take 40 mg by mouth Daily.     • meloxicam (MOBIC) 15 MG tablet Take 15 mg by mouth As Needed.     • clotrimazole (LOTRIMIN) 1 % cream Apply  topically to the appropriate area as directed 2 (Two) Times a Day for 14 days. 45 g 6   • terbinafine (lamiSIL) 250 MG tablet Take 1 tablet by mouth Daily for 84 days. 30 tablet 2     No current facility-administered medications for this visit.      Review of Systems   Constitutional: Negative for chills and fever.   HENT: Negative for congestion.    Respiratory: Negative for shortness of breath.    Cardiovascular: Negative for chest pain and leg swelling.   Gastrointestinal: Negative for constipation, diarrhea, nausea and vomiting.   Musculoskeletal: Positive for gait problem (foot drop).   Skin: Negative  for wound.        Dry skin of feet. Thick toenails   Neurological: Positive for numbness.       OBJECTIVE     Vitals:    06/10/20 0957   BP: 148/79   Pulse: 96   SpO2: 97%       PHYSICAL EXAM  GEN:   Accompanied by none.     Foot/Ankle Exam:       General:   Appearance: appears stated age and healthy    Orientation: AAOx3    Affect: appropriate    Gait comment:  Drop foot left  Assistance: independent    Shoe Gear:  Diabetic shoes (Left AFO)    VASCULAR      Right Foot Vascularity   Dorsalis pedis:  2+  Posterior tibial:  2+  Skin Temperature: warm    Edema Grading:  Trace  CFT:  3  Pedal Hair Growth:  Present  Varicosities: none       Left Foot Vascularity   Dorsalis pedis:  2+  Posterior tibial:  2+  Skin Temperature: warm    Edema Grading:  Trace  CFT:  3  Pedal Hair Growth:  Present  Varicosities: none        NEUROLOGIC     Right Foot Neurologic   Light touch sensation:  Diminished  Vibratory sensation:  Diminished  Hot/Cold sensation: diminished    Protective Sensation using [place] Monofilament:  2     Left Foot Neurologic   Light touch sensation:  Diminished  Vibratory sensation:  Diminished  Hot/cold sensation: diminished    Protective Sensation using [place] Monofilament:  0     MUSCULOSKELETAL      Right Foot Musculoskeletal    Amputation   Right toes amputated: No    Ecchymosis:  None  Tenderness: none    Arch:  Pes planus  Hallux valgus: No    Hallux limitus: No       Left Foot Musculoskeletal    Amputation   Left toes amputated: No    Ecchymosis:  None  Tenderness: none    Arch:  Pes planus  Hallux valgus: No    Hallux limitus: No       MUSCLE STRENGTH     Right Foot Muscle Strength   Foot dorsiflexion:  5  Foot plantar flexion:  5  Foot inversion:  5  Foot eversion:  5     Left Foot Muscle Strength   Foot dorsiflexion:  2  Foot plantar flexion:  3  Foot inversion:  3  Foot eversion:  3     RANGE OF MOTION      Right Foot Range of Motion   Foot and ankle ROM within normal limits        Left Foot Range of Motion   Foot and ankle ROM within normal limits       DERMATOLOGIC     Right Foot Dermatologic   Skin: tinea    Nails: onychomycosis, abnormally thick, subungual debris and dystrophic nails       Left Foot Dermatologic   Skin: corn and tinea    Nails: onychomycosis, abnormally thick, subungual debris and dystrophic nails        RADIOLOGY/NUCLEAR:  Us Ankle / Brachial Indices Extremity Complete    Result Date: 5/15/2020  Narrative:  History: [facility]  Comments: Bilateral lower extremity arterial with multi-level pulse volume recordings and segmental pressures were performed at rest and stress.  The right ankle/brachial index is 0.60. Doppler waveform at the dorsalis pedis is biphasic, and is absent and flat line at the posterior tibial. PVR waveform at the ankle is moderately dampened.These findings are consistent with moderate arterial insufficiency of the right lower extremity at rest.  The left ankle/brachial index is 0.60. Doppler waveforms are biphasic and PVR waveform at the ankle is mildly dampened. These findings are consistent with moderate arterial insufficiency of the left lower extremity at rest.      Impression: Impression: 1. Moderate arterial insufficiency of the right lower extremity at rest. 2. Moderate arterial insufficiency of the left lower extremity at rest.   This report was finalized on 05/15/2020 12:36 by Dr. Duncan Lucio MD.      LABORATORY/CULTURE RESULTS:      PATHOLOGY RESULTS:       ASSESSMENT/PLAN     Edy was seen today for follow-up and diabetes.    Diagnoses and all orders for this visit:    Onychomycosis  -     Discontinue: terbinafine (lamiSIL) 250 MG tablet; Take 1 tablet by mouth Daily for 84 days.  -     Hepatic Function Panel; Future  -     Hepatic Function Panel; Future  -     terbinafine (lamiSIL) 250 MG tablet; Take 1 tablet by mouth Daily for 84 days.    Tinea pedis of both feet  -     clotrimazole (LOTRIMIN) 1 % cream; Apply  topically to the  appropriate area as directed 2 (Two) Times a Day for 14 days.    Type 2 diabetes mellitus with diabetic polyneuropathy, with long-term current use of insulin (CMS/[facility])    Foot drop, left      Comprehensive lower extremity examination and evaluation was performed.  Discussed findings and treatment plan including risks, benefits, and treatment options with patient in detail. Patient agreed with treatment plan.  After verbal consent obtained, nail(s) x10 debrided of length and thickness with nail nipper without incidence  Patient may maintain nails and calluses at home utilizing emery board or pumice stone between visits as needed  Reviewed at home diabetic foot care including daily foot checks   Continue use of AFO   Previous recorded LFT reviewed and Lamasil prescribed for onychomycosis; order placed for LFT today and again in 3 months. Importance of lab monitoring discussed with patient to prevent liver complications. Followup in 1 month for monitoring.   Lotrimin for tinea 2 times daily for at least 14 days. Discussed hygiene including bleaching of socks, disinfecting of shoes and other surfaces, and transfer or tinea from surfaces.   An After Visit Summary was printed and given to the patient at discharge, including (if requested) any available informative/educational handouts regarding diagnosis, treatment, or medications. All questions were answered to patient/family satisfaction. Should symptoms fail to improve or worsen they agree to call or return to clinic or to go to the Emergency Department. Discussed the importance of following up with any needed screening tests/labs/specialist appointments and any requested follow-up recommended by me today. Importance of maintaining follow-up discussed and patient accepts that missed appointments can delay diagnosis and potentially lead to worsening of conditions.  Return in about 1 month (around 7/10/2020)., or sooner if acute issues arise.    Lab Frequency Next  Occurrence   Diet: Once 04/19/2018   Advance Diet as Tolerated Once 04/19/2018       This document has been electronically signed by PRATIMA Esteves on Marlee 10, 2020 11:10

## 2020-06-11 ENCOUNTER — TELEPHONE (OUTPATIENT)
Dept: PRIMARY CARE CLINIC | Age: 65
End: 2020-06-11

## 2020-06-11 NOTE — TELEPHONE ENCOUNTER
Page Dorothy requests that Halle Saravia return their call. The best time to reach him is Anytime. Pt is needing a call back about getting new diabetic shoes. Pt is way overdue. Thank you.

## 2020-06-11 NOTE — TELEPHONE ENCOUNTER
L/JAYLEEN to inform him his diabetic shoe requested has been filled out and sent to [employer]

## 2020-06-30 RX ORDER — AMLODIPINE BESYLATE 10 MG/1
TABLET ORAL
Qty: 90 TABLET | Refills: 0 | Status: SHIPPED | OUTPATIENT
Start: 2020-06-30 | End: 2020-09-28

## 2020-07-05 RX ORDER — HYDROCHLOROTHIAZIDE 25 MG/1
TABLET ORAL
Qty: 90 TABLET | Refills: 0 | Status: SHIPPED | OUTPATIENT
Start: 2020-07-05 | End: 2020-09-30

## 2020-08-03 ENCOUNTER — OFFICE VISIT (OUTPATIENT)
Age: 65
End: 2020-08-03

## 2020-08-03 VITALS — OXYGEN SATURATION: 96 % | TEMPERATURE: 96.5 F | HEART RATE: 94 BPM

## 2020-08-05 LAB — SARS-COV-2, NAA: NOT DETECTED

## 2020-08-27 ENCOUNTER — VIRTUAL VISIT (OUTPATIENT)
Dept: PRIMARY CARE CLINIC | Age: 65
End: 2020-08-27
Payer: MEDICARE

## 2020-08-27 PROCEDURE — 99442 PR PHYS/QHP TELEPHONE EVALUATION 11-20 MIN: CPT | Performed by: NURSE PRACTITIONER

## 2020-08-27 RX ORDER — AMOXICILLIN 250 MG
2 CAPSULE ORAL DAILY PRN
Qty: 30 TABLET | Refills: 0 | Status: SHIPPED | OUTPATIENT
Start: 2020-08-27 | End: 2021-07-27 | Stop reason: SDUPTHER

## 2020-08-27 RX ORDER — CYCLOBENZAPRINE HCL 5 MG
5 TABLET ORAL 3 TIMES DAILY PRN
Qty: 90 TABLET | Refills: 1 | Status: SHIPPED | OUTPATIENT
Start: 2020-08-27 | End: 2021-01-06 | Stop reason: SDUPTHER

## 2020-08-27 ASSESSMENT — ENCOUNTER SYMPTOMS
DIARRHEA: 0
COUGH: 0
SINUS PAIN: 0
ABDOMINAL PAIN: 1
SINUS PRESSURE: 0
BACK PAIN: 0
EYE PAIN: 0
NAUSEA: 0
SHORTNESS OF BREATH: 0
CONSTIPATION: 1
VOMITING: 0
WHEEZING: 0

## 2020-08-27 NOTE — PROGRESS NOTES
[address] [address]            Phone:  [phone]  Fax:  [phone]    Gavin Swan is a 60 y.o. male evaluated via telephone on 8/27/2020. Consent:    He and/or health care decision maker is aware that that he may receive a bill for this telephone service, depending on his insurance coverage, and has provided verbal consent to proceed: Yes      HPI  Chief Complaint   Patient presents with    Abdominal Pain       I communicated with the patient and/or health care decision maker about abdominal pain x 1 month due to not having his medication. He describes the pain as intermittent. He states this is in the lower right quadrant. He has been afebrile. He states this has no relationship with eating. He is eating normally. He denies nausea, vomiting, or diarrhea. He does have constipation and states that his laxatives have not been working well. He is taking over-the-counter laxatives. He takes cyclobenzaprine for muscular abdominal pain. He states this is effective. Review of Systems   Constitutional: Negative for appetite change, fatigue and fever. HENT: Negative for congestion, sinus pressure and sinus pain. Eyes: Negative for pain and visual disturbance. Respiratory: Negative for cough, shortness of breath and wheezing. Cardiovascular: Negative for chest pain and leg swelling. Gastrointestinal: Positive for abdominal pain and constipation. Negative for diarrhea, nausea and vomiting. Endocrine: Negative for cold intolerance and heat intolerance. Genitourinary: Negative for dysuria, frequency and urgency. Musculoskeletal: Positive for myalgias. Negative for arthralgias and back pain. Skin: Negative for rash and wound. Neurological: Negative for dizziness, weakness and headaches. Hematological: Negative for adenopathy. Psychiatric/Behavioral: Negative for dysphoric mood and sleep disturbance. The patient is not nervous/anxious. PLAN    Details of this discussion including any medical advice provided:     1. Abdominal muscle pain  Refill  - cyclobenzaprine (FLEXERIL) 5 MG tablet; Take 1 tablet by mouth 3 times daily as needed for Muscle spasms  Dispense: 90 tablet; Refill: 1    2. Slow transit constipation  Start  - senna-docusate (PERICOLACE) 8.6-50 MG per tablet; Take 2 tablets by mouth daily as needed for Constipation  Dispense: 30 tablet; Refill: 0       Return in about 4 weeks (around 9/24/2020), or if symptoms worsen or fail to improve, for AWV. I affirm this is a Patient Initiated Episode with an Established Patient who has not had a related appointment within my department in the past 7 days or scheduled within the next 24 hours. Total Time: minutes: 11-20 minutes      Note: not billable if this call serves to triage the patient into an appointment for the relevant concern      [id number] to the emergency declaration under the [facility], [facility] waiver authority and the [employer] and [employer] Act, this Virtual  Visit was conducted, with patient's consent, to reduce the patient's risk of exposure to COVID-19 and provide continuity of care for an established patient. Services were provided through a telephone discussion  to substitute for in-person clinic visit. Parties involved during telephone call include myself, ALFONSO Chen and patient listed.

## 2020-09-04 NOTE — PROGRESS NOTES
[facility] - PODIATRY    Today's Date: 09/09/20    Patient Name: Edy Cosby  MRN: [id number]  CSN: [id number]  PCP: Mark Villela MD  Referring Provider: No ref. provider found    SUBJECTIVE     Chief Complaint   Patient presents with   • Follow-up     pt is here for 3 mo f/u on long, thickened toenails- left foot drop, denies pain at present time, occasional pain, burning, and tingling - pcp 03/06/2020   • Diabetes     last blood sugar reading is 211mg/dl      HPI: Edy Cosby, a 65 y.o.male, comes to clinic as a(n) established patient presenting for diabetic foot exam and complaining of thickened toenails. Patient has h/o AFib, back pain, DM2, Emphysema lung, GERD, HTN, OA. Patient is IDDM with last stated BG level of 211mg/dl. Admits to numbness and tingling in feet. Continues use of AFO on left foot for foot drop. Was prescribed oral Lamisil as well as topical Lotrimin at the previous visit, however, he states that he never picked it up from the pharmacy. He is unable to care for nails by himself. Relates dryness and flaking of skin of feet but denies any treatment with topical antifungals previously. Denies open wound or sores. Denies pain today. Relates previous treatment(s) including foot care by podiatrist. Denies any constitutional symptoms. No other pedal complaints at this time.    Past Medical History:   Diagnosis Date   • A-fib (CMS/HCC)    • Atherosclerosis    • Chronic back pain    • Constipation    • Diabetes mellitus (CMS/HCC)     TYPE II   • Dysphagia    • Emphysema of lung (CMS/HCC)    • Gastroparesis    • GERD (gastroesophageal reflux disease)    • Hyperlipidemia    • Hypertension     ESSENTIAL   • Leg pain    • Muscle spasm    • Nerve pain    • Osteoarthritis    • Sleeping difficulty    • Ulcer of toe due to diabetes mellitus (CMS/HCC)      Past Surgical History:   Procedure Laterality Date   • ANGIOPLASTY ILIAC ARTERY Left 1/29/2019    Procedure: ANGIOPLASTY  ILIAC ARTERY, STENT PLACEMENT;  Surgeon: Duncan Lucio MD;  Location: [facility] HYBRID OR 12;  Service: Vascular   • AORTAGRAM Left 12/18/2018    Procedure: AORTOGRAM, LEFT LEG ANGIOGRAM, MYNX CLOSURE;  Surgeon: Duncan Lucio MD;  Location: [facility] HYBRID OR 12;  Service: Vascular   • BACK SURGERY     • CHOLECYSTECTOMY     • COLONOSCOPY  09/01/2011    TICS RECALL 5YR   • COLONOSCOPY  09/30/2008    ENTER RESULTS: STOOL THROUGHTOUT THE COLON POOR PREP REC 3 YEAR RECALL   • COLONOSCOPY N/A 11/9/2016    Procedure: COLONOSCOPY;  Surgeon: León Zepeda MD;  Location: [facility] ENDOSCOPY;  Service:    • COLONOSCOPY N/A 4/19/2018    Procedure: COLONOSCOPY WITH ANESTHESIA;  Surgeon: León Zepeda MD;  Location: [facility] ENDOSCOPY;  Service: Gastroenterology   • ENDOSCOPY  06/19/2012    HH   • ENDOSCOPY  08/25/2009    HIATAL HERNIA, DILATED WITH 50 FR MASCORRO   • ENDOSCOPY  06/19/2007    WITHIN NORMAL LIMITS UREA NEG   • FEMORAL ENDARTERECTOMY Left 1/29/2019    Procedure: LEFT FEMORAL ENDARTERECTOMY;  Surgeon: Duncan Lucio MD;  Location: [facility] HYBRID OR 12;  Service: Vascular   • LUMBAR LAMINECTOMY WITH FUSION N/A 1/23/2017    Procedure: REMOVAL OF INSTRUMENTATION, EXPLORATION OF FUSION L4-S1, REVISION LEFT L5-S1 HEMILAMINECTOMY, FACETECTOMY DECOMPRESSION, REVISION UNINSTRUMENTED POSTERIOR SPINAL FUSION L5-S1;  Surgeon: RHODNA Lopes MD;  Location: [facility] OR;  Service:    • OTHER SURGICAL HISTORY      LUMBAR SACRAL SURGERY WITH FUSION X2   • PILONIDAL CYST / SINUS EXCISION      PILONIDAL CYST REMOVAL     Family History   Problem Relation Age of Onset   • Heart attack Father    • Diabetes Brother    • Colon polyps Sister    • Stomach cancer Neg Hx         GI CNACERS OR DISEASE   • Colon cancer Neg Hx      Social History     Socioeconomic History   • Marital status: Single     Spouse name: Not on file   • Number of children: Not on file   • Years of education: Not on file   • Highest education  level: Not on file   Tobacco Use   • Smoking status: Former Smoker     Years: 30.00     Types: Cigarettes   • Smokeless tobacco: Never Used   Substance and Sexual Activity   • Alcohol use: No   • Drug use: No   • Sexual activity: Defer     No Known Allergies  Current Outpatient Medications   Medication Sig Dispense Refill   • amLODIPine (NORVASC) 5 MG tablet      • aspirin 325 MG tablet Take 325 mg by mouth daily.     • atorvastatin (LIPITOR) 40 MG tablet Take 40 mg by mouth 2 (Two) Times a Day.     • cyclobenzaprine (FLEXERIL) 5 MG tablet Take 5 mg by mouth 3 (Three) Times a Day As Needed for Muscle Spasms.     • hydrochlorothiazide (HYDRODIURIL) 25 MG tablet Take 25 mg by mouth Daily.     • insulin aspart (novoLOG FLEXPEN) 100 UNIT/ML solution pen-injector sc pen 25 units breakfast, 25units lunch, 25 units dinner + sliding scale each meal (Total dose is 20-32 breakfast, 30-42 lunch and dinner)     • labetalol (NORMODYNE) 100 MG tablet Take 100 mg by mouth 2 (Two) Times a Day.     • lisinopril (PRINIVIL,ZESTRIL) 40 MG tablet Take 40 mg by mouth Daily.     • meloxicam (MOBIC) 15 MG tablet Take 15 mg by mouth As Needed.     • omeprazole (priLOSEC) 20 MG capsule Take 20 mg by mouth 2 (Two) Times a Day.     • SM STOOL SOFTENER/LAXATIVE 8.6-50 MG per tablet TAKE 2 TABLETS BY MOUTH ONCE DAILY AS NEEDED FOR CONSTIPATION       No current facility-administered medications for this visit.      Review of Systems   Constitutional: Negative for chills and fever.   HENT: Negative for congestion.    Respiratory: Negative for shortness of breath.    Cardiovascular: Negative for chest pain and leg swelling.   Gastrointestinal: Negative for constipation, diarrhea, nausea and vomiting.   Musculoskeletal: Positive for gait problem (foot drop).   Skin: Negative for wound.        Dry skin of feet. Thick toenails   Neurological: Positive for numbness.       OBJECTIVE     Vitals:    09/09/20 1008   BP: 149/59   Pulse: 101   SpO2: 95%        PHYSICAL EXAM  GEN:   Accompanied by none.     Foot/Ankle Exam:       General:   Appearance: appears stated age and healthy    Orientation: AAOx3    Affect: appropriate    Gait comment:  Drop foot left  Assistance: independent    Shoe Gear:  Diabetic shoes (Left AFO)    VASCULAR      Right Foot Vascularity   Dorsalis pedis:  2+  Posterior tibial:  2+  Skin Temperature: warm    Edema Grading:  Trace  CFT:  3  Pedal Hair Growth:  Present  Varicosities: none       Left Foot Vascularity   Dorsalis pedis:  2+  Posterior tibial:  2+  Skin Temperature: warm    Edema Grading:  Trace  CFT:  3  Pedal Hair Growth:  Present  Varicosities: none        NEUROLOGIC     Right Foot Neurologic   Light touch sensation:  Diminished  Vibratory sensation:  Diminished  Hot/Cold sensation: diminished    Protective Sensation using [employer] Monofilament:  2     Left Foot Neurologic   Light touch sensation:  Diminished  Vibratory sensation:  Diminished  Hot/cold sensation: diminished    Protective Sensation using [employer]irtha Monofilament:  0     MUSCULOSKELETAL      Right Foot Musculoskeletal    Amputation   Right toes amputated: No    Ecchymosis:  None  Tenderness: none    Arch:  Pes planus  Hallux valgus: No    Hallux limitus: No       Left Foot Musculoskeletal    Amputation   Left toes amputated: No    Ecchymosis:  None  Tenderness: none    Arch:  Pes planus  Hallux valgus: No    Hallux limitus: No       MUSCLE STRENGTH     Right Foot Muscle Strength   Foot dorsiflexion:  5  Foot plantar flexion:  5  Foot inversion:  5  Foot eversion:  5     Left Foot Muscle Strength   Foot dorsiflexion:  2  Foot plantar flexion:  3  Foot inversion:  3  Foot eversion:  3     RANGE OF MOTION      Right Foot Range of Motion   Foot and ankle ROM within normal limits       Left Foot Range of Motion   Foot and ankle ROM within normal limits       DERMATOLOGIC     Right Foot Dermatologic   Skin: tinea (moccasin and interdigitally)    Nails:  onychomycosis, abnormally thick, subungual debris and dystrophic nails       Left Foot Dermatologic   Skin: corn and tinea (moccasin and interdigitally)    Nails: onychomycosis, abnormally thick, subungual debris and dystrophic nails       Image:       RADIOLOGY/NUCLEAR:  No results found.    LABORATORY/CULTURE RESULTS:      PATHOLOGY RESULTS:       ASSESSMENT/PLAN     Edy was seen today for follow-up and diabetes.    Diagnoses and all orders for this visit:    Onychomycosis    Tinea pedis of both feet    Type 2 diabetes mellitus with diabetic polyneuropathy, with long-term current use of insulin (CMS/[facility])    Foot drop, left    Foot callus      Comprehensive lower extremity examination and evaluation was performed.  Discussed findings and treatment plan including risks, benefits, and treatment options with patient in detail. Patient agreed with treatment plan.  After verbal consent obtained, nail(s) x10 debrided of length and thickness with nail nipper without incidence  Patient may maintain nails and calluses at home utilizing emery board or pumice stone between visits as needed  Reviewed at home diabetic foot care including daily foot checks   Continue use of AFO   Recommend picking up previous RX for oral and topical antifungal from the pharmacy for treatment.   Continue diabetic monitoring and control.   Patient's Body mass index is 36.98 kg/m². BMI is above normal parameters. Recommendations include: educational material.  An After Visit Summary was printed and given to the patient at discharge, including (if requested) any available informative/educational handouts regarding diagnosis, treatment, or medications. All questions were answered to patient/family satisfaction. Should symptoms fail to improve or worsen they agree to call or return to clinic or to go to the Emergency Department. Discussed the importance of following up with any needed screening tests/labs/specialist appointments and any requested  follow-up recommended by me today. Importance of maintaining follow-up discussed and patient accepts that missed appointments can delay diagnosis and potentially lead to worsening of conditions.  Return in about 3 months (around 12/9/2020)., or sooner if acute issues arise.    Lab Frequency Next Occurrence   Diet: Once 04/19/2018   Advance Diet as Tolerated Once 04/19/2018       This document has been electronically signed by PRATIMA Esteves on September 9, 2020 10:34

## 2020-09-09 ENCOUNTER — OFFICE VISIT (OUTPATIENT)
Dept: PODIATRY | Facility: CLINIC | Age: 65
End: 2020-09-09

## 2020-09-09 VITALS
DIASTOLIC BLOOD PRESSURE: 59 MMHG | HEIGHT: 69 IN | WEIGHT: 250.4 LBS | SYSTOLIC BLOOD PRESSURE: 149 MMHG | BODY MASS INDEX: 37.09 KG/M2 | HEART RATE: 101 BPM | OXYGEN SATURATION: 95 %

## 2020-09-09 DIAGNOSIS — B35.3 TINEA PEDIS OF BOTH FEET: ICD-10-CM

## 2020-09-09 DIAGNOSIS — E11.42 TYPE 2 DIABETES MELLITUS WITH DIABETIC POLYNEUROPATHY, WITH LONG-TERM CURRENT USE OF INSULIN (HCC): ICD-10-CM

## 2020-09-09 DIAGNOSIS — B35.1 ONYCHOMYCOSIS: Primary | ICD-10-CM

## 2020-09-09 DIAGNOSIS — Z79.4 TYPE 2 DIABETES MELLITUS WITH DIABETIC POLYNEUROPATHY, WITH LONG-TERM CURRENT USE OF INSULIN (HCC): ICD-10-CM

## 2020-09-09 DIAGNOSIS — L84 FOOT CALLUS: ICD-10-CM

## 2020-09-09 DIAGNOSIS — M21.372 FOOT DROP, LEFT: ICD-10-CM

## 2020-09-09 PROCEDURE — 11721 DEBRIDE NAIL 6 OR MORE: CPT | Performed by: NURSE PRACTITIONER

## 2020-09-09 PROCEDURE — 11055 PARING/CUTG B9 HYPRKER LES 1: CPT | Performed by: NURSE PRACTITIONER

## 2020-09-09 RX ORDER — OMEPRAZOLE 20 MG/1
20 CAPSULE, DELAYED RELEASE ORAL 2 TIMES DAILY
COMMUNITY
Start: 2020-08-17 | End: 2020-11-18

## 2020-09-09 RX ORDER — AMOXICILLIN 250 MG
2 CAPSULE ORAL
COMMUNITY
Start: 2020-08-27 | End: 2020-09-09

## 2020-09-09 RX ORDER — DOCUSATE SODIUM AND SENNOSIDES 50; 8.6 MG/1; MG/1
TABLET ORAL
COMMUNITY
Start: 2020-08-27 | End: 2020-12-11

## 2020-09-09 NOTE — PATIENT INSTRUCTIONS
Obesity, Adult  Obesity is having too much body fat. Being obese means that your weight is more than what is healthy for you.  BMI is a number that explains how much body fat you have. If you have a BMI of 30 or more, you are obese. Obesity is often caused by eating or drinking more calories than your body uses. Changing your lifestyle can help you lose weight.  Obesity can cause serious health problems, such as:  · Stroke.  · Coronary artery disease (CAD).  · Type 2 diabetes.  · Some types of cancer, including cancers of the colon, breast, uterus, and gallbladder.  · Osteoarthritis.  · High blood pressure (hypertension).  · High cholesterol.  · Sleep apnea.  · Gallbladder stones.  · Infertility problems.  What are the causes?  · Eating meals each day that are high in calories, sugar, and fat.  · Being born with genes that may make you more likely to become obese.  · Having a medical condition that causes obesity.  · Taking certain medicines.  · Sitting a lot (having a sedentary lifestyle).  · Not getting enough sleep.  · Drinking a lot of drinks that have sugar in them.  What increases the risk?  · Having a family history of obesity.  · Being an  woman.  · Being a  man.  · Living in an area with limited access to:  ? [place], recreation centers, or sidewalks.  ? Healthy food choices, such as grocery stores and [employer] markets.  What are the signs or symptoms?  The main sign is having too much body fat.  How is this treated?  · Treatment for this condition often includes changing your lifestyle. Treatment may include:  ? Changing your diet. This may include making a healthy meal plan.  ? Exercise. This may include activity that causes your heart to beat faster (aerobic exercise) and strength training. Work with your doctor to design a program that works for you.  ? Medicine to help you lose weight. This may be used if you are not able to lose 1 pound a week after 6 weeks of healthy eating and  more exercise.  ? Treating conditions that cause the obesity.  ? Surgery. Options may include gastric banding and gastric bypass. This may be done if:  § Other treatments have not helped to improve your condition.  § You have a BMI of 40 or higher.  § You have life-threatening health problems related to obesity.  Follow these instructions at home:  Eating and drinking    · Follow advice from your doctor about what to eat and drink. Your doctor may tell you to:  ? Limit fast food, sweets, and processed snack foods.  ? Choose low-fat options. For example, choose low-fat milk instead of whole milk.  ? Eat 5 or more servings of fruits or vegetables each day.  ? Eat at home more often. This gives you more control over what you eat.  ? Choose healthy foods when you eat out.  ? Learn to read food labels. This will help you learn how much food is in 1 serving.  ? Keep low-fat snacks available.  ? Avoid drinks that have a lot of sugar in them. These include soda, fruit juice, iced tea with sugar, and flavored milk.  · Drink enough water to keep your pee (urine) pale yellow.  · Do not go on fad diets.  Physical activity  · Exercise often, as told by your doctor. Most adults should get up to 150 minutes of moderate-intensity exercise every week.Ask your doctor:  ? What types of exercise are safe for you.  ? How often you should exercise.  · Warm up and stretch before being active.  · Do slow stretching after being active (cool down).  · Rest between times of being active.  Lifestyle  · Work with your doctor and a food expert (dietitian) to set a weight-loss goal that is best for you.  · Limit your screen time.  · Find ways to reward yourself that do not involve food.  · Do not drink alcohol if:  ? Your doctor tells you not to drink.  ? You are pregnant, may be pregnant, or are planning to become pregnant.  · If you drink alcohol:  ? Limit how much you use to:  § 0-1 drink a day for women.  § 0-2 drinks a day for men.  ? Be  aware of how much alcohol is in your drink. In the [place] one drink equals one 12 oz bottle of beer (355 mL), one 5 oz glass of wine (148 mL), or one 1½ oz glass of hard liquor (44 mL).  General instructions  · Keep a weight-loss journal. This can help you keep track of:  ? The food that you eat.  ? How much exercise you get.  · Take over-the-counter and prescription medicines only as told by your doctor.  · Take vitamins and supplements only as told by your doctor.  · Think about joining a support group.  · Keep all follow-up visits as told by your doctor. This is important.  Contact a doctor if:  · You cannot meet your weight loss goal after you have changed your diet and lifestyle for 6 weeks.  Get help right away if you:  · Are having trouble breathing.  · Are having thoughts of harming yourself.  Summary  · Obesity is having too much body fat.  · Being obese means that your weight is more than what is healthy for you.  · Work with your doctor to set a weight-loss goal.  · Get regular exercise as told by your doctor.  This information is not intended to replace advice given to you by your health care provider. Make sure you discuss any questions you have with your health care provider.  Document Released: 03/11/2013 Document Revised: 08/22/2019 Document Reviewed: 08/22/2019  [employer] Patient Education © 2020 [employer]

## 2020-09-24 ENCOUNTER — VIRTUAL VISIT (OUTPATIENT)
Dept: PRIMARY CARE CLINIC | Age: 65
End: 2020-09-24
Payer: MEDICARE

## 2020-09-24 PROCEDURE — G8417 CALC BMI ABV UP PARAM F/U: HCPCS | Performed by: FAMILY MEDICINE

## 2020-09-24 PROCEDURE — 99213 OFFICE O/P EST LOW 20 MIN: CPT | Performed by: FAMILY MEDICINE

## 2020-09-24 PROCEDURE — 3017F COLORECTAL CA SCREEN DOC REV: CPT | Performed by: FAMILY MEDICINE

## 2020-09-24 PROCEDURE — 1036F TOBACCO NON-USER: CPT | Performed by: FAMILY MEDICINE

## 2020-09-24 PROCEDURE — 3052F HG A1C>EQUAL 8.0%<EQUAL 9.0%: CPT | Performed by: FAMILY MEDICINE

## 2020-09-24 PROCEDURE — G8428 CUR MEDS NOT DOCUMENT: HCPCS | Performed by: FAMILY MEDICINE

## 2020-09-24 PROCEDURE — 2022F DILAT RTA XM EVC RTNOPTHY: CPT | Performed by: FAMILY MEDICINE

## 2020-09-24 ASSESSMENT — PATIENT HEALTH QUESTIONNAIRE - PHQ9
SUM OF ALL RESPONSES TO PHQ9 QUESTIONS 1 & 2: 0
SUM OF ALL RESPONSES TO PHQ QUESTIONS 1-9: 0
SUM OF ALL RESPONSES TO PHQ QUESTIONS 1-9: 0
1. LITTLE INTEREST OR PLEASURE IN DOING THINGS: 0
2. FEELING DOWN, DEPRESSED OR HOPELESS: 0

## 2020-09-24 ASSESSMENT — LIFESTYLE VARIABLES: HOW OFTEN DO YOU HAVE A DRINK CONTAINING ALCOHOL: 0

## 2020-09-24 NOTE — PROGRESS NOTES
2020    TELEHEALTH EVALUATION -- Audio/Visual (During LGIMS-22 public health emergency)    HPI:    Iman Jose (:  1955) has requested an audio/video evaluation for the following concern(s):    Patient presents today via video visit for follow-up for diabetes. Patient denies any issues. He does note that he needs paperwork completed. He did have a diabetic foot examination this spring. I have completed paperwork for him. Review of Systems    Prior to Visit Medications    Medication Sig Taking? Authorizing Provider   cyclobenzaprine (FLEXERIL) 5 MG tablet Take 1 tablet by mouth 3 times daily as needed for Muscle spasms  ALFONSO Betancourt   senna-docusate (PERICOLACE) 8.6-50 MG per tablet Take 2 tablets by mouth daily as needed for Constipation  ALFONSO Betancourt   hydroCHLOROthiazide (HYDRODIURIL) 25 MG tablet Take 1 tablet by mouth once daily  Adonis Mijares MD   amLODIPine (NORVASC) 10 MG tablet Take 1 tablet by mouth once daily  Adonis Mijares MD   labetalol (NORMODYNE) 100 MG tablet Take 1 tablet by mouth twice daily  Adonis Mijares MD   atorvastatin (LIPITOR) 40 MG tablet Take 1 tablet by mouth once daily  Adonis Mijares MD   lisinopril (PRINIVIL;ZESTRIL) 40 MG tablet Take 1 tablet by mouth once daily  Adonis Mijares MD   Diabetic Shoe MISC by Does not apply route DISPENSE ONE PAIR DIABETIC SHOES AND THREE PAIRS OF HEAT MOLDED INSERTS  Adonis Mijares MD   insulin glargine (LANTUS SOLOSTAR) 100 UNIT/ML injection pen Inject 20 Units into the skin nightly  Adonis Mijares MD   blood glucose monitor kit and supplies Test 4 times a day & as needed for symptoms of irregular blood glucose.   Adonis Mijares MD   cilostazol (PLETAL) 50 MG tablet Take 1 tablet by mouth 2 times daily  Adonis Mijares MD   blood glucose test strips (GNP EASY TOUCH GLUCOSE TEST) strip TEST 3 TIMES DAILY Dx E11.49  Adonis Mijares MD   Insulin Pen Needle 31G X 6 MM MISC 1 each by Does not apply route 4 times daily (before meals and nightly) May substitute to generic for insurance Dx E11.49  Eva Forman MD   insulin aspart (NOVOLOG FLEXPEN) 100 UNIT/ML injection pen Inject 25-37 Units into the skin 3 times daily (before meals) 20 units breakfast, 30 units lunch, 30 units dinner + sliding scale each meal (Total dose is 20-32 breakfast, 30-42 lunch and dinner)  Eva Forman MD   nicotine polacrilex (NICORETTE) 4 MG gum Take 1 each by mouth as needed for Smoking cessation  Patient not taking: Reported on 12/12/2019  Eva Forman MD   Diabetic Shoe MISC by Does not apply route  Eva Forman MD   Diabetic Shoe MISC by Does not apply route With inserts Ell.9  Eva Forman MD   gabapentin (NEURONTIN) 300 MG capsule Take 900 mg by mouth 3 times daily. Blanca Huber Historical Provider, MD   meloxicam (MOBIC) 15 MG tablet Take 15 mg by mouth daily  Historical Provider, MD   HYDROcodone-acetaminophen (NORCO)  MG per tablet TK 1 T PO Q 6 H PRN FOR PAIN  Historical Provider, MD   Insulin Syringe-Needle U-100 30G X 1/2\" 0.5 ML MISC 1 each by Does not apply route 3 times daily  Eva Forman MD   Lancets MISC Daily Jc Schreiber MD   omeprazole (PRILOSEC) 40 MG delayed release capsule Take 40 mg by mouth 2 times daily  Historical Provider, MD   aspirin 325 MG tablet Take 325 mg by mouth daily.   Historical Provider, MD       Social History     Tobacco Use    Smoking status: Former Smoker     Packs/day: 0.25     Years: 20.00     Pack years: 5.00     Types: Cigarettes     Last attempt to quit: 1/9/2017     Years since quitting: 3.7    Smokeless tobacco: Never Used   Substance Use Topics    Alcohol use: No    Drug use: No        No Known Allergies,   Past Medical History:   Diagnosis Date    Arthritis     BiPAP (biphasic positive airway pressure) dependence     8cm to 20cm    Chronic back pain     Emphysema/COPD (HCC)     GERD (gastroesophageal reflux disease)     History of anemia     History of blood transfusion  Hyperlipidemia     Hypertension     Neuropathy     Obstructive sleep apnea     AHI:  95.8 per PSG, 3/2017; repeat PSG, 11/2017 revealed an AHI of 65.5    Peripheral vascular disease ([address]     S/P angioplasty     Type II or unspecified type diabetes mellitus without mention of complication, not stated as uncontrolled    ,   Past Surgical History:   Procedure Laterality Date    CHOLECYSTECTOMY      LARYNGOSCOPY N/A 11/10/2017    Direct laryngoscopy with assessment of hypopharynx, larynx, and post-cricoid areas performed by Chapincito Hopkins MD at [facility] N/A 5/25/2018    MICRO DIRECT LARYNGOSCOPY WITH BIOPSY performed by Chapincito Hopkins MD at [address]      x 2 in past, around 1989   ,   Social History     Tobacco Use    Smoking status: Former Smoker     Packs/day: 0.25     Years: 20.00     Pack years: 5.00     Types: Cigarettes     Last attempt to quit: 1/9/2017     Years since quitting: 3.7    Smokeless tobacco: Never Used   Substance Use Topics    Alcohol use: No    Drug use: No   ,   Family History   Problem Relation Age of Onset    Diabetes Father     Heart Disease Maternal Grandmother    ,   Immunization History   Administered Date(s) Administered    Influenza, Quadv, IM, PF (6 mo and older Fluzone, Flulaval, Fluarix, and 3 yrs and older Afluria) 11/05/2018, 09/12/2019    Pneumococcal Polysaccharide ([id number]) 05/01/2017   ,   Health Maintenance   Topic Date Due    Diabetic retinal exam  03/26/1966    Shingles Vaccine (1 of 2) 03/26/2006    Colon cancer screen colonoscopy  04/19/2019    Lipid screen  04/25/2019    Potassium monitoring  04/25/2019    Creatinine monitoring  04/25/2019    Annual Wellness Visit (AWV)  05/29/2019    Flu vaccine (1) 09/01/2020    Diabetic foot exam  03/16/2021    A1C test (Diabetic or Prediabetic)  03/16/2021    Statin Therapy  05/21/2021    DTaP/Tdap/Td vaccine (2 - Td) 06/01/2024    Pneumococcal 0-64 years Vaccine  Completed    Hepatitis C screen  Completed    HIV screen  Completed    Hepatitis A vaccine  Aged Out    Hib vaccine  Aged Out    Meningococcal (ACWY) vaccine  Aged Out       PHYSICAL EXAMINATION:  [ INSTRUCTIONS:  \"[x]\" Indicates a positive item  \"[]\" Indicates a negative item  -- DELETE ALL ITEMS NOT EXAMINED]  Vital Signs: (As obtained by patient/caregiver or practitioner observation)    Blood pressure-  Heart rate-    Respiratory rate-    Temperature-  Pulse oximetry-     Constitutional: [x] Appears well-developed and well-nourished [] No apparent distress      [] Abnormal-   Mental status  [x] Alert and awake  [x] Oriented to person/place/time [x]Able to follow commands      Eyes:  EOM    [x]  Normal  [] Abnormal-  Sclera  [x]  Normal  [] Abnormal -         Discharge []  None visible  [] Abnormal -    HENT:   [x] Normocephalic, atraumatic. [] Abnormal   [x] Mouth/Throat: Mucous membranes are moist.     External Ears [x] Normal  [] Abnormal-     Neck: [x] No visualized mass     Pulmonary/Chest: [x] Respiratory effort normal.  [x] No visualized signs of difficulty breathing or respiratory distress        [] Abnormal-      Musculoskeletal:   [x] Normal gait with no signs of ataxia         [x] Normal range of motion of neck        [] Abnormal-       Neurological:        [x] No Facial Asymmetry (Cranial nerve 7 motor function) (limited exam to video visit)          [x] No gaze palsy        [] Abnormal-         Skin:        [x] No significant exanthematous lesions or discoloration noted on facial skin         [] Abnormal-            Psychiatric:       [x] Normal Affect [x] No Hallucinations        [] Abnormal-     Other pertinent observable physical exam findings-     ASSESSMENT/PLAN:    ICD-10-CM    1. Type 2 diabetes mellitus with other circulatory complication, without long-term current use of insulin (HCC)  E11.59    2.  Left foot drop  M21.372        This patient will use his diabetic shoes as needed. He does need these prevent ulceration and amputation. Patient have laboratory follow-up for chronic disease    No orders of the defined types were placed in this encounter. No orders of the defined types were placed in this encounter. Return in about 3 months (around 12/24/2020) for OV (M-Wam), DM. Hebert Cao is a 60 y.o. male being evaluated by a Virtual Visit (video visit) encounter to address concerns as mentioned above. A caregiver was present when appropriate. Due to this being a TeleHealth encounter (During CCK-93 public health emergency), evaluation of the following organ systems was limited: Vitals/Constitutional/EENT/Resp/CV/GI//MS/Neuro/Skin/Heme-Lymph-Imm. Pursuant to the emergency declaration under the [address] [place] [address], [address] [place] [address] authority and the [employer] Act, this Virtual Visit was conducted with patient's (and/or legal guardian's) consent, to reduce the patient's risk of exposure to COVID-19 and provide necessary medical care. The patient (and/or legal guardian) has also been advised to contact this office for worsening conditions or problems, and seek emergency medical treatment and/or call 911 if deemed necessary. Services were provided through a video synchronous discussion virtually to substitute for in-person clinic visit. Patient and provider were located at their individual homes or provider at secure site for business. It is possible that material may be posted from a notepad that is used for a Dragon dictation device for dictating notes outside the EMR and I am the original author of all of this content. --Wing Nageotte, MD on 9/24/2020 at 5:49 PM    An electronic signature was used to authenticate this note.

## 2020-09-28 ENCOUNTER — OFFICE VISIT (OUTPATIENT)
Dept: PRIMARY CARE CLINIC | Age: 65
End: 2020-09-28
Payer: MEDICARE

## 2020-09-28 DIAGNOSIS — E11.59 TYPE 2 DIABETES MELLITUS WITH OTHER CIRCULATORY COMPLICATION, WITHOUT LONG-TERM CURRENT USE OF INSULIN (HCC): ICD-10-CM

## 2020-09-28 LAB
ALBUMIN SERPL-MCNC: 4 G/DL (ref 3.5–5.2)
ALP BLD-CCNC: 74 U/L (ref 40–130)
ALT SERPL-CCNC: 10 U/L (ref 5–41)
ANION GAP SERPL CALCULATED.3IONS-SCNC: 12 MMOL/L (ref 7–19)
AST SERPL-CCNC: 15 U/L (ref 5–40)
BILIRUB SERPL-MCNC: 0.3 MG/DL (ref 0.2–1.2)
BUN BLDV-MCNC: 42 MG/DL (ref 8–23)
CALCIUM SERPL-MCNC: 9.9 MG/DL (ref 8.8–10.2)
CHLORIDE BLD-SCNC: 103 MMOL/L (ref 98–111)
CHOLESTEROL, TOTAL: 142 MG/DL (ref 160–199)
CO2: 26 MMOL/L (ref 22–29)
CREAT SERPL-MCNC: 2.7 MG/DL (ref 0.5–1.2)
CREATININE URINE: 257.7 MG/DL (ref 4.2–622)
GFR AFRICAN AMERICAN: 29
GFR NON-AFRICAN AMERICAN: 24
GLUCOSE BLD-MCNC: 188 MG/DL (ref 74–109)
HBA1C MFR BLD: 8.2 % (ref 4–6)
HCT VFR BLD CALC: 33.9 % (ref 42–52)
HDLC SERPL-MCNC: 42 MG/DL (ref 55–121)
HEMOGLOBIN: 10.3 G/DL (ref 14–18)
LDL CHOLESTEROL CALCULATED: 77 MG/DL
MCH RBC QN AUTO: 27 PG (ref 27–31)
MCHC RBC AUTO-ENTMCNC: 30.4 G/DL (ref 33–37)
MCV RBC AUTO: 89 FL (ref 80–94)
MICROALBUMIN UR-MCNC: 3.4 MG/DL (ref 0–19)
MICROALBUMIN/CREAT UR-RTO: 13.2 MG/G
PDW BLD-RTO: 16.3 % (ref 11.5–14.5)
PLATELET # BLD: 194 K/UL (ref 130–400)
PMV BLD AUTO: 12.7 FL (ref 9.4–12.4)
POTASSIUM SERPL-SCNC: 4.9 MMOL/L (ref 3.5–5)
RBC # BLD: 3.81 M/UL (ref 4.7–6.1)
SODIUM BLD-SCNC: 141 MMOL/L (ref 136–145)
TOTAL PROTEIN: 7.7 G/DL (ref 6.6–8.7)
TRIGL SERPL-MCNC: 114 MG/DL (ref 0–149)
WBC # BLD: 6.2 K/UL (ref 4.8–10.8)

## 2020-09-28 PROCEDURE — 90686 IIV4 VACC NO PRSV 0.5 ML IM: CPT | Performed by: FAMILY MEDICINE

## 2020-09-28 PROCEDURE — G0008 ADMIN INFLUENZA VIRUS VAC: HCPCS | Performed by: FAMILY MEDICINE

## 2020-09-28 RX ORDER — AMLODIPINE BESYLATE 10 MG/1
TABLET ORAL
Qty: 90 TABLET | Refills: 0 | Status: SHIPPED | OUTPATIENT
Start: 2020-09-28 | End: 2021-01-01

## 2020-09-28 NOTE — PROGRESS NOTES
After obtaining consent, and per orders of Dr. Олег Shine, injection of Flu Vaccine given in Right deltoid by Autumn Cogan. Patient instructed to remain in clinic for 20 minutes afterwards, and to report any adverse reaction to me immediately.     Rebeca WELLINGTON

## 2020-09-30 ENCOUNTER — TELEPHONE (OUTPATIENT)
Dept: PRIMARY CARE CLINIC | Age: 65
End: 2020-09-30

## 2020-09-30 RX ORDER — HYDROCHLOROTHIAZIDE 25 MG/1
12.5 TABLET ORAL DAILY
Qty: 90 TABLET | Refills: 0
Start: 2020-09-30 | End: 2020-10-01

## 2020-09-30 NOTE — TELEPHONE ENCOUNTER
----- Message from Adonis Mijares MD sent at 9/30/2020  7:25 AM CDT -----  Cloudacct message sent to patient about results and plan. Can we also please call the patient to let them know? Thank you! Kidney function has declined. Patient needs to stop meloxicam and any NSAIDs other than tylenol. Increase hydration, cut the hctz in half.       Recheck bmp in 1 week please

## 2020-10-01 RX ORDER — HYDROCHLOROTHIAZIDE 25 MG/1
TABLET ORAL
Qty: 90 TABLET | Refills: 0 | Status: SHIPPED | OUTPATIENT
Start: 2020-10-01 | End: 2020-10-08 | Stop reason: SDUPTHER

## 2020-10-08 ENCOUNTER — OFFICE VISIT (OUTPATIENT)
Dept: PRIMARY CARE CLINIC | Age: 65
End: 2020-10-08
Payer: MEDICARE

## 2020-10-08 VITALS
RESPIRATION RATE: 18 BRPM | HEART RATE: 88 BPM | SYSTOLIC BLOOD PRESSURE: 126 MMHG | OXYGEN SATURATION: 97 % | WEIGHT: 251 LBS | HEIGHT: 68 IN | TEMPERATURE: 97.3 F | DIASTOLIC BLOOD PRESSURE: 82 MMHG | BODY MASS INDEX: 38.04 KG/M2

## 2020-10-08 PROCEDURE — 99214 OFFICE O/P EST MOD 30 MIN: CPT | Performed by: STUDENT IN AN ORGANIZED HEALTH CARE EDUCATION/TRAINING PROGRAM

## 2020-10-08 PROCEDURE — 3052F HG A1C>EQUAL 8.0%<EQUAL 9.0%: CPT | Performed by: STUDENT IN AN ORGANIZED HEALTH CARE EDUCATION/TRAINING PROGRAM

## 2020-10-08 RX ORDER — HYDROCHLOROTHIAZIDE 25 MG/1
12.5 TABLET ORAL DAILY
Qty: 90 TABLET | Refills: 0 | Status: SHIPPED | OUTPATIENT
Start: 2020-10-08 | End: 2021-01-06 | Stop reason: SDUPTHER

## 2020-10-08 ASSESSMENT — ENCOUNTER SYMPTOMS
DIARRHEA: 0
EYE DISCHARGE: 0
RHINORRHEA: 0
EYE PAIN: 0
ABDOMINAL PAIN: 0
SORE THROAT: 0
SHORTNESS OF BREATH: 0
NAUSEA: 0
COUGH: 0

## 2020-10-19 DIAGNOSIS — N28.9 LOW KIDNEY FUNCTION: ICD-10-CM

## 2020-10-19 LAB
ANION GAP SERPL CALCULATED.3IONS-SCNC: 9 MMOL/L (ref 7–19)
BUN BLDV-MCNC: 26 MG/DL (ref 8–23)
CALCIUM SERPL-MCNC: 9.9 MG/DL (ref 8.8–10.2)
CHLORIDE BLD-SCNC: 101 MMOL/L (ref 98–111)
CO2: 29 MMOL/L (ref 22–29)
CREAT SERPL-MCNC: 1.9 MG/DL (ref 0.5–1.2)
GFR AFRICAN AMERICAN: 43
GFR NON-AFRICAN AMERICAN: 36
GLUCOSE BLD-MCNC: 308 MG/DL (ref 74–109)
POTASSIUM SERPL-SCNC: 4.8 MMOL/L (ref 3.5–5)
SODIUM BLD-SCNC: 139 MMOL/L (ref 136–145)

## 2020-10-19 NOTE — PROGRESS NOTES
cyclobenzaprine (FLEXERIL) 5 MG tablet TAKE 1 TABLET BY MOUTH EVERY 8 HOURS AS NEEDED FOR MUSCLE SPASMS 90 tablet 5    metoprolol (LOPRESSOR) 100 MG tablet Take 1 tablet by mouth 2 times daily 60 tablet 3    hydrochlorothiazide (HYDRODIURIL) 25 MG tablet Take 1 tablet by mouth daily 90 tablet 3    atorvastatin (LIPITOR) 40 MG tablet Take 1 tablet by mouth daily 90 tablet 3    omeprazole (PRILOSEC) 40 MG delayed release capsule Take 40 mg by mouth 2 times daily      aspirin 325 MG tablet Take 325 mg by mouth daily.  Insulin Pen Needle 31G X 6 MM MISC 1 each by Does not apply route 4 times daily (before meals and nightly) May substitute to generic for insurance 120 each 3    Lancets MISC Accu-Chek TID,  DX: E11.59 100 each 3    Insulin Pen Needle 31G X 6 MM MISC 1 each by Does not apply route daily May substitute to generic for insurance 100 each 3     No current facility-administered medications for this visit. No Known Allergies    Past Surgical History:   Procedure Laterality Date    CHOLECYSTECTOMY      LARYNGOSCOPY N/A 11/10/2017    Direct laryngoscopy with assessment of hypopharynx, larynx, and post-cricoid areas performed by Toni Orosco MD at [address]      x 2 in past, around 1989       Social History   Substance Use Topics    Smoking status: Former Smoker     Packs/day: 0.25     Types: Cigarettes     Quit date: 1/9/2017    Smokeless tobacco: Never Used    Alcohol use No       Family History   Problem Relation Age of Onset    Diabetes Father     Heart Disease Maternal Grandmother        /70   Pulse 88   Temp 98 °F (36.7 °C) (Temporal)   Resp 20   Ht 5' 7\" (1.702 m)   Wt 239 lb (108.4 kg)   SpO2 99%   BMI 37.43 kg/m²     Physical Exam   Constitutional: He is oriented to person, place, and time. He appears well-developed and well-nourished. Non-toxic appearance. No distress. HENT:   Head: Normocephalic and atraumatic.    Cardiovascular: Normal rate, regular rhythm, normal heart sounds and intact distal pulses. Exam reveals no gallop and no friction rub. No murmur heard. Pulmonary/Chest: Effort normal and breath sounds normal. No respiratory distress. He has no wheezes. He has no rales. He exhibits no tenderness. Abdominal: Soft. Bowel sounds are normal. He exhibits no distension and no mass. There is no tenderness. There is no rebound and no guarding. Musculoskeletal:        Right lower leg: He exhibits no edema. Left lower leg: He exhibits no edema. Neurological: He is alert and oriented to person, place, and time. Coordination and gait normal.   Skin: Skin is warm and dry. He is not diaphoretic. No cyanosis. Nails show no clubbing. Nursing note and vitals reviewed. Assessment:    ICD-10-CM ICD-9-CM    1. Type 2 diabetes mellitus with diabetic peripheral angiopathy without gangrene, without long-term current use of insulin ([facility]) E11.51 250.70      443.81        Plan: We will have him call us with the report of his blood sugars as he has had some ranging from  and I will hold off on any changes besides adding sliding scale. He does appear to be more motivated today. I have encouraged him to add on swimming classes that he had previously. I do believe that lifestyle change has impacted his A1c dramatically along with beta cell degeneration  No orders of the defined types were placed in this encounter. No orders of the defined types were placed in this encounter. There are no discontinued medications. Patient Instructions   Still continue nighttime insulin at 60 units. Check your fasting blood sugar before breakfast, lunch, and dinner. Take 20 units of meal time/short acting insulin plus the additional sliding scale amount for your blood sugar before that meal.    <150: 0  151-200:  2  201-250: 4  251-300: 6  301-350: 8  351-400: 10  >400: 12  Example:   If your pre-meal blood sugar is 175, take 20 + 2 unit = 22 units.    Keep a log of your sugars. Patient given educational handouts and has had all questions answered. Patient voices understanding and agrees to plans along with risks and benefits of plan. Patient is instructed to continue prior meds, diet, and exercise plans unless instructed otherwise. Patient agrees to follow up as instructed and sooner if needed. Patient agrees to go to ER if condition becomes emergent. Notes may be completed with dictation device and spelling errors may occur. Return in about 7 weeks (around 1/15/2018) for DM, POCT A1C, HTN. all other ROS negative except as per HPI

## 2020-10-20 ENCOUNTER — OFFICE VISIT (OUTPATIENT)
Dept: PRIMARY CARE CLINIC | Age: 65
End: 2020-10-20
Payer: MEDICARE

## 2020-10-20 VITALS
RESPIRATION RATE: 20 BRPM | OXYGEN SATURATION: 98 % | TEMPERATURE: 97.4 F | SYSTOLIC BLOOD PRESSURE: 138 MMHG | BODY MASS INDEX: 38.04 KG/M2 | HEART RATE: 94 BPM | HEIGHT: 68 IN | WEIGHT: 251 LBS | DIASTOLIC BLOOD PRESSURE: 62 MMHG

## 2020-10-20 PROCEDURE — 99214 OFFICE O/P EST MOD 30 MIN: CPT | Performed by: STUDENT IN AN ORGANIZED HEALTH CARE EDUCATION/TRAINING PROGRAM

## 2020-10-20 PROCEDURE — 3052F HG A1C>EQUAL 8.0%<EQUAL 9.0%: CPT | Performed by: STUDENT IN AN ORGANIZED HEALTH CARE EDUCATION/TRAINING PROGRAM

## 2020-10-20 RX ORDER — DULAGLUTIDE 0.75 MG/.5ML
0.75 INJECTION, SOLUTION SUBCUTANEOUS WEEKLY
Qty: 4 PEN | Refills: 0 | Status: SHIPPED | OUTPATIENT
Start: 2020-10-20 | End: 2021-01-06 | Stop reason: SDUPTHER

## 2020-10-20 RX ORDER — INSULIN GLARGINE 100 [IU]/ML
20 INJECTION, SOLUTION SUBCUTANEOUS NIGHTLY
Qty: 5 PEN | Refills: 0 | Status: SHIPPED | OUTPATIENT
Start: 2020-10-20 | End: 2021-01-06 | Stop reason: SDUPTHER

## 2020-10-20 ASSESSMENT — ENCOUNTER SYMPTOMS
ABDOMINAL PAIN: 0
DIARRHEA: 0
EYE DISCHARGE: 0
SHORTNESS OF BREATH: 0
SORE THROAT: 0
NAUSEA: 0
RHINORRHEA: 0
COUGH: 0
EYE PAIN: 0

## 2020-10-20 NOTE — PROGRESS NOTES
 History of anemia     History of blood transfusion     Hyperlipidemia     Hypertension     Neuropathy     Obstructive sleep apnea     AHI:  95.8 per PSG, 3/2017; repeat PSG, 11/2017 revealed an AHI of 65.5    Peripheral vascular disease (HCC)     S/P angioplasty     Type II or unspecified type diabetes mellitus without mention of complication, not stated as uncontrolled      Patient Active Problem List   Diagnosis    Chronic right-sided low back pain with right-sided sciatica    Degeneration of lumbar or lumbosacral intervertebral disc    Spinal stenosis, lumbar region, without neurogenic claudication    Back pain with left-sided radiculopathy    Lumbar disc disease with radiculopathy    Essential hypertension    Tobacco use disorder    Obstructive sleep apnea    Chronic kidney disease, stage III (moderate)    Type 2 diabetes mellitus with circulatory disorder, without long-term current use of insulin (HCC)    Obstructive sleep apnea    BiPAP (biphasic positive airway pressure) dependence    Morbid obesity due to excess calories (HCC)    Difficulty with BiPAP use    Vocal cord paresis    Diabetic nephropathy associated with type 2 diabetes mellitus (HCC)    PVD (peripheral vascular disease) with claudication (HCC)    Left foot drop    Paroxysmal atrial fibrillation (HCC)       PSHx:  Past Surgical History:   Procedure Laterality Date    CHOLECYSTECTOMY      LARYNGOSCOPY N/A 11/10/2017    Direct laryngoscopy with assessment of hypopharynx, larynx, and post-cricoid areas performed by Gage Walker MD at [facility] [facility] N/A 5/25/2018    MICRO DIRECT LARYNGOSCOPY WITH BIOPSY performed by Gage Walker MD at [address] [place] [address] Rd      x 2 in past, around 1989       PFHx:  Family History   Problem Relation Age of Onset    Diabetes Father     Heart Disease Maternal Grandmother        SocialHx:  Social History     Tobacco Use    Smoking status: Former Smoker     Packs/day: 0.25     Years: 20.00     Pack years: 5.00     Types: Cigarettes     Last attempt to quit: 1/9/2017     Years since quitting: 3.7    Smokeless tobacco: Never Used   Substance Use Topics    Alcohol use: No       Allergies:  No Known Allergies    Medications:  Current Outpatient Medications   Medication Sig Dispense Refill    Dulaglutide (TRULICITY) 0.83 HV/8.5XU SOPN Inject 0.75 mg into the skin once a week 4 pen 0    insulin glargine (LANTUS SOLOSTAR) 100 UNIT/ML injection pen Inject 20 Units into the skin nightly 5 pen 0    hydroCHLOROthiazide (HYDRODIURIL) 25 MG tablet Take 0.5 tablets by mouth daily 90 tablet 0    Diabetic Shoe MISC by Does not apply route DISPENSE ONE PAIR DIABETIC SHOES AND THREE PAIRS OF HEAT MOLDED INSERTS 1 each 0    amLODIPine (NORVASC) 10 MG tablet Take 1 tablet by mouth once daily 90 tablet 0    cyclobenzaprine (FLEXERIL) 5 MG tablet Take 1 tablet by mouth 3 times daily as needed for Muscle spasms 90 tablet 1    senna-docusate (PERICOLACE) 8.6-50 MG per tablet Take 2 tablets by mouth daily as needed for Constipation 30 tablet 0    labetalol (NORMODYNE) 100 MG tablet Take 1 tablet by mouth twice daily 180 tablet 0    atorvastatin (LIPITOR) 40 MG tablet Take 1 tablet by mouth once daily 90 tablet 1    lisinopril (PRINIVIL;ZESTRIL) 40 MG tablet Take 1 tablet by mouth once daily 90 tablet 1    blood glucose monitor kit and supplies Test 4 times a day & as needed for symptoms of irregular blood glucose.  1 kit 0    cilostazol (PLETAL) 50 MG tablet Take 1 tablet by mouth 2 times daily 60 tablet 3    blood glucose test strips (GNP EASY TOUCH GLUCOSE TEST) strip TEST 3 TIMES DAILY Dx E11.49 300 strip 0    Insulin Pen Needle 31G X 6 MM MISC 1 each by Does not apply route 4 times daily (before meals and nightly) May substitute to generic for insurance Dx E11.49 120 each 3    insulin aspart (NOVOLOG FLEXPEN) 100 UNIT/ML injection pen Inject 25-37 Units into the skin 3 times daily (before meals) 20 units breakfast, 30 units lunch, 30 units dinner + sliding scale each meal (Total dose is 20-32 breakfast, 30-42 lunch and dinner) 5 pen 3    nicotine polacrilex (NICORETTE) 4 MG gum Take 1 each by mouth as needed for Smoking cessation 110 each 1    Diabetic Shoe MISC by Does not apply route 1 each 0    gabapentin (NEURONTIN) 300 MG capsule Take 900 mg by mouth 3 times daily. Fauzia Swanson HYDROcodone-acetaminophen (NORCO)  MG per tablet TK 1 T PO Q 6 H PRN FOR PAIN  0    Insulin Syringe-Needle U-100 30G X 1/2\" 0.5 ML MISC 1 each by Does not apply route 3 times daily 100 each 3    Lancets MISC Daily Accu-Chek 100 each 3    omeprazole (PRILOSEC) 40 MG delayed release capsule Take 40 mg by mouth 2 times daily      aspirin 325 MG tablet Take 81 mg by mouth daily       No current facility-administered medications for this visit. Objective:   PE:  /62   Pulse 94   Temp 97.4 °F (36.3 °C) (Temporal)   Resp 20   Ht 5' 7.5\" (1.715 m)   Wt 251 lb (113.9 kg)   SpO2 98%   BMI 38.73 kg/m²   Physical Exam  Constitutional:       General: He is not in acute distress. Appearance: Normal appearance. HENT:      Head: Normocephalic. Nose: Nose normal.      Mouth/Throat:      Mouth: Mucous membranes are moist.      Pharynx: Oropharynx is clear. No oropharyngeal exudate or posterior oropharyngeal erythema. Eyes:      General: No scleral icterus. Extraocular Movements: Extraocular movements intact. Conjunctiva/sclera: Conjunctivae normal.   Neck:      Musculoskeletal: Normal range of motion. Cardiovascular:      Rate and Rhythm: Normal rate and regular rhythm. Pulses: Normal pulses. Heart sounds: No murmur. Pulmonary:      Effort: Pulmonary effort is normal.      Breath sounds: Normal breath sounds. Abdominal:      General: There is no distension. Palpations: Abdomen is soft. There is no mass. Tenderness:  There is

## 2020-10-20 NOTE — Clinical Note
Can you call pt to schedule separate medicare annual wellness visit. I didn't recommend it at visit. But he's got overdue colonoscopy and other things that do need to be addressed.

## 2020-10-21 ENCOUNTER — TELEPHONE (OUTPATIENT)
Dept: PRIMARY CARE CLINIC | Age: 65
End: 2020-10-21

## 2020-10-21 NOTE — TELEPHONE ENCOUNTER
Pt said he gets scripts that was written at [facility] couldn't find a number to fax tried to reach pt so mailed scripts today

## 2020-11-13 ENCOUNTER — TELEPHONE (OUTPATIENT)
Dept: VASCULAR SURGERY | Facility: CLINIC | Age: 65
End: 2020-11-13

## 2020-11-13 NOTE — TELEPHONE ENCOUNTER
Left a message for the patient to call back to remind him of his testing and appt to see Dr. Lucio on Monday.

## 2020-11-16 ENCOUNTER — OFFICE VISIT (OUTPATIENT)
Dept: VASCULAR SURGERY | Facility: CLINIC | Age: 65
End: 2020-11-16

## 2020-11-16 ENCOUNTER — HOSPITAL ENCOUNTER (OUTPATIENT)
Dept: ULTRASOUND IMAGING | Facility: HOSPITAL | Age: 65
Discharge: HOME | End: 2020-11-16
Admitting: SURGERY

## 2020-11-16 VITALS
BODY MASS INDEX: 37.74 KG/M2 | HEART RATE: 98 BPM | OXYGEN SATURATION: 95 % | SYSTOLIC BLOOD PRESSURE: 138 MMHG | WEIGHT: 249 LBS | DIASTOLIC BLOOD PRESSURE: 62 MMHG | HEIGHT: 68 IN

## 2020-11-16 DIAGNOSIS — I70.213 ATHEROSCLEROSIS OF NATIVE ARTERY OF BOTH LOWER EXTREMITIES WITH INTERMITTENT CLAUDICATION (HCC): ICD-10-CM

## 2020-11-16 DIAGNOSIS — I10 ESSENTIAL HYPERTENSION: ICD-10-CM

## 2020-11-16 DIAGNOSIS — E78.2 MIXED HYPERLIPIDEMIA: ICD-10-CM

## 2020-11-16 DIAGNOSIS — I48.0 PAROXYSMAL ATRIAL FIBRILLATION (HCC): ICD-10-CM

## 2020-11-16 DIAGNOSIS — E11.9 TYPE 2 DIABETES MELLITUS WITHOUT COMPLICATION, WITHOUT LONG-TERM CURRENT USE OF INSULIN (HCC): Chronic | ICD-10-CM

## 2020-11-16 DIAGNOSIS — I70.213 ATHEROSCLEROSIS OF NATIVE ARTERY OF BOTH LOWER EXTREMITIES WITH INTERMITTENT CLAUDICATION (HCC): Primary | ICD-10-CM

## 2020-11-16 PROCEDURE — 99214 OFFICE O/P EST MOD 30 MIN: CPT | Performed by: SURGERY

## 2020-11-16 PROCEDURE — 93923 UPR/LXTR ART STDY 3+ LVLS: CPT

## 2020-11-16 PROCEDURE — 93923 UPR/LXTR ART STDY 3+ LVLS: CPT | Performed by: SURGERY

## 2020-11-16 RX ORDER — ASPIRIN 81 MG/1
81 TABLET ORAL DAILY
COMMUNITY
End: 2022-01-05

## 2020-11-16 RX ORDER — INSULIN GLARGINE 100 [IU]/ML
20 INJECTION, SOLUTION SUBCUTANEOUS DAILY PRN
COMMUNITY
Start: 2020-10-20 | End: 2022-01-05

## 2020-11-16 NOTE — PATIENT INSTRUCTIONS
"BMI for Adults  What is BMI?  Body mass index (BMI) is a number that is calculated from a person's weight and height. BMI can help estimate how much of a person's weight is composed of fat. BMI does not measure body fat directly. Rather, it is an alternative to procedures that directly measure body fat, which can be difficult and expensive.  BMI can help identify people who may be at higher risk for certain medical problems.  What are BMI measurements used for?  BMI is used as a screening tool to identify possible weight problems. It helps determine whether a person is obese, overweight, a healthy weight, or underweight.  BMI is useful for:  · Identifying a weight problem that may be related to a medical condition or may increase the risk for medical problems.  · Promoting changes, such as changes in diet and exercise, to help reach a healthy weight. BMI screening can be repeated to see if these changes are working.  How is BMI calculated?  BMI involves measuring your weight in relation to your height. Both height and weight are measured, and the BMI is calculated from those numbers. This can be done either in English (U[place] or metric measurements. Note that charts and online BMI calculators are available to help you find your BMI quickly and easily without having to do these calculations yourself.  To calculate your BMI in English ([place] measurements:    1. Measure your weight in pounds (lb).  2. Multiply the number of pounds by 703.  ? For example, for a person who weighs 180 lb, multiply that number by 703, which equals 126,540.  3. Measure your height in inches. Then multiply that number by itself to get a measurement called \"inches squared.\"  ? For example, for a person who is 70 inches tall, the \"inches squared\" measurement is 70 inches x 70 inches, which equals 4,900 inches squared.  4. Divide the total from step 2 (number of lb x 703) by the total from step 3 (inches squared): 126,540 ÷ 4,900 = 25.8. This is " "your BMI.  To calculate your BMI in metric measurements:  1. Measure your weight in kilograms (kg).  2. Measure your height in meters (m). Then multiply that number by itself to get a measurement called \"meters squared.\"  ? For example, for a person who is 1.75 m tall, the \"meters squared\" measurement is 1.75 m x 1.75 m, which is equal to 3.1 meters squared.  3. Divide the number of kilograms (your weight) by the meters squared number. In this example: 70 ÷ 3.1 = 22.6. This is your BMI.  What do the results mean?  BMI charts are used to identify whether you are underweight, normal weight, overweight, or obese. The following guidelines will be used:  · Underweight: BMI less than 18.5.  · Normal weight: BMI between 18.5 and 24.9.  · Overweight: BMI between 25 and 29.9.  · Obese: BMI of 30 or above.  Keep these notes in mind:  · Weight includes both fat and muscle, so someone with a muscular build, such as an athlete, may have a BMI that is higher than 24.9. In cases like these, BMI is not an accurate measure of body fat.  · To determine if excess body fat is the cause of a BMI of 25 or higher, further assessments may need to be done by a health care provider.  · BMI is usually interpreted in the same way for men and women.  Where to find more information  For more information about BMI, including tools to quickly calculate your BMI, go to these websites:  · Centers for Disease Control and Prevention: www.cdc.gov  · American Heart Association: www.heart.org  · National Heart, Lung, and Blood [place]: www.nhlbi.nih.gov  Summary  · Body mass index (BMI) is a number that is calculated from a person's weight and height.  · BMI may help estimate how much of a person's weight is composed of fat. BMI can help identify those who may be at higher risk for certain medical problems.  · BMI can be measured using English measurements or metric measurements.  · BMI charts are used to identify whether you are underweight, normal " weight, overweight, or obese.  This information is not intended to replace advice given to you by your health care provider. Make sure you discuss any questions you have with your health care provider.  Document Released: 08/29/2005 Document Revised: 09/09/2020 Document Reviewed: 07/17/2020  Elsevier Patient Education © 2020 [employer]

## 2020-11-16 NOTE — PROGRESS NOTES
11/16/2020      Mark Villela MD  [address] [place] [address] [address]  [place] [address]        Edy Cosby  1955    Chief Complaint   Patient presents with   • Follow-up     6 month f/u with ABIs.  Pt states that he is doing about the same.  Pt denies wearing compression.         Dear Mark Villela MD:    HPI     I had the pleasure of seeing your patient in the office today for follow up.  As you recall, the patient is a 65 y.o. male who we are currently following for history of peripheral vascular disease.  He previously underwent left femoral endarterectomy with retrograde angioplasty and stenting of the left common iliac artery stenosis.  He returns today for continued surveillance and had repeat NIC/PVR which I did personally review.  Today the value on the right is 0.51, and on the left is 0.80.  This represents moderate arterial insufficiency on the right, and mild arterial insufficiency on the left.  Overall he continues to do well.  He does have mild claudication symptoms to the right calf mainly at around 3 blocks distance but this has not changed since our last visit and is not lifestyle limiting.  He otherwise denies any wounds and has no new complaints.  He continues on aspirin as well as a statin.    Review of Systems   Constitutional: Negative.  Negative for activity change, appetite change, chills and fever.   HENT: Negative.  Negative for congestion, sneezing and sore throat.    Eyes: Negative.    Respiratory: Negative.  Negative for chest tightness and shortness of breath.    Cardiovascular: Negative.  Negative for chest pain, palpitations and leg swelling.   Gastrointestinal: Negative.  Negative for abdominal distention, abdominal pain, nausea and vomiting.   Endocrine: Negative.    Genitourinary: Negative.    Musculoskeletal: Negative.         Mild RLE claudication at ~ 3 blocks. Unchanged from previous   Skin: Negative.    Allergic/Immunologic: Negative.    Neurological: Negative.   "  Hematological: Negative.    Psychiatric/Behavioral: Negative.        /62   Pulse 98   Ht 171.5 cm (67.5\")   Wt 113 kg (249 lb)   SpO2 95%   BMI 38.42 kg/m²   Physical Exam  Vitals signs reviewed.   Constitutional:       Appearance: Normal appearance.   HENT:      Head: Normocephalic and atraumatic.      Nose: Nose normal.      Mouth/Throat:      Mouth: Mucous membranes are moist.   Eyes:      Extraocular Movements: Extraocular movements intact.      Pupils: Pupils are equal, round, and reactive to light.   Neck:      Musculoskeletal: Normal range of motion and neck supple.   Cardiovascular:      Rate and Rhythm: Normal rate. Rhythm irregularly irregular.      Pulses:           Carotid pulses are 2+ on the right side and 2+ on the left side.       Radial pulses are 2+ on the right side and 2+ on the left side.        Femoral pulses are 2+ on the right side and 2+ on the left side.       Dorsalis pedis pulses are detected w/ Doppler on the right side and 1+ on the left side.        Posterior tibial pulses are 0 on the right side and detected w/ Doppler on the left side.      Comments: Both feet are warm.  He has no wounds.  Pulmonary:      Effort: Pulmonary effort is normal. No respiratory distress.   Abdominal:      General: There is no distension.      Palpations: Abdomen is soft. There is no mass.      Tenderness: There is no abdominal tenderness.   Musculoskeletal: Normal range of motion.      Right lower leg: No edema.      Left lower leg: No edema.      Comments: He has known chronic foot drop of the left foot and wears a brace for this.   Skin:     General: Skin is warm and dry.      Capillary Refill: Capillary refill takes 2 to 3 seconds.   Neurological:      General: No focal deficit present.      Mental Status: He is alert and oriented to person, place, and time.   Psychiatric:         Mood and Affect: Mood normal.         Behavior: Behavior normal.         Thought Content: Thought content " normal.         Judgment: Judgment normal.         DIAGNOSTIC DATA:        No results found.    Patient Active Problem List   Diagnosis   • Spinal stenosis of lumbar region   • Back pain   • Degeneration of intervertebral disc of lumbosacral region   • Essential hypertension   • Lumbar disc herniation with radiculopathy   • Type 2 diabetes mellitus without complication, without long-term current use of insulin (CMS/[facility])   • Constipation due to opioid therapy   • Gastroesophageal reflux disease without esophagitis   • Panlobular emphysema (CMS/[facility])   • A-fib (CMS/[facility])   • Paroxysmal atrial fibrillation (CMS/[facility])   • Gastroesophageal reflux disease   • Hx of colonic polyps   • HTN (hypertension), benign   • Morbidly obese (CMS/[facility])   • Atherosclerosis of native artery of extremity (CMS/[facility])   • Atherosclerosis of lower extremity with ulceration (CMS/[facility])   • Atherosclerosis 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 (CMS/[facility])   • Pre-op evaluation   • Ischemia of extremity   • H/O diabetic foot ulcer         ICD-10-CM ICD-9-CM   1. Atherosclerosis of native artery of both lower extremities with intermittent claudication (CMS/[facility])  I70.213 440.21   2. Essential hypertension  I10 401.9   3. Type 2 diabetes mellitus without complication, without long-term current use of insulin (CMS/[facility])  E11.9 250.00   4. Mixed hyperlipidemia  E78.2 272.2   5. Paroxysmal atrial fibrillation (CMS/[facility])  I48.0 427.31       Lab Frequency Next Occurrence   Follow Anesthesia Guidelines / Standing Orders Once 12/07/2018   Obtain Informed Consent Once 12/12/2018   Follow Anesthesia Guidelines / Standing Orders Once 12/17/2018   Obtain Informed Consent Once 12/22/2018   Follow Anesthesia Guidelines / Standing Orders Once 01/21/2019   Obtain Informed Consent Once 01/26/2019   Provide NPO Instructions to Patient Once 01/26/2019   Hepatic Function Panel Once 06/17/2020   Hepatic Function Panel Once 12/19/2020   US Ankle / Brachial  Indices Extremity Complete Once 05/15/2021       PLAN: After thoroughly evaluating Edy Cosby, I believe the best course of action is to remain conservative from a vascular standpoint.  Overall he continues to do well after prior history of left femoral endarterectomy with retrograde angioplasty and stenting of the left common iliac artery stenosis.  He continues to have mild claudication symptoms mainly in the right calf but this is not lifestyle limiting.  Otherwise he is able to ambulate and function is normal.  He has had no significant changes since her last visit.  As such he should continue on his aspirin and statin I will plan to see him back here in the office in 6 months with a repeat NIC/PVR for continued surveillance.  The patient is to continue taking their medications as previously discussed.   I did discuss vascular risk factors as they pertain to the progression of vascular disease including controlling diabetes, hypertension, and hyperlipidemia. These factors remain stable. Patient's Body mass index is 38.42 kg/m². BMI is above normal parameters. Recommendations include: educational material. This was all discussed in full with complete understanding.  Thank you for allowing me to participate in the care of your patient.  Please do not hesitate to call with any questions or concerns.  We will keep you aware of any further encounters with Edy Cosby.      Sincerely Yours,      Duncan Lucio MD

## 2020-11-18 DIAGNOSIS — K21.9 GASTROESOPHAGEAL REFLUX DISEASE, UNSPECIFIED WHETHER ESOPHAGITIS PRESENT: Primary | ICD-10-CM

## 2020-11-19 RX ORDER — OMEPRAZOLE 20 MG/1
CAPSULE, DELAYED RELEASE ORAL
Qty: 60 CAPSULE | Refills: 5 | Status: SHIPPED | OUTPATIENT
Start: 2020-11-19 | End: 2021-05-14

## 2020-12-10 NOTE — PROGRESS NOTES
[facility] - PODIATRY    Today's Date: 12/11/20    Patient Name: Edy Cosby  MRN: [id number]  CSN: [id number]  PCP: Mark Villela MD  Referring Provider: No ref. provider found    SUBJECTIVE     Chief Complaint   Patient presents with   • Follow-up     pt is here for 3 mo f/u on diabetic foot/nail care - pt presents with long, thickened toenails - pt stated that his left leg and foot feels like it is ready to give out some days - pt denies any pain at this moment  - pcp 03/06/2020   • Diabetes     last blood sugar reading is 118mg/dl     HPI: Edy Cosby, a 65 y.o.male, comes to clinic as a(n) established patient presenting for diabetic foot exam and complaining of thickened toenails. Patient has h/o AFib, back pain, DM2, Emphysema lung, GERD, HTN, OA. Patient is IDDM with last stated BG level of 118mg/dl. Admits to ongoing numbness and tingling in feet. Uses AFO to left foot due to foot drop. He relates he is unable to care for nails by himself. Relates dryness and flaking of skin of feet but denies any treatment with topical antifungals previously. Denies open wound or sores. Denies pain at present time. Relates previous treatment(s) including foot care by podiatrist. Denies any constitutional symptoms. No other pedal complaints at this time.    Past Medical History:   Diagnosis Date   • A-fib (CMS/HCC)    • Atherosclerosis    • Chronic back pain    • Constipation    • Diabetes mellitus (CMS/HCC)     TYPE II   • Dysphagia    • Emphysema of lung (CMS/HCC)    • Gastroparesis    • GERD (gastroesophageal reflux disease)    • Hyperlipidemia    • Hypertension     ESSENTIAL   • Leg pain    • Muscle spasm    • Nerve pain    • Osteoarthritis    • Sleeping difficulty    • Ulcer of toe due to diabetes mellitus (CMS/HCC)      Past Surgical History:   Procedure Laterality Date   • ANGIOPLASTY ILIAC ARTERY Left 1/29/2019    Procedure: ANGIOPLASTY ILIAC ARTERY, STENT PLACEMENT;  Surgeon:  Duncan Lucio MD;  Location: [facility] HYBRID OR 12;  Service: Vascular   • AORTAGRAM Left 12/18/2018    Procedure: AORTOGRAM, LEFT LEG ANGIOGRAM, MYNX CLOSURE;  Surgeon: Duncan Lucio MD;  Location: [facility] HYBRID OR 12;  Service: Vascular   • BACK SURGERY     • CHOLECYSTECTOMY     • COLONOSCOPY  09/01/2011    TICS RECALL 5YR   • COLONOSCOPY  09/30/2008    ENTER RESULTS: STOOL THROUGHTOUT THE COLON POOR PREP REC 3 YEAR RECALL   • COLONOSCOPY N/A 11/9/2016    Procedure: COLONOSCOPY;  Surgeon: León Zepeda MD;  Location: [facility] ENDOSCOPY;  Service:    • COLONOSCOPY N/A 4/19/2018    Procedure: COLONOSCOPY WITH ANESTHESIA;  Surgeon: León Zepeda MD;  Location: [facility] ENDOSCOPY;  Service: Gastroenterology   • ENDOSCOPY  06/19/2012    HH   • ENDOSCOPY  08/25/2009    HIATAL HERNIA, DILATED WITH 50 FR MASCORRO   • ENDOSCOPY  06/19/2007    WITHIN NORMAL LIMITS UREA NEG   • FEMORAL ENDARTERECTOMY Left 1/29/2019    Procedure: LEFT FEMORAL ENDARTERECTOMY;  Surgeon: Duncan Lucio MD;  Location: [facility] HYBRID OR 12;  Service: Vascular   • LUMBAR LAMINECTOMY WITH FUSION N/A 1/23/2017    Procedure: REMOVAL OF INSTRUMENTATION, EXPLORATION OF FUSION L4-S1, REVISION LEFT L5-S1 HEMILAMINECTOMY, FACETECTOMY DECOMPRESSION, REVISION UNINSTRUMENTED POSTERIOR SPINAL FUSION L5-S1;  Surgeon: RHONDA Lopes MD;  Location: [facility] OR;  Service:    • OTHER SURGICAL HISTORY      LUMBAR SACRAL SURGERY WITH FUSION X2   • PILONIDAL CYST / SINUS EXCISION      PILONIDAL CYST REMOVAL     Family History   Problem Relation Age of Onset   • Heart attack Father    • Diabetes Brother    • Colon polyps Sister    • Stomach cancer Neg Hx         GI CNACERS OR DISEASE   • Colon cancer Neg Hx      Social History     Socioeconomic History   • Marital status: Single     Spouse name: Not on file   • Number of children: Not on file   • Years of education: Not on file   • Highest education level: Not on file   Tobacco Use   • Smoking  status: Former Smoker     Years: 30.00     Types: Cigarettes   • Smokeless tobacco: Never Used   Substance and Sexual Activity   • Alcohol use: No   • Drug use: No   • Sexual activity: Defer     No Known Allergies  Current Outpatient Medications   Medication Sig Dispense Refill   • amLODIPine (NORVASC) 5 MG tablet      • aspirin 81 MG EC tablet Take 81 mg by mouth Daily.     • atorvastatin (LIPITOR) 40 MG tablet Take 40 mg by mouth 2 (Two) Times a Day.     • cyclobenzaprine (FLEXERIL) 5 MG tablet Take 5 mg by mouth 3 (Three) Times a Day As Needed for Muscle Spasms.     • hydrochlorothiazide (HYDRODIURIL) 25 MG tablet Take 12.5 mg by mouth Daily.     • Insulin Glargine (Lantus SoloStar) 100 UNIT/ML injection pen Inject 20 Units under the skin into the appropriate area as directed.     • labetalol (NORMODYNE) 100 MG tablet Take 100 mg by mouth 2 (Two) Times a Day.     • lisinopril (PRINIVIL,ZESTRIL) 40 MG tablet Take 40 mg by mouth Daily.     • omeprazole (priLOSEC) 20 MG capsule Take 1 capsule by mouth twice daily 60 capsule 5   • tamsulosin (FLOMAX) 0.4 MG capsule 24 hr capsule Take 1 capsule by mouth Daily.       No current facility-administered medications for this visit.      Review of Systems   Constitutional: Negative for chills and fever.   HENT: Negative for congestion.    Respiratory: Negative for shortness of breath.    Cardiovascular: Negative for chest pain and leg swelling.   Gastrointestinal: Negative for constipation, diarrhea, nausea and vomiting.   Musculoskeletal: Positive for gait problem (foot drop).   Skin: Negative for wound.        Dry skin of feet. Thick toenails   Neurological: Positive for numbness.       OBJECTIVE     Vitals:    12/11/20 0951   BP: 128/67   Pulse: 95   SpO2: 98%       PHYSICAL EXAM  GEN:   Accompanied by none.     Foot/Ankle Exam:       General:   Diabetic Foot Exam Performed    Appearance: appears stated age and healthy and obesity    Orientation: AAOx3    Affect:  appropriate    Gait: unimpaired    Gait comment:  Drop foot left  Assistance: independent    Shoe Gear:  Diabetic shoes (Left AFO)    VASCULAR      Right Foot Vascularity   Dorsalis pedis:  2+  Posterior tibial:  2+  Skin Temperature: warm    Edema Grading:  Trace  CFT:  3  Pedal Hair Growth:  Present  Varicosities: none       Left Foot Vascularity   Dorsalis pedis:  2+  Posterior tibial:  2+  Skin Temperature: warm    Edema Grading:  Trace  CFT:  3  Pedal Hair Growth:  Present  Varicosities: none        NEUROLOGIC     Right Foot Neurologic   Light touch sensation:  Diminished  Vibratory sensation:  Diminished  Hot/Cold sensation: diminished    Protective Sensation using [place] Monofilament:  1     Left Foot Neurologic   Light touch sensation:  Diminished  Vibratory sensation:  Diminished  Hot/cold sensation: diminished    Protective Sensation using [place] Monofilament:  0     MUSCULOSKELETAL      Right Foot Musculoskeletal    Amputation   Right toes amputated: No    Ecchymosis:  None  Tenderness: none    Arch:  Pes planus  Hallux valgus: No    Hallux limitus: No       Left Foot Musculoskeletal    Amputation   Left toes amputated: No    Ecchymosis:  None  Tenderness: none    Arch:  Pes planus  Hallux valgus: No    Hallux limitus: No       MUSCLE STRENGTH     Right Foot Muscle Strength   Foot dorsiflexion:  5  Foot plantar flexion:  5  Foot inversion:  5  Foot eversion:  5     Left Foot Muscle Strength   Foot dorsiflexion:  2  Foot plantar flexion:  3  Foot inversion:  3  Foot eversion:  3     RANGE OF MOTION      Right Foot Range of Motion   Foot and ankle ROM within normal limits       Left Foot Range of Motion   Foot and ankle ROM within normal limits       DERMATOLOGIC     Right Foot Dermatologic   Skin: dry skin and tinea (moccasin and interdigitally)    Nails: onychomycosis, abnormally thick, subungual debris and dystrophic nails       Left Foot Dermatologic   Skin: corn, dry skin and tinea  (moccasin and interdigitally)    Nails: onychomycosis, abnormally thick, subungual debris and dystrophic nails       Image:       RADIOLOGY/NUCLEAR:  Us Ankle / Brachial Indices Extremity Complete    Result Date: 11/17/2020  Narrative:  History: PAD  Comments: Bilateral lower extremity arterial with multi-level pulse volume recordings and segmental pressures were performed at rest and stress.  The right ankle/brachial index is 0.51. The waveforms are biphasic and dampened at the ankle. The posterior tibial waveform is flat lined. These findings are consistent with severe arterial insufficiency of the right lower extremity at rest.  The left ankle/brachial index is 0.8. The waveforms are biphasic without dampening. These findings are consistent with mild arterial insufficiency of the left lower extremity at rest.      Impression: Impression: 1. Severe arterial insufficiency of the right lower extremity at rest. 2. Mild arterial insufficiency of the left lower extremity at rest.   This report was finalized on 11/17/2020 12:38 by Dr. Constantino Metzger MD.      LABORATORY/CULTURE RESULTS:      PATHOLOGY RESULTS:       ASSESSMENT/PLAN     Diagnoses and all orders for this visit:    1. Onychomycosis (Primary)    2. Tinea pedis of both feet    3. Type 2 diabetes mellitus with diabetic polyneuropathy, with long-term current use of insulin (CMS/[facility])    4. Foot drop, left    5. Foot callus    6. Morbidly obese (CMS/[facility])      Comprehensive lower extremity examination and evaluation was performed.  Discussed findings and treatment plan including risks, benefits, and treatment options with patient in detail. Patient agreed with treatment plan.  After verbal consent obtained, nail(s) x10 debrided of length and thickness with nail nipper without incidence  Patient may maintain nails and calluses at home utilizing emery board or pumice stone between visits as needed  Reviewed at home diabetic foot care including daily foot checks    Continue use of AFO   Continue diabetic monitoring and control under direction of PCP.   Patient's Body mass index is 40.8 kg/m². BMI is above normal parameters. Recommendations include: educational material.  An After Visit Summary was printed and given to the patient at discharge, including (if requested) any available informative/educational handouts regarding diagnosis, treatment, or medications. All questions were answered to patient/family satisfaction. Should symptoms fail to improve or worsen they agree to call or return to clinic or to go to the Emergency Department. Discussed the importance of following up with any needed screening tests/labs/specialist appointments and any requested follow-up recommended by me today. Importance of maintaining follow-up discussed and patient accepts that missed appointments can delay diagnosis and potentially lead to worsening of conditions.  Return in about 3 months (around 3/11/2021)., or sooner if acute issues arise.    Lab Frequency Next Occurrence   Diet: Once 04/19/2018   Advance Diet as Tolerated Once 04/19/2018       This document has been electronically signed by PRATIMA Esteves on December 11, 2020 12:23 CST

## 2020-12-11 ENCOUNTER — OFFICE VISIT (OUTPATIENT)
Dept: PODIATRY | Facility: CLINIC | Age: 65
End: 2020-12-11

## 2020-12-11 VITALS
OXYGEN SATURATION: 98 % | BODY MASS INDEX: 40.07 KG/M2 | WEIGHT: 264.4 LBS | HEIGHT: 68 IN | DIASTOLIC BLOOD PRESSURE: 67 MMHG | HEART RATE: 95 BPM | SYSTOLIC BLOOD PRESSURE: 128 MMHG

## 2020-12-11 DIAGNOSIS — L84 FOOT CALLUS: ICD-10-CM

## 2020-12-11 DIAGNOSIS — B35.3 TINEA PEDIS OF BOTH FEET: ICD-10-CM

## 2020-12-11 DIAGNOSIS — B35.1 ONYCHOMYCOSIS: Primary | ICD-10-CM

## 2020-12-11 DIAGNOSIS — M21.372 FOOT DROP, LEFT: ICD-10-CM

## 2020-12-11 DIAGNOSIS — E11.42 TYPE 2 DIABETES MELLITUS WITH DIABETIC POLYNEUROPATHY, WITH LONG-TERM CURRENT USE OF INSULIN (HCC): ICD-10-CM

## 2020-12-11 DIAGNOSIS — Z79.4 TYPE 2 DIABETES MELLITUS WITH DIABETIC POLYNEUROPATHY, WITH LONG-TERM CURRENT USE OF INSULIN (HCC): ICD-10-CM

## 2020-12-11 DIAGNOSIS — E66.01 MORBIDLY OBESE (HCC): ICD-10-CM

## 2020-12-11 PROCEDURE — 99213 OFFICE O/P EST LOW 20 MIN: CPT | Performed by: NURSE PRACTITIONER

## 2020-12-11 PROCEDURE — 11055 PARING/CUTG B9 HYPRKER LES 1: CPT | Performed by: NURSE PRACTITIONER

## 2020-12-11 PROCEDURE — 11721 DEBRIDE NAIL 6 OR MORE: CPT | Performed by: NURSE PRACTITIONER

## 2020-12-11 RX ORDER — TAMSULOSIN HYDROCHLORIDE 0.4 MG/1
1 CAPSULE ORAL DAILY
COMMUNITY

## 2020-12-11 NOTE — PATIENT INSTRUCTIONS
Obesity, Adult  Obesity is having too much body fat. Being obese means that your weight is more than what is healthy for you.  BMI is a number that explains how much body fat you have. If you have a BMI of 30 or more, you are obese. Obesity is often caused by eating or drinking more calories than your body uses. Changing your lifestyle can help you lose weight.  Obesity can cause serious health problems, such as:  · Stroke.  · Coronary artery disease (CAD).  · Type 2 diabetes.  · Some types of cancer, including cancers of the colon, breast, uterus, and gallbladder.  · Osteoarthritis.  · High blood pressure (hypertension).  · High cholesterol.  · Sleep apnea.  · Gallbladder stones.  · Infertility problems.  What are the causes?  · Eating meals each day that are high in calories, sugar, and fat.  · Being born with genes that may make you more likely to become obese.  · Having a medical condition that causes obesity.  · Taking certain medicines.  · Sitting a lot (having a sedentary lifestyle).  · Not getting enough sleep.  · Drinking a lot of drinks that have sugar in them.  What increases the risk?  · Having a family history of obesity.  · Being an  woman.  · Being a  man.  · Living in an area with limited access to:  ? [place], recreation centers, or sidewalks.  ? Healthy food choices, such as grocery stores and [employer] markets.  What are the signs or symptoms?  The main sign is having too much body fat.  How is this treated?  · Treatment for this condition often includes changing your lifestyle. Treatment may include:  ? Changing your diet. This may include making a healthy meal plan.  ? Exercise. This may include activity that causes your heart to beat faster (aerobic exercise) and strength training. Work with your doctor to design a program that works for you.  ? Medicine to help you lose weight. This may be used if you are not able to lose 1 pound a week after 6 weeks of healthy eating and  more exercise.  ? Treating conditions that cause the obesity.  ? Surgery. Options may include gastric banding and gastric bypass. This may be done if:  § Other treatments have not helped to improve your condition.  § You have a BMI of 40 or higher.  § You have life-threatening health problems related to obesity.  Follow these instructions at home:  Eating and drinking    · Follow advice from your doctor about what to eat and drink. Your doctor may tell you to:  ? Limit fast food, sweets, and processed snack foods.  ? Choose low-fat options. For example, choose low-fat milk instead of whole milk.  ? Eat 5 or more servings of fruits or vegetables each day.  ? Eat at home more often. This gives you more control over what you eat.  ? Choose healthy foods when you eat out.  ? Learn to read food labels. This will help you learn how much food is in 1 serving.  ? Keep low-fat snacks available.  ? Avoid drinks that have a lot of sugar in them. These include soda, fruit juice, iced tea with sugar, and flavored milk.  · Drink enough water to keep your pee (urine) pale yellow.  · Do not go on fad diets.  Physical activity  · Exercise often, as told by your doctor. Most adults should get up to 150 minutes of moderate-intensity exercise every week.Ask your doctor:  ? What types of exercise are safe for you.  ? How often you should exercise.  · Warm up and stretch before being active.  · Do slow stretching after being active (cool down).  · Rest between times of being active.  Lifestyle  · Work with your doctor and a food expert (dietitian) to set a weight-loss goal that is best for you.  · Limit your screen time.  · Find ways to reward yourself that do not involve food.  · Do not drink alcohol if:  ? Your doctor tells you not to drink.  ? You are pregnant, may be pregnant, or are planning to become pregnant.  · If you drink alcohol:  ? Limit how much you use to:  § 0-1 drink a day for women.  § 0-2 drinks a day for men.  ? Be  aware of how much alcohol is in your drink. In the [place] one drink equals one 12 oz bottle of beer (355 mL), one 5 oz glass of wine (148 mL), or one 1½ oz glass of hard liquor (44 mL).  General instructions  · Keep a weight-loss journal. This can help you keep track of:  ? The food that you eat.  ? How much exercise you get.  · Take over-the-counter and prescription medicines only as told by your doctor.  · Take vitamins and supplements only as told by your doctor.  · Think about joining a support group.  · Keep all follow-up visits as told by your doctor. This is important.  Contact a doctor if:  · You cannot meet your weight loss goal after you have changed your diet and lifestyle for 6 weeks.  Get help right away if you:  · Are having trouble breathing.  · Are having thoughts of harming yourself.  Summary  · Obesity is having too much body fat.  · Being obese means that your weight is more than what is healthy for you.  · Work with your doctor to set a weight-loss goal.  · Get regular exercise as told by your doctor.  This information is not intended to replace advice given to you by your health care provider. Make sure you discuss any questions you have with your health care provider.  Document Revised: 08/22/2019 Document Reviewed: 08/22/2019  Elsevier Patient Education © 2020 [employer]

## 2021-01-01 DIAGNOSIS — I10 ESSENTIAL HYPERTENSION: ICD-10-CM

## 2021-01-01 RX ORDER — AMLODIPINE BESYLATE 10 MG/1
TABLET ORAL
Qty: 90 TABLET | Refills: 0 | Status: SHIPPED | OUTPATIENT
Start: 2021-01-01 | End: 2021-03-31

## 2021-01-04 ENCOUNTER — VIRTUAL VISIT (OUTPATIENT)
Dept: PRIMARY CARE CLINIC | Age: 66
End: 2021-01-04
Payer: MEDICARE

## 2021-01-04 DIAGNOSIS — E11.51 TYPE 2 DIABETES MELLITUS WITH DIABETIC PERIPHERAL ANGIOPATHY WITHOUT GANGRENE, WITHOUT LONG-TERM CURRENT USE OF INSULIN (HCC): Primary | Chronic | ICD-10-CM

## 2021-01-04 PROCEDURE — 99442 PR PHYS/QHP TELEPHONE EVALUATION 11-20 MIN: CPT | Performed by: NURSE PRACTITIONER

## 2021-01-04 ASSESSMENT — ENCOUNTER SYMPTOMS
COLOR CHANGE: 0
COUGH: 0
NAUSEA: 0
SHORTNESS OF BREATH: 0
VOMITING: 0
VOICE CHANGE: 0
RHINORRHEA: 0
BACK PAIN: 0
PHOTOPHOBIA: 0

## 2021-01-04 NOTE — PROGRESS NOTES
[facility]   [address] [place] [address]            Phone:  [phone]  Fax:  [phone]    Amy Ford is a 60 y.o. male evaluated via telephone on 1/4/2021. Consent:    He and/or health care decision maker is aware that that he may receive a bill for this telephone service, depending on his insurance coverage, and has provided verbal consent to proceed: Yes      HPI  Chief Complaint   Patient presents with    Diabetes       I communicated with the patient and/or health care decision maker about:    Patient initially wanted to discuss his brother as he thought this visit was for his brother of whom he has custody. He then realized it was for himself and mentioned belching frequently? Patient is extremely hard to understand on telephone and is agreeable to coming in for in person evaluation later this week. Review of Systems   Constitutional: Negative for chills and fever. HENT: Negative for ear pain, hearing loss, rhinorrhea and voice change. Eyes: Negative for photophobia and visual disturbance. Respiratory: Negative for cough and shortness of breath. Cardiovascular: Negative for chest pain and palpitations. Gastrointestinal: Negative for nausea and vomiting. Endocrine: Negative. Negative for cold intolerance and heat intolerance. Genitourinary: Negative for difficulty urinating and flank pain. Musculoskeletal: Negative for back pain and neck pain. Skin: Negative for color change and rash. Allergic/Immunologic: Negative for environmental allergies and food allergies. Neurological: Negative for dizziness, speech difficulty and headaches. Hematological: Does not bruise/bleed easily. Psychiatric/Behavioral: Negative for sleep disturbance and suicidal ideas.        Patient location: home    PLAN    Details of this discussion including any medical advice provided:

## 2021-01-06 ENCOUNTER — OFFICE VISIT (OUTPATIENT)
Dept: PRIMARY CARE CLINIC | Age: 66
End: 2021-01-06
Payer: MEDICARE

## 2021-01-06 VITALS
HEIGHT: 68 IN | TEMPERATURE: 97.3 F | DIASTOLIC BLOOD PRESSURE: 62 MMHG | OXYGEN SATURATION: 96 % | HEART RATE: 78 BPM | WEIGHT: 251.4 LBS | SYSTOLIC BLOOD PRESSURE: 128 MMHG | BODY MASS INDEX: 38.1 KG/M2

## 2021-01-06 DIAGNOSIS — M79.18 ABDOMINAL MUSCLE PAIN: ICD-10-CM

## 2021-01-06 DIAGNOSIS — E11.51 TYPE 2 DIABETES MELLITUS WITH DIABETIC PERIPHERAL ANGIOPATHY WITHOUT GANGRENE, WITHOUT LONG-TERM CURRENT USE OF INSULIN (HCC): Chronic | ICD-10-CM

## 2021-01-06 PROCEDURE — 99214 OFFICE O/P EST MOD 30 MIN: CPT | Performed by: NURSE PRACTITIONER

## 2021-01-06 PROCEDURE — 3046F HEMOGLOBIN A1C LEVEL >9.0%: CPT | Performed by: NURSE PRACTITIONER

## 2021-01-06 PROCEDURE — 2022F DILAT RTA XM EVC RTNOPTHY: CPT | Performed by: NURSE PRACTITIONER

## 2021-01-06 PROCEDURE — G8482 FLU IMMUNIZE ORDER/ADMIN: HCPCS | Performed by: NURSE PRACTITIONER

## 2021-01-06 PROCEDURE — G8417 CALC BMI ABV UP PARAM F/U: HCPCS | Performed by: NURSE PRACTITIONER

## 2021-01-06 PROCEDURE — 3017F COLORECTAL CA SCREEN DOC REV: CPT | Performed by: NURSE PRACTITIONER

## 2021-01-06 PROCEDURE — G8427 DOCREV CUR MEDS BY ELIG CLIN: HCPCS | Performed by: NURSE PRACTITIONER

## 2021-01-06 PROCEDURE — 1036F TOBACCO NON-USER: CPT | Performed by: NURSE PRACTITIONER

## 2021-01-06 RX ORDER — CYCLOBENZAPRINE HCL 5 MG
5 TABLET ORAL 3 TIMES DAILY PRN
Qty: 90 TABLET | Refills: 1 | Status: SHIPPED | OUTPATIENT
Start: 2021-01-06 | End: 2021-03-16

## 2021-01-06 RX ORDER — ALBUTEROL SULFATE 90 UG/1
2 AEROSOL, METERED RESPIRATORY (INHALATION) 4 TIMES DAILY PRN
Qty: 1 INHALER | Refills: 5 | Status: SHIPPED | OUTPATIENT
Start: 2021-01-06 | End: 2021-07-27 | Stop reason: SDUPTHER

## 2021-01-06 RX ORDER — INSULIN GLARGINE 100 [IU]/ML
20 INJECTION, SOLUTION SUBCUTANEOUS NIGHTLY
Qty: 5 PEN | Refills: 0 | Status: SHIPPED | OUTPATIENT
Start: 2021-01-06 | End: 2021-03-23 | Stop reason: ALTCHOICE

## 2021-01-06 RX ORDER — PANTOPRAZOLE SODIUM 40 MG/1
40 TABLET, DELAYED RELEASE ORAL
Qty: 30 TABLET | Refills: 5 | Status: SHIPPED | OUTPATIENT
Start: 2021-01-06 | End: 2021-07-21 | Stop reason: SDUPTHER

## 2021-01-06 RX ORDER — DULAGLUTIDE 0.75 MG/.5ML
0.75 INJECTION, SOLUTION SUBCUTANEOUS WEEKLY
Qty: 4 PEN | Refills: 0 | Status: SHIPPED | OUTPATIENT
Start: 2021-01-06 | End: 2021-03-17 | Stop reason: DRUGHIGH

## 2021-01-06 RX ORDER — HYDROCHLOROTHIAZIDE 25 MG/1
12.5 TABLET ORAL DAILY
Qty: 90 TABLET | Refills: 0 | Status: SHIPPED | OUTPATIENT
Start: 2021-01-06 | End: 2021-03-14

## 2021-01-06 SDOH — ECONOMIC STABILITY: INCOME INSECURITY: HOW HARD IS IT FOR YOU TO PAY FOR THE VERY BASICS LIKE FOOD, HOUSING, MEDICAL CARE, AND HEATING?: NOT HARD AT ALL

## 2021-01-06 SDOH — ECONOMIC STABILITY: FOOD INSECURITY: WITHIN THE PAST 12 MONTHS, THE FOOD YOU BOUGHT JUST DIDN'T LAST AND YOU DIDN'T HAVE MONEY TO GET MORE.: NEVER TRUE

## 2021-01-06 SDOH — ECONOMIC STABILITY: TRANSPORTATION INSECURITY
IN THE PAST 12 MONTHS, HAS LACK OF TRANSPORTATION KEPT YOU FROM MEETINGS, WORK, OR FROM GETTING THINGS NEEDED FOR DAILY LIVING?: NO

## 2021-01-06 SDOH — ECONOMIC STABILITY: TRANSPORTATION INSECURITY
IN THE PAST 12 MONTHS, HAS THE LACK OF TRANSPORTATION KEPT YOU FROM MEDICAL APPOINTMENTS OR FROM GETTING MEDICATIONS?: NO

## 2021-01-06 ASSESSMENT — ENCOUNTER SYMPTOMS
RHINORRHEA: 0
BACK PAIN: 0
VOICE CHANGE: 0
NAUSEA: 0
VOMITING: 0
COLOR CHANGE: 0
COUGH: 0
PHOTOPHOBIA: 0
SHORTNESS OF BREATH: 0

## 2021-01-06 ASSESSMENT — PATIENT HEALTH QUESTIONNAIRE - PHQ9
1. LITTLE INTEREST OR PLEASURE IN DOING THINGS: 0
SUM OF ALL RESPONSES TO PHQ QUESTIONS 1-9: 0
SUM OF ALL RESPONSES TO PHQ QUESTIONS 1-9: 0

## 2021-01-06 NOTE — PATIENT INSTRUCTIONS
1. Try to get Trulicity-- this is a once weekly injection to help your body make more of its' own insulin. It is not insulin. 2. If unable to get Trulicity, resume Lantus (insulin) nightly starting at 20 units. 3. Monitor blood sugar 3x daily and keep a diary of this. 4. Follow-up in 2 months with blood sugar diary and labs 1 week prior to appointment. 5. Discontinue omeprazole and begin protonix. 6. Albuterol inhaler every 4 hours as needed for shortness of breath, cough. 7. Call for any worsened problems or concerns. We are committed to providing you with the best care possible. In order to help us achieve these goals please remember to bring all medications, herbal products, and over the counter supplements with you to each visit. If your provider has ordered testing for you, please be sure to follow up with our office if you have not received results within 7 days after the testing took place. *If you receive a survey after visiting one of our offices, please take time to share your experience concerning your physician office visit. These surveys are confidential and no health information about you is shared. We are eager to improve for you and we are counting on your feedback to help make that happen.

## 2021-01-11 DIAGNOSIS — I10 ESSENTIAL HYPERTENSION: ICD-10-CM

## 2021-01-11 RX ORDER — LISINOPRIL 40 MG/1
TABLET ORAL
Qty: 90 TABLET | Refills: 0 | Status: SHIPPED | OUTPATIENT
Start: 2021-01-11 | End: 2021-02-12

## 2021-02-12 DIAGNOSIS — I10 ESSENTIAL HYPERTENSION: ICD-10-CM

## 2021-02-12 RX ORDER — LISINOPRIL 40 MG/1
TABLET ORAL
Qty: 90 TABLET | Refills: 0 | Status: SHIPPED | OUTPATIENT
Start: 2021-02-12 | End: 2021-07-10

## 2021-03-03 NOTE — PROGRESS NOTES
[facility] - PODIATRY    Today's Date: 03/17/21    Patient Name: Edy Cosby  MRN: [id number]  CSN: [id number]  PCP: Mark Villela MD  Referring Provider: No ref. provider found    SUBJECTIVE     Chief Complaint   Patient presents with   • Follow-up     3 mo diabetic nail care- pt states shooting pain sometimes in left outer foot that runs up leg.- pt denies any pain at this time - pt presents with long and discolored toe nails. Nails have some thickness.  - pcp 09/28/2020   • Diabetes     148 mg/dl this morning      HPI: Edy Cosby, a 65 y.o.male, comes to clinic as a(n) established patient presenting for diabetic foot exam and complaining of thickened toenails. Patient has h/o AFib, back pain, DM2, Emphysema lung, GERD, HTN, OA. Patient is IDDM with last stated BG level of 148mg/dl. Notes neuropathy in feet with numbness and tingling. Continues to use AFO to left foot due to foot drop. He states he is unable to care for nails by himself. Denies open wound or sores. Patient notes that he has had some intermittent shooting pain from the outer foot that runs up the legs from time to time but denies pain today. Relates previous treatment(s) including foot care by podiatrist. Denies any constitutional symptoms. No other pedal complaints at this time.    Past Medical History:   Diagnosis Date   • A-fib (CMS/HCC)    • Atherosclerosis    • Chronic back pain    • Constipation    • Diabetes mellitus (CMS/HCC)     TYPE II   • Dysphagia    • Emphysema of lung (CMS/HCC)    • Gastroparesis    • GERD (gastroesophageal reflux disease)    • Hyperlipidemia    • Hypertension     ESSENTIAL   • Leg pain    • Muscle spasm    • Nerve pain    • Osteoarthritis    • Sleeping difficulty    • Ulcer of toe due to diabetes mellitus (CMS/HCC)      Past Surgical History:   Procedure Laterality Date   • ANGIOPLASTY ILIAC ARTERY Left 1/29/2019    Procedure: ANGIOPLASTY ILIAC ARTERY, STENT PLACEMENT;  Surgeon:  Duncan Lucio MD;  Location: [facility] HYBRID OR 12;  Service: Vascular   • AORTAGRAM Left 12/18/2018    Procedure: AORTOGRAM, LEFT LEG ANGIOGRAM, MYNX CLOSURE;  Surgeon: Duncan Lucio MD;  Location: [facility] HYBRID OR 12;  Service: Vascular   • BACK SURGERY     • CHOLECYSTECTOMY     • COLONOSCOPY  09/01/2011    TICS RECALL 5YR   • COLONOSCOPY  09/30/2008    ENTER RESULTS: STOOL THROUGHTOUT THE COLON POOR PREP REC 3 YEAR RECALL   • COLONOSCOPY N/A 11/9/2016    Procedure: COLONOSCOPY;  Surgeon: León Zepeda MD;  Location: [facility] ENDOSCOPY;  Service:    • COLONOSCOPY N/A 4/19/2018    Procedure: COLONOSCOPY WITH ANESTHESIA;  Surgeon: León Zepeda MD;  Location: [facility] ENDOSCOPY;  Service: Gastroenterology   • ENDOSCOPY  06/19/2012    HH   • ENDOSCOPY  08/25/2009    HIATAL HERNIA, DILATED WITH 50 FR MASCORRO   • ENDOSCOPY  06/19/2007    WITHIN NORMAL LIMITS UREA NEG   • FEMORAL ENDARTERECTOMY Left 1/29/2019    Procedure: LEFT FEMORAL ENDARTERECTOMY;  Surgeon: Duncan Lucio MD;  Location: [facility] HYBRID OR 12;  Service: Vascular   • LUMBAR LAMINECTOMY WITH FUSION N/A 1/23/2017    Procedure: REMOVAL OF INSTRUMENTATION, EXPLORATION OF FUSION L4-S1, REVISION LEFT L5-S1 HEMILAMINECTOMY, FACETECTOMY DECOMPRESSION, REVISION UNINSTRUMENTED POSTERIOR SPINAL FUSION L5-S1;  Surgeon: RHONDA Lopes MD;  Location: [facility] OR;  Service:    • OTHER SURGICAL HISTORY      LUMBAR SACRAL SURGERY WITH FUSION X2   • PILONIDAL CYST / SINUS EXCISION      PILONIDAL CYST REMOVAL     Family History   Problem Relation Age of Onset   • Heart attack Father    • Diabetes Brother    • Colon polyps Sister    • Stomach cancer Neg Hx         GI CNACERS OR DISEASE   • Colon cancer Neg Hx      Social History     Socioeconomic History   • Marital status: Single     Spouse name: Not on file   • Number of children: Not on file   • Years of education: Not on file   • Highest education level: Not on file   Tobacco Use   • Smoking  status: Former Smoker     Years: 30.00     Types: Cigarettes   • Smokeless tobacco: Never Used   Vaping Use   • Vaping Use: Never used   Substance and Sexual Activity   • Alcohol use: No   • Drug use: No   • Sexual activity: Defer     No Known Allergies  Current Outpatient Medications   Medication Sig Dispense Refill   • amLODIPine (NORVASC) 5 MG tablet 10 mg.     • aspirin 81 MG EC tablet Take 81 mg by mouth Daily.     • atorvastatin (LIPITOR) 40 MG tablet Take 40 mg by mouth 2 (Two) Times a Day.     • cyclobenzaprine (FLEXERIL) 5 MG tablet Take 5 mg by mouth 3 (Three) Times a Day As Needed for Muscle Spasms.     • hydrochlorothiazide (HYDRODIURIL) 25 MG tablet Take 12.5 mg by mouth Daily.     • Insulin Glargine (Lantus SoloStar) 100 UNIT/ML injection pen Inject 20 Units under the skin into the appropriate area as directed.     • labetalol (NORMODYNE) 100 MG tablet Take 100 mg by mouth 2 (Two) Times a Day.     • lisinopril (PRINIVIL,ZESTRIL) 40 MG tablet Take 40 mg by mouth Daily.     • omeprazole (priLOSEC) 20 MG capsule Take 1 capsule by mouth twice daily 60 capsule 5   • tamsulosin (FLOMAX) 0.4 MG capsule 24 hr capsule Take 1 capsule by mouth Daily.       No current facility-administered medications for this visit.     Review of Systems   Constitutional: Negative for chills and fever.   HENT: Negative for congestion.    Respiratory: Negative for shortness of breath.    Cardiovascular: Negative for chest pain and leg swelling.   Gastrointestinal: Negative for constipation, diarrhea, nausea and vomiting.   Musculoskeletal: Positive for gait problem (foot drop).   Skin: Negative for wound.        Dry skin    Neurological: Positive for numbness.       OBJECTIVE     Vitals:    03/12/21 1010   BP: 158/64   Pulse: 85   SpO2: 91%       PHYSICAL EXAM  GEN:   Accompanied by none.     Foot/Ankle Exam:       General:   Diabetic Foot Exam Performed    Appearance: appears stated age and healthy and obesity    Orientation:  AAOx3    Affect: appropriate    Gait: unimpaired    Gait comment:  Drop foot left  Assistance: independent    Shoe Gear:  Diabetic shoes (Left AFO)    VASCULAR      Right Foot Vascularity   Dorsalis pedis:  2+  Posterior tibial:  2+  Skin Temperature: warm    Edema Grading:  Trace and non-pitting  CFT:  3  Pedal Hair Growth:  Present  Varicosities: none       Left Foot Vascularity   Dorsalis pedis:  2+  Posterior tibial:  2+  Skin Temperature: warm    Edema Grading:  Trace and non-pitting  CFT:  3  Pedal Hair Growth:  Present  Varicosities: none        NEUROLOGIC     Right Foot Neurologic   Light touch sensation:  Diminished  Vibratory sensation:  Diminished  Hot/Cold sensation: diminished    Protective Sensation using [place] Monofilament:  0     Left Foot Neurologic   Light touch sensation:  Diminished  Vibratory sensation:  Diminished  Hot/cold sensation: diminished    Protective Sensation using [place] Monofilament:  0     MUSCULOSKELETAL      Right Foot Musculoskeletal   Ecchymosis:  None  Tenderness: none    Arch:  Pes planus  Hallux valgus: No    Hallux limitus: No       Left Foot Musculoskeletal   Ecchymosis:  None  Tenderness: none    Arch:  Pes planus  Hallux valgus: No    Hallux limitus: No       MUSCLE STRENGTH     Right Foot Muscle Strength   Foot dorsiflexion:  5  Foot plantar flexion:  5  Foot inversion:  5  Foot eversion:  5     Left Foot Muscle Strength   Foot dorsiflexion:  2  Foot plantar flexion:  3  Foot inversion:  3  Foot eversion:  3     RANGE OF MOTION      Right Foot Range of Motion   Foot and ankle ROM within normal limits       Left Foot Range of Motion   Foot and ankle ROM within normal limits       DERMATOLOGIC     Right Foot Dermatologic   Skin: dry skin and tinea (moccasin and interdigitally - essentially unchanged)    Nails: onychomycosis, abnormally thick, subungual debris and dystrophic nails       Left Foot Dermatologic   Skin: dry skin and tinea (moccasin and  interdigitally - essentially unchanged)    Nails: onychomycosis, abnormally thick, subungual debris and dystrophic nails        RADIOLOGY/NUCLEAR:  No results found.    LABORATORY/CULTURE RESULTS:      PATHOLOGY RESULTS:       ASSESSMENT/PLAN     Diagnoses and all orders for this visit:    1. Onychomycosis (Primary)    2. Type 2 diabetes mellitus with diabetic polyneuropathy, with long-term current use of insulin (CMS/[facility])    3. Tinea pedis of both feet    4. Foot drop, left    5. Foot callus    6. Morbidly obese (CMS/[facility])      Comprehensive lower extremity examination and evaluation was performed.  Discussed findings and treatment plan including risks, benefits, and treatment options with patient in detail. Patient agreed with treatment plan.  After verbal consent obtained, nail(s) x10 debrided of length and thickness with nail nipper without incidence  Patient may maintain nails and calluses at home utilizing emery board or pumice stone between visits as needed  Reviewed at home diabetic foot care including daily foot checks   Continue AFO for foot drop.   Continue to monitor and control blood glucose.   Patient's Body mass index is 39.09 kg/m². BMI is above normal parameters. Recommendations include: educational material.  An After Visit Summary was printed and given to the patient at discharge, including (if requested) any available informative/educational handouts regarding diagnosis, treatment, or medications. All questions were answered to patient/family satisfaction. Should symptoms fail to improve or worsen they agree to call or return to clinic or to go to the Emergency Department. Discussed the importance of following up with any needed screening tests/labs/specialist appointments and any requested follow-up recommended by me today. Importance of maintaining follow-up discussed and patient accepts that missed appointments can delay diagnosis and potentially lead to worsening of conditions.  Return in  about 3 months (around 6/12/2021)., or sooner if acute issues arise.    Lab Frequency Next Occurrence   Diet: Once 04/19/2018   Advance Diet as Tolerated Once 04/19/2018       This document has been electronically signed by PRATIMA Esteves on March 17, 2021 06:25 CDT

## 2021-03-04 ENCOUNTER — OFFICE VISIT (OUTPATIENT)
Dept: CARDIOLOGY | Facility: CLINIC | Age: 66
End: 2021-03-04

## 2021-03-04 VITALS
HEIGHT: 67 IN | SYSTOLIC BLOOD PRESSURE: 116 MMHG | OXYGEN SATURATION: 97 % | WEIGHT: 245 LBS | BODY MASS INDEX: 38.45 KG/M2 | DIASTOLIC BLOOD PRESSURE: 62 MMHG | HEART RATE: 97 BPM

## 2021-03-04 DIAGNOSIS — I48.0 PAROXYSMAL ATRIAL FIBRILLATION (HCC): Primary | Chronic | ICD-10-CM

## 2021-03-04 DIAGNOSIS — Z79.4 TYPE 2 DIABETES MELLITUS WITHOUT COMPLICATION, WITH LONG-TERM CURRENT USE OF INSULIN (HCC): ICD-10-CM

## 2021-03-04 DIAGNOSIS — E11.9 TYPE 2 DIABETES MELLITUS WITHOUT COMPLICATION, WITH LONG-TERM CURRENT USE OF INSULIN (HCC): ICD-10-CM

## 2021-03-04 DIAGNOSIS — E66.01 CLASS 2 SEVERE OBESITY DUE TO EXCESS CALORIES WITH SERIOUS COMORBIDITY AND BODY MASS INDEX (BMI) OF 39.0 TO 39.9 IN ADULT (HCC): Chronic | ICD-10-CM

## 2021-03-04 DIAGNOSIS — I10 ESSENTIAL HYPERTENSION: ICD-10-CM

## 2021-03-04 PROCEDURE — 99214 OFFICE O/P EST MOD 30 MIN: CPT | Performed by: NURSE PRACTITIONER

## 2021-03-04 RX ORDER — PANTOPRAZOLE SODIUM 40 MG/1
40 TABLET, DELAYED RELEASE ORAL DAILY
COMMUNITY
Start: 2021-01-06 | End: 2021-03-04 | Stop reason: SDUPTHER

## 2021-03-10 ENCOUNTER — TELEPHONE (OUTPATIENT)
Dept: PODIATRY | Facility: CLINIC | Age: 66
End: 2021-03-10

## 2021-03-10 NOTE — PROGRESS NOTES
Subjective:     Encounter Date:03/04/2021      Patient ID: Edy Cosby is a 65 y.o. male with a history of paroxysmal atrial fibrillation following surgery in 2017, hypertension, insulin-dependent diabetes, CKD, obstructive sleep apnea, and obesity.  He presents the office today for routine follow-up.    Chief Complaint: Follow Up  Atrial Fibrillation  Presents for follow-up visit. Symptoms are negative for chest pain, dizziness, hypertension, hypotension, palpitations, shortness of breath, syncope, tachycardia and weakness. The symptoms have been stable. Past medical history includes atrial fibrillation.   Hypertension  This is a chronic problem. The current episode started more than 1 year ago. The problem is controlled. Pertinent negatives include no chest pain, malaise/fatigue, orthopnea, palpitations, PND or shortness of breath. Risk factors for coronary artery disease include diabetes mellitus, male gender and obesity. Current antihypertension treatment includes ACE inhibitors, diuretics, calcium channel blockers and beta blockers. The current treatment provides significant improvement. Compliance problems include exercise.  There is no history of angina, CAD/MI or heart failure.     Mr. Cosby presents today for follow-up.  He follows with our office due to history of paroxysmal atrial fibrillation which occurred following surgery in 2017.  He has not had any known recurrence since that time.  He is not on long-term anticoagulation but does take aspirin 81 mg daily.  He denies any complaints.  He denies any chest pain, chest pressure, chest discomfort.  He denies any palpitations, lightheadedness, dizziness.  He is compliant with his medications and takes medicines for blood pressure control and diabetes.  He is an insulin-dependent diabetic.  He has managed through his primary care physician's office.    The following portions of the patient's history were reviewed and updated as appropriate:  allergies, current medications, past family history, past medical history, past social history and past surgical history.     No Known Allergies      Current Outpatient Medications:   •  amLODIPine (NORVASC) 5 MG tablet, 10 mg., Disp: , Rfl:   •  aspirin 81 MG EC tablet, Take 81 mg by mouth Daily., Disp: , Rfl:   •  atorvastatin (LIPITOR) 40 MG tablet, Take 40 mg by mouth 2 (Two) Times a Day., Disp: , Rfl:   •  cyclobenzaprine (FLEXERIL) 5 MG tablet, Take 5 mg by mouth 3 (Three) Times a Day As Needed for Muscle Spasms., Disp: , Rfl:   •  hydrochlorothiazide (HYDRODIURIL) 25 MG tablet, Take 12.5 mg by mouth Daily., Disp: , Rfl:   •  Insulin Glargine (Lantus SoloStar) 100 UNIT/ML injection pen, Inject 20 Units under the skin into the appropriate area as directed., Disp: , Rfl:   •  labetalol (NORMODYNE) 100 MG tablet, Take 100 mg by mouth 2 (Two) Times a Day., Disp: , Rfl:   •  lisinopril (PRINIVIL,ZESTRIL) 40 MG tablet, Take 40 mg by mouth Daily., Disp: , Rfl:   •  omeprazole (priLOSEC) 20 MG capsule, Take 1 capsule by mouth twice daily, Disp: 60 capsule, Rfl: 5  •  tamsulosin (FLOMAX) 0.4 MG capsule 24 hr capsule, Take 1 capsule by mouth Daily., Disp: , Rfl:       Review of Systems   Constitutional: Negative for malaise/fatigue.   Eyes: Negative for visual disturbance.   Cardiovascular: Negative for chest pain, dyspnea on exertion, leg swelling, near-syncope, orthopnea, palpitations, paroxysmal nocturnal dyspnea and syncope.   Respiratory: Negative for shortness of breath and wheezing.    Hematologic/Lymphatic: Negative for bleeding problem.   Gastrointestinal: Negative for bloating and abdominal pain.   Neurological: Negative for dizziness and weakness.         ECG 12 Lead    Date/Time: 3/15/2021 2:23 PM  Performed by: Aziza Soriano APRN  Authorized by: Aziza Soriano APRN   Comparison: compared with previous ECG from 2/17/2020  Similar to previous ECG  Rhythm: sinus rhythm  Rate: normal  BPM: 93  Conduction:  "conduction normal  Q waves: II    T inversion: II and V6  T flattening: V5  QRS axis: normal  Other findings: non-specific ST-T wave changes    Clinical impression: abnormal EKG          /62   Pulse 97   Ht 170.2 cm (67\")   Wt 111 kg (245 lb)   SpO2 97%   BMI 38.37 kg/m²        Objective:     Vitals reviewed.   Constitutional:       General: Not in acute distress.     Appearance: Well-developed. Not diaphoretic.   Eyes:      General:         Right eye: No discharge.         Left eye: No discharge.   HENT:      Head: Normocephalic and atraumatic.    Mouth/Throat:      Pharynx: No oropharyngeal exudate.   Pulmonary:      Effort: Pulmonary effort is normal. No respiratory distress.      Breath sounds: Normal breath sounds. No wheezing. No rhonchi. No rales.   Chest:      Chest wall: Not tender to palpatation.   Cardiovascular:      Normal rate. Regular rhythm.      Murmurs: There is no murmur.      No gallop. No rub.   Edema:     Peripheral edema absent.   Abdominal:      General: There is no distension.      Palpations: Abdomen is soft.      Tenderness: There is no abdominal tenderness.   Musculoskeletal: Normal range of motion.      Cervical back: Normal range of motion and neck supple. Skin:     General: Skin is warm and dry.      Coloration: Skin is not pale.      Findings: No erythema or rash.   Neurological:      Mental Status: Alert, oriented to person, place, and time and oriented to person, place and time.   Psychiatric:         Mood and Affect: Mood normal.         Speech: Speech normal.         Behavior: Behavior normal. Behavior is cooperative.         Thought Content: Thought content normal.         Cognition and Memory: Cognition normal.         Judgment: Judgment normal.         Lab Review:       Assessment:          Diagnosis Plan   1. Paroxysmal atrial fibrillation (CMS/HCC)     2. Essential hypertension     3. Type 2 diabetes mellitus without complication, with long-term current use of " insulin (CMS/[facility])     4. Class 2 severe obesity due to excess calories with serious comorbidity and body mass index (BMI) of 39.0 to 39.9 in adult (CMS/[facility])            Plan:       -Paroxysmal atrial fibrillation: His EKG today demonstrates sinus rhythm.  He denies any recent symptoms of palpitations or episodes of known tachycardia.  He is stable.  He is not on long-term anticoagulation due to no known recurrence of this rhythm which presented after a surgical procedure several years ago.  We will continue to follow the patient for surveillance.    -Hypertension: The patient's blood pressure is well controlled on his current medications.  He follows closely with his primary care physician.  No changes are recommended at this time.    -Diabetes mellitus: The patient is managed through his primary care physician's office.  He currently is on insulin therapy.    -Obesity: BMI 39.09.  Diet changes and exercise are recommended.    Follow-up in our office in 1 year.  Routine follow-up with PCP.  Call our office sooner if needed.

## 2021-03-12 ENCOUNTER — OFFICE VISIT (OUTPATIENT)
Dept: PODIATRY | Facility: CLINIC | Age: 66
End: 2021-03-12

## 2021-03-12 VITALS
OXYGEN SATURATION: 91 % | SYSTOLIC BLOOD PRESSURE: 158 MMHG | HEIGHT: 67 IN | BODY MASS INDEX: 39.18 KG/M2 | DIASTOLIC BLOOD PRESSURE: 64 MMHG | WEIGHT: 249.6 LBS | HEART RATE: 85 BPM

## 2021-03-12 DIAGNOSIS — Z79.4 TYPE 2 DIABETES MELLITUS WITH DIABETIC POLYNEUROPATHY, WITH LONG-TERM CURRENT USE OF INSULIN (HCC): ICD-10-CM

## 2021-03-12 DIAGNOSIS — M21.372 FOOT DROP, LEFT: ICD-10-CM

## 2021-03-12 DIAGNOSIS — E66.01 MORBIDLY OBESE (HCC): ICD-10-CM

## 2021-03-12 DIAGNOSIS — L84 FOOT CALLUS: ICD-10-CM

## 2021-03-12 DIAGNOSIS — B35.3 TINEA PEDIS OF BOTH FEET: ICD-10-CM

## 2021-03-12 DIAGNOSIS — B35.1 ONYCHOMYCOSIS: Primary | ICD-10-CM

## 2021-03-12 DIAGNOSIS — E11.42 TYPE 2 DIABETES MELLITUS WITH DIABETIC POLYNEUROPATHY, WITH LONG-TERM CURRENT USE OF INSULIN (HCC): ICD-10-CM

## 2021-03-12 PROCEDURE — 11721 DEBRIDE NAIL 6 OR MORE: CPT | Performed by: NURSE PRACTITIONER

## 2021-03-12 PROCEDURE — 99213 OFFICE O/P EST LOW 20 MIN: CPT | Performed by: NURSE PRACTITIONER

## 2021-03-14 RX ORDER — HYDROCHLOROTHIAZIDE 25 MG/1
TABLET ORAL
Qty: 90 TABLET | Refills: 0 | Status: SHIPPED | OUTPATIENT
Start: 2021-03-14 | End: 2021-07-27 | Stop reason: SDUPTHER

## 2021-03-15 DIAGNOSIS — E11.51 TYPE 2 DIABETES MELLITUS WITH DIABETIC PERIPHERAL ANGIOPATHY WITHOUT GANGRENE, WITHOUT LONG-TERM CURRENT USE OF INSULIN (HCC): Chronic | ICD-10-CM

## 2021-03-15 PROBLEM — I48.0 PAROXYSMAL ATRIAL FIBRILLATION: Chronic | Status: ACTIVE | Noted: 2017-02-27

## 2021-03-15 PROBLEM — I10 HTN (HYPERTENSION), BENIGN: Status: RESOLVED | Noted: 2017-10-23 | Resolved: 2021-03-15

## 2021-03-15 PROBLEM — I99.8 ISCHEMIA OF EXTREMITY: Status: RESOLVED | Noted: 2019-01-29 | Resolved: 2021-03-15

## 2021-03-15 PROBLEM — E66.01 MORBIDLY OBESE: Chronic | Status: ACTIVE | Noted: 2018-11-19

## 2021-03-15 PROBLEM — E66.812 CLASS 2 SEVERE OBESITY DUE TO EXCESS CALORIES WITH SERIOUS COMORBIDITY AND BODY MASS INDEX (BMI) OF 39.0 TO 39.9 IN ADULT: Chronic | Status: ACTIVE | Noted: 2018-11-19

## 2021-03-15 PROBLEM — Z79.4 TYPE 2 DIABETES MELLITUS WITHOUT COMPLICATION, WITH LONG-TERM CURRENT USE OF INSULIN (HCC): Status: ACTIVE | Noted: 2017-01-23

## 2021-03-15 PROBLEM — Z01.818 PRE-OP EVALUATION: Status: RESOLVED | Noted: 2019-01-17 | Resolved: 2021-03-15

## 2021-03-15 PROBLEM — I70.25 ATHEROSCLEROSIS OF LOWER EXTREMITY WITH ULCERATION (HCC): Status: RESOLVED | Noted: 2018-12-13 | Resolved: 2021-03-15

## 2021-03-15 PROBLEM — E11.9 TYPE 2 DIABETES MELLITUS WITHOUT COMPLICATION, WITH LONG-TERM CURRENT USE OF INSULIN (HCC): Status: ACTIVE | Noted: 2017-01-23

## 2021-03-15 PROBLEM — K21.9 GASTROESOPHAGEAL REFLUX DISEASE: Status: RESOLVED | Noted: 2017-10-23 | Resolved: 2021-03-15

## 2021-03-15 PROBLEM — I70.209 ATHEROSCLEROSIS OF NATIVE ARTERY OF EXTREMITY: Status: RESOLVED | Noted: 2018-12-10 | Resolved: 2021-03-15

## 2021-03-15 PROBLEM — E66.812 CLASS 2 SEVERE OBESITY DUE TO EXCESS CALORIES WITH SERIOUS COMORBIDITY AND BODY MASS INDEX (BMI) OF 39.0 TO 39.9 IN ADULT: Status: ACTIVE | Noted: 2018-11-19

## 2021-03-15 PROBLEM — I10 ESSENTIAL HYPERTENSION: Chronic | Status: ACTIVE | Noted: 2017-01-18

## 2021-03-15 LAB
ALBUMIN SERPL-MCNC: 3.8 G/DL (ref 3.5–5.2)
ALP BLD-CCNC: 80 U/L (ref 40–130)
ALT SERPL-CCNC: 16 U/L (ref 5–41)
ANION GAP SERPL CALCULATED.3IONS-SCNC: 9 MMOL/L (ref 7–19)
AST SERPL-CCNC: 20 U/L (ref 5–40)
BASOPHILS ABSOLUTE: 0 K/UL (ref 0–0.2)
BASOPHILS RELATIVE PERCENT: 0.6 % (ref 0–1)
BILIRUB SERPL-MCNC: <0.2 MG/DL (ref 0.2–1.2)
BUN BLDV-MCNC: 23 MG/DL (ref 8–23)
CALCIUM SERPL-MCNC: 9.3 MG/DL (ref 8.8–10.2)
CHLORIDE BLD-SCNC: 102 MMOL/L (ref 98–111)
CHOLESTEROL, TOTAL: 133 MG/DL (ref 160–199)
CO2: 29 MMOL/L (ref 22–29)
CREAT SERPL-MCNC: 1.6 MG/DL (ref 0.5–1.2)
EOSINOPHILS ABSOLUTE: 0.1 K/UL (ref 0–0.6)
EOSINOPHILS RELATIVE PERCENT: 2.4 % (ref 0–5)
GFR AFRICAN AMERICAN: 53
GFR NON-AFRICAN AMERICAN: 44
GLUCOSE BLD-MCNC: 257 MG/DL (ref 74–109)
HBA1C MFR BLD: 8.8 % (ref 4–6)
HCT VFR BLD CALC: 32.7 % (ref 42–52)
HDLC SERPL-MCNC: 47 MG/DL (ref 55–121)
HEMOGLOBIN: 10 G/DL (ref 14–18)
IMMATURE GRANULOCYTES #: 0 K/UL
LDL CHOLESTEROL CALCULATED: 66 MG/DL
LYMPHOCYTES ABSOLUTE: 1.6 K/UL (ref 1.1–4.5)
LYMPHOCYTES RELATIVE PERCENT: 30.4 % (ref 20–40)
MCH RBC QN AUTO: 26.5 PG (ref 27–31)
MCHC RBC AUTO-ENTMCNC: 30.6 G/DL (ref 33–37)
MCV RBC AUTO: 86.7 FL (ref 80–94)
MONOCYTES ABSOLUTE: 0.4 K/UL (ref 0–0.9)
MONOCYTES RELATIVE PERCENT: 6.8 % (ref 0–10)
NEUTROPHILS ABSOLUTE: 3.2 K/UL (ref 1.5–7.5)
NEUTROPHILS RELATIVE PERCENT: 59.4 % (ref 50–65)
PDW BLD-RTO: 16.2 % (ref 11.5–14.5)
PLATELET # BLD: 175 K/UL (ref 130–400)
PMV BLD AUTO: 12.9 FL (ref 9.4–12.4)
POTASSIUM SERPL-SCNC: 4.4 MMOL/L (ref 3.5–5)
RBC # BLD: 3.77 M/UL (ref 4.7–6.1)
SODIUM BLD-SCNC: 140 MMOL/L (ref 136–145)
TOTAL PROTEIN: 7.4 G/DL (ref 6.6–8.7)
TRIGL SERPL-MCNC: 99 MG/DL (ref 0–149)
WBC # BLD: 5.3 K/UL (ref 4.8–10.8)

## 2021-03-15 PROCEDURE — 93000 ELECTROCARDIOGRAM COMPLETE: CPT | Performed by: NURSE PRACTITIONER

## 2021-03-16 DIAGNOSIS — M79.18 ABDOMINAL MUSCLE PAIN: ICD-10-CM

## 2021-03-16 RX ORDER — CYCLOBENZAPRINE HCL 5 MG
TABLET ORAL
Qty: 90 TABLET | Refills: 0 | Status: SHIPPED | OUTPATIENT
Start: 2021-03-16 | End: 2021-03-17

## 2021-03-17 ENCOUNTER — TELEPHONE (OUTPATIENT)
Dept: PRIMARY CARE CLINIC | Age: 66
End: 2021-03-17

## 2021-03-17 DIAGNOSIS — E11.51 TYPE 2 DIABETES MELLITUS WITH DIABETIC PERIPHERAL ANGIOPATHY WITHOUT GANGRENE, WITHOUT LONG-TERM CURRENT USE OF INSULIN (HCC): Chronic | ICD-10-CM

## 2021-03-17 DIAGNOSIS — M79.18 ABDOMINAL MUSCLE PAIN: ICD-10-CM

## 2021-03-17 RX ORDER — DULAGLUTIDE 1.5 MG/.5ML
1.5 INJECTION, SOLUTION SUBCUTANEOUS WEEKLY
Qty: 4 PEN | Refills: 3 | Status: SHIPPED | OUTPATIENT
Start: 2021-03-17 | End: 2021-07-21 | Stop reason: SDUPTHER

## 2021-03-17 RX ORDER — CYCLOBENZAPRINE HCL 5 MG
TABLET ORAL
Qty: 90 TABLET | Refills: 0 | Status: SHIPPED | OUTPATIENT
Start: 2021-03-17 | End: 2021-05-19

## 2021-03-17 NOTE — TELEPHONE ENCOUNTER
----- Message from ALFONSO Fuentes sent at 3/17/2021 12:54 PM CDT -----  Increase Trulicity to 1.5 mg once weekly; monitor daily blood sugar and keep diary. Follow-up 3 months.

## 2021-03-17 NOTE — PATIENT INSTRUCTIONS
Diabetes Mellitus and Foot Care  Foot care is an important part of your health, especially when you have diabetes. Diabetes may cause you to have problems because of poor blood flow (circulation) to your feet and legs, which can cause your skin to:  · Become thinner and drier.  · Break more easily.  · Heal more slowly.  · Peel and crack.  You may also have nerve damage (neuropathy) in your legs and feet, causing decreased feeling in them. This means that you may not notice minor injuries to your feet that could lead to more serious problems. Noticing and addressing any potential problems early is the best way to prevent future foot problems.  How to care for your feet  Foot hygiene  · Wash your feet daily with warm water and mild soap. Do not use hot water. Then, pat your feet and the areas between your toes until they are completely dry. Do not soak your feet as this can dry your skin.  · Trim your toenails straight across. Do not dig under them or around the cuticle. File the edges of your nails with an emery board or nail file.  · Apply a moisturizing lotion or petroleum jelly to the skin on your feet and to dry, brittle toenails. Use lotion that does not contain alcohol and is unscented. Do not apply lotion between your toes.  Shoes and socks  · Wear clean socks or stockings every day. Make sure they are not too tight. Do not wear knee-high stockings since they may decrease blood flow to your legs.  · Wear shoes that fit properly and have enough cushioning. Always look in your shoes before you put them on to be sure there are no objects inside.  · To break in new shoes, wear them for just a few hours a day. This prevents injuries on your feet.  Wounds, scrapes, corns, and calluses  · Check your feet daily for blisters, cuts, bruises, sores, and redness. If you cannot see the bottom of your feet, use a mirror or ask someone for help.  · Do not cut corns or calluses or try to remove them with medicine.  · If you  find a minor scrape, cut, or break in the skin on your feet, keep it and the skin around it clean and dry. You may clean these areas with mild soap and water. Do not clean the area with peroxide, alcohol, or iodine.  · If you have a wound, scrape, corn, or callus on your foot, look at it several times a day to make sure it is healing and not infected. Check for:  ? Redness, swelling, or pain.  ? Fluid or blood.  ? Warmth.  ? Pus or a bad smell.  General instructions  · Do not cross your legs. This may decrease blood flow to your feet.  · Do not use heating pads or hot water bottles on your feet. They may burn your skin. If you have lost feeling in your feet or legs, you may not know this is happening until it is too late.  · Protect your feet from hot and cold by wearing shoes, such as at the beach or on hot pavement.  · Schedule a complete foot exam at least once a year (annually) or more often if you have foot problems. If you have foot problems, report any cuts, sores, or bruises to your health care provider immediately.  Contact a health care provider if:  · You have a medical condition that increases your risk of infection and you have any cuts, sores, or bruises on your feet.  · You have an injury that is not healing.  · You have redness on your legs or feet.  · You feel burning or tingling in your legs or feet.  · You have pain or cramps in your legs and feet.  · Your legs or feet are numb.  · Your feet always feel cold.  · You have pain around a toenail.  Get help right away if:  · You have a wound, scrape, corn, or callus on your foot and:  ? You have pain, swelling, or redness that gets worse.  ? You have fluid or blood coming from the wound, scrape, corn, or callus.  ? Your wound, scrape, corn, or callus feels warm to the touch.  ? You have pus or a bad smell coming from the wound, scrape, corn, or callus.  ? You have a fever.  ? You have a red line going up your leg.  Summary  · Check your feet every day  for cuts, sores, red spots, swelling, and blisters.  · Moisturize feet and legs daily.  · Wear shoes that fit properly and have enough cushioning.  · If you have foot problems, report any cuts, sores, or bruises to your health care provider immediately.  · Schedule a complete foot exam at least once a year (annually) or more often if you have foot problems.  This information is not intended to replace advice given to you by your health care provider. Make sure you discuss any questions you have with your health care provider.  Document Revised: 09/09/2020 Document Reviewed: 01/19/2018  [employer] Patient Education © 2021 [employer]      BMI for Adults  What is BMI?  Body mass index (BMI) is a number that is calculated from a person's weight and height. BMI can help estimate how much of a person's weight is composed of fat. BMI does not measure body fat directly. Rather, it is an alternative to procedures that directly measure body fat, which can be difficult and expensive.  BMI can help identify people who may be at higher risk for certain medical problems.  What are BMI measurements used for?  BMI is used as a screening tool to identify possible weight problems. It helps determine whether a person is obese, overweight, a healthy weight, or underweight.  BMI is useful for:  · Identifying a weight problem that may be related to a medical condition or may increase the risk for medical problems.  · Promoting changes, such as changes in diet and exercise, to help reach a healthy weight. BMI screening can be repeated to see if these changes are working.  How is BMI calculated?  BMI involves measuring your weight in relation to your height. Both height and weight are measured, and the BMI is calculated from those numbers. This can be done either in English ([place] or metric measurements. Note that charts and online BMI calculators are available to help you find your BMI quickly and easily without having to do these  "calculations yourself.  To calculate your BMI in English ([place] measurements:    1. Measure your weight in pounds (lb).  2. Multiply the number of pounds by 703.  ? For example, for a person who weighs 180 lb, multiply that number by 703, which equals 126,540.  3. Measure your height in inches. Then multiply that number by itself to get a measurement called \"inches squared.\"  ? For example, for a person who is 70 inches tall, the \"inches squared\" measurement is 70 inches x 70 inches, which equals 4,900 inches squared.  4. Divide the total from step 2 (number of lb x 703) by the total from step 3 (inches squared): 126,540 ÷ 4,900 = 25.8. This is your BMI.  To calculate your BMI in metric measurements:  1. Measure your weight in kilograms (kg).  2. Measure your height in meters (m). Then multiply that number by itself to get a measurement called \"meters squared.\"  ? For example, for a person who is 1.75 m tall, the \"meters squared\" measurement is 1.75 m x 1.75 m, which is equal to 3.1 meters squared.  3. Divide the number of kilograms (your weight) by the meters squared number. In this example: 70 ÷ 3.1 = 22.6. This is your BMI.  What do the results mean?  BMI charts are used to identify whether you are underweight, normal weight, overweight, or obese. The following guidelines will be used:  · Underweight: BMI less than 18.5.  · Normal weight: BMI between 18.5 and 24.9.  · Overweight: BMI between 25 and 29.9.  · Obese: BMI of 30 or above.  Keep these notes in mind:  · Weight includes both fat and muscle, so someone with a muscular build, such as an athlete, may have a BMI that is higher than 24.9. In cases like these, BMI is not an accurate measure of body fat.  · To determine if excess body fat is the cause of a BMI of 25 or higher, further assessments may need to be done by a health care provider.  · BMI is usually interpreted in the same way for men and women.  Where to find more information  For more information " about BMI, including tools to quickly calculate your BMI, go to these websites:  · Centers for Disease Control and Prevention: www.cdc.gov  · American Heart Association: www.heart.org  · National Heart, Lung, and Blood [place]: www.nhlbi.nih.gov  Summary  · Body mass index (BMI) is a number that is calculated from a person's weight and height.  · BMI may help estimate how much of a person's weight is composed of fat. BMI can help identify those who may be at higher risk for certain medical problems.  · BMI can be measured using English measurements or metric measurements.  · BMI charts are used to identify whether you are underweight, normal weight, overweight, or obese.  This information is not intended to replace advice given to you by your health care provider. Make sure you discuss any questions you have with your health care provider.  Document Revised: 09/09/2020 Document Reviewed: 07/17/2020  [employer] Patient Education © 2021 [employer]

## 2021-03-17 NOTE — TELEPHONE ENCOUNTER
Attempted to call patient with results, he was unable to take notes at the time and will call back to discuss. Also give results on MRN: [id number] due to Nalini Stern being the legal guardian.

## 2021-03-22 PROBLEM — J38.00 VOCAL CORD PARALYSIS: Status: ACTIVE | Noted: 2021-03-22

## 2021-03-22 PROBLEM — G47.00 INSOMNIA: Status: ACTIVE | Noted: 2021-03-22

## 2021-03-22 PROBLEM — K21.9 GASTROESOPHAGEAL REFLUX DISEASE WITHOUT ESOPHAGITIS: Status: ACTIVE | Noted: 2017-01-23

## 2021-03-22 PROBLEM — I99.8 ISCHEMIA OF EXTREMITY: Status: ACTIVE | Noted: 2019-01-29

## 2021-03-22 PROBLEM — N52.9 IMPOTENCE: Status: ACTIVE | Noted: 2021-03-22

## 2021-03-22 PROBLEM — Z86.0100 HX OF COLONIC POLYPS: Status: ACTIVE | Noted: 2017-10-23

## 2021-03-22 PROBLEM — E78.5 OTHER AND UNSPECIFIED HYPERLIPIDEMIA: Status: ACTIVE | Noted: 2021-03-22

## 2021-03-22 PROBLEM — Z86.31 H/O DIABETIC FOOT ULCER: Status: ACTIVE | Noted: 2019-09-23

## 2021-03-22 PROBLEM — D64.9 ANEMIA: Status: ACTIVE | Noted: 2021-03-22

## 2021-03-22 PROBLEM — T40.2X5A CONSTIPATION DUE TO OPIOID THERAPY: Status: ACTIVE | Noted: 2017-01-23

## 2021-03-22 PROBLEM — J43.1 PANLOBULAR EMPHYSEMA (HCC): Status: ACTIVE | Noted: 2017-01-23

## 2021-03-22 PROBLEM — K59.03 CONSTIPATION DUE TO OPIOID THERAPY: Status: ACTIVE | Noted: 2017-01-23

## 2021-03-22 PROBLEM — I70.209 ATHEROSCLEROSIS OF ARTERIES OF EXTREMITIES (HCC): Status: ACTIVE | Noted: 2018-12-10

## 2021-03-22 PROBLEM — Z86.010 HX OF COLONIC POLYPS: Status: ACTIVE | Noted: 2017-10-23

## 2021-03-22 PROBLEM — E11.40 DIABETIC NEUROPATHY (HCC): Status: ACTIVE | Noted: 2021-03-22

## 2021-03-23 ENCOUNTER — OFFICE VISIT (OUTPATIENT)
Dept: PRIMARY CARE CLINIC | Age: 66
End: 2021-03-23
Payer: MEDICARE

## 2021-03-23 VITALS
HEART RATE: 86 BPM | HEIGHT: 67 IN | SYSTOLIC BLOOD PRESSURE: 136 MMHG | DIASTOLIC BLOOD PRESSURE: 84 MMHG | RESPIRATION RATE: 20 BRPM | WEIGHT: 250 LBS | OXYGEN SATURATION: 95 % | BODY MASS INDEX: 39.24 KG/M2 | TEMPERATURE: 96.7 F

## 2021-03-23 DIAGNOSIS — J44.9 CHRONIC OBSTRUCTIVE PULMONARY DISEASE, UNSPECIFIED COPD TYPE (HCC): ICD-10-CM

## 2021-03-23 DIAGNOSIS — N18.32 STAGE 3B CHRONIC KIDNEY DISEASE (HCC): Chronic | ICD-10-CM

## 2021-03-23 DIAGNOSIS — E11.51 TYPE 2 DIABETES MELLITUS WITH DIABETIC PERIPHERAL ANGIOPATHY WITHOUT GANGRENE, WITHOUT LONG-TERM CURRENT USE OF INSULIN (HCC): Chronic | ICD-10-CM

## 2021-03-23 DIAGNOSIS — Z00.00 ROUTINE GENERAL MEDICAL EXAMINATION AT A HEALTH CARE FACILITY: ICD-10-CM

## 2021-03-23 DIAGNOSIS — I10 ESSENTIAL HYPERTENSION: Chronic | ICD-10-CM

## 2021-03-23 DIAGNOSIS — E66.01 MORBIDLY OBESE (HCC): ICD-10-CM

## 2021-03-23 DIAGNOSIS — Z12.11 COLON CANCER SCREENING: Primary | ICD-10-CM

## 2021-03-23 PROCEDURE — 2022F DILAT RTA XM EVC RTNOPTHY: CPT | Performed by: NURSE PRACTITIONER

## 2021-03-23 PROCEDURE — 3017F COLORECTAL CA SCREEN DOC REV: CPT | Performed by: NURSE PRACTITIONER

## 2021-03-23 PROCEDURE — G8482 FLU IMMUNIZE ORDER/ADMIN: HCPCS | Performed by: NURSE PRACTITIONER

## 2021-03-23 PROCEDURE — 1036F TOBACCO NON-USER: CPT | Performed by: NURSE PRACTITIONER

## 2021-03-23 PROCEDURE — 3052F HG A1C>EQUAL 8.0%<EQUAL 9.0%: CPT | Performed by: NURSE PRACTITIONER

## 2021-03-23 PROCEDURE — 99213 OFFICE O/P EST LOW 20 MIN: CPT | Performed by: NURSE PRACTITIONER

## 2021-03-23 PROCEDURE — G8427 DOCREV CUR MEDS BY ELIG CLIN: HCPCS | Performed by: NURSE PRACTITIONER

## 2021-03-23 PROCEDURE — G0439 PPPS, SUBSEQ VISIT: HCPCS | Performed by: NURSE PRACTITIONER

## 2021-03-23 PROCEDURE — G8926 SPIRO NO PERF OR DOC: HCPCS | Performed by: NURSE PRACTITIONER

## 2021-03-23 PROCEDURE — 3023F SPIROM DOC REV: CPT | Performed by: NURSE PRACTITIONER

## 2021-03-23 PROCEDURE — G8417 CALC BMI ABV UP PARAM F/U: HCPCS | Performed by: NURSE PRACTITIONER

## 2021-03-23 RX ORDER — INSULIN GLARGINE 300 U/ML
20 INJECTION, SOLUTION SUBCUTANEOUS NIGHTLY
Qty: 4 PEN | Refills: 5 | Status: SHIPPED | OUTPATIENT
Start: 2021-03-23 | End: 2021-03-30

## 2021-03-23 ASSESSMENT — ENCOUNTER SYMPTOMS
COLOR CHANGE: 0
SHORTNESS OF BREATH: 0
BACK PAIN: 0
NAUSEA: 0
VOICE CHANGE: 0
RHINORRHEA: 0
PHOTOPHOBIA: 0
VOMITING: 0
COUGH: 0

## 2021-03-23 ASSESSMENT — LIFESTYLE VARIABLES: HOW OFTEN DO YOU HAVE A DRINK CONTAINING ALCOHOL: 0

## 2021-03-23 ASSESSMENT — PATIENT HEALTH QUESTIONNAIRE - PHQ9
2. FEELING DOWN, DEPRESSED OR HOPELESS: 0
SUM OF ALL RESPONSES TO PHQ QUESTIONS 1-9: 0

## 2021-03-23 NOTE — PROGRESS NOTES
67 03/15/2021    LDLCALC 77 09/28/2020    LDLCALC 79 04/25/2018     No results found for: LABVLDL, VLDL  No results found for: [facility]  Lab Results   Component Value Date     03/15/2021    K 4.4 03/15/2021     03/15/2021    CO2 29 03/15/2021    BUN 23 03/15/2021    CREATININE 1.6 (H) 03/15/2021    GLUCOSE 257 (H) 03/15/2021    CALCIUM 9.3 03/15/2021    PROT 7.4 03/15/2021    LABALBU 3.8 03/15/2021    BILITOT <0.2 03/15/2021    ALKPHOS 80 03/15/2021    AST 20 03/15/2021    ALT 16 03/15/2021    LABGLOM 44 (A) 03/15/2021    GFRAA 53 (L) 03/15/2021    GLOB 4.1 09/23/2016         Past Medical History:   Diagnosis Date    Arthritis     BiPAP (biphasic positive airway pressure) dependence     8cm to 20cm    Chronic back pain     Emphysema/COPD (HCC)     GERD (gastroesophageal reflux disease)     History of anemia     History of blood transfusion     Hyperlipidemia     Hypertension     Impotence 3/22/2021    Neuropathy     Obstructive sleep apnea     AHI:  95.8 per PSG, 3/2017; repeat PSG, 11/2017 revealed an AHI of 65.5    Peripheral vascular disease (HCC)     S/P angioplasty     Type II or unspecified type diabetes mellitus without mention of complication, not stated as uncontrolled       Past Surgical History:   Procedure Laterality Date    CHOLECYSTECTOMY      LARYNGOSCOPY N/A 11/10/2017    Direct laryngoscopy with assessment of hypopharynx, larynx, and post-cricoid areas performed by Haily Tafoya MD at [facility] N/A 5/25/2018    MICRO DIRECT LARYNGOSCOPY WITH BIOPSY performed by Haily Tafoya MD at [address]      x 2 in past, around [address] 3/23/2021 1/6/2021 10/20/2020 10/8/2020 8/3/2020 8/76/6743   SYSTOLIC 965 854 323 944 - 224   DIASTOLIC 84 62 62 82 - 68   Pulse 86 78 94 88 94 93   Temp 96.7 97.3 97.4 97.3 96.5 97.2   Resp 20 - 20 18 - -   SpO2 95 96 98 97 96 94   Weight 250 lb 251 lb 6.4 oz 251 lb 251 lb - 243 lb   Height 5' 7\" 5' 7.5\" 5' 7.5\" 5' 7.5\" - 5' 7\"   Body mass index 39.15 kg/m2 38.79 kg/m2 38.73 kg/m2 38.73 kg/m2 - 38.06 kg/m2   Some recent data might be hidden       Family History   Problem Relation Age of Onset    Diabetes Father     Heart Disease Maternal Grandmother        Social History     Tobacco Use    Smoking status: Former Smoker     Packs/day: 0.25     Years: 20.00     Pack years: 5.00     Types: Cigarettes     Quit date: 2017     Years since quittin.2    Smokeless tobacco: Never Used   Substance Use Topics    Alcohol use: No      Current Outpatient Medications   Medication Sig Dispense Refill    Insulin Glargine, 1 Unit Dial, (TOUJEO SOLOSTAR) 300 UNIT/ML SOPN Inject 20 Units into the skin nightly 4 pen 5    cyclobenzaprine (FLEXERIL) 5 MG tablet Take 1 tablet by mouth three times daily as needed for muscle spasm 90 tablet 0    Dulaglutide (TRULICITY) 1.5 JH/0.5HW SOPN Inject 1.5 mg into the skin once a week 4 pen 3    hydroCHLOROthiazide (HYDRODIURIL) 25 MG tablet Take 1 tablet by mouth once daily 90 tablet 0    lisinopril (PRINIVIL;ZESTRIL) 40 MG tablet Take 1 tablet by mouth once daily 90 tablet 0    albuterol sulfate HFA (VENTOLIN HFA) 108 (90 Base) MCG/ACT inhaler Inhale 2 puffs into the lungs 4 times daily as needed for Wheezing 1 Inhaler 5    pantoprazole (PROTONIX) 40 MG tablet Take 1 tablet by mouth every morning (before breakfast) 30 tablet 5    amLODIPine (NORVASC) 10 MG tablet Take 1 tablet by mouth once daily 90 tablet 0    atorvastatin (LIPITOR) 40 MG tablet Take 1 tablet by mouth once daily 90 tablet 3    labetalol (NORMODYNE) 100 MG tablet Take 1 tablet by mouth twice daily 180 tablet 1    Diabetic Shoe MISC by Does not apply route DISPENSE ONE PAIR DIABETIC SHOES AND THREE PAIRS OF HEAT MOLDED INSERTS 1 each 0    blood glucose monitor kit and supplies Test 4 times a day & as needed for symptoms of irregular blood glucose.  1 kit 0    cilostazol (PLETAL) 50 MG tablet Take 1 tablet by mouth 2 times daily 60 tablet 3    blood glucose test strips (GNP EASY TOUCH GLUCOSE TEST) strip TEST 3 TIMES DAILY Dx E11.49 300 strip 0    Insulin Pen Needle 31G X 6 MM MISC 1 each by Does not apply route 4 times daily (before meals and nightly) May substitute to generic for insurance Dx E11.49 120 each 3    insulin aspart (NOVOLOG FLEXPEN) 100 UNIT/ML injection pen Inject 25-37 Units into the skin 3 times daily (before meals) 20 units breakfast, 30 units lunch, 30 units dinner + sliding scale each meal (Total dose is 20-32 breakfast, 30-42 lunch and dinner) 5 pen 3    Diabetic Shoe MISC by Does not apply route 1 each 0    HYDROcodone-acetaminophen (NORCO)  MG per tablet TK 1 T PO Q 6 H PRN FOR PAIN  0    Insulin Syringe-Needle U-100 30G X 1/2\" 0.5 ML MISC 1 each by Does not apply route 3 times daily 100 each 3    Lancets MISC Daily Accu-Chek 100 each 3    aspirin 325 MG tablet Take 81 mg by mouth daily      senna-docusate (PERICOLACE) 8.6-50 MG per tablet Take 2 tablets by mouth daily as needed for Constipation (Patient not taking: Reported on 1/6/2021) 30 tablet 0    nicotine polacrilex (NICORETTE) 4 MG gum Take 1 each by mouth as needed for Smoking cessation (Patient not taking: Reported on 1/6/2021) 110 each 1    gabapentin (NEURONTIN) 300 MG capsule Take 900 mg by mouth 3 times daily. .       No current facility-administered medications for this visit.       No Known Allergies    Health Maintenance   Topic Date Due    Diabetic retinal exam  Never done    COVID-19 Vaccine (1) Never done    DTaP/Tdap/Td vaccine (1 - Tdap) Never done    Colon cancer screen colonoscopy  04/19/2019    Annual Wellness Visit (AWV)  Never done    Diabetic foot exam  03/16/2021    Shingles Vaccine (1 of 2) 10/20/2021 (Originally 3/26/2006)    A1C test (Diabetic or Prediabetic)  03/15/2022    Lipid screen  03/15/2022    Potassium monitoring  03/15/2022    Creatinine monitoring  03/15/2022    Flu vaccine  Completed    Pneumococcal 0-64 years Vaccine  Completed    Hepatitis C screen  Completed    HIV screen  Completed    Hepatitis A vaccine  Aged Out    Hib vaccine  Aged Out    Meningococcal (ACWY) vaccine  Aged Out       Subjective:      Review of Systems   Constitutional: Negative for chills and fever. HENT: Negative for ear pain, hearing loss, rhinorrhea and voice change. Eyes: Negative for photophobia and visual disturbance. Respiratory: Negative for cough and shortness of breath. Cardiovascular: Negative for chest pain and palpitations. Gastrointestinal: Negative for nausea and vomiting. Endocrine: Negative. Negative for cold intolerance and heat intolerance. Genitourinary: Negative for difficulty urinating and flank pain. Musculoskeletal: Negative for back pain and neck pain. Skin: Negative for color change and rash. Allergic/Immunologic: Negative for environmental allergies and food allergies. Neurological: Negative for dizziness, speech difficulty and headaches. Hematological: Does not bruise/bleed easily. Psychiatric/Behavioral: Negative for sleep disturbance and suicidal ideas. Objective:     Physical Exam  Vitals signs and nursing note reviewed. Constitutional:       Appearance: He is well-developed. HENT:      Head: Atraumatic. Right Ear: External ear normal.      Left Ear: External ear normal.      Nose: Nose normal.   Eyes:      Conjunctiva/sclera: Conjunctivae normal.      Pupils: Pupils are equal, round, and reactive to light. Neck:      Musculoskeletal: Normal range of motion and neck supple. Cardiovascular:      Rate and Rhythm: Normal rate and regular rhythm. Heart sounds: Normal heart sounds, S1 normal and S2 normal.   Pulmonary:      Effort: Pulmonary effort is normal.      Breath sounds: Normal breath sounds. Abdominal:      General: Bowel sounds are normal.      Palpations: Abdomen is soft. Musculoskeletal: Normal range of motion. Skin:     General: Skin is warm and dry. Neurological:      Mental Status: He is alert and oriented to person, place, and time. Psychiatric:         Behavior: Behavior normal.       /84   Pulse 86   Temp 96.7 °F (35.9 °C) (Temporal)   Resp 20   Ht 5' 7\" (1.702 m)   Wt 250 lb (113.4 kg)   SpO2 95%   BMI 39.16 kg/m²     Assessment:       Diagnosis Orders   1. Colon cancer screening  Cologuard (For External Results Only)   2. Routine general medical examination at a health care facility     3. Type 2 diabetes mellitus with diabetic peripheral angiopathy without gangrene, without long-term current use of insulin (HCC)     4. Stage 3b chronic kidney disease     5. Essential hypertension     6. Chronic obstructive pulmonary disease, unspecified COPD type ([facility] [address] [address]     7. Morbidly obese ([facility] [address]           Plan: Will try Toujeo to see if this is covered; he is to let us know if unable to get this so we can try a different insulin. Needs follow-up with Dr Deepti Yoder. Cologuasusi ordered. Patient given educational materials -see patient instructions. Discussed use, benefit, and side effects of prescribed medications. All patient questions answered. Pt voiced understanding. Reviewed health maintenance. Instructed to continue currentmedications, diet and exercise. Patient agreed with treatment plan. Follow up as directed. MEDICATIONS:  Orders Placed This Encounter   Medications    Insulin Glargine, 1 Unit Dial, (TOUJEO SOLOSTAR) 300 UNIT/ML SOPN     Sig: Inject 20 Units into the skin nightly     Dispense:  4 pen     Refill:  5         ORDERS:  Orders Placed This Encounter   Procedures    Cologuasusi (For External Results Only)       Follow-up:  Return in 3 months (on 6/23/2021). PATIENT INSTRUCTIONS:  Patient Instructions   We are committed to providing you with the best care possible.    In order to help us achieve these goals please remember to bring all medications, herbal products, and over the counter supplements with you to each visit. If your provider has ordered testing for you, please be sure to follow up with our office if you have not received results within 7 days after the testing took place. *If you receive a survey after visiting one of our offices, please take time to share your experience concerning your physician office visit. These surveys are confidential and no health information about you is shared. We are eager to improve for you and we are counting on your feedback to help make that happen. Personalized Preventive Plan for Katey Helm - 3/23/2021  Medicare offers a range of preventive health benefits. Some of the tests and screenings are paid in full while other may be subject to a deductible, co-insurance, and/or copay. Some of these benefits include a comprehensive review of your medical history including lifestyle, illnesses that may run in your family, and various assessments and screenings as appropriate. After reviewing your medical record and screening and assessments performed today your provider may have ordered immunizations, labs, imaging, and/or referrals for you. A list of these orders (if applicable) as well as your Preventive Care list are included within your After Visit Summary for your review. Other Preventive Recommendations:    · A preventive eye exam performed by an eye specialist is recommended every 1-2 years to screen for glaucoma; cataracts, macular degeneration, and other eye disorders. · A preventive dental visit is recommended every 6 months. · Try to get at least 150 minutes of exercise per week or 10,000 steps per day on a pedometer . · Order or download the FREE \"Exercise & Physical Activity: Your Everyday Guide\" from [employer]. Call 0-864.649.5283 or search [employer] online.   · You need 1936-1392 mg of calcium and 4197-2773 IU of vitamin

## 2021-03-23 NOTE — PROGRESS NOTES
cilostazol (PLETAL) 50 MG tablet Take 1 tablet by mouth 2 times daily Yes Denise Leal MD   blood glucose test strips (GNP EASY TOUCH GLUCOSE TEST) strip TEST 3 TIMES DAILY Dx E11.49 Yes Denise Leal MD   Insulin Pen Needle 31G X 6 MM MISC 1 each by Does not apply route 4 times daily (before meals and nightly) May substitute to generic for insurance Dx E11.49 Yes Denise Leal MD   insulin aspart (NOVOLOG FLEXPEN) 100 UNIT/ML injection pen Inject 25-37 Units into the skin 3 times daily (before meals) 20 units breakfast, 30 units lunch, 30 units dinner + sliding scale each meal (Total dose is 20-32 breakfast, 30-42 lunch and dinner) Yes Denise Leal MD   Diabetic Shoe MISC by Does not apply route Yes Denise Leal MD   HYDROcodone-acetaminophen (NORCO)  MG per tablet TK 1 T PO Q 6 H PRN FOR PAIN Yes Historical Provider, MD   Insulin Syringe-Needle U-100 30G X 1/2\" 0.5 ML MISC 1 each by Does not apply route 3 times daily Yes Denise Leal MD   Lancets MISC Daily Accu-Chek Yes Denise Leal MD   aspirin 325 MG tablet Take 81 mg by mouth daily Yes Historical Provider, MD   insulin glargine (LANTUS SOLOSTAR) 100 UNIT/ML injection pen Inject 20 Units into the skin nightly  Patient not taking: Reported on 2/85/4622  ALFONSO Weiner   senna-docusate (PERICOLACE) 8.6-50 MG per tablet Take 2 tablets by mouth daily as needed for Constipation  Patient not taking: Reported on 1/6/2021  ALFONSO Mckay   nicotine polacrilex (NICORETTE) 4 MG gum Take 1 each by mouth as needed for Smoking cessation  Patient not taking: Reported on 1/6/2021  Denise Leal MD   gabapentin (NEURONTIN) 300 MG capsule Take 900 mg by mouth 3 times daily. J Luis Mendoza   Historical Provider, MD       Past Medical History:   Diagnosis Date    Arthritis     BiPAP (biphasic positive airway pressure) dependence     8cm to 20cm    Chronic back pain     Emphysema/COPD (HCC)     GERD (gastroesophageal reflux disease)     History of anemia  History of blood transfusion     Hyperlipidemia     Hypertension     Impotence 3/22/2021    Neuropathy     Obstructive sleep apnea     AHI:  95.8 per PSG, 3/2017; repeat PSG, 11/2017 revealed an AHI of 65.5    Peripheral vascular disease ([address]     S/P angioplasty     Type II or unspecified type diabetes mellitus without mention of complication, not stated as uncontrolled        Past Surgical History:   Procedure Laterality Date    CHOLECYSTECTOMY      LARYNGOSCOPY N/A 11/10/2017    Direct laryngoscopy with assessment of hypopharynx, larynx, and post-cricoid areas performed by Josiane Mcadams MD at [facility] N/A 5/25/2018    MICRO DIRECT LARYNGOSCOPY WITH BIOPSY performed by Josiane Mcadams MD at [address]      x 2 in past, around 1989       Family History   Problem Relation Age of Onset    Diabetes Father     Heart Disease Maternal Grandmother        CareTeam (Including outside providers/suppliers regularly involved in providing care):   Patient Care Team:  Marito Chambers MD as PCP - General (Family Medicine)  Marito Chambers MD as PCP - [facility] Empaneled Provider  Tesfaye Trejo MD (Gastroenterology)  AHSLEY Denis as Physician Assistant (Neurology)  Josiane Mcadams MD as Consulting Physician (Otolaryngology)    Wt Readings from Last 3 Encounters:   03/23/21 250 lb (113.4 kg)   01/06/21 251 lb 6.4 oz (114 kg)   10/20/20 251 lb (113.9 kg)     Vitals:    03/23/21 1050   BP: 136/84   Pulse: 86   Resp: 20   Temp: 96.7 °F (35.9 °C)   TempSrc: Temporal   SpO2: 95%   Weight: 250 lb (113.4 kg)   Height: 5' 7\" (1.702 m)     Body mass index is 39.16 kg/m². Based upon direct observation of the patient, evaluation of cognition reveals recent and remote memory intact.     General Appearance: alert and oriented to person, place and time, well developed and well- nourished, in no acute distress  Skin: warm and dry, no rash or erythema  Head: normocephalic and atraumatic  Eyes: pupils equal, round, and reactive to light, extraocular eye movements intact, conjunctivae normal  ENT: tympanic membrane, external ear and ear canal normal bilaterally, nose without deformity, nasal mucosa and turbinates normal without polyps  Neck: supple and non-tender without mass, no thyromegaly or thyroid nodules, no cervical lymphadenopathy  Pulmonary/Chest: clear to auscultation bilaterally- no wheezes, rales or rhonchi, normal air movement, no respiratory distress  Cardiovascular: normal rate, regular rhythm, normal S1 and S2, no murmurs, rubs, clicks, or gallops, distal pulses intact, no carotid bruits  Abdomen: soft, non-tender, non-distended, normal bowel sounds, no masses or organomegaly  Extremities: no cyanosis, clubbing or edema  Musculoskeletal: normal range of motion, no joint swelling, deformity or tenderness  Neurologic: reflexes normal and symmetric, no cranial nerve deficit, gait, coordination and speech normal    Patient's complete Health Risk Assessment and screening values have been reviewed and are found in Flowsheets. The following problems were reviewed today and where indicated follow up appointments were made and/or referrals ordered. Positive Risk Factor Screenings with Interventions:     Fall Risk:  Timed Up and Go Test > 12 seconds? (Complete if either Fall Risk answers are Yes): (!) yes  2 or more falls in past year?: (!) yes  Fall with injury in past year?: no  Fall Risk Interventions:    · Home safety tips provided        General Health and ACP:  General  In general, how would you say your health is?: Good  In the past 7 days, have you experienced any of the following?  New or Increased Pain, New or Increased Fatigue, Loneliness, Social Isolation, Stress or Anger?: None of These  Do you get the social and emotional support that you need?: Yes  Do you have a Living Will?: Yes  Advance Directives     Power of  Living Will ACP-Advance Directive ACP-Power of     Not on File Not on File Not on File Not on File      General Health Risk Interventions:  · n/a    Health Habits/Nutrition:  Health Habits/Nutrition  Do you exercise for at least 20 minutes 2-3 times per week?: Yes  Have you lost any weight without trying in the past 3 months?: No  Do you eat only one meal per day?: No  Have you seen the dentist within the past year?: (!) No  Body mass index: (!) 39.15  Health Habits/Nutrition Interventions:  · Dental exam overdue:  patient encouraged to make appointment with his/her dentist       Personalized Preventive Plan   Current Health Maintenance Status  Immunization History   Administered Date(s) Administered    Influenza, Quadv, IM, PF (6 mo and older Fluzone, Flulaval, Fluarix, and 3 yrs and older Afluria) 11/05/2018, 09/12/2019, 09/28/2020    Pneumococcal Polysaccharide ([id number]) 05/01/2017        Health Maintenance   Topic Date Due    Diabetic retinal exam  Never done    COVID-19 Vaccine (1) Never done    DTaP/Tdap/Td vaccine (1 - Tdap) Never done    Colon cancer screen colonoscopy  04/19/2019    Annual Wellness Visit (AWV)  Never done    Diabetic foot exam  03/16/2021    Shingles Vaccine (1 of 2) 10/20/2021 (Originally 3/26/2006)    A1C test (Diabetic or Prediabetic)  03/15/2022    Lipid screen  03/15/2022    Potassium monitoring  03/15/2022    Creatinine monitoring  03/15/2022    Flu vaccine  Completed    Pneumococcal 0-64 years Vaccine  Completed    Hepatitis C screen  Completed    HIV screen  Completed    Hepatitis A vaccine  Aged Out    Hib vaccine  Aged Out    Meningococcal (ACWY) vaccine  Aged Out     Recommendations for [employer] Due: see orders and patient instructions/AVS.  . Recommended screening schedule for the next 5-10 years is provided to the patient in written form: see Patient Instructions/AVS.    Vivek Gong was seen today for medicare awv.     Diagnoses and all orders for this visit:    Colon cancer screening

## 2021-03-23 NOTE — PATIENT INSTRUCTIONS
We are committed to providing you with the best care possible. In order to help us achieve these goals please remember to bring all medications, herbal products, and over the counter supplements with you to each visit. If your provider has ordered testing for you, please be sure to follow up with our office if you have not received results within 7 days after the testing took place. *If you receive a survey after visiting one of our offices, please take time to share your experience concerning your physician office visit. These surveys are confidential and no health information about you is shared. We are eager to improve for you and we are counting on your feedback to help make that happen. Personalized Preventive Plan for Yuli Anger - 3/23/2021  Medicare offers a range of preventive health benefits. Some of the tests and screenings are paid in full while other may be subject to a deductible, co-insurance, and/or copay. Some of these benefits include a comprehensive review of your medical history including lifestyle, illnesses that may run in your family, and various assessments and screenings as appropriate. After reviewing your medical record and screening and assessments performed today your provider may have ordered immunizations, labs, imaging, and/or referrals for you. A list of these orders (if applicable) as well as your Preventive Care list are included within your After Visit Summary for your review. Other Preventive Recommendations:    · A preventive eye exam performed by an eye specialist is recommended every 1-2 years to screen for glaucoma; cataracts, macular degeneration, and other eye disorders. · A preventive dental visit is recommended every 6 months. · Try to get at least 150 minutes of exercise per week or 10,000 steps per day on a pedometer . · Order or download the FREE \"Exercise & Physical Activity: Your Everyday Guide\" from [employer]. Call 3-937.137.8096 or search [employer] online. · You need 3561-7633 mg of calcium and 0355-9982 IU of vitamin D per day. It is possible to meet your calcium requirement with diet alone, but a vitamin D supplement is usually necessary to meet this goal.  · When exposed to the sun, use a sunscreen that protects against both UVA and UVB radiation with an SPF of 30 or greater. Reapply every 2 to 3 hours or after sweating, drying off with a towel, or swimming. · Always wear a seat belt when traveling in a car. Always wear a helmet when riding a bicycle or motorcycle.

## 2021-03-29 ENCOUNTER — HOSPITAL ENCOUNTER (OUTPATIENT)
Dept: ULTRASOUND IMAGING | Age: 66
Discharge: HOME | End: 2021-03-29
Payer: MEDICARE

## 2021-03-29 DIAGNOSIS — N18.31 CHRONIC KIDNEY DISEASE, STAGE 3A (HCC): ICD-10-CM

## 2021-03-29 PROCEDURE — 76770 US EXAM ABDO BACK WALL COMP: CPT

## 2021-03-30 ENCOUNTER — TELEPHONE (OUTPATIENT)
Dept: PRIMARY CARE CLINIC | Age: 66
End: 2021-03-30

## 2021-03-30 RX ORDER — INSULIN GLARGINE 100 [IU]/ML
20 INJECTION, SOLUTION SUBCUTANEOUS NIGHTLY
Qty: 5 PEN | Refills: 3 | Status: SHIPPED | OUTPATIENT
Start: 2021-03-30 | End: 2021-07-13 | Stop reason: SDUPTHER

## 2021-03-30 NOTE — TELEPHONE ENCOUNTER
Patient called and reports that Toujeo is too expensive and can't afford it.    Called pharmacy and copay is 126 dollars and they do not know what would be cheaper

## 2021-03-31 DIAGNOSIS — I10 ESSENTIAL HYPERTENSION: ICD-10-CM

## 2021-03-31 RX ORDER — AMLODIPINE BESYLATE 10 MG/1
TABLET ORAL
Qty: 90 TABLET | Refills: 1 | Status: SHIPPED | OUTPATIENT
Start: 2021-03-31 | End: 2021-07-27 | Stop reason: SDUPTHER

## 2021-03-31 NOTE — TELEPHONE ENCOUNTER
Gabrielle Diaz called to request a refill on his medication.       Last office visit : 3/23/2021   Next office visit : 6/30/2021     Requested Prescriptions     Signed Prescriptions Disp Refills    amLODIPine (NORVASC) 10 MG tablet 90 tablet 1     Sig: Take 1 tablet by mouth once daily     Authorizing Provider: Seda Nation     Ordering User: Austin Krishnamurthy MA

## 2021-04-11 ENCOUNTER — IMMUNIZATION (OUTPATIENT)
Age: 66
End: 2021-04-11
Payer: MEDICARE

## 2021-04-11 PROCEDURE — 0001A COVID-19, PFIZER VACCINE 30MCG/0.3ML DOSE: CPT | Performed by: FAMILY MEDICINE

## 2021-04-11 PROCEDURE — 91300 COVID-19, PFIZER VACCINE 30MCG/0.3ML DOSE: CPT | Performed by: FAMILY MEDICINE

## 2021-05-02 ENCOUNTER — IMMUNIZATION (OUTPATIENT)
Age: 66
End: 2021-05-02
Payer: MEDICARE

## 2021-05-02 PROCEDURE — 0002A PR IMM ADMN SARSCOV2 30MCG/0.3ML DIL RECON 2ND DOSE: CPT | Performed by: FAMILY MEDICINE

## 2021-05-02 PROCEDURE — 91300 COVID-19, PFIZER VACCINE 30MCG/0.3ML DOSE: CPT | Performed by: FAMILY MEDICINE

## 2021-05-14 ENCOUNTER — HOSPITAL ENCOUNTER (OUTPATIENT)
Dept: ULTRASOUND IMAGING | Facility: HOSPITAL | Age: 66
Discharge: HOME | End: 2021-05-14
Admitting: SURGERY

## 2021-05-14 ENCOUNTER — OFFICE VISIT (OUTPATIENT)
Dept: VASCULAR SURGERY | Facility: CLINIC | Age: 66
End: 2021-05-14

## 2021-05-14 VITALS
HEART RATE: 99 BPM | DIASTOLIC BLOOD PRESSURE: 62 MMHG | OXYGEN SATURATION: 95 % | BODY MASS INDEX: 37.83 KG/M2 | HEIGHT: 67 IN | SYSTOLIC BLOOD PRESSURE: 130 MMHG | WEIGHT: 241 LBS

## 2021-05-14 DIAGNOSIS — I70.219 ATHEROSCLEROSIS OF ARTERY OF EXTREMITY WITH INTERMITTENT CLAUDICATION (HCC): ICD-10-CM

## 2021-05-14 DIAGNOSIS — E78.2 MIXED HYPERLIPIDEMIA: ICD-10-CM

## 2021-05-14 DIAGNOSIS — I10 ESSENTIAL HYPERTENSION: Chronic | ICD-10-CM

## 2021-05-14 DIAGNOSIS — I70.213 ATHEROSCLEROSIS OF NATIVE ARTERY OF BOTH LOWER EXTREMITIES WITH INTERMITTENT CLAUDICATION (HCC): ICD-10-CM

## 2021-05-14 DIAGNOSIS — I73.9 PERIPHERAL ARTERIAL DISEASE WITH HISTORY OF REVASCULARIZATION (HCC): Primary | ICD-10-CM

## 2021-05-14 DIAGNOSIS — Z98.890 PERIPHERAL ARTERIAL DISEASE WITH HISTORY OF REVASCULARIZATION (HCC): Primary | ICD-10-CM

## 2021-05-14 DIAGNOSIS — E11.9 TYPE 2 DIABETES MELLITUS WITHOUT COMPLICATION, WITH LONG-TERM CURRENT USE OF INSULIN (HCC): ICD-10-CM

## 2021-05-14 DIAGNOSIS — Z79.4 TYPE 2 DIABETES MELLITUS WITHOUT COMPLICATION, WITH LONG-TERM CURRENT USE OF INSULIN (HCC): ICD-10-CM

## 2021-05-14 PROCEDURE — 93923 UPR/LXTR ART STDY 3+ LVLS: CPT

## 2021-05-14 PROCEDURE — 93923 UPR/LXTR ART STDY 3+ LVLS: CPT | Performed by: SURGERY

## 2021-05-14 PROCEDURE — 99214 OFFICE O/P EST MOD 30 MIN: CPT | Performed by: SURGERY

## 2021-05-14 RX ORDER — PANTOPRAZOLE SODIUM 40 MG/1
40 TABLET, DELAYED RELEASE ORAL DAILY
COMMUNITY
Start: 2021-04-14

## 2021-05-14 RX ORDER — AMLODIPINE BESYLATE 10 MG/1
TABLET ORAL
COMMUNITY
Start: 2021-03-31 | End: 2021-05-14

## 2021-05-14 RX ORDER — DULAGLUTIDE 1.5 MG/.5ML
1.5 INJECTION, SOLUTION SUBCUTANEOUS WEEKLY
COMMUNITY
Start: 2021-03-17

## 2021-05-14 NOTE — PATIENT INSTRUCTIONS
"BMI for Adults  What is BMI?  Body mass index (BMI) is a number that is calculated from a person's weight and height. BMI can help estimate how much of a person's weight is composed of fat. BMI does not measure body fat directly. Rather, it is an alternative to procedures that directly measure body fat, which can be difficult and expensive.  BMI can help identify people who may be at higher risk for certain medical problems.  What are BMI measurements used for?  BMI is used as a screening tool to identify possible weight problems. It helps determine whether a person is obese, overweight, a healthy weight, or underweight.  BMI is useful for:  · Identifying a weight problem that may be related to a medical condition or may increase the risk for medical problems.  · Promoting changes, such as changes in diet and exercise, to help reach a healthy weight. BMI screening can be repeated to see if these changes are working.  How is BMI calculated?  BMI involves measuring your weight in relation to your height. Both height and weight are measured, and the BMI is calculated from those numbers. This can be done either in English (U[place] or metric measurements. Note that charts and online BMI calculators are available to help you find your BMI quickly and easily without having to do these calculations yourself.  To calculate your BMI in English (U.S[place] measurements:    1. Measure your weight in pounds (lb).  2. Multiply the number of pounds by 703.  ? For example, for a person who weighs 180 lb, multiply that number by 703, which equals 126,540.  3. Measure your height in inches. Then multiply that number by itself to get a measurement called \"inches squared.\"  ? For example, for a person who is 70 inches tall, the \"inches squared\" measurement is 70 inches x 70 inches, which equals 4,900 inches squared.  4. Divide the total from step 2 (number of lb x 703) by the total from step 3 (inches squared): 126,540 ÷ 4,900 = 25.8. This is " "your BMI.  To calculate your BMI in metric measurements:  1. Measure your weight in kilograms (kg).  2. Measure your height in meters (m). Then multiply that number by itself to get a measurement called \"meters squared.\"  ? For example, for a person who is 1.75 m tall, the \"meters squared\" measurement is 1.75 m x 1.75 m, which is equal to 3.1 meters squared.  3. Divide the number of kilograms (your weight) by the meters squared number. In this example: 70 ÷ 3.1 = 22.6. This is your BMI.  What do the results mean?  BMI charts are used to identify whether you are underweight, normal weight, overweight, or obese. The following guidelines will be used:  · Underweight: BMI less than 18.5.  · Normal weight: BMI between 18.5 and 24.9.  · Overweight: BMI between 25 and 29.9.  · Obese: BMI of 30 or above.  Keep these notes in mind:  · Weight includes both fat and muscle, so someone with a muscular build, such as an athlete, may have a BMI that is higher than 24.9. In cases like these, BMI is not an accurate measure of body fat.  · To determine if excess body fat is the cause of a BMI of 25 or higher, further assessments may need to be done by a health care provider.  · BMI is usually interpreted in the same way for men and women.  Where to find more information  For more information about BMI, including tools to quickly calculate your BMI, go to these websites:  · Centers for Disease Control and Prevention: www.cdc.gov  · American Heart Association: www.heart.org  · National Heart, Lung, and Blood [place]: www.nhlbi.nih.gov  Summary  · Body mass index (BMI) is a number that is calculated from a person's weight and height.  · BMI may help estimate how much of a person's weight is composed of fat. BMI can help identify those who may be at higher risk for certain medical problems.  · BMI can be measured using English measurements or metric measurements.  · BMI charts are used to identify whether you are underweight, normal " weight, overweight, or obese.  This information is not intended to replace advice given to you by your health care provider. Make sure you discuss any questions you have with your health care provider.  Document Revised: 09/09/2020 Document Reviewed: 07/17/2020  Elsevier Patient Education © 2021 [employer].

## 2021-05-14 NOTE — PROGRESS NOTES
11/16/2020      Mark Villela MD  [address] [place] [address] [address]  [place] [address]        Edy A Harjeet  1955    Chief Complaint   Patient presents with   • Follow-up     pt is here for 6mo f/u w/ NIC         Dear Mark Villela MD:    HPI     I had the pleasure of seeing your patient in the office today for follow up.  As you recall, the patient is a 66 y.o. male who we are currently following for history of peripheral vascular disease.  He previously underwent left femoral endarterectomy with retrograde angioplasty and stenting of the left common iliac artery stenosis.  He returns today for continued surveillance and had repeat NIC/PVR which I did personally review.  Today the value on the right is 0.37, and on the left is 0.54.  This represents severe arterial insufficiency on the right, and moderate arterial insufficiency on the left.  Overall he continues to do well.  He does have mild claudication symptoms to the right calf mainly at around 3 blocks distance but this has not changed since our last visit and is not lifestyle limiting.  He has no rest pain.  He otherwise denies any wounds and has no new complaints.  He continues on aspirin as well as a statin.    Review of Systems   Constitutional: Negative.  Negative for activity change, appetite change, chills and fever.   HENT: Negative.  Negative for congestion, sneezing and sore throat.    Eyes: Negative.    Respiratory: Negative.  Negative for chest tightness and shortness of breath.    Cardiovascular: Negative.  Negative for chest pain, palpitations and leg swelling.   Gastrointestinal: Negative.  Negative for abdominal distention, abdominal pain, nausea and vomiting.   Endocrine: Negative.    Genitourinary: Negative.    Musculoskeletal: Negative.         Mild RLE claudication at ~ 3 blocks. Unchanged from previous   Skin: Negative.    Allergic/Immunologic: Negative.    Neurological: Negative.    Hematological: Negative.   "  Psychiatric/Behavioral: Negative.        /62 (BP Location: Left arm, Patient Position: Sitting, Cuff Size: Adult)   Pulse 99   Ht 170.2 cm (67\")   Wt 109 kg (241 lb)   SpO2 95%   BMI 37.75 kg/m²   Physical Exam  Vitals reviewed.   Constitutional:       Appearance: Normal appearance.   HENT:      Head: Normocephalic and atraumatic.      Nose: Nose normal.      Mouth/Throat:      Mouth: Mucous membranes are moist.   Eyes:      Extraocular Movements: Extraocular movements intact.      Pupils: Pupils are equal, round, and reactive to light.   Cardiovascular:      Rate and Rhythm: Normal rate. Rhythm irregularly irregular.      Pulses:           Carotid pulses are 2+ on the right side and 2+ on the left side.       Radial pulses are 2+ on the right side and 2+ on the left side.        Femoral pulses are 2+ on the right side and 2+ on the left side.       Dorsalis pedis pulses are detected w/ Doppler on the right side and 1+ on the left side.        Posterior tibial pulses are 0 on the right side and detected w/ Doppler on the left side.      Comments: Both feet are warm.  He has no wounds.  Pulmonary:      Effort: Pulmonary effort is normal. No respiratory distress.   Abdominal:      General: There is no distension.      Palpations: Abdomen is soft. There is no mass.      Tenderness: There is no abdominal tenderness.   Musculoskeletal:         General: Normal range of motion.      Cervical back: Normal range of motion and neck supple.      Right lower leg: No edema.      Left lower leg: No edema.      Comments: He has known chronic foot drop of the left foot and wears a brace for this.   Skin:     General: Skin is warm and dry.      Capillary Refill: Capillary refill takes 2 to 3 seconds.   Neurological:      General: No focal deficit present.      Mental Status: He is alert and oriented to person, place, and time.   Psychiatric:         Mood and Affect: Mood normal.         Behavior: Behavior normal.        "  Thought Content: Thought content normal.         Judgment: Judgment normal.         DIAGNOSTIC DATA:        US Ankle / Brachial Indices Extremity Complete    Result Date: 5/14/2021  Narrative:  History: [facility]  Comments: Bilateral lower extremity arterial with multi-level pulse volume recordings and segmental pressures were performed at rest and stress.  The right ankle/brachial index is 0.37. The waveforms are biphasic and dampened at the ankle.These findings are consistent with severe arterial insufficiency of the right lower extremity at rest.  The posterior tibial waveform is flat line. There is likely underlying aortoiliac inflow disease.  The left ankle/brachial index is 0.54. The waveforms are biphasic without dampening. These findings are consistent with moderate to severe arterial insufficiency of the left lower extremity at rest.    There is likely underlying aortoiliac inflow disease.      Impression: Impression: 1. Severe arterial insufficiency of the right lower extremity at rest. 2. Moderate to severe arterial insufficiency of the left lower extremity at rest.   This report was finalized on 05/14/2021 14:32 by Dr. Constantino Metzger MD.      Patient Active Problem List   Diagnosis   • Spinal stenosis of lumbar region   • Back pain   • Degeneration of intervertebral disc of lumbosacral region   • Essential hypertension   • Lumbar disc herniation with radiculopathy   • Type 2 diabetes mellitus without complication, with long-term current use of insulin (CMS/HCC)   • Constipation due to opioid therapy   • Gastroesophageal reflux disease without esophagitis   • Panlobular emphysema (CMS/HCC)   • Paroxysmal atrial fibrillation (CMS/HCC)   • Hx of colonic polyps   • Class 2 severe obesity due to excess calories with serious comorbidity and body mass index (BMI) of 39.0 to 39.9 in adult (CMS/HCC)   • Atherosclerosis 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 (CMS/HCC)   • H/O diabetic foot ulcer          ICD-10-CM ICD-9-CM   1. Peripheral arterial disease with history of revascularization (CMS/[facility])  I73.9 443.9    Z98.890 V45.89   2. Atherosclerosis of artery of extremity with intermittent claudication (CMS/[facility])  I70.219 440.21   3. Essential hypertension  I10 401.9   4. Type 2 diabetes mellitus without complication, with long-term current use of insulin (CMS/[facility])  E11.9 250.00    Z79.4 V58.67   5. Mixed hyperlipidemia  E78.2 272.2       Lab Frequency Next Occurrence   Follow Anesthesia Guidelines / Standing Orders Once 12/07/2018   Obtain Informed Consent Once 12/12/2018   Follow Anesthesia Guidelines / Standing Orders Once 12/17/2018   Obtain Informed Consent Once 12/22/2018   Follow Anesthesia Guidelines / Standing Orders Once 01/21/2019   Obtain Informed Consent Once 01/26/2019   Provide NPO Instructions to Patient Once 01/26/2019   Hepatic Function Panel Once 06/17/2020   Hepatic Function Panel Once 12/19/2020   US Ankle / Brachial Indices Extremity Complete Once 05/15/2021       PLAN: After thoroughly evaluating Edy Cosby, I believe the best course of action is to remain conservative from a vascular standpoint.  Overall he continues to do well after prior history of left femoral endarterectomy with retrograde angioplasty and stenting of the left common iliac artery stenosis.  He continues to have mild claudication symptoms mainly in the right calf but this is not lifestyle limiting.  Otherwise he is able to ambulate and function is normal.  He has had no significant changes since h his last visit.  As such he should continue on his aspirin and statin I will plan to see him back here in the office in 6 months with a repeat NIC/PVR for continued surveillance.  The patient is to continue taking their medications as previously discussed.   I did discuss vascular risk factors as they pertain to the progression of vascular disease including controlling diabetes, hypertension, and hyperlipidemia. These  factors remain stable. Patient's Body mass index is 37.75 kg/m². BMI is above normal parameters. Recommendations include: educational material. This was all discussed in full with complete understanding.  Thank you for allowing me to participate in the care of your patient.  Please do not hesitate to call with any questions or concerns.  We will keep you aware of any further encounters with Edy Cosby.      Sincerely Yours,      Duncan Lucio MD

## 2021-05-19 DIAGNOSIS — M79.18 ABDOMINAL MUSCLE PAIN: ICD-10-CM

## 2021-05-19 RX ORDER — CYCLOBENZAPRINE HCL 5 MG
TABLET ORAL
Qty: 90 TABLET | Refills: 0 | Status: SHIPPED | OUTPATIENT
Start: 2021-05-19 | End: 2021-06-21

## 2021-05-19 NOTE — TELEPHONE ENCOUNTER
Shawn Madison called to request a refill on his medication. Last office visit : 3/23/2021   Next office visit : 6/30/2021     Last UDS:   Opiate Scrn, Ur   Date Value Ref Range Status   07/29/2015 See Final Results [id number]=832 ng/mL Final     Comment:     Opiate test includes Codeine, Morphine, Hydromorphone, Hydrocodone. Last Mary Mcallister:   Medication Contract: na  Last Fill: 3/17/2021    Requested Prescriptions     Pending Prescriptions Disp Refills    cyclobenzaprine (FLEXERIL) 5 MG tablet [Pharmacy Med Name: Cyclobenzaprine HCl 5 MG Oral Tablet] 90 tablet 0     Sig: Take 1 tablet by mouth three times daily as needed for muscle spasm         Please approve or refuse this medication.    Leah Olivo

## 2021-06-18 RX ORDER — LABETALOL 100 MG/1
TABLET, FILM COATED ORAL
Qty: 180 TABLET | Refills: 0 | Status: SHIPPED | OUTPATIENT
Start: 2021-06-18 | End: 2021-06-20

## 2021-06-20 DIAGNOSIS — M79.18 ABDOMINAL MUSCLE PAIN: ICD-10-CM

## 2021-06-20 RX ORDER — LABETALOL 100 MG/1
TABLET, FILM COATED ORAL
Qty: 180 TABLET | Refills: 0 | Status: SHIPPED | OUTPATIENT
Start: 2021-06-20 | End: 2021-07-27 | Stop reason: SDUPTHER

## 2021-06-21 RX ORDER — CYCLOBENZAPRINE HCL 5 MG
TABLET ORAL
Qty: 90 TABLET | Refills: 0 | Status: SHIPPED | OUTPATIENT
Start: 2021-06-21 | End: 2021-07-20

## 2021-07-08 NOTE — PROGRESS NOTES
[facility] - PODIATRY    Today's Date: 07/14/21    Patient Name: Edy Cosby  MRN: [id number]  CSN: [id number]  PCP: Mark Villela MD  Referring Provider: No ref. provider found    SUBJECTIVE     Chief Complaint   Patient presents with   • Follow-up     pcp09/28/2020 3 month follow up- diabetic nail care- pt states feet are doing well- pt denies pain at present- pt presents with long yellowed nails, shoe with brace in it on left foot   • Diabetes     159mg/dl BG this am     HPI: Edy Cosby, a 66 y.o.male, comes to clinic as a(n) established patient presenting for diabetic foot exam and complaining of thickened toenails. Patient has h/o AFib, back pain, DM2, Emphysema lung, GERD, HTN, OA. Patient is IDDM with last stated BG level of 159mg/dl.  Relates neuropathy in feet with numbness and tingling.  Utilizing AFO for foot drop of left foot..  Patient states that toenails are long, thick, and irregular and that he is unable to care for them himself.  Denies open wound or sores. Denies pain today. Relates previous treatment(s) including foot care by podiatrist. Denies any constitutional symptoms. No other pedal complaints at this time.    Past Medical History:   Diagnosis Date   • A-fib (CMS/HCC)    • Atherosclerosis    • Chronic back pain    • Constipation    • Diabetes mellitus (CMS/HCC)     TYPE II   • Dysphagia    • Emphysema of lung (CMS/HCC)    • Gastroparesis    • GERD (gastroesophageal reflux disease)    • Hyperlipidemia    • Hypertension     ESSENTIAL   • Leg pain    • Muscle spasm    • Nerve pain    • Osteoarthritis    • Sleeping difficulty    • Ulcer of toe due to diabetes mellitus (CMS/HCC)      Past Surgical History:   Procedure Laterality Date   • ANGIOPLASTY ILIAC ARTERY Left 1/29/2019    Procedure: ANGIOPLASTY ILIAC ARTERY, STENT PLACEMENT;  Surgeon: Duncan Lucio MD;  Location: [place] [address];  Service: Vascular   • AORTAGRAM Left 12/18/2018    Procedure:  AORTOGRAM, LEFT LEG ANGIOGRAM, MYNX CLOSURE;  Surgeon: Duncan Lucio MD;  Location:  [facility] HYBRID OR 12;  Service: Vascular   • BACK SURGERY     • CHOLECYSTECTOMY     • COLONOSCOPY  09/01/2011    TICS RECALL 5YR   • COLONOSCOPY  09/30/2008    ENTER RESULTS: STOOL THROUGHTOUT THE COLON POOR PREP REC 3 YEAR RECALL   • COLONOSCOPY N/A 11/9/2016    Procedure: COLONOSCOPY;  Surgeon: León Zepeda MD;  Location: [facility] ENDOSCOPY;  Service:    • COLONOSCOPY N/A 4/19/2018    Procedure: COLONOSCOPY WITH ANESTHESIA;  Surgeon: León Zepeda MD;  Location: [facility] ENDOSCOPY;  Service: Gastroenterology   • ENDOSCOPY  06/19/2012    HH   • ENDOSCOPY  08/25/2009    HIATAL HERNIA, DILATED WITH 50 FR MASCORRO   • ENDOSCOPY  06/19/2007    WITHIN NORMAL LIMITS UREA NEG   • FEMORAL ENDARTERECTOMY Left 1/29/2019    Procedure: LEFT FEMORAL ENDARTERECTOMY;  Surgeon: Duncan Lucio MD;  Location: [facility] HYBRID OR 12;  Service: Vascular   • LUMBAR LAMINECTOMY WITH FUSION N/A 1/23/2017    Procedure: REMOVAL OF INSTRUMENTATION, EXPLORATION OF FUSION L4-S1, REVISION LEFT L5-S1 HEMILAMINECTOMY, FACETECTOMY DECOMPRESSION, REVISION UNINSTRUMENTED POSTERIOR SPINAL FUSION L5-S1;  Surgeon: RHONDA Lopes MD;  Location: [facility] OR;  Service:    • OTHER SURGICAL HISTORY      LUMBAR SACRAL SURGERY WITH FUSION X2   • PILONIDAL CYST / SINUS EXCISION      PILONIDAL CYST REMOVAL     Family History   Problem Relation Age of Onset   • Heart attack Father    • Diabetes Brother    • Colon polyps Sister    • Stomach cancer Neg Hx         GI CNACERS OR DISEASE   • Colon cancer Neg Hx      Social History     Socioeconomic History   • Marital status: Single     Spouse name: Not on file   • Number of children: Not on file   • Years of education: Not on file   • Highest education level: Not on file   Tobacco Use   • Smoking status: Former Smoker     Years: 30.00     Types: Cigarettes   • Smokeless tobacco: Never Used   Vaping Use   • Vaping Use:  Never used   Substance and Sexual Activity   • Alcohol use: No   • Drug use: No   • Sexual activity: Defer     No Known Allergies  Current Outpatient Medications   Medication Sig Dispense Refill   • amLODIPine (NORVASC) 5 MG tablet 10 mg.     • aspirin 81 MG EC tablet Take 81 mg by mouth Daily.     • cyclobenzaprine (FLEXERIL) 5 MG tablet Take 5 mg by mouth 3 (Three) Times a Day As Needed for Muscle Spasms.     • Dulaglutide (Trulicity) 1.5 MG/0.5ML solution pen-injector Inject 1.5 mg under the skin into the appropriate area as directed.     • hydrochlorothiazide (HYDRODIURIL) 25 MG tablet Take 12.5 mg by mouth Daily.     • Insulin Glargine (Lantus SoloStar) 100 UNIT/ML injection pen Inject 20 Units under the skin into the appropriate area as directed.     • labetalol (NORMODYNE) 100 MG tablet Take 100 mg by mouth 2 (Two) Times a Day.     • lisinopril (PRINIVIL,ZESTRIL) 40 MG tablet Take 40 mg by mouth Daily.     • pantoprazole (PROTONIX) 40 MG EC tablet      • tamsulosin (FLOMAX) 0.4 MG capsule 24 hr capsule Take 1 capsule by mouth Daily.       No current facility-administered medications for this visit.     Review of Systems   Constitutional: Negative for chills and fever.   HENT: Negative for congestion.    Respiratory: Negative for shortness of breath.    Cardiovascular: Negative for chest pain and leg swelling.   Gastrointestinal: Negative for constipation, diarrhea, nausea and vomiting.   Musculoskeletal: Positive for gait problem (foot drop).   Skin: Negative for wound.        Dry skin    Neurological: Positive for numbness.       OBJECTIVE     Vitals:    07/14/21 1112   BP: 142/58   Pulse: 91   SpO2: 98%       PHYSICAL EXAM  GEN:   Accompanied by none.     Foot/Ankle Exam:       General:   Diabetic Foot Exam Performed    Appearance: appears stated age and healthy and obesity    Orientation: AAOx3    Affect: appropriate    Gait: unimpaired    Gait comment:  Drop foot left  Assistance: independent    Shoe  Gear:  Diabetic shoes (Left AFO)    VASCULAR      Right Foot Vascularity   Dorsalis pedis:  2+  Posterior tibial:  2+  Skin Temperature: warm    Edema Grading:  Trace and non-pitting  CFT:  3  Pedal Hair Growth:  Present  Varicosities: none       Left Foot Vascularity   Dorsalis pedis:  2+  Posterior tibial:  2+  Skin Temperature: warm    Edema Grading:  Trace and non-pitting  CFT:  3  Pedal Hair Growth:  Present  Varicosities: none        NEUROLOGIC     Right Foot Neurologic   Light touch sensation:  Diminished  Vibratory sensation:  Diminished  Hot/Cold sensation: diminished    Protective Sensation using [place] Monofilament:  0     Left Foot Neurologic   Light touch sensation:  Diminished  Vibratory sensation:  Diminished  Hot/cold sensation: diminished    Protective Sensation using [place] Monofilament:  0     MUSCULOSKELETAL      Right Foot Musculoskeletal   Ecchymosis:  None  Tenderness: none    Arch:  Pes planus  Hallux valgus: No    Hallux limitus: No       Left Foot Musculoskeletal   Ecchymosis:  None  Tenderness: none    Arch:  Pes planus  Hallux valgus: No    Hallux limitus: No       MUSCLE STRENGTH     Right Foot Muscle Strength   Foot dorsiflexion:  5  Foot plantar flexion:  5  Foot inversion:  5  Foot eversion:  5     Left Foot Muscle Strength   Foot dorsiflexion:  2  Foot plantar flexion:  3  Foot inversion:  3  Foot eversion:  3     RANGE OF MOTION      Right Foot Range of Motion   Foot and ankle ROM within normal limits       Left Foot Range of Motion   Foot and ankle ROM within normal limits       DERMATOLOGIC     Right Foot Dermatologic   Skin: dry skin and tinea (moccasin and interdigitally - essentially unchanged)    Nails: onychomycosis, abnormally thick, subungual debris and dystrophic nails       Left Foot Dermatologic   Skin: dry skin and tinea (moccasin and interdigitally - essentially unchanged)    Nails: onychomycosis, abnormally thick, subungual debris and dystrophic  nails        RADIOLOGY/NUCLEAR:  No results found.    LABORATORY/CULTURE RESULTS:      PATHOLOGY RESULTS:       ASSESSMENT/PLAN     Diagnoses and all orders for this visit:    1. Onychomycosis (Primary)    2. Type 2 diabetes mellitus with diabetic polyneuropathy, with long-term current use of insulin (CMS/[facility])    3. Tinea pedis of both feet    4. Foot drop, left    5. Foot callus      Comprehensive lower extremity examination and evaluation was performed.  Discussed findings and treatment plan including risks, benefits, and treatment options with patient in detail. Patient agreed with treatment plan.  After verbal consent obtained, nail(s) x10 debrided of length and thickness with nail nipper without incidence  Patient may maintain nails and calluses at home utilizing emery board or pumice stone between visits as needed  Reviewed at home diabetic foot care including daily foot checks   Continue AFO for foot drop.   Continue diabetic monitoring and control under direction of PCP.    An After Visit Summary was printed and given to the patient at discharge, including (if requested) any available informative/educational handouts regarding diagnosis, treatment, or medications. All questions were answered to patient/family satisfaction. Should symptoms fail to improve or worsen they agree to call or return to clinic or to go to the Emergency Department. Discussed the importance of following up with any needed screening tests/labs/specialist appointments and any requested follow-up recommended by me today. Importance of maintaining follow-up discussed and patient accepts that missed appointments can delay diagnosis and potentially lead to worsening of conditions.  Return in about 3 months (around 10/14/2021)., or sooner if acute issues arise.    Lab Frequency Next Occurrence   Diet: Once 04/19/2018   Advance Diet as Tolerated Once 04/19/2018       This document has been electronically signed by PRATIMA Esteves on  July 14, 2021 12:55 CDT

## 2021-07-10 DIAGNOSIS — I10 ESSENTIAL HYPERTENSION: ICD-10-CM

## 2021-07-10 RX ORDER — LISINOPRIL 40 MG/1
TABLET ORAL
Qty: 90 TABLET | Refills: 0 | Status: SHIPPED | OUTPATIENT
Start: 2021-07-10 | End: 2021-07-27 | Stop reason: SDUPTHER

## 2021-07-13 ENCOUNTER — TELEPHONE (OUTPATIENT)
Dept: PRIMARY CARE CLINIC | Age: 66
End: 2021-07-13

## 2021-07-13 ENCOUNTER — TELEPHONE (OUTPATIENT)
Dept: PODIATRY | Facility: CLINIC | Age: 66
End: 2021-07-13

## 2021-07-13 RX ORDER — INSULIN ASPART 100 [IU]/ML
25-37 INJECTION, SOLUTION INTRAVENOUS; SUBCUTANEOUS
Qty: 5 PEN | Refills: 3 | Status: SHIPPED | OUTPATIENT
Start: 2021-07-13 | End: 2021-07-27 | Stop reason: SDUPTHER

## 2021-07-13 RX ORDER — INSULIN GLARGINE 100 [IU]/ML
20 INJECTION, SOLUTION SUBCUTANEOUS NIGHTLY
Qty: 5 PEN | Refills: 3 | Status: SHIPPED | OUTPATIENT
Start: 2021-07-13 | End: 2021-07-27 | Stop reason: SDUPTHER

## 2021-07-13 NOTE — TELEPHONE ENCOUNTER
Patient called requesting scripts be sent to [facility].  I have printed scripts and faxed them to the fax number provided [phone] (Francheska Richalfreda) [phone] (Vida Kaiser)    See media         Requested Prescriptions     Signed Prescriptions Disp Refills    insulin glargine (LANTUS SOLOSTAR) 100 UNIT/ML injection pen 5 pen 3     Sig: Inject 20 Units into the skin nightly     Authorizing Provider: Ute Yen     Ordering User: Rhonda Spina    insulin aspart (NOVOLOG FLEXPEN) 100 UNIT/ML injection pen 5 pen 3     Sig: Inject 25-37 Units into the skin 3 times daily (before meals) 20 units breakfast, 30 units lunch, 30 units dinner + sliding scale each meal (Total dose is 20-32 breakfast, 30-42 lunch and dinner)     Authorizing Provider: Ute Yen     Ordering User: Chucky Lopez MA

## 2021-07-14 ENCOUNTER — OFFICE VISIT (OUTPATIENT)
Dept: PODIATRY | Facility: CLINIC | Age: 66
End: 2021-07-14

## 2021-07-14 VITALS
HEIGHT: 67 IN | SYSTOLIC BLOOD PRESSURE: 142 MMHG | BODY MASS INDEX: 37.83 KG/M2 | OXYGEN SATURATION: 98 % | DIASTOLIC BLOOD PRESSURE: 58 MMHG | HEART RATE: 91 BPM | WEIGHT: 241 LBS

## 2021-07-14 DIAGNOSIS — M21.372 FOOT DROP, LEFT: ICD-10-CM

## 2021-07-14 DIAGNOSIS — Z79.4 TYPE 2 DIABETES MELLITUS WITH DIABETIC POLYNEUROPATHY, WITH LONG-TERM CURRENT USE OF INSULIN (HCC): ICD-10-CM

## 2021-07-14 DIAGNOSIS — B35.3 TINEA PEDIS OF BOTH FEET: ICD-10-CM

## 2021-07-14 DIAGNOSIS — E11.42 TYPE 2 DIABETES MELLITUS WITH DIABETIC POLYNEUROPATHY, WITH LONG-TERM CURRENT USE OF INSULIN (HCC): ICD-10-CM

## 2021-07-14 DIAGNOSIS — L84 FOOT CALLUS: ICD-10-CM

## 2021-07-14 DIAGNOSIS — B35.1 ONYCHOMYCOSIS: Primary | ICD-10-CM

## 2021-07-14 PROCEDURE — 99213 OFFICE O/P EST LOW 20 MIN: CPT | Performed by: NURSE PRACTITIONER

## 2021-07-14 PROCEDURE — 11721 DEBRIDE NAIL 6 OR MORE: CPT | Performed by: NURSE PRACTITIONER

## 2021-07-20 DIAGNOSIS — M79.18 ABDOMINAL MUSCLE PAIN: ICD-10-CM

## 2021-07-20 RX ORDER — CYCLOBENZAPRINE HCL 5 MG
TABLET ORAL
Qty: 90 TABLET | Refills: 0 | Status: SHIPPED | OUTPATIENT
Start: 2021-07-20 | End: 2021-07-27 | Stop reason: SDUPTHER

## 2021-07-21 DIAGNOSIS — E11.51 TYPE 2 DIABETES MELLITUS WITH DIABETIC PERIPHERAL ANGIOPATHY WITHOUT GANGRENE, WITHOUT LONG-TERM CURRENT USE OF INSULIN (HCC): Chronic | ICD-10-CM

## 2021-07-21 RX ORDER — DULAGLUTIDE 1.5 MG/.5ML
1.5 INJECTION, SOLUTION SUBCUTANEOUS WEEKLY
Qty: 4 PEN | Refills: 0 | Status: SHIPPED | OUTPATIENT
Start: 2021-07-21 | End: 2021-07-27 | Stop reason: SDUPTHER

## 2021-07-21 RX ORDER — PANTOPRAZOLE SODIUM 40 MG/1
40 TABLET, DELAYED RELEASE ORAL
Qty: 90 TABLET | Refills: 2 | Status: SHIPPED | OUTPATIENT
Start: 2021-07-21 | End: 2021-07-21 | Stop reason: SDUPTHER

## 2021-07-21 RX ORDER — DULAGLUTIDE 1.5 MG/.5ML
1.5 INJECTION, SOLUTION SUBCUTANEOUS WEEKLY
Qty: 4 PEN | Refills: 3 | Status: SHIPPED | OUTPATIENT
Start: 2021-07-21 | End: 2021-07-21 | Stop reason: SDUPTHER

## 2021-07-21 RX ORDER — PANTOPRAZOLE SODIUM 40 MG/1
40 TABLET, DELAYED RELEASE ORAL
Qty: 90 TABLET | Refills: 0 | Status: SHIPPED | OUTPATIENT
Start: 2021-07-21 | End: 2021-07-27 | Stop reason: SDUPTHER

## 2021-07-21 NOTE — TELEPHONE ENCOUNTER
Bernarda Rouse called to request a refill on his medication.       Last office visit : 3/23/2021   Next office visit : Visit date not found     Requested Prescriptions     Signed Prescriptions Disp Refills    Dulaglutide (TRULICITY) 1.5 KO/2.9SK SOPN 4 pen 3     Sig: Inject 1.5 mg into the skin once a week     Authorizing Provider: Keshia Nuno     Ordering User: Jasper Vasquez    pantoprazole (PROTONIX) 40 MG tablet 90 tablet 2     Sig: Take 1 tablet by mouth every morning (before breakfast)     Authorizing Provider: Keshia Nuno     Ordering User: Kimball Schirmer, MA

## 2021-07-26 ENCOUNTER — TELEPHONE (OUTPATIENT)
Dept: PRIMARY CARE CLINIC | Age: 66
End: 2021-07-26

## 2021-07-26 DIAGNOSIS — E11.51 TYPE 2 DIABETES MELLITUS WITH DIABETIC PERIPHERAL ANGIOPATHY WITHOUT GANGRENE, WITHOUT LONG-TERM CURRENT USE OF INSULIN (HCC): Chronic | ICD-10-CM

## 2021-07-26 RX ORDER — DULAGLUTIDE 1.5 MG/.5ML
1.5 INJECTION, SOLUTION SUBCUTANEOUS WEEKLY
Qty: 4 PEN | Refills: 0 | Status: CANCELLED | OUTPATIENT
Start: 2021-07-26

## 2021-07-27 ENCOUNTER — OFFICE VISIT (OUTPATIENT)
Dept: PRIMARY CARE CLINIC | Age: 66
End: 2021-07-27
Payer: MEDICARE

## 2021-07-27 VITALS
BODY MASS INDEX: 36.22 KG/M2 | DIASTOLIC BLOOD PRESSURE: 60 MMHG | RESPIRATION RATE: 16 BRPM | WEIGHT: 239 LBS | OXYGEN SATURATION: 95 % | SYSTOLIC BLOOD PRESSURE: 126 MMHG | HEIGHT: 68 IN | HEART RATE: 91 BPM | TEMPERATURE: 97.2 F

## 2021-07-27 DIAGNOSIS — Z91.81 AT HIGH RISK FOR FALLS: ICD-10-CM

## 2021-07-27 DIAGNOSIS — E11.21 DIABETIC NEPHROPATHY ASSOCIATED WITH TYPE 2 DIABETES MELLITUS (HCC): ICD-10-CM

## 2021-07-27 DIAGNOSIS — K59.01 SLOW TRANSIT CONSTIPATION: ICD-10-CM

## 2021-07-27 DIAGNOSIS — E11.42 DIABETIC POLYNEUROPATHY ASSOCIATED WITH TYPE 2 DIABETES MELLITUS (HCC): ICD-10-CM

## 2021-07-27 DIAGNOSIS — I10 ESSENTIAL HYPERTENSION: ICD-10-CM

## 2021-07-27 DIAGNOSIS — E11.51 TYPE 2 DIABETES MELLITUS WITH DIABETIC PERIPHERAL ANGIOPATHY WITHOUT GANGRENE, WITHOUT LONG-TERM CURRENT USE OF INSULIN (HCC): Primary | Chronic | ICD-10-CM

## 2021-07-27 DIAGNOSIS — M79.18 ABDOMINAL MUSCLE PAIN: ICD-10-CM

## 2021-07-27 LAB — HBA1C MFR BLD: 7.4 %

## 2021-07-27 PROCEDURE — 99214 OFFICE O/P EST MOD 30 MIN: CPT | Performed by: NURSE PRACTITIONER

## 2021-07-27 PROCEDURE — 3051F HG A1C>EQUAL 7.0%<8.0%: CPT | Performed by: NURSE PRACTITIONER

## 2021-07-27 PROCEDURE — 83036 HEMOGLOBIN GLYCOSYLATED A1C: CPT | Performed by: NURSE PRACTITIONER

## 2021-07-27 RX ORDER — CILOSTAZOL 50 MG/1
50 TABLET ORAL 2 TIMES DAILY
Qty: 60 TABLET | Refills: 3 | Status: SHIPPED | OUTPATIENT
Start: 2021-07-27 | End: 2021-11-21

## 2021-07-27 RX ORDER — CYCLOBENZAPRINE HCL 5 MG
TABLET ORAL
Qty: 90 TABLET | Refills: 0 | Status: SHIPPED | OUTPATIENT
Start: 2021-07-27 | End: 2021-09-27

## 2021-07-27 RX ORDER — HYDROCHLOROTHIAZIDE 25 MG/1
TABLET ORAL
Qty: 90 TABLET | Refills: 0 | Status: SHIPPED | OUTPATIENT
Start: 2021-07-27 | End: 2022-01-20 | Stop reason: SDUPTHER

## 2021-07-27 RX ORDER — INSULIN GLARGINE 100 [IU]/ML
20 INJECTION, SOLUTION SUBCUTANEOUS NIGHTLY
Qty: 5 PEN | Refills: 3 | Status: SHIPPED | OUTPATIENT
Start: 2021-07-27 | End: 2022-07-26 | Stop reason: SDUPTHER

## 2021-07-27 RX ORDER — AMOXICILLIN 250 MG
2 CAPSULE ORAL DAILY PRN
Qty: 30 TABLET | Refills: 0 | Status: SHIPPED | OUTPATIENT
Start: 2021-07-27 | End: 2022-07-26 | Stop reason: SDUPTHER

## 2021-07-27 RX ORDER — ASPIRIN 325 MG
81 TABLET ORAL DAILY
Qty: 30 TABLET | Refills: 2 | Status: SHIPPED | OUTPATIENT
Start: 2021-07-27

## 2021-07-27 RX ORDER — LABETALOL 100 MG/1
TABLET, FILM COATED ORAL
Qty: 180 TABLET | Refills: 0 | Status: SHIPPED | OUTPATIENT
Start: 2021-07-27 | End: 2021-12-22

## 2021-07-27 RX ORDER — ALBUTEROL SULFATE 90 UG/1
2 AEROSOL, METERED RESPIRATORY (INHALATION) 4 TIMES DAILY PRN
Qty: 1 INHALER | Refills: 5 | Status: SHIPPED | OUTPATIENT
Start: 2021-07-27 | End: 2022-01-20 | Stop reason: SDUPTHER

## 2021-07-27 RX ORDER — INSULIN ASPART 100 [IU]/ML
25-37 INJECTION, SOLUTION INTRAVENOUS; SUBCUTANEOUS
Qty: 5 PEN | Refills: 3 | Status: SHIPPED | OUTPATIENT
Start: 2021-07-27 | End: 2022-07-26 | Stop reason: SDUPTHER

## 2021-07-27 RX ORDER — DULAGLUTIDE 1.5 MG/.5ML
1.5 INJECTION, SOLUTION SUBCUTANEOUS WEEKLY
Qty: 4 PEN | Refills: 0 | Status: SHIPPED | OUTPATIENT
Start: 2021-07-27 | End: 2022-07-26 | Stop reason: SDUPTHER

## 2021-07-27 RX ORDER — PANTOPRAZOLE SODIUM 40 MG/1
40 TABLET, DELAYED RELEASE ORAL
Qty: 90 TABLET | Refills: 0 | Status: SHIPPED | OUTPATIENT
Start: 2021-07-27 | End: 2022-07-24

## 2021-07-27 RX ORDER — ATORVASTATIN CALCIUM 40 MG/1
TABLET, FILM COATED ORAL
Qty: 90 TABLET | Refills: 3 | Status: SHIPPED | OUTPATIENT
Start: 2021-07-27 | End: 2022-01-20 | Stop reason: SDUPTHER

## 2021-07-27 RX ORDER — AMLODIPINE BESYLATE 10 MG/1
TABLET ORAL
Qty: 90 TABLET | Refills: 1 | Status: SHIPPED | OUTPATIENT
Start: 2021-07-27 | End: 2022-01-20 | Stop reason: SDUPTHER

## 2021-07-27 RX ORDER — LISINOPRIL 40 MG/1
TABLET ORAL
Qty: 90 TABLET | Refills: 0 | Status: SHIPPED | OUTPATIENT
Start: 2021-07-27 | End: 2022-01-07

## 2021-07-27 ASSESSMENT — ENCOUNTER SYMPTOMS
COUGH: 0
SHORTNESS OF BREATH: 0
COLOR CHANGE: 0
RHINORRHEA: 0
VOMITING: 0
PHOTOPHOBIA: 0
VOICE CHANGE: 0
NAUSEA: 0
BACK PAIN: 0

## 2021-07-27 NOTE — PROGRESS NOTES
[address] [facility]  [address] [place] [id number] [address] [address]  Dept: [phone]  Dept Fax: [phone]  Loc: [phone]    David Gutierrez is a 73 y.o. male who presents today for his medical conditions/complaints as noted below. David Gutierrez is c/o of 3 Month Follow-Up (need diabetic shoes ) and Medication Management (needs diabetic meds sent to [facility])        HPI:     HPI   Chief Complaint   Patient presents with    3 Month Follow-Up     need diabetic shoes     Medication Management     needs diabetic meds sent to [facility]       Patient presents today for diabetic foot exam. He would like order sent to [facility]. He states blood sugars have been fairly well controlled. Denies episodes of hypoglycemia. Patient has history of peripheral neuropathy requiring diabetic shoes. Blood pressure is well-controlled on medications.      He is due for refills on Trulicity, novolog and lantus-- he is requesting this to be sent to [facility] due to cost.       Past Medical History:   Diagnosis Date    Arthritis     BiPAP (biphasic positive airway pressure) dependence     8cm to 20cm    Chronic back pain     Emphysema/COPD ([address]     GERD (gastroesophageal reflux disease)     History of anemia     History of blood transfusion     Hyperlipidemia     Hypertension     Impotence 3/22/2021    Neuropathy     Obstructive sleep apnea     AHI:  95.8 per PSG, 3/2017; repeat PSG, 11/2017 revealed an AHI of 65.5    Peripheral vascular disease ([address]     S/P angioplasty     Type II or unspecified type diabetes mellitus without mention of complication, not stated as uncontrolled       Past Surgical History:   Procedure Laterality Date    CHOLECYSTECTOMY      LARYNGOSCOPY N/A 11/10/2017    Direct laryngoscopy with assessment of hypopharynx, larynx, and post-cricoid areas performed by Cam Dey MD at [address] [place] [address] N/A 5/25/2018    MICRO (NEURONTIN) 300 MG capsule Take 900 mg by mouth 3 times daily. . (Patient not taking: Reported on 7/27/2021)      HYDROcodone-acetaminophen (NORCO)  MG per tablet TK 1 T PO Q 6 H PRN FOR PAIN (Patient not taking: Reported on 7/27/2021)  0     No current facility-administered medications on file prior to visit. No Known Allergies    Health Maintenance   Topic Date Due    AAA screen  Never done    Diabetic retinal exam  Never done    DTaP/Tdap/Td vaccine (1 - Tdap) Never done    Diabetic foot exam  03/16/2021    Shingles Vaccine (1 of 2) 10/20/2021 (Originally 3/26/2006)    Flu vaccine (1) 09/01/2021    Lipid screen  03/15/2022    Potassium monitoring  03/15/2022    Creatinine monitoring  03/15/2022    Annual Wellness Visit (AWV)  03/24/2022    Pneumococcal 65+ years Vaccine (1 of 1 - PPSV23) 05/01/2022    A1C test (Diabetic or Prediabetic)  07/27/2022    Colon cancer screen colonoscopy  04/08/2024    COVID-19 Vaccine  Completed    Hepatitis C screen  Completed    HIV screen  Completed    Hepatitis A vaccine  Aged Out    Hib vaccine  Aged Out    Meningococcal (ACWY) vaccine  Aged Out       Subjective:      Review of Systems   Constitutional: Negative for chills and fever. HENT: Negative for ear pain, hearing loss, rhinorrhea and voice change. Eyes: Negative for photophobia and visual disturbance. Respiratory: Negative for cough and shortness of breath. Cardiovascular: Negative for chest pain and palpitations. Gastrointestinal: Negative for nausea and vomiting. Endocrine: Negative. Negative for cold intolerance and heat intolerance. Genitourinary: Negative for difficulty urinating and flank pain. Musculoskeletal: Negative for back pain and neck pain. Skin: Negative for color change and rash. Allergic/Immunologic: Negative for environmental allergies and food allergies. Neurological: Negative for dizziness, speech difficulty and headaches.    Hematological: Does not bruise/bleed easily. Psychiatric/Behavioral: Negative for sleep disturbance and suicidal ideas. Objective:     Physical Exam  Vitals and nursing note reviewed. Constitutional:       Appearance: He is well-developed. HENT:      Head: Atraumatic. Right Ear: External ear normal.      Left Ear: External ear normal.      Nose: Nose normal.   Eyes:      Conjunctiva/sclera: Conjunctivae normal.      Pupils: Pupils are equal, round, and reactive to light. Cardiovascular:      Rate and Rhythm: Normal rate and regular rhythm. Heart sounds: Normal heart sounds, S1 normal and S2 normal.   Pulmonary:      Effort: Pulmonary effort is normal.      Breath sounds: Normal breath sounds. Abdominal:      General: Bowel sounds are normal.      Palpations: Abdomen is soft. Musculoskeletal:         General: Normal range of motion. Cervical back: Normal range of motion and neck supple. Skin:     General: Skin is warm and dry. Neurological:      Mental Status: He is alert and oriented to person, place, and time. Psychiatric:         Behavior: Behavior normal.       /60   Pulse 91   Temp 97.2 °F (36.2 °C)   Resp 16   Ht 5' 7.5\" (1.715 m)   Wt 239 lb (108.4 kg)   SpO2 95%   BMI 36.88 kg/m²     Assessment:       Diagnosis Orders   1. Type 2 diabetes mellitus with diabetic peripheral angiopathy without gangrene, without long-term current use of insulin ([facility])  Dulaglutide (TRULICITY) 1.5 SH/3.4TM SOPN   2. Slow transit constipation  senna-docusate (PERICOLACE) 8.6-50 MG per tablet   3. Essential hypertension  lisinopril (PRINIVIL;ZESTRIL) 40 MG tablet    amLODIPine (NORVASC) 10 MG tablet   4. Abdominal muscle pain  cyclobenzaprine (FLEXERIL) 5 MG tablet   5. At high risk for falls     6. Diabetic polyneuropathy associated with type 2 diabetes mellitus ([address]     7.  Diabetic nephropathy associated with type 2 diabetes mellitus ([address]  Diabetic Shoe MISC     Results for orders placed or performed in visit on 07/27/21   POCT glycosylated hemoglobin (Hb A1C)   Result Value Ref Range    Hemoglobin A1C 7.4 %       DIABETIC FOOT EXAM:   Visual inspection:  Deformity/amputation: absent  Skin lesions/pre-ulcerative calluses: absent  Edema: right- negative, left- negative    Sensory exam:  Monofilament sensation: abnormal - +neuropathy  (minimum of 5 random plantar locations tested, avoiding callused areas - > 1 area with absence of sensation is + for neuropathy)    Plus at least one of the following:  Pulses: weak/poor circuluation  Pinprick: Intact    Plan: Today's A1C was 7.4; Trulicity has helped decrease this from 8.8 at last visit on 3/15/21. He will remain on all current medications. Follow ADA guidelines for diet. Follow-up in 3 months or sooner if needed. Patient will need new diabetic shoes to protect feet from injury. Patient given educational materials -see patient instructions. Discussed use, benefit, and side effects of prescribed medications. All patient questions answered. Pt voiced understanding. Reviewed health maintenance. Instructed to continue currentmedications, diet and exercise. Patient agreed with treatment plan. Follow up as directed.        MEDICATIONS:  Orders Placed This Encounter   Medications    senna-docusate (PERICOLACE) 8.6-50 MG per tablet     Sig: Take 2 tablets by mouth daily as needed for Constipation     Dispense:  30 tablet     Refill:  0    pantoprazole (PROTONIX) 40 MG tablet     Sig: Take 1 tablet by mouth every morning (before breakfast)     Dispense:  90 tablet     Refill:  0    lisinopril (PRINIVIL;ZESTRIL) 40 MG tablet     Sig: Take 1 tablet by mouth once daily     Dispense:  90 tablet     Refill:  0    labetalol (NORMODYNE) 100 MG tablet     Sig: Take 1 tablet by mouth twice daily     Dispense:  180 tablet     Refill:  0    hydroCHLOROthiazide (HYDRODIURIL) 25 MG tablet     Sig: Take 1 tablet by mouth once daily     Dispense:  90 tablet language to printed texts. The electronic translation of spoken language may be erroneous, or at times, nonsensical words or phrases may be inadvertentlytranscribed. Although I have reviewed the note for such errors, some may still exist.  On the basis of positive falls risk screening, assessment and plan is as follows: patient declines any further evaluation/treatment for increased falls risk.

## 2021-08-11 ENCOUNTER — HOSPITAL ENCOUNTER (EMERGENCY)
Age: 66
Discharge: LEFT BEFORE DISCHARGE | End: 2021-08-11
Payer: MEDICARE

## 2021-08-11 VITALS
BODY MASS INDEX: 38.58 KG/M2 | DIASTOLIC BLOOD PRESSURE: 74 MMHG | HEART RATE: 104 BPM | WEIGHT: 250 LBS | RESPIRATION RATE: 18 BRPM | SYSTOLIC BLOOD PRESSURE: 159 MMHG | OXYGEN SATURATION: 93 % | TEMPERATURE: 98.9 F

## 2021-08-11 LAB
GLUCOSE BLD-MCNC: 219 MG/DL (ref 70–99)
PERFORMED ON: ABNORMAL

## 2021-08-11 PROCEDURE — 82947 ASSAY GLUCOSE BLOOD QUANT: CPT

## 2021-08-11 PROCEDURE — 4500000002 HC ER NO CHARGE

## 2021-09-16 ENCOUNTER — TELEPHONE (OUTPATIENT)
Dept: PODIATRY | Facility: CLINIC | Age: 66
End: 2021-09-16

## 2021-09-16 NOTE — TELEPHONE ENCOUNTER
Left a message for a return call. Pt's appointment had to be moved from Oct.15th to Oct.13th due to Provider being out of the office.

## 2021-09-26 DIAGNOSIS — M79.18 ABDOMINAL MUSCLE PAIN: ICD-10-CM

## 2021-09-27 RX ORDER — CYCLOBENZAPRINE HCL 5 MG
TABLET ORAL
Qty: 90 TABLET | Refills: 0 | Status: SHIPPED | OUTPATIENT
Start: 2021-09-27 | End: 2021-10-31

## 2021-10-06 NOTE — PROGRESS NOTES
[facility] - PODIATRY    Today's Date: 10/13/21    Patient Name: Edy Cosby  MRN: [id number]  CSN: [id number]  PCP: Mark Villela MD  Referring Provider: No ref. provider found    SUBJECTIVE     Chief Complaint   Patient presents with   • Follow-up     pcp09/28/2020 3 month fu diabetic nail care- pt states feet doing ok- pt denies pain- pt presents with thick long discolored nails   • Diabetes     hasnt checked     HPI: Edy Cosby, a 66 y.o.male, comes to clinic as a(n) established patient presenting for diabetic foot exam and complaining of thickened toenails. Patient has h/o AFib, back pain, DM2, Emphysema lung, GERD, HTN, OA. Patient is IDDM and unsure of last BG level.  Patient states that he is not check blood glucose recently.  Relates neuropathy in feet with numbness and tingling.  Continues use of AFO for foot drop of left foot..  Patient states that toenails are long, thick, and irregular and that he is unable to care for them himself.  Denies open wound or sores. Denies pain at the present time. Relates previous treatment(s) including foot care by podiatrist. Denies any constitutional symptoms. No other pedal complaints at this time.    Past Medical History:   Diagnosis Date   • A-fib (HCC)    • Atherosclerosis    • Chronic back pain    • Constipation    • Diabetes mellitus (HCC)     TYPE II   • Dysphagia    • Emphysema of lung (HCC)    • Gastroparesis    • GERD (gastroesophageal reflux disease)    • Hyperlipidemia    • Hypertension     ESSENTIAL   • Leg pain    • Muscle spasm    • Nerve pain    • Osteoarthritis    • Sleeping difficulty    • Ulcer of toe due to diabetes mellitus (HCC)      Past Surgical History:   Procedure Laterality Date   • ANGIOPLASTY ILIAC ARTERY Left 1/29/2019    Procedure: ANGIOPLASTY ILIAC ARTERY, STENT PLACEMENT;  Surgeon: Duncan Lucio MD;  Location: [place] [address];  Service: Vascular   • AORTAGRAM Left 12/18/2018    Procedure:  AORTOGRAM, LEFT LEG ANGIOGRAM, MYNX CLOSURE;  Surgeon: Duncan Lucio MD;  Location:  [facility] HYBRID OR 12;  Service: Vascular   • BACK SURGERY     • CHOLECYSTECTOMY     • COLONOSCOPY  09/01/2011    TICS RECALL 5YR   • COLONOSCOPY  09/30/2008    ENTER RESULTS: STOOL THROUGHTOUT THE COLON POOR PREP REC 3 YEAR RECALL   • COLONOSCOPY N/A 11/9/2016    Procedure: COLONOSCOPY;  Surgeon: León Zepeda MD;  Location: [facility] ENDOSCOPY;  Service:    • COLONOSCOPY N/A 4/19/2018    Procedure: COLONOSCOPY WITH ANESTHESIA;  Surgeon: León Zepeda MD;  Location: [facility] ENDOSCOPY;  Service: Gastroenterology   • ENDOSCOPY  06/19/2012    HH   • ENDOSCOPY  08/25/2009    HIATAL HERNIA, DILATED WITH 50 FR MASCORRO   • ENDOSCOPY  06/19/2007    WITHIN NORMAL LIMITS UREA NEG   • FEMORAL ENDARTERECTOMY Left 1/29/2019    Procedure: LEFT FEMORAL ENDARTERECTOMY;  Surgeon: Duncan Lucio MD;  Location: [facility] HYBRID OR 12;  Service: Vascular   • LUMBAR LAMINECTOMY WITH FUSION N/A 1/23/2017    Procedure: REMOVAL OF INSTRUMENTATION, EXPLORATION OF FUSION L4-S1, REVISION LEFT L5-S1 HEMILAMINECTOMY, FACETECTOMY DECOMPRESSION, REVISION UNINSTRUMENTED POSTERIOR SPINAL FUSION L5-S1;  Surgeon: RHONDA Lopes MD;  Location: [facility] OR;  Service:    • OTHER SURGICAL HISTORY      LUMBAR SACRAL SURGERY WITH FUSION X2   • PILONIDAL CYST / SINUS EXCISION      PILONIDAL CYST REMOVAL     Family History   Problem Relation Age of Onset   • Heart attack Father    • Diabetes Brother    • Colon polyps Sister    • Stomach cancer Neg Hx         GI CNACERS OR DISEASE   • Colon cancer Neg Hx      Social History     Socioeconomic History   • Marital status: Single   Tobacco Use   • Smoking status: Former Smoker     Years: 30.00     Types: Cigarettes   • Smokeless tobacco: Never Used   Vaping Use   • Vaping Use: Never used   Substance and Sexual Activity   • Alcohol use: No   • Drug use: No   • Sexual activity: Defer     No Known Allergies  Current  Outpatient Medications   Medication Sig Dispense Refill   • amLODIPine (NORVASC) 5 MG tablet 10 mg.     • aspirin 81 MG EC tablet Take 81 mg by mouth Daily.     • cyclobenzaprine (FLEXERIL) 5 MG tablet Take 5 mg by mouth 3 (Three) Times a Day As Needed for Muscle Spasms.     • Dulaglutide (Trulicity) 1.5 MG/0.5ML solution pen-injector Inject 1.5 mg under the skin into the appropriate area as directed.     • hydrochlorothiazide (HYDRODIURIL) 25 MG tablet Take 12.5 mg by mouth Daily.     • Insulin Glargine (Lantus SoloStar) 100 UNIT/ML injection pen Inject 20 Units under the skin into the appropriate area as directed.     • labetalol (NORMODYNE) 100 MG tablet Take 100 mg by mouth 2 (Two) Times a Day.     • lisinopril (PRINIVIL,ZESTRIL) 40 MG tablet Take 40 mg by mouth Daily.     • pantoprazole (PROTONIX) 40 MG EC tablet      • tamsulosin (FLOMAX) 0.4 MG capsule 24 hr capsule Take 1 capsule by mouth Daily.       No current facility-administered medications for this visit.     Review of Systems   Constitutional: Negative for chills and fever.   HENT: Negative for congestion.    Respiratory: Negative for shortness of breath.    Cardiovascular: Negative for chest pain and leg swelling.   Gastrointestinal: Negative for constipation, diarrhea, nausea and vomiting.   Musculoskeletal: Positive for gait problem (foot drop).   Skin: Negative for wound.        Dry skin    Neurological: Positive for numbness.       OBJECTIVE     Vitals:    10/13/21 1341   BP: 128/62   Pulse: 103   SpO2: 99%       PHYSICAL EXAM  GEN:   Accompanied by none.     Foot/Ankle Exam:       General:   Diabetic Foot Exam Performed    Appearance: appears stated age and healthy and obesity    Orientation: AAOx3    Affect: appropriate    Gait: unimpaired    Gait comment:  Drop foot left  Assistance: independent    Shoe Gear:  Diabetic shoes (Left AFO)    VASCULAR      Right Foot Vascularity   Dorsalis pedis:  2+  Posterior tibial:  2+  Skin Temperature: warm     Edema Grading:  Trace and non-pitting  CFT:  3  Pedal Hair Growth:  Present  Varicosities: none       Left Foot Vascularity   Dorsalis pedis:  2+  Posterior tibial:  2+  Skin Temperature: warm    Edema Grading:  Trace and non-pitting  CFT:  3  Pedal Hair Growth:  Present  Varicosities: none        NEUROLOGIC     Right Foot Neurologic   Light touch sensation:  Diminished  Vibratory sensation:  Diminished  Hot/Cold sensation: diminished    Protective Sensation using Florence-Mirtha Monofilament:  0     Left Foot Neurologic   Light touch sensation:  Diminished  Vibratory sensation:  Diminished  Hot/cold sensation: diminished    Protective Sensation using Florence-Mirtha Monofilament:  0     MUSCULOSKELETAL      Right Foot Musculoskeletal   Ecchymosis:  None  Tenderness: none    Arch:  Pes planus  Hallux valgus: No    Hallux limitus: No       Left Foot Musculoskeletal   Ecchymosis:  None  Tenderness: none    Arch:  Pes planus  Hallux valgus: No    Hallux limitus: No       MUSCLE STRENGTH     Right Foot Muscle Strength   Foot dorsiflexion:  5  Foot plantar flexion:  5  Foot inversion:  5  Foot eversion:  5     Left Foot Muscle Strength   Foot dorsiflexion:  2  Foot plantar flexion:  3  Foot inversion:  3  Foot eversion:  3     RANGE OF MOTION      Right Foot Range of Motion   Foot and ankle ROM within normal limits       Left Foot Range of Motion   Foot and ankle ROM within normal limits       DERMATOLOGIC     Right Foot Dermatologic   Skin: dry skin and tinea (moccasin and interdigitally -improved)    Nails: onychomycosis, abnormally thick, subungual debris and dystrophic nails       Left Foot Dermatologic   Skin: dry skin and tinea (moccasin and interdigitally -slightly improved)    Nails: onychomycosis, abnormally thick, subungual debris and dystrophic nails        RADIOLOGY/NUCLEAR:  No results found.    LABORATORY/CULTURE RESULTS:      PATHOLOGY RESULTS:       ASSESSMENT/PLAN     Diagnoses and all orders for  this visit:    1. Onychomycosis (Primary)    2. Type 2 diabetes mellitus with diabetic polyneuropathy, with long-term current use of insulin (HCC)    3. Tinea pedis of both feet    4. Foot drop, left    5. Foot callus      Comprehensive lower extremity examination and evaluation was performed.  Discussed findings and treatment plan including risks, benefits, and treatment options with patient in detail. Patient agreed with treatment plan.  After verbal consent obtained, nail(s) x10 debrided of length and thickness with nail nipper without incidence  Patient may maintain nails and calluses at home utilizing emery board or pumice stone between visits as needed  Reviewed at home diabetic foot care including daily foot checks   Continue AFO for foot drop.   Continue diabetic monitoring and control under direction of PCP.    An After Visit Summary was printed and given to the patient at discharge, including (if requested) any available informative/educational handouts regarding diagnosis, treatment, or medications. All questions were answered to patient/family satisfaction. Should symptoms fail to improve or worsen they agree to call or return to clinic or to go to the Emergency Department. Discussed the importance of following up with any needed screening tests/labs/specialist appointments and any requested follow-up recommended by me today. Importance of maintaining follow-up discussed and patient accepts that missed appointments can delay diagnosis and potentially lead to worsening of conditions.  Return in about 3 months (around 1/13/2022)., or sooner if acute issues arise.    Lab Frequency Next Occurrence   Diet: Once 04/19/2018   Advance Diet as Tolerated Once 04/19/2018       This document has been electronically signed by PRATIMA Esteves on October 13, 2021 14:33 CDT

## 2021-10-13 ENCOUNTER — OFFICE VISIT (OUTPATIENT)
Dept: PODIATRY | Facility: CLINIC | Age: 66
End: 2021-10-13

## 2021-10-13 VITALS
OXYGEN SATURATION: 99 % | DIASTOLIC BLOOD PRESSURE: 62 MMHG | BODY MASS INDEX: 36.73 KG/M2 | HEIGHT: 67 IN | HEART RATE: 103 BPM | SYSTOLIC BLOOD PRESSURE: 128 MMHG | WEIGHT: 234 LBS

## 2021-10-13 DIAGNOSIS — B35.3 TINEA PEDIS OF BOTH FEET: ICD-10-CM

## 2021-10-13 DIAGNOSIS — B35.1 ONYCHOMYCOSIS: Primary | ICD-10-CM

## 2021-10-13 DIAGNOSIS — E11.42 TYPE 2 DIABETES MELLITUS WITH DIABETIC POLYNEUROPATHY, WITH LONG-TERM CURRENT USE OF INSULIN (HCC): ICD-10-CM

## 2021-10-13 DIAGNOSIS — M21.372 FOOT DROP, LEFT: ICD-10-CM

## 2021-10-13 DIAGNOSIS — L84 FOOT CALLUS: ICD-10-CM

## 2021-10-13 DIAGNOSIS — Z79.4 TYPE 2 DIABETES MELLITUS WITH DIABETIC POLYNEUROPATHY, WITH LONG-TERM CURRENT USE OF INSULIN (HCC): ICD-10-CM

## 2021-10-13 PROCEDURE — 99213 OFFICE O/P EST LOW 20 MIN: CPT | Performed by: NURSE PRACTITIONER

## 2021-10-13 PROCEDURE — 11721 DEBRIDE NAIL 6 OR MORE: CPT | Performed by: NURSE PRACTITIONER

## 2021-11-09 ENCOUNTER — TELEPHONE (OUTPATIENT)
Dept: VASCULAR SURGERY | Facility: CLINIC | Age: 66
End: 2021-11-09

## 2021-11-09 NOTE — TELEPHONE ENCOUNTER
Left a message for a return call . Pt needs to keep testing for 11/15 and come see Dr. Lucio on the 19th.

## 2021-11-15 ENCOUNTER — HOSPITAL ENCOUNTER (OUTPATIENT)
Dept: ULTRASOUND IMAGING | Facility: HOSPITAL | Age: 66
Discharge: HOME | End: 2021-11-15
Admitting: SURGERY

## 2021-11-15 DIAGNOSIS — I73.9 PERIPHERAL ARTERIAL DISEASE WITH HISTORY OF REVASCULARIZATION (HCC): ICD-10-CM

## 2021-11-15 DIAGNOSIS — Z98.890 PERIPHERAL ARTERIAL DISEASE WITH HISTORY OF REVASCULARIZATION (HCC): ICD-10-CM

## 2021-11-15 PROCEDURE — 93923 UPR/LXTR ART STDY 3+ LVLS: CPT | Performed by: SURGERY

## 2021-11-15 PROCEDURE — 93923 UPR/LXTR ART STDY 3+ LVLS: CPT

## 2021-11-18 ENCOUNTER — TELEPHONE (OUTPATIENT)
Dept: VASCULAR SURGERY | Facility: CLINIC | Age: 66
End: 2021-11-18

## 2021-11-19 ENCOUNTER — OFFICE VISIT (OUTPATIENT)
Dept: VASCULAR SURGERY | Facility: CLINIC | Age: 66
End: 2021-11-19

## 2021-11-19 VITALS
DIASTOLIC BLOOD PRESSURE: 74 MMHG | HEIGHT: 67 IN | BODY MASS INDEX: 36.57 KG/M2 | WEIGHT: 233 LBS | SYSTOLIC BLOOD PRESSURE: 142 MMHG | RESPIRATION RATE: 18 BRPM | OXYGEN SATURATION: 94 % | HEART RATE: 114 BPM

## 2021-11-19 DIAGNOSIS — Z79.4 TYPE 2 DIABETES MELLITUS WITHOUT COMPLICATION, WITH LONG-TERM CURRENT USE OF INSULIN (HCC): ICD-10-CM

## 2021-11-19 DIAGNOSIS — I70.211 ATHEROSCLEROSIS OF NATIVE ARTERY OF RIGHT LOWER EXTREMITY WITH INTERMITTENT CLAUDICATION (HCC): Primary | ICD-10-CM

## 2021-11-19 DIAGNOSIS — E11.9 TYPE 2 DIABETES MELLITUS WITHOUT COMPLICATION, WITH LONG-TERM CURRENT USE OF INSULIN (HCC): ICD-10-CM

## 2021-11-19 DIAGNOSIS — E66.09 CLASS 2 OBESITY DUE TO EXCESS CALORIES WITHOUT SERIOUS COMORBIDITY WITH BODY MASS INDEX (BMI) OF 36.0 TO 36.9 IN ADULT: ICD-10-CM

## 2021-11-19 DIAGNOSIS — I10 ESSENTIAL HYPERTENSION: Chronic | ICD-10-CM

## 2021-11-19 DIAGNOSIS — E78.2 MIXED HYPERLIPIDEMIA: ICD-10-CM

## 2021-11-19 PROBLEM — G47.33 OBSTRUCTIVE SLEEP APNEA: Status: ACTIVE | Noted: 2017-03-07

## 2021-11-19 PROBLEM — Z78.9 DIFFICULTY WITH BIPAP USE: Status: ACTIVE | Noted: 2017-11-13

## 2021-11-19 PROBLEM — M21.372 LEFT FOOT DROP: Status: ACTIVE | Noted: 2019-09-12

## 2021-11-19 PROBLEM — F17.200 TOBACCO USE DISORDER: Status: ACTIVE | Noted: 2017-01-18

## 2021-11-19 PROBLEM — J38.00 VOCAL CORD PARALYSIS: Status: ACTIVE | Noted: 2021-03-22

## 2021-11-19 PROBLEM — E11.40 DIABETIC NEUROPATHY (HCC): Status: ACTIVE | Noted: 2021-03-22

## 2021-11-19 PROBLEM — G47.00 INSOMNIA: Status: ACTIVE | Noted: 2021-03-22

## 2021-11-19 PROBLEM — E11.21 DIABETIC NEPHROPATHY ASSOCIATED WITH TYPE 2 DIABETES MELLITUS: Status: ACTIVE | Noted: 2018-05-03

## 2021-11-19 PROBLEM — D64.9 ANEMIA: Status: ACTIVE | Noted: 2021-03-22

## 2021-11-19 PROBLEM — I73.9 PVD (PERIPHERAL VASCULAR DISEASE) WITH CLAUDICATION (HCC): Status: ACTIVE | Noted: 2019-01-07

## 2021-11-19 PROBLEM — Z99.89 BIPAP (BIPHASIC POSITIVE AIRWAY PRESSURE) DEPENDENCE: Status: ACTIVE | Noted: 2021-11-19

## 2021-11-19 PROBLEM — E78.5 OTHER AND UNSPECIFIED HYPERLIPIDEMIA: Status: ACTIVE | Noted: 2021-03-22

## 2021-11-19 PROBLEM — J38.00 VOCAL CORD PARESIS: Status: ACTIVE | Noted: 2021-11-19

## 2021-11-19 PROBLEM — N52.9 IMPOTENCE: Status: ACTIVE | Noted: 2021-03-22

## 2021-11-19 PROBLEM — N18.30 CHRONIC KIDNEY DISEASE, STAGE III (MODERATE): Status: ACTIVE | Noted: 2017-03-16

## 2021-11-19 PROCEDURE — 99214 OFFICE O/P EST MOD 30 MIN: CPT | Performed by: SURGERY

## 2021-11-19 RX ORDER — ATORVASTATIN CALCIUM 40 MG/1
1 TABLET, FILM COATED ORAL NIGHTLY
COMMUNITY
Start: 2021-10-23

## 2021-11-19 RX ORDER — INSULIN ASPART 100 [IU]/ML
25 INJECTION, SOLUTION INTRAVENOUS; SUBCUTANEOUS
COMMUNITY
Start: 2021-07-27 | End: 2022-04-18

## 2021-11-19 RX ORDER — CILOSTAZOL 50 MG/1
1 TABLET ORAL 2 TIMES DAILY
COMMUNITY
Start: 2021-10-18 | End: 2022-02-04 | Stop reason: HOSPADM

## 2021-11-19 RX ORDER — ALBUTEROL SULFATE 90 UG/1
2 AEROSOL, METERED RESPIRATORY (INHALATION) 4 TIMES DAILY PRN
COMMUNITY
Start: 2021-07-27

## 2021-11-19 NOTE — H&P
"HPI     I had the pleasure of seeing your patient in the office today for follow up.  As you recall, the patient is a 66 y.o. male who we are currently following for history of peripheral vascular disease.  He previously underwent left femoral endarterectomy with retrograde angioplasty and stenting of a left common iliac artery stenosis.  He returns today for continued surveillance and had repeat NIC/PVR which I did personally review.  Today the value on the right is 0.33, and on the left is 0.62.  This represents severe arterial insufficiency on the right, and moderate arterial insufficiency on the left.  Previously he was only having minimal claudication but now today he reports increasing claudication in the right calf especially that is now coming at very short distances and making it difficult for him to ambulate.  He has mild claudication on the left but this is not bothersome.  He denies any rest pain.  He continues on aspirin, Pletal, as well as a statin.    Review of Systems   Constitutional: Negative.  Negative for activity change, appetite change, chills and fever.   HENT: Negative.  Negative for congestion, sneezing and sore throat.    Eyes: Negative.    Respiratory: Negative.  Negative for chest tightness and shortness of breath.    Cardiovascular: Negative.  Negative for chest pain, palpitations and leg swelling.   Gastrointestinal: Negative.  Negative for abdominal distention, abdominal pain, nausea and vomiting.   Endocrine: Negative.    Genitourinary: Negative.    Musculoskeletal: Negative.         Worsening right lower extremity claudication note short distances.  This is a change from previous.   Skin: Negative.    Allergic/Immunologic: Negative.    Neurological: Negative.    Hematological: Negative.    Psychiatric/Behavioral: Negative.        /74 (BP Location: Left arm, Patient Position: Sitting, Cuff Size: Adult)   Pulse 114   Resp 18   Ht 170.2 cm (67\")   Wt 106 kg (233 lb)   SpO2 " 94%   BMI 36.49 kg/m²   Physical Exam  Vitals reviewed.   Constitutional:       Appearance: Normal appearance.   HENT:      Head: Normocephalic and atraumatic.      Nose: Nose normal.      Mouth/Throat:      Mouth: Mucous membranes are moist.   Eyes:      Extraocular Movements: Extraocular movements intact.      Pupils: Pupils are equal, round, and reactive to light.   Cardiovascular:      Rate and Rhythm: Normal rate. Rhythm irregularly irregular.      Pulses:           Carotid pulses are 2+ on the right side and 2+ on the left side.       Radial pulses are 2+ on the right side and 2+ on the left side.        Femoral pulses are 2+ on the right side and 2+ on the left side.       Dorsalis pedis pulses are detected w/ Doppler on the right side and 1+ on the left side.        Posterior tibial pulses are 0 on the right side and detected w/ Doppler on the left side.      Comments: Both feet are warm.  He has no wounds.  Pulmonary:      Effort: Pulmonary effort is normal. No respiratory distress.   Abdominal:      General: There is no distension.      Palpations: Abdomen is soft. There is no mass.      Tenderness: There is no abdominal tenderness.   Musculoskeletal:         General: Normal range of motion.      Cervical back: Normal range of motion and neck supple.      Right lower leg: No edema.      Left lower leg: No edema.      Comments: He has known chronic foot drop of the left foot and wears a brace for this.   Skin:     General: Skin is warm and dry.      Capillary Refill: Capillary refill takes 2 to 3 seconds.   Neurological:      General: No focal deficit present.      Mental Status: He is alert and oriented to person, place, and time.   Psychiatric:         Mood and Affect: Mood normal.         Behavior: Behavior normal.         Thought Content: Thought content normal.         Judgment: Judgment normal.         DIAGNOSTIC DATA:        US Ankle / Brachial Indices Extremity Complete    Result Date:  11/15/2021  Narrative:  History: PAD  Comments: Bilateral lower extremity arterial with multi-level pulse volume recordings and segmental pressures were performed at rest and stress.  The right ankle/brachial index is 0.33. The waveforms are monophasic and flattened at the ankle.These findings are consistent with severe arterial insufficiency of the right lower extremity at rest.  The left ankle/brachial index is 0.62. The waveforms are biphasic and dampened at the ankle. These findings are consistent with moderate arterial insufficiency of the left lower extremity at rest.      Impression: Impression: 1. Severe arterial insufficiency of the right lower extremity at rest. 2. Moderate arterial insufficiency of the left lower extremity at rest.   This report was finalized on 11/15/2021 15:22 by Dr. Constantino Metzger MD.      Patient Active Problem List   Diagnosis   • Spinal stenosis of lumbar region   • Back pain   • Degeneration of intervertebral disc of lumbosacral region   • Essential hypertension   • Lumbar disc herniation with radiculopathy   • Type 2 diabetes mellitus without complication, with long-term current use of insulin ([facility])   • Constipation due to opioid therapy   • Gastroesophageal reflux disease without esophagitis   • Panlobular emphysema ([facility])   • Paroxysmal atrial fibrillation ([facility])   • Hx of colonic polyps   • Class 2 severe obesity due to excess calories with serious comorbidity and body mass index (BMI) of 39.0 to 39.9 in adult ([facility])   • Atherosclerosis 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 ([facility])   • H/O diabetic foot ulcer   • Anemia   • BiPAP (biphasic positive airway pressure) dependence   • Chronic kidney disease, stage III (moderate) ([facility])   • Diabetic nephropathy associated with type 2 diabetes mellitus ([facility])   • Diabetic neuropathy ([facility])   • Difficulty with BiPAP use   • Impotence   • Insomnia   • Left foot drop   • Lumbosacral radiculitis   • Obstructive sleep apnea   •  Other and unspecified hyperlipidemia   • PVD (peripheral vascular disease) with claudication (HCC)   • Tobacco use disorder   • Vocal cord paralysis   • Vocal cord paresis         ICD-10-CM ICD-9-CM   1. Atherosclerosis of native artery of right lower extremity with intermittent claudication (HCC)  I70.211 440.21   2. Essential hypertension  I10 401.9   3. Mixed hyperlipidemia  E78.2 272.2   4. Type 2 diabetes mellitus without complication, with long-term current use of insulin (HCC)  E11.9 250.00    Z79.4 V58.67   5. Class 2 obesity due to excess calories without serious comorbidity with body mass index (BMI) of 36.0 to 36.9 in adult  E66.09 278.00    Z68.36 V85.36       Lab Frequency Next Occurrence   Follow Anesthesia Guidelines / Standing Orders Once 12/07/2018   Obtain Informed Consent Once 12/12/2018   Follow Anesthesia Guidelines / Standing Orders Once 12/17/2018   Obtain Informed Consent Once 12/22/2018   Follow Anesthesia Guidelines / Standing Orders Once 01/21/2019   Obtain Informed Consent Once 01/26/2019   Provide NPO Instructions to Patient Once 01/26/2019   Hepatic Function Panel Once 06/17/2020   Hepatic Function Panel Once 12/19/2020   US Ankle / Brachial Indices Extremity Complete Once 05/15/2021       PLAN: After thoroughly evaluating Edy NIKO Cosby, I believe the best course of action is to proceed with aortogram and right lower extremity angiogram with possible intervention.  Patient presents today after repeat NIC/PVR that shows severe arterial insufficiency in the right lower extremity.  Up to this point he has been having only mild claudication but since her last visit this is worsened and is now at short distances and is lifestyle limiting.  As such she would benefit from aortogram and right lower extremity angiogram with possible revascularization to improve the symptoms and allow him to ambulate more easily.  Risks of angiogram were discussed.  These include, but are not limited to,  bleeding, infection, vessel damage, nerve damage, embolus, and loss of limb.  The patient understands these risks and wishes to proceed with procedure. The patient is to continue taking their medications as previously discussed.   I did discuss vascular risk factors as they pertain to the progression of vascular disease including controlling diabetes, hypertension, and hyperlipidemia. These factors remain stable. Patient's Body mass index is 36.49 kg/m². indicating that he is morbidly obese (BMI > 40 or > 35 with obesity - related health condition). Obesity-related health conditions include the following: hypertension, diabetes mellitus, dyslipidemias and peripheral vascular disease. Obesity is unchanged. BMI is is above average; BMI management plan is completed. We discussed portion control and increasing exercise. This was all discussed in full with complete understanding.

## 2021-11-19 NOTE — PATIENT INSTRUCTIONS
"BMI for Adults  What is BMI?  Body mass index (BMI) is a number that is calculated from a person's weight and height. BMI can help estimate how much of a person's weight is composed of fat. BMI does not measure body fat directly. Rather, it is an alternative to procedures that directly measure body fat, which can be difficult and expensive.  BMI can help identify people who may be at higher risk for certain medical problems.  What are BMI measurements used for?  BMI is used as a screening tool to identify possible weight problems. It helps determine whether a person is obese, overweight, a healthy weight, or underweight.  BMI is useful for:  · Identifying a weight problem that may be related to a medical condition or may increase the risk for medical problems.  · Promoting changes, such as changes in diet and exercise, to help reach a healthy weight. BMI screening can be repeated to see if these changes are working.  How is BMI calculated?  BMI involves measuring your weight in relation to your height. Both height and weight are measured, and the BMI is calculated from those numbers. This can be done either in English ([place] or metric measurements. Note that charts and online BMI calculators are available to help you find your BMI quickly and easily without having to do these calculations yourself.  To calculate your BMI in English ([place] measurements:    1. Measure your weight in pounds (lb).  2. Multiply the number of pounds by 703.  ? For example, for a person who weighs 180 lb, multiply that number by 703, which equals 126,540.  3. Measure your height in inches. Then multiply that number by itself to get a measurement called \"inches squared.\"  ? For example, for a person who is 70 inches tall, the \"inches squared\" measurement is 70 inches x 70 inches, which equals 4,900 inches squared.  4. Divide the total from step 2 (number of lb x 703) by the total from step 3 (inches squared): 126,540 ÷ 4,900 = 25.8. This is " "your BMI.    To calculate your BMI in metric measurements:  1. Measure your weight in kilograms (kg).  2. Measure your height in meters (m). Then multiply that number by itself to get a measurement called \"meters squared.\"  ? For example, for a person who is 1.75 m tall, the \"meters squared\" measurement is 1.75 m x 1.75 m, which is equal to 3.1 meters squared.  3. Divide the number of kilograms (your weight) by the meters squared number. In this example: 70 ÷ 3.1 = 22.6. This is your BMI.  What do the results mean?  BMI charts are used to identify whether you are underweight, normal weight, overweight, or obese. The following guidelines will be used:  · Underweight: BMI less than 18.5.  · Normal weight: BMI between 18.5 and 24.9.  · Overweight: BMI between 25 and 29.9.  · Obese: BMI of 30 or above.  Keep these notes in mind:  · Weight includes both fat and muscle, so someone with a muscular build, such as an athlete, may have a BMI that is higher than 24.9. In cases like these, BMI is not an accurate measure of body fat.  · To determine if excess body fat is the cause of a BMI of 25 or higher, further assessments may need to be done by a health care provider.  · BMI is usually interpreted in the same way for men and women.  Where to find more information  For more information about BMI, including tools to quickly calculate your BMI, go to these websites:  · Centers for Disease Control and Prevention: www.cdc.gov  · American Heart Association: www.heart.org  · National Heart, Lung, and Blood [place]: www.nhlbi.nih.gov  Summary  · Body mass index (BMI) is a number that is calculated from a person's weight and height.  · BMI may help estimate how much of a person's weight is composed of fat. BMI can help identify those who may be at higher risk for certain medical problems.  · BMI can be measured using English measurements or metric measurements.  · BMI charts are used to identify whether you are underweight, normal " weight, overweight, or obese.  This information is not intended to replace advice given to you by your health care provider. Make sure you discuss any questions you have with your health care provider.  Document Revised: 09/09/2020 Document Reviewed: 07/17/2020  Elsevier Patient Education © 2021 [employer]

## 2021-11-19 NOTE — PROGRESS NOTES
11/19/2021      Mark Villela MD  [address] [place] [address] [address]  [place] [address]        Edy Cosby  1955    Chief Complaint   Patient presents with   • Follow-up     6 month follow-up with testing for peripheral disease and atherosclerosis of extremities with intermittent cladication.  Pt states he has constant shooting pain and has to stop to rest after walking 2 blocks. Pt denies any stroke type symptoms.   • Former smoker     Verified pt former smoker       Dear Mark Villela MD:    HPI     I had the pleasure of seeing your patient in the office today for follow up.  As you recall, the patient is a 66 y.o. male who we are currently following for history of peripheral vascular disease.  He previously underwent left femoral endarterectomy with retrograde angioplasty and stenting of a left common iliac artery stenosis.  He returns today for continued surveillance and had repeat NIC/PVR which I did personally review.  Today the value on the right is 0.33, and on the left is 0.62.  This represents severe arterial insufficiency on the right, and moderate arterial insufficiency on the left.  Previously he was only having minimal claudication but now today he reports increasing claudication in the right calf especially that is now coming at very short distances and making it difficult for him to ambulate.  He has mild claudication on the left but this is not bothersome.  He denies any rest pain.  He continues on aspirin, Pletal, as well as a statin.    Review of Systems   Constitutional: Negative.  Negative for activity change, appetite change, chills and fever.   HENT: Negative.  Negative for congestion, sneezing and sore throat.    Eyes: Negative.    Respiratory: Negative.  Negative for chest tightness and shortness of breath.    Cardiovascular: Negative.  Negative for chest pain, palpitations and leg swelling.   Gastrointestinal: Negative.  Negative for abdominal distention, abdominal pain, nausea  "and vomiting.   Endocrine: Negative.    Genitourinary: Negative.    Musculoskeletal: Negative.         Worsening right lower extremity claudication note short distances.  This is a change from previous.   Skin: Negative.    Allergic/Immunologic: Negative.    Neurological: Negative.    Hematological: Negative.    Psychiatric/Behavioral: Negative.        /74 (BP Location: Left arm, Patient Position: Sitting, Cuff Size: Adult)   Pulse 114   Resp 18   Ht 170.2 cm (67\")   Wt 106 kg (233 lb)   SpO2 94%   BMI 36.49 kg/m²   Physical Exam  Vitals reviewed.   Constitutional:       Appearance: Normal appearance.   HENT:      Head: Normocephalic and atraumatic.      Nose: Nose normal.      Mouth/Throat:      Mouth: Mucous membranes are moist.   Eyes:      Extraocular Movements: Extraocular movements intact.      Pupils: Pupils are equal, round, and reactive to light.   Cardiovascular:      Rate and Rhythm: Normal rate. Rhythm irregularly irregular.      Pulses:           Carotid pulses are 2+ on the right side and 2+ on the left side.       Radial pulses are 2+ on the right side and 2+ on the left side.        Femoral pulses are 2+ on the right side and 2+ on the left side.       Dorsalis pedis pulses are detected w/ Doppler on the right side and 1+ on the left side.        Posterior tibial pulses are 0 on the right side and detected w/ Doppler on the left side.      Comments: Both feet are warm.  He has no wounds.  Pulmonary:      Effort: Pulmonary effort is normal. No respiratory distress.   Abdominal:      General: There is no distension.      Palpations: Abdomen is soft. There is no mass.      Tenderness: There is no abdominal tenderness.   Musculoskeletal:         General: Normal range of motion.      Cervical back: Normal range of motion and neck supple.      Right lower leg: No edema.      Left lower leg: No edema.      Comments: He has known chronic foot drop of the left foot and wears a brace for this. "   Skin:     General: Skin is warm and dry.      Capillary Refill: Capillary refill takes 2 to 3 seconds.   Neurological:      General: No focal deficit present.      Mental Status: He is alert and oriented to person, place, and time.   Psychiatric:         Mood and Affect: Mood normal.         Behavior: Behavior normal.         Thought Content: Thought content normal.         Judgment: Judgment normal.         DIAGNOSTIC DATA:        US Ankle / Brachial Indices Extremity Complete    Result Date: 11/15/2021  Narrative:  History: PAD  Comments: Bilateral lower extremity arterial with multi-level pulse volume recordings and segmental pressures were performed at rest and stress.  The right ankle/brachial index is 0.33. The waveforms are monophasic and flattened at the ankle.These findings are consistent with severe arterial insufficiency of the right lower extremity at rest.  The left ankle/brachial index is 0.62. The waveforms are biphasic and dampened at the ankle. These findings are consistent with moderate arterial insufficiency of the left lower extremity at rest.      Impression: Impression: 1. Severe arterial insufficiency of the right lower extremity at rest. 2. Moderate arterial insufficiency of the left lower extremity at rest.   This report was finalized on 11/15/2021 15:22 by Dr. Constantino Metzger MD.      Patient Active Problem List   Diagnosis   • Spinal stenosis of lumbar region   • Back pain   • Degeneration of intervertebral disc of lumbosacral region   • Essential hypertension   • Lumbar disc herniation with radiculopathy   • Type 2 diabetes mellitus without complication, with long-term current use of insulin (HCC)   • Constipation due to opioid therapy   • Gastroesophageal reflux disease without esophagitis   • Panlobular emphysema (HCC)   • Paroxysmal atrial fibrillation (HCC)   • Hx of colonic polyps   • Class 2 severe obesity due to excess calories with serious comorbidity and body mass index (BMI)  of 39.0 to 39.9 in adult ([facility])   • Atherosclerosis 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 ([facility])   • H/O diabetic foot ulcer   • Anemia   • BiPAP (biphasic positive airway pressure) dependence   • Chronic kidney disease, stage III (moderate) ([facility])   • Diabetic nephropathy associated with type 2 diabetes mellitus ([facility])   • Diabetic neuropathy ([facility])   • Difficulty with BiPAP use   • Impotence   • Insomnia   • Left foot drop   • Lumbosacral radiculitis   • Obstructive sleep apnea   • Other and unspecified hyperlipidemia   • PVD (peripheral vascular disease) with claudication ([facility])   • Tobacco use disorder   • Vocal cord paralysis   • Vocal cord paresis         ICD-10-CM ICD-9-CM   1. Atherosclerosis of native artery of right lower extremity with intermittent claudication ([facility])  I70.211 440.21   2. Essential hypertension  I10 401.9   3. Mixed hyperlipidemia  E78.2 272.2   4. Type 2 diabetes mellitus without complication, with long-term current use of insulin ([facility])  E11.9 250.00    Z79.4 V58.67   5. Class 2 obesity due to excess calories without serious comorbidity with body mass index (BMI) of 36.0 to 36.9 in adult  E66.09 278.00    Z68.36 V85.36       Lab Frequency Next Occurrence   Follow Anesthesia Guidelines / Standing Orders Once 12/07/2018   Obtain Informed Consent Once 12/12/2018   Follow Anesthesia Guidelines / Standing Orders Once 12/17/2018   Obtain Informed Consent Once 12/22/2018   Follow Anesthesia Guidelines / Standing Orders Once 01/21/2019   Obtain Informed Consent Once 01/26/2019   Provide NPO Instructions to Patient Once 01/26/2019   Hepatic Function Panel Once 06/17/2020   Hepatic Function Panel Once 12/19/2020   US Ankle / Brachial Indices Extremity Complete Once 05/15/2021       PLAN: After thoroughly evaluating Edy Cosby, I believe the best course of action is to proceed with aortogram and right lower extremity angiogram with possible intervention.  Patient presents today  after repeat NIC/PVR that shows severe arterial insufficiency in the right lower extremity.  Up to this point he has been having only mild claudication but since her last visit this is worsened and is now at short distances and is lifestyle limiting.  As such she would benefit from aortogram and right lower extremity angiogram with possible revascularization to improve the symptoms and allow him to ambulate more easily.  Risks of angiogram were discussed.  These include, but are not limited to, bleeding, infection, vessel damage, nerve damage, embolus, and loss of limb.  The patient understands these risks and wishes to proceed with procedure. The patient is to continue taking their medications as previously discussed.   I did discuss vascular risk factors as they pertain to the progression of vascular disease including controlling diabetes, hypertension, and hyperlipidemia. These factors remain stable. Patient's Body mass index is 36.49 kg/m². indicating that he is morbidly obese (BMI > 40 or > 35 with obesity - related health condition). Obesity-related health conditions include the following: hypertension, diabetes mellitus, dyslipidemias and peripheral vascular disease. Obesity is unchanged. BMI is is above average; BMI management plan is completed. We discussed portion control and increasing exercise. This was all discussed in full with complete understanding.  Thank you for allowing me to participate in the care of your patient.  Please do not hesitate to call with any questions or concerns.  We will keep you aware of any further encounters with Edy Cosby.      Sincerely Yours,      Duncan Lucio MD

## 2021-12-01 PROBLEM — I70.211 ATHEROSCLEROSIS OF NATIVE ARTERY OF RIGHT LOWER EXTREMITY WITH INTERMITTENT CLAUDICATION (HCC): Status: ACTIVE | Noted: 2021-12-01

## 2021-12-02 DIAGNOSIS — M79.18 ABDOMINAL MUSCLE PAIN: ICD-10-CM

## 2021-12-02 RX ORDER — CYCLOBENZAPRINE HCL 5 MG
TABLET ORAL
Qty: 90 TABLET | Refills: 0 | Status: SHIPPED | OUTPATIENT
Start: 2021-12-02 | End: 2022-01-02

## 2021-12-02 NOTE — TELEPHONE ENCOUNTER
Rashmi Ospina called to request a refill on his medication.       Last office visit : 7/27/2021   Next office visit : 3/28/2022     Requested Prescriptions     Signed Prescriptions Disp Refills    cyclobenzaprine (FLEXERIL) 5 MG tablet 90 tablet 0     Sig: Take 1 tablet by mouth three times daily as needed for muscle spasm APPOINTMENT NEEDED NO FURTHER REFILLS     Authorizing Provider: Lorelei Francis     Ordering User: Ilda Gandhi MA

## 2021-12-13 ENCOUNTER — PRE-ADMISSION TESTING (OUTPATIENT)
Dept: PREADMISSION TESTING | Facility: HOSPITAL | Age: 66
End: 2021-12-13

## 2021-12-13 ENCOUNTER — LAB (OUTPATIENT)
Dept: LAB | Facility: HOSPITAL | Age: 66
End: 2021-12-13

## 2021-12-13 ENCOUNTER — HOSPITAL ENCOUNTER (OUTPATIENT)
Dept: GENERAL RADIOLOGY | Facility: HOSPITAL | Age: 66
Discharge: HOME | End: 2021-12-13

## 2021-12-13 DIAGNOSIS — I70.211 ATHEROSCLEROSIS OF NATIVE ARTERY OF RIGHT LOWER EXTREMITY WITH INTERMITTENT CLAUDICATION (HCC): ICD-10-CM

## 2021-12-13 LAB
ANION GAP SERPL CALCULATED.3IONS-SCNC: 7 MMOL/L (ref 5–15)
BASOPHILS # BLD AUTO: 0.03 10*3/MM3 (ref 0–0.2)
BASOPHILS NFR BLD AUTO: 0.7 % (ref 0–1.5)
BUN SERPL-MCNC: 29 MG/DL (ref 8–23)
BUN/CREAT SERPL: 17 (ref 7–25)
CALCIUM SPEC-SCNC: 9.5 MG/DL (ref 8.6–10.5)
CHLORIDE SERPL-SCNC: 104 MMOL/L (ref 98–107)
CO2 SERPL-SCNC: 30 MMOL/L (ref 22–29)
CREAT SERPL-MCNC: 1.71 MG/DL (ref 0.76–1.27)
DEPRECATED RDW RBC AUTO: 46.8 FL (ref 37–54)
EOSINOPHIL # BLD AUTO: 0.12 10*3/MM3 (ref 0–0.4)
EOSINOPHIL NFR BLD AUTO: 2.7 % (ref 0.3–6.2)
ERYTHROCYTE [DISTWIDTH] IN BLOOD BY AUTOMATED COUNT: 15.4 % (ref 12.3–15.4)
GFR SERPL CREATININE-BSD FRML MDRD: 49 ML/MIN/1.73
GLUCOSE SERPL-MCNC: 153 MG/DL (ref 65–99)
HCT VFR BLD AUTO: 30.9 % (ref 37.5–51)
HGB BLD-MCNC: 9.6 G/DL (ref 13–17.7)
IMM GRANULOCYTES # BLD AUTO: 0.01 10*3/MM3 (ref 0–0.05)
IMM GRANULOCYTES NFR BLD AUTO: 0.2 % (ref 0–0.5)
LYMPHOCYTES # BLD AUTO: 1.52 10*3/MM3 (ref 0.7–3.1)
LYMPHOCYTES NFR BLD AUTO: 33.9 % (ref 19.6–45.3)
MCH RBC QN AUTO: 26.2 PG (ref 26.6–33)
MCHC RBC AUTO-ENTMCNC: 31.1 G/DL (ref 31.5–35.7)
MCV RBC AUTO: 84.4 FL (ref 79–97)
MONOCYTES # BLD AUTO: 0.32 10*3/MM3 (ref 0.1–0.9)
MONOCYTES NFR BLD AUTO: 7.1 % (ref 5–12)
NEUTROPHILS NFR BLD AUTO: 2.49 10*3/MM3 (ref 1.7–7)
NEUTROPHILS NFR BLD AUTO: 55.4 % (ref 42.7–76)
NRBC BLD AUTO-RTO: 0 /100 WBC (ref 0–0.2)
PLATELET # BLD AUTO: 202 10*3/MM3 (ref 140–450)
PMV BLD AUTO: 12.3 FL (ref 6–12)
POTASSIUM SERPL-SCNC: 4.5 MMOL/L (ref 3.5–5.2)
RBC # BLD AUTO: 3.66 10*6/MM3 (ref 4.14–5.8)
SARS-COV-2 ORF1AB RESP QL NAA+PROBE: NOT DETECTED
SODIUM SERPL-SCNC: 141 MMOL/L (ref 136–145)
WBC NRBC COR # BLD: 4.49 10*3/MM3 (ref 3.4–10.8)

## 2021-12-13 PROCEDURE — 80048 BASIC METABOLIC PNL TOTAL CA: CPT

## 2021-12-13 PROCEDURE — C9803 HOPD COVID-19 SPEC COLLECT: HCPCS

## 2021-12-13 PROCEDURE — 85025 COMPLETE CBC W/AUTO DIFF WBC: CPT

## 2021-12-13 PROCEDURE — 36415 COLL VENOUS BLD VENIPUNCTURE: CPT

## 2021-12-13 PROCEDURE — 93005 ELECTROCARDIOGRAM TRACING: CPT

## 2021-12-13 PROCEDURE — U0004 COV-19 TEST NON-CDC HGH THRU: HCPCS

## 2021-12-13 PROCEDURE — 93010 ELECTROCARDIOGRAM REPORT: CPT | Performed by: INTERNAL MEDICINE

## 2021-12-13 PROCEDURE — 71045 X-RAY EXAM CHEST 1 VIEW: CPT

## 2021-12-13 NOTE — DISCHARGE INSTRUCTIONS
DAY OF SURGERY INSTRUCTIONS          ARRIVAL TIME: AS DIRECTED BY OFFICE    YOU MAY TAKE THE FOLLOWING MEDICATION(S) THE MORNING OF SURGERY WITH A SIP OF WATER: ***take labatelol am of surgery and hold lisinopril x 24 hours      ALL OTHER HOME MEDICATION CHECK WITH YOUR PHYSICIAN      DO NOT TAKE ANY ERECTILE DYSFUNCTION MEDICATIONS (EX: CIALIS, VIAGRA) 24 HOURS PRIOR TO SURGERY                      MANAGING PAIN AFTER SURGERY    We know you are probably wondering what your pain will be like after surgery.  Following surgery it is unrealistic to expect you will not have pain.   Pain is how our bodies let us know that something is wrong or cautions us to be careful.  That said, our goal is to make your pain tolerable.    Methods we may use to treat your pain include (oral or IV medications, PCAs, epidurals, nerve blocks, etc.)   While some procedures require IV pain medications for a short time after surgery, transitioning to pain medications by mouth allows for better management of pain.   Your nurse will encourage you to take oral pain medications whenever possible.  IV medications work almost immediately, but only last a short while.  Taking medications by mouth allows for a more constant level of medication in your blood stream for a longer period of time.      Once your pain is out of control it is harder to get back under control.  It is important you are aware when your next dose of pain medication is due.  If you are admitted, your nurse may write the time of your next dose on the white board in your room to help you remember.      We are interested in your pain and encourage you to inform us about aggravating factors during your visit.   Many times a simple repositioning every few hours can make a big difference.    If your physician says it is okay, do not let your pain prevent you from getting out of bed. Be sure to call your nurse for assistance prior to getting up so you do not fall.      Before  surgery, please decide your tolerable pain goal.  These faces help describe the pain ratings we use on a 0-10 scale.   Be prepared to tell us your goal and whether or not you take pain or anxiety medications at home.          BEFORE YOU COME TO THE HOSPITAL  (Pre-op instructions)  • Do not eat, drink, smoke or chew gum after midnight the night before surgery.  This also includes no mints.  • Morning of surgery take only the medicines you have been instructed with a sip of water unless otherwise instructed  by your physician.  • Do not shave, wear makeup or dark nail polish.  • Remove all jewelry including rings.  • Leave anything you consider valuable at home.  • Leave your suitcase in the car until after your surgery.  • Bring the following with you if applicable:  o Picture ID and insurance, Medicare or Medicaid cards  o Co-pay/deductible required by insurance (cash, check, credit card)  o Copy of advance directive, living will or power-of- documents if not brought to pre-work  o CPAP or BIPAP mask and tubing  o Relaxation aids ( book, magazine), etc.  o Hearing aids                        ON THE DAY OF SURGERY  · On the day of surgery check in at registration located at the main entrance of the hospital. Only one family member or friend are allowed per patient.  ? You will be registered and given a beeper with instructions where to wait in the main lobby.  ? When your beeper lights up and vibrates a member of the Outpatient Surgery staff will meet you at the double doors under the stair steps and escort you to your preoperative room.   · You may have cloth compression devices placed on your legs. These help to prevent blood clots and reduce swelling in your legs.  · An IV may be inserted into one of your veins.  · In the operating room, you may be given one or more of the following:  ? A medicine to help you relax (sedative).  ? A medicine to numb the area (local anesthetic).  ? A medicine to make you  "fall asleep (general anesthetic).  ? A medicine that is injected into an area of your body to numb everything below the injection site (regional anesthetic).  · Your surgical site will be marked or identified.  · You may be given an antibiotic through your IV to help prevent infection.  Contact a health care provider if you:  · Develop a fever of more than 100.4°F (38°C) or other feelings of illness during the 48 hours before your surgery.  · Have symptoms that get worse.  Have questions or concerns about your surgery    General Anesthesia/Surgery, Adult  General anesthesia is the use of medicines to make a person \"go to sleep\" (unconscious) for a medical procedure. General anesthesia must be used for certain procedures, and is often recommended for procedures that:  · Last a long time.  · Require you to be still or in an unusual position.  · Are major and can cause blood loss.  The medicines used for general anesthesia are called general anesthetics. As well as making you unconscious for a certain amount of time, these medicines:  · Prevent pain.  · Control your blood pressure.  · Relax your muscles.  Tell a health care provider about:  · Any allergies you have.  · All medicines you are taking, including vitamins, herbs, eye drops, creams, and over-the-counter medicines.  · Any problems you or family members have had with anesthetic medicines.  · Types of anesthetics you have had in the past.  · Any blood disorders you have.  · Any surgeries you have had.  · Any medical conditions you have.  · Any recent upper respiratory, chest, or ear infections.  · Any history of:  ? Heart or lung conditions, such as heart failure, sleep apnea, asthma, or chronic obstructive pulmonary disease (COPD).  ?  service.  ? Depression or anxiety.  · Any tobacco or drug use, including marijuana or alcohol use.  · Whether you are pregnant or may be pregnant.  What are the risks?  Generally, this is a safe procedure. However, " problems may occur, including:  · Allergic reaction.  · Lung and heart problems.  · Inhaling food or liquid from the stomach into the lungs (aspiration).  · Nerve injury.  · Air in the bloodstream, which can lead to stroke.  · Extreme agitation or confusion (delirium) when you wake up from the anesthetic.  · Waking up during your procedure and being unable to move. This is rare.  These problems are more likely to develop if you are having a major surgery or if you have an advanced or serious medical condition. You can prevent some of these complications by answering all of your health care provider's questions thoroughly and by following all instructions before your procedure.  General anesthesia can cause side effects, including:  · Nausea or vomiting.  · A sore throat from the breathing tube.  · Hoarseness.  · Wheezing or coughing.  · Shaking chills.  · Tiredness.  · Body aches.  · Anxiety.  · Sleepiness or drowsiness.  · Confusion or agitation.  RISKS AND COMPLICATIONS OF SURGERY  Your health care provider will discuss possible risks and complications with you before surgery. Common risks and complications include:    · Problems due to the use of anesthetics.  · Blood loss and replacement (does not apply to minor surgical procedures).  · Temporary increase in pain due to surgery.  · Uncorrected pain or problems that the surgery was meant to correct.  · Infection.  · New damage.    What happens before the procedure?    Medicines  Ask your health care provider about:  · Changing or stopping your regular medicines. This is especially important if you are taking diabetes medicines or blood thinners.  · Taking medicines such as aspirin and ibuprofen. These medicines can thin your blood. Do not take these medicines unless your health care provider tells you to take them.  · Taking over-the-counter medicines, vitamins, herbs, and supplements. Do not take these during the week before your procedure unless your health  care provider approves them.  General instructions  · Starting 3-6 weeks before the procedure, do not use any products that contain nicotine or tobacco, such as cigarettes and e-cigarettes. If you need help quitting, ask your health care provider.  · If you brush your teeth on the morning of the procedure, make sure to spit out all of the toothpaste.  · Tell your health care provider if you become ill or develop a cold, cough, or fever.  · If instructed by your health care provider, bring your sleep apnea device with you on the day of your surgery (if applicable).  · Ask your health care provider if you will be going home the same day, the following day, or after a longer hospital stay.  ? Plan to have someone take you home from the hospital or clinic.  ? Plan to have a responsible adult care for you for at least 24 hours after you leave the hospital or clinic. This is important.  What happens during the procedure?  · You will be given anesthetics through both of the following:  ? A mask placed over your nose and mouth.  ? An IV in one of your veins.  · You may receive a medicine to help you relax (sedative).  · After you are unconscious, a breathing tube may be inserted down your throat to help you breathe. This will be removed before you wake up.  · An anesthesia specialist will stay with you throughout your procedure. He or she will:  ? Keep you comfortable and safe by continuing to give you medicines and adjusting the amount of medicine that you get.  ? Monitor your blood pressure, pulse, and oxygen levels to make sure that the anesthetics do not cause any problems.  The procedure may vary among health care providers and hospitals.  What happens after the procedure?  · Your blood pressure, temperature, heart rate, breathing rate, and blood oxygen level will be monitored until the medicines you were given have worn off.  · You will wake up in a recovery area. You may wake up slowly.  · If you feel anxious or  agitated, you may be given medicine to help you calm down.  · If you will be going home the same day, your health care provider may check to make sure you can walk, drink, and urinate.  · Your health care provider will treat any pain or side effects you have before you go home.  · Do not drive for 24 hours if you were given a sedative.  Summary  · General anesthesia is used to keep you still and prevent pain during a procedure.  · It is important to tell your healthcare provider about your medical history and any surgeries you have had, and previous experience with anesthesia.  · Follow your healthcare provider’s instructions about when to stop eating, drinking, or taking certain medicines before your procedure.  · Plan to have someone take you home from the hospital or clinic.  This information is not intended to replace advice given to you by your health care provider. Make sure you discuss any questions you have with your health care provider.  Document Released: 03/26/2009 Document Revised: 08/03/2018 Document Reviewed: 08/03/2018  [employer] Interactive Patient Education © 2019 [employer]       Fall Prevention in Hospitals, Adult  As a hospital patient, your condition and the treatments you receive can increase your risk for falls. Some additional risk factors for falls in a hospital include:  · Being in an unfamiliar environment.  · Being on bed rest.  · Your surgery.  · Taking certain medicines.  · Your tubing requirements, such as intravenous (IV) therapy or catheters.  It is important that you learn how to decrease fall risks while at the hospital. Below are important tips that can help prevent falls.  SAFETY TIPS FOR PREVENTING FALLS  Talk about your risk of falling.  · Ask your health care provider why you are at risk for falling. Is it your medicine, illness, tubing placement, or something else?  · Make a plan with your health care provider to keep you safe from falls.  · Ask your health care provider  or pharmacist about side effects of your medicines. Some medicines can make you dizzy or affect your coordination.  Ask for help.  · Ask for help before getting out of bed. You may need to press your call button.  · Ask for assistance in getting safely to the toilet.  · Ask for a walker or cane to be put at your bedside. Ask that most of the side rails on your bed be placed up before your health care provider leaves the room.  · Ask family or friends to sit with you.  · Ask for things that are out of your reach, such as your glasses, hearing aids, telephone, bedside table, or call button.  Follow these tips to avoid falling:  · Stay lying or seated, rather than standing, while waiting for help.  · Wear rubber-soled slippers or shoes whenever you walk in the hospital.  · Avoid quick, sudden movements.  ¨ Change positions slowly.  ¨ Sit on the side of your bed before standing.  ¨ Stand up slowly and wait before you start to walk.  · Let your health care provider know if there is a spill on the floor.  · Pay careful attention to the medical equipment, electrical cords, and tubes around you.  · When you need help, use your call button by your bed or in the bathroom. Wait for one of your health care providers to help you.  · If you feel dizzy or unsure of your footing, return to bed and wait for assistance.  · Avoid being distracted by the TV, telephone, or another person in your room.  · Do not lean or support yourself on rolling objects, such as IV poles or bedside tables.     This information is not intended to replace advice given to you by your health care provider. Make sure you discuss any questions you have with your health care provider.     Document Released: 12/15/2001 Document Revised: 01/08/2016 Document Reviewed: 08/25/2013  [employer] Interactive Patient Education ©2016 [employer]       [facility]  CHG 4% Patient Instruction Sheet    Chlorhexidine Before Surgery  Chlorhexidine gluconate (CHG)  is a germ-killing (antiseptic) solution that is used to clean the skin. It gets rid of the bacteria that normally live on the skin. Cleaning your skin with CHG before surgery helps lower the risk for infection after surgery.    How to use CHG solution  · You will take 2 showers, one shower the night before surgery, the second shower the morning of surgery before coming to the hospital.  · Use CHG only as told by your health care provider, and follow the instructions on the label.  · Use CHG solution while taking a shower. Follow these steps when using CHG solution (unless your health care provider gives you different instructions):  1. Start the shower.  2. Use your normal soap and shampoo to wash your face and hair.  3. Turn off the shower or move out of the shower stream.  4. Pour the CHG onto a clean washcloth. Do not use any type of brush or rough-edged sponge.  5. Starting at your neck, lather your body down to your toes. Make sure you:  6. Pay special attention to the part of your body where you will be having surgery. Scrub this area for at least 1 minute.  7. Use the full amount of CHG as directed. Usually, this is one half bottle for each shower.  8. Do not use CHG on your head or face. If the solution gets into your ears or eyes, rinse them well with water.  9. Avoid your genital area.  10. Avoid any areas of skin that have broken skin, cuts, or scrapes.  11. Scrub your back and under your arms. Make sure to wash skin folds.  12. Let the lather sit on your skin for 1-2 minutes or as long as told by your health care  provider.  13. Thoroughly rinse your entire body in the shower. Make sure that all body creases and crevices are rinsed well.  14. Dry off with a clean towel. Do not put any substances on your body afterward, such as powder, lotion, or perfume.  15. Put on clean clothes or pajamas.  16. If it is the night before your surgery, sleep in clean sheets.    What are the risks?  Risks of using CHG  include:  · A skin reaction.  · Hearing loss, if CHG gets in your ears.  · Eye injury, if CHG gets in your eyes and is not rinsed out.  · The CHG product catching fire.  Make sure that you avoid smoking and flames after applying CHG to your skin.  Do not use CHG:  · If you have a chlorhexidine allergy or have previously reacted to chlorhexidine.  · On babies younger than 2 months of age.      On the day of surgery, when you are taken to your room in Outpatient Surgery you will be given a CHG prepackaged cloth to wipe the site for your surgery.  How to use CHG prepackaged cloths  · Follow the instructions on the label.  · Use the CHG cloth on clean, dry skin. Follow these steps when using a CHG cloth (unless your health care provider gives you different instructions):  1. Using the CHG cloth, vigorously scrub the part of your body where you will be having surgery. Scrub using a back-and-forth motion for 3 minutes. The area on your body should be completely wet with CHG when you are finished scrubbing.  2. Do not rinse. Discard the cloth and let the area air-dry for 1 minute. Do not put any substances on your body afterward, such as powder, lotion, or perfume.  Contact a health care provider if:  · Your skin gets irritated after scrubbing.  · You have questions about using your solution or cloth.  Get help right away if:  · Your eyes become very red or swollen.  · Your eyes itch badly.  · Your skin itches badly and is red or swollen.  · Your hearing changes.  · You have trouble seeing.  · You have swelling or tingling in your mouth or throat.  · You have trouble breathing.  · You swallow any chlorhexidine.  Summary  · Chlorhexidine gluconate (CHG) is a germ-killing (antiseptic) solution that is used to clean the skin. Cleaning your skin with CHG before surgery helps lower the risk for infection after surgery.  · You may be given CHG to use at home. It may be in a bottle or in a prepackaged cloth to use on your skin.  Carefully follow your health care provider's instructions and the instructions on the product label.  · Do not use CHG if you have a chlorhexidine allergy.  · Contact your health care provider if your skin gets irritated after scrubbing.  This information is not intended to replace advice given to you by your health care provider. Make sure you discuss any questions you have with your health care provider.  Document Released: 09/11/2013 Document Revised: 11/15/2018 Document Reviewed: 11/15/2018  [employer] Interactive Patient Education © 2019 [employer]          PATIENT/FAMILY/RESPONSIBLE PARTY VERBALIZES UNDERSTANDING OF ABOVE EDUCATION.  COPY OF PAIN SCALE GIVEN AND REVIEWED WITH VERBALIZED UNDERSTANDING.

## 2021-12-14 LAB
QT INTERVAL: 342 MS
QTC INTERVAL: 436 MS

## 2021-12-15 ENCOUNTER — TELEPHONE (OUTPATIENT)
Dept: VASCULAR SURGERY | Facility: CLINIC | Age: 66
End: 2021-12-15

## 2021-12-16 ENCOUNTER — ANESTHESIA EVENT (OUTPATIENT)
Dept: PERIOP | Facility: HOSPITAL | Age: 66
End: 2021-12-16

## 2021-12-16 ENCOUNTER — APPOINTMENT (OUTPATIENT)
Dept: INTERVENTIONAL RADIOLOGY/VASCULAR | Facility: HOSPITAL | Age: 66
End: 2021-12-16

## 2021-12-16 ENCOUNTER — ANESTHESIA (OUTPATIENT)
Dept: PERIOP | Facility: HOSPITAL | Age: 66
End: 2021-12-16

## 2021-12-16 ENCOUNTER — HOSPITAL ENCOUNTER (OUTPATIENT)
Facility: HOSPITAL | Age: 66
Setting detail: HOSPITAL OUTPATIENT SURGERY
Discharge: HOME | End: 2021-12-16
Attending: SURGERY | Admitting: SURGERY

## 2021-12-16 VITALS
SYSTOLIC BLOOD PRESSURE: 170 MMHG | DIASTOLIC BLOOD PRESSURE: 70 MMHG | RESPIRATION RATE: 16 BRPM | HEART RATE: 102 BPM | OXYGEN SATURATION: 96 % | TEMPERATURE: 98 F

## 2021-12-16 DIAGNOSIS — I70.211 ATHEROSCLEROSIS OF NATIVE ARTERY OF RIGHT LOWER EXTREMITY WITH INTERMITTENT CLAUDICATION (HCC): ICD-10-CM

## 2021-12-16 LAB
ABO GROUP BLD: NORMAL
BLD GP AB SCN SERPL QL: NEGATIVE
GLUCOSE BLDC GLUCOMTR-MCNC: 163 MG/DL (ref 70–130)
GLUCOSE BLDC GLUCOMTR-MCNC: 177 MG/DL (ref 70–130)
RH BLD: POSITIVE
T&S EXPIRATION DATE: NORMAL

## 2021-12-16 PROCEDURE — C1894 INTRO/SHEATH, NON-LASER: HCPCS | Performed by: SURGERY

## 2021-12-16 PROCEDURE — 36246 INS CATH ABD/L-EXT ART 2ND: CPT | Performed by: SURGERY

## 2021-12-16 PROCEDURE — C1769 GUIDE WIRE: HCPCS | Performed by: SURGERY

## 2021-12-16 PROCEDURE — 86901 BLOOD TYPING SEROLOGIC RH(D): CPT | Performed by: SURGERY

## 2021-12-16 PROCEDURE — 75710 ARTERY X-RAYS ARM/LEG: CPT | Performed by: SURGERY

## 2021-12-16 PROCEDURE — 25010000002 HEPARIN (PORCINE) PER 1000 UNITS: Performed by: SURGERY

## 2021-12-16 PROCEDURE — 25010000002 HEPARIN (PORCINE) PER 1000 UNITS: Performed by: NURSE ANESTHETIST, CERTIFIED REGISTERED

## 2021-12-16 PROCEDURE — 82962 GLUCOSE BLOOD TEST: CPT

## 2021-12-16 PROCEDURE — 25010000002 CEFAZOLIN PER 500 MG

## 2021-12-16 PROCEDURE — 76000 FLUOROSCOPY <1 HR PHYS/QHP: CPT

## 2021-12-16 PROCEDURE — 25010000002 IOPAMIDOL 61 % SOLUTION: Performed by: SURGERY

## 2021-12-16 PROCEDURE — C1887 CATHETER, GUIDING: HCPCS | Performed by: SURGERY

## 2021-12-16 PROCEDURE — C1760 CLOSURE DEV, VASC: HCPCS | Performed by: SURGERY

## 2021-12-16 PROCEDURE — 75710 ARTERY X-RAYS ARM/LEG: CPT

## 2021-12-16 PROCEDURE — 75625 CONTRAST EXAM ABDOMINL AORTA: CPT | Performed by: SURGERY

## 2021-12-16 PROCEDURE — 0 LIDOCAINE 1 % SOLUTION: Performed by: SURGERY

## 2021-12-16 PROCEDURE — 86900 BLOOD TYPING SEROLOGIC ABO: CPT | Performed by: SURGERY

## 2021-12-16 PROCEDURE — 86850 RBC ANTIBODY SCREEN: CPT | Performed by: SURGERY

## 2021-12-16 PROCEDURE — 76000 FLUOROSCOPY <1 HR PHYS/QHP: CPT | Performed by: SURGERY

## 2021-12-16 PROCEDURE — 25010000002 PROPOFOL 10 MG/ML EMULSION: Performed by: NURSE ANESTHETIST, CERTIFIED REGISTERED

## 2021-12-16 RX ORDER — FLUMAZENIL 0.1 MG/ML
0.2 INJECTION INTRAVENOUS AS NEEDED
Status: DISCONTINUED | OUTPATIENT
Start: 2021-12-16 | End: 2021-12-16 | Stop reason: HOSPADM

## 2021-12-16 RX ORDER — SODIUM CHLORIDE, SODIUM LACTATE, POTASSIUM CHLORIDE, CALCIUM CHLORIDE 600; 310; 30; 20 MG/100ML; MG/100ML; MG/100ML; MG/100ML
INJECTION, SOLUTION INTRAVENOUS CONTINUOUS PRN
Status: DISCONTINUED | OUTPATIENT
Start: 2021-12-16 | End: 2021-12-16 | Stop reason: SURG

## 2021-12-16 RX ORDER — SODIUM CHLORIDE 0.9 % (FLUSH) 0.9 %
3-10 SYRINGE (ML) INJECTION AS NEEDED
Status: DISCONTINUED | OUTPATIENT
Start: 2021-12-16 | End: 2021-12-16 | Stop reason: HOSPADM

## 2021-12-16 RX ORDER — MIDAZOLAM HYDROCHLORIDE 1 MG/ML
0.5 INJECTION INTRAMUSCULAR; INTRAVENOUS
Status: DISCONTINUED | OUTPATIENT
Start: 2021-12-16 | End: 2021-12-16 | Stop reason: HOSPADM

## 2021-12-16 RX ORDER — FENTANYL CITRATE 50 UG/ML
25 INJECTION, SOLUTION INTRAMUSCULAR; INTRAVENOUS
Status: DISCONTINUED | OUTPATIENT
Start: 2021-12-16 | End: 2021-12-16 | Stop reason: HOSPADM

## 2021-12-16 RX ORDER — HEPARIN SODIUM 1000 [USP'U]/ML
INJECTION, SOLUTION INTRAVENOUS; SUBCUTANEOUS AS NEEDED
Status: DISCONTINUED | OUTPATIENT
Start: 2021-12-16 | End: 2021-12-16 | Stop reason: SURG

## 2021-12-16 RX ORDER — SUFENTANIL CITRATE 50 UG/ML
INJECTION EPIDURAL; INTRAVENOUS AS NEEDED
Status: DISCONTINUED | OUTPATIENT
Start: 2021-12-16 | End: 2021-12-16 | Stop reason: SURG

## 2021-12-16 RX ORDER — SODIUM CHLORIDE 0.9 % (FLUSH) 0.9 %
3 SYRINGE (ML) INJECTION EVERY 12 HOURS SCHEDULED
Status: DISCONTINUED | OUTPATIENT
Start: 2021-12-16 | End: 2021-12-16 | Stop reason: HOSPADM

## 2021-12-16 RX ORDER — OXYCODONE AND ACETAMINOPHEN 7.5; 325 MG/1; MG/1
2 TABLET ORAL EVERY 4 HOURS PRN
Status: DISCONTINUED | OUTPATIENT
Start: 2021-12-16 | End: 2021-12-16 | Stop reason: HOSPADM

## 2021-12-16 RX ORDER — DEXMEDETOMIDINE HYDROCHLORIDE 100 UG/ML
INJECTION, SOLUTION INTRAVENOUS AS NEEDED
Status: DISCONTINUED | OUTPATIENT
Start: 2021-12-16 | End: 2021-12-16 | Stop reason: SURG

## 2021-12-16 RX ORDER — LIDOCAINE HYDROCHLORIDE 20 MG/ML
INJECTION, SOLUTION EPIDURAL; INFILTRATION; INTRACAUDAL; PERINEURAL AS NEEDED
Status: DISCONTINUED | OUTPATIENT
Start: 2021-12-16 | End: 2021-12-16 | Stop reason: SURG

## 2021-12-16 RX ORDER — SODIUM CHLORIDE, SODIUM LACTATE, POTASSIUM CHLORIDE, CALCIUM CHLORIDE 600; 310; 30; 20 MG/100ML; MG/100ML; MG/100ML; MG/100ML
1000 INJECTION, SOLUTION INTRAVENOUS CONTINUOUS
Status: DISCONTINUED | OUTPATIENT
Start: 2021-12-16 | End: 2021-12-16 | Stop reason: HOSPADM

## 2021-12-16 RX ORDER — SODIUM CHLORIDE 9 MG/ML
125 INJECTION, SOLUTION INTRAVENOUS CONTINUOUS
Status: DISCONTINUED | OUTPATIENT
Start: 2021-12-16 | End: 2021-12-16 | Stop reason: HOSPADM

## 2021-12-16 RX ORDER — BUPIVACAINE HCL/0.9 % NACL/PF 0.1 %
2 PLASTIC BAG, INJECTION (ML) EPIDURAL ONCE
Status: COMPLETED | OUTPATIENT
Start: 2021-12-16 | End: 2021-12-16

## 2021-12-16 RX ORDER — LIDOCAINE HYDROCHLORIDE 10 MG/ML
INJECTION, SOLUTION INFILTRATION; PERINEURAL AS NEEDED
Status: DISCONTINUED | OUTPATIENT
Start: 2021-12-16 | End: 2021-12-16 | Stop reason: HOSPADM

## 2021-12-16 RX ORDER — LIDOCAINE HYDROCHLORIDE 10 MG/ML
0.5 INJECTION, SOLUTION EPIDURAL; INFILTRATION; INTRACAUDAL; PERINEURAL ONCE AS NEEDED
Status: DISCONTINUED | OUTPATIENT
Start: 2021-12-16 | End: 2021-12-16 | Stop reason: HOSPADM

## 2021-12-16 RX ORDER — OXYCODONE AND ACETAMINOPHEN 10; 325 MG/1; MG/1
1 TABLET ORAL ONCE AS NEEDED
Status: DISCONTINUED | OUTPATIENT
Start: 2021-12-16 | End: 2021-12-16 | Stop reason: HOSPADM

## 2021-12-16 RX ORDER — NALOXONE HCL 0.4 MG/ML
0.4 VIAL (ML) INJECTION AS NEEDED
Status: DISCONTINUED | OUTPATIENT
Start: 2021-12-16 | End: 2021-12-16 | Stop reason: HOSPADM

## 2021-12-16 RX ORDER — MIDAZOLAM HYDROCHLORIDE 1 MG/ML
1 INJECTION INTRAMUSCULAR; INTRAVENOUS
Status: DISCONTINUED | OUTPATIENT
Start: 2021-12-16 | End: 2021-12-16 | Stop reason: HOSPADM

## 2021-12-16 RX ORDER — ONDANSETRON 2 MG/ML
4 INJECTION INTRAMUSCULAR; INTRAVENOUS ONCE AS NEEDED
Status: DISCONTINUED | OUTPATIENT
Start: 2021-12-16 | End: 2021-12-16 | Stop reason: HOSPADM

## 2021-12-16 RX ORDER — LABETALOL HYDROCHLORIDE 5 MG/ML
5 INJECTION, SOLUTION INTRAVENOUS
Status: DISCONTINUED | OUTPATIENT
Start: 2021-12-16 | End: 2021-12-16 | Stop reason: HOSPADM

## 2021-12-16 RX ORDER — OXYCODONE HYDROCHLORIDE AND ACETAMINOPHEN 5; 325 MG/1; MG/1
1 TABLET ORAL ONCE AS NEEDED
Status: DISCONTINUED | OUTPATIENT
Start: 2021-12-16 | End: 2021-12-16 | Stop reason: HOSPADM

## 2021-12-16 RX ORDER — PROPOFOL 10 MG/ML
VIAL (ML) INTRAVENOUS AS NEEDED
Status: DISCONTINUED | OUTPATIENT
Start: 2021-12-16 | End: 2021-12-16 | Stop reason: SURG

## 2021-12-16 RX ORDER — SODIUM CHLORIDE, SODIUM LACTATE, POTASSIUM CHLORIDE, CALCIUM CHLORIDE 600; 310; 30; 20 MG/100ML; MG/100ML; MG/100ML; MG/100ML
100 INJECTION, SOLUTION INTRAVENOUS CONTINUOUS
Status: DISCONTINUED | OUTPATIENT
Start: 2021-12-16 | End: 2021-12-16 | Stop reason: HOSPADM

## 2021-12-16 RX ORDER — SODIUM CHLORIDE 0.9 % (FLUSH) 0.9 %
3 SYRINGE (ML) INJECTION AS NEEDED
Status: DISCONTINUED | OUTPATIENT
Start: 2021-12-16 | End: 2021-12-16 | Stop reason: HOSPADM

## 2021-12-16 RX ADMIN — PROPOFOL 60 MG: 10 INJECTION, EMULSION INTRAVENOUS at 10:05

## 2021-12-16 RX ADMIN — Medication 2 G: at 10:00

## 2021-12-16 RX ADMIN — PROPOFOL 40 MG: 10 INJECTION, EMULSION INTRAVENOUS at 10:23

## 2021-12-16 RX ADMIN — OXYCODONE HYDROCHLORIDE AND ACETAMINOPHEN 2 TABLET: 7.5; 325 TABLET ORAL at 16:40

## 2021-12-16 RX ADMIN — SODIUM CHLORIDE, POTASSIUM CHLORIDE, SODIUM LACTATE AND CALCIUM CHLORIDE 100 ML/HR: 600; 310; 30; 20 INJECTION, SOLUTION INTRAVENOUS at 09:46

## 2021-12-16 RX ADMIN — HEPARIN SODIUM 1000 UNITS: 1000 INJECTION, SOLUTION INTRAVENOUS; SUBCUTANEOUS at 10:47

## 2021-12-16 RX ADMIN — GLYCOPYRROLATE 0.2 MCG: 0.2 INJECTION, SOLUTION INTRAMUSCULAR; INTRAVENOUS at 10:37

## 2021-12-16 RX ADMIN — DEXMEDETOMIDINE 25 MCG: 100 INJECTION, SOLUTION, CONCENTRATE INTRAVENOUS at 10:04

## 2021-12-16 RX ADMIN — LIDOCAINE HYDROCHLORIDE 100 MG: 20 INJECTION, SOLUTION EPIDURAL; INFILTRATION; INTRACAUDAL; PERINEURAL at 10:05

## 2021-12-16 RX ADMIN — PROPOFOL 50 MCG/KG/MIN: 10 INJECTION, EMULSION INTRAVENOUS at 10:21

## 2021-12-16 RX ADMIN — DEXMEDETOMIDINE HYDROCHLORIDE 25 MCG: 100 INJECTION, SOLUTION INTRAVENOUS at 10:23

## 2021-12-16 RX ADMIN — LIDOCAINE HYDROCHLORIDE 100 MG: 20 INJECTION, SOLUTION EPIDURAL; INFILTRATION; INTRACAUDAL; PERINEURAL at 11:08

## 2021-12-16 RX ADMIN — PROPOFOL 50 MCG/KG/MIN: 10 INJECTION, EMULSION INTRAVENOUS at 10:01

## 2021-12-16 RX ADMIN — SODIUM CHLORIDE, POTASSIUM CHLORIDE, SODIUM LACTATE AND CALCIUM CHLORIDE: 600; 310; 30; 20 INJECTION, SOLUTION INTRAVENOUS at 11:06

## 2021-12-16 RX ADMIN — SUFENTANIL CITRATE 10 MCG: 50 INJECTION EPIDURAL; INTRAVENOUS at 10:23

## 2021-12-16 RX ADMIN — SUFENTANIL CITRATE 10 MCG: 50 INJECTION EPIDURAL; INTRAVENOUS at 10:05

## 2021-12-16 NOTE — OP NOTE
Edy Cosby  12/16/2021     PREOPERATIVE DIAGNOSIS: Atherosclerosis of native artery of right lower extremity with intermittent claudication (HCC) [I70.211]     POSTOPERATIVE DIAGNOSIS: Post-Op Diagnosis Codes:     * Atherosclerosis of native artery of right lower extremity with intermittent claudication (HCC) [I70.211]     PROCEDURE PERFORMED:  1. Ultrasound-guided left common femoral artery access  2. Placement of catheter in abdominal aorta  3. Aortoiliac angiogram  4. Crossing of the aortic bifurcation and placement of catheter in the right common femoral artery  5. Right lower extremity angiogram  6. Minx closure device in the left common femoral artery  7. Radiographic supervision and interpretation     SURGEON: Duncan Lucio MD     ANESTHESIA: General.    PREPARATION: Routine.    STAFF: Circulator: Sofiya Dominguez RN  Scrub Person: Priyanka Antonio Melissa K  Vascular Radiology Technician: Emily Lay    Estimated Blood Loss: minimal    SPECIMENS: None    COMPLICATIONS: None apparent    INDICATIONS: dEy Cosby is a 66 y.o. male who we are currently following for history of peripheral vascular disease.  He previously underwent left femoral endarterectomy with retrograde angioplasty and stenting of a left common iliac artery stenosis.  He returns today for continued surveillance and had repeat NIC/PVR which I did personally review.  Today the value on the right is 0.33, and on the left is 0.62.  This represents severe arterial insufficiency on the right, and moderate arterial insufficiency on the left.  Previously he was only having minimal claudication but now today he reports increasing claudication in the right calf especially that is now coming at very short distances and making it difficult for him to ambulate.  He has mild claudication on the left but this is not bothersome.  He denies any rest pain.  He continues on aspirin, Pletal, as well as a statin. Given his worsening  claudication and the findings on noninvasive arterial imaging he was indicated for aortogram and right lower extremity angiogram with possible intervention. The indications, risks, and possible complications of the procedure were explained to the patient, who voiced understanding and wished to proceed with surgery.     PROCEDURE IN DETAIL: The patient was taken to the operating room and placed on the operating table in a supine position. After back anesthesia was obtained, the bilateral groins were prepped and draped in a sterile manner. Under direct ultrasound guidance the left common femoral artery was then accessed using a microneedle and microwire placed. Initially micro sheath was attempted to be placed but given the significant scar tissue from previous femoral endarterectomy this was difficult. Ultimately a low-profile five Panamanian sheath was brought onto the field and the inner dilator of this was initially used to dilate the tract and then the low-profile five Panamanian sheath was able to be advanced over the wire. The microwire was then removed and a Glidewire advantage was introduced up into the abdominal aorta. 1000 units of IV heparin were then given. Now an Omni Flush catheter was advanced into the abdominal aorta. Wire was removed and aortoiliac angiogram was performed which showed wide patency of the abdominal aorta with some mild atherosclerotic disease diffusely. Bilateral iliac systems were patent. The previously placed left common iliac artery stent was widely patent. Now using the Glidewire advantage and the Omni Flush catheter the aortic bifurcation was traversed and the catheter advanced down to the level of the right common femoral artery. Right lower extremity runoff from here was then performed which showed patency of the proximal common femoral however in the mid and distal common femoral and into the proximal SFA there was heavily calcified plaque causing stenosis. The profunda was also open  but there was plaque at its origin. Distal to this the SFA was patent but in the mid SFA there was a short segment high-grade stenosis. Popliteal artery was patent. Anterior tibial artery was flush occluded from its takeoff and did not reconstitute until a dorsalis pedis with branches from the peroneal. The tibioperoneal trunk was patent and the runoff was via a peroneal. The posterior tibial appeared occluded from its takeoff with very minimal reconstitution distally at the ankle from some branches from the peroneal as well. Further images were taken of the common femoral artery at various degrees of YBARRA to further characterize the plaque there. Ultimately given the involvement of the common femoral artery and the heavily calcified plaque I feel the patient would most benefit from undergoing open femoral endarterectomy and at that time can have treatment of the more distal SFA lesion. As such no further intervention was performed today and all wires and catheters were then removed. A minx closure device was deployed in the left common femoral artery and manual pressure was held for additional 15 minutes to ensure hemostasis. Sterile dressings were applied. The patient tolerated the procedure well. Sponge and needle counts were correct. The patient was then awakened in the operating room and taken to the recovery room in good condition.    Duncan Lucio MD  Date: 12/16/2021 Time: 11:07 CST

## 2021-12-16 NOTE — DISCHARGE INSTRUCTIONS
YOUR NEXT PAIN MEDICATION IS DUE AT______________        Moderate Conscious Sedation, Adult, Care After  Refer to this sheet in the next few weeks. These instructions provide you with information on caring for yourself after your procedure. Your health care provider may also give you more specific instructions. Your treatment has been planned according to current medical practices, but problems sometimes occur. Call your health care provider if you have any problems or questions after your procedure.  WHAT TO EXPECT AFTER THE PROCEDURE    After your procedure:  · You may feel sleepy, clumsy, and have poor balance for several hours.  · Vomiting may occur if you eat too soon after the procedure.  HOME CARE INSTRUCTIONS  · Do not participate in any activities where you could become injured for at least 24 hours. Do not:  ¨ Drive.  ¨ Swim.  ¨ Ride a bicycle.  ¨ Operate heavy machinery.  ¨ Cook.  ¨ Use power tools.  ¨ Climb ladders.  ¨ Work from a high place.  · Do not make important decisions or sign legal documents until you are improved.  · If you vomit, drink water, juice, or soup when you can drink without vomiting. Make sure you have little or no nausea before eating solid foods.  · Only take over-the-counter or prescription medicines for pain, discomfort, or fever as directed by your health care provider.  · Make sure you and your family fully understand everything about the medicines given to you, including what side effects may occur.  · You should not drink alcohol, take sleeping pills, or take medicines that cause drowsiness for at least 24 hours.  · If you smoke, do not smoke without supervision.  · If you are feeling better, you may resume normal activities 24 hours after you were sedated.  · Keep all appointments with your health care provider.  SEEK MEDICAL CARE IF:  · Your skin is pale or bluish in color.  · You continue to feel nauseous or vomit.  · Your pain is getting worse and is not helped by  medicine.  · You have bleeding or swelling.  · You are still sleepy or feeling clumsy after 24 hours.  SEEK IMMEDIATE MEDICAL CARE IF:  · You develop a rash.  · You have difficulty breathing.  · You develop any type of allergic problem.  · You have a fever.  MAKE SURE YOU:  · Understand these instructions.  · Will watch your condition.  · Will get help right away if you are not doing well or get worse.     This information is not intended to replace advice given to you by your health care provider. Make sure you discuss any questions you have with your health care provider.     Document Released: 10/08/2014 Document Revised: 01/08/2016 Document Reviewed: 10/08/2014  [employer] Interactive Patient Education ©2016 [employer]         CALL YOUR PHYSICIAN IF YOU EXPERIENCE  INCREASED PAIN NOT HELPED BY YOUR PAIN MEDICATION.        Fall Prevention in the Home      Falls can cause injuries. They can happen to people of all ages. There are many things you can do to make your home safe and to help prevent falls.    WHAT CAN I DO ON THE OUTSIDE OF MY HOME?  · Regularly fix the edges of walkways and driveways and fix any cracks.  · Remove anything that might make you trip as you walk through a door, such as a raised step or threshold.  · Trim any bushes or trees on the path to your home.  · Use bright outdoor lighting.  · Clear any walking paths of anything that might make someone trip, such as rocks or tools.  · Regularly check to see if handrails are loose or broken. Make sure that both sides of any steps have handrails.  · Any raised decks and porches should have guardrails on the edges.  · Have any leaves, snow, or ice cleared regularly.  · Use sand or salt on walking paths during winter.  · Clean up any spills in your garage right away. This includes oil or grease spills.  WHAT CAN I DO IN THE BATHROOM?    · Use night lights.  · Install grab bars by the toilet and in the tub and shower. Do not use towel bars as grab  bars.  · Use non-skid mats or decals in the tub or shower.  · If you need to sit down in the shower, use a plastic, non-slip stool.  · Keep the floor dry. Clean up any water that spills on the floor as soon as it happens.  · Remove soap buildup in the tub or shower regularly.  · Attach bath mats securely with double-sided non-slip rug tape.  · Do not have throw rugs and other things on the floor that can make you trip.  WHAT CAN I DO IN THE BEDROOM?  · Use night lights.  · Make sure that you have a light by your bed that is easy to reach.  · Do not use any sheets or blankets that are too big for your bed. They should not hang down onto the floor.  · Have a firm chair that has side arms. You can use this for support while you get dressed.  · Do not have throw rugs and other things on the floor that can make you trip.  WHAT CAN I DO IN THE KITCHEN?  · Clean up any spills right away.  · Avoid walking on wet floors.  · Keep items that you use a lot in easy-to-reach places.  · If you need to reach something above you, use a strong step stool that has a grab bar.  · Keep electrical cords out of the way.  · Do not use floor polish or wax that makes floors slippery. If you must use wax, use non-skid floor wax.  · Do not have throw rugs and other things on the floor that can make you trip.  WHAT CAN I DO WITH MY STAIRS?  · Do not leave any items on the stairs.  · Make sure that there are handrails on both sides of the stairs and use them. Fix handrails that are broken or loose. Make sure that handrails are as long as the stairways.  · Check any carpeting to make sure that it is firmly attached to the stairs. Fix any carpet that is loose or worn.  · Avoid having throw rugs at the top or bottom of the stairs. If you do have throw rugs, attach them to the floor with carpet tape.  · Make sure that you have a light switch at the top of the stairs and the bottom of the stairs. If you do not have them, ask someone to add them for  you.  WHAT ELSE CAN I DO TO HELP PREVENT FALLS?  · Wear shoes that:  ¨ Do not have high heels.  ¨ Have rubber bottoms.  ¨ Are comfortable and fit you well.  ¨ Are closed at the toe. Do not wear sandals.  · If you use a stepladder:  ¨ Make sure that it is fully opened. Do not climb a closed stepladder.  ¨ Make sure that both sides of the stepladder are locked into place.  ¨ Ask someone to hold it for you, if possible.  · Clearly tara and make sure that you can see:  ¨ Any grab bars or handrails.  ¨ First and last steps.  ¨ Where the edge of each step is.  · Use tools that help you move around (mobility aids) if they are needed. These include:  ¨ Canes.  ¨ Walkers.  ¨ Scooters.  ¨ Crutches.  · Turn on the lights when you go into a dark area. Replace any light bulbs as soon as they burn out.  · Set up your furniture so you have a clear path. Avoid moving your furniture around.  · If any of your floors are uneven, fix them.  · If there are any pets around you, be aware of where they are.  · Review your medicines with your doctor. Some medicines can make you feel dizzy. This can increase your chance of falling.  Ask your doctor what other things that you can do to help prevent falls.     This information is not intended to replace advice given to you by your health care provider. Make sure you discuss any questions you have with your health care provider.     Document Released: 10/14/2010 Document Revised: 05/03/2016 Document Reviewed: 01/22/2016  [employer] Interactive Patient Education ©2016 [employer]     PATIENT/FAMILY/RESPONSIBLE PARTY VERBALIZES UNDERSTANDING OF ABOVE EDUCATION.  COPY OF PAIN SCALE GIVEN AND REVIEWED WITH VERBALIZED UNDERSTANDING.        ANGIOGRAM DISCHARGE INSTRUCTIONS    You underwent an angiogram procedure.    If an intervention was performed, you may be discharged on a new medication called Plavix (clopidogrel).     Should you experience any increased pain, drainage/redness, fever greater than  101 degrees, or increased swelling in your groin or arm incisions, please call [phone] to discuss with a physician on call.    If you leave with any bandages, remove those 48 hours following surgery.    Do not drive a car until you are walking normally and pain free (usually 5 to 7 days) and have stopped taking narcotic pain medicine.      You may take a shower and wash over the incision with soap and water.  Do not soak the incision (i.e. take a bath) until the incision is completely healed.    Any invasive procedure, such as dental work, endoscopy, colonoscopy, cystoscopy, etc. should be avoided in the first 3 months following surgery. If an urgent procedure is required, you should receive prophylactic antibiotics to prevent arterial graft infection. The American Heart Association endocarditis prophylaxis regimen may be used for this purpose.    Please follow up with Dr. Lucio in the office in 2 weeks. If you do not have an appointment time at the time of discharge, please call [phone]  on the next business day to make a follow-up appointment.    Our administrative office number is [phone], and office address is [address] [place] [address].

## 2021-12-16 NOTE — ANESTHESIA PREPROCEDURE EVALUATION
Anesthesia Evaluation     Patient summary reviewed and Nursing notes reviewed   no history of anesthetic complications:  NPO Solid Status: > 8 hours  NPO Liquid Status: > 8 hours           Airway   Mallampati: III  TM distance: >3 FB  Possible difficult intubation  Dental      Pulmonary    (+) a smoker Former, COPD, sleep apnea,     ROS comment: vccal cord paralysis  Cardiovascular   Exercise tolerance: poor (<4 METS)    ECG reviewed    (+) hypertension, dysrhythmias Atrial Fib, PVD, hyperlipidemia,       Neuro/Psych  (-) seizures, TIA, CVA  GI/Hepatic/Renal/Endo    (+) obesity,  GERD,  renal disease CRI, diabetes mellitus type 2 using insulin,   (-) liver disease    Musculoskeletal     Abdominal    Substance History      OB/GYN          Other   arthritis,          Phys Exam Other: Previously gr 3 view with mac 4, used bougie                  Anesthesia Plan    ASA 3     MAC     intravenous induction     Anesthetic plan, all risks, benefits, and alternatives have been provided, discussed and informed consent has been obtained with: patient.  Use of blood products discussed with patient  Consented to blood products.

## 2021-12-16 NOTE — INTERVAL H&P NOTE
H&P reviewed. The patient was examined and there are no changes to the H&P.  For aortogram and RLE angiogram. Risks of angiogram were discussed.  These include, but are not limited to, bleeding, infection, vessel damage, nerve damage, embolus, and loss of limb.  The patient understands these risks and wishes to proceed with procedure.

## 2021-12-16 NOTE — ANESTHESIA POSTPROCEDURE EVALUATION
Patient: Edy Cosby    Procedure Summary     Date: 12/16/21 Room / Location: [facility] OR  /  PAD HYBRID OR 12    Anesthesia Start: 1001 Anesthesia Stop: 1122    Procedure: AORTAGRAM, RIGHT LOWER EXTREMITY ANGIOGRAM, POSSIBLE INTERVENTION (Right Groin) Diagnosis:       Atherosclerosis of native artery of right lower extremity with intermittent claudication (HCC)      (Atherosclerosis of native artery of right lower extremity with intermittent claudication (HCC) [I70.211])    Surgeons: Dnucan Lucio MD Provider: Stephen Purdy CRNA    Anesthesia Type: MAC ASA Status: 3          Anesthesia Type: MAC    Vitals  Vitals Value Taken Time   /62 12/16/21 1210   Temp 98 °F (36.7 °C) 12/16/21 1210   Pulse 90 12/16/21 1214   Resp 16 12/16/21 1210   SpO2 94 % 12/16/21 1214   Vitals shown include unvalidated device data.        Post Anesthesia Care and Evaluation    Patient location during evaluation: PACU  Patient participation: complete - patient participated  Level of consciousness: awake and alert  Pain management: adequate  Airway patency: patent  Anesthetic complications: No anesthetic complications    Cardiovascular status: acceptable  Respiratory status: acceptable  Hydration status: acceptable    Comments: Blood pressure 164/68, pulse 92, temperature 98 °F (36.7 °C), temperature source Temporal, resp. rate 18, SpO2 93 %.    Pt discharged from PACU based on dino score >8

## 2021-12-16 NOTE — PERIOPERATIVE NURSING NOTE
Dr. Guzman at bedside to assess patient.Patient awake and alert. Dr. Guzman states patient may go to [facility]PC.

## 2021-12-22 RX ORDER — LABETALOL 100 MG/1
TABLET, FILM COATED ORAL
Qty: 180 TABLET | Refills: 0 | Status: SHIPPED | OUTPATIENT
Start: 2021-12-22 | End: 2022-06-21

## 2021-12-30 ENCOUNTER — TELEPHONE (OUTPATIENT)
Dept: PODIATRY | Facility: CLINIC | Age: 66
End: 2021-12-30

## 2022-01-02 DIAGNOSIS — M79.18 ABDOMINAL MUSCLE PAIN: ICD-10-CM

## 2022-01-02 RX ORDER — CYCLOBENZAPRINE HCL 5 MG
TABLET ORAL
Qty: 90 TABLET | Refills: 0 | Status: SHIPPED | OUTPATIENT
Start: 2022-01-02 | End: 2022-01-31

## 2022-01-03 ENCOUNTER — TELEPHONE (OUTPATIENT)
Dept: VASCULAR SURGERY | Facility: CLINIC | Age: 67
End: 2022-01-03

## 2022-01-03 ENCOUNTER — OFFICE VISIT (OUTPATIENT)
Dept: VASCULAR SURGERY | Facility: CLINIC | Age: 67
End: 2022-01-03

## 2022-01-03 VITALS
DIASTOLIC BLOOD PRESSURE: 66 MMHG | WEIGHT: 224.2 LBS | BODY MASS INDEX: 35.19 KG/M2 | SYSTOLIC BLOOD PRESSURE: 148 MMHG | HEART RATE: 104 BPM | OXYGEN SATURATION: 95 % | HEIGHT: 67 IN

## 2022-01-03 DIAGNOSIS — I10 ESSENTIAL HYPERTENSION: Chronic | ICD-10-CM

## 2022-01-03 DIAGNOSIS — E11.9 TYPE 2 DIABETES MELLITUS WITHOUT COMPLICATION, WITH LONG-TERM CURRENT USE OF INSULIN: ICD-10-CM

## 2022-01-03 DIAGNOSIS — Z79.4 TYPE 2 DIABETES MELLITUS WITHOUT COMPLICATION, WITH LONG-TERM CURRENT USE OF INSULIN: ICD-10-CM

## 2022-01-03 DIAGNOSIS — E78.2 MIXED HYPERLIPIDEMIA: ICD-10-CM

## 2022-01-03 DIAGNOSIS — I70.211 ATHEROSCLEROSIS OF NATIVE ARTERY OF RIGHT LOWER EXTREMITY WITH INTERMITTENT CLAUDICATION: Primary | ICD-10-CM

## 2022-01-03 PROCEDURE — 99214 OFFICE O/P EST MOD 30 MIN: CPT | Performed by: SURGERY

## 2022-01-03 NOTE — PROGRESS NOTES
01/03/2022      Mark Villela MD  [address] [place] [address] [address] MOSQUEDA Alexandra  [place] [address]        Edy Cosby  1955    Chief Complaint   Patient presents with   • Post-op     2 wk po of AORTAGRAM, RIGHT LOWER EXTREMITY ANGIOGRAM, POSSIBLE INTERVENTION.  Pt states .  has had pain shooting through his legs up to 9/10 at times   • smoking status     former smoker       Dear Mark Villela MD:    HPI     I had the pleasure of seeing your patient in the office today for follow up.  As you recall, the patient is a 66 y.o. male who we are currently following for known peripheral arterial disease.  He has previously undergone left femoral endarterectomy as well as stenting of the left common iliac artery due to lifestyle limiting claudication.  More recently he return for continued surveillance and was noted to have worsening claudication and very short distances in the right calf level.  Noninvasive arterial testing it showed severe arterial insufficiency in the right lower extremity.  As such she was taken for angiogram of the right lower extremity about 2 weeks ago.  He was noted to have significant calcified plaque of the right common femoral artery and extending into the proximal SFA.  He also had significant tibial disease with main outflow via the peroneal with collateralization to a dorsalis pedis.  The AT and PT were both occluded proximally.  Given the significant involvement of the right common femoral artery his angiogram was only diagnostic and he is brought back to the office today to discuss potential right femoral endarterectomy to improve perfusion and decrease his claudication symptoms to the right lower extremity.  He otherwise feels well and has no new acute changes.      Review of Systems   Constitutional: Negative.  Negative for activity change, appetite change, chills and fever.   HENT: Negative.  Negative for congestion, sneezing and sore throat.    Eyes: Negative.    Respiratory: Negative.   "Negative for chest tightness and shortness of breath.    Cardiovascular: Negative.  Negative for chest pain, palpitations and leg swelling.   Gastrointestinal: Negative.  Negative for abdominal distention, abdominal pain, nausea and vomiting.   Endocrine: Negative.    Genitourinary: Negative.    Musculoskeletal: Negative.         Continued right lower extremity claudication at short distances.  This is unchanged from his last visit in November.   Skin: Negative.    Allergic/Immunologic: Negative.    Neurological: Negative.    Hematological: Negative.    Psychiatric/Behavioral: Negative.        /66 (BP Location: Left arm, Patient Position: Sitting, Cuff Size: Adult)   Pulse 104   Ht 170.2 cm (67\")   Wt 102 kg (224 lb 3.2 oz)   SpO2 95%   BMI 35.11 kg/m²   Physical Exam  Vitals reviewed.   Constitutional:       Appearance: Normal appearance.   HENT:      Head: Normocephalic and atraumatic.      Nose: Nose normal.      Mouth/Throat:      Mouth: Mucous membranes are moist.   Eyes:      Extraocular Movements: Extraocular movements intact.      Pupils: Pupils are equal, round, and reactive to light.   Cardiovascular:      Rate and Rhythm: Normal rate. Rhythm irregularly irregular.      Pulses:           Carotid pulses are 2+ on the right side and 2+ on the left side.       Radial pulses are 2+ on the right side and 2+ on the left side.        Femoral pulses are 2+ on the right side and 2+ on the left side.       Dorsalis pedis pulses are detected w/ Doppler on the right side and 1+ on the left side.        Posterior tibial pulses are 0 on the right side and detected w/ Doppler on the left side.      Comments: Both feet are warm.  He has no wounds.    Left groin access site from recent angiogram is well-healed with no evidence of hematoma or infection.  Pulmonary:      Effort: Pulmonary effort is normal. No respiratory distress.   Abdominal:      General: There is no distension.      Palpations: Abdomen is soft. " There is no mass.      Tenderness: There is no abdominal tenderness.   Musculoskeletal:         General: Normal range of motion.      Cervical back: Normal range of motion and neck supple.      Right lower leg: No edema.      Left lower leg: No edema.      Comments: He has known chronic foot drop of the left foot and wears a brace for this.   Skin:     General: Skin is warm and dry.      Capillary Refill: Capillary refill takes 2 to 3 seconds.   Neurological:      General: No focal deficit present.      Mental Status: He is alert and oriented to person, place, and time.   Psychiatric:         Mood and Affect: Mood normal.         Behavior: Behavior normal.         Thought Content: Thought content normal.         Judgment: Judgment normal.         DIAGNOSTIC DATA:    IR Angiogram Extremity, FL C Arm During Surgery    Result Date: 12/23/2021  Narrative: Performed by Dr. Lucio. Please see procedure note. This report was finalized on 12/23/2021 15:26 by Dr. Duncan Lucio MD.    XR Chest 1 View    Result Date: 12/13/2021  Narrative: HISTORY: Atherosclerosis, BiPAP dependent with diabetes, peripheral vascular disease  CXR: Frontal view the chest obtained  COMPARISON: 12/11/2018  FINDINGS: Mild basilar atelectasis with no acute consolidation or edema. Stable cardiomediastinal contours. Calcified left mediastinal and hilar lymph nodes. No pleural effusion or pneumothorax. Minimal chronic pleural thickening along the right minor fissure. No acute regional bony pathology.      Impression: 1. Mild chronic change with no acute process identified. This report was finalized on 12/13/2021 09:51 by Dr. Norah Escalante MD.      Patient Active Problem List   Diagnosis   • Spinal stenosis of lumbar region   • Back pain   • Degeneration of intervertebral disc of lumbosacral region   • Essential hypertension   • Lumbar disc herniation with radiculopathy   • Type 2 diabetes mellitus without complication, with long-term current use of  insulin ([facility])   • Constipation due to opioid therapy   • Gastroesophageal reflux disease without esophagitis   • Panlobular emphysema ([facility])   • Paroxysmal atrial fibrillation ([facility])   • Hx of colonic polyps   • Class 2 severe obesity due to excess calories with serious comorbidity and body mass index (BMI) of 39.0 to 39.9 in adult ([facility])   • Atherosclerosis 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 ([facility])   • H/O diabetic foot ulcer   • Anemia   • BiPAP (biphasic positive airway pressure) dependence   • Chronic kidney disease, stage III (moderate) ([facility])   • Diabetic nephropathy associated with type 2 diabetes mellitus ([facility])   • Diabetic neuropathy ([facility])   • Difficulty with BiPAP use   • Impotence   • Insomnia   • Left foot drop   • Lumbosacral radiculitis   • Obstructive sleep apnea   • Other and unspecified hyperlipidemia   • PVD (peripheral vascular disease) with claudication ([facility])   • Tobacco use disorder   • Vocal cord paralysis   • Vocal cord paresis   • Atherosclerosis of native artery of right lower extremity with intermittent claudication ([facility])         ICD-10-CM ICD-9-CM   1. Atherosclerosis of native artery of right lower extremity with intermittent claudication ([facility])  I70.211 440.21   2. Essential hypertension  I10 401.9   3. Type 2 diabetes mellitus without complication, with long-term current use of insulin ([facility])  E11.9 250.00    Z79.4 V58.67   4. Mixed hyperlipidemia  E78.2 272.2       Lab Frequency Next Occurrence   Follow Anesthesia Guidelines / Standing Orders Once 12/07/2018   Obtain Informed Consent Once 12/12/2018   Follow Anesthesia Guidelines / Standing Orders Once 12/17/2018   Obtain Informed Consent Once 12/22/2018   Follow Anesthesia Guidelines / Standing Orders Once 01/21/2019   Obtain Informed Consent Once 01/26/2019   Provide NPO Instructions to Patient Once 01/26/2019   Follow Anesthesia Guidelines / Protocol Once 11/19/2021   Obtain Informed Consent Once 11/24/2021   Provide NPO  Instructions to Patient Once 11/24/2021   Chlorhexidine Skin Prep Once 11/24/2021       PLAN: After thoroughly evaluating Edy Cosby, I believe the best course of action is to proceed with right common femoral endarterectomy with right lower extremity angiogram and possible more distal intervention.  Patient has significant claudication in the right lower extremity at short distances which is lifestyle limiting at this point.  Recent angiogram showed heavy plaque burden in the right common femoral artery which I think would be best treated with open common femoral endarterectomy.  He had a similar procedure on the left side before and has done well from this.  As such at this point I will plan to refer him to cardiology who he already follows with for preoperative or stratification and once that is completed we can plan to schedule him. Risks of the procedure were discussed.  These include, but are not limited to, bleeding, infection, vessel damage, nerve damage, embolus, and loss of limb.  The patient understands these risks and wishes to proceed with procedure.  The patient is to continue taking their medications as previously discussed.   I did discuss vascular risk factors as they pertain to the progression of vascular disease including controlling diabetes, hypertension, and hyperlipidemia. These factors remain stable. Patient's Body mass index is 35.11 kg/m². indicating that he is obese (BMI >30). Obesity-related health conditions include the following: hypertension, diabetes mellitus, dyslipidemias and peripheral vascular disease. Obesity is unchanged. BMI is is above average; BMI management plan is completed. We discussed portion control and increasing exercise. This was all discussed in full with complete understanding.  Thank you for allowing me to participate in the care of your patient.  Please do not hesitate to call with any questions or concerns.  We will keep you aware of any further  encounters with Edy Cosby.      Sincerely Yours,      Duncan Lucio MD

## 2022-01-03 NOTE — PATIENT INSTRUCTIONS
"BMI for Adults  What is BMI?  Body mass index (BMI) is a number that is calculated from a person's weight and height. BMI can help estimate how much of a person's weight is composed of fat. BMI does not measure body fat directly. Rather, it is an alternative to procedures that directly measure body fat, which can be difficult and expensive.  BMI can help identify people who may be at higher risk for certain medical problems.  What are BMI measurements used for?  BMI is used as a screening tool to identify possible weight problems. It helps determine whether a person is obese, overweight, a healthy weight, or underweight.  BMI is useful for:  · Identifying a weight problem that may be related to a medical condition or may increase the risk for medical problems.  · Promoting changes, such as changes in diet and exercise, to help reach a healthy weight. BMI screening can be repeated to see if these changes are working.  How is BMI calculated?  BMI involves measuring your weight in relation to your height. Both height and weight are measured, and the BMI is calculated from those numbers. This can be done either in English (U[place] or metric measurements. Note that charts and online BMI calculators are available to help you find your BMI quickly and easily without having to do these calculations yourself.  To calculate your BMI in English ([place] measurements:    1. Measure your weight in pounds (lb).  2. Multiply the number of pounds by 703.  ? For example, for a person who weighs 180 lb, multiply that number by 703, which equals 126,540.  3. Measure your height in inches. Then multiply that number by itself to get a measurement called \"inches squared.\"  ? For example, for a person who is 70 inches tall, the \"inches squared\" measurement is 70 inches x 70 inches, which equals 4,900 inches squared.  4. Divide the total from step 2 (number of lb x 703) by the total from step 3 (inches squared): 126,540 ÷ 4,900 = 25.8. This is " "your BMI.    To calculate your BMI in metric measurements:  1. Measure your weight in kilograms (kg).  2. Measure your height in meters (m). Then multiply that number by itself to get a measurement called \"meters squared.\"  ? For example, for a person who is 1.75 m tall, the \"meters squared\" measurement is 1.75 m x 1.75 m, which is equal to 3.1 meters squared.  3. Divide the number of kilograms (your weight) by the meters squared number. In this example: 70 ÷ 3.1 = 22.6. This is your BMI.  What do the results mean?  BMI charts are used to identify whether you are underweight, normal weight, overweight, or obese. The following guidelines will be used:  · Underweight: BMI less than 18.5.  · Normal weight: BMI between 18.5 and 24.9.  · Overweight: BMI between 25 and 29.9.  · Obese: BMI of 30 or above.  Keep these notes in mind:  · Weight includes both fat and muscle, so someone with a muscular build, such as an athlete, may have a BMI that is higher than 24.9. In cases like these, BMI is not an accurate measure of body fat.  · To determine if excess body fat is the cause of a BMI of 25 or higher, further assessments may need to be done by a health care provider.  · BMI is usually interpreted in the same way for men and women.  Where to find more information  For more information about BMI, including tools to quickly calculate your BMI, go to these websites:  · Centers for Disease Control and Prevention: www.cdc.gov  · American Heart Association: www.heart.org  · National Heart, Lung, and Blood [place]: www.nhlbi.nih.gov  Summary  · Body mass index (BMI) is a number that is calculated from a person's weight and height.  · BMI may help estimate how much of a person's weight is composed of fat. BMI can help identify those who may be at higher risk for certain medical problems.  · BMI can be measured using English measurements or metric measurements.  · BMI charts are used to identify whether you are underweight, normal " weight, overweight, or obese.  This information is not intended to replace advice given to you by your health care provider. Make sure you discuss any questions you have with your health care provider.  Document Revised: 09/09/2020 Document Reviewed: 07/17/2020  Elsevier Patient Education © 2021 [employer]

## 2022-01-03 NOTE — TELEPHONE ENCOUNTER
PER DR CABRERA MOVED [facility] APPT UP FOR CARDIAC CLEARANCE. SPOKE WITH PT TO INFORM OF NEW APPT WITH THEIR OFFICE. PT STATED UNDERSTANDING.

## 2022-01-03 NOTE — PROGRESS NOTES
[facility] - PODIATRY    Today's Date: 01/14/22    Patient Name: Edy Cosby  MRN: [id number]  CSN: [id number]  PCP: Mark Villela MD  Referring Provider: No ref. provider found    SUBJECTIVE     Chief Complaint   Patient presents with   • Follow-up     pcp09/28/2020 3 month f/u - pt states no issues - pt denies pain - pt presents routine nail care, long thick toenails    • Diabetes     127mg/dl yesterday     HPI: Edy Cosby, a 66 y.o.male, comes to clinic as a(n) established patient presenting for diabetic foot exam and complaining of thickened toenails. Patient has h/o AFib, back pain, DM2, Emphysema lung, GERD, HTN, OA. Patient is IDDM with last stated BG level of 127mg/dl.  Patient states that he does not check blood glucose recently.  Relates neuropathy in feet with numbness and tingling.  Continues use of AFO for foot drop of left foot..  Patient states that toenails are long, thick, and irregular and that he is unable to care for them himself.  Denies open wound or sores. Denies pain today. Relates previous treatment(s) including foot care by podiatrist. Denies any constitutional symptoms. No other pedal complaints at this time.    Past Medical History:   Diagnosis Date   • A-fib (HCC)    • Atherosclerosis    • Chronic back pain    • Constipation    • Diabetes mellitus (HCC)     TYPE II   • Dropfoot     wears brace   • Dysphagia    • Emphysema of lung (HCC)    • Gastroparesis    • GERD (gastroesophageal reflux disease)    • Hyperlipidemia    • Hypertension     ESSENTIAL   • Leg pain    • Muscle spasm    • Nerve pain    • Osteoarthritis    • Sleeping difficulty    • Ulcer of toe due to diabetes mellitus (HCC)      Past Surgical History:   Procedure Laterality Date   • ANGIOPLASTY ILIAC ARTERY Left 1/29/2019    Procedure: ANGIOPLASTY ILIAC ARTERY, STENT PLACEMENT;  Surgeon: Duncan Lucio MD;  Location: [place] [address];  Service: Vascular   • AORTAGRAM Left  12/18/2018    Procedure: AORTOGRAM, LEFT LEG ANGIOGRAM, MYNX CLOSURE;  Surgeon: Duncan Lucio MD;  Location:  [facility] HYBRID OR 12;  Service: Vascular   • AORTAGRAM Right 12/16/2021    Procedure: AORTAGRAM, RIGHT LOWER EXTREMITY ANGIOGRAM, POSSIBLE INTERVENTION;  Surgeon: Duncan Lucio MD;  Location: [facility] HYBRID OR 12;  Service: Vascular;  Laterality: Right;   • BACK SURGERY     • CHOLECYSTECTOMY     • COLONOSCOPY  09/01/2011    TICS RECALL 5YR   • COLONOSCOPY  09/30/2008    ENTER RESULTS: STOOL THROUGHTOUT THE COLON POOR PREP REC 3 YEAR RECALL   • COLONOSCOPY N/A 11/9/2016    Procedure: COLONOSCOPY;  Surgeon: León Zepeda MD;  Location: [facility] ENDOSCOPY;  Service:    • COLONOSCOPY N/A 4/19/2018    Procedure: COLONOSCOPY WITH ANESTHESIA;  Surgeon: León Zepeda MD;  Location: [facility] ENDOSCOPY;  Service: Gastroenterology   • ENDOSCOPY  06/19/2012    HH   • ENDOSCOPY  08/25/2009    HIATAL HERNIA, DILATED WITH 50 FR MASCORRO   • ENDOSCOPY  06/19/2007    WITHIN NORMAL LIMITS UREA NEG   • FEMORAL ENDARTERECTOMY Left 1/29/2019    Procedure: LEFT FEMORAL ENDARTERECTOMY;  Surgeon: Duncan Lucio MD;  Location: [facility] HYBRID OR 12;  Service: Vascular   • LUMBAR LAMINECTOMY WITH FUSION N/A 1/23/2017    Procedure: REMOVAL OF INSTRUMENTATION, EXPLORATION OF FUSION L4-S1, REVISION LEFT L5-S1 HEMILAMINECTOMY, FACETECTOMY DECOMPRESSION, REVISION UNINSTRUMENTED POSTERIOR SPINAL FUSION L5-S1;  Surgeon: RHONDA Lopes MD;  Location: [facility] OR;  Service:    • OTHER SURGICAL HISTORY      LUMBAR SACRAL SURGERY WITH FUSION X2   • PILONIDAL CYST / SINUS EXCISION      PILONIDAL CYST REMOVAL     Family History   Problem Relation Age of Onset   • Heart attack Father    • Diabetes Brother    • Colon polyps Sister    • Stomach cancer Neg Hx         GI CNACERS OR DISEASE   • Colon cancer Neg Hx      Social History     Socioeconomic History   • Marital status: Single   Tobacco Use   • Smoking status: Former  Smoker     Years: 30.00     Types: Cigarettes   • Smokeless tobacco: Never Used   Vaping Use   • Vaping Use: Never used   Substance and Sexual Activity   • Alcohol use: No   • Drug use: No   • Sexual activity: Defer     No Known Allergies  Current Outpatient Medications   Medication Sig Dispense Refill   • albuterol sulfate  (90 Base) MCG/ACT inhaler Inhale 2 puffs 4 (Four) Times a Day As Needed.     • amLODIPine (NORVASC) 10 MG tablet Take 10 mg by mouth Daily.     • atorvastatin (LIPITOR) 40 MG tablet 1 tablet Every Night.     • cilostazol (PLETAL) 50 MG tablet 1 tablet 2 (Two) Times a Day.     • cyclobenzaprine (FLEXERIL) 5 MG tablet Take 5 mg by mouth 3 (Three) Times a Day As Needed for Muscle Spasms.     • Dulaglutide (Trulicity) 1.5 MG/0.5ML solution pen-injector Inject 1.5 mg under the skin into the appropriate area as directed 1 (One) Time Per Week.     • hydroCHLOROthiazide (HYDRODIURIL) 12.5 MG tablet Take 12.5 mg by mouth Daily.     • insulin aspart (NovoLOG FlexPen) 100 UNIT/ML solution pen-injector sc pen Inject 25-37 Units under the skin into the appropriate area as directed Take As Directed.     • labetalol (NORMODYNE) 100 MG tablet Take 100 mg by mouth 2 (Two) Times a Day.     • lisinopril (PRINIVIL,ZESTRIL) 40 MG tablet Take 40 mg by mouth Daily.     • pantoprazole (PROTONIX) 40 MG EC tablet Take 40 mg by mouth Daily.     • tamsulosin (FLOMAX) 0.4 MG capsule 24 hr capsule Take 1 capsule by mouth Daily.       No current facility-administered medications for this visit.     Review of Systems   Constitutional: Negative for chills and fever.   HENT: Negative for congestion.    Respiratory: Negative for shortness of breath.    Cardiovascular: Negative for chest pain and leg swelling.   Gastrointestinal: Negative for constipation, diarrhea, nausea and vomiting.   Musculoskeletal: Positive for gait problem (foot drop).   Skin: Negative for wound.   Neurological: Positive for numbness.        OBJECTIVE     Vitals:    01/14/22 1137   Pulse: 102   SpO2: 96%       PHYSICAL EXAM  GEN:   Accompanied by none.     Foot/Ankle Exam:       General:   Diabetic Foot Exam Performed    Appearance: appears stated age and healthy and obesity    Orientation: AAOx3    Affect: appropriate    Gait: unimpaired    Gait comment:  Drop foot left  Assistance: independent    Shoe Gear:  Diabetic shoes (Left AFO)    VASCULAR      Right Foot Vascularity   Dorsalis pedis:  2+  Posterior tibial:  2+  Skin Temperature: warm    Edema Grading:  Trace and non-pitting  CFT:  3  Pedal Hair Growth:  Present  Varicosities: none       Left Foot Vascularity   Dorsalis pedis:  2+  Posterior tibial:  2+  Skin Temperature: warm    Edema Grading:  Trace and non-pitting  CFT:  3  Pedal Hair Growth:  Present  Varicosities: none        NEUROLOGIC     Right Foot Neurologic   Light touch sensation:  Diminished  Vibratory sensation:  Diminished  Hot/Cold sensation: diminished    Protective Sensation using [place] Monofilament:  0     Left Foot Neurologic   Light touch sensation:  Diminished  Vibratory sensation:  Diminished  Hot/cold sensation: diminished    Protective Sensation using [place] Monofilament:  0     MUSCULOSKELETAL      Right Foot Musculoskeletal   Ecchymosis:  None  Tenderness: none    Arch:  Pes planus  Hallux valgus: No    Hallux limitus: No       Left Foot Musculoskeletal   Ecchymosis:  None  Tenderness: none    Arch:  Pes planus  Hallux valgus: No    Hallux limitus: No       MUSCLE STRENGTH     Right Foot Muscle Strength   Foot dorsiflexion:  5  Foot plantar flexion:  5  Foot inversion:  5  Foot eversion:  5     Left Foot Muscle Strength   Foot dorsiflexion:  2  Foot plantar flexion:  3  Foot inversion:  3  Foot eversion:  3     RANGE OF MOTION      Right Foot Range of Motion   Foot and ankle ROM within normal limits       Left Foot Range of Motion   Foot and ankle ROM within normal limits        DERMATOLOGIC     Right Foot Dermatologic   Skin: no right foot skin dryness and no right foot tinea    Nails: onychomycosis, abnormally thick, subungual debris and dystrophic nails       Left Foot Dermatologic   Skin: no left foot skin dryness and no left foot tinea    Nails: onychomycosis, abnormally thick, subungual debris and dystrophic nails        RADIOLOGY/NUCLEAR:  IR Angiogram Extremity, FL C Arm During Surgery    Result Date: 12/23/2021  Narrative: Performed by Dr. Lucio. Please see procedure note. This report was finalized on 12/23/2021 15:26 by Dr. Duncan Lucio MD.      LABORATORY/CULTURE RESULTS:      PATHOLOGY RESULTS:       ASSESSMENT/PLAN     Diagnoses and all orders for this visit:    1. Onychomycosis (Primary)    2. Type 2 diabetes mellitus with diabetic polyneuropathy, with long-term current use of insulin (HCC)    3. Tinea pedis of both feet    4. Foot drop, left      Comprehensive lower extremity examination and evaluation was performed.  Discussed findings and treatment plan including risks, benefits, and treatment options with patient in detail. Patient agreed with treatment plan.  After verbal consent obtained, nail(s) x10 debrided of length and thickness with nail nipper without incidence  Patient may maintain nails and calluses at home utilizing emery board or pumice stone between visits as needed  Reviewed at home diabetic foot care including daily foot checks   Continue AFO for foot drop.   Continue diabetic monitoring and control under direction of PCP.    An After Visit Summary was printed and given to the patient at discharge, including (if requested) any available informative/educational handouts regarding diagnosis, treatment, or medications. All questions were answered to patient/family satisfaction. Should symptoms fail to improve or worsen they agree to call or return to clinic or to go to the Emergency Department. Discussed the importance of following up with any needed  screening tests/labs/specialist appointments and any requested follow-up recommended by me today. Importance of maintaining follow-up discussed and patient accepts that missed appointments can delay diagnosis and potentially lead to worsening of conditions.  Return in about 3 months (around 4/14/2022)., or sooner if acute issues arise.    Lab Frequency Next Occurrence   Diet: Once 04/19/2018   Advance Diet as Tolerated Once 04/19/2018       This document has been electronically signed by PRATIMA Esteves on January 14, 2022 13:04 CST

## 2022-01-05 ENCOUNTER — OFFICE VISIT (OUTPATIENT)
Dept: CARDIOLOGY | Facility: CLINIC | Age: 67
End: 2022-01-05

## 2022-01-05 VITALS
HEART RATE: 98 BPM | RESPIRATION RATE: 18 BRPM | SYSTOLIC BLOOD PRESSURE: 145 MMHG | DIASTOLIC BLOOD PRESSURE: 85 MMHG | BODY MASS INDEX: 35.47 KG/M2 | HEIGHT: 67 IN | WEIGHT: 226 LBS | OXYGEN SATURATION: 96 %

## 2022-01-05 DIAGNOSIS — N18.31 TYPE 2 DIABETES MELLITUS WITH STAGE 3A CHRONIC KIDNEY DISEASE, WITH LONG-TERM CURRENT USE OF INSULIN: ICD-10-CM

## 2022-01-05 DIAGNOSIS — Z79.4 TYPE 2 DIABETES MELLITUS WITH STAGE 3A CHRONIC KIDNEY DISEASE, WITH LONG-TERM CURRENT USE OF INSULIN: ICD-10-CM

## 2022-01-05 DIAGNOSIS — I73.9 PVD (PERIPHERAL VASCULAR DISEASE) WITH CLAUDICATION: ICD-10-CM

## 2022-01-05 DIAGNOSIS — E66.01 CLASS 2 SEVERE OBESITY DUE TO EXCESS CALORIES WITH SERIOUS COMORBIDITY AND BODY MASS INDEX (BMI) OF 35.0 TO 35.9 IN ADULT: Chronic | ICD-10-CM

## 2022-01-05 DIAGNOSIS — E11.22 TYPE 2 DIABETES MELLITUS WITH STAGE 3A CHRONIC KIDNEY DISEASE, WITH LONG-TERM CURRENT USE OF INSULIN: ICD-10-CM

## 2022-01-05 DIAGNOSIS — I48.0 PAROXYSMAL ATRIAL FIBRILLATION: Chronic | ICD-10-CM

## 2022-01-05 DIAGNOSIS — E78.2 MIXED HYPERLIPIDEMIA: ICD-10-CM

## 2022-01-05 DIAGNOSIS — Z01.810 PREOPERATIVE CARDIOVASCULAR EXAMINATION: Primary | ICD-10-CM

## 2022-01-05 DIAGNOSIS — N18.31 STAGE 3A CHRONIC KIDNEY DISEASE: Chronic | ICD-10-CM

## 2022-01-05 DIAGNOSIS — I10 ESSENTIAL HYPERTENSION: Chronic | ICD-10-CM

## 2022-01-05 PROBLEM — F17.200 TOBACCO USE DISORDER: Status: RESOLVED | Noted: 2017-01-18 | Resolved: 2022-01-05

## 2022-01-05 PROBLEM — Z78.9 DIFFICULTY WITH BIPAP USE: Chronic | Status: ACTIVE | Noted: 2017-11-13

## 2022-01-05 PROBLEM — G47.33 OBSTRUCTIVE SLEEP APNEA: Chronic | Status: ACTIVE | Noted: 2017-03-07

## 2022-01-05 PROBLEM — Z99.89 BIPAP (BIPHASIC POSITIVE AIRWAY PRESSURE) DEPENDENCE: Status: RESOLVED | Noted: 2021-11-19 | Resolved: 2022-01-05

## 2022-01-05 PROBLEM — E11.21 DIABETIC NEPHROPATHY ASSOCIATED WITH TYPE 2 DIABETES MELLITUS (HCC): Status: RESOLVED | Noted: 2018-05-03 | Resolved: 2022-01-05

## 2022-01-05 PROCEDURE — 99214 OFFICE O/P EST MOD 30 MIN: CPT | Performed by: NURSE PRACTITIONER

## 2022-01-05 NOTE — PROGRESS NOTES
Subjective:     Encounter Date:01/05/2022      Patient ID: Edy Cosby is a 66 y.o. male with paroxysmal atrial fibrillation following surgery in 2017 and no known recurrence, hypertension, insulin-dependent diabetes, peripheral vascular disease, CKD, obstructive sleep apnea, and obesity.  He presents the office today at the request of vascular surgery for cardiac risk assessment.    Chief Complaint: Preoperative cardiac risk assessment  Atrial Fibrillation  Presents for follow-up visit. Symptoms are negative for chest pain, dizziness, hypertension, hypotension, palpitations, shortness of breath, syncope, tachycardia and weakness. The symptoms have been stable. Past medical history includes atrial fibrillation.   Hypertension  This is a chronic problem. The current episode started more than 1 year ago. The problem is controlled. Pertinent negatives include no chest pain, headaches, malaise/fatigue, orthopnea, palpitations, PND or shortness of breath. Risk factors for coronary artery disease include diabetes mellitus, male gender and obesity. Current antihypertension treatment includes ACE inhibitors, diuretics, calcium channel blockers and beta blockers. The current treatment provides significant improvement. Compliance problems include exercise.  There is no history of angina, CAD/MI or heart failure.     Mr. Cosby presents today at the request of Dr. Lucio for evaluation by cardiology in efforts to provide a risk assessment so that he may proceed in scheduling the patient's peripheral vascular procedure.    Mr. Cosby follows with our office due to a previously known episode of atrial fibrillation.  He has had no known recurrence since that time in 2017.  He has other chronic stable conditions including diabetes, kidney disease, hypertension, hyperlipidemia.  He has no known coronary artery disease.    Mr. Cosby reports no cardiac complaints.  He does have significant claudication requiring  increased periods of rest while walking.  His claudication has obviously affected his functional capacity.  He has previously had difficulty with his left lower extremity which had improvement of symptoms with revascularization.  He is now being evaluated for revascularization of the right lower extremity.  He denies any chest pain, chest pressure, chest discomfort.  He denies any significant shortness of breath, dyspnea on exertion, increased fatigue.  He denies any evidence of congestive heart failure with no complaints of orthopnea, PND, lower extremity swelling.  He denies any lightheadedness, dizziness, near-syncope, syncope.    The following portions of the patient's history were reviewed and updated as appropriate: allergies, current medications, past family history, past medical history, past social history and past surgical history.     No Known Allergies      Current Outpatient Medications:   •  albuterol sulfate  (90 Base) MCG/ACT inhaler, Inhale 2 puffs 4 (Four) Times a Day As Needed., Disp: , Rfl:   •  amLODIPine (NORVASC) 10 MG tablet, Take 10 mg by mouth Daily., Disp: , Rfl:   •  atorvastatin (LIPITOR) 40 MG tablet, 1 tablet Every Night., Disp: , Rfl:   •  cilostazol (PLETAL) 50 MG tablet, 1 tablet 2 (Two) Times a Day., Disp: , Rfl:   •  cyclobenzaprine (FLEXERIL) 5 MG tablet, Take 5 mg by mouth 3 (Three) Times a Day As Needed for Muscle Spasms., Disp: , Rfl:   •  Dulaglutide (Trulicity) 1.5 MG/0.5ML solution pen-injector, Inject 1.5 mg under the skin into the appropriate area as directed 1 (One) Time Per Week., Disp: , Rfl:   •  hydroCHLOROthiazide (HYDRODIURIL) 12.5 MG tablet, Take 12.5 mg by mouth Daily., Disp: , Rfl:   •  insulin aspart (NovoLOG FlexPen) 100 UNIT/ML solution pen-injector sc pen, Inject 25-37 Units under the skin into the appropriate area as directed Take As Directed., Disp: , Rfl:   •  labetalol (NORMODYNE) 100 MG tablet, Take 100 mg by mouth 2 (Two) Times a Day., Disp: ,  "Rfl:   •  lisinopril (PRINIVIL,ZESTRIL) 40 MG tablet, Take 40 mg by mouth Daily., Disp: , Rfl:   •  pantoprazole (PROTONIX) 40 MG EC tablet, Take 40 mg by mouth Daily., Disp: , Rfl:   •  tamsulosin (FLOMAX) 0.4 MG capsule 24 hr capsule, Take 1 capsule by mouth Daily., Disp: , Rfl:       Review of Systems   Constitutional: Negative for diaphoresis, fever and malaise/fatigue.   HENT: Negative for congestion.    Eyes: Negative for visual disturbance.   Cardiovascular: Positive for claudication. Negative for chest pain, dyspnea on exertion, leg swelling, near-syncope, orthopnea, palpitations, paroxysmal nocturnal dyspnea and syncope.   Respiratory: Negative for shortness of breath and wheezing.    Hematologic/Lymphatic: Negative for bleeding problem.   Gastrointestinal: Negative for bloating, abdominal pain, nausea and vomiting.   Neurological: Negative for dizziness, headaches, light-headedness and weakness.   Psychiatric/Behavioral: Negative for altered mental status.       Procedures  /85 (BP Location: Right arm, Patient Position: Sitting, Cuff Size: Adult)   Pulse 98   Resp 18   Ht 170.2 cm (67\")   Wt 103 kg (226 lb)   SpO2 96%   BMI 35.40 kg/m²        Objective:     Vitals reviewed.   Constitutional:       General: Awake. Not in acute distress.     Appearance: Well-developed, well-groomed and not in distress. Obese. Chronically ill-appearing. Not diaphoretic.   Eyes:      General:         Right eye: No discharge.         Left eye: No discharge.   HENT:      Head: Normocephalic and atraumatic.    Mouth/Throat:      Pharynx: No oropharyngeal exudate.   Pulmonary:      Effort: Pulmonary effort is normal. No respiratory distress.      Breath sounds: Normal breath sounds. No wheezing. No rhonchi. No rales.   Chest:      Chest wall: Not tender to palpatation.   Cardiovascular:      Normal rate. Regular rhythm.      Murmurs: There is no murmur.      No gallop. No rub.   Edema:     Peripheral edema absent. "   Abdominal:      General: There is no distension.      Palpations: Abdomen is soft.      Tenderness: There is no abdominal tenderness.   Musculoskeletal: Normal range of motion.      Cervical back: Normal range of motion and neck supple. Skin:     General: Skin is warm and dry.      Coloration: Skin is not pale.      Findings: No erythema or rash.   Neurological:      Mental Status: Alert, oriented to person, place, and time and oriented to person, place and time.   Psychiatric:         Attention and Perception: Attention normal.         Mood and Affect: Mood normal.         Speech: Speech normal.         Behavior: Behavior normal. Behavior is cooperative.         Thought Content: Thought content normal.         Cognition and Memory: Cognition normal.         Judgment: Judgment normal.       Lab Review:   Results for orders placed during the hospital encounter of 01/23/17    Adult Transthoracic Echo Complete With Contrast    Interpretation Summary  · All left ventricular wall segments contract normally.  · Left ventricular wall thickness is consistent with moderate concentric hypertrophy.    GROSSLY NORMAL LV AND RV SYSTOLIC FUNCTION  GROSSLY NORMAL VALVE FUNCTION  MODERATE LVH          Assessment:          Diagnosis Plan   1. Preoperative cardiovascular examination     2. Paroxysmal atrial fibrillation ([facility])     3. PVD (peripheral vascular disease) with claudication ([facility])     4. Essential hypertension     5. Mixed hyperlipidemia     6. Stage 3a chronic kidney disease ([facility])     7. Type 2 diabetes mellitus with stage 3a chronic kidney disease, with long-term current use of insulin ([facility])     8. Class 2 severe obesity due to excess calories with serious comorbidity and body mass index (BMI) of 35.0 to 35.9 in adult ([facility])            Plan:       Perioperative cardiovascular examination with cardiovascular risk assessment    By his symptoms, claudication, recent arteriogram, and previously known history the patient has  peripheral arterial disease which is an equivalent of coronary artery disease.  The patient was previously managed on aspirin and stopped this on his own approximately 2 months ago.  He should resume low-dose aspirin therapy for chronic medical management in my opinion.  In addition to this, the patient is currently on statin therapy which should be continued as well.  Based on the revised cardiac risk index, the patient does not have any known ischemic heart disease, congestive heart failure, cerebrovascular disease.  He does have chronic kidney disease but does not have acute renal failure with creatinine greater than 2 based on last evaluation.  His history is positive for insulin-dependent diabetes.  Currently, he cannot do greater than 4 METS but states that this is primarily limited to his claudication.  He denies any angina symptoms including chest discomfort or shortness of breath.  Regarding the possibility of active cardiac conditions, he does not have any chest pain indicate possibility of acute coronary syndrome, has no signs of symptoms of decompensated heart failure with worsening dyspnea, orthopnea, PND, lower extremity edema, no palpitations or syncope to suggest an uncontrolled arrhythmia, or symptoms to suggest severe valvular disease.  With those findings patient would be felt to be low risk for a major cardiac event, less than 1%, for this intermediate risk procedure.  As previously mentioned, by way of history, the patient does not have any symptoms suggestive of active cardiac conditions.  I would suggest that the patient resume low-dose aspirin therapy, otherwise his risks are nonmodifiable and proceeding with vascular surgery is reasonable.      He can follow-up in our office for routine visit in 6 months.  Call sooner if needed.

## 2022-01-05 NOTE — PATIENT INSTRUCTIONS
Hypertension, Adult  Hypertension is another name for high blood pressure. High blood pressure forces your heart to work harder to pump blood. This can cause problems over time.  There are two numbers in a blood pressure reading. There is a top number (systolic) over a bottom number (diastolic). It is best to have a blood pressure that is below 120/80. Healthy choices can help lower your blood pressure, or you may need medicine to help lower it.  What are the causes?  The cause of this condition is not known. Some conditions may be related to high blood pressure.  What increases the risk?  · Smoking.  · Having type 2 diabetes mellitus, high cholesterol, or both.  · Not getting enough exercise or physical activity.  · Being overweight.  · Having too much fat, sugar, calories, or salt (sodium) in your diet.  · Drinking too much alcohol.  · Having long-term (chronic) kidney disease.  · Having a family history of high blood pressure.  · Age. Risk increases with age.  · Race. You may be at higher risk if you are .  · Gender. Men are at higher risk than women before age 45. After age 65, women are at higher risk than men.  · Having obstructive sleep apnea.  · Stress.  What are the signs or symptoms?  · High blood pressure may not cause symptoms. Very high blood pressure (hypertensive crisis) may cause:  ? Headache.  ? Feelings of worry or nervousness (anxiety).  ? Shortness of breath.  ? Nosebleed.  ? A feeling of being sick to your stomach (nausea).  ? Throwing up (vomiting).  ? Changes in how you see.  ? Very bad chest pain.  ? Seizures.  How is this treated?  · This condition is treated by making healthy lifestyle changes, such as:  ? Eating healthy foods.  ? Exercising more.  ? Drinking less alcohol.  · Your health care provider may prescribe medicine if lifestyle changes are not enough to get your blood pressure under control, and if:  ? Your top number is above 130.  ? Your bottom number is above  80.  · Your personal target blood pressure may vary.  Follow these instructions at home:  Eating and drinking    · If told, follow the DASH eating plan. To follow this plan:  ? Fill one half of your plate at each meal with fruits and vegetables.  ? Fill one fourth of your plate at each meal with whole grains. Whole grains include whole-wheat pasta, brown rice, and whole-grain bread.  ? Eat or drink low-fat dairy products, such as skim milk or low-fat yogurt.  ? Fill one fourth of your plate at each meal with low-fat (lean) proteins. Low-fat proteins include fish, chicken without skin, eggs, beans, and tofu.  ? Avoid fatty meat, cured and processed meat, or chicken with skin.  ? Avoid pre-made or processed food.  · Eat less than 1,500 mg of salt each day.  · Do not drink alcohol if:  ? Your doctor tells you not to drink.  ? You are pregnant, may be pregnant, or are planning to become pregnant.  · If you drink alcohol:  ? Limit how much you use to:  § 0-1 drink a day for women.  § 0-2 drinks a day for men.  ? Be aware of how much alcohol is in your drink. In the [place] one drink equals one 12 oz bottle of beer (355 mL), one 5 oz glass of wine (148 mL), or one 1½ oz glass of hard liquor (44 mL).    Lifestyle    · Work with your doctor to stay at a healthy weight or to lose weight. Ask your doctor what the best weight is for you.  · Get at least 30 minutes of exercise most days of the week. This may include walking, swimming, or biking.  · Get at least 30 minutes of exercise that strengthens your muscles (resistance exercise) at least 3 days a week. This may include lifting weights or doing Pilates.  · Do not use any products that contain nicotine or tobacco, such as cigarettes, e-cigarettes, and chewing tobacco. If you need help quitting, ask your doctor.  · Check your blood pressure at home as told by your doctor.  · Keep all follow-up visits as told by your doctor. This is important.    Medicines  · Take  over-the-counter and prescription medicines only as told by your doctor. Follow directions carefully.  · Do not skip doses of blood pressure medicine. The medicine does not work as well if you skip doses. Skipping doses also puts you at risk for problems.  · Ask your doctor about side effects or reactions to medicines that you should watch for.  Contact a doctor if you:  · Think you are having a reaction to the medicine you are taking.  · Have headaches that keep coming back (recurring).  · Feel dizzy.  · Have swelling in your ankles.  · Have trouble with your vision.  Get help right away if you:  · Get a very bad headache.  · Start to feel mixed up (confused).  · Feel weak or numb.  · Feel faint.  · Have very bad pain in your:  ? Chest.  ? Belly (abdomen).  · Throw up more than once.  · Have trouble breathing.  Summary  · Hypertension is another name for high blood pressure.  · High blood pressure forces your heart to work harder to pump blood.  · For most people, a normal blood pressure is less than 120/80.  · Making healthy choices can help lower blood pressure. If your blood pressure does not get lower with healthy choices, you may need to take medicine.  This information is not intended to replace advice given to you by your health care provider. Make sure you discuss any questions you have with your health care provider.  Document Revised: 08/28/2019 Document Reviewed: 08/28/2019  [employer] Patient Education © 2021 [employer]  Atrial Fibrillation    Atrial fibrillation is a type of irregular or rapid heartbeat (arrhythmia). In atrial fibrillation, the top part of the heart (atria) beats in an irregular pattern. This makes the heart unable to pump blood normally and effectively.  The goal of treatment is to prevent blood clots from forming, control your heart rate, or restore your heartbeat to a normal rhythm. If this condition is not treated, it can cause serious problems, such as a weakened heart muscle  (cardiomyopathy) or a stroke.  What are the causes?  This condition is often caused by medical conditions that damage the heart's electrical system. These include:  · High blood pressure (hypertension). This is the most common cause.  · Certain heart problems or conditions, such as heart failure, coronary artery disease, heart valve problems, or heart surgery.  · Diabetes.  · Overactive thyroid (hyperthyroidism).  · Obesity.  · Chronic kidney disease.  In some cases, the cause of this condition is not known.  What increases the risk?  This condition is more likely to develop in:  · Older people.  · People who smoke.  · Athletes who do endurance exercise.  · People who have a family history of atrial fibrillation.  · Men.  · People who use drugs.  · People who drink a lot of alcohol.  · People who have lung conditions, such as emphysema, pneumonia, or COPD.  · People who have obstructive sleep apnea.  What are the signs or symptoms?  Symptoms of this condition include:  · A feeling that your heart is racing or beating irregularly.  · Discomfort or pain in your chest.  · Shortness of breath.  · Sudden light-headedness or weakness.  · Tiring easily during exercise or activity.  · Fatigue.  · Syncope (fainting).  · Sweating.  In some cases, there are no symptoms.  How is this diagnosed?  Your health care provider may detect atrial fibrillation when taking your pulse. If detected, this condition may be diagnosed with:  · An electrocardiogram (ECG) to check electrical signals of the heart.  · An ambulatory cardiac monitor to record your heart's activity for a few days.  · A transthoracic echocardiogram (TTE) to create pictures of your heart.  · A transesophageal echocardiogram ([place]) to create even closer pictures of your heart.  · A stress test to check your blood supply while you exercise.  · Imaging tests, such as a CT scan or chest X-ray.  · Blood tests.  How is this treated?  Treatment depends on underlying conditions  and how you feel when you experience atrial fibrillation. This condition may be treated with:  · Medicines to prevent blood clots or to treat heart rate or heart rhythm problems.  · Electrical cardioversion to reset the heart's rhythm.  · A pacemaker to correct abnormal heart rhythm.  · Ablation to remove the heart tissue that sends abnormal signals.  · Left atrial appendage closure to seal the area where blood clots can form.  In some cases, underlying conditions will be treated.  Follow these instructions at home:  Medicines  · Take over-the counter and prescription medicines only as told by your health care provider.  · Do not take any new medicines without talking to your health care provider.  · If you are taking blood thinners:  ? Talk with your health care provider before you take any medicines that contain aspirin or NSAIDs, such as ibuprofen. These medicines increase your risk for dangerous bleeding.  ? Take your medicine exactly as told, at the same time every day.  ? Avoid activities that could cause injury or bruising, and follow instructions about how to prevent falls.  ? Wear a medical alert bracelet or carry a card that lists what medicines you take.  Lifestyle         · Do not use any products that contain nicotine or tobacco, such as cigarettes, e-cigarettes, and chewing tobacco. If you need help quitting, ask your health care provider.  · Eat heart-healthy foods. Talk with a dietitian to make an eating plan that is right for you.  · Exercise regularly as told by your health care provider.  · Do not drink alcohol.  · Lose weight if you are overweight.  · Do not use drugs, including cannabis.  General instructions  · If you have obstructive sleep apnea, manage your condition as told by your health care provider.  · Do not use diet pills unless your health care provider approves. Diet pills can make heart problems worse.  · Keep all follow-up visits as told by your health care provider. This is  "important.  Contact a health care provider if you:  · Notice a change in the rate, rhythm, or strength of your heartbeat.  · Are taking a blood thinner and you notice more bruising.  · Tire more easily when you exercise or do heavy work.  · Have a sudden change in weight.  Get help right away if you have:    · Chest pain, abdominal pain, sweating, or weakness.  · Trouble breathing.  · Side effects of blood thinners, such as blood in your vomit, stool, or urine, or bleeding that cannot stop.  · Any symptoms of a stroke. \"BE FAST\" is an easy way to remember the main warning signs of a stroke:  ? B - Balance. Signs are dizziness, sudden trouble walking, or loss of balance.  ? E - Eyes. Signs are trouble seeing or a sudden change in vision.  ? F - Face. Signs are sudden weakness or numbness of the face, or the face or eyelid drooping on one side.  ? A - Arms. Signs are weakness or numbness in an arm. This happens suddenly and usually on one side of the body.  ? S - Speech. Signs are sudden trouble speaking, slurred speech, or trouble understanding what people say.  ? T - Time. Time to call emergency services. Write down what time symptoms started.  · Other signs of a stroke, such as:  ? A sudden, severe headache with no known cause.  ? Nausea or vomiting.  ? Seizure.  These symptoms may represent a serious problem that is an emergency. Do not wait to see if the symptoms will go away. Get medical help right away. Call your local emergency services (911 in the [place] Do not drive yourself to the hospital.  Summary  · Atrial fibrillation is a type of irregular or rapid heartbeat (arrhythmia).  · Symptoms include a feeling that your heart is beating fast or irregularly.  · You may be given medicines to prevent blood clots or to treat heart rate or heart rhythm problems.  · Get help right away if you have signs or symptoms of a stroke.  · Get help right away if you cannot catch your breath or have chest pain or " pressure.  This information is not intended to replace advice given to you by your health care provider. Make sure you discuss any questions you have with your health care provider.  Document Revised: 06/10/2020 Document Reviewed: 06/10/2020  [employer] Patient Education © 2021 [employer]  BMI for Adults  What is BMI?  Body mass index (BMI) is a number that is calculated from a person's weight and height. BMI can help estimate how much of a person's weight is composed of fat. BMI does not measure body fat directly. Rather, it is an alternative to procedures that directly measure body fat, which can be difficult and expensive.  BMI can help identify people who may be at higher risk for certain medical problems.  What are BMI measurements used for?  BMI is used as a screening tool to identify possible weight problems. It helps determine whether a person is obese, overweight, a healthy weight, or underweight.  BMI is useful for:  · Identifying a weight problem that may be related to a medical condition or may increase the risk for medical problems.  · Promoting changes, such as changes in diet and exercise, to help reach a healthy weight. BMI screening can be repeated to see if these changes are working.  How is BMI calculated?  BMI involves measuring your weight in relation to your height. Both height and weight are measured, and the BMI is calculated from those numbers. This can be done either in English ([place] or metric measurements. Note that charts and online BMI calculators are available to help you find your BMI quickly and easily without having to do these calculations yourself.  To calculate your BMI in English ([place] measurements:    1. Measure your weight in pounds (lb).  2. Multiply the number of pounds by 703.  ? For example, for a person who weighs 180 lb, multiply that number by 703, which equals 126,540.  3. Measure your height in inches. Then multiply that number by itself to get a measurement called  "\"inches squared.\"  ? For example, for a person who is 70 inches tall, the \"inches squared\" measurement is 70 inches x 70 inches, which equals 4,900 inches squared.  4. Divide the total from step 2 (number of lb x 703) by the total from step 3 (inches squared): 126,540 ÷ 4,900 = 25.8. This is your BMI.    To calculate your BMI in metric measurements:  1. Measure your weight in kilograms (kg).  2. Measure your height in meters (m). Then multiply that number by itself to get a measurement called \"meters squared.\"  ? For example, for a person who is 1.75 m tall, the \"meters squared\" measurement is 1.75 m x 1.75 m, which is equal to 3.1 meters squared.  3. Divide the number of kilograms (your weight) by the meters squared number. In this example: 70 ÷ 3.1 = 22.6. This is your BMI.  What do the results mean?  BMI charts are used to identify whether you are underweight, normal weight, overweight, or obese. The following guidelines will be used:  · Underweight: BMI less than 18.5.  · Normal weight: BMI between 18.5 and 24.9.  · Overweight: BMI between 25 and 29.9.  · Obese: BMI of 30 or above.  Keep these notes in mind:  · Weight includes both fat and muscle, so someone with a muscular build, such as an athlete, may have a BMI that is higher than 24.9. In cases like these, BMI is not an accurate measure of body fat.  · To determine if excess body fat is the cause of a BMI of 25 or higher, further assessments may need to be done by a health care provider.  · BMI is usually interpreted in the same way for men and women.  Where to find more information  For more information about BMI, including tools to quickly calculate your BMI, go to these websites:  · Centers for Disease Control and Prevention: www.cdc.gov  · American Heart Association: www.heart.org  · National Heart, Lung, and [employer]: www.nhlbi.nih.gov  Summary  · Body mass index (BMI) is a number that is calculated from a person's weight and height.  · BMI may " help estimate how much of a person's weight is composed of fat. BMI can help identify those who may be at higher risk for certain medical problems.  · BMI can be measured using English measurements or metric measurements.  · BMI charts are used to identify whether you are underweight, normal weight, overweight, or obese.  This information is not intended to replace advice given to you by your health care provider. Make sure you discuss any questions you have with your health care provider.  Document Revised: 09/09/2020 Document Reviewed: 07/17/2020  [employer] Patient Education © 2021 [employer]

## 2022-01-07 ENCOUNTER — PREP FOR SURGERY (OUTPATIENT)
Dept: OTHER | Facility: HOSPITAL | Age: 67
End: 2022-01-07

## 2022-01-07 DIAGNOSIS — I10 ESSENTIAL HYPERTENSION: ICD-10-CM

## 2022-01-07 DIAGNOSIS — I70.211 ATHEROSCLEROSIS OF NATIVE ARTERY OF RIGHT LOWER EXTREMITY WITH INTERMITTENT CLAUDICATION: Primary | ICD-10-CM

## 2022-01-07 RX ORDER — LISINOPRIL 40 MG/1
TABLET ORAL
Qty: 90 TABLET | Refills: 0 | Status: SHIPPED | OUTPATIENT
Start: 2022-01-07 | End: 2022-04-05

## 2022-01-07 NOTE — TELEPHONE ENCOUNTER
Sally High called to request a refill on his medication.       Last office visit : 7/27/2021   Next office visit : 1/20/2022     Requested Prescriptions     Pending Prescriptions Disp Refills    lisinopril (PRINIVIL;ZESTRIL) 40 MG tablet [Pharmacy Med Name: LISINOPRIL 40MG     TAB] 90 tablet 0     Sig: Take 1 tablet by mouth once daily            Dianne

## 2022-01-13 ENCOUNTER — TELEPHONE (OUTPATIENT)
Dept: PODIATRY | Facility: CLINIC | Age: 67
End: 2022-01-13

## 2022-01-13 NOTE — TELEPHONE ENCOUNTER
Called patient regarding appt on 01/14/2021 . Left message for patient to return call if any questions or concerns arise.

## 2022-01-14 ENCOUNTER — OFFICE VISIT (OUTPATIENT)
Dept: PODIATRY | Facility: CLINIC | Age: 67
End: 2022-01-14

## 2022-01-14 ENCOUNTER — TELEPHONE (OUTPATIENT)
Dept: PODIATRY | Facility: CLINIC | Age: 67
End: 2022-01-14

## 2022-01-14 VITALS — WEIGHT: 227.8 LBS | HEART RATE: 102 BPM | HEIGHT: 67 IN | OXYGEN SATURATION: 96 % | BODY MASS INDEX: 35.75 KG/M2

## 2022-01-14 DIAGNOSIS — B35.3 TINEA PEDIS OF BOTH FEET: ICD-10-CM

## 2022-01-14 DIAGNOSIS — E11.42 TYPE 2 DIABETES MELLITUS WITH DIABETIC POLYNEUROPATHY, WITH LONG-TERM CURRENT USE OF INSULIN: ICD-10-CM

## 2022-01-14 DIAGNOSIS — Z79.4 TYPE 2 DIABETES MELLITUS WITH DIABETIC POLYNEUROPATHY, WITH LONG-TERM CURRENT USE OF INSULIN: ICD-10-CM

## 2022-01-14 DIAGNOSIS — M21.372 FOOT DROP, LEFT: ICD-10-CM

## 2022-01-14 DIAGNOSIS — B35.1 ONYCHOMYCOSIS: Primary | ICD-10-CM

## 2022-01-14 PROCEDURE — 11721 DEBRIDE NAIL 6 OR MORE: CPT | Performed by: NURSE PRACTITIONER

## 2022-01-14 NOTE — TELEPHONE ENCOUNTER
PT HAS BEEN CALLING A NUMBER TO ANOTHER AREA. WE RETURNED CALL JUST TO REMIND PT TO BE HERE AT 1130 FOR APPT

## 2022-01-17 RX ORDER — BUPIVACAINE HCL/0.9 % NACL/PF 0.1 %
2 PLASTIC BAG, INJECTION (ML) EPIDURAL ONCE
Status: CANCELLED | OUTPATIENT
Start: 2022-01-17 | End: 2022-01-17

## 2022-01-17 NOTE — H&P
HPI      I had the pleasure of seeing your patient in the office today for follow up.  As you recall, the patient is a 66 y.o. male who we are currently following for known peripheral arterial disease.  He has previously undergone left femoral endarterectomy as well as stenting of the left common iliac artery due to lifestyle limiting claudication.  More recently he return for continued surveillance and was noted to have worsening claudication and very short distances in the right calf level.  Noninvasive arterial testing it showed severe arterial insufficiency in the right lower extremity.  As such she was taken for angiogram of the right lower extremity about 2 weeks ago.  He was noted to have significant calcified plaque of the right common femoral artery and extending into the proximal SFA.  He also had significant tibial disease with main outflow via the peroneal with collateralization to a dorsalis pedis.  The AT and PT were both occluded proximally.  Given the significant involvement of the right common femoral artery his angiogram was only diagnostic and he is brought back to the office today to discuss potential right femoral endarterectomy to improve perfusion and decrease his claudication symptoms to the right lower extremity.  He otherwise feels well and has no new acute changes.       Review of Systems   Constitutional: Negative.  Negative for activity change, appetite change, chills and fever.   HENT: Negative.  Negative for congestion, sneezing and sore throat.    Eyes: Negative.    Respiratory: Negative.  Negative for chest tightness and shortness of breath.    Cardiovascular: Negative.  Negative for chest pain, palpitations and leg swelling.   Gastrointestinal: Negative.  Negative for abdominal distention, abdominal pain, nausea and vomiting.   Endocrine: Negative.    Genitourinary: Negative.    Musculoskeletal: Negative.         Continued right lower extremity claudication at short distances.  This  "is unchanged from his last visit in November.   Skin: Negative.    Allergic/Immunologic: Negative.    Neurological: Negative.    Hematological: Negative.    Psychiatric/Behavioral: Negative.          /66 (BP Location: Left arm, Patient Position: Sitting, Cuff Size: Adult)   Pulse 104   Ht 170.2 cm (67\")   Wt 102 kg (224 lb 3.2 oz)   SpO2 95%   BMI 35.11 kg/m²   Physical Exam  Vitals reviewed.   Constitutional:       Appearance: Normal appearance.   HENT:      Head: Normocephalic and atraumatic.      Nose: Nose normal.      Mouth/Throat:      Mouth: Mucous membranes are moist.   Eyes:      Extraocular Movements: Extraocular movements intact.      Pupils: Pupils are equal, round, and reactive to light.   Cardiovascular:      Rate and Rhythm: Normal rate. Rhythm irregularly irregular.      Pulses:           Carotid pulses are 2+ on the right side and 2+ on the left side.       Radial pulses are 2+ on the right side and 2+ on the left side.        Femoral pulses are 2+ on the right side and 2+ on the left side.       Dorsalis pedis pulses are detected w/ Doppler on the right side and 1+ on the left side.        Posterior tibial pulses are 0 on the right side and detected w/ Doppler on the left side.      Comments: Both feet are warm.  He has no wounds.    Left groin access site from recent angiogram is well-healed with no evidence of hematoma or infection.  Pulmonary:      Effort: Pulmonary effort is normal. No respiratory distress.   Abdominal:      General: There is no distension.      Palpations: Abdomen is soft. There is no mass.      Tenderness: There is no abdominal tenderness.   Musculoskeletal:         General: Normal range of motion.      Cervical back: Normal range of motion and neck supple.      Right lower leg: No edema.      Left lower leg: No edema.      Comments: He has known chronic foot drop of the left foot and wears a brace for this.   Skin:     General: Skin is warm and dry.      Capillary " Refill: Capillary refill takes 2 to 3 seconds.   Neurological:      General: No focal deficit present.      Mental Status: He is alert and oriented to person, place, and time.   Psychiatric:         Mood and Affect: Mood normal.         Behavior: Behavior normal.         Thought Content: Thought content normal.         Judgment: Judgment normal.            DIAGNOSTIC DATA:     IR Angiogram Extremity, FL C Arm During Surgery     Result Date: 12/23/2021  Narrative: Performed by Dr. Lucio. Please see procedure note. This report was finalized on 12/23/2021 15:26 by Dr. Duncan Lucio MD.     XR Chest 1 View     Result Date: 12/13/2021  Narrative: HISTORY: Atherosclerosis, BiPAP dependent with diabetes, peripheral vascular disease  CXR: Frontal view the chest obtained  COMPARISON: 12/11/2018  FINDINGS: Mild basilar atelectasis with no acute consolidation or edema. Stable cardiomediastinal contours. Calcified left mediastinal and hilar lymph nodes. No pleural effusion or pneumothorax. Minimal chronic pleural thickening along the right minor fissure. No acute regional bony pathology.       Impression: 1. Mild chronic change with no acute process identified. This report was finalized on 12/13/2021 09:51 by Dr. Norah Escalante MD.            Patient Active Problem List   Diagnosis   • Spinal stenosis of lumbar region   • Back pain   • Degeneration of intervertebral disc of lumbosacral region   • Essential hypertension   • Lumbar disc herniation with radiculopathy   • Type 2 diabetes mellitus without complication, with long-term current use of insulin ([facility])   • Constipation due to opioid therapy   • Gastroesophageal reflux disease without esophagitis   • Panlobular emphysema (HCC)   • Paroxysmal atrial fibrillation ([facility])   • Hx of colonic polyps   • Class 2 severe obesity due to excess calories with serious comorbidity and body mass index (BMI) of 39.0 to 39.9 in adult ([facility])   • Atherosclerosis of native arteries of the  extremities with ulceration ([facility])   • H/O diabetic foot ulcer   • Anemia   • BiPAP (biphasic positive airway pressure) dependence   • Chronic kidney disease, stage III (moderate) ([facility])   • Diabetic nephropathy associated with type 2 diabetes mellitus ([facility])   • Diabetic neuropathy ([facility])   • Difficulty with BiPAP use   • Impotence   • Insomnia   • Left foot drop   • Lumbosacral radiculitis   • Obstructive sleep apnea   • Other and unspecified hyperlipidemia   • PVD (peripheral vascular disease) with claudication ([facility])   • Tobacco use disorder   • Vocal cord paralysis   • Vocal cord paresis   • Atherosclerosis of native artery of right lower extremity with intermittent claudication ([facility])         Visit Diagnosis       ICD-10-CM ICD-9-CM   1. Atherosclerosis of native artery of right lower extremity with intermittent claudication ([facility])  I70.211 440.21   2. Essential hypertension  I10 401.9   3. Type 2 diabetes mellitus without complication, with long-term current use of insulin ([facility])  E11.9 250.00     Z79.4 V58.67   4. Mixed hyperlipidemia  E78.2 272.2            Lab Frequency Next Occurrence   Follow Anesthesia Guidelines / Standing Orders Once 12/07/2018   Obtain Informed Consent Once 12/12/2018   Follow Anesthesia Guidelines / Standing Orders Once 12/17/2018   Obtain Informed Consent Once 12/22/2018   Follow Anesthesia Guidelines / Standing Orders Once 01/21/2019   Obtain Informed Consent Once 01/26/2019   Provide NPO Instructions to Patient Once 01/26/2019   Follow Anesthesia Guidelines / Protocol Once 11/19/2021   Obtain Informed Consent Once 11/24/2021   Provide NPO Instructions to Patient Once 11/24/2021   Chlorhexidine Skin Prep Once 11/24/2021         PLAN: After thoroughly evaluating Edy Cosby, I believe the best course of action is to proceed with right common femoral endarterectomy with right lower extremity angiogram and possible more distal intervention.  Patient has significant claudication in the  right lower extremity at short distances which is lifestyle limiting at this point.  Recent angiogram showed heavy plaque burden in the right common femoral artery which I think would be best treated with open common femoral endarterectomy.  He had a similar procedure on the left side before and has done well from this.  He has been seen by cardiology and is optimized from their perspective to proceed with no further testing needed. Risks of the procedure were discussed.  These include, but are not limited to, bleeding, infection, vessel damage, nerve damage, embolus, and loss of limb.  The patient understands these risks and wishes to proceed with procedure.  The patient is to continue taking their medications as previously discussed.   I did discuss vascular risk factors as they pertain to the progression of vascular disease including controlling diabetes, hypertension, and hyperlipidemia. These factors remain stable. Patient's Body mass index is 35.11 kg/m². indicating that he is obese (BMI >30). Obesity-related health conditions include the following: hypertension, diabetes mellitus, dyslipidemias and peripheral vascular disease. Obesity is unchanged. BMI is is above average; BMI management plan is completed. We discussed portion control and increasing exercise. This was all discussed in full with complete understanding.

## 2022-01-20 ENCOUNTER — TELEPHONE (OUTPATIENT)
Dept: VASCULAR SURGERY | Facility: CLINIC | Age: 67
End: 2022-01-20

## 2022-01-20 ENCOUNTER — OFFICE VISIT (OUTPATIENT)
Dept: PRIMARY CARE CLINIC | Age: 67
End: 2022-01-20
Payer: MEDICARE

## 2022-01-20 VITALS
TEMPERATURE: 96.5 F | BODY MASS INDEX: 35 KG/M2 | WEIGHT: 223 LBS | DIASTOLIC BLOOD PRESSURE: 70 MMHG | SYSTOLIC BLOOD PRESSURE: 130 MMHG | HEART RATE: 99 BPM | HEIGHT: 67 IN | OXYGEN SATURATION: 95 %

## 2022-01-20 DIAGNOSIS — E11.21 DIABETIC NEPHROPATHY ASSOCIATED WITH TYPE 2 DIABETES MELLITUS (HCC): ICD-10-CM

## 2022-01-20 DIAGNOSIS — J38.00 VOCAL CORD PARESIS: ICD-10-CM

## 2022-01-20 DIAGNOSIS — I73.9 PVD (PERIPHERAL VASCULAR DISEASE) WITH CLAUDICATION (HCC): Primary | ICD-10-CM

## 2022-01-20 DIAGNOSIS — Z91.89 AT INCREASED RISK OF EXPOSURE TO COVID-19 VIRUS: ICD-10-CM

## 2022-01-20 DIAGNOSIS — I10 ESSENTIAL HYPERTENSION: ICD-10-CM

## 2022-01-20 LAB
HBA1C MFR BLD: 7.5 %
SARS-COV-2, PCR: DETECTED

## 2022-01-20 PROCEDURE — 3051F HG A1C>EQUAL 7.0%<8.0%: CPT | Performed by: FAMILY MEDICINE

## 2022-01-20 PROCEDURE — 99214 OFFICE O/P EST MOD 30 MIN: CPT | Performed by: FAMILY MEDICINE

## 2022-01-20 PROCEDURE — 83036 HEMOGLOBIN GLYCOSYLATED A1C: CPT | Performed by: FAMILY MEDICINE

## 2022-01-20 RX ORDER — AMLODIPINE BESYLATE 10 MG/1
TABLET ORAL
Qty: 90 TABLET | Refills: 1 | Status: SHIPPED | OUTPATIENT
Start: 2022-01-20 | End: 2022-07-26 | Stop reason: SDUPTHER

## 2022-01-20 RX ORDER — ATORVASTATIN CALCIUM 40 MG/1
TABLET, FILM COATED ORAL
Qty: 90 TABLET | Refills: 3 | Status: SHIPPED | OUTPATIENT
Start: 2022-01-20 | End: 2022-07-26 | Stop reason: SDUPTHER

## 2022-01-20 RX ORDER — ALBUTEROL SULFATE 90 UG/1
2 AEROSOL, METERED RESPIRATORY (INHALATION) 4 TIMES DAILY PRN
Qty: 1 EACH | Refills: 5 | Status: SHIPPED | OUTPATIENT
Start: 2022-01-20 | End: 2022-07-26 | Stop reason: SDUPTHER

## 2022-01-20 RX ORDER — HYDROCHLOROTHIAZIDE 25 MG/1
TABLET ORAL
Qty: 90 TABLET | Refills: 0 | Status: SHIPPED | OUTPATIENT
Start: 2022-01-20 | End: 2022-07-26 | Stop reason: SDUPTHER

## 2022-01-20 ASSESSMENT — ENCOUNTER SYMPTOMS
CHEST TIGHTNESS: 0
CONSTIPATION: 0
BACK PAIN: 1
DIARRHEA: 0
ABDOMINAL PAIN: 0
WHEEZING: 0
NAUSEA: 0
VOMITING: 0
SHORTNESS OF BREATH: 0
COUGH: 0

## 2022-01-20 NOTE — PROGRESS NOTES
Annamarie Gr is a 73 y.o. male who presents today for   Chief Complaint   Patient presents with    Hypertension     6 month follow up    Diabetes     pt also need a diabetic foot exam       HPI  Patient is here for f/u hypertension and dm. Needs foot exam for shoes. Lack of sensation of the feet and history of  Neuropathy as well w/ flat feet b/l. Has surgery scheduled of PVD soon around the hip area. a1c controlled. No issues w/ bp. No change in PMH, family, social, or surgical history unless mentioned above. I have reviewed the above chief complaint and HPI details charted by staff and claim ownership of the documentation. Review of Systems   Constitutional: Negative for chills and fever. Respiratory: Negative for cough, chest tightness, shortness of breath and wheezing. Cardiovascular: Negative for chest pain, palpitations and leg swelling. Gastrointestinal: Negative for abdominal pain, constipation, diarrhea, nausea and vomiting. Genitourinary: Negative for difficulty urinating, dysuria and frequency. Musculoskeletal: Positive for arthralgias, back pain and gait problem.        Past Medical History:   Diagnosis Date    Arthritis     BiPAP (biphasic positive airway pressure) dependence     8cm to 20cm    Chronic back pain     Emphysema/COPD (HCC)     GERD (gastroesophageal reflux disease)     History of anemia     History of blood transfusion     Hyperlipidemia     Hypertension     Impotence 3/22/2021    Neuropathy     Obstructive sleep apnea     AHI:  95.8 per PSG, 3/2017; repeat PSG, 11/2017 revealed an AHI of 65.5    Peripheral vascular disease ([facility] [address]     S/P angioplasty     Type II or unspecified type diabetes mellitus without mention of complication, not stated as uncontrolled        Current Outpatient Medications   Medication Sig Dispense Refill    Diabetic Shoe MISC by Does not apply route 1 each 0    amLODIPine (NORVASC) 10 MG tablet Take 1 tablet by mouth once daily 90 tablet 1    albuterol sulfate HFA (VENTOLIN HFA) 108 (90 Base) MCG/ACT inhaler Inhale 2 puffs into the lungs 4 times daily as needed for Wheezing 1 each 5    atorvastatin (LIPITOR) 40 MG tablet Take 1 tablet by mouth once daily 90 tablet 3    hydroCHLOROthiazide (HYDRODIURIL) 25 MG tablet Take 1 tablet by mouth once daily 90 tablet 0    Diabetic Shoe MISC by Does not apply route DISPENSE ONE PAIR DIABETIC SHOES AND THREE PAIRS OF HEAT MOLDED INSERTS 1 each 0    lisinopril (PRINIVIL;ZESTRIL) 40 MG tablet Take 1 tablet by mouth once daily 90 tablet 0    cyclobenzaprine (FLEXERIL) 5 MG tablet TAKE 1 TABLET BY MOUTH THREE TIMES DAILY AS NEEDED FOR MUSCLE SPASM . APPOINTMENT REQUIRED FOR FUTURE REFILLS 90 tablet 0    labetalol (NORMODYNE) 100 MG tablet Take 1 tablet by mouth twice daily 180 tablet 0    cilostazol (PLETAL) 50 MG tablet Take 1 tablet by mouth twice daily 60 tablet 5    senna-docusate (PERICOLACE) 8.6-50 MG per tablet Take 2 tablets by mouth daily as needed for Constipation 30 tablet 0    pantoprazole (PROTONIX) 40 MG tablet Take 1 tablet by mouth every morning (before breakfast) 90 tablet 0    aspirin 325 MG tablet Take 0.5 tablets by mouth daily 30 tablet 2    insulin aspart (NOVOLOG FLEXPEN) 100 UNIT/ML injection pen Inject 25-37 Units into the skin 3 times daily (before meals) 20 units breakfast, 30 units lunch, 30 units dinner + sliding scale each meal (Total dose is 20-32 breakfast, 30-42 lunch and dinner) 5 pen 3    Dulaglutide (TRULICITY) 1.5 [place]/4.5UE SOPN Inject 1.5 mg into the skin once a week 4 pen 0    blood glucose monitor kit and supplies Test 4 times a day & as needed for symptoms of irregular blood glucose.  1 kit 0    blood glucose test strips (GNP EASY TOUCH GLUCOSE TEST) strip TEST 3 TIMES DAILY Dx E11.49 300 strip 0    Insulin Pen Needle 31G X 6 MM MISC 1 each by Does not apply route 4 times daily (before meals and nightly) May substitute to generic for insurance Dx E11.49 120 each 3    Insulin Syringe-Needle U-100 30G X 1/2\" 0.5 ML MISC 1 each by Does not apply route 3 times daily 100 each 3    Lancets MISC Daily Accu-Chek 100 each 3    insulin glargine (LANTUS SOLOSTAR) 100 UNIT/ML injection pen Inject 20 Units into the skin nightly (Patient not taking: Reported on 2022) 5 pen 3     No current facility-administered medications for this visit. No Known Allergies    Past Surgical History:   Procedure Laterality Date    CHOLECYSTECTOMY      LARYNGOSCOPY N/A 11/10/2017    Direct laryngoscopy with assessment of hypopharynx, larynx, and post-cricoid areas performed by Hemalatha Roque MD at [facility] N/A 2018    MICRO DIRECT LARYNGOSCOPY WITH BIOPSY performed by Hemalatha Roque MD at [address]      x 2 in past, around        Social History     Tobacco Use    Smoking status: Former Smoker     Packs/day: 0.25     Years: 20.00     Pack years: 5.00     Types: Cigarettes     Quit date: 2017     Years since quittin.0    Smokeless tobacco: Never Used   Vaping Use    Vaping Use: Never used   Substance Use Topics    Alcohol use: No    Drug use: No       Family History   Problem Relation Age of Onset    Diabetes Father     Heart Disease Maternal Grandmother        /70 (Site: Left Upper Arm)   Pulse 99   Temp 96.5 °F (35.8 °C)   Ht 5' 7\" (1.702 m)   Wt 223 lb (101.2 kg)   SpO2 95%   BMI 34.93 kg/m²     Physical Exam  Vitals and nursing note reviewed. Constitutional:       General: He is not in acute distress. Appearance: He is well-developed. He is not toxic-appearing or diaphoretic. HENT:      Head: Normocephalic and atraumatic. Cardiovascular:      Rate and Rhythm: Normal rate and regular rhythm. Heart sounds: Normal heart sounds. No murmur heard. No friction rub. No gallop.     Pulmonary:      Effort: Pulmonary effort is normal. No respiratory distress. Breath sounds: Normal breath sounds. No wheezing or rales. Chest:      Chest wall: No tenderness. Abdominal:      General: Bowel sounds are normal. There is no distension. Palpations: Abdomen is soft. There is no mass. Tenderness: There is no abdominal tenderness. There is no guarding or rebound. Musculoskeletal:      Right lower leg: No edema. Left lower leg: No edema. Skin:     General: Skin is warm and dry. Nails: There is no clubbing. Neurological:      Mental Status: He is alert and oriented to person, place, and time. Motor: No weakness. Coordination: Coordination normal.      Gait: Gait abnormal.         Monofilament Exam Reveals:  Pulses: normal  Edema:normal  Skin Lesions:calluses b/l 1st metatarsal heads w/ b/l flat feet  Right Foot:    Left Foot:  Normal sensation at 0   Normal sensation at 0   Diminished sensation at 0 Diminished sensation at 0   No sensation at 1-10    No sensation at 1-10           Assessment:    ICD-10-CM    1. PVD (peripheral vascular disease) with claudication ([facility])  I73.9    2. Diabetic nephropathy associated with type 2 diabetes mellitus ([facility])  E11.21 POCT glycosylated hemoglobin (Hb A1C)     Diabetic Shoe MISC      DIABETES FOOT EXAM   3. Essential hypertension  I10 amLODIPine (NORVASC) 10 MG tablet   4. Vocal cord paresis  J38.00        Plan:   He is to follow through w/ vascular surgery for PVD and blockage. Needs to where shoes for DM and will wear daily. Stay on current medications.       Orders Placed This Encounter   Procedures    POCT glycosylated hemoglobin (Hb A1C)     DIABETES FOOT EXAM     Orders Placed This Encounter   Medications    Diabetic Shoe MISC     Sig: by Does not apply route     Dispense:  1 each     Refill:  0    amLODIPine (NORVASC) 10 MG tablet     Sig: Take 1 tablet by mouth once daily     Dispense:  90 tablet     Refill:  1    albuterol sulfate HFA (VENTOLIN HFA) 108 (90 Base) MCG/ACT inhaler     Sig: Inhale 2 puffs into the lungs 4 times daily as needed for Wheezing     Dispense:  1 each     Refill:  5    atorvastatin (LIPITOR) 40 MG tablet     Sig: Take 1 tablet by mouth once daily     Dispense:  90 tablet     Refill:  3    hydroCHLOROthiazide (HYDRODIURIL) 25 MG tablet     Sig: Take 1 tablet by mouth once daily     Dispense:  90 tablet     Refill:  0    Diabetic Shoe MISC     Sig: by Does not apply route DISPENSE ONE PAIR DIABETIC SHOES AND THREE PAIRS OF HEAT MOLDED INSERTS     Dispense:  1 each     Refill:  0     Medications Discontinued During This Encounter   Medication Reason    gabapentin (NEURONTIN) 300 MG capsule Therapy completed    HYDROcodone-acetaminophen (NORCO)  MG per tablet Therapy completed    nicotine polacrilex (NICORETTE) 4 MG gum Therapy completed    hydroCHLOROthiazide (HYDRODIURIL) 25 MG tablet REORDER    atorvastatin (LIPITOR) 40 MG tablet REORDER    amLODIPine (NORVASC) 10 MG tablet REORDER    albuterol sulfate HFA (VENTOLIN HFA) 108 (90 Base) MCG/ACT inhaler REORDER    Diabetic Shoe MISC REORDER     There are no Patient Instructions on file for this visit. Patient given educational handouts and has had all questions answered. Patient voices understanding and agrees to plans along with risks and benefits of plan. Patient isinstructed to continue prior meds, diet, and exercise plans unless instructed otherwise. Patient agrees to follow up as instructed and sooner if needed. Patient agrees to go to ER if condition becomes emergent. Notesmay be completed with dictation device and spelling errors may occur. Materials may be copied and pasted from a notepad outside of EMR, all of which, I, Dr. Radha Manjarrez MD, take sole intellectual ownership of and have approved adding to my note. Return in about 6 months (around 7/20/2022) for , medicare AW 2 time slots.

## 2022-01-20 NOTE — TELEPHONE ENCOUNTER
SPOKE WITH PATIENT REGARDING SURGERY. PT WAS INFORMED OF PRE WORK ON 01/31 AT 0815. PT WAS ALSO INFORMED OF SURGERY ON 02/03 AT 0600. PT WAS INFORMED OF COVID TEST AND VISITATION. PT STATED UNDERSTANDING OF INSTRUCTIONS AND ALL INFORMATION.

## 2022-01-31 ENCOUNTER — LAB (OUTPATIENT)
Dept: LAB | Facility: HOSPITAL | Age: 67
End: 2022-01-31

## 2022-01-31 ENCOUNTER — PRE-ADMISSION TESTING (OUTPATIENT)
Dept: PREADMISSION TESTING | Facility: HOSPITAL | Age: 67
End: 2022-01-31

## 2022-01-31 VITALS
HEIGHT: 69 IN | DIASTOLIC BLOOD PRESSURE: 64 MMHG | SYSTOLIC BLOOD PRESSURE: 144 MMHG | BODY MASS INDEX: 33.47 KG/M2 | RESPIRATION RATE: 20 BRPM | WEIGHT: 225.97 LBS | OXYGEN SATURATION: 94 % | HEART RATE: 96 BPM

## 2022-01-31 DIAGNOSIS — I70.211 ATHEROSCLEROSIS OF NATIVE ARTERY OF RIGHT LOWER EXTREMITY WITH INTERMITTENT CLAUDICATION: ICD-10-CM

## 2022-01-31 LAB
ABO GROUP BLD: NORMAL
ANION GAP SERPL CALCULATED.3IONS-SCNC: 8 MMOL/L (ref 5–15)
BASOPHILS # BLD AUTO: 0.01 10*3/MM3 (ref 0–0.2)
BASOPHILS NFR BLD AUTO: 0.2 % (ref 0–1.5)
BLD GP AB SCN SERPL QL: NEGATIVE
BUN SERPL-MCNC: 36 MG/DL (ref 8–23)
BUN/CREAT SERPL: 17.4 (ref 7–25)
CALCIUM SPEC-SCNC: 9.2 MG/DL (ref 8.6–10.5)
CHLORIDE SERPL-SCNC: 106 MMOL/L (ref 98–107)
CO2 SERPL-SCNC: 30 MMOL/L (ref 22–29)
CREAT SERPL-MCNC: 2.07 MG/DL (ref 0.76–1.27)
DEPRECATED RDW RBC AUTO: 52.8 FL (ref 37–54)
EOSINOPHIL # BLD AUTO: 0.11 10*3/MM3 (ref 0–0.4)
EOSINOPHIL NFR BLD AUTO: 2.5 % (ref 0.3–6.2)
ERYTHROCYTE [DISTWIDTH] IN BLOOD BY AUTOMATED COUNT: 16.6 % (ref 12.3–15.4)
GFR SERPL CREATININE-BSD FRML MDRD: 39 ML/MIN/1.73
GLUCOSE SERPL-MCNC: 180 MG/DL (ref 65–99)
HCT VFR BLD AUTO: 30.9 % (ref 37.5–51)
HGB BLD-MCNC: 9.3 G/DL (ref 13–17.7)
IMM GRANULOCYTES # BLD AUTO: 0.01 10*3/MM3 (ref 0–0.05)
IMM GRANULOCYTES NFR BLD AUTO: 0.2 % (ref 0–0.5)
LYMPHOCYTES # BLD AUTO: 1.34 10*3/MM3 (ref 0.7–3.1)
LYMPHOCYTES NFR BLD AUTO: 29.9 % (ref 19.6–45.3)
MCH RBC QN AUTO: 26.1 PG (ref 26.6–33)
MCHC RBC AUTO-ENTMCNC: 30.1 G/DL (ref 31.5–35.7)
MCV RBC AUTO: 86.8 FL (ref 79–97)
MONOCYTES # BLD AUTO: 0.39 10*3/MM3 (ref 0.1–0.9)
MONOCYTES NFR BLD AUTO: 8.7 % (ref 5–12)
NEUTROPHILS NFR BLD AUTO: 2.62 10*3/MM3 (ref 1.7–7)
NEUTROPHILS NFR BLD AUTO: 58.5 % (ref 42.7–76)
NRBC BLD AUTO-RTO: 0 /100 WBC (ref 0–0.2)
PLATELET # BLD AUTO: 181 10*3/MM3 (ref 140–450)
PMV BLD AUTO: 12.2 FL (ref 6–12)
POTASSIUM SERPL-SCNC: 4.7 MMOL/L (ref 3.5–5.2)
RBC # BLD AUTO: 3.56 10*6/MM3 (ref 4.14–5.8)
RH BLD: POSITIVE
SARS-COV-2 ORF1AB RESP QL NAA+PROBE: NOT DETECTED
SODIUM SERPL-SCNC: 144 MMOL/L (ref 136–145)
T&S EXPIRATION DATE: NORMAL
WBC NRBC COR # BLD: 4.48 10*3/MM3 (ref 3.4–10.8)

## 2022-01-31 PROCEDURE — 86901 BLOOD TYPING SEROLOGIC RH(D): CPT | Performed by: SURGERY

## 2022-01-31 PROCEDURE — 80048 BASIC METABOLIC PNL TOTAL CA: CPT

## 2022-01-31 PROCEDURE — 93010 ELECTROCARDIOGRAM REPORT: CPT | Performed by: INTERNAL MEDICINE

## 2022-01-31 PROCEDURE — 86900 BLOOD TYPING SEROLOGIC ABO: CPT | Performed by: SURGERY

## 2022-01-31 PROCEDURE — C9803 HOPD COVID-19 SPEC COLLECT: HCPCS

## 2022-01-31 PROCEDURE — 36415 COLL VENOUS BLD VENIPUNCTURE: CPT

## 2022-01-31 PROCEDURE — 86850 RBC ANTIBODY SCREEN: CPT | Performed by: SURGERY

## 2022-01-31 PROCEDURE — 85025 COMPLETE CBC W/AUTO DIFF WBC: CPT

## 2022-01-31 PROCEDURE — 93005 ELECTROCARDIOGRAM TRACING: CPT

## 2022-01-31 PROCEDURE — U0004 COV-19 TEST NON-CDC HGH THRU: HCPCS

## 2022-02-01 LAB
QT INTERVAL: 356 MS
QTC INTERVAL: 445 MS

## 2022-02-02 ENCOUNTER — TELEPHONE (OUTPATIENT)
Dept: VASCULAR SURGERY | Facility: CLINIC | Age: 67
End: 2022-02-02

## 2022-02-02 NOTE — TELEPHONE ENCOUNTER
SPOKE WITH PT TO VERIFY STILL ARRIVING AT 0600 FOR SURGERY EVEN WITH WEATHER. PT STATED HE STILL PLANNED TO ARRIVE FOR SURGERY.

## 2022-02-03 ENCOUNTER — ANESTHESIA (OUTPATIENT)
Dept: PERIOP | Facility: HOSPITAL | Age: 67
End: 2022-02-03

## 2022-02-03 ENCOUNTER — HOSPITAL ENCOUNTER (INPATIENT)
Facility: HOSPITAL | Age: 67
LOS: 1 days | Discharge: HOME | End: 2022-02-04
Attending: SURGERY | Admitting: SURGERY

## 2022-02-03 ENCOUNTER — ANESTHESIA EVENT (OUTPATIENT)
Dept: PERIOP | Facility: HOSPITAL | Age: 67
End: 2022-02-03

## 2022-02-03 ENCOUNTER — APPOINTMENT (OUTPATIENT)
Dept: INTERVENTIONAL RADIOLOGY/VASCULAR | Facility: HOSPITAL | Age: 67
End: 2022-02-03

## 2022-02-03 DIAGNOSIS — I70.211 ATHEROSCLEROSIS OF NATIVE ARTERY OF RIGHT LOWER EXTREMITY WITH INTERMITTENT CLAUDICATION: ICD-10-CM

## 2022-02-03 PROBLEM — I65.29 CAROTID STENOSIS: Status: ACTIVE | Noted: 2022-02-03

## 2022-02-03 LAB
GLUCOSE BLDC GLUCOMTR-MCNC: 161 MG/DL (ref 70–130)
GLUCOSE BLDC GLUCOMTR-MCNC: 166 MG/DL (ref 70–130)
GLUCOSE BLDC GLUCOMTR-MCNC: 199 MG/DL (ref 70–130)

## 2022-02-03 PROCEDURE — 047K3ZZ DILATION OF RIGHT FEMORAL ARTERY, PERCUTANEOUS APPROACH: ICD-10-PCS | Performed by: SURGERY

## 2022-02-03 PROCEDURE — 25010000002 CEFAZOLIN PER 500 MG

## 2022-02-03 PROCEDURE — 04CK0ZZ EXTIRPATION OF MATTER FROM RIGHT FEMORAL ARTERY, OPEN APPROACH: ICD-10-PCS | Performed by: SURGERY

## 2022-02-03 PROCEDURE — C1894 INTRO/SHEATH, NON-LASER: HCPCS | Performed by: SURGERY

## 2022-02-03 PROCEDURE — C1887 CATHETER, GUIDING: HCPCS | Performed by: SURGERY

## 2022-02-03 PROCEDURE — 25010000002 HEPARIN (PORCINE) PER 1000 UNITS: Performed by: SURGERY

## 2022-02-03 PROCEDURE — 25010000002 HEPARIN (PORCINE) PER 1000 UNITS: Performed by: NURSE ANESTHETIST, CERTIFIED REGISTERED

## 2022-02-03 PROCEDURE — 25010000002 FENTANYL CITRATE (PF) 50 MCG/ML SOLUTION: Performed by: ANESTHESIOLOGY

## 2022-02-03 PROCEDURE — 75625 CONTRAST EXAM ABDOMINL AORTA: CPT | Performed by: SURGERY

## 2022-02-03 PROCEDURE — 0 HYDROMORPHONE 1 MG/ML SOLUTION: Performed by: SURGERY

## 2022-02-03 PROCEDURE — C1760 CLOSURE DEV, VASC: HCPCS | Performed by: SURGERY

## 2022-02-03 PROCEDURE — 25010000002 IOPAMIDOL 61 % SOLUTION: Performed by: SURGERY

## 2022-02-03 PROCEDURE — 25010000002 PHENYLEPHRINE 10 MG/ML SOLUTION 5 ML VIAL: Performed by: NURSE ANESTHETIST, CERTIFIED REGISTERED

## 2022-02-03 PROCEDURE — B41D1ZZ FLUOROSCOPY OF AORTA AND BILATERAL LOWER EXTREMITY ARTERIES USING LOW OSMOLAR CONTRAST: ICD-10-PCS | Performed by: SURGERY

## 2022-02-03 PROCEDURE — 37224 PR REVSC OPN/PRG FEM/POP W/ANGIOPLASTY UNI: CPT | Performed by: SURGERY

## 2022-02-03 PROCEDURE — 25010000002 HYDROMORPHONE PER 4 MG: Performed by: ANESTHESIOLOGY

## 2022-02-03 PROCEDURE — C1768 GRAFT, VASCULAR: HCPCS | Performed by: SURGERY

## 2022-02-03 PROCEDURE — 25010000002 ONDANSETRON PER 1 MG: Performed by: NURSE ANESTHETIST, CERTIFIED REGISTERED

## 2022-02-03 PROCEDURE — C1725 CATH, TRANSLUMIN NON-LASER: HCPCS | Performed by: SURGERY

## 2022-02-03 PROCEDURE — 88311 DECALCIFY TISSUE: CPT | Performed by: SURGERY

## 2022-02-03 PROCEDURE — 35371 RECHANNELING OF ARTERY: CPT | Performed by: SURGERY

## 2022-02-03 PROCEDURE — 76000 FLUOROSCOPY <1 HR PHYS/QHP: CPT

## 2022-02-03 PROCEDURE — 0 CEFAZOLIN PER 500 MG: Performed by: SURGERY

## 2022-02-03 PROCEDURE — 75710 ARTERY X-RAYS ARM/LEG: CPT

## 2022-02-03 PROCEDURE — 75710 ARTERY X-RAYS ARM/LEG: CPT | Performed by: SURGERY

## 2022-02-03 PROCEDURE — 25010000002 PROPOFOL 10 MG/ML EMULSION: Performed by: NURSE ANESTHETIST, CERTIFIED REGISTERED

## 2022-02-03 PROCEDURE — 94799 UNLISTED PULMONARY SVC/PX: CPT

## 2022-02-03 PROCEDURE — 04UK0KZ SUPPLEMENT RIGHT FEMORAL ARTERY WITH NONAUTOLOGOUS TISSUE SUBSTITUTE, OPEN APPROACH: ICD-10-PCS | Performed by: SURGERY

## 2022-02-03 PROCEDURE — 82962 GLUCOSE BLOOD TEST: CPT

## 2022-02-03 PROCEDURE — 88304 TISSUE EXAM BY PATHOLOGIST: CPT | Performed by: SURGERY

## 2022-02-03 DEVICE — PTCH VASC XENOSURE BIO 0.8X8CM: Type: IMPLANTABLE DEVICE | Site: GROIN | Status: FUNCTIONAL

## 2022-02-03 RX ORDER — ATORVASTATIN CALCIUM 40 MG/1
40 TABLET, FILM COATED ORAL NIGHTLY
Status: DISCONTINUED | OUTPATIENT
Start: 2022-02-03 | End: 2022-02-04 | Stop reason: HOSPADM

## 2022-02-03 RX ORDER — LISINOPRIL 20 MG/1
40 TABLET ORAL DAILY
Status: DISCONTINUED | OUTPATIENT
Start: 2022-02-03 | End: 2022-02-04 | Stop reason: HOSPADM

## 2022-02-03 RX ORDER — PANTOPRAZOLE SODIUM 40 MG/1
40 TABLET, DELAYED RELEASE ORAL DAILY
Status: DISCONTINUED | OUTPATIENT
Start: 2022-02-03 | End: 2022-02-04 | Stop reason: HOSPADM

## 2022-02-03 RX ORDER — OXYCODONE AND ACETAMINOPHEN 10; 325 MG/1; MG/1
1 TABLET ORAL ONCE AS NEEDED
Status: DISCONTINUED | OUTPATIENT
Start: 2022-02-03 | End: 2022-02-03 | Stop reason: HOSPADM

## 2022-02-03 RX ORDER — ONDANSETRON 2 MG/ML
4 INJECTION INTRAMUSCULAR; INTRAVENOUS AS NEEDED
Status: DISCONTINUED | OUTPATIENT
Start: 2022-02-03 | End: 2022-02-03 | Stop reason: HOSPADM

## 2022-02-03 RX ORDER — NEOSTIGMINE METHYLSULFATE 5 MG/5 ML
SYRINGE (ML) INTRAVENOUS AS NEEDED
Status: DISCONTINUED | OUTPATIENT
Start: 2022-02-03 | End: 2022-02-03 | Stop reason: SURG

## 2022-02-03 RX ORDER — PROPOFOL 10 MG/ML
VIAL (ML) INTRAVENOUS AS NEEDED
Status: DISCONTINUED | OUTPATIENT
Start: 2022-02-03 | End: 2022-02-03 | Stop reason: SURG

## 2022-02-03 RX ORDER — SODIUM CHLORIDE 0.9 % (FLUSH) 0.9 %
10 SYRINGE (ML) INJECTION AS NEEDED
Status: DISCONTINUED | OUTPATIENT
Start: 2022-02-03 | End: 2022-02-03 | Stop reason: HOSPADM

## 2022-02-03 RX ORDER — SODIUM CHLORIDE 0.9 % (FLUSH) 0.9 %
10 SYRINGE (ML) INJECTION EVERY 12 HOURS SCHEDULED
Status: DISCONTINUED | OUTPATIENT
Start: 2022-02-03 | End: 2022-02-03 | Stop reason: HOSPADM

## 2022-02-03 RX ORDER — ASPIRIN 81 MG/1
81 TABLET ORAL DAILY
Status: DISCONTINUED | OUTPATIENT
Start: 2022-02-04 | End: 2022-02-04 | Stop reason: HOSPADM

## 2022-02-03 RX ORDER — ONDANSETRON 4 MG/1
4 TABLET, FILM COATED ORAL EVERY 6 HOURS PRN
Status: DISCONTINUED | OUTPATIENT
Start: 2022-02-03 | End: 2022-02-04 | Stop reason: HOSPADM

## 2022-02-03 RX ORDER — LIDOCAINE HYDROCHLORIDE 10 MG/ML
0.5 INJECTION, SOLUTION EPIDURAL; INFILTRATION; INTRACAUDAL; PERINEURAL ONCE AS NEEDED
Status: DISCONTINUED | OUTPATIENT
Start: 2022-02-03 | End: 2022-02-03 | Stop reason: HOSPADM

## 2022-02-03 RX ORDER — NALOXONE HCL 0.4 MG/ML
0.04 VIAL (ML) INJECTION AS NEEDED
Status: DISCONTINUED | OUTPATIENT
Start: 2022-02-03 | End: 2022-02-03 | Stop reason: HOSPADM

## 2022-02-03 RX ORDER — SODIUM CHLORIDE 0.9 % (FLUSH) 0.9 %
3 SYRINGE (ML) INJECTION AS NEEDED
Status: DISCONTINUED | OUTPATIENT
Start: 2022-02-03 | End: 2022-02-03 | Stop reason: HOSPADM

## 2022-02-03 RX ORDER — NALOXONE HCL 0.4 MG/ML
0.4 VIAL (ML) INJECTION
Status: DISCONTINUED | OUTPATIENT
Start: 2022-02-03 | End: 2022-02-04 | Stop reason: HOSPADM

## 2022-02-03 RX ORDER — FENTANYL CITRATE 50 UG/ML
25 INJECTION, SOLUTION INTRAMUSCULAR; INTRAVENOUS
Status: DISCONTINUED | OUTPATIENT
Start: 2022-02-03 | End: 2022-02-03 | Stop reason: HOSPADM

## 2022-02-03 RX ORDER — FLUMAZENIL 0.1 MG/ML
0.2 INJECTION INTRAVENOUS AS NEEDED
Status: DISCONTINUED | OUTPATIENT
Start: 2022-02-03 | End: 2022-02-03 | Stop reason: HOSPADM

## 2022-02-03 RX ORDER — SODIUM CHLORIDE, SODIUM LACTATE, POTASSIUM CHLORIDE, CALCIUM CHLORIDE 600; 310; 30; 20 MG/100ML; MG/100ML; MG/100ML; MG/100ML
1000 INJECTION, SOLUTION INTRAVENOUS CONTINUOUS
Status: DISCONTINUED | OUTPATIENT
Start: 2022-02-03 | End: 2022-02-03

## 2022-02-03 RX ORDER — ALBUTEROL SULFATE 90 UG/1
2 AEROSOL, METERED RESPIRATORY (INHALATION) EVERY 6 HOURS PRN
Status: DISCONTINUED | OUTPATIENT
Start: 2022-02-03 | End: 2022-02-04 | Stop reason: HOSPADM

## 2022-02-03 RX ORDER — HYDROMORPHONE HYDROCHLORIDE 1 MG/ML
0.5 INJECTION, SOLUTION INTRAMUSCULAR; INTRAVENOUS; SUBCUTANEOUS
Status: DISCONTINUED | OUTPATIENT
Start: 2022-02-03 | End: 2022-02-03 | Stop reason: HOSPADM

## 2022-02-03 RX ORDER — AMLODIPINE BESYLATE 10 MG/1
10 TABLET ORAL DAILY
Status: DISCONTINUED | OUTPATIENT
Start: 2022-02-03 | End: 2022-02-04 | Stop reason: HOSPADM

## 2022-02-03 RX ORDER — HYDROCHLOROTHIAZIDE 25 MG/1
12.5 TABLET ORAL DAILY
Status: DISCONTINUED | OUTPATIENT
Start: 2022-02-03 | End: 2022-02-04 | Stop reason: HOSPADM

## 2022-02-03 RX ORDER — CLOPIDOGREL BISULFATE 75 MG/1
150 TABLET ORAL ONCE
Status: COMPLETED | OUTPATIENT
Start: 2022-02-03 | End: 2022-02-03

## 2022-02-03 RX ORDER — HEPARIN SODIUM 1000 [USP'U]/ML
INJECTION, SOLUTION INTRAVENOUS; SUBCUTANEOUS AS NEEDED
Status: DISCONTINUED | OUTPATIENT
Start: 2022-02-03 | End: 2022-02-03 | Stop reason: SURG

## 2022-02-03 RX ORDER — CYCLOBENZAPRINE HCL 5 MG
5 TABLET ORAL 3 TIMES DAILY PRN
Status: DISCONTINUED | OUTPATIENT
Start: 2022-02-03 | End: 2022-02-04 | Stop reason: HOSPADM

## 2022-02-03 RX ORDER — LABETALOL HYDROCHLORIDE 5 MG/ML
5 INJECTION, SOLUTION INTRAVENOUS
Status: DISCONTINUED | OUTPATIENT
Start: 2022-02-03 | End: 2022-02-03 | Stop reason: HOSPADM

## 2022-02-03 RX ORDER — SODIUM CHLORIDE, SODIUM LACTATE, POTASSIUM CHLORIDE, CALCIUM CHLORIDE 600; 310; 30; 20 MG/100ML; MG/100ML; MG/100ML; MG/100ML
100 INJECTION, SOLUTION INTRAVENOUS CONTINUOUS
Status: DISCONTINUED | OUTPATIENT
Start: 2022-02-03 | End: 2022-02-04 | Stop reason: HOSPADM

## 2022-02-03 RX ORDER — ASPIRIN 81 MG/1
81 TABLET ORAL DAILY
COMMUNITY
End: 2022-07-28

## 2022-02-03 RX ORDER — BUPIVACAINE HCL/0.9 % NACL/PF 0.1 %
2 PLASTIC BAG, INJECTION (ML) EPIDURAL ONCE
Status: COMPLETED | OUTPATIENT
Start: 2022-02-03 | End: 2022-02-03

## 2022-02-03 RX ORDER — CLOPIDOGREL BISULFATE 75 MG/1
75 TABLET ORAL DAILY
Status: DISCONTINUED | OUTPATIENT
Start: 2022-02-04 | End: 2022-02-04 | Stop reason: HOSPADM

## 2022-02-03 RX ORDER — ROCURONIUM BROMIDE 10 MG/ML
INJECTION, SOLUTION INTRAVENOUS AS NEEDED
Status: DISCONTINUED | OUTPATIENT
Start: 2022-02-03 | End: 2022-02-03 | Stop reason: SURG

## 2022-02-03 RX ORDER — TAMSULOSIN HYDROCHLORIDE 0.4 MG/1
0.4 CAPSULE ORAL DAILY
Status: DISCONTINUED | OUTPATIENT
Start: 2022-02-03 | End: 2022-02-04 | Stop reason: HOSPADM

## 2022-02-03 RX ORDER — PHENYLEPHRINE HCL IN 0.9% NACL 1 MG/10 ML
SYRINGE (ML) INTRAVENOUS AS NEEDED
Status: DISCONTINUED | OUTPATIENT
Start: 2022-02-03 | End: 2022-02-03 | Stop reason: SURG

## 2022-02-03 RX ORDER — DEXTROSE MONOHYDRATE 25 G/50ML
25 INJECTION, SOLUTION INTRAVENOUS
Status: DISCONTINUED | OUTPATIENT
Start: 2022-02-03 | End: 2022-02-04 | Stop reason: HOSPADM

## 2022-02-03 RX ORDER — NICOTINE POLACRILEX 4 MG
15 LOZENGE BUCCAL
Status: DISCONTINUED | OUTPATIENT
Start: 2022-02-03 | End: 2022-02-04 | Stop reason: HOSPADM

## 2022-02-03 RX ORDER — ONDANSETRON 2 MG/ML
INJECTION INTRAMUSCULAR; INTRAVENOUS AS NEEDED
Status: DISCONTINUED | OUTPATIENT
Start: 2022-02-03 | End: 2022-02-03 | Stop reason: SURG

## 2022-02-03 RX ORDER — ASPIRIN 81 MG/1
81 TABLET ORAL ONCE
Status: COMPLETED | OUTPATIENT
Start: 2022-02-03 | End: 2022-02-03

## 2022-02-03 RX ORDER — ONDANSETRON 2 MG/ML
4 INJECTION INTRAMUSCULAR; INTRAVENOUS EVERY 6 HOURS PRN
Status: DISCONTINUED | OUTPATIENT
Start: 2022-02-03 | End: 2022-02-04 | Stop reason: HOSPADM

## 2022-02-03 RX ORDER — IBUPROFEN 600 MG/1
600 TABLET ORAL ONCE AS NEEDED
Status: DISCONTINUED | OUTPATIENT
Start: 2022-02-03 | End: 2022-02-03 | Stop reason: HOSPADM

## 2022-02-03 RX ORDER — LIDOCAINE HYDROCHLORIDE 20 MG/ML
INJECTION, SOLUTION EPIDURAL; INFILTRATION; INTRACAUDAL; PERINEURAL AS NEEDED
Status: DISCONTINUED | OUTPATIENT
Start: 2022-02-03 | End: 2022-02-03 | Stop reason: SURG

## 2022-02-03 RX ORDER — DEXTROSE MONOHYDRATE 25 G/50ML
12.5 INJECTION, SOLUTION INTRAVENOUS AS NEEDED
Status: DISCONTINUED | OUTPATIENT
Start: 2022-02-03 | End: 2022-02-03 | Stop reason: HOSPADM

## 2022-02-03 RX ORDER — FENTANYL CITRATE 50 UG/ML
25 INJECTION, SOLUTION INTRAMUSCULAR; INTRAVENOUS
Status: COMPLETED | OUTPATIENT
Start: 2022-02-03 | End: 2022-02-03

## 2022-02-03 RX ORDER — LABETALOL 200 MG/1
100 TABLET, FILM COATED ORAL 2 TIMES DAILY
Status: DISCONTINUED | OUTPATIENT
Start: 2022-02-03 | End: 2022-02-04 | Stop reason: HOSPADM

## 2022-02-03 RX ORDER — SODIUM CHLORIDE, SODIUM LACTATE, POTASSIUM CHLORIDE, CALCIUM CHLORIDE 600; 310; 30; 20 MG/100ML; MG/100ML; MG/100ML; MG/100ML
9 INJECTION, SOLUTION INTRAVENOUS CONTINUOUS
Status: DISCONTINUED | OUTPATIENT
Start: 2022-02-03 | End: 2022-02-03

## 2022-02-03 RX ORDER — SILDENAFIL 100 MG/1
50 TABLET, FILM COATED ORAL AS NEEDED
COMMUNITY

## 2022-02-03 RX ORDER — OXYCODONE AND ACETAMINOPHEN 10; 325 MG/1; MG/1
1 TABLET ORAL EVERY 4 HOURS PRN
Status: DISCONTINUED | OUTPATIENT
Start: 2022-02-03 | End: 2022-02-04 | Stop reason: HOSPADM

## 2022-02-03 RX ADMIN — LIDOCAINE HYDROCHLORIDE 100 MG: 20 INJECTION, SOLUTION EPIDURAL; INFILTRATION; INTRACAUDAL; PERINEURAL at 08:01

## 2022-02-03 RX ADMIN — ROCURONIUM BROMIDE 50 MG: 10 INJECTION INTRAVENOUS at 08:02

## 2022-02-03 RX ADMIN — ASPIRIN 81 MG: 81 TABLET, COATED ORAL at 11:27

## 2022-02-03 RX ADMIN — ATORVASTATIN CALCIUM 40 MG: 40 TABLET, FILM COATED ORAL at 17:59

## 2022-02-03 RX ADMIN — GLYCOPYRROLATE 0.6 MG: 0.2 INJECTION, SOLUTION INTRAMUSCULAR; INTRAVENOUS at 10:31

## 2022-02-03 RX ADMIN — HYDROMORPHONE HYDROCHLORIDE 1 MG: 1 INJECTION, SOLUTION INTRAMUSCULAR; INTRAVENOUS; SUBCUTANEOUS at 18:01

## 2022-02-03 RX ADMIN — FENTANYL CITRATE 25 MCG: 50 INJECTION, SOLUTION INTRAMUSCULAR; INTRAVENOUS at 11:42

## 2022-02-03 RX ADMIN — HEPARIN SODIUM 5000 UNITS: 1000 INJECTION, SOLUTION INTRAVENOUS; SUBCUTANEOUS at 08:50

## 2022-02-03 RX ADMIN — FENTANYL CITRATE 50 MCG: 50 INJECTION, SOLUTION INTRAMUSCULAR; INTRAVENOUS at 09:28

## 2022-02-03 RX ADMIN — HEPARIN SODIUM 1000 UNITS: 1000 INJECTION, SOLUTION INTRAVENOUS; SUBCUTANEOUS at 09:53

## 2022-02-03 RX ADMIN — HYDROCHLOROTHIAZIDE 12.5 MG: 25 TABLET ORAL at 18:00

## 2022-02-03 RX ADMIN — Medication 8 MCG: at 10:40

## 2022-02-03 RX ADMIN — ONDANSETRON 4 MG: 2 INJECTION INTRAMUSCULAR; INTRAVENOUS at 09:54

## 2022-02-03 RX ADMIN — OXYCODONE AND ACETAMINOPHEN 1 TABLET: 325; 10 TABLET ORAL at 22:38

## 2022-02-03 RX ADMIN — HYDROMORPHONE HYDROCHLORIDE 0.5 MG: 1 INJECTION, SOLUTION INTRAMUSCULAR; INTRAVENOUS; SUBCUTANEOUS at 11:30

## 2022-02-03 RX ADMIN — HEPARIN SODIUM 1000 UNITS: 1000 INJECTION, SOLUTION INTRAVENOUS; SUBCUTANEOUS at 08:22

## 2022-02-03 RX ADMIN — SODIUM CHLORIDE, POTASSIUM CHLORIDE, SODIUM LACTATE AND CALCIUM CHLORIDE 9 ML/HR: 600; 310; 30; 20 INJECTION, SOLUTION INTRAVENOUS at 06:59

## 2022-02-03 RX ADMIN — FENTANYL CITRATE 25 MCG: 50 INJECTION, SOLUTION INTRAMUSCULAR; INTRAVENOUS at 11:32

## 2022-02-03 RX ADMIN — ROCURONIUM BROMIDE 10 MG: 10 INJECTION INTRAVENOUS at 09:01

## 2022-02-03 RX ADMIN — PHENYLEPHRINE HYDROCHLORIDE 0.5 MCG/KG/MIN: 10 INJECTION INTRAVENOUS at 08:24

## 2022-02-03 RX ADMIN — PROPOFOL 200 MG: 10 INJECTION, EMULSION INTRAVENOUS at 08:01

## 2022-02-03 RX ADMIN — PANTOPRAZOLE SODIUM 40 MG: 40 TABLET, DELAYED RELEASE ORAL at 22:39

## 2022-02-03 RX ADMIN — FENTANYL CITRATE 25 MCG: 50 INJECTION, SOLUTION INTRAMUSCULAR; INTRAVENOUS at 11:52

## 2022-02-03 RX ADMIN — AMLODIPINE BESYLATE 10 MG: 10 TABLET ORAL at 18:00

## 2022-02-03 RX ADMIN — PROPOFOL 30 MG: 10 INJECTION, EMULSION INTRAVENOUS at 10:17

## 2022-02-03 RX ADMIN — FENTANYL CITRATE 100 MCG: 50 INJECTION, SOLUTION INTRAMUSCULAR; INTRAVENOUS at 08:45

## 2022-02-03 RX ADMIN — Medication 2 G: at 08:04

## 2022-02-03 RX ADMIN — Medication 200 MCG: at 08:22

## 2022-02-03 RX ADMIN — ROCURONIUM BROMIDE 10 MG: 10 INJECTION INTRAVENOUS at 10:17

## 2022-02-03 RX ADMIN — HYDROMORPHONE HYDROCHLORIDE 0.5 MG: 1 INJECTION, SOLUTION INTRAMUSCULAR; INTRAVENOUS; SUBCUTANEOUS at 11:40

## 2022-02-03 RX ADMIN — HYDROMORPHONE HYDROCHLORIDE 1 MG: 1 INJECTION, SOLUTION INTRAMUSCULAR; INTRAVENOUS; SUBCUTANEOUS at 14:34

## 2022-02-03 RX ADMIN — LISINOPRIL 40 MG: 20 TABLET ORAL at 17:59

## 2022-02-03 RX ADMIN — Medication 200 MCG: at 08:11

## 2022-02-03 RX ADMIN — CLOPIDOGREL BISULFATE 150 MG: 75 TABLET ORAL at 11:27

## 2022-02-03 RX ADMIN — ROCURONIUM BROMIDE 10 MG: 10 INJECTION INTRAVENOUS at 08:38

## 2022-02-03 RX ADMIN — SODIUM CHLORIDE, POTASSIUM CHLORIDE, SODIUM LACTATE AND CALCIUM CHLORIDE 100 ML/HR: 600; 310; 30; 20 INJECTION, SOLUTION INTRAVENOUS at 18:23

## 2022-02-03 RX ADMIN — Medication 4 MG: at 10:31

## 2022-02-03 RX ADMIN — Medication 200 MCG: at 08:15

## 2022-02-03 RX ADMIN — FENTANYL CITRATE 25 MCG: 50 INJECTION, SOLUTION INTRAMUSCULAR; INTRAVENOUS at 11:47

## 2022-02-03 RX ADMIN — FENTANYL CITRATE 100 MCG: 50 INJECTION, SOLUTION INTRAMUSCULAR; INTRAVENOUS at 08:01

## 2022-02-03 RX ADMIN — SODIUM CHLORIDE, POTASSIUM CHLORIDE, SODIUM LACTATE AND CALCIUM CHLORIDE 1000 ML: 600; 310; 30; 20 INJECTION, SOLUTION INTRAVENOUS at 06:01

## 2022-02-03 RX ADMIN — LABETALOL HYDROCHLORIDE 100 MG: 200 TABLET, FILM COATED ORAL at 17:59

## 2022-02-03 NOTE — INTERVAL H&P NOTE
H&P reviewed. The patient was examined and there are no changes to the H&P.  For right femoral endarterectomy and angiogram with possible intervention.   Risks of the procedure were discussed.  These include, but are not limited to, bleeding, infection, vessel damage, nerve damage, embolus, and loss of limb.  The patient understands these risks and wishes to proceed with procedure.

## 2022-02-03 NOTE — ANESTHESIA PROCEDURE NOTES
Airway  Urgency: elective    Date/Time: 2/3/2022 8:02 AM  Difficult airway    General Information and Staff    Patient location during procedure: OR    Indications and Patient Condition  Indications for airway management: airway protection    Preoxygenated: yes  MILS maintained throughout  Mask difficulty assessment: 2 - vent by mask + OA or adjuvant +/- NMBA    Final Airway Details  Final airway type: endotracheal airway      Successful airway: ETT  Cuffed: yes   Successful intubation technique: direct laryngoscopy  Facilitating devices/methods: intubating stylet  Blade: Cerrato  Blade size: 4  ETT size (mm): 8.0  Cormack-Lehane Classification: grade IIa - partial view of glottis  Placement verified by: chest auscultation and capnometry   Cuff volume (mL): 10  Measured from: gums  ETT/EBT to gums (cm): 22  Number of attempts at approach: 1  Assessment: lips, teeth, and gum same as pre-op and atraumatic intubation    Additional Comments  DL cynthia PRIEST

## 2022-02-03 NOTE — OP NOTE
Edy Cosby  2/3/2022     PREOPERATIVE DIAGNOSIS: Atherosclerosis of native artery of right lower extremity with intermittent claudication (HCC) [I70.211]     POSTOPERATIVE DIAGNOSIS: Post-Op Diagnosis Codes:     * Atherosclerosis of native artery of right lower extremity with intermittent claudication (HCC) [I70.211]     PROCEDURE PERFORMED:  1.  Ultrasound-guided left common femoral artery access  2.  Placement of catheter in abdominal aorta  3.  Aortoiliac angiogram  4.  Right groin exploration with exposure of the right common femoral artery  5.  Right common femoral endarterectomy with eversion profundoplasty and bovine pericardial patch angioplasty  6.  Right lower extremity angiogram  7.  Angioplasty of the mid SFA using a 5 x 40 mm ever cross balloon angioplasty catheter  8.  Completion right lower extremity angiogram  9.  Minx closure of the left common femoral artery and primary closure of the right groin  10.  Radiographic supervision and interpretation     SURGEON: Duncan Lucio MD     ANESTHESIA: General.    PREPARATION: Routine.    STAFF: Circulator: Jade Hsu RN  Scrub Person: Priyanka Antonio  Assistant: Waldemar García  Vascular Radiology Technician: Mitra Jenkins    Estimated Blood Loss: <500ml    SPECIMENS: Right femoral artery plaque    COMPLICATIONS: None apparent    INDICATIONS: Edy Cosby is a 66 y.o. male with known significant peripheral arterial disease.  He was having worsening, lifestyle limiting claudication of the right lower extremity.  Noninvasive arterial testing showed a significant perfusion deficit.  He had similar symptoms on the left lower extremity previously and had undergone a left femoral endarterectomy and angiogram at that time with good result.  He had recently undergone right lower extremity angiogram which showed significant right common femoral artery plaque burden and so decision was made to bring him back for right common femoral  endarterectomy with concomitant endovascular treatment of right SFA stenosis. The indications, risks, and possible complications of the procedure were explained to the patient, who voiced understanding and wished to proceed with surgery.     PROCEDURE IN DETAIL: The patient was taken to the operating room and placed on the operating table in a supine position. After general anesthesia was obtained, the bilateral groins were prepped and draped in a sterile manner.  The left common femoral artery was then accessed under direct ultrasound guidance and a micropuncture technique.  The micro sheath would not pass over the wire and so a low-profile 5 [place] sheath was advanced over the wire into position.  The microwire was then removed and the Glidewire advantage was introduced into the abdominal aorta.  1000 units of IV heparin were then given.  Omni Flush catheter was then advanced to the distal abdominal aorta just above the bifurcation.  The wire was removed.  Aortoiliac angiogram from here showed again patency of the distal aorta and bilateral iliac segments and patency of the previously placed left common iliac artery stent.  I was able to visualize the common femorals in the right common femoral again was noted to have significant calcified plaque extending into the proximal SFA.  With confirmation of this finding decision was made to proceed with femoral endarterectomy and so the wire and catheter were removed and the sheath was left in place.  Attention was then turned to the right groin.  A vertical incision was then made overlying the common femoral artery using a #15 blade.  Dissection was carried down through subcutaneous tissue using electrocautery and any crossing veins were ligated with hemoclips.  The inguinal ligament was notified and just deep to this the femoral sheath was entered with Metzenbaum scissors.  The proximal common femoral artery was identified and circumferentially dissected free and  controlled with a vessel loop.  Any side branches were also controlled with 2-0 silk ties.  Dissection was then carried distally along the common femoral artery down to the bifurcation of SFA and profunda.  The SFA and profunda reach controlled with Vesseloops dissection was now complete.  An additional 5000 units of IV heparin were then given, and a further 1000 units of heparin were given throughout the rest of the case.  After 3 minutes the proximal common femoral artery was clamped with a C-clamp in the SFA and profunda were controlled with Vesseloops.  A longitudinal arteriotomy was then made using a #11 blade and it was extended proximally distally with Hutson scissors.  It was extended proximally up to the inguinal ligament, and distally just onto the proximal SFA.  Common femoral endarterectomy was then performed in standard fashion using a freer elevator and all plaque was removed.  This was carried out into the proximal SFA and the plaque was feathered off for a smooth endpoint.  Eversion profundoplasty was also performed.  The endarterectomized surface was then irrigated with heparinized saline solution any debris picked clean.  Following this bovine pericardial patch was brought onto the field.  Patch anastomosis was started from the proximal end using a 5-0 Prolene suture in a running fashion sutures were brought down on either side to the midline portion of the arteriotomy.  The patch was then trimmed to length in a similar fashion the distal patch anastomosis was started using a separate 5-0 Prolene suture and both sutures brought up to the midline.  Prior to completing the anastomosis the appropriate flushing maneuvers were performed and then the anastomosis was completed.  Perfusion was then restored and there was a good pulse in the common femoral artery.  Hemostasis was achieved using thrombin-soaked Gelfoam.  At this point decision was now made to complete the right lower extremity angiogram.  As  such through the sheath in the left groin the Omni Flush catheter and Glidewire advantage was reintroduced and used to select the contralateral right common iliac artery and the catheter was brought down to the external iliac artery.  Angiogram from here showed wide patency now of the endarterectomized common femoral with good flow out into the profunda and SFA.  In the mid SFA there was noted to be a high-grade stenosis of approximately 90% over about 2 cm.  As previously seen on the angiogram there was only 1 vessel runoff via the peroneal with both AT and PT completely occluded from their takeoffs.  There was reconstitution of the DP very distally in the foot from the peroneal.  At this point the Glidewire advantage was advanced down into the SFA and the short 6 [place]an sheath was exchanged over the wire for a 6 [place]an by 45 cm destination sheath which was advanced down into the proximal SFA.  Now using the wire the stenosis was crossed in the mid SFA and then that area was angioplastied using a 5 x 40 mm ever cross balloon.  The sheath and wire were then pulled back into the external iliac artery and completion angiogram down through the right lower extremity showed patency of the common femoral, profunda, and SFA.  The popliteal was also patent and there was maintained peroneal runoff with reconstitution of the DP via collaterals.  At this point I felt the result was adequate.  The 6 [place] by 45 cm sheath was then pulled back over the aortic bifurcation and the wire advanced up into the abdominal aorta.  The long 6 [place] sheath was exchanged over the wire for a short 6 [place] sheath and then a minx closure device was deployed in the left common femoral artery.  Manual pressure was held there for 10 minutes to ensure hemostasis.  The right groin was then irrigated with antibiotic saline solution and hemostasis noted to be excellent.  It was then closed in layers with 2-0 Vicryl for the femoral sheath, another  2-0 Vicryl layer for the deep tissue, 3-0 Vicryl running stitch for the deep dermal layer, and 4-0 Monocryl for skin.  Sterile dressings were applied. The patient tolerated the procedure well. Sponge and needle counts were correct. The patient was then awakened and extubated in the operating room and taken to the recovery room in good condition.    Duncan Lucio MD  Date: 2/3/2022 Time: 10:52 CST

## 2022-02-03 NOTE — ANESTHESIA PREPROCEDURE EVALUATION
Anesthesia Evaluation     Patient summary reviewed and Nursing notes reviewed   no history of anesthetic complications:  NPO Solid Status: > 8 hours  NPO Liquid Status: > 8 hours           Airway   Mallampati: III  TM distance: >3 FB  Possible difficult intubation  Dental      Pulmonary    (+) a smoker Former, COPD, sleep apnea,   (-) asthma    ROS comment: vccal cord paralysis  Cardiovascular   Exercise tolerance: poor (<4 METS)    ECG reviewed    (+) hypertension, dysrhythmias Atrial Fib, PVD, hyperlipidemia,   (-) pacemaker, past MI, angina, cardiac stents, CABG      Neuro/Psych  (-) seizures, TIA, CVA  GI/Hepatic/Renal/Endo    (+) obesity,  GERD,  renal disease CRI, diabetes mellitus type 2 using insulin,   (-) liver disease    Musculoskeletal     Abdominal    Substance History      OB/GYN          Other   arthritis,          Phys Exam Other: Previously gr 3 view with mac 4, used bougie                  Anesthesia Plan    ASA 3     general     intravenous induction     Anesthetic plan, all risks, benefits, and alternatives have been provided, discussed and informed consent has been obtained with: patient.  Use of blood products discussed with patient  Consented to blood products.

## 2022-02-04 VITALS
RESPIRATION RATE: 18 BRPM | OXYGEN SATURATION: 97 % | SYSTOLIC BLOOD PRESSURE: 150 MMHG | HEART RATE: 107 BPM | TEMPERATURE: 98.6 F | DIASTOLIC BLOOD PRESSURE: 64 MMHG

## 2022-02-04 LAB
ANION GAP SERPL CALCULATED.3IONS-SCNC: 9 MMOL/L (ref 5–15)
BASOPHILS # BLD AUTO: 0.01 10*3/MM3 (ref 0–0.2)
BASOPHILS NFR BLD AUTO: 0.2 % (ref 0–1.5)
BUN SERPL-MCNC: 17 MG/DL (ref 8–23)
BUN/CREAT SERPL: 13.1 (ref 7–25)
CALCIUM SPEC-SCNC: 9 MG/DL (ref 8.6–10.5)
CHLORIDE SERPL-SCNC: 102 MMOL/L (ref 98–107)
CO2 SERPL-SCNC: 29 MMOL/L (ref 22–29)
CREAT SERPL-MCNC: 1.3 MG/DL (ref 0.76–1.27)
CYTO UR: NORMAL
DEPRECATED RDW RBC AUTO: 53.3 FL (ref 37–54)
EOSINOPHIL # BLD AUTO: 0.08 10*3/MM3 (ref 0–0.4)
EOSINOPHIL NFR BLD AUTO: 1.6 % (ref 0.3–6.2)
ERYTHROCYTE [DISTWIDTH] IN BLOOD BY AUTOMATED COUNT: 16.7 % (ref 12.3–15.4)
GFR SERPL CREATININE-BSD FRML MDRD: 67 ML/MIN/1.73
GLUCOSE SERPL-MCNC: 165 MG/DL (ref 65–99)
HCT VFR BLD AUTO: 26 % (ref 37.5–51)
HGB BLD-MCNC: 7.8 G/DL (ref 13–17.7)
IMM GRANULOCYTES # BLD AUTO: 0.01 10*3/MM3 (ref 0–0.05)
IMM GRANULOCYTES NFR BLD AUTO: 0.2 % (ref 0–0.5)
LAB AP CASE REPORT: NORMAL
LYMPHOCYTES # BLD AUTO: 0.99 10*3/MM3 (ref 0.7–3.1)
LYMPHOCYTES NFR BLD AUTO: 20.2 % (ref 19.6–45.3)
MCH RBC QN AUTO: 26.4 PG (ref 26.6–33)
MCHC RBC AUTO-ENTMCNC: 30 G/DL (ref 31.5–35.7)
MCV RBC AUTO: 88.1 FL (ref 79–97)
MONOCYTES # BLD AUTO: 0.45 10*3/MM3 (ref 0.1–0.9)
MONOCYTES NFR BLD AUTO: 9.2 % (ref 5–12)
NEUTROPHILS NFR BLD AUTO: 3.36 10*3/MM3 (ref 1.7–7)
NEUTROPHILS NFR BLD AUTO: 68.6 % (ref 42.7–76)
NRBC BLD AUTO-RTO: 0 /100 WBC (ref 0–0.2)
PATH REPORT.FINAL DX SPEC: NORMAL
PATH REPORT.GROSS SPEC: NORMAL
PLATELET # BLD AUTO: 148 10*3/MM3 (ref 140–450)
PMV BLD AUTO: 12.3 FL (ref 6–12)
POTASSIUM SERPL-SCNC: 4.1 MMOL/L (ref 3.5–5.2)
RBC # BLD AUTO: 2.95 10*6/MM3 (ref 4.14–5.8)
SODIUM SERPL-SCNC: 140 MMOL/L (ref 136–145)
WBC NRBC COR # BLD: 4.9 10*3/MM3 (ref 3.4–10.8)

## 2022-02-04 PROCEDURE — 85025 COMPLETE CBC W/AUTO DIFF WBC: CPT | Performed by: SURGERY

## 2022-02-04 PROCEDURE — 80048 BASIC METABOLIC PNL TOTAL CA: CPT | Performed by: SURGERY

## 2022-02-04 PROCEDURE — 25010000002 ENOXAPARIN PER 10 MG: Performed by: SURGERY

## 2022-02-04 PROCEDURE — 99024 POSTOP FOLLOW-UP VISIT: CPT | Performed by: SURGERY

## 2022-02-04 RX ORDER — OXYCODONE AND ACETAMINOPHEN 10; 325 MG/1; MG/1
1 TABLET ORAL EVERY 6 HOURS PRN
Qty: 20 TABLET | Refills: 0 | Status: SHIPPED | OUTPATIENT
Start: 2022-02-04 | End: 2022-02-10

## 2022-02-04 RX ORDER — CLOPIDOGREL BISULFATE 75 MG/1
75 TABLET ORAL DAILY
Qty: 30 TABLET | Refills: 5 | Status: SHIPPED | OUTPATIENT
Start: 2022-02-05 | End: 2022-08-03

## 2022-02-04 RX ADMIN — ASPIRIN 81 MG: 81 TABLET, COATED ORAL at 08:38

## 2022-02-04 RX ADMIN — AMLODIPINE BESYLATE 10 MG: 10 TABLET ORAL at 08:38

## 2022-02-04 RX ADMIN — HYDROCHLOROTHIAZIDE 12.5 MG: 25 TABLET ORAL at 08:37

## 2022-02-04 RX ADMIN — LABETALOL HYDROCHLORIDE 100 MG: 200 TABLET, FILM COATED ORAL at 08:37

## 2022-02-04 RX ADMIN — TAMSULOSIN HYDROCHLORIDE 0.4 MG: 0.4 CAPSULE ORAL at 08:37

## 2022-02-04 RX ADMIN — CLOPIDOGREL 75 MG: 75 TABLET, FILM COATED ORAL at 08:37

## 2022-02-04 RX ADMIN — OXYCODONE AND ACETAMINOPHEN 1 TABLET: 325; 10 TABLET ORAL at 08:37

## 2022-02-04 RX ADMIN — ENOXAPARIN SODIUM 40 MG: 40 INJECTION SUBCUTANEOUS at 08:38

## 2022-02-04 RX ADMIN — LISINOPRIL 40 MG: 20 TABLET ORAL at 08:37

## 2022-02-04 RX ADMIN — SODIUM CHLORIDE, POTASSIUM CHLORIDE, SODIUM LACTATE AND CALCIUM CHLORIDE 100 ML/HR: 600; 310; 30; 20 INJECTION, SOLUTION INTRAVENOUS at 06:17

## 2022-02-04 RX ADMIN — PANTOPRAZOLE SODIUM 40 MG: 40 TABLET, DELAYED RELEASE ORAL at 08:37

## 2022-02-04 NOTE — PLAN OF CARE
Goal Outcome Evaluation:  Plan of Care Reviewed With: patient        Progress: no change  Outcome Summary: ivf in progress. bilat groin drsg with scant serosaang drainage. baez to bsd with yellow urine. cont c/o considerable pain. medicated per order with dilaudid x2. bilat pedal pulses 2+ . no acute distress noted. cont to monitor.

## 2022-02-04 NOTE — DISCHARGE SUMMARY
Date of Discharge:  2/4/2022    Discharge Diagnosis: Peripheral arterial disease, lifestyle limiting right lower extremity claudication    Presenting Problem/History of Present Illness  Atherosclerosis of native artery of right lower extremity with intermittent claudication (HCC) [I70.211]  Carotid stenosis [I65.29]       Hospital Course  Patient is a 66 y.o. male who we are currently following for known peripheral arterial disease.  He has previously undergone left femoral endarterectomy as well as stenting of the left common iliac artery due to lifestyle limiting claudication.  More recently he returned for continued surveillance and was noted to have worsening claudication at very short distances in the right calf level.  Noninvasive arterial testing showed severe arterial insufficiency in the right lower extremity.  As such he was taken for angiogram of the right lower extremity about 2 weeks ago.  He was noted to have significant calcified plaque of the right common femoral artery and extending into the proximal SFA.  He also had significant tibial disease with main outflow via the peroneal with collateralization to a dorsalis pedis.  The AT and PT were both occluded proximally.  Given the significant involvement of the right common femoral artery his angiogram was only diagnostic and he was subsequently brought back now for open right femoral endarterectomy with right lower extremity angiogram and intervention on her right SFA stenosis.  This procedure was performed on 2/3.  He tolerated the procedure very well and was monitored overnight on 3C.  His pain was well controlled with oral meds.  This morning he is awake and alert and hemodynamically stable.  Labs are stable and creatinine is actually improved from a preop value of 2 down to 1.3 with hydration.  Hemoglobin is 7.9.  On exam he is sitting at the bedside in his chair.  He has palpable dorsalis pedis pulses bilaterally.  His right groin incision site  is dressing in place with minimal serosanguineous strikethrough.  He has a easily palpable right femoral pulse as well as a good left femoral pulse.  The left groin access site for angiogram also has a dressing in place and is soft with no evidence of hematoma.  At this point he is stable for discharge.  His Puckett will be removed and once he voids he will be cleared to go home.  He was given a prescription for pain medication and his Vinny was reviewed.  Otherwise his home meds will remain the same.  I did add Plavix 75 mg daily along with his 81 mg aspirin that he was already taking.  He was previously on cilostazol but this has been discontinued.  I will see him for follow-up in my office in 2 weeks.  All questions were answered.    Procedures Performed  Procedure(s):  RIGHT FEMORAL ENDARTERECTOMY, RIGHT LOWER EXTREMITY ANGIOGRAM, BALLOON ANGIOPLASTY       Consults:   Consults     No orders found for last 30 day(s).            Condition on Discharge: Stable    Discharge Medications     Discharge Medications      New Medications      Instructions Start Date   clopidogrel 75 MG tablet  Commonly known as: PLAVIX   75 mg, Oral, Daily   Start Date: February 5, 2022     oxyCODONE-acetaminophen  MG per tablet  Commonly known as: PERCOCET   1 tablet, Oral, Every 6 Hours PRN         Continue These Medications      Instructions Start Date   albuterol sulfate  (90 Base) MCG/ACT inhaler  Commonly known as: PROVENTIL HFA;VENTOLIN HFA;PROAIR HFA   2 puffs, Inhalation, 4 Times Daily PRN      amLODIPine 10 MG tablet  Commonly known as: NORVASC   10 mg, Oral, Daily      aspirin 81 MG EC tablet   81 mg, Oral, Daily      atorvastatin 40 MG tablet  Commonly known as: LIPITOR   1 tablet, Nightly      cyclobenzaprine 5 MG tablet  Commonly known as: FLEXERIL   5 mg, Oral, 3 Times Daily PRN      hydroCHLOROthiazide 25 MG tablet  Commonly known as: HYDRODIURIL   12.5 mg, Oral, Daily      labetalol 100 MG tablet  Commonly  known as: NORMODYNE   100 mg, Oral, 2 Times Daily      lisinopril 40 MG tablet  Commonly known as: PRINIVIL,ZESTRIL   40 mg, Oral, Daily      NovoLOG FlexPen 100 UNIT/ML solution pen-injector sc pen  Generic drug: insulin aspart   25 Units, Subcutaneous, 3 Times Daily With Meals, Sliding scale      pantoprazole 40 MG EC tablet  Commonly known as: PROTONIX   40 mg, Oral, Daily      sildenafil 100 MG tablet  Commonly known as: VIAGRA   50 mg, Oral, As Needed      tamsulosin 0.4 MG capsule 24 hr capsule  Commonly known as: FLOMAX   1 capsule, Oral, Daily      Trulicity 1.5 MG/0.5ML solution pen-injector  Generic drug: Dulaglutide   1.5 mg, Subcutaneous, Weekly         Stop These Medications    cilostazol 50 MG tablet  Commonly known as: PLETAL            Discharge Diet:   Diet Instructions     Advance Diet As Tolerated      Diet: Consistent Carbohydrate      Discharge Diet: Consistent Carbohydrate          Activity at Discharge:   Activity Instructions     Activity as Tolerated      Other Activity Instructions      Activity Instructions: No heavy lifting over 10 pounds and no strenuous activity until seen in the office in 2 weeks.          Follow-up Appointments  Future Appointments   Date Time Provider Department Center   4/18/2022 10:45 AM Aurelio Brandon APRN [facility]   7/5/2022 10:30 AM Aziza Soriano APRN [facility]     Additional Instructions for the Follow-ups that You Need to Schedule     Discharge Follow-up with Specified Provider: Vascular-Dr. Lucio; 2 Weeks   As directed      To: Vascular-Dr. Lucio    Follow Up: 2 Weeks                Duncan Lucio MD  02/04/22  12:04 CST

## 2022-02-04 NOTE — PAYOR COMM NOTE
"2/4/22 [facility] [phone]  [phone]      PATIENT INPATIENT ADMISSION ON 2/3/22 POST SURGICAL PROCEDURE. INPATIENT APPROVED.  FAXING CLINICAL FOR CONT STAY.              Edy Oneill (66 y.o. Male)             Date of Birth Social Security Number Address Home Phone MRN    1955  [address] [facility] [address] [phone] [id number]    Sikhism Marital Status             [facility] Single       Admission Date Admission Type Admitting Provider Attending Provider Department, Room/Bed    2/3/22 Elective Duncan Lucio MD Cumbers, Jason Ronald, MD [facility] 3C, 384/1    Discharge Date Discharge Disposition Discharge Destination                         Attending Provider: Duncan Lucio MD    Allergies: No Known Allergies    Isolation: None   Infection: None   Code Status: CPR   Advance Care Planning Activity    Ht: 175 cm (68.9\")   Wt: 102 kg (225 lb 15.5 oz)    Admission Cmt: None   Principal Problem: Atherosclerosis of native artery of right lower extremity with intermittent claudication (HCC) [I70.211] More...                 Active Insurance as of 2/3/2022     Primary Coverage     Payor Plan Insurance Group Employer/Plan Group    AETNA MEDICARE REPLACEMENT AETNA MEDICARE REPLACEMENT [id number]     Payor Plan Address Payor Plan Phone Number Payor Plan Fax Number Effective Dates    [address] [phone]  1/1/2022 - None Entered    [facility] [id number]       Subscriber Name Subscriber Birth Date Member ID       EDY ONEILL 1955 [id number]           Secondary Coverage     Payor Plan Insurance Group Employer/Plan Group    HUMANA HUMANA [id number]     Payor Plan Address Payor Plan Phone Number Payor Plan Fax Number Effective Dates    [address] [address] [phone]  1/1/2013 - None Entered    [facility] [id number]       Subscriber Name Subscriber Birth Date Member ID       EDY ONEILL 1955 [id number]           "       Emergency Contacts      (Rel.) Home Phone Work Phone Mobile Phone    René Garland (Relative) [phone] -- --            [facility] Encounter Date/Time: 2/3/2022 [facility]5   Hospital Account: [id number]    MRN: [id number]   Patient:  Edy Oneill   Contact Serial #: [id number]   SSN:          ENCOUNTER             Patient Class: Inpatient   Unit: [address] Service: Surgery     Bed: 384/1   Admitting Provider: Duncan Lucio MD   Referring Physician: Duncan Lucio   Attending Provider: Duncan Lucio MD   Adm Diagnosis: Atherosclerosis of nativ*               PATIENT          Name: Edy Oneill : 1955 (66 yrs)   Address: [facility] [address] [place] [address] Sex: Male   City: [place] [address]   County: [facility]   Marital Status: SINGLE Ethnicity: NOT                                                                              Race: BLACK   Primary Care Provider: Mark Villela MD Patients Phone: Home Phone: [phone]     Mobile Phone: [phone]   EMERGENCY CONTACT   Contact Name Legal Guardian? Relationship to Patient Home Phone Work Phone   1. René Garland  2. *No Contact Specified* No    Relative    [phone]              GUARANTOR            Guarantor: Edy Oneill     : 1955   Address: [facility] [address] [place] [address] Sex: [place]     [place] [address]     Relation to Patient: Self       Home Phone: [phone]   Guarantor ID: [id number]       Work Phone:     GUARANTOR EMPLOYER   Employer:           Status: DISABLED   COVERAGE          PRIMARY INSURANCE   Payor: AETNA MEDICARE REPLACEMENT Plan: AETNA MEDICARE REPLACEMENT   Group Number: [id number] Insurance Type: INDEMNITY   Subscriber Name: EDY ONEILL Subscriber : 1955   Subscriber ID: [id number] Coverage Address: [facility] [id number] [place] [address]   Pat. Rel. to Subscriber: Self Coverage Phone: [phone]   SECONDARY INSURANCE   Payor: HUMANA Plan: HUMANA   Group  Number: [id number] Insurance Type: INDEMNITY   Subscriber Name: EDY ONEILL Subscriber : 1955   Subscriber ID: [id number] Coverage Address: [address] [address]   Pat. Rel. to Subscriber: SELF Coverage Phone:        Contact Serial # ([id number])  `       2022    Chart ID ([id number])             Department Encounter #   2/3/2022  5:05 AM  PAD 3C [id number]        Duncan Lucio MD   Physician   Vascular Surgery   Op Note       Signed   Date of Service:  22   Creation Time:  22           Case Time:  Procedures:  Surgeons:    22 RIGHT FEMORAL ENDARTERECTOMY, RIGHT LOWER EXTREMITY ANGIOGRAM, BALLOON ANGIOPLASTY    Duncan Lucio MD               Signed                   Show:Clear all  [x]Manual[x]Template[]Copied    Added by:  [x]Duncan Lucio MD      []Hover for details    Edy Oneill  2/3/2022     PREOPERATIVE DIAGNOSIS: Atherosclerosis of native artery of right lower extremity with intermittent claudication (HCC) [I70.211]     POSTOPERATIVE DIAGNOSIS: Post-Op Diagnosis Codes:     * Atherosclerosis of native artery of right lower extremity with intermittent claudication (HCC) [I70.211]     PROCEDURE PERFORMED:  1.  Ultrasound-guided left common femoral artery access  2.  Placement of catheter in abdominal aorta  3.  Aortoiliac angiogram  4.  Right groin exploration with exposure of the right common femoral artery  5.  Right common femoral endarterectomy with eversion profundoplasty and bovine pericardial patch angioplasty  6.  Right lower extremity angiogram  7.  Angioplasty of the mid SFA using a 5 x 40 mm ever cross balloon angioplasty catheter  8.  Completion right lower extremity angiogram  9.  Minx closure of the left common femoral artery and primary closure of the right groin  10.  Radiographic supervision and interpretation     SURGEON: Duncan Lucio MD     ANESTHESIA:  General.     PREPARATION: Routine.     STAFF: Circulator: Jade Hsu RN  Scrub Person: Priyanka Antonio  Assistant: Waldemar García  Vascular Radiology Technician: Mitra Jenkins     Estimated Blood Loss: <500ml     SPECIMENS: Right femoral artery plaque     COMPLICATIONS: None apparent     INDICATIONS: Edy Cosby is a 66 y.o. male with known significant peripheral arterial disease.  He was having worsening, lifestyle limiting claudication of the right lower extremity.  Noninvasive arterial testing showed a significant perfusion deficit.  He had similar symptoms on the left lower extremity previously and had undergone a left femoral endarterectomy and angiogram at that time with good result.  He had recently undergone right lower extremity angiogram which showed significant right common femoral artery plaque burden and so decision was made to bring him back for right common femoral endarterectomy with concomitant endovascular treatment of right SFA stenosis. The indications, risks, and possible complications of the procedure were explained to the patient, who voiced understanding and wished to proceed with surgery.     PROCEDURE IN DETAIL: The patient was taken to the operating room and placed on the operating table in a supine position. After general anesthesia was obtained, the bilateral groins were prepped and draped in a sterile manner.  The left common femoral artery was then accessed under direct ultrasound guidance and a micropuncture technique.  The micro sheath would not pass over the wire and so a low-profile 5 Palestinian sheath was advanced over the wire into position.  The microwire was then removed and the Glidewire advantage was introduced into the abdominal aorta.  1000 units of IV heparin were then given.  Omni Flush catheter was then advanced to the distal abdominal aorta just above the bifurcation.  The wire was removed.  Aortoiliac angiogram from here showed again patency of the  distal aorta and bilateral iliac segments and patency of the previously placed left common iliac artery stent.  I was able to visualize the common femorals in the right common femoral again was noted to have significant calcified plaque extending into the proximal SFA.  With confirmation of this finding decision was made to proceed with femoral endarterectomy and so the wire and catheter were removed and the sheath was left in place.  Attention was then turned to the right groin.  A vertical incision was then made overlying the common femoral artery using a #15 blade.  Dissection was carried down through subcutaneous tissue using electrocautery and any crossing veins were ligated with hemoclips.  The inguinal ligament was notified and just deep to this the femoral sheath was entered with Metzenbaum scissors.  The proximal common femoral artery was identified and circumferentially dissected free and controlled with a vessel loop.  Any side branches were also controlled with 2-0 silk ties.  Dissection was then carried distally along the common femoral artery down to the bifurcation of SFA and profunda.  The SFA and profunda reach controlled with Vesseloops dissection was now complete.  An additional 5000 units of IV heparin were then given, and a further 1000 units of heparin were given throughout the rest of the case.  After 3 minutes the proximal common femoral artery was clamped with a C-clamp in the SFA and profunda were controlled with Vesseloops.  A longitudinal arteriotomy was then made using a #11 blade and it was extended proximally distally with Hutson scissors.  It was extended proximally up to the inguinal ligament, and distally just onto the proximal SFA.  Common femoral endarterectomy was then performed in standard fashion using a freer elevator and all plaque was removed.  This was carried out into the proximal SFA and the plaque was feathered off for a smooth endpoint.  Eversion profundoplasty was also  performed.  The endarterectomized surface was then irrigated with heparinized saline solution any debris picked clean.  Following this bovine pericardial patch was brought onto the field.  Patch anastomosis was started from the proximal end using a 5-0 Prolene suture in a running fashion sutures were brought down on either side to the midline portion of the arteriotomy.  The patch was then trimmed to length in a similar fashion the distal patch anastomosis was started using a separate 5-0 Prolene suture and both sutures brought up to the midline.  Prior to completing the anastomosis the appropriate flushing maneuvers were performed and then the anastomosis was completed.  Perfusion was then restored and there was a good pulse in the common femoral artery.  Hemostasis was achieved using thrombin-soaked Gelfoam.  At this point decision was now made to complete the right lower extremity angiogram.  As such through the sheath in the left groin the Omni Flush catheter and Glidewire advantage was reintroduced and used to select the contralateral right common iliac artery and the catheter was brought down to the external iliac artery.  Angiogram from here showed wide patency now of the endarterectomized common femoral with good flow out into the profunda and SFA.  In the mid SFA there was noted to be a high-grade stenosis of approximately 90% over about 2 cm.  As previously seen on the angiogram there was only 1 vessel runoff via the peroneal with both AT and PT completely occluded from their takeoffs.  There was reconstitution of the DP very distally in the foot from the peroneal.  At this point the Glidewire advantage was advanced down into the SFA and the short 6 Hungarian sheath was exchanged over the wire for a 6 Hungarian by 45 cm destination sheath which was advanced down into the proximal SFA.  Now using the wire the stenosis was crossed in the mid SFA and then that area was angioplastied using a 5 x 40 mm ever cross  balloon.  The sheath and wire were then pulled back into the external iliac artery and completion angiogram down through the right lower extremity showed patency of the common femoral, profunda, and SFA.  The popliteal was also patent and there was maintained peroneal runoff with reconstitution of the DP via collaterals.  At this point I felt the result was adequate.  The 6 [place] by 45 cm sheath was then pulled back over the aortic bifurcation and the wire advanced up into the abdominal aorta.  The long 6 [place] sheath was exchanged over the wire for a short 6 [place] sheath and then a minx closure device was deployed in the left common femoral artery.  Manual pressure was held there for 10 minutes to ensure hemostasis.  The right groin was then irrigated with antibiotic saline solution and hemostasis noted to be excellent.  It was then closed in layers with 2-0 Vicryl for the femoral sheath, another 2-0 Vicryl layer for the deep tissue, 3-0 Vicryl running stitch for the deep dermal layer, and 4-0 Monocryl for skin.  Sterile dressings were applied. The patient tolerated the procedure well. Sponge and needle counts were correct. The patient was then awakened and extubated in the operating room and taken to the recovery room in good condition.     Duncan Lucio MD  Date: 2/3/2022            Time: 10:52 CST               Admission (Current) on 2/3/2022     Admission (Current) on 2/3/2022      Outcome Summary: ivf in progress. bilat groin drsg with scant serosaang drainage. baez to bsd with yellow urine. cont c/o considerable pain. medicated per order with dilaudid x2. bilat pedal pulses 2+ . no acute distress noted. cont to monitor.  Outcome Summary: VSS. Pulses 2+ with doppler, and palpable. PRN pain med given x1 with good results. R groin site with small amount of sero-sagn drainage, L groin site with scant amount of old bloody drainage noted. IVF as ordered. Will continue to monitor      02/03/22 2059 -- -- --  -- -- room air -- -- --   02/03/22 1928 98.1 (36.7) 99 16 160/59 Lying room air -- -- 94   02/03/22 1342 -- 91 16 167/61 Lying nasal cannula 3 -- 99   02/03/22 1312 97.9 (36.6) 84 16 153/73 Lying nasal cannula 3 -- 98   02/03/22 1240 98.2 (36.8) 84 16 164/61 Lying nasal cannula 3 -- 100   02/03/22 1230 97.2 (36.2) 84 16 148/65 -- nasal cannula 3 -- 93   02/03/22             Next appt: 04/18/2022 at 10:45 AM in Podiatry (Aurelio Brandon, APRN)    Specimen Information: Blood         0 Result Notes    Component   Ref Range & Units 06:15   (2/4/22) 1 d ago   (2/3/22) 1 d ago   (2/3/22) 1 d ago   (2/3/22) 4 d ago   (1/31/22) 1 mo ago   (12/16/21) 1 mo ago   (12/16/21)   Glucose   65 - 99 mg/dL 165 High   161 High  R, CM  166 High  R, CM  199 High  R, CM  180 High   163 High  R, CM  177 High  R, CM    BUN   8 - 23 mg/dL 17     36 High       Creatinine   0.76 - 1.27 mg/dL 1.30 High      2.07 High       Sodium   136 - 145 mmol/L 140     144      Potassium   3.5 - 5.2 mmol/L 4.1     4.7      Chloride   98 - 107 mmol/L 102     106      CO2   22.0 - 29.0 mmol/L 29.0     30.0 High       Calcium   8.6 - 10.5 mg/dL 9.0                Notes    Component   Ref Range & Units 06:15   (2/4/22)   Cori/[place] 4 d ago   (1/31/22)   Cori/[place] 1 mo ago   (12/13/21)   Cori/[place] 10 mo ago   (3/15/21)   Cori/[place] 1 yr ago   (9/28/20)   Cori/[place] 3 yr ago   (2/2/19)   Cori/[place] 3 yr ago   (1/30/19)   Cori/[place]   WBC   3.40 - 10.80 10*3/mm3 4.90  4.48  4.49  5.3 R  6.2 R  5.99 R  7.90 R    RBC   4.14 - 5.80 10*6/mm3 2.95 Low   3.56 Low   3.66 Low   3.77 Low  R  3.81 Low  R  2.96 Low  R  3.25 Low  R    Hemoglobin   13.0 - 17.7 g/dL 7.8 Low   9.3 Low   9.6 Low   10.0 Low  R  10.3 Low  R  8.2 Low  R  8.7 Low  R    Hematocrit   37.5 - 51.0 % 26.0 Low   30.9 Low   30.9 Low   32.7 Low  R  33.9 Low  R  25.3 Low  R  27.1 Low  R    MCV   79.0 - 97.0 fL 88.1  86.8  84.4  86.7 R  89.0 R  85.5 R  83.4 R    MCH    26.6 - 33.0 pg 26.4 Low   26.1 Low   26.2 Low   26.5 Low  R  27.0 R  27.7 Low  R  26.8 Low  R    MCHC   31.5 - 35.7 g/dL 30.0 Low   30.1 Low   31.1 Low   30.6 Low  R  30.4 Low  R  32.4 Low  R  32.1 Low  R    RDW   12.3 - 15.4 % 16.7 High   16.6 High   15.4  16.2 High  R  16.3 High  R  16.5 High  R  16.3 High  R    RDW-SD   37.0 - 54.0 fl 53.3  52.8  46.8    51.6 R  50.0 R    MPV   6.0 - 12.0 fL 12.3 High   12.2 High   12.3 High   12.9 High  R  12.7 High  R  12.5 High   12.8 High     Platelets   140 - 450 10*3/mm3 148  181  202  175 R  194 R  173 R  123 Low  R    Neutrophil %   42.7 - 76.0 % 68.6  58.5  55.4  59.4 R   60.3 R  65.8 R    Lymphocyte %   19.6 - 45.3 % 20.2  29.9  33.9  30.4 R   27.7 R  24.1 R    Monocyte %   5.0 - 12.0 % 9.2  8.7  7.1  6.8 R   7.7 R  7.8 R    Eosinophil %   0.3 - 6.2 % 1.6  2.5  2.7  2.4 R   3.3 R  1.6 R    Basophil %   0.0 - 1.5 % 0.2  0.2  0.7  0.6 R   0.5 R  0.4 R    Immature Grans %   0.0 - 0.5 % 0.2  0.2  0.2    0.5 R  0.3 R    Neutrophils, Absolute   1.70 - 7.00 10*3/mm3 3.36  2.62  2.49  3.2 R   3.61 R  5.20 R    Lymphocytes, Absolute   0.70 - 3.10 10*3/mm3 0.99  1.34  1.52  1.6 R   1.66 R  1.90 R    Monocytes, Absolute   0.10 - 0.90 10*3/mm3 0.45  0.39  0.32  0.40 R   0.46 R  0.62 R    Eosinophils, Absolute   0.00 - 0.40 10*3/mm3 0.08  0.11  0.12  0.10 R   0.20 R  0.13 R    Basophils, Absolute   0.00 - 0.20 10*3/mm3 0.01  0.01  0.03  0.00 R   0.03  0.03    Immature Grans, Absolute   0.00 - 0.05 10*3/mm3 0.01  0.01  0.01  0.0 R   0.03 R  0.02 R    nRBC   0.0 - 0.2 /100 WBC 0.0  0.0  0.0    0.0 R  0.0 R    Resulting Agency [facility]              Specimen Collected: 02/04/22 06:15 Last Resulted: 02/04/22 06:31        Lab Flowsheet     Order Details     View Encounter     Lab and Collection Details     Routing     Result History        R=Reference range differs from  displayed range          Result Care Coordination      Patient Communication    2/4/2022  8:31 PM  Not seen Back to Top              CBC & Differential (Order [id number])      Linked Results    Procedure Abnormality Status   CBC Auto Differential Abnormal Abnormal   Final result      CBC Auto Differential (Order [id number])    Additional Information    Specimen ID Bill Type Client ID   [id number]              Specimen Date Taken Specimen Time Taken Specimen Received Date Specimen Received Time Result Date Result Time   Feb 4, 2022  6:15 AM   Feb 4, 2022  6:31 AM       Results Routing Tracking    View Results Routing Information                         Current Facility-Administered Medications   Medication Dose Route Frequency Provider Last Rate Last Admin   • albuterol sulfate HFA (PROVENTIL HFA;VENTOLIN HFA;PROAIR HFA) inhaler 2 puff  2 puff Inhalation Q6H PRN Duncan Lucio MD       • amLODIPine (NORVASC) tablet 10 mg  10 mg Oral Daily Duncan Lucio MD   10 mg at 02/03/22 1800   • aspirin EC tablet 81 mg  81 mg Oral Daily Duncan Lucio MD       • atorvastatin (LIPITOR) tablet 40 mg  40 mg Oral Nightly Duncan Lucio MD   40 mg at 02/03/22 1759   • clopidogrel (PLAVIX) tablet 75 mg  75 mg Oral Daily Duncan Lucio MD       • cyclobenzaprine (FLEXERIL) tablet 5 mg  5 mg Oral TID PRN Duncan Lucio MD       • dextrose (D50W) (25 g/50 mL) IV injection 25 g  25 g Intravenous Q15 Min PRN Duncan Lucio MD       • dextrose (GLUTOSE) oral gel 15 g  15 g Oral Q15 Min PRN Duncan Lucio MD       • enoxaparin (LOVENOX) syringe 40 mg  40 mg Subcutaneous Daily Duncan Lucio MD       • glucagon (human recombinant) (GLUCAGEN DIAGNOSTIC) injection 1 mg  1 mg Subcutaneous Q15 Min PRN Duncan Lucio MD       • hydroCHLOROthiazide (HYDRODIURIL) tablet 12.5 mg  12.5 mg Oral Daily Duncan Lucio MD   12.5 mg at 02/03/22 1800   • HYDROmorphone  (DILAUDID) injection 1 mg  1 mg Intravenous Q2H PRN Duncan Lucio MD   1 mg at 02/03/22 1801    And   • naloxone (NARCAN) injection 0.4 mg  0.4 mg Intravenous Q5 Min PRN Duncan Lucio MD       • insulin lispro (humaLOG) injection 0-14 Units  0-14 Units Subcutaneous TID AC Duncan Lucio MD       • labetalol (NORMODYNE) tablet 100 mg  100 mg Oral BID Duncan Lucio MD   100 mg at 02/03/22 1759   • lactated ringers infusion  100 mL/hr Intravenous Continuous Duncan Lucio  mL/hr at 02/04/22 0617 100 mL/hr at 02/04/22 0617   • lisinopril (PRINIVIL,ZESTRIL) tablet 40 mg  40 mg Oral Daily Duncan Lucio MD   40 mg at 02/03/22 1759   • ondansetron (ZOFRAN) tablet 4 mg  4 mg Oral Q6H PRN Duncan Lucio MD        Or   • ondansetron (ZOFRAN) injection 4 mg  4 mg Intravenous Q6H PRN Duncan Lucio MD       • oxyCODONE-acetaminophen (PERCOCET)  MG per tablet 1 tablet  1 tablet Oral Q4H PRN Duncan Lucio MD   1 tablet at 02/03/22 2238   • pantoprazole (PROTONIX) EC tablet 40 mg  40 mg Oral Daily Duncan Lucio MD   40 mg at 02/03/22 2239   • tamsulosin (FLOMAX) 24 hr capsule 0.4 mg  0.4 mg Oral Daily Duncan Lucio MD

## 2022-02-05 ENCOUNTER — READMISSION MANAGEMENT (OUTPATIENT)
Dept: CALL CENTER | Facility: HOSPITAL | Age: 67
End: 2022-02-05

## 2022-02-05 NOTE — OUTREACH NOTE
Prep Survey      Responses   Worship facility patient discharged from? [facility]   Is LACE score < 7 ? No   Emergency Room discharge w/ pulse ox? No   Eligibility Readm Mgmt   Discharge diagnosis Peripheral arterial disease, lifestyle limiting right lower extremity claudication   Does the patient have one of the following disease processes/diagnoses(primary or secondary)? General Surgery   Does the patient have Home health ordered? No   Is there a DME ordered? No   Medication alerts for this patient Plavix    Prep survey completed? Yes          Daniela Wiggins RN

## 2022-02-05 NOTE — ANESTHESIA POSTPROCEDURE EVALUATION
Patient: Edy Cosby    Procedure Summary     Date: 02/03/22 Room / Location: [facility] OR  /  PAD HYBRID OR 12    Anesthesia Start: 0751 Anesthesia Stop: 1054    Procedure: RIGHT FEMORAL ENDARTERECTOMY, RIGHT LOWER EXTREMITY ANGIOGRAM, BALLOON ANGIOPLASTY (Right Groin) Diagnosis:       Atherosclerosis of native artery of right lower extremity with intermittent claudication (HCC)      (Atherosclerosis of native artery of right lower extremity with intermittent claudication (HCC) [I70.211])    Surgeons: Duncan Lucio MD Provider: Balaji Mayorga CRNA    Anesthesia Type: general ASA Status: 3          Anesthesia Type: general    Vitals  Vitals Value Taken Time   /65 02/03/22 1230   Temp 97.2 °F (36.2 °C) 02/03/22 1230   Pulse 84 02/03/22 1230   Resp 16 02/03/22 1230   SpO2 93 % 02/03/22 1230           Post Anesthesia Care and Evaluation    Patient location during evaluation: PACU  Patient participation: complete - patient participated  Level of consciousness: awake and alert  Pain management: adequate  Airway patency: patent  Anesthetic complications: No anesthetic complications    Cardiovascular status: acceptable  Respiratory status: acceptable  Hydration status: acceptable    Comments: Blood pressure 150/64, pulse 107, temperature 98.6 °F (37 °C), temperature source Oral, resp. rate 18, SpO2 97 %.    Pt discharged from PACU based on dino score >8

## 2022-02-07 NOTE — PAYOR COMM NOTE
"REF: [id number]    [facility]  VAN,   [phone]  OR  FAX   [phone]          Edy Oneill (66 y.o. Male)             Date of Birth Social Security Number Address Home Phone MRN    1955  [id number] [facility] [address] [phone] [id number]    Zoroastrian Marital Status             [facility] Single       Admission Date Admission Type Admitting Provider Attending Provider Department, Room/Bed    2/3/22 Elective Duncan Lucio MD  [facility] 3C, 384/1    Discharge Date Discharge Disposition Discharge Destination          2/4/2022 Home or Self Care              Attending Provider: (none)   Allergies: No Known Allergies    Isolation: None   Infection: None   Code Status: Prior   Advance Care Planning Activity    Ht: 175 cm (68.9\")   Wt: 102 kg (225 lb 15.5 oz)    Admission Cmt: None   Principal Problem: Atherosclerosis of native artery of right lower extremity with intermittent claudication (HCC) [I70.211] More...                 Active Insurance as of 2/3/2022     Primary Coverage     Payor Plan Insurance Group Employer/Plan Group    AETNA MEDICARE REPLACEMENT AETNA MEDICARE REPLACEMENT [id number]     Payor Plan Address Payor Plan Phone Number Payor Plan Fax Number Effective Dates    [address] [phone]  1/1/2022 - None Entered    [facility] [id number]       Subscriber Name Subscriber Birth Date Member ID       EDY ONEILL 1955 [id number]           Secondary Coverage     Payor Plan Insurance Group Employer/Plan Group    HUMANA HUMANA [id number]     Payor Plan Address Payor Plan Phone Number Payor Plan Fax Number Effective Dates    [address] [address] [phone]  1/1/2013 - None Entered    [facility] [address]       Subscriber Name Subscriber Birth Date Member ID       EDY ONEILL 1955 [id number]                 Emergency Contacts      (Rel.) Home Phone Work Phone Mobile Phone    GarlandRené (Relative) [phone] -- " --               Discharge Summary      Duncan Lucio MD at 02/04/22 1204            Date of Discharge:  2/4/2022    Discharge Diagnosis: Peripheral arterial disease, lifestyle limiting right lower extremity claudication    Presenting Problem/History of Present Illness  Atherosclerosis of native artery of right lower extremity with intermittent claudication (HCC) [I70.211]  Carotid stenosis [I65.29]       Hospital Course  Patient is a 66 y.o. male who we are currently following for known peripheral arterial disease.  He has previously undergone left femoral endarterectomy as well as stenting of the left common iliac artery due to lifestyle limiting claudication.  More recently he returned for continued surveillance and was noted to have worsening claudication at very short distances in the right calf level.  Noninvasive arterial testing showed severe arterial insufficiency in the right lower extremity.  As such he was taken for angiogram of the right lower extremity about 2 weeks ago.  He was noted to have significant calcified plaque of the right common femoral artery and extending into the proximal SFA.  He also had significant tibial disease with main outflow via the peroneal with collateralization to a dorsalis pedis.  The AT and PT were both occluded proximally.  Given the significant involvement of the right common femoral artery his angiogram was only diagnostic and he was subsequently brought back now for open right femoral endarterectomy with right lower extremity angiogram and intervention on her right SFA stenosis.  This procedure was performed on 2/3.  He tolerated the procedure very well and was monitored overnight on 3C.  His pain was well controlled with oral meds.  This morning he is awake and alert and hemodynamically stable.  Labs are stable and creatinine is actually improved from a preop value of 2 down to 1.3 with hydration.  Hemoglobin is 7.9.  On exam he is sitting at the bedside in  his chair.  He has palpable dorsalis pedis pulses bilaterally.  His right groin incision site is dressing in place with minimal serosanguineous strikethrough.  He has a easily palpable right femoral pulse as well as a good left femoral pulse.  The left groin access site for angiogram also has a dressing in place and is soft with no evidence of hematoma.  At this point he is stable for discharge.  His Puckett will be removed and once he voids he will be cleared to go home.  He was given a prescription for pain medication and his Vinny was reviewed.  Otherwise his home meds will remain the same.  I did add Plavix 75 mg daily along with his 81 mg aspirin that he was already taking.  He was previously on cilostazol but this has been discontinued.  I will see him for follow-up in my office in 2 weeks.  All questions were answered.    Procedures Performed  Procedure(s):  RIGHT FEMORAL ENDARTERECTOMY, RIGHT LOWER EXTREMITY ANGIOGRAM, BALLOON ANGIOPLASTY       Consults:   Consults     No orders found for last 30 day(s).            Condition on Discharge: Stable    Discharge Medications     Discharge Medications      New Medications      Instructions Start Date   clopidogrel 75 MG tablet  Commonly known as: PLAVIX   75 mg, Oral, Daily   Start Date: February 5, 2022     oxyCODONE-acetaminophen  MG per tablet  Commonly known as: PERCOCET   1 tablet, Oral, Every 6 Hours PRN         Continue These Medications      Instructions Start Date   albuterol sulfate  (90 Base) MCG/ACT inhaler  Commonly known as: PROVENTIL HFA;VENTOLIN HFA;PROAIR HFA   2 puffs, Inhalation, 4 Times Daily PRN      amLODIPine 10 MG tablet  Commonly known as: NORVASC   10 mg, Oral, Daily      aspirin 81 MG EC tablet   81 mg, Oral, Daily      atorvastatin 40 MG tablet  Commonly known as: LIPITOR   1 tablet, Nightly      cyclobenzaprine 5 MG tablet  Commonly known as: FLEXERIL   5 mg, Oral, 3 Times Daily PRN      hydroCHLOROthiazide 25 MG  tablet  Commonly known as: HYDRODIURIL   12.5 mg, Oral, Daily      labetalol 100 MG tablet  Commonly known as: NORMODYNE   100 mg, Oral, 2 Times Daily      lisinopril 40 MG tablet  Commonly known as: PRINIVIL,ZESTRIL   40 mg, Oral, Daily      NovoLOG FlexPen 100 UNIT/ML solution pen-injector sc pen  Generic drug: insulin aspart   25 Units, Subcutaneous, 3 Times Daily With Meals, Sliding scale      pantoprazole 40 MG EC tablet  Commonly known as: PROTONIX   40 mg, Oral, Daily      sildenafil 100 MG tablet  Commonly known as: VIAGRA   50 mg, Oral, As Needed      tamsulosin 0.4 MG capsule 24 hr capsule  Commonly known as: FLOMAX   1 capsule, Oral, Daily      Trulicity 1.5 MG/0.5ML solution pen-injector  Generic drug: Dulaglutide   1.5 mg, Subcutaneous, Weekly         Stop These Medications    cilostazol 50 MG tablet  Commonly known as: PLETAL            Discharge Diet:   Diet Instructions     Advance Diet As Tolerated      Diet: Consistent Carbohydrate      Discharge Diet: Consistent Carbohydrate          Activity at Discharge:   Activity Instructions     Activity as Tolerated      Other Activity Instructions      Activity Instructions: No heavy lifting over 10 pounds and no strenuous activity until seen in the office in 2 weeks.          Follow-up Appointments  Future Appointments   Date Time Provider Department Center   4/18/2022 10:45 AM Aurelio Brandon APRN [facility] POD PAD PAD   7/5/2022 10:30 AM Aziza Soriano APRN [facility] CD PAD PAD     Additional Instructions for the Follow-ups that You Need to Schedule     Discharge Follow-up with Specified Provider: Vascular-Dr. Lucio; 2 Weeks   As directed      To: VascularNatasha Lucio    Follow Up: 2 Weeks                Duncan Lucio MD  02/04/22  12:04 CST                Electronically signed by Duncan Lucio MD at 02/04/22 1207       Discharge Order (From admission, onward)     Start     Ordered    02/04/22 1201  Discharge patient  Once        Expected  Discharge Date: 02/04/22    Discharge Disposition: Home or Self Care    Physician of Record for Attribution - Please select from Treatment Team: GLO CABRERA [[id number]]    Review needed by CMO to determine Physician of Record: No       Question Answer Comment   Physician of Record for Attribution - Please select from Treatment Team GLO CABRERA    Review needed by CMO to determine Physician of Record No        02/04/22 1204

## 2022-02-07 NOTE — PAYOR COMM NOTE
"REF:  [id number]    [facility]  VAN,   [phone]  OR  FAX   [phone]         Edy Oneill (66 y.o. Male)             Date of Birth Social Security Number Address Home Phone MRN    1955  [address] [facility] [address] [phone] [id number]    Sabianism Marital Status             [facility] Single       Admission Date Admission Type Admitting Provider Attending Provider Department, Room/Bed    2/3/22 Elective Duncan Lucio MD  [facility] 3C, 384/1    Discharge Date Discharge Disposition Discharge Destination          2/4/2022 Home or Self Care              Attending Provider: (none)   Allergies: No Known Allergies    Isolation: None   Infection: None   Code Status: Prior   Advance Care Planning Activity    Ht: 175 cm (68.9\")   Wt: 102 kg (225 lb 15.5 oz)    Admission Cmt: None   Principal Problem: Atherosclerosis of native artery of right lower extremity with intermittent claudication (HCC) [I70.211] More...                 Active Insurance as of 2/3/2022     Primary Coverage     Payor Plan Insurance Group Employer/Plan Group    AETNA MEDICARE REPLACEMENT AETNA MEDICARE REPLACEMENT [id number]     Payor Plan Address Payor Plan Phone Number Payor Plan Fax Number Effective Dates    [address] [phone]  1/1/2022 - None Entered    [facility] [id number]       Subscriber Name Subscriber Birth Date Member ID       EDY ONEILL 1955 [id number]           Secondary Coverage     Payor Plan Insurance Group Employer/Plan Group    HUMANA HUMANA [id number]     Payor Plan Address Payor Plan Phone Number Payor Plan Fax Number Effective Dates    [address] [phone]  1/1/2013 - None Entered    [facility] [address]       Subscriber Name Subscriber Birth Date Member ID       EDY ONEILL 1955 [id number]                 Emergency Contacts      (Rel.) Home Phone Work Phone Mobile Phone    GarlandRené (Relative) [phone] -- -- "               Discharge Summary      Duncan Lucio MD at 02/04/22 1204            Date of Discharge:  2/4/2022    Discharge Diagnosis: Peripheral arterial disease, lifestyle limiting right lower extremity claudication    Presenting Problem/History of Present Illness  Atherosclerosis of native artery of right lower extremity with intermittent claudication (HCC) [I70.211]  Carotid stenosis [I65.29]       Hospital Course  Patient is a 66 y.o. male who we are currently following for known peripheral arterial disease.  He has previously undergone left femoral endarterectomy as well as stenting of the left common iliac artery due to lifestyle limiting claudication.  More recently he returned for continued surveillance and was noted to have worsening claudication at very short distances in the right calf level.  Noninvasive arterial testing showed severe arterial insufficiency in the right lower extremity.  As such he was taken for angiogram of the right lower extremity about 2 weeks ago.  He was noted to have significant calcified plaque of the right common femoral artery and extending into the proximal SFA.  He also had significant tibial disease with main outflow via the peroneal with collateralization to a dorsalis pedis.  The AT and PT were both occluded proximally.  Given the significant involvement of the right common femoral artery his angiogram was only diagnostic and he was subsequently brought back now for open right femoral endarterectomy with right lower extremity angiogram and intervention on her right SFA stenosis.  This procedure was performed on 2/3.  He tolerated the procedure very well and was monitored overnight on 3C.  His pain was well controlled with oral meds.  This morning he is awake and alert and hemodynamically stable.  Labs are stable and creatinine is actually improved from a preop value of 2 down to 1.3 with hydration.  Hemoglobin is 7.9.  On exam he is sitting at the bedside in his  chair.  He has palpable dorsalis pedis pulses bilaterally.  His right groin incision site is dressing in place with minimal serosanguineous strikethrough.  He has a easily palpable right femoral pulse as well as a good left femoral pulse.  The left groin access site for angiogram also has a dressing in place and is soft with no evidence of hematoma.  At this point he is stable for discharge.  His Puckett will be removed and once he voids he will be cleared to go home.  He was given a prescription for pain medication and his Vinny was reviewed.  Otherwise his home meds will remain the same.  I did add Plavix 75 mg daily along with his 81 mg aspirin that he was already taking.  He was previously on cilostazol but this has been discontinued.  I will see him for follow-up in my office in 2 weeks.  All questions were answered.    Procedures Performed  Procedure(s):  RIGHT FEMORAL ENDARTERECTOMY, RIGHT LOWER EXTREMITY ANGIOGRAM, BALLOON ANGIOPLASTY       Consults:   Consults     No orders found for last 30 day(s).            Condition on Discharge: Stable    Discharge Medications     Discharge Medications      New Medications      Instructions Start Date   clopidogrel 75 MG tablet  Commonly known as: PLAVIX   75 mg, Oral, Daily   Start Date: February 5, 2022     oxyCODONE-acetaminophen  MG per tablet  Commonly known as: PERCOCET   1 tablet, Oral, Every 6 Hours PRN         Continue These Medications      Instructions Start Date   albuterol sulfate  (90 Base) MCG/ACT inhaler  Commonly known as: PROVENTIL HFA;VENTOLIN HFA;PROAIR HFA   2 puffs, Inhalation, 4 Times Daily PRN      amLODIPine 10 MG tablet  Commonly known as: NORVASC   10 mg, Oral, Daily      aspirin 81 MG EC tablet   81 mg, Oral, Daily      atorvastatin 40 MG tablet  Commonly known as: LIPITOR   1 tablet, Nightly      cyclobenzaprine 5 MG tablet  Commonly known as: FLEXERIL   5 mg, Oral, 3 Times Daily PRN      hydroCHLOROthiazide 25 MG  tablet  Commonly known as: HYDRODIURIL   12.5 mg, Oral, Daily      labetalol 100 MG tablet  Commonly known as: NORMODYNE   100 mg, Oral, 2 Times Daily      lisinopril 40 MG tablet  Commonly known as: PRINIVIL,ZESTRIL   40 mg, Oral, Daily      NovoLOG FlexPen 100 UNIT/ML solution pen-injector sc pen  Generic drug: insulin aspart   25 Units, Subcutaneous, 3 Times Daily With Meals, Sliding scale      pantoprazole 40 MG EC tablet  Commonly known as: PROTONIX   40 mg, Oral, Daily      sildenafil 100 MG tablet  Commonly known as: VIAGRA   50 mg, Oral, As Needed      tamsulosin 0.4 MG capsule 24 hr capsule  Commonly known as: FLOMAX   1 capsule, Oral, Daily      Trulicity 1.5 MG/0.5ML solution pen-injector  Generic drug: Dulaglutide   1.5 mg, Subcutaneous, Weekly         Stop These Medications    cilostazol 50 MG tablet  Commonly known as: PLETAL            Discharge Diet:   Diet Instructions     Advance Diet As Tolerated      Diet: Consistent Carbohydrate      Discharge Diet: Consistent Carbohydrate          Activity at Discharge:   Activity Instructions     Activity as Tolerated      Other Activity Instructions      Activity Instructions: No heavy lifting over 10 pounds and no strenuous activity until seen in the office in 2 weeks.          Follow-up Appointments  Future Appointments   Date Time Provider Department Center   4/18/2022 10:45 AM Aurelio Brandon APRN [facility] POD PAD PAD   7/5/2022 10:30 AM Aziza Soriano APRN [facility] CD PAD PAD     Additional Instructions for the Follow-ups that You Need to Schedule     Discharge Follow-up with Specified Provider: Vascular-Dr. Lucio; 2 Weeks   As directed      To: VascularNatasha Lucio    Follow Up: 2 Weeks                Duncan Lucio MD  02/04/22  12:04 CST                Electronically signed by Duncan Lucio MD at 02/04/22 1207       Discharge Order (From admission, onward)     Start     Ordered    02/04/22 1201  Discharge patient  Once        Expected  Discharge Date: 02/04/22    Discharge Disposition: Home or Self Care    Physician of Record for Attribution - Please select from Treatment Team: GLO CABRERA [[id number]]    Review needed by CMO to determine Physician of Record: No       Question Answer Comment   Physician of Record for Attribution - Please select from Treatment Team GLO CABRERA    Review needed by CMO to determine Physician of Record No        02/04/22 1204

## 2022-02-08 ENCOUNTER — READMISSION MANAGEMENT (OUTPATIENT)
Dept: CALL CENTER | Facility: HOSPITAL | Age: 67
End: 2022-02-08

## 2022-02-08 NOTE — OUTREACH NOTE
General Surgery Week 1 Survey      Responses   [facility] patient discharged from? [place]   Does the patient have one of the following disease processes/diagnoses(primary or secondary)? General Surgery   Week 1 attempt successful? No   Unsuccessful attempts Attempt 1          Van Villela RN

## 2022-02-10 ENCOUNTER — READMISSION MANAGEMENT (OUTPATIENT)
Dept: CALL CENTER | Facility: HOSPITAL | Age: 67
End: 2022-02-10

## 2022-02-10 NOTE — OUTREACH NOTE
General Surgery Week 1 Survey      Responses   [facility] patient discharged from? [place]   Does the patient have one of the following disease processes/diagnoses(primary or secondary)? General Surgery   Week 1 attempt successful? Yes   Call start time 1513   Call end time 1513   Discharge diagnosis Peripheral arterial disease, lifestyle limiting right lower extremity claudication   Is patient permission given to speak with other caregiver? Yes   List who call center can speak with René cousin    Person spoke with today (if not patient) and relationship René cousin    What is the patient's perception of their health status since discharge? Improving   Week 1 call completed? Yes   Wrap up additional comments pt unable to get a hold of - cousin reports he spoke with him a couple days ago and he was getting around ok          Tomasa Jenkins RN

## 2022-02-16 ENCOUNTER — READMISSION MANAGEMENT (OUTPATIENT)
Dept: CALL CENTER | Facility: HOSPITAL | Age: 67
End: 2022-02-16

## 2022-02-16 NOTE — OUTREACH NOTE
General Surgery Week 2 Survey      Responses   [facility] patient discharged from? [place]   Does the patient have one of the following disease processes/diagnoses(primary or secondary)? General Surgery   Week 2 attempt successful? Yes   Call start time 1538   Call end time 1545   Discharge diagnosis Peripheral arterial disease, lifestyle limiting right lower extremity claudication   Person spoke with today (if not patient) and relationship patient   Meds reviewed with patient/caregiver? Yes   Is the patient having any side effects they believe may be caused by any medication additions or changes? No   Does the patient have all medications related to this admission filled (includes all antibiotics, pain medications, etc.) Yes   Is the patient taking all medications as directed (includes completed medication regime)? Yes   Does the patient have a follow up appointment scheduled with their surgeon? Yes  [2/21/22]   Has the patient kept scheduled appointments due by today? N/A   Psychosocial issues? No   Did the patient receive a copy of their discharge instructions? Yes   Nursing interventions Reviewed instructions with patient   What is the patient's perception of their health status since discharge? Improving   Nursing interventions Nurse provided patient education   Is the patient /caregiver able to teach back basic post-op care? Continue use of incentive spirometry at least 1 week post discharge,  Take showers only when approved by MD-sponge bathe until then,  No tub bath, swimming, or hot tub until instructed by MD,  Keep incision areas clean,dry and protected,  Lifting as instructed by MD in discharge instructions   Is the patient/caregiver able to teach back signs and symptoms of incisional infection? Increased redness, swelling or pain at the incisonal site,  Increased drainage or bleeding,  Incisional warmth,  Pus or odor from incision,  Fever   Is the patient/caregiver able to teach back steps to  recovery at home? Rest and rebuild strength, gradually increase activity,  Eat a well-balance diet   If the patient is a current smoker, are they able to teach back resources for cessation? Not a smoker   Is the patient/caregiver able to teach back the hierarchy of who to call/visit for symptoms/problems? PCP, Specialist, Home health nurse, Urgent Care, ED, 911 Yes   Week 2 call completed? Yes   Wrap up additional comments Pt states he is doing better, and shares he feels like he has new right leg, Pt denies increased redness, swelling, or drainage at surgical site. Pt confirmed post-op appt on 2/21/22. Pt advised to make PCP fu appt. No questions/concerns.          Krystal Miles RN

## 2022-02-18 ENCOUNTER — TELEPHONE (OUTPATIENT)
Dept: VASCULAR SURGERY | Facility: CLINIC | Age: 67
End: 2022-02-18

## 2022-02-21 ENCOUNTER — OFFICE VISIT (OUTPATIENT)
Dept: VASCULAR SURGERY | Facility: CLINIC | Age: 67
End: 2022-02-21

## 2022-02-21 VITALS
WEIGHT: 219 LBS | SYSTOLIC BLOOD PRESSURE: 150 MMHG | OXYGEN SATURATION: 96 % | RESPIRATION RATE: 18 BRPM | HEART RATE: 98 BPM | DIASTOLIC BLOOD PRESSURE: 76 MMHG | BODY MASS INDEX: 32.44 KG/M2 | HEIGHT: 69 IN

## 2022-02-21 DIAGNOSIS — I10 ESSENTIAL HYPERTENSION: Chronic | ICD-10-CM

## 2022-02-21 DIAGNOSIS — E78.2 MIXED HYPERLIPIDEMIA: Chronic | ICD-10-CM

## 2022-02-21 DIAGNOSIS — I73.9 PERIPHERAL ARTERIAL DISEASE WITH HISTORY OF REVASCULARIZATION: Primary | ICD-10-CM

## 2022-02-21 DIAGNOSIS — Z98.890 PERIPHERAL ARTERIAL DISEASE WITH HISTORY OF REVASCULARIZATION: Primary | ICD-10-CM

## 2022-02-21 DIAGNOSIS — Z79.4 TYPE 2 DIABETES MELLITUS WITH STAGE 3A CHRONIC KIDNEY DISEASE, WITH LONG-TERM CURRENT USE OF INSULIN: Chronic | ICD-10-CM

## 2022-02-21 DIAGNOSIS — N18.31 STAGE 3A CHRONIC KIDNEY DISEASE: Chronic | ICD-10-CM

## 2022-02-21 DIAGNOSIS — E11.22 TYPE 2 DIABETES MELLITUS WITH STAGE 3A CHRONIC KIDNEY DISEASE, WITH LONG-TERM CURRENT USE OF INSULIN: Chronic | ICD-10-CM

## 2022-02-21 DIAGNOSIS — N18.31 TYPE 2 DIABETES MELLITUS WITH STAGE 3A CHRONIC KIDNEY DISEASE, WITH LONG-TERM CURRENT USE OF INSULIN: Chronic | ICD-10-CM

## 2022-02-21 PROCEDURE — 99024 POSTOP FOLLOW-UP VISIT: CPT | Performed by: SURGERY

## 2022-02-21 NOTE — PATIENT INSTRUCTIONS
"BMI for Adults  What is BMI?  Body mass index (BMI) is a number that is calculated from a person's weight and height. BMI can help estimate how much of a person's weight is composed of fat. BMI does not measure body fat directly. Rather, it is an alternative to procedures that directly measure body fat, which can be difficult and expensive.  BMI can help identify people who may be at higher risk for certain medical problems.  What are BMI measurements used for?  BMI is used as a screening tool to identify possible weight problems. It helps determine whether a person is obese, overweight, a healthy weight, or underweight.  BMI is useful for:  · Identifying a weight problem that may be related to a medical condition or may increase the risk for medical problems.  · Promoting changes, such as changes in diet and exercise, to help reach a healthy weight. BMI screening can be repeated to see if these changes are working.  How is BMI calculated?  BMI involves measuring your weight in relation to your height. Both height and weight are measured, and the BMI is calculated from those numbers. This can be done either in English (U[place] or metric measurements. Note that charts and online BMI calculators are available to help you find your BMI quickly and easily without having to do these calculations yourself.  To calculate your BMI in English ([place] measurements:    1. Measure your weight in pounds (lb).  2. Multiply the number of pounds by 703.  ? For example, for a person who weighs 180 lb, multiply that number by 703, which equals 126,540.  3. Measure your height in inches. Then multiply that number by itself to get a measurement called \"inches squared.\"  ? For example, for a person who is 70 inches tall, the \"inches squared\" measurement is 70 inches x 70 inches, which equals 4,900 inches squared.  4. Divide the total from step 2 (number of lb x 703) by the total from step 3 (inches squared): 126,540 ÷ 4,900 = 25.8. This is " "your BMI.    To calculate your BMI in metric measurements:  1. Measure your weight in kilograms (kg).  2. Measure your height in meters (m). Then multiply that number by itself to get a measurement called \"meters squared.\"  ? For example, for a person who is 1.75 m tall, the \"meters squared\" measurement is 1.75 m x 1.75 m, which is equal to 3.1 meters squared.  3. Divide the number of kilograms (your weight) by the meters squared number. In this example: 70 ÷ 3.1 = 22.6. This is your BMI.  What do the results mean?  BMI charts are used to identify whether you are underweight, normal weight, overweight, or obese. The following guidelines will be used:  · Underweight: BMI less than 18.5.  · Normal weight: BMI between 18.5 and 24.9.  · Overweight: BMI between 25 and 29.9.  · Obese: BMI of 30 or above.  Keep these notes in mind:  · Weight includes both fat and muscle, so someone with a muscular build, such as an athlete, may have a BMI that is higher than 24.9. In cases like these, BMI is not an accurate measure of body fat.  · To determine if excess body fat is the cause of a BMI of 25 or higher, further assessments may need to be done by a health care provider.  · BMI is usually interpreted in the same way for men and women.  Where to find more information  For more information about BMI, including tools to quickly calculate your BMI, go to these websites:  · Centers for Disease Control and Prevention: www.cdc.gov  · American Heart Association: www.heart.org  · National Heart, Lung, and Blood [place]: www.nhlbi.nih.gov  Summary  · Body mass index (BMI) is a number that is calculated from a person's weight and height.  · BMI may help estimate how much of a person's weight is composed of fat. BMI can help identify those who may be at higher risk for certain medical problems.  · BMI can be measured using English measurements or metric measurements.  · BMI charts are used to identify whether you are underweight, normal " weight, overweight, or obese.  This information is not intended to replace advice given to you by your health care provider. Make sure you discuss any questions you have with your health care provider.  Document Revised: 09/09/2020 Document Reviewed: 07/17/2020  Elsevier Patient Education © 2021 [employer]

## 2022-02-21 NOTE — PROGRESS NOTES
02/21/2022      Mark Villela MD  [address] [place] [address] [address]  [place] [address]        Edy Cosby  1955    Chief Complaint   Patient presents with   • Post-op     2 week Post-op follow-up for RIGHT FEMORAL ENDARTERECTOMY, RIGHT LOWER EXTREMITY ANGIOGRAM, BALLOON ANGIOPLASTY.  Pt states he is able to walk without pain since prodedure.  Pt denies any stroke-like symptoms.       Dear Mark Villela MD:    HPI     I had the pleasure of seeing your patient in the office today for follow up.  As you recall, the patient is a 66 y.o. male who we are currently following for peripheral arterial disease.  He returns after having undergone recent right common femoral endarterectomy as well as concomitant angiogram of the right lower extremity with angioplasty of a mid SFA stenosis.  He tolerated this procedure very well and was discharged home on postoperative day #1.  He notes that since the procedure the claudication in his right lower extremity has completely resolved and he is able to ambulate much more easily and is very pleased.  He denies any significant pain at the incision site in his right groin.  He continues on aspirin, Plavix, and statin as previous.  He otherwise has no new acute complaints today.      Review of Systems   Constitutional: Negative.  Negative for activity change, appetite change, chills and fever.   HENT: Negative.  Negative for congestion, sneezing and sore throat.    Eyes: Negative.    Respiratory: Negative.  Negative for chest tightness and shortness of breath.    Cardiovascular: Negative.  Negative for chest pain, palpitations and leg swelling.   Gastrointestinal: Negative.  Negative for abdominal distention, abdominal pain, nausea and vomiting.   Endocrine: Negative.    Genitourinary: Negative.    Musculoskeletal: Negative.         Right lower extremity claudication is resolved.   Skin: Negative.    Allergic/Immunologic: Negative.    Neurological: Negative.    Hematological:  "Negative.    Psychiatric/Behavioral: Negative.        /76 (BP Location: Left arm, Patient Position: Sitting, Cuff Size: Adult)   Pulse 98   Resp 18   Ht 175 cm (68.9\")   Wt 99.3 kg (219 lb)   SpO2 96%   BMI 32.43 kg/m²   Physical Exam  Vitals reviewed.   Constitutional:       Appearance: Normal appearance.   HENT:      Head: Normocephalic and atraumatic.      Nose: Nose normal.      Mouth/Throat:      Mouth: Mucous membranes are moist.   Eyes:      Extraocular Movements: Extraocular movements intact.      Pupils: Pupils are equal, round, and reactive to light.   Cardiovascular:      Rate and Rhythm: Normal rate. Rhythm irregularly irregular.      Pulses:           Carotid pulses are 2+ on the right side and 2+ on the left side.       Radial pulses are 2+ on the right side and 2+ on the left side.        Femoral pulses are 2+ on the right side and 2+ on the left side.       Dorsalis pedis pulses are 1+ on the right side and 1+ on the left side.        Posterior tibial pulses are detected w/ Doppler on the right side and detected w/ Doppler on the left side.      Comments: Both feet are warm.  He has no wounds.    Right groin incision from recent femoral endarterectomy is healing well.  There is no evidence of any significant hematoma or infection.  The left groin access site from angiogram at that same time is also well-healed with no evidence of any hematoma or infection.  Pulmonary:      Effort: Pulmonary effort is normal. No respiratory distress.   Abdominal:      General: There is no distension.      Palpations: Abdomen is soft. There is no mass.      Tenderness: There is no abdominal tenderness.   Musculoskeletal:         General: Normal range of motion.      Cervical back: Normal range of motion and neck supple.      Right lower leg: No edema.      Left lower leg: No edema.      Comments: He has known chronic foot drop of the left foot and wears a brace for this.   Skin:     General: Skin is warm " and dry.      Capillary Refill: Capillary refill takes 2 to 3 seconds.   Neurological:      General: No focal deficit present.      Mental Status: He is alert and oriented to person, place, and time.   Psychiatric:         Mood and Affect: Mood normal.         Behavior: Behavior normal.         Thought Content: Thought content normal.         Judgment: Judgment normal.         DIAGNOSTIC DATA:    IR Angiogram Extremity, FL C Arm During Surgery    Result Date: 2/5/2022  Narrative: Performed by Dr. Lucio. Please see procedure note. This report was finalized on 02/05/2022 09:49 by Dr. Duncan Lucio MD.      Patient Active Problem List   Diagnosis   • Spinal stenosis of lumbar region   • Back pain   • Degeneration of intervertebral disc of lumbosacral region   • Essential hypertension   • Lumbar disc herniation with radiculopathy   • Type 2 diabetes mellitus with stage 3a chronic kidney disease, with long-term current use of insulin ([facility])   • Constipation due to opioid therapy   • Gastroesophageal reflux disease without esophagitis   • Panlobular emphysema ([facility])   • Paroxysmal atrial fibrillation ([facility])   • Hx of colonic polyps   • Class 2 severe obesity due to excess calories with serious comorbidity and body mass index (BMI) of 35.0 to 35.9 in adult ([facility])   • Atherosclerosis 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 ([facility])   • H/O diabetic foot ulcer   • Anemia   • Stage 3a chronic kidney disease ([facility])   • Diabetic neuropathy ([facility])   • Difficulty with BiPAP use   • Impotence   • Insomnia   • Left foot drop   • Lumbosacral radiculitis   • Obstructive sleep apnea   • Mixed hyperlipidemia   • PVD (peripheral vascular disease) with claudication ([facility])   • Vocal cord paralysis   • Vocal cord paresis   • Atherosclerosis of native artery of right lower extremity with intermittent claudication ([facility])   • Carotid stenosis         ICD-10-CM ICD-9-CM   1. Peripheral arterial disease with history of revascularization  ([facility])  I73.9 443.9    Z98.890 V45.89   2. Essential hypertension  I10 401.9   3. Mixed hyperlipidemia  E78.2 272.2   4. Stage 3a chronic kidney disease ([facility])  N18.31 585.3   5. Type 2 diabetes mellitus with stage 3a chronic kidney disease, with long-term current use of insulin ([facility])  E11.22 250.40    N18.31 585.3    Z79.4 V58.67       Lab Frequency Next Occurrence   Follow Anesthesia Guidelines / Standing Orders Once 12/07/2018   Obtain Informed Consent Once 12/12/2018   Follow Anesthesia Guidelines / Standing Orders Once 12/17/2018   Obtain Informed Consent Once 12/22/2018   Follow Anesthesia Guidelines / Standing Orders Once 01/21/2019   Obtain Informed Consent Once 01/26/2019   Provide NPO Instructions to Patient Once 01/26/2019   Follow Anesthesia Guidelines / Protocol Once 11/19/2021   Obtain Informed Consent Once 11/24/2021   Provide NPO Instructions to Patient Once 11/24/2021   Chlorhexidine Skin Prep Once 11/24/2021   Follow Anesthesia Guidelines / Protocol Once 01/17/2022   Obtain Informed Consent Once 01/22/2022   US Ankle / Brachial Indices Extremity Complete Once 05/21/2022       PLAN: After thoroughly evaluating Edy Cosby, I believe the best course of action is to remain conservative from a vascular standpoint.  Overall he is done very well after his recent right femoral endarterectomy with concomitant right lower extremity angiogram and intervention.  He notes that the claudication in the right lower extremity has basically resolved since the procedure and he is very pleased.  He is able to ambulate much more easily with no complaints.  His right groin incision is healing very well.  At this point he can return to normal activities with no restrictions.  I will plan to see him back in the office in 3 months with repeat NIC/PVR for continued surveillance.  The patient is to continue taking their medications as previously discussed.   I did discuss vascular risk factors as they pertain to the  progression of vascular disease including controlling diabetes, hypertension, and hyperlipidemia. These factors remain stable. Patient's Body mass index is 32.43 kg/m². indicating that he is obese (BMI >30). Obesity-related health conditions include the following: hypertension, diabetes mellitus, dyslipidemias and peripheral vascular disease. Obesity is unchanged. BMI is is above average; BMI management plan is completed. We discussed portion control and increasing exercise. This was all discussed in full with complete understanding.  Thank you for allowing me to participate in the care of your patient.  Please do not hesitate to call with any questions or concerns.  We will keep you aware of any further encounters with Edy Cosby.      Sincerely Yours,      Duncan Lucio MD

## 2022-02-24 ENCOUNTER — READMISSION MANAGEMENT (OUTPATIENT)
Dept: CALL CENTER | Facility: HOSPITAL | Age: 67
End: 2022-02-24

## 2022-02-24 NOTE — OUTREACH NOTE
General Surgery Week 3 Survey      Responses   [facility] patient discharged from? [place]   Does the patient have one of the following disease processes/diagnoses(primary or secondary)? General Surgery   Week 3 attempt successful? Yes   Call start time [id number]   Call end time 1700   Discharge diagnosis Peripheral arterial disease, lifestyle limiting right lower extremity claudication   Meds reviewed with patient/caregiver? Yes   Is the patient having any side effects they believe may be caused by any medication additions or changes? No   Does the patient have all medications related to this admission filled (includes all antibiotics, pain medications, etc.) Yes   Has the patient kept scheduled appointments due by today? Yes   Comments Seen by PCP last week.    Psychosocial issues? No   Did the patient receive a copy of their discharge instructions? Yes   What is the patient's perception of their health status since discharge? Improving   Is the patient/caregiver able to teach back the hierarchy of who to call/visit for symptoms/problems? PCP, Specialist, Home health nurse, Urgent Care, ED, 911 Yes   Week 3 call completed? Yes   Wrap up additional comments States is doing great,  says incisions look go with no redness, swelling or drainage.          Amy Ramirez RN

## 2022-02-24 NOTE — OUTREACH NOTE
General Surgery Week 4 Survey      Responses   [facility] patient discharged from? [place]   Does the patient have one of the following disease processes/diagnoses(primary or secondary)? General Surgery   Week 4 attempt successful? No          Maria Guadalupe Carey RN

## 2022-02-25 ENCOUNTER — TELEPHONE (OUTPATIENT)
Dept: PRIMARY CARE CLINIC | Age: 67
End: 2022-02-25

## 2022-02-25 NOTE — TELEPHONE ENCOUNTER
Dr. Tyler Sandhu called from the [facility] clinic pharmacists      -GFR- 40mL/min and is stable but a little lower and nephrology consult has been put in for him at the [place]  -CPK- elevated because of a statin at 348 Units/Liter  -Cholesterol is normal  -Triglycerides were normal  -Lipitor is being decreased to 20mg   -Elevated uric acid- low dose allopurinol 50mg and check in 1 month  -Iron, vit d, folic were low so those supplements are being added to his daily   -A1C is 7.4  -Protein urea is elevated slightly. Monitor lisinopril and check in a month    Her phone number is [phone] if you would like to contact her with any questions. She wanted you to be aware of these changes.

## 2022-03-02 DIAGNOSIS — M79.18 ABDOMINAL MUSCLE PAIN: ICD-10-CM

## 2022-03-02 RX ORDER — CYCLOBENZAPRINE HCL 5 MG
TABLET ORAL
Qty: 90 TABLET | Refills: 0 | Status: SHIPPED | OUTPATIENT
Start: 2022-03-02 | End: 2022-04-03

## 2022-04-02 DIAGNOSIS — M79.18 ABDOMINAL MUSCLE PAIN: ICD-10-CM

## 2022-04-03 RX ORDER — CYCLOBENZAPRINE HCL 5 MG
TABLET ORAL
Qty: 90 TABLET | Refills: 5 | Status: SHIPPED | OUTPATIENT
Start: 2022-04-03 | End: 2022-07-26 | Stop reason: SDUPTHER

## 2022-04-05 DIAGNOSIS — I10 ESSENTIAL HYPERTENSION: ICD-10-CM

## 2022-04-05 RX ORDER — LISINOPRIL 40 MG/1
TABLET ORAL
Qty: 90 TABLET | Refills: 0 | Status: SHIPPED | OUTPATIENT
Start: 2022-04-05 | End: 2022-04-11

## 2022-04-10 DIAGNOSIS — I10 ESSENTIAL HYPERTENSION: ICD-10-CM

## 2022-04-11 RX ORDER — LISINOPRIL 40 MG/1
TABLET ORAL
Qty: 90 TABLET | Refills: 1 | Status: SHIPPED | OUTPATIENT
Start: 2022-04-11 | End: 2022-07-26 | Stop reason: SDUPTHER

## 2022-04-11 NOTE — TELEPHONE ENCOUNTER
Gerardotanna Delisa called to request a refill on his medication.       Last office visit : 1/20/2022   Next office visit : 7/20/2022     Requested Prescriptions     Pending Prescriptions Disp Refills    lisinopril (PRINIVIL;ZESTRIL) 40 MG tablet [Pharmacy Med Name: Lisinopril 40 MG Oral Tablet] 90 tablet 0     Sig: Take 1 tablet by mouth once daily            Connie Llanos MA

## 2022-04-18 ENCOUNTER — OFFICE VISIT (OUTPATIENT)
Dept: PODIATRY | Facility: CLINIC | Age: 67
End: 2022-04-18

## 2022-04-18 VITALS
SYSTOLIC BLOOD PRESSURE: 138 MMHG | DIASTOLIC BLOOD PRESSURE: 60 MMHG | HEIGHT: 69 IN | HEART RATE: 83 BPM | OXYGEN SATURATION: 98 % | BODY MASS INDEX: 32.44 KG/M2 | WEIGHT: 219 LBS

## 2022-04-18 DIAGNOSIS — Z79.02 ANTIPLATELET OR ANTITHROMBOTIC LONG-TERM USE: ICD-10-CM

## 2022-04-18 DIAGNOSIS — Z79.4 TYPE 2 DIABETES MELLITUS WITH DIABETIC POLYNEUROPATHY, WITH LONG-TERM CURRENT USE OF INSULIN: ICD-10-CM

## 2022-04-18 DIAGNOSIS — B35.1 ONYCHOMYCOSIS: Primary | ICD-10-CM

## 2022-04-18 DIAGNOSIS — E66.9 CLASS 1 OBESITY WITH BODY MASS INDEX (BMI) OF 32.0 TO 32.9 IN ADULT, UNSPECIFIED OBESITY TYPE, UNSPECIFIED WHETHER SERIOUS COMORBIDITY PRESENT: ICD-10-CM

## 2022-04-18 DIAGNOSIS — E11.9 ENCOUNTER FOR DIABETIC FOOT EXAM: ICD-10-CM

## 2022-04-18 DIAGNOSIS — M21.372 FOOT DROP, LEFT: ICD-10-CM

## 2022-04-18 DIAGNOSIS — E11.42 TYPE 2 DIABETES MELLITUS WITH DIABETIC POLYNEUROPATHY, WITH LONG-TERM CURRENT USE OF INSULIN: ICD-10-CM

## 2022-04-18 PROCEDURE — 99213 OFFICE O/P EST LOW 20 MIN: CPT | Performed by: NURSE PRACTITIONER

## 2022-04-18 PROCEDURE — 11721 DEBRIDE NAIL 6 OR MORE: CPT | Performed by: NURSE PRACTITIONER

## 2022-04-18 NOTE — PROGRESS NOTES
[facility] - PODIATRY    Today's Date: 04/18/22    Patient Name: Edy Cosby  MRN: [id number]  CSN: [id number]  PCP: Mark Villela MD  Referring Provider: No ref. provider found    SUBJECTIVE     Chief Complaint   Patient presents with   • Follow-up     Mark Villela MD 03/22/2022 3 MO FOLLOW UP diabetic foot/nail care- pt states feet doing good- pt denies pain- pt presents with long thick nails   • Diabetes     Hasnt checked     HPI: Edy Cosby, a 67 y.o.male, comes to clinic as a(n) established patient presenting for diabetic foot exam and complaining of thickened toenails. Patient has h/o AFib, back pain, DM2, Emphysema lung, GERD, HTN, OA. Patient is IDDM with last stated BG level of 127mg/dl.  Patient states that he does not check blood glucose recently.  Relates neuropathy in feet with numbness and tingling.  Continues use of AFO for foot drop of left foot..  Patient states that toenails are long, thick, and irregular and that he is unable to care for them himself.  Denies open wound or sores. Denies pain today. Relates previous treatment(s) including foot care by podiatrist. Denies any constitutional symptoms. No other pedal complaints at this time.    Past Medical History:   Diagnosis Date   • A-fib (HCC)    • Atherosclerosis    • Chronic back pain    • Constipation    • Diabetes mellitus (HCC)     TYPE II   • Dropfoot     wears brace   • Dysphagia    • Emphysema of lung (HCC)    • Gastroparesis    • GERD (gastroesophageal reflux disease)    • Hyperlipidemia    • Hypertension     ESSENTIAL   • Leg pain    • Muscle spasm    • Nerve pain    • Osteoarthritis    • Sleep apnea     no cpap/bipap (non-compliance)   • Sleeping difficulty    • Ulcer of toe due to diabetes mellitus (HCC)      Past Surgical History:   Procedure Laterality Date   • ANGIOPLASTY ILIAC ARTERY Left 1/29/2019    Procedure: ANGIOPLASTY ILIAC ARTERY, STENT PLACEMENT;  Surgeon: Duncan Lucio MD;   Location:  [facility] HYBRID OR 12;  Service: Vascular   • AORTAGRAM Left 12/18/2018    Procedure: AORTOGRAM, LEFT LEG ANGIOGRAM, MYNX CLOSURE;  Surgeon: Duncan Lucio MD;  Location:  [facility] OR 12;  Service: Vascular   • AORTAGRAM Right 12/16/2021    Procedure: AORTAGRAM, RIGHT LOWER EXTREMITY ANGIOGRAM, POSSIBLE INTERVENTION;  Surgeon: Duncan Lucio MD;  Location:  [facility]BRID OR 12;  Service: Vascular;  Laterality: Right;   • BACK SURGERY     • CHOLECYSTECTOMY     • COLONOSCOPY  09/01/2011    TICS RECALL 5YR   • COLONOSCOPY  09/30/2008    ENTER RESULTS: STOOL THROUGHTOUT THE COLON POOR PREP REC 3 YEAR RECALL   • COLONOSCOPY N/A 11/9/2016    Procedure: COLONOSCOPY;  Surgeon: León Zepeda MD;  Location: [facility] ENDOSCOPY;  Service:    • COLONOSCOPY N/A 4/19/2018    Procedure: COLONOSCOPY WITH ANESTHESIA;  Surgeon: León Zepeda MD;  Location: [facility] ENDOSCOPY;  Service: Gastroenterology   • ENDOSCOPY  06/19/2012    HH   • ENDOSCOPY  08/25/2009    HIATAL HERNIA, DILATED WITH 50 FR MASCORRO   • ENDOSCOPY  06/19/2007    WITHIN NORMAL LIMITS UREA NEG   • FEMORAL ENDARTERECTOMY Left 1/29/2019    Procedure: LEFT FEMORAL ENDARTERECTOMY;  Surgeon: Duncan Lucio MD;  Location: [facility] HYBRID OR 12;  Service: Vascular   • FEMORAL ENDARTERECTOMY Right 2/3/2022    Procedure: RIGHT FEMORAL ENDARTERECTOMY, RIGHT LOWER EXTREMITY ANGIOGRAM, BALLOON ANGIOPLASTY;  Surgeon: Duncan Lucio MD;  Location: [facility] HYBRID OR 12;  Service: Vascular;  Laterality: Right;   • LUMBAR LAMINECTOMY WITH FUSION N/A 1/23/2017    Procedure: REMOVAL OF INSTRUMENTATION, EXPLORATION OF FUSION L4-S1, REVISION LEFT L5-S1 HEMILAMINECTOMY, FACETECTOMY DECOMPRESSION, REVISION UNINSTRUMENTED POSTERIOR SPINAL FUSION L5-S1;  Surgeon: RHONDA Lopes MD;  Location: [facility] OR;  Service:    • OTHER SURGICAL HISTORY      LUMBAR SACRAL SURGERY WITH FUSION X2   • PILONIDAL CYST / SINUS EXCISION      PILONIDAL CYST REMOVAL      Family History   Problem Relation Age of Onset   • Heart attack Father    • Diabetes Brother    • Colon polyps Sister    • Stomach cancer Neg Hx         GI CNACERS OR DISEASE   • Colon cancer Neg Hx      Social History     Socioeconomic History   • Marital status: Single   Tobacco Use   • Smoking status: Former Smoker     Years: 30.00     Types: Cigarettes     Quit date: 2019     Years since quitting: 3.2   • Smokeless tobacco: Never Used   Vaping Use   • Vaping Use: Never used   Substance and Sexual Activity   • Alcohol use: No   • Drug use: Yes     Frequency: 4.0 times per week     Types: Marijuana   • Sexual activity: Defer     No Known Allergies  Current Outpatient Medications   Medication Sig Dispense Refill   • albuterol sulfate  (90 Base) MCG/ACT inhaler Inhale 2 puffs 4 (Four) Times a Day As Needed.     • amLODIPine (NORVASC) 10 MG tablet Take 10 mg by mouth Daily.     • aspirin 81 MG EC tablet Take 81 mg by mouth Daily.     • atorvastatin (LIPITOR) 40 MG tablet 1 tablet Every Night.     • clopidogrel (PLAVIX) 75 MG tablet Take 1 tablet by mouth Daily. 30 tablet 5   • cyclobenzaprine (FLEXERIL) 5 MG tablet Take 5 mg by mouth 3 (Three) Times a Day As Needed for Muscle Spasms.     • Dulaglutide (Trulicity) 1.5 MG/0.5ML solution pen-injector Inject 1.5 mg under the skin into the appropriate area as directed 1 (One) Time Per Week.     • hydroCHLOROthiazide (HYDRODIURIL) 25 MG tablet Take 12.5 mg by mouth Daily.     • labetalol (NORMODYNE) 100 MG tablet Take 100 mg by mouth 2 (Two) Times a Day.     • lisinopril (PRINIVIL,ZESTRIL) 40 MG tablet Take 40 mg by mouth Daily.     • pantoprazole (PROTONIX) 40 MG EC tablet Take 40 mg by mouth Daily.     • sildenafil (VIAGRA) 100 MG tablet Take 50 mg by mouth As Needed for Erectile Dysfunction.     • tamsulosin (FLOMAX) 0.4 MG capsule 24 hr capsule Take 1 capsule by mouth Daily.       No current facility-administered medications for this visit.     Review of  Systems   Constitutional: Negative for chills and fever.   HENT: Negative for congestion.    Respiratory: Negative for shortness of breath.    Cardiovascular: Negative for chest pain and leg swelling.   Gastrointestinal: Negative for constipation, diarrhea, nausea and vomiting.   Musculoskeletal: Positive for gait problem (foot drop).   Skin: Negative for wound.   Neurological: Positive for numbness.   Hematological: Bruises/bleeds easily.       OBJECTIVE     Vitals:    04/18/22 1051   BP: 138/60   Pulse: 83   SpO2: 98%       PHYSICAL EXAM  GEN:   Accompanied by none.     Foot/Ankle Exam:       General:   Diabetic Foot Exam Performed    Appearance: appears stated age and healthy and obesity    Orientation: AAOx3    Affect: appropriate    Gait: steppage    Gait comment:  Drop foot left  Assistance: independent    Shoe Gear:  Diabetic shoes (Left AFO)    VASCULAR      Right Foot Vascularity   Dorsalis pedis:  2+  Posterior tibial:  2+  Skin Temperature: warm    Edema Grading:  Trace and non-pitting  CFT:  3  Pedal Hair Growth:  Present  Varicosities: none       Left Foot Vascularity   Dorsalis pedis:  2+  Posterior tibial:  2+  Skin Temperature: warm    Edema Grading:  Trace and non-pitting  CFT:  3  Pedal Hair Growth:  Present  Varicosities: none        NEUROLOGIC     Right Foot Neurologic   Light touch sensation:  Diminished  Vibratory sensation:  Diminished  Hot/Cold sensation: diminished    Protective Sensation using Gresham-Mirtha Monofilament:  0     Left Foot Neurologic   Light touch sensation:  Diminished  Vibratory sensation:  Diminished  Hot/cold sensation: diminished    Protective Sensation using Gresham-Mirtha Monofilament:  0     MUSCULOSKELETAL      Right Foot Musculoskeletal   Ecchymosis:  None  Tenderness: none    Arch:  Pes planus  Hallux valgus: No    Hallux limitus: No       Left Foot Musculoskeletal   Ecchymosis:  None  Tenderness: none    Arch:  Pes planus  Hallux valgus: No    Hallux limitus:  No       MUSCLE STRENGTH     Right Foot Muscle Strength   Foot dorsiflexion:  5  Foot plantar flexion:  5  Foot inversion:  5  Foot eversion:  5     Left Foot Muscle Strength   Foot dorsiflexion:  2  Foot plantar flexion:  3  Foot inversion:  3  Foot eversion:  3     RANGE OF MOTION      Right Foot Range of Motion   Foot and ankle ROM within normal limits       Left Foot Range of Motion   Foot and ankle ROM within normal limits       DERMATOLOGIC     Right Foot Dermatologic   Skin: skin intact    Skin: no right foot skin dryness and no right foot tinea    Nails: onychomycosis, abnormally thick, subungual debris and dystrophic nails       Left Foot Dermatologic   Skin: skin intact    Skin: no left foot skin dryness and no left foot tinea    Nails: onychomycosis, abnormally thick, subungual debris and dystrophic nails        RADIOLOGY/NUCLEAR:  No results found.    LABORATORY/CULTURE RESULTS:      PATHOLOGY RESULTS:       ASSESSMENT/PLAN     Diagnoses and all orders for this visit:    1. Onychomycosis (Primary)    2. Type 2 diabetes mellitus with diabetic polyneuropathy, with long-term current use of insulin (HCC)    3. Foot drop, left    4. Antiplatelet or antithrombotic long-term use    5. Encounter for diabetic foot exam ([facility])    6. Class 1 obesity with body mass index (BMI) of 32.0 to 32.9 in adult, unspecified obesity type, unspecified whether serious comorbidity present      Comprehensive lower extremity examination and evaluation was performed.  Discussed findings and treatment plan including risks, benefits, and treatment options with patient in detail. Patient agreed with treatment plan.  Diabetic foot exam performed  After verbal consent obtained, nail(s) x10 debrided of length and thickness with nail nipper without incidence  Patient may maintain nails and calluses at home utilizing emery board or pumice stone between visits as needed  Reviewed at home diabetic foot care including daily foot checks    Continue AFO for foot drop.   Continue diabetic monitoring and control under direction of PCP.    Patient's Body mass index is 32.43 kg/m². indicating that he is obese (BMI >30). Obesity-related health conditions include the following: diabetes mellitus. Obesity is improving with lifestyle modifications. BMI is is above average; BMI management plan is completed. We discussed portion control and increasing exercise..  An After Visit Summary was printed and given to the patient at discharge, including (if requested) any available informative/educational handouts regarding diagnosis, treatment, or medications. All questions were answered to patient/family satisfaction. Should symptoms fail to improve or worsen they agree to call or return to clinic or to go to the Emergency Department. Discussed the importance of following up with any needed screening tests/labs/specialist appointments and any requested follow-up recommended by me today. Importance of maintaining follow-up discussed and patient accepts that missed appointments can delay diagnosis and potentially lead to worsening of conditions.  Return in about 3 months (around 7/18/2022)., or sooner if acute issues arise.    Lab Frequency Next Occurrence   Diet: Once 04/19/2018   Advance Diet as Tolerated Once 04/19/2018       This document has been electronically signed by PRATIMA Esteves on April 18, 2022 12:29 CDT

## 2022-05-23 ENCOUNTER — HOSPITAL ENCOUNTER (OUTPATIENT)
Dept: ULTRASOUND IMAGING | Facility: HOSPITAL | Age: 67
Discharge: HOME | End: 2022-05-23
Admitting: SURGERY

## 2022-05-23 ENCOUNTER — OFFICE VISIT (OUTPATIENT)
Dept: VASCULAR SURGERY | Facility: CLINIC | Age: 67
End: 2022-05-23

## 2022-05-23 VITALS
WEIGHT: 214 LBS | OXYGEN SATURATION: 95 % | RESPIRATION RATE: 18 BRPM | HEIGHT: 69 IN | DIASTOLIC BLOOD PRESSURE: 76 MMHG | SYSTOLIC BLOOD PRESSURE: 142 MMHG | HEART RATE: 96 BPM | BODY MASS INDEX: 31.7 KG/M2

## 2022-05-23 DIAGNOSIS — I73.9 PERIPHERAL ARTERIAL DISEASE WITH HISTORY OF REVASCULARIZATION: ICD-10-CM

## 2022-05-23 DIAGNOSIS — Z79.4 TYPE 2 DIABETES MELLITUS WITH HYPERGLYCEMIA, WITH LONG-TERM CURRENT USE OF INSULIN: ICD-10-CM

## 2022-05-23 DIAGNOSIS — E11.65 TYPE 2 DIABETES MELLITUS WITH HYPERGLYCEMIA, WITH LONG-TERM CURRENT USE OF INSULIN: ICD-10-CM

## 2022-05-23 DIAGNOSIS — R09.89 BILATERAL CAROTID BRUITS: ICD-10-CM

## 2022-05-23 DIAGNOSIS — E78.2 MIXED HYPERLIPIDEMIA: Chronic | ICD-10-CM

## 2022-05-23 DIAGNOSIS — I73.9 PERIPHERAL ARTERIAL DISEASE WITH HISTORY OF REVASCULARIZATION: Primary | ICD-10-CM

## 2022-05-23 DIAGNOSIS — I10 ESSENTIAL HYPERTENSION: Chronic | ICD-10-CM

## 2022-05-23 DIAGNOSIS — Z98.890 PERIPHERAL ARTERIAL DISEASE WITH HISTORY OF REVASCULARIZATION: ICD-10-CM

## 2022-05-23 DIAGNOSIS — Z98.890 PERIPHERAL ARTERIAL DISEASE WITH HISTORY OF REVASCULARIZATION: Primary | ICD-10-CM

## 2022-05-23 PROBLEM — N40.0 BENIGN PROSTATIC HYPERPLASIA WITHOUT URINARY OBSTRUCTION: Status: ACTIVE | Noted: 2022-05-23

## 2022-05-23 PROBLEM — E55.9 VITAMIN D DEFICIENCY: Status: ACTIVE | Noted: 2022-05-23

## 2022-05-23 PROCEDURE — 93923 UPR/LXTR ART STDY 3+ LVLS: CPT | Performed by: SURGERY

## 2022-05-23 PROCEDURE — 93923 UPR/LXTR ART STDY 3+ LVLS: CPT

## 2022-05-23 PROCEDURE — 99214 OFFICE O/P EST MOD 30 MIN: CPT | Performed by: SURGERY

## 2022-05-23 RX ORDER — ERGOCALCIFEROL 1.25 MG/1
50000 CAPSULE ORAL WEEKLY
COMMUNITY
Start: 2022-05-14

## 2022-05-23 NOTE — PROGRESS NOTES
05/23/2022      Ana Vincent APRN  [address] [place] [address] [address]  [place] [address]        Edy NIKO Cosby  1955    Chief Complaint   Patient presents with   • Peripheral Vascular Disease     3 month follow-up with US - bilateral.  Pt denies any leg pain, swelling or problems walking from pain.  Pt denies any stroke-like symptoms.        Dear Ana Vincent APRN:    HPI     I had the pleasure of seeing your patient in the office today for follow up.  As you recall, the patient is a 67 y.o. male who we are currently following for peripheral arterial disease.  He returns today after having undergone repeat NIC/PVR for ongoing surveillance.  He had recently undergone right femoral endarterectomy with angiogram about 3 months ago, and prior to that had undergone a similar procedure on the left lower extremity back in 2019.  He continues to feel well after the right femoral endarterectomy and denies any significant claudication in the right lower extremity.  He feels as though he is ambulating much better and has no acute complaints.    Review of Systems   Constitutional: Negative.  Negative for activity change, appetite change, chills and fever.   HENT: Negative.  Negative for congestion, sneezing and sore throat.    Eyes: Negative.    Respiratory: Negative.  Negative for chest tightness and shortness of breath.    Cardiovascular: Negative.  Negative for chest pain, palpitations and leg swelling.   Gastrointestinal: Negative.  Negative for abdominal distention, abdominal pain, nausea and vomiting.   Endocrine: Negative.    Genitourinary: Negative.    Musculoskeletal: Negative.         Right lower extremity claudication is resolved.   Skin: Negative.    Allergic/Immunologic: Negative.    Neurological: Negative.    Hematological: Negative.    Psychiatric/Behavioral: Negative.        /76 (BP Location: Left arm, Patient Position: Sitting, Cuff Size: Adult)   Pulse 96   Resp 18   Ht 175 cm  "(68.9\")   Wt 97.1 kg (214 lb)   SpO2 95%   BMI 31.69 kg/m²   Physical Exam  Vitals reviewed.   Constitutional:       Appearance: Normal appearance.   HENT:      Head: Normocephalic and atraumatic.      Nose: Nose normal.      Mouth/Throat:      Mouth: Mucous membranes are moist.   Eyes:      Extraocular Movements: Extraocular movements intact.      Pupils: Pupils are equal, round, and reactive to light.   Cardiovascular:      Rate and Rhythm: Normal rate. Rhythm irregularly irregular.      Pulses:           Carotid pulses are 2+ on the right side and 2+ on the left side.       Radial pulses are 2+ on the right side and 2+ on the left side.        Femoral pulses are 2+ on the right side and 2+ on the left side.       Dorsalis pedis pulses are 1+ on the right side and 1+ on the left side.        Posterior tibial pulses are detected w/ Doppler on the right side and detected w/ Doppler on the left side.      Comments: Both feet are warm.  He has no wounds.    Right groin incision from previous femoral endarterectomy is well-healed.  He has palpable femoral pulses bilaterally and also has palpable dorsalis pedis pulses.  He has Doppler signals of the bilateral PTs.  Pulmonary:      Effort: Pulmonary effort is normal. No respiratory distress.   Abdominal:      General: There is no distension.      Palpations: Abdomen is soft. There is no mass.      Tenderness: There is no abdominal tenderness.   Musculoskeletal:         General: Normal range of motion.      Cervical back: Normal range of motion and neck supple.      Right lower leg: No edema.      Left lower leg: No edema.      Comments: He has known chronic foot drop of the left foot and wears a brace for this.   Skin:     General: Skin is warm and dry.      Capillary Refill: Capillary refill takes 2 to 3 seconds.   Neurological:      General: No focal deficit present.      Mental Status: He is alert and oriented to person, place, and time.   Psychiatric:         Mood " and Affect: Mood normal.         Behavior: Behavior normal.         Thought Content: Thought content normal.         Judgment: Judgment normal.         DIAGNOSTIC DATA:        US Ankle / Brachial Indices Extremity Complete    Result Date: 5/23/2022  Narrative:  History: [facility]  Comments: Bilateral lower extremity arterial with multi-level pulse volume recordings and segmental pressures were performed at rest and stress.  The right ankle/brachial index is 0.89. The waveforms are biphasic without dampening.These findings are consistent with mild arterial insufficiency of the right lower extremity at rest.  The left ankle/brachial index is 0.72. The waveforms are biphasic without dampening. These findings are consistent with moderate arterial insufficiency of the left lower extremity at rest.      Impression: Impression: 1. Mild arterial insufficiency of the right lower extremity at rest. 2. Moderate arterial insufficiency of the left lower extremity at rest.   This report was finalized on 05/23/2022 14:13 by Dr. Constantino Metzger MD.      Patient Active Problem List   Diagnosis   • Spinal stenosis of lumbar region   • Back pain   • Degeneration of intervertebral disc of lumbosacral region   • Essential hypertension   • Lumbar disc herniation with radiculopathy   • Type 2 diabetes mellitus with stage 3a chronic kidney disease, with long-term current use of insulin ([facility])   • Constipation due to opioid therapy   • Gastroesophageal reflux disease without esophagitis   • Panlobular emphysema ([facility])   • Paroxysmal atrial fibrillation ([facility])   • Hx of colonic polyps   • Class 2 severe obesity due to excess calories with serious comorbidity and body mass index (BMI) of 35.0 to 35.9 in adult ([facility])   • Atherosclerosis 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 ([facility])   • H/O diabetic foot ulcer   • Anemia   • Stage 3a chronic kidney disease ([facility])   • Diabetic neuropathy ([facility])   • Difficulty with BiPAP use   • Impotence   •  Insomnia   • Left foot drop   • Lumbosacral radiculitis   • Obstructive sleep apnea   • Mixed hyperlipidemia   • PVD (peripheral vascular disease) with claudication ([facility])   • Vocal cord paralysis   • Vocal cord paresis   • Atherosclerosis of native artery of right lower extremity with intermittent claudication ([facility])   • Carotid stenosis   • Benign prostatic hyperplasia without urinary obstruction   • Vitamin D deficiency         ICD-10-CM ICD-9-CM   1. Peripheral arterial disease with history of revascularization ([facility])  I73.9 443.9    Z98.890 V45.89   2. Bilateral carotid bruits  R09.89 785.9   3. Essential hypertension  I10 401.9   4. Mixed hyperlipidemia  E78.2 272.2   5. Type 2 diabetes mellitus with hyperglycemia, with long-term current use of insulin ([facility])  E11.65 250.00    Z79.4 790.29     V58.67       Lab Frequency Next Occurrence   Follow Anesthesia Guidelines / Standing Orders Once 12/07/2018   Obtain Informed Consent Once 12/12/2018   Follow Anesthesia Guidelines / Standing Orders Once 12/17/2018   Obtain Informed Consent Once 12/22/2018   Follow Anesthesia Guidelines / Standing Orders Once 01/21/2019   Obtain Informed Consent Once 01/26/2019   Provide NPO Instructions to Patient Once 01/26/2019   Follow Anesthesia Guidelines / Protocol Once 11/19/2021   Obtain Informed Consent Once 11/24/2021   Provide NPO Instructions to Patient Once 11/24/2021   Chlorhexidine Skin Prep Once 11/24/2021   Follow Anesthesia Guidelines / Protocol Once 01/17/2022   Obtain Informed Consent Once 01/22/2022   US Ankle / Brachial Indices Extremity Complete Once 11/19/2022   US Carotid Bilateral Once 11/19/2022       PLAN: After thoroughly evaluating Edy NIKO Cosby, I believe the best course of action is to remain conservative from a vascular standpoint.  Overall he has done quite well after his previous right femoral endarterectomy and right lower extremity angiogram.  He notes that his ambulation is much improved and he  denies any significant claudication.  Repeat NIC/PVR done today showed a value of 0.89 on the right, and 0.72 on the left which is improved from previous.  At this point plan will be for ongoing surveillance with a repeat NIC/PVR in 6 months.  Also he does have a history of peripheral arterial disease and there is evidence of bruit in the carotids on exam today.  As such when he returns in 6 months I ordered a carotid duplex for evaluation of carotid atherosclerotic disease. The patient is to continue taking their medications as previously discussed.   I did discuss vascular risk factors as they pertain to the progression of vascular disease including controlling diabetes, hypertension, and hyperlipidemia. These factors remain stable. BMI is >= 30 and <= 34.9 (Class 1 obesity). The following options were offered after discussion: exercise counseling/recommendations and nutrition counseling/recommendations. This was all discussed in full with complete understanding.  Thank you for allowing me to participate in the care of your patient.  Please do not hesitate to call with any questions or concerns.  We will keep you aware of any further encounters with Edy Cosby.      Sincerely Yours,      Duncan Lucio MD

## 2022-06-21 RX ORDER — LABETALOL 100 MG/1
TABLET, FILM COATED ORAL
Qty: 60 TABLET | Refills: 0 | Status: SHIPPED | OUTPATIENT
Start: 2022-06-21 | End: 2022-07-24

## 2022-07-15 NOTE — PROGRESS NOTES
[facility] - PODIATRY    Today's Date: 07/18/22    Patient Name: Edy Cosby  MRN: [id number]  CSN: [id number]  PCP: Ana Vincent APRN  Referring Provider: No ref. provider found    SUBJECTIVE     Chief Complaint   Patient presents with   • Follow-up     Mark Villela MD 03/22/2022  3 MO FU DIABETIC NAIL CARE- pt states left foot hurts at times, poss gout, bottom of foot ball through arch- pt pain 9/10 at worst at times, mostly left foot- pt presents with long thick nails   • Diabetes     148mg/dl BG last check     HPI: Edy Cosby, a 67 y.o.male, comes to clinic as a(n) established patient presenting for diabetic foot exam and complaining of thickened toenails. Patient has h/o AFib, back pain, DM2, Emphysema lung, GERD, HTN, OA. Patient is IDDM with last stated BG level of 148mg/dl.  Relates neuropathy in feet with numbness and tingling.  Continues use of AFO for foot drop of left foot. States he has had some pain in left 1st MTPJ that his PCP believed might be gout. Pain worse with movement and has been continuous.  Patient states that toenails are long, thick, and irregular and that he is unable to care for them himself.  Denies open wound or sores. Admits pain at 9/10 level and described as aching and nagging. Relates previous treatment(s) including foot care by podiatrist. Denies any constitutional symptoms. No other pedal complaints at this time.    Past Medical History:   Diagnosis Date   • A-fib (HCC)    • Atherosclerosis    • Chronic back pain    • Constipation    • Diabetes mellitus (HCC)     TYPE II   • Dropfoot     wears brace   • Dysphagia    • Emphysema of lung (HCC)    • Gastroparesis    • GERD (gastroesophageal reflux disease)    • Hyperlipidemia    • Hypertension     ESSENTIAL   • Leg pain    • Muscle spasm    • Nerve pain    • Osteoarthritis    • Sleep apnea     no cpap/bipap (non-compliance)   • Sleeping difficulty    • Ulcer of toe due to diabetes  mellitus (HCC)      Past Surgical History:   Procedure Laterality Date   • ANGIOPLASTY ILIAC ARTERY Left 1/29/2019    Procedure: ANGIOPLASTY ILIAC ARTERY, STENT PLACEMENT;  Surgeon: Duncan Lucio MD;  Location: [facility] HYBRID OR 12;  Service: Vascular   • AORTAGRAM Left 12/18/2018    Procedure: AORTOGRAM, LEFT LEG ANGIOGRAM, MYNX CLOSURE;  Surgeon: Duncan Lucio MD;  Location: [facility] HYBRID OR 12;  Service: Vascular   • AORTAGRAM Right 12/16/2021    Procedure: AORTAGRAM, RIGHT LOWER EXTREMITY ANGIOGRAM, POSSIBLE INTERVENTION;  Surgeon: Duncan Lucio MD;  Location: [facility] HYBRID OR 12;  Service: Vascular;  Laterality: Right;   • BACK SURGERY     • CHOLECYSTECTOMY     • COLONOSCOPY  09/01/2011    TICS RECALL 5YR   • COLONOSCOPY  09/30/2008    ENTER RESULTS: STOOL THROUGHTOUT THE COLON POOR PREP REC 3 YEAR RECALL   • COLONOSCOPY N/A 11/9/2016    Procedure: COLONOSCOPY;  Surgeon: León Zepeda MD;  Location: [facility] ENDOSCOPY;  Service:    • COLONOSCOPY N/A 4/19/2018    Procedure: COLONOSCOPY WITH ANESTHESIA;  Surgeon: León Zepeda MD;  Location: [facility] ENDOSCOPY;  Service: Gastroenterology   • ENDOSCOPY  06/19/2012    HH   • ENDOSCOPY  08/25/2009    HIATAL HERNIA, DILATED WITH 50 FR MASCORRO   • ENDOSCOPY  06/19/2007    WITHIN NORMAL LIMITS UREA NEG   • FEMORAL ENDARTERECTOMY Left 1/29/2019    Procedure: LEFT FEMORAL ENDARTERECTOMY;  Surgeon: Duncan Lucio MD;  Location: [facility] HYBRID OR 12;  Service: Vascular   • FEMORAL ENDARTERECTOMY Right 2/3/2022    Procedure: RIGHT FEMORAL ENDARTERECTOMY, RIGHT LOWER EXTREMITY ANGIOGRAM, BALLOON ANGIOPLASTY;  Surgeon: Duncan Lucio MD;  Location: [facility] HYBRID OR 12;  Service: Vascular;  Laterality: Right;   • LUMBAR LAMINECTOMY WITH FUSION N/A 1/23/2017    Procedure: REMOVAL OF INSTRUMENTATION, EXPLORATION OF FUSION L4-S1, REVISION LEFT L5-S1 HEMILAMINECTOMY, FACETECTOMY DECOMPRESSION, REVISION UNINSTRUMENTED POSTERIOR SPINAL FUSION  L5-S1;  Surgeon: RHONDA Lopes MD;  Location:  [facility] OR;  Service:    • OTHER SURGICAL HISTORY      LUMBAR SACRAL SURGERY WITH FUSION X2   • PILONIDAL CYST / SINUS EXCISION      PILONIDAL CYST REMOVAL     Family History   Problem Relation Age of Onset   • Heart attack Father    • Diabetes Brother    • Colon polyps Sister    • Stomach cancer Neg Hx         GI CNACERS OR DISEASE   • Colon cancer Neg Hx      Social History     Socioeconomic History   • Marital status: Single   Tobacco Use   • Smoking status: Former Smoker     Years: 30.00     Types: Cigarettes     Quit date: 2019     Years since quitting: 3.5   • Smokeless tobacco: Never Used   Vaping Use   • Vaping Use: Never used   Substance and Sexual Activity   • Alcohol use: No   • Drug use: Yes     Frequency: 4.0 times per week     Types: Marijuana   • Sexual activity: Defer     No Known Allergies  Current Outpatient Medications   Medication Sig Dispense Refill   • albuterol sulfate  (90 Base) MCG/ACT inhaler Inhale 2 puffs 4 (Four) Times a Day As Needed.     • amLODIPine (NORVASC) 10 MG tablet Take 10 mg by mouth Daily.     • aspirin 81 MG EC tablet Take 81 mg by mouth Daily.     • atorvastatin (LIPITOR) 40 MG tablet 1 tablet Every Night.     • clopidogrel (PLAVIX) 75 MG tablet Take 1 tablet by mouth Daily. 30 tablet 5   • cyclobenzaprine (FLEXERIL) 5 MG tablet Take 5 mg by mouth 3 (Three) Times a Day As Needed for Muscle Spasms.     • Dulaglutide (Trulicity) 1.5 MG/0.5ML solution pen-injector Inject 1.5 mg under the skin into the appropriate area as directed 1 (One) Time Per Week.     • hydroCHLOROthiazide (HYDRODIURIL) 25 MG tablet Take 12.5 mg by mouth Daily.     • labetalol (NORMODYNE) 100 MG tablet Take 100 mg by mouth 2 (Two) Times a Day.     • lisinopril (PRINIVIL,ZESTRIL) 40 MG tablet Take 40 mg by mouth Daily.     • pantoprazole (PROTONIX) 40 MG EC tablet Take 40 mg by mouth Daily.     • sildenafil (VIAGRA) 100 MG tablet Take 50 mg by mouth  As Needed for Erectile Dysfunction.     • tamsulosin (FLOMAX) 0.4 MG capsule 24 hr capsule Take 1 capsule by mouth Daily.     • vitamin D (ERGOCALCIFEROL) 1.25 MG (34962 UT) capsule capsule Take 50,000 Units by mouth 1 (One) Time Per Week.       No current facility-administered medications for this visit.     Review of Systems   Constitutional: Negative for chills and fever.   HENT: Negative for congestion.    Respiratory: Negative for shortness of breath.    Cardiovascular: Negative for chest pain and leg swelling.   Gastrointestinal: Negative for constipation, diarrhea, nausea and vomiting.   Musculoskeletal: Positive for arthralgias and gait problem (foot drop).   Skin: Negative for wound.   Neurological: Positive for numbness.   Hematological: Bruises/bleeds easily.       OBJECTIVE     Vitals:    07/18/22 1107   BP: 138/66   Pulse: 91   SpO2: 96%       PHYSICAL EXAM  GEN:   Accompanied by none.     Foot/Ankle Exam:       General:   Appearance: appears stated age and healthy and obesity    Orientation: AAOx3    Affect: appropriate    Gait: steppage    Gait comment:  Drop foot left  Assistance: independent    Shoe Gear:  Diabetic shoes (Left AFO)    VASCULAR      Right Foot Vascularity   Dorsalis pedis:  2+  Posterior tibial:  2+  Skin Temperature: warm    Edema Grading:  Trace and non-pitting  CFT:  3  Pedal Hair Growth:  Present  Varicosities: none       Left Foot Vascularity   Dorsalis pedis:  2+  Posterior tibial:  2+  Skin Temperature: warm    Edema Grading:  Trace and non-pitting  CFT:  3  Pedal Hair Growth:  Present  Varicosities: none        NEUROLOGIC     Right Foot Neurologic   Light touch sensation:  Diminished  Vibratory sensation:  Diminished  Hot/Cold sensation: diminished    Protective Sensation using [place] Monofilament:  0     Left Foot Neurologic   Light touch sensation:  Diminished  Vibratory sensation:  Diminished  Hot/cold sensation: diminished    Protective Sensation using  [place]irtha Monofilament:  0     MUSCULOSKELETAL      Right Foot Musculoskeletal   Ecchymosis:  None  Tenderness: none    Arch:  Pes planus  Hallux valgus: No    Hallux limitus: No       Left Foot Musculoskeletal   Ecchymosis:  None  Tenderness: none    Arch:  Pes planus  Hallux valgus: No    Hallux limitus: No       MUSCLE STRENGTH     Right Foot Muscle Strength   Foot dorsiflexion:  5  Foot plantar flexion:  5  Foot inversion:  5  Foot eversion:  5     Left Foot Muscle Strength   Foot dorsiflexion:  2  Foot plantar flexion:  3  Foot inversion:  3  Foot eversion:  3     RANGE OF MOTION      Right Foot Range of Motion   Foot and ankle ROM within normal limits       Left Foot Range of Motion   Foot and ankle ROM within normal limits       DERMATOLOGIC     Right Foot Dermatologic   Skin: skin intact    Skin: no right foot skin dryness and no right foot tinea    Nails: onychomycosis, abnormally thick, subungual debris and dystrophic nails       Left Foot Dermatologic   Skin: corn    Skin: no left foot skin dryness and no left foot tinea    Nails: onychomycosis, abnormally thick, subungual debris and dystrophic nails       Image:       RADIOLOGY/NUCLEAR:  No results found.    LABORATORY/CULTURE RESULTS:      PATHOLOGY RESULTS:       ASSESSMENT/PLAN     Diagnoses and all orders for this visit:    1. Onychomycosis (Primary)    2. Pre-ulcerative calluses    3. Type 2 diabetes mellitus with diabetic polyneuropathy, with long-term current use of insulin (HCC)    4. Foot drop, left    5. Antiplatelet or antithrombotic long-term use    6. Tinea pedis of both feet      Comprehensive lower extremity examination and evaluation was performed.  Discussed findings and treatment plan including risks, benefits, and treatment options with patient in detail. Patient agreed with treatment plan.  After verbal consent obtained, nail(s) x10 debrided of length and thickness with nail nipper without incidence  After verbal consent  obtained, calluses x1 pared utilizing dermal curette and/or scalpel without incidence  Patient may maintain nails and calluses at home utilizing emery board or pumice stone between visits as needed  Reviewed at home diabetic foot care including daily foot checks   Continue AFO for foot drop.   Continue diabetic monitoring and control under direction of PCP.    Monitor callused area closely and return to care or go to wound care if area opens/changes.  Patient's Body mass index is 31.16 kg/m². indicating that he is obese (BMI >30). Obesity-related health conditions include the following: diabetes mellitus. Obesity is improving with lifestyle modifications. BMI is is above average; BMI management plan is completed. We discussed portion control and increasing exercise..  An After Visit Summary was printed and given to the patient at discharge, including (if requested) any available informative/educational handouts regarding diagnosis, treatment, or medications. All questions were answered to patient/family satisfaction. Should symptoms fail to improve or worsen they agree to call or return to clinic or to go to the Emergency Department. Discussed the importance of following up with any needed screening tests/labs/specialist appointments and any requested follow-up recommended by me today. Importance of maintaining follow-up discussed and patient accepts that missed appointments can delay diagnosis and potentially lead to worsening of conditions.  Return in about 3 months (around 10/18/2022)., or sooner if acute issues arise.    Lab Frequency Next Occurrence   Diet: Once 04/19/2018   Advance Diet as Tolerated Once 04/19/2018       This document has been electronically signed by PRATIMA Esteves on July 18, 2022 12:20 CDT

## 2022-07-18 ENCOUNTER — OFFICE VISIT (OUTPATIENT)
Dept: PODIATRY | Facility: CLINIC | Age: 67
End: 2022-07-18

## 2022-07-18 VITALS
OXYGEN SATURATION: 96 % | HEART RATE: 91 BPM | SYSTOLIC BLOOD PRESSURE: 138 MMHG | BODY MASS INDEX: 31.16 KG/M2 | WEIGHT: 210.4 LBS | DIASTOLIC BLOOD PRESSURE: 66 MMHG | HEIGHT: 69 IN

## 2022-07-18 DIAGNOSIS — Z79.02 ANTIPLATELET OR ANTITHROMBOTIC LONG-TERM USE: ICD-10-CM

## 2022-07-18 DIAGNOSIS — L84 PRE-ULCERATIVE CALLUSES: ICD-10-CM

## 2022-07-18 DIAGNOSIS — Z79.4 TYPE 2 DIABETES MELLITUS WITH DIABETIC POLYNEUROPATHY, WITH LONG-TERM CURRENT USE OF INSULIN: ICD-10-CM

## 2022-07-18 DIAGNOSIS — B35.3 TINEA PEDIS OF BOTH FEET: ICD-10-CM

## 2022-07-18 DIAGNOSIS — E11.42 TYPE 2 DIABETES MELLITUS WITH DIABETIC POLYNEUROPATHY, WITH LONG-TERM CURRENT USE OF INSULIN: ICD-10-CM

## 2022-07-18 DIAGNOSIS — M21.372 FOOT DROP, LEFT: ICD-10-CM

## 2022-07-18 DIAGNOSIS — B35.1 ONYCHOMYCOSIS: Primary | ICD-10-CM

## 2022-07-18 PROCEDURE — 11721 DEBRIDE NAIL 6 OR MORE: CPT | Performed by: NURSE PRACTITIONER

## 2022-07-18 PROCEDURE — 11055 PARING/CUTG B9 HYPRKER LES 1: CPT | Performed by: NURSE PRACTITIONER

## 2022-07-20 NOTE — TELEPHONE ENCOUNTER
Gretchen Sidhu called requesting a refill of the below medication which has been pended for you:     Requested Prescriptions     Pending Prescriptions Disp Refills    labetalol (NORMODYNE) 100 MG tablet [Pharmacy Med Name: Labetalol HCl 100 MG Oral Tablet] 60 tablet 0     Sig: Take 1 tablet by mouth twice daily       Last Appointment Date: 1/20/2022  Next Appointment Date: 7/26/2022    No Known Allergies

## 2022-07-21 NOTE — TELEPHONE ENCOUNTER
Rylan Witt called to request a refill on his medication.       Last office visit : 1/20/2022   Next office visit : 7/26/2022     Requested Prescriptions     Pending Prescriptions Disp Refills    pantoprazole (PROTONIX) 40 MG tablet [Pharmacy Med Name: Pantoprazole Sodium 40 MG Oral Tablet Delayed Release] 90 tablet 0     Sig: TAKE 1 TABLET BY MOUTH ONCE DAILY IN THE MORNING BEFORE BREAKFAST            Kianna Wade

## 2022-07-24 RX ORDER — PANTOPRAZOLE SODIUM 40 MG/1
TABLET, DELAYED RELEASE ORAL
Qty: 90 TABLET | Refills: 0 | Status: SHIPPED | OUTPATIENT
Start: 2022-07-24 | End: 2022-07-26 | Stop reason: SDUPTHER

## 2022-07-24 RX ORDER — LABETALOL 100 MG/1
TABLET, FILM COATED ORAL
Qty: 60 TABLET | Refills: 0 | Status: SHIPPED | OUTPATIENT
Start: 2022-07-24 | End: 2022-07-26 | Stop reason: SDUPTHER

## 2022-07-26 ENCOUNTER — OFFICE VISIT (OUTPATIENT)
Dept: PRIMARY CARE CLINIC | Age: 67
End: 2022-07-26
Payer: COMMERCIAL

## 2022-07-26 VITALS
DIASTOLIC BLOOD PRESSURE: 68 MMHG | HEART RATE: 104 BPM | OXYGEN SATURATION: 97 % | SYSTOLIC BLOOD PRESSURE: 138 MMHG | BODY MASS INDEX: 33.09 KG/M2 | TEMPERATURE: 97.1 F | WEIGHT: 210.8 LBS | HEIGHT: 67 IN

## 2022-07-26 DIAGNOSIS — E11.59 TYPE 2 DIABETES MELLITUS WITH OTHER CIRCULATORY COMPLICATION, WITHOUT LONG-TERM CURRENT USE OF INSULIN (HCC): ICD-10-CM

## 2022-07-26 DIAGNOSIS — Z00.00 MEDICARE ANNUAL WELLNESS VISIT, SUBSEQUENT: Primary | ICD-10-CM

## 2022-07-26 DIAGNOSIS — M79.18 ABDOMINAL MUSCLE PAIN: ICD-10-CM

## 2022-07-26 DIAGNOSIS — E11.51 TYPE 2 DIABETES MELLITUS WITH DIABETIC PERIPHERAL ANGIOPATHY WITHOUT GANGRENE, WITHOUT LONG-TERM CURRENT USE OF INSULIN (HCC): Chronic | ICD-10-CM

## 2022-07-26 DIAGNOSIS — K59.01 SLOW TRANSIT CONSTIPATION: ICD-10-CM

## 2022-07-26 DIAGNOSIS — I10 ESSENTIAL HYPERTENSION: ICD-10-CM

## 2022-07-26 LAB
ALBUMIN SERPL-MCNC: 4.2 G/DL (ref 3.5–5.2)
ALP BLD-CCNC: 75 U/L (ref 40–130)
ALT SERPL-CCNC: 10 U/L (ref 5–41)
ANION GAP SERPL CALCULATED.3IONS-SCNC: 11 MMOL/L (ref 7–19)
AST SERPL-CCNC: 18 U/L (ref 5–40)
BASOPHILS ABSOLUTE: 0 K/UL (ref 0–0.2)
BASOPHILS RELATIVE PERCENT: 0.7 % (ref 0–1)
BILIRUB SERPL-MCNC: 0.3 MG/DL (ref 0.2–1.2)
BUN BLDV-MCNC: 29 MG/DL (ref 8–23)
CALCIUM SERPL-MCNC: 10.1 MG/DL (ref 8.8–10.2)
CHLORIDE BLD-SCNC: 107 MMOL/L (ref 98–111)
CHOLESTEROL, TOTAL: 143 MG/DL (ref 160–199)
CO2: 26 MMOL/L (ref 22–29)
CREAT SERPL-MCNC: 1.5 MG/DL (ref 0.5–1.2)
EOSINOPHILS ABSOLUTE: 0.1 K/UL (ref 0–0.6)
EOSINOPHILS RELATIVE PERCENT: 2 % (ref 0–5)
GFR AFRICAN AMERICAN: 57
GFR NON-AFRICAN AMERICAN: 47
GLUCOSE BLD-MCNC: 123 MG/DL (ref 74–109)
HBA1C MFR BLD: 6.9 % (ref 4–6)
HCT VFR BLD CALC: 35.4 % (ref 42–52)
HDLC SERPL-MCNC: 51 MG/DL (ref 55–121)
HEMOGLOBIN: 10.7 G/DL (ref 14–18)
IMMATURE GRANULOCYTES #: 0 K/UL
LDL CHOLESTEROL CALCULATED: 80 MG/DL
LYMPHOCYTES ABSOLUTE: 1.5 K/UL (ref 1.1–4.5)
LYMPHOCYTES RELATIVE PERCENT: 33.9 % (ref 20–40)
MCH RBC QN AUTO: 27.2 PG (ref 27–31)
MCHC RBC AUTO-ENTMCNC: 30.2 G/DL (ref 33–37)
MCV RBC AUTO: 90.1 FL (ref 80–94)
MONOCYTES ABSOLUTE: 0.3 K/UL (ref 0–0.9)
MONOCYTES RELATIVE PERCENT: 6.3 % (ref 0–10)
NEUTROPHILS ABSOLUTE: 2.5 K/UL (ref 1.5–7.5)
NEUTROPHILS RELATIVE PERCENT: 56.9 % (ref 50–65)
PDW BLD-RTO: 15.6 % (ref 11.5–14.5)
PLATELET # BLD: 199 K/UL (ref 130–400)
PMV BLD AUTO: 12.7 FL (ref 9.4–12.4)
POTASSIUM SERPL-SCNC: 4.4 MMOL/L (ref 3.5–5)
PROSTATE SPECIFIC ANTIGEN: 0.75 NG/ML (ref 0–4)
RBC # BLD: 3.93 M/UL (ref 4.7–6.1)
SODIUM BLD-SCNC: 144 MMOL/L (ref 136–145)
TOTAL PROTEIN: 7.9 G/DL (ref 6.6–8.7)
TRIGL SERPL-MCNC: 60 MG/DL (ref 0–149)
TSH SERPL DL<=0.05 MIU/L-ACNC: 2.06 UIU/ML (ref 0.27–4.2)
WBC # BLD: 4.5 K/UL (ref 4.8–10.8)

## 2022-07-26 PROCEDURE — 3017F COLORECTAL CA SCREEN DOC REV: CPT | Performed by: NURSE PRACTITIONER

## 2022-07-26 PROCEDURE — 3044F HG A1C LEVEL LT 7.0%: CPT | Performed by: NURSE PRACTITIONER

## 2022-07-26 PROCEDURE — G0439 PPPS, SUBSEQ VISIT: HCPCS | Performed by: NURSE PRACTITIONER

## 2022-07-26 PROCEDURE — 0054A COVID-19, PFIZER GRAY TOP, DO NOT DILUTE, (AGE 12 Y+), IM, 30MCG/0.3 ML: CPT | Performed by: NURSE PRACTITIONER

## 2022-07-26 PROCEDURE — 1123F ACP DISCUSS/DSCN MKR DOCD: CPT | Performed by: NURSE PRACTITIONER

## 2022-07-26 PROCEDURE — 91305 COVID-19, PFIZER GRAY TOP, DO NOT DILUTE, (AGE 12 Y+), IM, 30MCG/0.3 ML: CPT | Performed by: NURSE PRACTITIONER

## 2022-07-26 RX ORDER — AMLODIPINE BESYLATE 10 MG/1
TABLET ORAL
Qty: 90 TABLET | Refills: 1 | Status: SHIPPED | OUTPATIENT
Start: 2022-07-26

## 2022-07-26 RX ORDER — HYDROCHLOROTHIAZIDE 25 MG/1
TABLET ORAL
Qty: 90 TABLET | Refills: 1 | Status: SHIPPED | OUTPATIENT
Start: 2022-07-26

## 2022-07-26 RX ORDER — LABETALOL 100 MG/1
100 TABLET, FILM COATED ORAL 2 TIMES DAILY
Qty: 180 TABLET | Refills: 0 | Status: SHIPPED | OUTPATIENT
Start: 2022-07-26 | End: 2022-08-23

## 2022-07-26 RX ORDER — ERGOCALCIFEROL (VITAMIN D2) 1250 MCG
50000 CAPSULE ORAL WEEKLY
COMMUNITY
Start: 2022-05-14

## 2022-07-26 RX ORDER — FERROUS SULFATE 325(65) MG
325 TABLET ORAL
COMMUNITY
Start: 2022-07-11

## 2022-07-26 RX ORDER — TAMSULOSIN HYDROCHLORIDE 0.4 MG/1
CAPSULE ORAL
COMMUNITY
Start: 2022-07-05

## 2022-07-26 RX ORDER — ERGOCALCIFEROL 1.25 MG/1
CAPSULE ORAL
COMMUNITY
Start: 2022-07-05

## 2022-07-26 RX ORDER — ATORVASTATIN CALCIUM 40 MG/1
TABLET, FILM COATED ORAL
Qty: 90 TABLET | Refills: 3 | Status: SHIPPED | OUTPATIENT
Start: 2022-07-26

## 2022-07-26 RX ORDER — INSULIN GLARGINE 100 [IU]/ML
20 INJECTION, SOLUTION SUBCUTANEOUS NIGHTLY
Qty: 5 PEN | Refills: 3 | Status: SHIPPED | OUTPATIENT
Start: 2022-07-26

## 2022-07-26 RX ORDER — PANTOPRAZOLE SODIUM 40 MG/1
TABLET, DELAYED RELEASE ORAL
Qty: 90 TABLET | Refills: 1 | Status: SHIPPED | OUTPATIENT
Start: 2022-07-26

## 2022-07-26 RX ORDER — CLOPIDOGREL BISULFATE 75 MG/1
75 TABLET ORAL DAILY
COMMUNITY
Start: 2022-02-05

## 2022-07-26 RX ORDER — DULAGLUTIDE 1.5 MG/.5ML
1.5 INJECTION, SOLUTION SUBCUTANEOUS WEEKLY
Qty: 4 PEN | Refills: 3 | Status: SHIPPED | OUTPATIENT
Start: 2022-07-26

## 2022-07-26 RX ORDER — ALLOPURINOL 100 MG/1
50 TABLET ORAL
COMMUNITY
Start: 2022-07-11

## 2022-07-26 RX ORDER — AMOXICILLIN 250 MG
2 CAPSULE ORAL DAILY PRN
Qty: 30 TABLET | Refills: 0 | Status: SHIPPED | OUTPATIENT
Start: 2022-07-26

## 2022-07-26 RX ORDER — FOLIC ACID 1 MG/1
1 TABLET ORAL
COMMUNITY
Start: 2022-07-11

## 2022-07-26 RX ORDER — LISINOPRIL 40 MG/1
40 TABLET ORAL DAILY
Qty: 90 TABLET | Refills: 1 | Status: SHIPPED | OUTPATIENT
Start: 2022-07-26 | End: 2022-10-31

## 2022-07-26 RX ORDER — ASCORBIC ACID 500 MG
500 TABLET ORAL
COMMUNITY
Start: 2022-07-11

## 2022-07-26 RX ORDER — CLOPIDOGREL BISULFATE 75 MG/1
TABLET ORAL
COMMUNITY
Start: 2022-05-01

## 2022-07-26 RX ORDER — LABETALOL 100 MG/1
TABLET, FILM COATED ORAL
Qty: 60 TABLET | Refills: 0 | OUTPATIENT
Start: 2022-07-26

## 2022-07-26 RX ORDER — INSULIN ASPART 100 [IU]/ML
25-37 INJECTION, SOLUTION INTRAVENOUS; SUBCUTANEOUS
Qty: 5 PEN | Refills: 3 | Status: SHIPPED | OUTPATIENT
Start: 2022-07-26

## 2022-07-26 RX ORDER — CYCLOBENZAPRINE HCL 5 MG
5 TABLET ORAL NIGHTLY
Qty: 90 TABLET | Refills: 0 | Status: SHIPPED | OUTPATIENT
Start: 2022-07-26 | End: 2022-10-03

## 2022-07-26 RX ORDER — ALBUTEROL SULFATE 90 UG/1
2 AEROSOL, METERED RESPIRATORY (INHALATION) 4 TIMES DAILY PRN
Qty: 1 EACH | Refills: 5 | Status: SHIPPED | OUTPATIENT
Start: 2022-07-26

## 2022-07-26 SDOH — ECONOMIC STABILITY: FOOD INSECURITY: WITHIN THE PAST 12 MONTHS, THE FOOD YOU BOUGHT JUST DIDN'T LAST AND YOU DIDN'T HAVE MONEY TO GET MORE.: NEVER TRUE

## 2022-07-26 SDOH — ECONOMIC STABILITY: FOOD INSECURITY: WITHIN THE PAST 12 MONTHS, YOU WORRIED THAT YOUR FOOD WOULD RUN OUT BEFORE YOU GOT MONEY TO BUY MORE.: NEVER TRUE

## 2022-07-26 ASSESSMENT — PATIENT HEALTH QUESTIONNAIRE - PHQ9
2. FEELING DOWN, DEPRESSED OR HOPELESS: 0
SUM OF ALL RESPONSES TO PHQ QUESTIONS 1-9: 0
1. LITTLE INTEREST OR PLEASURE IN DOING THINGS: 0
SUM OF ALL RESPONSES TO PHQ9 QUESTIONS 1 & 2: 0

## 2022-07-26 ASSESSMENT — SOCIAL DETERMINANTS OF HEALTH (SDOH): HOW HARD IS IT FOR YOU TO PAY FOR THE VERY BASICS LIKE FOOD, HOUSING, MEDICAL CARE, AND HEATING?: NOT HARD AT ALL

## 2022-07-26 ASSESSMENT — LIFESTYLE VARIABLES: HOW OFTEN DO YOU HAVE A DRINK CONTAINING ALCOHOL: NEVER

## 2022-07-26 NOTE — PROGRESS NOTES
Medicare Annual Wellness Visit    Brianna Wise is here for Medicare AWV and Discuss Medications (Isn't sure  why or what he is on the pletal is for and does he need it . He has be with out some medications because I believe they were all under DR Vandana Murry )    Assessment & Plan   Essential hypertension  The following orders have not been finalized:  -     lisinopril (PRINIVIL;ZESTRIL) 40 MG tablet  -     amLODIPine (NORVASC) 10 MG tablet  Type 2 diabetes mellitus with other circulatory complication, without long-term current use of insulin (HCC)  Abdominal muscle pain  The following orders have not been finalized:  -     cyclobenzaprine (FLEXERIL) 5 MG tablet  Type 2 diabetes mellitus with diabetic peripheral angiopathy without gangrene, without long-term current use of insulin ([facility] [address]  The following orders have not been finalized:  -     insulin glargine (LANTUS SOLOSTAR) 100 UNIT/ML injection pen  -     insulin aspart (NOVOLOG FLEXPEN) 100 UNIT/ML injection pen  -     Dulaglutide (TRULICITY) 1.5 NV/1.1IX SOPN  Slow transit constipation  The following orders have not been finalized:  -     senna-docusate (PERICOLACE) 8.6-50 MG per tablet    Recommendations for Preventive Services Due: see orders and patient instructions/AVS.  Recommended screening schedule for the next 5-10 years is provided to the patient in written form: see Patient Instructions/AVS.     No follow-ups on file. Subjective     Patient's complete Health Risk Assessment and screening values have been reviewed and are found in Flowsheets. The following problems were reviewed today and where indicated follow up appointments were made and/or referrals ordered.     Positive Risk Factor Screenings with Interventions:             General Health and ACP:  General  In general, how would you say your health is?: Good  In the past 7 days, have you experienced any of the following: New or Increased Pain, New or Increased Fatigue, Loneliness, Social Isolation, Stress or Anger?: No  Do you get the social and emotional support that you need?: Yes  Do you have a Living Will?: (!) No    Advance Directives       Power of  Living Will ACP-Advance Directive ACP-Power of     Not on File Not on File Not on File Not on File        General Health Risk Interventions:  No Living Will: Patient declines ACP discussion/assistance    Health Habits/Nutrition:  Physical Activity: Inactive    Days of Exercise per Week: 0 days    Minutes of Exercise per Session: 0 min     Have you lost any weight without trying in the past 3 months?: No  Body mass index: (!) 33.01  Have you seen the dentist within the past year?: Yes  Health Habits/Nutrition Interventions:  Nutritional issues:  patient is not ready to address his/her nutritional/weight issues at this time             Objective   Vitals:    07/26/22 1057   BP: 138/68   Site: Right Upper Arm   Position: Sitting   Pulse: (!) 104   Temp: 97.1 °F (36.2 °C)   TempSrc: Temporal   SpO2: 97%   Weight: 210 lb 12.8 oz (95.6 kg)   Height: 5' 7\" (1.702 m)      Body mass index is 33.02 kg/m².       General Appearance: alert and oriented to person, place and time, well developed and well- nourished, in no acute distress  Skin: warm and dry, no rash or erythema  Head: normocephalic and atraumatic  Eyes: pupils equal, round, and reactive to light, extraocular eye movements intact, conjunctivae normal  ENT: tympanic membrane, external ear and ear canal normal bilaterally, nose without deformity, nasal mucosa and turbinates normal without polyps  Neck: supple and non-tender without mass, no thyromegaly or thyroid nodules, no cervical lymphadenopathy  Pulmonary/Chest: clear to auscultation bilaterally- no wheezes, rales or rhonchi, normal air movement, no respiratory distress  Cardiovascular: normal rate, regular rhythm, normal S1 and S2, no murmurs, rubs, clicks, or gallops, distal pulses intact, no carotid bruits  Abdomen: soft, non-tender, non-distended, normal bowel sounds, no masses or organomegaly  Extremities: no cyanosis, clubbing or edema  Musculoskeletal: normal range of motion, no joint swelling, deformity or tenderness  Neurologic: reflexes normal and symmetric, no cranial nerve deficit, gait, coordination and speech normal       No Known Allergies  Prior to Visit Medications    Medication Sig Taking?  Authorizing Provider   tamsulosin (FLOMAX) 0.4 MG capsule TAKE 1 CAPSULE BY MOUTH ONCE DAILY Yes Historical Provider, MD   folic acid (FOLVITE) 1 MG tablet 1 mg Yes Historical Provider, MD   ferrous sulfate (IRON 325) 325 (65 Fe) MG tablet 325 mg Yes Historical Provider, MD   ergocalciferol (ERGOCALCIFEROL) 1.25 MG (72160 UT) capsule Take 50,000 Units by mouth once a week Yes Historical Provider, MD   clopidogrel (PLAVIX) 75 MG tablet TAKE 1 TABLET BY MOUTH ONCE DAILY Yes Historical Provider, MD   Cholecalciferol 50 MCG (2000 UT) TABS 50 mcg Yes Historical Provider, MD   ascorbic acid (VITAMIN C) 500 MG tablet 500 mg Yes Historical Provider, MD   allopurinol (ZYLOPRIM) 100 MG tablet 50 mg Yes Historical Provider, MD   labetalol (NORMODYNE) 100 MG tablet Take 1 tablet by mouth twice daily Yes ALFONSO Tate   pantoprazole (PROTONIX) 40 MG tablet TAKE 1 TABLET BY MOUTH ONCE DAILY IN THE MORNING BEFORE BREAKFAST Yes ALFONSO Tate   lisinopril (PRINIVIL;ZESTRIL) 40 MG tablet Take 1 tablet by mouth once daily Yes ALFONSO Tate   cyclobenzaprine (FLEXERIL) 5 MG tablet Take 1 tablet by mouth three times daily as needed for muscle spasm Yes Trupti Espinoza MD   Diabetic Shoe MISC by Does not apply route Yes Trupti Espinoza MD   amLODIPine (NORVASC) 10 MG tablet Take 1 tablet by mouth once daily Yes Trupti Espinoza MD   albuterol sulfate HFA (VENTOLIN HFA) 108 (90 Base) MCG/ACT inhaler Inhale 2 puffs into the lungs 4 times daily as needed for Wheezing Yes Trupti Espinoza MD   atorvastatin (LIPITOR) 40 MG tablet Take 1 tablet by mouth once daily Yes Maurisio Kincaid MD   hydroCHLOROthiazide (HYDRODIURIL) 25 MG tablet Take 1 tablet by mouth once daily  Patient taking differently: 25 mg Take 1/2 tablet by mouth once daily Yes Maurisio Kincaid MD   cilostazol (PLETAL) 50 MG tablet Take 1 tablet by mouth twice daily Yes Maurisio Kincaid MD   senna-docusate (PERICOLACE) 8.6-50 MG per tablet Take 2 tablets by mouth daily as needed for Constipation Yes ALFONSO Urbano   aspirin 325 MG tablet Take 0.5 tablets by mouth daily Yes ALFONSO Urbano   Dulaglutide (TRULICITY) 1.5 UU/0.9WT SOPN Inject 1.5 mg into the skin once a week Yes ALFONSO Urbano   blood glucose monitor kit and supplies Test 4 times a day & as needed for symptoms of irregular blood glucose. Yes Maurisio Kincaid MD   blood glucose test strips (GNP EASY TOUCH GLUCOSE TEST) strip TEST 3 TIMES DAILY Dx E11.49 Yes Maurisio Kincaid MD   Insulin Pen Needle 31G X 6 MM MISC 1 each by Does not apply route 4 times daily (before meals and nightly) May substitute to generic for insurance Dx E11.49 Yes Maurisio Kincaid MD   Insulin Syringe-Needle U-100 30G X 1/2\" 0.5 ML MISC 1 each by Does not apply route 3 times daily Yes Maurisio Kincaid MD   vitamin D (ERGOCALCIFEROL) 1.25 MG (80214 UT) CAPS capsule TAKE 1 CAPSULE BY MOUTH ONCE A WEEK  Historical Provider, MD   clopidogrel (PLAVIX) 75 MG tablet Take 75 mg by mouth in the morning.   Historical Provider, MD   Diabetic Shoe MISC by Does not apply route DISPENSE ONE PAIR DIABETIC SHOES AND THREE PAIRS OF HEAT MOLDED INSERTS  Maurisio Kincaid MD   insulin glargine (LANTUS SOLOSTAR) 100 UNIT/ML injection pen Inject 20 Units into the skin nightly  Patient not taking: Reported on 1/27/4697  ALFONSO Urbano   insulin aspart (NOVOLOG FLEXPEN) 100 UNIT/ML injection pen Inject 25-37 Units into the skin 3 times daily (before meals) 20 units breakfast, 30 units lunch, 30 units dinner + sliding scale each meal (Total dose is 20-32 breakfast, 30-42 lunch and dinner)  ALFONSO Tate   Diabetic Shoe MISC by Does not apply route DISPENSE ONE PAIR DIABETIC SHOES AND THREE PAIRS OF HEAT MOLDED INSERTS  Micky Lujan MD   Lancets MISC Daily Siri Denver, MD       [facility] (Including outside providers/suppliers regularly involved in providing care):   Patient Care Team:  ALFONSO Tate as PCP - General (Family Nurse Practitioner)  ALFONSO Tate as PCP - [facility]  Pola Cook MD (Gastroenterology)  ASHLEY Al as Physician Assistant (Neurology)  Jo Perez MD as Consulting Physician (Otolaryngology)     Reviewed and updated this visit:  Tobacco  Allergies  Meds  Problems  Med Hx  Surg Hx  Soc Hx  Fam Hx

## 2022-07-26 NOTE — PATIENT INSTRUCTIONS
Advance Directives: Care Instructions  Overview  An advance directive is a legal way to state your wishes at the end of your life. It tells your family and your doctor what to do if you can't say what youwant. There are two main types of advance directives. You can change them any timeyour wishes change. Living will. This form tells your family and your doctor your wishes about life support and other treatment. The form is also called a declaration. Medical power of . This form lets you name a person to make treatment decisions for you when you can't speak for yourself. This person is called a health care agent (health care proxy, health care surrogate). The form is also called a durable power of  for health care. If you do not have an advance directive, decisions about your medical care maybe made by a family member, or by a doctor or a  who doesn't know you. It may help to think of an advance directive as a gift to the people who carefor you. If you have one, they won't have to make tough decisions by themselves. Follow-up care is a key part of your treatment and safety. Be sure to make and go to all appointments, and call your doctor if you are having problems. It's also a good idea to know your test results and keep alist of the medicines you take. What should you include in an advance directive? Many states have a unique advance directive form. (It may ask you to address specific issues.) Or you might use a universal form that's approved by manystates. If your form doesn't tell you what to address, it may be hard to know what to include in your advance directive. Use the questions below to help you getstarted. Who do you want to make decisions about your medical care if you are not able to? What life-support measures do you want if you have a serious illness that gets worse over time or can't be cured? What are you most afraid of that might happen?  (Maybe you're afraid of you.  Understands your [place]ian and moral values. Will do what you want, not what he or she wants. Will be able to make difficult choices at a stressful time. Will be able to refuse or stop treatment, if that is what you would want, even if you could die. Will be firm and confident with health professionals if needed. Will ask questions to get needed information. Lives near you or agrees to travel to you if needed. Your family may help you make medical decisions while you can still be part of that process. But it's important to choose one person to be your health careagent in case you aren't able to make decisions for yourself. If you don't fill out the legal form and name a health care agent, thedecisions your family can make may be limited. A health care agent may be called something else in your state. Who will make decisions for you if you don't have a health care agent? If you don't have a health care agent or a living will, you may not get the care you want. Decisions may be made by family members who disagree about your medical care. Or decisions may be made by a medical professional who doesn'tknow you well. In some cases, a  makes the decisions. When you name a health care agent, it is very clear who has the power to [place] decisions for you. How do you name a health care agent? You name your health care agent on a legal form. This form is usually called a medical power of . Ask your hospital, state bar association, or officeon aging where to find these forms. You must sign the form to make it legal. Some states require you to get the form notarized. This means that a person called a  watches you sign the form and then he or she signs the form. Some states also require thattwo or more witnesses sign the form. Be sure to tell your family members and doctors who your health care agent is. Where can you learn more? Go to [email or web address].[email or web address] org and sign in to your webme account. Enter [id number] in the [facility] box to learn more about \"Learning About [address]. \"     If you do not have an account, please click on the \"Sign Up Now\" link. Current as of: October 18, 2021               Content Version: 13.3  © 2006-2022 [employer]. Care instructions adapted under license by [facility] ([facility]). If you have questions about a medical condition or this instruction, always ask your healthcare professional. [place] [address] any warranty or liability for your use of this information. Learning About Living [place]  What is a living will? A living will, also called a declaration, is a legal form. It tells your family and your doctor your wishes when you can't speak for yourself. It's used by the health professionals who will treat you as you near the end of your life or ifyou get seriously hurt or ill. If you put your wishes in writing, your loved ones and others will know what kind of care you want. They won't need to guess. This can ease your mind and behelpful to others. And you can change or cancel your living will at any time. A living will is not the same as an estate or property will. An estate willexplains what you want to happen with your money and property after you die. How do you use it? Keep these facts in mind about how a living will is used. Your living will is used only if you can't speak or make decisions for yourself. Most often, one or more doctors must certify that you can't speak or decide for yourself before your living will takes effect. If you get better and can speak for yourself again, you can accept or refuse any treatment. It doesn't matter what you said in your living will. Some states may limit your right to refuse treatment in certain cases.  For example, you may need to clearly state in your living will that you don't want artificial hydration and nutrition, such as being fed through a tube. Is a living will a legal document? A living will is a legal document. Each state has its own laws about livingwills. And a living will may be called something else in your state. Here are some things to know about living ortiz. You don't need an  to complete a living will. But legal advice can be helpful if your state's laws are unclear. It can also help if your health history is complicated or your family can't agree on what should be in your living will. You can change your living will at any time. Some people find that their wishes about end-of-life care change as their health changes. If you make big changes to your living will, complete a new form. If you move to another state, make sure that your living will is legal in the state where you now live. In most cases, doctors will respect your wishes even if you have a form from a different state. You might use a universal form that has been approved by many states. This kind of form can sometimes be filled out and stored online. Your digital copy will then be available wherever you have a connection to the internet. The doctors and nurses who need to treat you can find it right away. Your state may offer an online registry. This is another place where you can store your living will online. It's a good idea to get your living will notarized. This means using a person called a  to watch two people sign, or witness, your living will. What should you know when you create a living will? Here are some questions to ask yourself as you make your living will. Do you know enough about life support methods that might be used? If not, talk to your doctor so you know what might be done if you can't breathe on your own, your heart stops, or you can't swallow. What things would you still want to be able to do after you receive life-support methods? Would you want to be able to walk? To speak? To eat on your own?  To live without the help of machines? Do you want certain Cheondoism practices performed if you become very ill? If you have a choice, where do you want to be cared for? In your home? At a hospital or nursing home? If you have a choice at the end of your life, where would you prefer to die? At home? In a hospital or nursing home? Somewhere else? Would you prefer to be buried or cremated? Do you want your organs to be donated after you die? What should you do with your living will? Make sure that your family members and your health care agent have copies of your living will (also called a declaration). Give your doctor a copy of your living will. Ask to have it kept as part of your medical record. If you have more than one doctor, make sure that each one has a copy. Put a copy of your living will where it can be easily found. For example, some people may put a copy on their refrigerator door. If you are using a digital copy, be sure your doctor, family members, and health care agent know how to find and access it. Where can you learn more? Go to https://[email or web address] org and sign in to your [employer] account. Enter K513 in the [facility] box to learn more about \"Learning About Living Antoni Pearson. \"     If you do not have an account, please click on the \"Sign Up Now\" link. Current as of: October 18, 2021               Content Version: 13.3  © 9015-0467 [employer]. Care instructions adapted under license by [facility] ([facility] [place]). If you have questions about a medical condition or this instruction, always ask your healthcare professional. [place] [address] any warranty or liability for your use of this information. Personalized Preventive Plan for Baldemar Moreno - 7/26/2022  Medicare offers a range of preventive health benefits. Some of the tests and screenings are paid in full while other may be subject to a deductible, co-insurance, and/or copay.     Some of these benefits include a comprehensive review of your medical history including lifestyle, illnesses that may run in your family, and various assessments and screenings as appropriate. After reviewing your medical record and screening and assessments performed today your provider may have ordered immunizations, labs, imaging, and/or referrals for you. A list of these orders (if applicable) as well as your Preventive Care list are included within your After Visit Summary for your review. Other Preventive Recommendations:    A preventive eye exam performed by an eye specialist is recommended every 1-2 years to screen for glaucoma; cataracts, macular degeneration, and other eye disorders. A preventive dental visit is recommended every 6 months. Try to get at least 150 minutes of exercise per week or 10,000 steps per day on a pedometer . Order or download the FREE \"Exercise & Physical Activity: Your Everyday Guide\" from [employer]. Call 4-931.133.5200 or search [employer] online. You need 3847-1316 mg of calcium and 3614-4833 IU of vitamin D per day. It is possible to meet your calcium requirement with diet alone, but a vitamin D supplement is usually necessary to meet this goal.  When exposed to the sun, use a sunscreen that protects against both UVA and UVB radiation with an SPF of 30 or greater. Reapply every 2 to 3 hours or after sweating, drying off with a towel, or swimming. Always wear a seat belt when traveling in a car. Always wear a helmet when riding a bicycle or motorcycle.

## 2022-07-27 RX ORDER — LABETALOL 100 MG/1
TABLET, FILM COATED ORAL
Qty: 60 TABLET | Refills: 0 | OUTPATIENT
Start: 2022-07-27

## 2022-07-28 ENCOUNTER — OFFICE VISIT (OUTPATIENT)
Dept: CARDIOLOGY | Facility: CLINIC | Age: 67
End: 2022-07-28

## 2022-07-28 VITALS
WEIGHT: 208 LBS | HEIGHT: 68 IN | BODY MASS INDEX: 31.52 KG/M2 | RESPIRATION RATE: 18 BRPM | DIASTOLIC BLOOD PRESSURE: 80 MMHG | SYSTOLIC BLOOD PRESSURE: 140 MMHG | HEART RATE: 107 BPM | OXYGEN SATURATION: 98 %

## 2022-07-28 DIAGNOSIS — E66.09 CLASS 1 OBESITY DUE TO EXCESS CALORIES WITH SERIOUS COMORBIDITY AND BODY MASS INDEX (BMI) OF 31.0 TO 31.9 IN ADULT: Chronic | ICD-10-CM

## 2022-07-28 DIAGNOSIS — E78.2 MIXED HYPERLIPIDEMIA: Chronic | ICD-10-CM

## 2022-07-28 DIAGNOSIS — I10 ESSENTIAL HYPERTENSION: Chronic | ICD-10-CM

## 2022-07-28 DIAGNOSIS — I48.0 PAROXYSMAL ATRIAL FIBRILLATION: Primary | ICD-10-CM

## 2022-07-28 DIAGNOSIS — I73.9 PVD (PERIPHERAL VASCULAR DISEASE): Chronic | ICD-10-CM

## 2022-07-28 PROCEDURE — 99214 OFFICE O/P EST MOD 30 MIN: CPT | Performed by: NURSE PRACTITIONER

## 2022-07-28 RX ORDER — ASCORBIC ACID 500 MG
500 TABLET ORAL DAILY
COMMUNITY
Start: 2022-07-11

## 2022-07-28 RX ORDER — FOLIC ACID 1 MG/1
1 TABLET ORAL
COMMUNITY
Start: 2022-07-11

## 2022-07-28 NOTE — PROGRESS NOTES
Subjective:     Encounter Date:07/28/2022      Patient ID: Edy Cosby is a 67 y.o. male with paroxysmal atrial fibrillation following surgery in 2017 and no known recurrence, hypertension, insulin-dependent diabetes, peripheral vascular disease, CKD, obstructive sleep apnea, and obesity.  He presents the office today for routine follow-up.    Chief Complaint: Routine follow-up  Atrial Fibrillation  Presents for follow-up visit. Symptoms are negative for chest pain, dizziness, hypertension, hypotension, palpitations, shortness of breath, syncope, tachycardia and weakness. The symptoms have been stable. Past medical history includes atrial fibrillation.   Hypertension  This is a chronic problem. The current episode started more than 1 year ago. The problem is controlled. Pertinent negatives include no chest pain, headaches, malaise/fatigue, orthopnea, palpitations, PND or shortness of breath. Risk factors for coronary artery disease include diabetes mellitus, male gender and obesity. Current antihypertension treatment includes ACE inhibitors, diuretics, calcium channel blockers and beta blockers. The current treatment provides significant improvement. Compliance problems include exercise.  There is no history of angina, CAD/MI or heart failure.     Mr. Cosby presents today for follow up. He was last seen in January at which time a risk assessment had been requested for planned vascular surgery.  Since that time he has been doing well feeling better without complaints of claudication.  He denies any cardiac complaints.  He denies any chest pain, chest pressure, chest discomfort.  He denies any significant shortness of breath, dyspnea on exertion, or fatigue.  He denies any evidence of congestive heart failure with no complaints of orthopnea, PND, lower extremity swelling.  He denies any lightheadedness, dizziness, near-syncope, syncope.  He feels that he has some lower leg weakness but is regaining  strength.    Mr. Cosby follows with our office due to a previously known episode of atrial fibrillation.  He has had no known recurrence since that time in 2017.  He has other chronic stable conditions including diabetes, kidney disease, hypertension, hyperlipidemia.  He has no known coronary artery disease.    The following portions of the patient's history were reviewed and updated as appropriate: allergies, current medications, past family history, past medical history, past social history and past surgical history.     No Known Allergies      Current Outpatient Medications:   •  albuterol sulfate  (90 Base) MCG/ACT inhaler, Inhale 2 puffs 4 (Four) Times a Day As Needed., Disp: , Rfl:   •  amLODIPine (NORVASC) 10 MG tablet, Take 10 mg by mouth Daily., Disp: , Rfl:   •  ascorbic acid (VITAMIN C) 500 MG tablet, 500 mg Daily., Disp: , Rfl:   •  atorvastatin (LIPITOR) 40 MG tablet, 1 tablet Every Night., Disp: , Rfl:   •  clopidogrel (PLAVIX) 75 MG tablet, Take 1 tablet by mouth Daily., Disp: 30 tablet, Rfl: 5  •  cyclobenzaprine (FLEXERIL) 5 MG tablet, Take 5 mg by mouth 3 (Three) Times a Day As Needed for Muscle Spasms., Disp: , Rfl:   •  Dulaglutide (Trulicity) 1.5 MG/0.5ML solution pen-injector, Inject 1.5 mg under the skin into the appropriate area as directed 1 (One) Time Per Week., Disp: , Rfl:   •  folic acid (FOLVITE) 1 MG tablet, 1 mg., Disp: , Rfl:   •  hydroCHLOROthiazide (MICROZIDE) 12.5 MG capsule, Take 12.5 mg by mouth Daily., Disp: , Rfl:   •  labetalol (NORMODYNE) 100 MG tablet, Take 100 mg by mouth 2 (Two) Times a Day., Disp: , Rfl:   •  lisinopril (PRINIVIL,ZESTRIL) 40 MG tablet, Take 40 mg by mouth Daily., Disp: , Rfl:   •  pantoprazole (PROTONIX) 40 MG EC tablet, Take 40 mg by mouth Daily., Disp: , Rfl:   •  sildenafil (VIAGRA) 100 MG tablet, Take 50 mg by mouth As Needed for Erectile Dysfunction., Disp: , Rfl:   •  tamsulosin (FLOMAX) 0.4 MG capsule 24 hr capsule, Take 1 capsule by  "mouth Daily., Disp: , Rfl:   •  vitamin D (ERGOCALCIFEROL) 1.25 MG (48222 UT) capsule capsule, Take 50,000 Units by mouth 1 (One) Time Per Week., Disp: , Rfl:       Review of Systems   Constitutional: Negative for diaphoresis, fever and malaise/fatigue.   HENT: Negative for congestion.    Eyes: Negative for visual disturbance.   Cardiovascular: Negative for chest pain, claudication, dyspnea on exertion, leg swelling, near-syncope, orthopnea, palpitations, paroxysmal nocturnal dyspnea and syncope.   Respiratory: Negative for shortness of breath and wheezing.    Hematologic/Lymphatic: Negative for bleeding problem.   Musculoskeletal: Positive for muscle weakness.   Gastrointestinal: Negative for bloating, abdominal pain, nausea and vomiting.   Neurological: Negative for dizziness, headaches, light-headedness and weakness.   Psychiatric/Behavioral: Negative for altered mental status.         ECG 12 Lead    Date/Time: 7/29/2022 12:56 PM  Performed by: Aziza Soriano APRN  Authorized by: Aziza Soriano APRN   Comparison: compared with previous ECG from 1/31/2022  Similar to previous ECG  Rhythm: sinus tachycardia  Rate: tachycardic  BPM: 107  Conduction: conduction normal    Clinical impression: abnormal EKG          /80 (BP Location: Right arm, Patient Position: Sitting, Cuff Size: Adult)   Pulse 107   Resp 18   Ht 172.7 cm (68\")   Wt 94.3 kg (208 lb)   SpO2 98%   BMI 31.63 kg/m²        Objective:     Vitals reviewed.   Constitutional:       General: Awake. Not in acute distress.     Appearance: Normal appearance. Well-developed, well-groomed and not in distress. Obese. Chronically ill-appearing. Not diaphoretic.   Eyes:      General:         Right eye: No discharge.         Left eye: No discharge.   HENT:      Head: Normocephalic and atraumatic.    Mouth/Throat:      Pharynx: No oropharyngeal exudate.   Pulmonary:      Effort: Pulmonary effort is normal. No respiratory distress.      Breath sounds: " Normal breath sounds. No wheezing. No rhonchi. No rales.   Chest:      Chest wall: Not tender to palpatation.   Cardiovascular:      Tachycardia present. Regular rhythm.      Murmurs: There is no murmur.      No gallop. No rub.   Edema:     Peripheral edema absent.   Abdominal:      General: There is no distension.      Palpations: Abdomen is soft.      Tenderness: There is no abdominal tenderness.   Musculoskeletal: Normal range of motion.      Cervical back: Normal range of motion and neck supple. Skin:     General: Skin is warm and dry.      Coloration: Skin is not pale.      Findings: No erythema or rash.   Neurological:      Mental Status: Alert, oriented to person, place, and time and oriented to person, place and time.   Psychiatric:         Attention and Perception: Attention normal.         Mood and Affect: Mood normal.         Speech: Speech normal.         Behavior: Behavior normal. Behavior is cooperative.         Thought Content: Thought content normal.         Cognition and Memory: Cognition normal.         Judgment: Judgment normal.       Lab Review:   Results for orders placed during the hospital encounter of 01/23/17    Adult Transthoracic Echo Complete With Contrast    Interpretation Summary  · All left ventricular wall segments contract normally.  · Left ventricular wall thickness is consistent with moderate concentric hypertrophy.    GROSSLY NORMAL LV AND RV SYSTOLIC FUNCTION  GROSSLY NORMAL VALVE FUNCTION  MODERATE LVH          Assessment:          Diagnosis Plan   1. Paroxysmal atrial fibrillation ([facility])  ECG 12 Lead   2. Essential hypertension     3. Mixed hyperlipidemia     4. PVD (peripheral vascular disease) ([facility])     5. Class 1 obesity due to excess calories with serious comorbidity and body mass index (BMI) of 31.0 to 31.9 in adult            Plan:      Paroxysmal atrial fibrillation: No known recurrence.  He denies any palpitations, prolonged episodes of tachycardia.  He is not  anticoagulated due to no recent recurrence.  CHADVASC2 SCORE   [id number] Score: 4 (7/29/2022  1:20 PM)    Hypertension: Stable.  Currently well controlled on his home medications.  Continue follow-up with primary care office.    Hyperlipidemia: Managed on statin therapy follow-up by his PCP office.    Peripheral vascular disease: The patient follows with vascular surgery.  He has known peripheral disease including bilateral lower extremity disease as well as surveillance set up for carotid ultrasound in future.  The patient is managed on clopidogrel and statin therapy.    Obesity: BMI 31.63.  Cardiac diet is recommended with routine exercise.      Continue current medications.  Follow-up in our office in 6 months, call sooner if needed.

## 2022-07-29 PROBLEM — E66.09 OBESITY DUE TO EXCESS CALORIES WITH SERIOUS COMORBIDITY: Chronic | Status: ACTIVE | Noted: 2018-11-19

## 2022-07-29 PROBLEM — E66.09 OBESITY DUE TO EXCESS CALORIES WITH SERIOUS COMORBIDITY: Status: ACTIVE | Noted: 2018-11-19

## 2022-07-29 PROBLEM — I73.9 PVD (PERIPHERAL VASCULAR DISEASE): Chronic | Status: ACTIVE | Noted: 2019-01-07

## 2022-07-29 PROCEDURE — 93000 ELECTROCARDIOGRAM COMPLETE: CPT | Performed by: NURSE PRACTITIONER

## 2022-08-03 RX ORDER — CLOPIDOGREL BISULFATE 75 MG/1
TABLET ORAL
Qty: 90 TABLET | Refills: 0 | Status: SHIPPED | OUTPATIENT
Start: 2022-08-03 | End: 2022-10-31

## 2022-08-23 RX ORDER — LABETALOL 100 MG/1
TABLET, FILM COATED ORAL
Qty: 60 TABLET | Refills: 3 | Status: SHIPPED | OUTPATIENT
Start: 2022-08-23

## 2022-10-03 DIAGNOSIS — M79.18 ABDOMINAL MUSCLE PAIN: ICD-10-CM

## 2022-10-03 RX ORDER — CYCLOBENZAPRINE HCL 5 MG
TABLET ORAL
Qty: 90 TABLET | Refills: 0 | Status: SHIPPED | OUTPATIENT
Start: 2022-10-03 | End: 2022-11-07

## 2022-10-20 NOTE — PROGRESS NOTES
[facility] - PODIATRY    Today's Date: 10/25/22    Patient Name: Edy Cosby  MRN: [id number]  CSN: [id number]  PCP: Ana Vincent APRN  Referring Provider: No ref. provider found    SUBJECTIVE     Chief Complaint   Patient presents with   • Follow-up     Mark Villela MD 03/22/2022  Return in about 3 months - pt states feet doing ok, would like to discuss a new brace for left foot, one he has is starting to hurt- pt denies pain- pt presents with long thick nails, spot on bottom of left great toe   • Diabetes     Hasnt checked     HPI: Edy Cosby, a 67 y.o.male, comes to clinic as a(n) established patient presenting for diabetic foot exam and complaining of thickened toenails. Patient has h/o AFib, back pain, DM2, Emphysema lung, GERD, HTN, OA. Patient is IDDM and unsure of last BG level.  Reports neuropathy in feet with numbness and tingling.  Continues use of AFO for foot drop of left foot; [facility] and [facility]. Patient states that toenails are long, thick, and irregular and that he is unable to care for them himself.  Denies open wound or sores. Has had return of callus to left plantar hallux. Denies pain presently but notes pain when wearing new AFO. Relates previous treatment(s) including foot care by podiatrist. Denies any constitutional symptoms. No other pedal complaints at this time.    Past Medical History:   Diagnosis Date   • A-fib (HCC)    • Atherosclerosis    • Chronic back pain    • Constipation    • Diabetes mellitus (HCC)     TYPE II   • Dropfoot     wears brace   • Dysphagia    • Emphysema of lung (HCC)    • Gastroparesis    • GERD (gastroesophageal reflux disease)    • Hyperlipidemia    • Hypertension     ESSENTIAL   • Leg pain    • Muscle spasm    • Nerve pain    • Osteoarthritis    • Sleep apnea     no cpap/bipap (non-compliance)   • Sleeping difficulty    • Ulcer of toe due to diabetes mellitus (HCC)      Past Surgical History:    Procedure Laterality Date   • ANGIOPLASTY ILIAC ARTERY Left 1/29/2019    Procedure: ANGIOPLASTY ILIAC ARTERY, STENT PLACEMENT;  Surgeon: Duncan Lucio MD;  Location: [facility] HYBRID OR 12;  Service: Vascular   • AORTAGRAM Left 12/18/2018    Procedure: AORTOGRAM, LEFT LEG ANGIOGRAM, MYNX CLOSURE;  Surgeon: Duncan Lucio MD;  Location: [facility] HYBRID OR 12;  Service: Vascular   • AORTAGRAM Right 12/16/2021    Procedure: AORTAGRAM, RIGHT LOWER EXTREMITY ANGIOGRAM, POSSIBLE INTERVENTION;  Surgeon: Duncan Lucio MD;  Location: [facility] HYBRID OR 12;  Service: Vascular;  Laterality: Right;   • BACK SURGERY     • CHOLECYSTECTOMY     • COLONOSCOPY  09/01/2011    TICS RECALL 5YR   • COLONOSCOPY  09/30/2008    ENTER RESULTS: STOOL THROUGHTOUT THE COLON POOR PREP REC 3 YEAR RECALL   • COLONOSCOPY N/A 11/9/2016    Procedure: COLONOSCOPY;  Surgeon: León Zepeda MD;  Location: [facility] ENDOSCOPY;  Service:    • COLONOSCOPY N/A 4/19/2018    Procedure: COLONOSCOPY WITH ANESTHESIA;  Surgeon: León Zepeda MD;  Location: [facility] ENDOSCOPY;  Service: Gastroenterology   • ENDOSCOPY  06/19/2012    HH   • ENDOSCOPY  08/25/2009    HIATAL HERNIA, DILATED WITH 50 FR MASCORRO   • ENDOSCOPY  06/19/2007    WITHIN NORMAL LIMITS UREA NEG   • FEMORAL ENDARTERECTOMY Left 1/29/2019    Procedure: LEFT FEMORAL ENDARTERECTOMY;  Surgeon: Duncan Lucio MD;  Location: [facility] OR 12;  Service: Vascular   • FEMORAL ENDARTERECTOMY Right 2/3/2022    Procedure: RIGHT FEMORAL ENDARTERECTOMY, RIGHT LOWER EXTREMITY ANGIOGRAM, BALLOON ANGIOPLASTY;  Surgeon: Duncan Lucio MD;  Location: [facility] OR 12;  Service: Vascular;  Laterality: Right;   • LUMBAR LAMINECTOMY WITH FUSION N/A 1/23/2017    Procedure: REMOVAL OF INSTRUMENTATION, EXPLORATION OF FUSION L4-S1, REVISION LEFT L5-S1 HEMILAMINECTOMY, FACETECTOMY DECOMPRESSION, REVISION UNINSTRUMENTED POSTERIOR SPINAL FUSION L5-S1;  Surgeon: RHONDA Lopes MD;   Location:  [facility] OR;  Service:    • OTHER SURGICAL HISTORY      LUMBAR SACRAL SURGERY WITH FUSION X2   • PILONIDAL CYST / SINUS EXCISION      PILONIDAL CYST REMOVAL     Family History   Problem Relation Age of Onset   • Heart attack Father    • Diabetes Brother    • Colon polyps Sister    • Stomach cancer Neg Hx         GI CNACERS OR DISEASE   • Colon cancer Neg Hx      Social History     Socioeconomic History   • Marital status: Single   Tobacco Use   • Smoking status: Former     Years: 30.00     Types: Cigarettes     Quit date: 2019     Years since quitting: 3.8   • Smokeless tobacco: Never   Vaping Use   • Vaping Use: Never used   Substance and Sexual Activity   • Alcohol use: No   • Drug use: Yes     Frequency: 4.0 times per week     Types: Marijuana   • Sexual activity: Defer     No Known Allergies  Current Outpatient Medications   Medication Sig Dispense Refill   • albuterol sulfate  (90 Base) MCG/ACT inhaler Inhale 2 puffs 4 (Four) Times a Day As Needed.     • amLODIPine (NORVASC) 10 MG tablet Take 10 mg by mouth Daily.     • ascorbic acid (VITAMIN C) 500 MG tablet 500 mg Daily.     • atorvastatin (LIPITOR) 40 MG tablet 1 tablet Every Night.     • clopidogrel (PLAVIX) 75 MG tablet Take 1 tablet by mouth once daily 90 tablet 0   • cyclobenzaprine (FLEXERIL) 5 MG tablet Take 5 mg by mouth 3 (Three) Times a Day As Needed for Muscle Spasms.     • Dulaglutide (Trulicity) 1.5 MG/0.5ML solution pen-injector Inject 1.5 mg under the skin into the appropriate area as directed 1 (One) Time Per Week.     • folic acid (FOLVITE) 1 MG tablet 1 mg.     • hydroCHLOROthiazide (MICROZIDE) 12.5 MG capsule Take 12.5 mg by mouth Daily.     • labetalol (NORMODYNE) 100 MG tablet Take 100 mg by mouth 2 (Two) Times a Day.     • lisinopril (PRINIVIL,ZESTRIL) 40 MG tablet Take 40 mg by mouth Daily.     • pantoprazole (PROTONIX) 40 MG EC tablet Take 40 mg by mouth Daily.     • sildenafil (VIAGRA) 100 MG tablet Take 50 mg by mouth  As Needed for Erectile Dysfunction.     • tamsulosin (FLOMAX) 0.4 MG capsule 24 hr capsule Take 1 capsule by mouth Daily.     • vitamin D (ERGOCALCIFEROL) 1.25 MG (86444 UT) capsule capsule Take 50,000 Units by mouth 1 (One) Time Per Week.       No current facility-administered medications for this visit.     Review of Systems   Constitutional: Negative for chills and fever.   HENT: Negative for congestion.    Respiratory: Negative for shortness of breath.    Cardiovascular: Negative for chest pain and leg swelling.   Gastrointestinal: Negative for constipation, diarrhea, nausea and vomiting.   Musculoskeletal: Positive for arthralgias and gait problem (foot drop).   Skin: Negative for wound.        Left hallux plantar callus   Neurological: Positive for numbness.   Hematological: Bruises/bleeds easily.       OBJECTIVE     Vitals:    10/24/22 1047   BP: 156/68   Pulse: 97   SpO2: 98%       PHYSICAL EXAM  GEN:   Accompanied by none.     Foot/Ankle Exam:       General:   Appearance: appears stated age and healthy and obesity    Orientation: AAOx3    Affect: appropriate    Gait: steppage    Gait comment:  Drop foot left  Assistance: independent    Shoe Gear:  Diabetic shoes (Left AFO)    VASCULAR      Right Foot Vascularity   Dorsalis pedis:  2+  Posterior tibial:  2+  Skin Temperature: warm    Edema Grading:  Trace and non-pitting  CFT:  3  Pedal Hair Growth:  Present  Varicosities: none       Left Foot Vascularity   Dorsalis pedis:  2+  Posterior tibial:  2+  Skin Temperature: warm    Edema Grading:  Trace and non-pitting  CFT:  3  Pedal Hair Growth:  Present  Varicosities: none        NEUROLOGIC     Right Foot Neurologic   Light touch sensation:  Diminished  Vibratory sensation:  Diminished  Hot/Cold sensation: diminished    Protective Sensation using [place] Monofilament:  0     Left Foot Neurologic   Light touch sensation:  Diminished  Vibratory sensation:  Diminished  Hot/cold sensation: diminished     Protective Sensation using [place] Monofilament:  0     MUSCULOSKELETAL      Right Foot Musculoskeletal   Ecchymosis:  None  Tenderness: none    Arch:  Pes planus  Hallux valgus: No    Hallux limitus: No       Left Foot Musculoskeletal   Ecchymosis:  None  Tenderness: none    Arch:  Pes planus  Hallux valgus: No    Hallux limitus: No       MUSCLE STRENGTH     Right Foot Muscle Strength   Foot dorsiflexion:  5  Foot plantar flexion:  5  Foot inversion:  5  Foot eversion:  5     Left Foot Muscle Strength   Foot dorsiflexion:  2  Foot plantar flexion:  3  Foot inversion:  3  Foot eversion:  3     RANGE OF MOTION      Right Foot Range of Motion   Foot and ankle ROM within normal limits       Left Foot Range of Motion   Foot and ankle ROM within normal limits       DERMATOLOGIC     Right Foot Dermatologic   Skin: skin intact    Skin: no right foot skin dryness and no right foot tinea    Nails: onychomycosis, abnormally thick, subungual debris and dystrophic nails       Left Foot Dermatologic   Skin: blister (dried blister with plantar lateral hallux beside callus) and corn    Skin: no left foot skin dryness and no left foot tinea    Nails: onychomycosis, abnormally thick, subungual debris and dystrophic nails       Image:       RADIOLOGY/NUCLEAR:  No results found.    LABORATORY/CULTURE RESULTS:      PATHOLOGY RESULTS:       ASSESSMENT/PLAN     Diagnoses and all orders for this visit:    1. Onychomycosis (Primary)    2. Pre-ulcerative calluses    3. Type 2 diabetes mellitus with diabetic polyneuropathy, with long-term current use of insulin (HCC)    4. Foot drop, left    5. Antiplatelet or antithrombotic long-term use    6. Blister of foot without infection, left, initial encounter      Comprehensive lower extremity examination and evaluation was performed.  Discussed findings and treatment plan including risks, benefits, and treatment options with patient in detail. Patient agreed with treatment plan.  After  verbal consent obtained, nail(s) x10 debrided of length and thickness with nail nipper without incidence  After verbal consent obtained, calluses x1 pared utilizing dermal curette and/or scalpel without incidence  Patient may maintain nails and calluses at home utilizing emery board or pumice stone between visits as needed  Reviewed at home diabetic foot care including daily foot checks   Continue AFO for foot drop.   Continue diabetic monitoring and control under direction of PCP.    Monitor callused area closely and return to care or go to wound care if area opens/changes. Advised to keep triple abx and band-aid over area due to blistered area for next week.  An After Visit Summary was printed and given to the patient at discharge, including (if requested) any available informative/educational handouts regarding diagnosis, treatment, or medications. All questions were answered to patient/family satisfaction. Should symptoms fail to improve or worsen they agree to call or return to clinic or to go to the Emergency Department. Discussed the importance of following up with any needed screening tests/labs/specialist appointments and any requested follow-up recommended by me today. Importance of maintaining follow-up discussed and patient accepts that missed appointments can delay diagnosis and potentially lead to worsening of conditions.  Return in about 3 months (around 1/24/2023)., or sooner if acute issues arise.    Lab Frequency Next Occurrence   Diet: Once 04/19/2018   Advance Diet as Tolerated Once 04/19/2018       This document has been electronically signed by PRATIMA Esteves on October 25, 2022 07:56 CDT

## 2022-10-24 ENCOUNTER — OFFICE VISIT (OUTPATIENT)
Dept: PODIATRY | Facility: CLINIC | Age: 67
End: 2022-10-24

## 2022-10-24 VITALS
HEIGHT: 68 IN | HEART RATE: 97 BPM | OXYGEN SATURATION: 98 % | WEIGHT: 208 LBS | SYSTOLIC BLOOD PRESSURE: 156 MMHG | BODY MASS INDEX: 31.52 KG/M2 | DIASTOLIC BLOOD PRESSURE: 68 MMHG

## 2022-10-24 DIAGNOSIS — Z79.4 TYPE 2 DIABETES MELLITUS WITH DIABETIC POLYNEUROPATHY, WITH LONG-TERM CURRENT USE OF INSULIN: ICD-10-CM

## 2022-10-24 DIAGNOSIS — S90.822A BLISTER OF FOOT WITHOUT INFECTION, LEFT, INITIAL ENCOUNTER: ICD-10-CM

## 2022-10-24 DIAGNOSIS — Z79.02 ANTIPLATELET OR ANTITHROMBOTIC LONG-TERM USE: ICD-10-CM

## 2022-10-24 DIAGNOSIS — L84 PRE-ULCERATIVE CALLUSES: ICD-10-CM

## 2022-10-24 DIAGNOSIS — E11.42 TYPE 2 DIABETES MELLITUS WITH DIABETIC POLYNEUROPATHY, WITH LONG-TERM CURRENT USE OF INSULIN: ICD-10-CM

## 2022-10-24 DIAGNOSIS — M21.372 FOOT DROP, LEFT: ICD-10-CM

## 2022-10-24 DIAGNOSIS — B35.1 ONYCHOMYCOSIS: Primary | ICD-10-CM

## 2022-10-24 PROCEDURE — 11721 DEBRIDE NAIL 6 OR MORE: CPT | Performed by: NURSE PRACTITIONER

## 2022-10-24 PROCEDURE — 11055 PARING/CUTG B9 HYPRKER LES 1: CPT | Performed by: NURSE PRACTITIONER

## 2022-10-28 LAB
ALBUMIN SERPL-MCNC: 4.3 G/DL (ref 3.5–5.2)
ALP BLD-CCNC: 72 U/L (ref 40–130)
ALT SERPL-CCNC: 13 U/L (ref 5–41)
ANION GAP SERPL CALCULATED.3IONS-SCNC: 12 MMOL/L (ref 7–19)
AST SERPL-CCNC: 18 U/L (ref 5–40)
BASOPHILS ABSOLUTE: 0 K/UL (ref 0–0.2)
BASOPHILS RELATIVE PERCENT: 0.8 % (ref 0–1)
BILIRUB SERPL-MCNC: <0.2 MG/DL (ref 0.2–1.2)
BILIRUBIN URINE: NEGATIVE
BLOOD, URINE: NEGATIVE
BUN BLDV-MCNC: 36 MG/DL (ref 8–23)
CALCIUM SERPL-MCNC: 9.5 MG/DL (ref 8.8–10.2)
CHLORIDE BLD-SCNC: 104 MMOL/L (ref 98–111)
CLARITY: CLEAR
CO2: 26 MMOL/L (ref 22–29)
COLOR: YELLOW
CREAT SERPL-MCNC: 1.8 MG/DL (ref 0.5–1.2)
CREATININE URINE: 203.4 MG/DL (ref 4.2–622)
EOSINOPHILS ABSOLUTE: 0.1 K/UL (ref 0–0.6)
EOSINOPHILS RELATIVE PERCENT: 2.8 % (ref 0–5)
GFR SERPL CREATININE-BSD FRML MDRD: 41 ML/MIN/{1.73_M2}
GLUCOSE BLD-MCNC: 140 MG/DL (ref 74–109)
GLUCOSE URINE: NEGATIVE MG/DL
HCT VFR BLD CALC: 29.7 % (ref 42–52)
HEMOGLOBIN: 9.4 G/DL (ref 14–18)
IMMATURE GRANULOCYTES #: 0 K/UL
KETONES, URINE: ABNORMAL MG/DL
LEUKOCYTE ESTERASE, URINE: NEGATIVE
LYMPHOCYTES ABSOLUTE: 1.4 K/UL (ref 1.1–4.5)
LYMPHOCYTES RELATIVE PERCENT: 36.2 % (ref 20–40)
MCH RBC QN AUTO: 28.4 PG (ref 27–31)
MCHC RBC AUTO-ENTMCNC: 31.6 G/DL (ref 33–37)
MCV RBC AUTO: 89.7 FL (ref 80–94)
MONOCYTES ABSOLUTE: 0.3 K/UL (ref 0–0.9)
MONOCYTES RELATIVE PERCENT: 6.4 % (ref 0–10)
NEUTROPHILS ABSOLUTE: 2.1 K/UL (ref 1.5–7.5)
NEUTROPHILS RELATIVE PERCENT: 53.5 % (ref 50–65)
NITRITE, URINE: NEGATIVE
PDW BLD-RTO: 15.8 % (ref 11.5–14.5)
PH UA: 5 (ref 5–8)
PLATELET # BLD: 207 K/UL (ref 130–400)
PMV BLD AUTO: 12.3 FL (ref 9.4–12.4)
POTASSIUM SERPL-SCNC: 4.3 MMOL/L (ref 3.5–5)
PROTEIN PROTEIN: 13 MG/DL (ref 15–45)
PROTEIN UA: NEGATIVE MG/DL
RBC # BLD: 3.31 M/UL (ref 4.7–6.1)
SODIUM BLD-SCNC: 142 MMOL/L (ref 136–145)
SPECIFIC GRAVITY UA: 1.02 (ref 1–1.03)
TOTAL PROTEIN: 7.6 G/DL (ref 6.6–8.7)
URIC ACID, SERUM: 6.3 MG/DL (ref 3.4–7)
UROBILINOGEN, URINE: 0.2 E.U./DL
VITAMIN D 25-HYDROXY: 82.6 NG/ML
WBC # BLD: 3.9 K/UL (ref 4.8–10.8)

## 2022-10-31 ENCOUNTER — OFFICE VISIT (OUTPATIENT)
Dept: PRIMARY CARE CLINIC | Age: 67
End: 2022-10-31

## 2022-10-31 VITALS
DIASTOLIC BLOOD PRESSURE: 68 MMHG | HEART RATE: 91 BPM | OXYGEN SATURATION: 96 % | HEIGHT: 67 IN | WEIGHT: 212 LBS | SYSTOLIC BLOOD PRESSURE: 144 MMHG | TEMPERATURE: 97.5 F | BODY MASS INDEX: 33.27 KG/M2

## 2022-10-31 DIAGNOSIS — E11.51 TYPE 2 DIABETES MELLITUS WITH DIABETIC PERIPHERAL ANGIOPATHY WITHOUT GANGRENE, WITHOUT LONG-TERM CURRENT USE OF INSULIN (HCC): Primary | ICD-10-CM

## 2022-10-31 DIAGNOSIS — N18.32 STAGE 3B CHRONIC KIDNEY DISEASE (HCC): ICD-10-CM

## 2022-10-31 DIAGNOSIS — J44.9 CHRONIC OBSTRUCTIVE PULMONARY DISEASE, UNSPECIFIED COPD TYPE (HCC): ICD-10-CM

## 2022-10-31 DIAGNOSIS — I10 ESSENTIAL HYPERTENSION: ICD-10-CM

## 2022-10-31 DIAGNOSIS — I48.0 PAROXYSMAL ATRIAL FIBRILLATION (HCC): ICD-10-CM

## 2022-10-31 DIAGNOSIS — Z23 NEED FOR VACCINATION: ICD-10-CM

## 2022-10-31 LAB — HBA1C MFR BLD: 6.8 %

## 2022-10-31 PROCEDURE — 0124A COVID-19, PFIZER BIVALENT BOOSTER, (AGE 12Y+), IM, 30 MCG/0.3 ML: CPT | Performed by: NURSE PRACTITIONER

## 2022-10-31 PROCEDURE — 99214 OFFICE O/P EST MOD 30 MIN: CPT | Performed by: NURSE PRACTITIONER

## 2022-10-31 PROCEDURE — G0008 ADMIN INFLUENZA VIRUS VAC: HCPCS | Performed by: NURSE PRACTITIONER

## 2022-10-31 PROCEDURE — 83036 HEMOGLOBIN GLYCOSYLATED A1C: CPT | Performed by: NURSE PRACTITIONER

## 2022-10-31 PROCEDURE — 90694 VACC AIIV4 NO PRSRV 0.5ML IM: CPT | Performed by: NURSE PRACTITIONER

## 2022-10-31 PROCEDURE — 3078F DIAST BP <80 MM HG: CPT | Performed by: NURSE PRACTITIONER

## 2022-10-31 PROCEDURE — 3044F HG A1C LEVEL LT 7.0%: CPT | Performed by: NURSE PRACTITIONER

## 2022-10-31 PROCEDURE — 91312 COVID-19, PFIZER BIVALENT BOOSTER, (AGE 12Y+), IM, 30 MCG/0.3 ML: CPT | Performed by: NURSE PRACTITIONER

## 2022-10-31 PROCEDURE — 1123F ACP DISCUSS/DSCN MKR DOCD: CPT | Performed by: NURSE PRACTITIONER

## 2022-10-31 PROCEDURE — 3074F SYST BP LT 130 MM HG: CPT | Performed by: NURSE PRACTITIONER

## 2022-10-31 RX ORDER — CLOPIDOGREL BISULFATE 75 MG/1
TABLET ORAL
Qty: 90 TABLET | Refills: 0 | Status: SHIPPED | OUTPATIENT
Start: 2022-10-31 | End: 2023-01-31

## 2022-10-31 ASSESSMENT — ENCOUNTER SYMPTOMS
COUGH: 0
VOMITING: 0
SHORTNESS OF BREATH: 0
NAUSEA: 0
RHINORRHEA: 0
PHOTOPHOBIA: 0
VOICE CHANGE: 0
COLOR CHANGE: 0
BACK PAIN: 0

## 2022-10-31 NOTE — PATIENT INSTRUCTIONS
Update vaccines today. Follow-up in 6 months with fasting labs prior to visit. Continue all medications as directed. Monitor blood pressure.

## 2022-10-31 NOTE — PROGRESS NOTES
[address] [facility] CARE  [address] [place] [address]  [address] [address]  Dept: [phone]  Dept Fax: [phone]  Loc: [phone]    Lesly Moreno is a 78 y.o. male who presents today for his medical conditions/complaints as noted below. Lesly Moreno is c/o of Follow-up (No new issues or concerns )        HPI:     HPI   Chief Complaint   Patient presents with    Follow-up     No new issues or concerns      Patient presents today for follow-up DM, hypertension, COPD, CKD, afib. He is seeing Dr Silvano Jose for CKD; recent creatinine 1.8, GFR 41. Blood pressure in stable range. Denies chest pain or SOB. A1C today is 6.9; last A1C was 6.9. He is due for flu and covid vaccines.     Past Medical History:   Diagnosis Date    Arthritis     BiPAP (biphasic positive airway pressure) dependence     8cm to 20cm    Chronic back pain     Emphysema/COPD (HCC)     GERD (gastroesophageal reflux disease)     History of anemia     History of blood transfusion     Hyperlipidemia     Hypertension     Impotence 3/22/2021    Neuropathy     Obstructive sleep apnea     AHI:  95.8 per PSG, 3/2017; repeat PSG, 11/2017 revealed an AHI of 65.5    Peripheral vascular disease (HCC)     S/P angioplasty     Type II or unspecified type diabetes mellitus without mention of complication, not stated as uncontrolled       Past Surgical History:   Procedure Laterality Date    CHOLECYSTECTOMY      LARYNGOSCOPY N/A 11/10/2017    Direct laryngoscopy with assessment of hypopharynx, larynx, and post-cricoid areas performed by Cam Bradley MD at [address] N/A 5/25/2018    MICRO DIRECT LARYNGOSCOPY WITH BIOPSY performed by Cam Bradley MD at [facility]      x  in past, around [address] 10/31/2022 7/26/2022 1/20/2022 8/11/2021 7/27/2021 2/34/1914   SYSTOLIC 193 [phone]   DIASTOLIC 68 68 70 74 60 84   Site - Right Upper Arm Left Upper Arm - - -   Position - Sitting - - - -   Pulse 91 104 99 104 91 86   Temp 97.5 97.1 96.5 98.9 97.2 96.7   Resp - - - 18 16 20   SpO2 96 97 95 93 95 95   Weight 212 lb 210 lb 12.8 oz 223 lb 250 lb 239 lb 250 lb   Height 5' 7\" 5' 7\" 5' 7\" - 5' 7.5\" 5' 7\"   Body mass index 33.2 kg/m2 33.01 kg/m2 34.92 kg/m2 - 36.88 kg/m2 39.15 kg/m2   Some recent data might be hidden       Family History   Problem Relation Age of Onset    Diabetes Father     Heart Disease Maternal Grandmother        Social History     Tobacco Use    Smoking status: Former     Packs/day: 0.25     Years: 20.00     Pack years: 5.00     Types: Cigarettes     Quit date: 2017     Years since quittin.8    Smokeless tobacco: Never   Substance Use Topics    Alcohol use: No      Current Outpatient Medications on File Prior to Visit   Medication Sig Dispense Refill    cyclobenzaprine (FLEXERIL) 5 MG tablet Take 1 tablet by mouth three times daily as needed for muscle spasm 90 tablet 0    labetalol (NORMODYNE) 100 MG tablet Take 1 tablet by mouth twice daily 60 tablet 3    tamsulosin (FLOMAX) 0.4 MG capsule TAKE 1 CAPSULE BY MOUTH ONCE DAILY      folic acid (FOLVITE) 1 MG tablet 1 mg      ferrous sulfate (IRON 325) 325 (65 Fe) MG tablet 325 mg      ergocalciferol (ERGOCALCIFEROL) 1.25 MG (98710 UT) capsule Take 50,000 Units by mouth once a week      vitamin D (ERGOCALCIFEROL) 1.25 MG (46066 UT) CAPS capsule TAKE 1 CAPSULE BY MOUTH ONCE A WEEK      clopidogrel (PLAVIX) 75 MG tablet Take 75 mg by mouth in the morning.       clopidogrel (PLAVIX) 75 MG tablet TAKE 1 TABLET BY MOUTH ONCE DAILY      Cholecalciferol 50 MCG (2000 UT) TABS 50 mcg      ascorbic acid (VITAMIN C) 500 MG tablet 500 mg      allopurinol (ZYLOPRIM) 100 MG tablet 50 mg      albuterol sulfate HFA (VENTOLIN HFA) 108 (90 Base) MCG/ACT inhaler Inhale 2 puffs into the lungs 4 times daily as needed for Wheezing 1 each 5    amLODIPine (NORVASC) 10 MG tablet Take 1 tablet by mouth once daily 90 tablet 1    atorvastatin (LIPITOR) 40 MG tablet Take 1 tablet by mouth once daily 90 tablet 3    blood glucose test strips (GNP EASY TOUCH GLUCOSE TEST) strip TEST 3 TIMES DAILY Dx E11.49 300 strip 0    Dulaglutide (TRULICITY) 1.5 UN/6.9JR SOPN Inject 1.5 mg into the skin once a week 4 pen 3    hydroCHLOROthiazide (HYDRODIURIL) 25 MG tablet Take 1 tablet by mouth once daily 90 tablet 1    insulin aspart (NOVOLOG FLEXPEN) 100 UNIT/ML injection pen Inject 25-37 Units into the skin in the morning and 25-37 Units at noon and 25-37 Units in the evening. Inject before meals. 20 units breakfast, 30 units lunch, 30 units dinner + sliding scale each meal (Total dose is 20-32 breakfast, 30-42 lunch and dinner). 5 pen 3    insulin glargine (LANTUS SOLOSTAR) 100 UNIT/ML injection pen Inject 20 Units into the skin nightly 5 pen 3    Insulin Pen Needle 31G X 6 MM MISC 1 each by Does not apply route 4 times daily (before meals and nightly) May substitute to generic for insurance Dx E11.49 120 each 3    Insulin Syringe-Needle U-100 30G X 1/2\" 0.5 ML MISC 1 each by Does not apply route 3 times daily 100 each 3    lisinopril (PRINIVIL;ZESTRIL) 40 MG tablet Take 1 tablet by mouth in the morning. 90 tablet 1    pantoprazole (PROTONIX) 40 MG tablet TAKE 1 TABLET BY MOUTH ONCE DAILY IN THE MORNING BEFORE BREAKFAST 90 tablet 1    senna-docusate (PERICOLACE) 8.6-50 MG per tablet Take 2 tablets by mouth daily as needed for Constipation 30 tablet 0    Diabetic Shoe MISC by Does not apply route 1 each 0    Diabetic Shoe MISC by Does not apply route DISPENSE ONE PAIR DIABETIC SHOES AND THREE PAIRS OF HEAT MOLDED INSERTS 1 each 0    cilostazol (PLETAL) 50 MG tablet Take 1 tablet by mouth twice daily 60 tablet 5    aspirin 325 MG tablet Take 0.5 tablets by mouth daily 30 tablet 2    blood glucose monitor kit and supplies Test 4 times a day & as needed for symptoms of irregular blood glucose.  1 kit 0    Lancets MISC Daily Accu-Chek 100 each 3    [DISCONTINUED] Diabetic Shoe MISC by Does not apply route DISPENSE ONE PAIR DIABETIC SHOES AND THREE PAIRS OF HEAT MOLDED INSERTS 1 each 0     No current facility-administered medications on file prior to visit. No Known Allergies    Health Maintenance   Topic Date Due    Diabetic retinal exam  Never done    DTaP/Tdap/Td vaccine (1 - Tdap) Never done    Shingles vaccine (1 of 2) Never done    AAA screen  Never done    Flu vaccine (1) 08/01/2022    COVID-19 Vaccine (4 - Booster for Pfizer series) 09/20/2022    Diabetic foot exam  01/20/2023    Lipids  07/26/2023    Depression Screen  07/26/2023    Annual Wellness Visit (AWV)  07/27/2023    A1C test (Diabetic or Prediabetic)  10/31/2023    Colorectal Cancer Screen  04/08/2024    Pneumococcal 65+ years Vaccine  Completed    Hepatitis C screen  Completed    Hepatitis A vaccine  Aged Out    Hib vaccine  Aged Out    Meningococcal (ACWY) vaccine  Aged Out       Subjective:      Review of Systems   Constitutional:  Negative for chills and fever. HENT:  Negative for ear pain, hearing loss, rhinorrhea and voice change. Eyes:  Negative for photophobia and visual disturbance. Respiratory:  Negative for cough and shortness of breath. Cardiovascular:  Negative for chest pain and palpitations. Gastrointestinal:  Negative for nausea and vomiting. Endocrine: Negative. Negative for cold intolerance and heat intolerance. Genitourinary:  Negative for difficulty urinating and flank pain. Musculoskeletal:  Negative for back pain and neck pain. Skin:  Negative for color change and rash. Allergic/Immunologic: Negative for environmental allergies and food allergies. Neurological:  Negative for dizziness, speech difficulty and headaches. Hematological:  Does not bruise/bleed easily. Psychiatric/Behavioral:  Negative for sleep disturbance and suicidal ideas. Objective:     Physical Exam  Vitals and nursing note reviewed.    Constitutional: Appearance: He is well-developed. HENT:      Head: Atraumatic. Right Ear: External ear normal.      Left Ear: External ear normal.      Nose: Nose normal.   Eyes:      Conjunctiva/sclera: Conjunctivae normal.      Pupils: Pupils are equal, round, and reactive to light. Cardiovascular:      Rate and Rhythm: Normal rate and regular rhythm. Heart sounds: Normal heart sounds, S1 normal and S2 normal.   Pulmonary:      Effort: Pulmonary effort is normal.      Breath sounds: Normal breath sounds. Abdominal:      General: Bowel sounds are normal.      Palpations: Abdomen is soft. Musculoskeletal:         General: Normal range of motion. Cervical back: Normal range of motion and neck supple. Skin:     General: Skin is warm and dry. Neurological:      Mental Status: He is alert and oriented to person, place, and time. Psychiatric:         Behavior: Behavior normal.     BP (!) 144/68   Pulse 91   Temp 97.5 °F (36.4 °C) (Temporal)   Ht 5' 7\" (1.702 m)   Wt 212 lb (96.2 kg)   SpO2 96%   BMI 33.20 kg/m²     Assessment:       Diagnosis Orders   1. Type 2 diabetes mellitus with diabetic peripheral angiopathy without gangrene, without long-term current use of insulin ([facility])  POCT glycosylated hemoglobin (Hb A1C)    CBC with Auto Differential    Comprehensive Metabolic Panel    Hemoglobin A1C    Lipid Panel    Microalbumin / Creatinine Urine Ratio    TSH      2. Need for vaccination  Influenza, FLUAD, (age 72 y+), IM, Preservative Free, 0.5 mL    COVID-19, PFIZER Bivalent BOOSTER, (age 12y+), IM, 30 mcg/0.3 mL      3. Essential hypertension  CBC with Auto Differential    Comprehensive Metabolic Panel    Hemoglobin A1C    Lipid Panel    Microalbumin / Creatinine Urine Ratio    TSH      4.  Chronic obstructive pulmonary disease, [facility]  CBC with Auto Differential    Comprehensive Metabolic Panel    Hemoglobin A1C    Lipid Panel    Microalbumin / Creatinine Urine Ratio    TSH 5. Paroxysmal atrial fibrillation (HCC)  CBC with Auto Differential    Comprehensive Metabolic Panel    Hemoglobin A1C    Lipid Panel    Microalbumin / Creatinine Urine Ratio    TSH      6. Stage 3b chronic kidney disease (HCC)  CBC with Auto Differential    Comprehensive Metabolic Panel    Hemoglobin A1C    Lipid Panel    Microalbumin / Creatinine Urine Ratio    TSH            Plan:   More than 50% of the time was spent counseling and coordinating care for a total time of 30 min face to face. Update vaccines today. Follow-up in 6 months with fasting labs prior to visit. Continue all medications as directed. Monitor blood pressure. PDMP Monitoring:    Last PDMP Bartolo as Reviewed:  Review User Review Instant Review Result            Urine Drug Screenings (1 yr)       DRUG PROFILE  Collected: 7/29/2015  8:05 AM (Final result)              DRUG PROFILE  Collected: 4/29/2015  8:11 AM (Final result)              DRUG PROFILE  Collected: 5/9/2014 10:50 AM (Final result)                  Medication Contract and Consent for Opioid Use Documents Filed       Patient Documents       Type of Document Status Date Received Received By Description    Medication Contract Signed 3/3/2016  1:52 PM Vidya JOSÉ                      Patient given educational materials -see patient instructions. Discussed use, benefit, and side effects of prescribed medications. All patient questions answered. Pt voiced understanding. Reviewed health maintenance. Instructed to continue currentmedications, diet and exercise. Patient agreed with treatment plan. Follow up as directed. MEDICATIONS:  No orders of the defined types were placed in this encounter.         ORDERS:  Orders Placed This Encounter   Procedures    Influenza, FLUAD, (age 72 y+), IM, Preservative Free, 0.5 mL    COVID-19, PFIZER Bivalent BOOSTER, (age 12y+), IM, 30 mcg/0.3 mL    CBC with Auto Differential    Comprehensive Metabolic Panel    Hemoglobin A1C Lipid Panel    Microalbumin / Creatinine Urine Ratio    TSH    POCT glycosylated hemoglobin (Hb A1C)       Follow-up:  Return in about 6 months (around 4/30/2023) for in office, follow-up with labs. PATIENT INSTRUCTIONS:  Patient Instructions   Update vaccines today. Follow-up in 6 months with fasting labs prior to visit. Continue all medications as directed. Monitor blood pressure. Electronically signed by ALFONSO Quevedo on 10/31/2022 at 10:04 AM    EMR Dragon/transcription disclaimer:  Much of thisencounter note is electronic transcription/translation of spoken language to printed texts. The electronic translation of spoken language may be erroneous, or at times, nonsensical words or phrases may be inadvertentlytranscribed.   Although I have reviewed the note for such errors, some may still exist.

## 2022-11-04 DIAGNOSIS — M79.18 ABDOMINAL MUSCLE PAIN: ICD-10-CM

## 2022-11-04 RX ORDER — CLOPIDOGREL BISULFATE 75 MG/1
TABLET ORAL
Qty: 90 TABLET | Refills: 0 | OUTPATIENT
Start: 2022-11-04

## 2022-11-04 NOTE — TELEPHONE ENCOUNTER
Mary Jomoisés Lin called to request a refill on his medication.       Last office visit : 10/31/2022   Next office visit : 5/1/2023     Requested Prescriptions     Pending Prescriptions Disp Refills    cyclobenzaprine (FLEXERIL) 5 MG tablet [Pharmacy Med Name: Cyclobenzaprine HCl 5 MG Oral Tablet] 90 tablet 0     Sig: Take 1 tablet by mouth three times daily as needed for muscle spasm            Ta Foster MA

## 2022-11-07 RX ORDER — CYCLOBENZAPRINE HCL 5 MG
TABLET ORAL
Qty: 90 TABLET | Refills: 0 | Status: SHIPPED | OUTPATIENT
Start: 2022-11-07

## 2022-11-08 DIAGNOSIS — M79.18 ABDOMINAL MUSCLE PAIN: ICD-10-CM

## 2022-11-08 RX ORDER — CYCLOBENZAPRINE HCL 5 MG
TABLET ORAL
Qty: 90 TABLET | Refills: 0 | OUTPATIENT
Start: 2022-11-08

## 2022-11-17 RX ORDER — HYDROCHLOROTHIAZIDE 25 MG/1
TABLET ORAL
Qty: 90 TABLET | Refills: 2 | Status: SHIPPED | OUTPATIENT
Start: 2022-11-17

## 2022-11-17 NOTE — TELEPHONE ENCOUNTER
Requested Prescriptions     Pending Prescriptions Disp Refills    hydroCHLOROthiazide (HYDRODIURIL) 25 MG tablet [Pharmacy Med Name: hydroCHLOROthiazide 25 MG Oral Tablet] 90 tablet 0     Sig: Take 1 tablet by mouth once daily

## 2022-11-28 ENCOUNTER — TELEPHONE (OUTPATIENT)
Dept: VASCULAR SURGERY | Facility: CLINIC | Age: 67
End: 2022-11-28

## 2022-11-28 NOTE — TELEPHONE ENCOUNTER
Left message for a return call. Pt's appointment with Dr. Lucio had to be moved to 01/06/2023 @ 10:45. Pt will still need to have his testing on 12/30/22 .     Dr. Lucio will be out of town on 12/30.      I will mail updated appointment letter.

## 2022-12-02 ENCOUNTER — APPOINTMENT (OUTPATIENT)
Dept: ULTRASOUND IMAGING | Facility: HOSPITAL | Age: 67
End: 2022-12-02

## 2022-12-06 DIAGNOSIS — M79.18 ABDOMINAL MUSCLE PAIN: ICD-10-CM

## 2022-12-07 RX ORDER — CYCLOBENZAPRINE HCL 5 MG
TABLET ORAL
Qty: 90 TABLET | Refills: 0 | Status: SHIPPED | OUTPATIENT
Start: 2022-12-07 | End: 2023-01-05

## 2022-12-07 NOTE — TELEPHONE ENCOUNTER
Geremias Pitts called to request a refill on his medication.       Last office visit : 10/31/2022   Next office visit : 5/1/2023     Requested Prescriptions     Pending Prescriptions Disp Refills    cyclobenzaprine (FLEXERIL) 5 MG tablet [Pharmacy Med Name: Cyclobenzaprine HCl 5 MG Oral Tablet] 90 tablet 0     Sig: Take 1 tablet by mouth three times daily as needed for muscle spasm            Clint Love LPN

## 2022-12-30 ENCOUNTER — HOSPITAL ENCOUNTER (OUTPATIENT)
Dept: ULTRASOUND IMAGING | Facility: HOSPITAL | Age: 67
Discharge: HOME | End: 2022-12-30

## 2022-12-30 DIAGNOSIS — Z98.890 PERIPHERAL ARTERIAL DISEASE WITH HISTORY OF REVASCULARIZATION: ICD-10-CM

## 2022-12-30 DIAGNOSIS — R09.89 BILATERAL CAROTID BRUITS: ICD-10-CM

## 2022-12-30 DIAGNOSIS — I73.9 PERIPHERAL ARTERIAL DISEASE WITH HISTORY OF REVASCULARIZATION: ICD-10-CM

## 2022-12-30 PROCEDURE — 93880 EXTRACRANIAL BILAT STUDY: CPT

## 2022-12-30 PROCEDURE — 93923 UPR/LXTR ART STDY 3+ LVLS: CPT | Performed by: SURGERY

## 2022-12-30 PROCEDURE — 93880 EXTRACRANIAL BILAT STUDY: CPT | Performed by: SURGERY

## 2022-12-30 PROCEDURE — 93923 UPR/LXTR ART STDY 3+ LVLS: CPT

## 2023-01-05 ENCOUNTER — TELEPHONE (OUTPATIENT)
Dept: VASCULAR SURGERY | Facility: CLINIC | Age: 68
End: 2023-01-05
Payer: COMMERCIAL

## 2023-01-05 DIAGNOSIS — M79.18 ABDOMINAL MUSCLE PAIN: ICD-10-CM

## 2023-01-05 RX ORDER — CYCLOBENZAPRINE HCL 5 MG
TABLET ORAL
Qty: 90 TABLET | Refills: 0 | Status: SHIPPED | OUTPATIENT
Start: 2023-01-05

## 2023-01-06 ENCOUNTER — OFFICE VISIT (OUTPATIENT)
Dept: VASCULAR SURGERY | Facility: CLINIC | Age: 68
End: 2023-01-06
Payer: MEDICARE

## 2023-01-06 VITALS
HEIGHT: 69 IN | HEART RATE: 98 BPM | SYSTOLIC BLOOD PRESSURE: 144 MMHG | BODY MASS INDEX: 30.21 KG/M2 | WEIGHT: 204 LBS | OXYGEN SATURATION: 99 % | DIASTOLIC BLOOD PRESSURE: 80 MMHG

## 2023-01-06 DIAGNOSIS — E11.65 TYPE 2 DIABETES MELLITUS WITH HYPERGLYCEMIA, WITHOUT LONG-TERM CURRENT USE OF INSULIN: ICD-10-CM

## 2023-01-06 DIAGNOSIS — I10 ESSENTIAL HYPERTENSION: ICD-10-CM

## 2023-01-06 DIAGNOSIS — I65.23 BILATERAL CAROTID ARTERY STENOSIS: ICD-10-CM

## 2023-01-06 DIAGNOSIS — I73.9 PERIPHERAL ARTERIAL DISEASE WITH HISTORY OF REVASCULARIZATION: Primary | ICD-10-CM

## 2023-01-06 DIAGNOSIS — Z98.890 PERIPHERAL ARTERIAL DISEASE WITH HISTORY OF REVASCULARIZATION: Primary | ICD-10-CM

## 2023-01-06 DIAGNOSIS — E78.2 MIXED HYPERLIPIDEMIA: ICD-10-CM

## 2023-01-06 PROCEDURE — 99214 OFFICE O/P EST MOD 30 MIN: CPT | Performed by: SURGERY

## 2023-01-06 NOTE — PROGRESS NOTES
01/06/2023      Ana Vincent APRN  [address] [place] [address] [facility] [place] [address]        Edy Cosby  1955    Chief Complaint   Patient presents with   • Follow-up     6 month follow up. Last seen 5/23/22. States that everything has been going well.        Dear Ana Vincent APRN:    HPI    I had the pleasure of seeing your patient in the office today for follow up.  As you recall, the patient is a 67 y.o. male who we are currently following for peripheral arterial disease.  He returns today after having undergone repeat NIC/PVR as well as carotid duplex for ongoing surveillance.  He had previously undergone right femoral endarterectomy with angiogram, and prior to that had undergone a similar procedure on the left lower extremity back in 2019.  He continues to feel well after the right femoral endarterectomy and denies any significant claudication in the right lower extremity.  He feels as though he is ambulating much better and has no acute complaints.  NIC/PVR done recently which I personally reviewed showed values of 0.97 on the right, and 0.74 on the left.  This represents no significant arterial insufficiency in the right lower extremity, and mild to moderate arterial insufficiency on the left.  He also had carotid duplex done today which showed some mild plaque disease but less than 50% stenosis of the bilateral internal carotid arteries.  He is asymptomatic from a carotid standpoint with no TIA or strokelike symptoms.  He otherwise feels well with no acute complaints today.  He is maintained on Plavix, and a statin.    Review of Systems   Constitutional: Negative.  Negative for activity change, appetite change, chills and fever.   HENT: Negative.  Negative for congestion, sneezing and sore throat.    Eyes: Negative.    Respiratory: Negative.  Negative for chest tightness and shortness of breath.    Cardiovascular: Negative.  Negative for chest pain, palpitations and leg  swelling.   Gastrointestinal: Negative.  Negative for abdominal distention, abdominal pain, nausea and vomiting.   Endocrine: Negative.    Genitourinary: Negative.    Musculoskeletal: Negative.         Right lower extremity claudication is resolved.   Skin: Negative.    Allergic/Immunologic: Negative.    Neurological: Negative.    Hematological: Negative.    Psychiatric/Behavioral: Negative.        /80   Pulse 98   Ht 175.3 cm (69\")   Wt 92.5 kg (204 lb)   SpO2 99%   BMI 30.13 kg/m²   Physical Exam  Vitals reviewed.   Constitutional:       Appearance: Normal appearance.   HENT:      Head: Normocephalic and atraumatic.      Nose: Nose normal.      Mouth/Throat:      Mouth: Mucous membranes are moist.   Eyes:      Extraocular Movements: Extraocular movements intact.      Pupils: Pupils are equal, round, and reactive to light.   Cardiovascular:      Rate and Rhythm: Normal rate. Rhythm irregularly irregular.      Pulses:           Carotid pulses are 2+ on the right side and 2+ on the left side.       Radial pulses are 2+ on the right side and 2+ on the left side.        Femoral pulses are 2+ on the right side and 2+ on the left side.       Dorsalis pedis pulses are 1+ on the right side and 1+ on the left side.        Posterior tibial pulses are detected w/ Doppler on the right side and detected w/ Doppler on the left side.      Comments: Both feet are warm.  He has no wounds.    Bilateral groin incisions from prior femoral endarterectomies are well-healed.  He has palpable femoral pulses bilaterally and also has palpable dorsalis pedis pulses.  He has Doppler signals of the bilateral PTs.  Pulmonary:      Effort: Pulmonary effort is normal. No respiratory distress.   Abdominal:      General: There is no distension.      Palpations: Abdomen is soft. There is no mass.      Tenderness: There is no abdominal tenderness.   Musculoskeletal:         General: Normal range of motion.      Cervical back: Normal range  of motion and neck supple.      Right lower leg: No edema.      Left lower leg: No edema.      Comments: He has known chronic foot drop of the left foot and wears a brace for this.   Skin:     General: Skin is warm and dry.      Capillary Refill: Capillary refill takes 2 to 3 seconds.   Neurological:      General: No focal deficit present.      Mental Status: He is alert and oriented to person, place, and time.   Psychiatric:         Mood and Affect: Mood normal.         Behavior: Behavior normal.         Thought Content: Thought content normal.         Judgment: Judgment normal.         DIAGNOSTIC DATA:        US Carotid Bilateral    Result Date: 1/3/2023  Narrative: History: Carotid occlusive disease      Impression: Impression: 1. There is less than 50% stenosis of the right internal carotid artery. 2. There is less than 50% stenosis of the left internal carotid artery. 3. Antegrade flow is demonstrated in bilateral vertebral arteries.  Comments: Bilateral carotid vertebral arterial duplex scan was performed.  Grayscale imaging shows intimal thickening and calcified elements at the carotid bifurcation. The right internal carotid artery peak systolic velocity is 77 cm/sec. The end-diastolic velocity is 17.7 cm/sec. The right ICA/CCA ratio is approximately 1.2 . These findings correlate with less than 50% stenosis of the right internal carotid artery.  Grayscale imaging shows intimal thickening and calcified elements at the carotid bifurcation. The left internal carotid artery peak systolic velocity is 64.4 cm/sec. The end-diastolic velocity is 14.6 cm/sec. The left ICA/CCA ratio is approximately 0.8 . These findings correlate with less than 50% stenosis of the left internal carotid artery.  Antegrade flow is demonstrated in bilateral vertebral arteries.  This report was finalized on 01/03/2023 13:04 by Dr. Constantino Metzegr MD.    US Ankle / Brachial Indices Extremity Complete    Result Date: 1/3/2023  Narrative:   History: PAD  Comments: Bilateral lower extremity arterial with multi-level pulse volume recordings and segmental pressures were performed at rest and stress.  The right ankle/brachial index is 0.97. The waveforms are biphasic without dampening.These findings are consistent with no significant arterial insufficiency of the right lower extremity at rest.  The left ankle/brachial index is 0.74. The waveforms are biphasic without dampening. These findings are consistent with moderate arterial insufficiency of the left lower extremity at rest.      Impression: Impression: 1. No significant arterial insufficiency of the right lower extremity at rest. 2. Moderate arterial insufficiency of the left lower extremity at rest.   This report was finalized on 01/03/2023 12:58 by Dr. Constantino Metzger MD.      Patient Active Problem List   Diagnosis   • Spinal stenosis of lumbar region   • Back pain   • Degeneration of intervertebral disc of lumbosacral region   • Essential hypertension   • Lumbar disc herniation with radiculopathy   • Type 2 diabetes mellitus with stage 3a chronic kidney disease, with long-term current use of insulin ([facility])   • Constipation due to opioid therapy   • Gastroesophageal reflux disease without esophagitis   • Panlobular emphysema ([facility])   • Paroxysmal atrial fibrillation ([facility])   • Hx of colonic polyps   • Obesity due to excess calories with serious comorbidity   • Atherosclerosis 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 ([facility])   • H/O diabetic foot ulcer   • Anemia   • Stage 3a chronic kidney disease ([facility])   • Diabetic neuropathy ([facility])   • Difficulty with BiPAP use   • Impotence   • Insomnia   • Left foot drop   • Lumbosacral radiculitis   • Obstructive sleep apnea   • Mixed hyperlipidemia   • PVD (peripheral vascular disease) ([facility])   • Vocal cord paralysis   • Vocal cord paresis   • Atherosclerosis of native artery of right lower extremity with intermittent claudication ([facility])   • Carotid  stenosis   • Benign prostatic hyperplasia without urinary obstruction   • Vitamin D deficiency         ICD-10-CM ICD-9-CM   1. Peripheral arterial disease with history of revascularization ([facility])  I73.9 443.9    Z98.890 V45.89   2. Essential hypertension  I10 401.9   3. Mixed hyperlipidemia  E78.2 272.2   4. Type 2 diabetes mellitus with hyperglycemia, without long-term current use of insulin ([facility])  E11.65 250.00     790.29   5. Bilateral carotid artery stenosis  I65.23 433.10     433.30       Lab Frequency Next Occurrence   Follow Anesthesia Guidelines / Standing Orders Once 12/07/2018   Obtain Informed Consent Once 12/12/2018   Follow Anesthesia Guidelines / Standing Orders Once 12/17/2018   Obtain Informed Consent Once 12/22/2018   Follow Anesthesia Guidelines / Standing Orders Once 01/21/2019   Obtain Informed Consent Once 01/26/2019   Provide NPO Instructions to Patient Once 01/26/2019   Follow Anesthesia Guidelines / Protocol Once 11/19/2021   Obtain Informed Consent Once 11/24/2021   Provide NPO Instructions to Patient Once 11/24/2021   Chlorhexidine Skin Prep Once 11/24/2021   Follow Anesthesia Guidelines / Protocol Once 01/17/2022   Obtain Informed Consent Once 01/22/2022   US Ankle / Brachial Indices Extremity Complete Once 11/19/2022   US Carotid Bilateral Once 11/19/2022       PLAN: After thoroughly evaluating Edy NIKO Cosby, I believe the best course of action is to remain conservative from a vascular standpoint.  Overall he has done quite well after his previous right femoral endarterectomy and right lower extremity angiogram.  He notes that his ambulation is much improved and he denies any significant claudication.  Repeat NIC/PVR done today showed a value of 0.97 on the right, and 0.74 on the left which is improved from previous.  At this point plan will be for ongoing surveillance with a repeat NIC/PVR as well as carotid duplex in 1 year.  I will see him back here in the office at that time.  The  patient is to continue taking their medications as previously discussed.   I did discuss vascular risk factors as they pertain to the progression of vascular disease including controlling diabetes, hypertension, and hyperlipidemia.  Blood pressure is slightly elevated today at 144/80.  I will have him follow with his primary care physician for further evaluation of his antihypertensive regimen. BMI is >= 30 and <= 34.9 (Class 1 obesity). The following options were offered after discussion: exercise counseling/recommendations and nutrition counseling/recommendations. This was all discussed in full with complete understanding.  Thank you for allowing me to participate in the care of your patient.  Please do not hesitate to call with any questions or concerns.  We will keep you aware of any further encounters with Edy Cosby.      Sincerely Yours,      Duncan Lucio MD

## 2023-01-06 NOTE — LETTER
January 6, 2023     PRATIMA Armando  [address] [place] [address] [address]  [place] [address]    Patient: Edy Cosby   YOB: 1955   Date of Visit: 1/6/2023       Dear PRATIMA Strong:    Thank you for referring Edy Cosby to me for evaluation. Below are the relevant portions of my assessment and plan of care.    If you have questions, please do not hesitate to call me. I look forward to following Edy along with you.         Sincerely,        Duncan Lucio MD        CC: No Recipients  Duncan Lucio MD  01/06/23 1100  Signed  01/06/2023      Ana Vincent APRN  [address] [place] [address] [address]  [facility] [place] [address]        Edy Cosby  1955    Chief Complaint   Patient presents with   • Follow-up     6 month follow up. Last seen 5/23/22. States that everything has been going well.        Dear Ana Vincent APRN:    HPI    I had the pleasure of seeing your patient in the office today for follow up.  As you recall, the patient is a 67 y.o. male who we are currently following for peripheral arterial disease.  He returns today after having undergone repeat NIC/PVR as well as carotid duplex for ongoing surveillance.  He had previously undergone right femoral endarterectomy with angiogram, and prior to that had undergone a similar procedure on the left lower extremity back in 2019.  He continues to feel well after the right femoral endarterectomy and denies any significant claudication in the right lower extremity.  He feels as though he is ambulating much better and has no acute complaints.  NIC/PVR done recently which I personally reviewed showed values of 0.97 on the right, and 0.74 on the left.  This represents no significant arterial insufficiency in the right lower extremity, and mild to moderate arterial insufficiency on the left.  He also had carotid duplex done today which showed some mild plaque disease but less than 50% stenosis of the  bilateral internal carotid arteries.  He is asymptomatic from a carotid standpoint with no TIA or strokelike symptoms.  He otherwise feels well with no acute complaints today.  He is maintained on Plavix, and a statin.    Review of Systems   Constitutional: Negative.  Negative for activity change, appetite change, chills and fever.   HENT: Negative.  Negative for congestion, sneezing and sore throat.    Eyes: Negative.    Respiratory: Negative.  Negative for chest tightness and shortness of breath.    Cardiovascular: Negative.  Negative for chest pain, palpitations and leg swelling.   Gastrointestinal: Negative.  Negative for abdominal distention, abdominal pain, nausea and vomiting.   Endocrine: Negative.    Genitourinary: Negative.    Musculoskeletal: Negative.         Right lower extremity claudication is resolved.   Skin: Negative.    Allergic/Immunologic: Negative.    Neurological: Negative.    Hematological: Negative.    Psychiatric/Behavioral: Negative.        /80   Pulse 98   Ht 175.3 cm (69\")   Wt 92.5 kg (204 lb)   SpO2 99%   BMI 30.13 kg/m²   Physical Exam  Vitals reviewed.   Constitutional:       Appearance: Normal appearance.   HENT:      Head: Normocephalic and atraumatic.      Nose: Nose normal.      Mouth/Throat:      Mouth: Mucous membranes are moist.   Eyes:      Extraocular Movements: Extraocular movements intact.      Pupils: Pupils are equal, round, and reactive to light.   Cardiovascular:      Rate and Rhythm: Normal rate. Rhythm irregularly irregular.      Pulses:           Carotid pulses are 2+ on the right side and 2+ on the left side.       Radial pulses are 2+ on the right side and 2+ on the left side.        Femoral pulses are 2+ on the right side and 2+ on the left side.       Dorsalis pedis pulses are 1+ on the right side and 1+ on the left side.        Posterior tibial pulses are detected w/ Doppler on the right side and detected w/ Doppler on the left side.      Comments:  Both feet are warm.  He has no wounds.    Bilateral groin incisions from prior femoral endarterectomies are well-healed.  He has palpable femoral pulses bilaterally and also has palpable dorsalis pedis pulses.  He has Doppler signals of the bilateral PTs.  Pulmonary:      Effort: Pulmonary effort is normal. No respiratory distress.   Abdominal:      General: There is no distension.      Palpations: Abdomen is soft. There is no mass.      Tenderness: There is no abdominal tenderness.   Musculoskeletal:         General: Normal range of motion.      Cervical back: Normal range of motion and neck supple.      Right lower leg: No edema.      Left lower leg: No edema.      Comments: He has known chronic foot drop of the left foot and wears a brace for this.   Skin:     General: Skin is warm and dry.      Capillary Refill: Capillary refill takes 2 to 3 seconds.   Neurological:      General: No focal deficit present.      Mental Status: He is alert and oriented to person, place, and time.   Psychiatric:         Mood and Affect: Mood normal.         Behavior: Behavior normal.         Thought Content: Thought content normal.         Judgment: Judgment normal.         DIAGNOSTIC DATA:        US Carotid Bilateral    Result Date: 1/3/2023  Narrative: History: Carotid occlusive disease      Impression: Impression: 1. There is less than 50% stenosis of the right internal carotid artery. 2. There is less than 50% stenosis of the left internal carotid artery. 3. Antegrade flow is demonstrated in bilateral vertebral arteries.  Comments: Bilateral carotid vertebral arterial duplex scan was performed.  Grayscale imaging shows intimal thickening and calcified elements at the carotid bifurcation. The right internal carotid artery peak systolic velocity is 77 cm/sec. The end-diastolic velocity is 17.7 cm/sec. The right ICA/CCA ratio is approximately 1.2 . These findings correlate with less than 50% stenosis of the right internal carotid  artery.  Grayscale imaging shows intimal thickening and calcified elements at the carotid bifurcation. The left internal carotid artery peak systolic velocity is 64.4 cm/sec. The end-diastolic velocity is 14.6 cm/sec. The left ICA/CCA ratio is approximately 0.8 . These findings correlate with less than 50% stenosis of the left internal carotid artery.  Antegrade flow is demonstrated in bilateral vertebral arteries.  This report was finalized on 01/03/2023 13:04 by Dr. Constantino Metzger MD.    US Ankle / Brachial Indices Extremity Complete    Result Date: 1/3/2023  Narrative:  History: PAD  Comments: Bilateral lower extremity arterial with multi-level pulse volume recordings and segmental pressures were performed at rest and stress.  The right ankle/brachial index is 0.97. The waveforms are biphasic without dampening.These findings are consistent with no significant arterial insufficiency of the right lower extremity at rest.  The left ankle/brachial index is 0.74. The waveforms are biphasic without dampening. These findings are consistent with moderate arterial insufficiency of the left lower extremity at rest.      Impression: Impression: 1. No significant arterial insufficiency of the right lower extremity at rest. 2. Moderate arterial insufficiency of the left lower extremity at rest.   This report was finalized on 01/03/2023 12:58 by Dr. Constantino Metzger MD.      Patient Active Problem List   Diagnosis   • Spinal stenosis of lumbar region   • Back pain   • Degeneration of intervertebral disc of lumbosacral region   • Essential hypertension   • Lumbar disc herniation with radiculopathy   • Type 2 diabetes mellitus with stage 3a chronic kidney disease, with long-term current use of insulin ([facility])   • Constipation due to opioid therapy   • Gastroesophageal reflux disease without esophagitis   • Panlobular emphysema ([facility])   • Paroxysmal atrial fibrillation ([facility])   • Hx of colonic polyps   • Obesity due to excess  calories with serious comorbidity   • Atherosclerosis 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 ([facility])   • H/O diabetic foot ulcer   • Anemia   • Stage 3a chronic kidney disease ([facility])   • Diabetic neuropathy ([facility])   • Difficulty with BiPAP use   • Impotence   • Insomnia   • Left foot drop   • Lumbosacral radiculitis   • Obstructive sleep apnea   • Mixed hyperlipidemia   • PVD (peripheral vascular disease) ([facility])   • Vocal cord paralysis   • Vocal cord paresis   • Atherosclerosis of native artery of right lower extremity with intermittent claudication ([facility])   • Carotid stenosis   • Benign prostatic hyperplasia without urinary obstruction   • Vitamin D deficiency         ICD-10-CM ICD-9-CM   1. Peripheral arterial disease with history of revascularization ([facility])  I73.9 443.9    Z98.890 V45.89   2. Essential hypertension  I10 401.9   3. Mixed hyperlipidemia  E78.2 272.2   4. Type 2 diabetes mellitus with hyperglycemia, without long-term current use of insulin ([facility])  E11.65 250.00     790.29   5. Bilateral carotid artery stenosis  I65.23 433.10     433.30       Lab Frequency Next Occurrence   Follow Anesthesia Guidelines / Standing Orders Once 12/07/2018   Obtain Informed Consent Once 12/12/2018   Follow Anesthesia Guidelines / Standing Orders Once 12/17/2018   Obtain Informed Consent Once 12/22/2018   Follow Anesthesia Guidelines / Standing Orders Once 01/21/2019   Obtain Informed Consent Once 01/26/2019   Provide NPO Instructions to Patient Once 01/26/2019   Follow Anesthesia Guidelines / Protocol Once 11/19/2021   Obtain Informed Consent Once 11/24/2021   Provide NPO Instructions to Patient Once 11/24/2021   Chlorhexidine Skin Prep Once 11/24/2021   Follow Anesthesia Guidelines / Protocol Once 01/17/2022   Obtain Informed Consent Once 01/22/2022   US Ankle / Brachial Indices Extremity Complete Once 11/19/2022   US Carotid Bilateral Once 11/19/2022       PLAN: After thoroughly evaluating LOLI Robles  believe the best course of action is to remain conservative from a vascular standpoint.  Overall he has done quite well after his previous right femoral endarterectomy and right lower extremity angiogram.  He notes that his ambulation is much improved and he denies any significant claudication.  Repeat NIC/PVR done today showed a value of 0.97 on the right, and 0.74 on the left which is improved from previous.  At this point plan will be for ongoing surveillance with a repeat NIC/PVR as well as carotid duplex in 1 year.  I will see him back here in the office at that time.  The patient is to continue taking their medications as previously discussed.   I did discuss vascular risk factors as they pertain to the progression of vascular disease including controlling diabetes, hypertension, and hyperlipidemia.  Blood pressure is slightly elevated today at 144/80.  I will have him follow with his primary care physician for further evaluation of his antihypertensive regimen. BMI is >= 30 and <= 34.9 (Class 1 obesity). The following options were offered after discussion: exercise counseling/recommendations and nutrition counseling/recommendations. This was all discussed in full with complete understanding.  Thank you for allowing me to participate in the care of your patient.  Please do not hesitate to call with any questions or concerns.  We will keep you aware of any further encounters with Edy Cosby.      Sincerely Yours,      Duncan Lucio MD

## 2023-01-12 NOTE — PROGRESS NOTES
[facility] - PODIATRY    Today's Date: 01/26/23    Patient Name: Edy Cosby  MRN: [id number]  CSN: [id number]  PCP: Ana Vincent APRN  Referring Provider: No ref. provider found    SUBJECTIVE     Chief Complaint   Patient presents with   • Follow-up     Mark Villela MD 03/22/2022 3 month diabetic nail care- pt states left great toe has been hurting some again for last couple weeks- pt states7/10 at worst at times   • Diabetes     Hasnt checked     HPI: Edy Cosby, a 67 y.o.male, comes to clinic as a(n) established patient presenting for diabetic foot exam and complaining of thickened toenails. Patient has h/o AFib, back pain, DM2, Emphysema lung, GERD, HTN, OA. Patient is IDDM and unsure of last BG level.  Reports neuropathy in feet with numbness and tingling.  Continues use of AFO for foot drop of left foot. Has gone back to his old brace as the newest one was rubbing and causing increased pain and calluses. Callus to left plantar hallux has returned and has been very painful when walking. Patient states that toenails are long, thick, and irregular and that he is unable to care for them himself.  Denies open wound or sores.  Admits pain at 7/10 level and described as aching and nagging. Relates previous treatment(s) including foot care by podiatrist. Denies any constitutional symptoms. No other pedal complaints at this time.    Past Medical History:   Diagnosis Date   • A-fib ([facility])    • Atherosclerosis    • Chronic back pain    • Constipation    • Diabetes mellitus (HCC)     TYPE II   • Dropfoot     wears brace   • Dysphagia    • Emphysema of lung (HCC)    • Gastroparesis    • GERD (gastroesophageal reflux disease)    • Hyperlipidemia    • Hypertension     ESSENTIAL   • Leg pain    • Muscle spasm    • Nerve pain    • Osteoarthritis    • Sleep apnea     no cpap/bipap (non-compliance)   • Sleeping difficulty    • Ulcer of toe due to diabetes mellitus (HCC)      Past  Surgical History:   Procedure Laterality Date   • ANGIOPLASTY ILIAC ARTERY Left 1/29/2019    Procedure: ANGIOPLASTY ILIAC ARTERY, STENT PLACEMENT;  Surgeon: Duncan Lucio MD;  Location: [facility] HYBRID OR 12;  Service: Vascular   • AORTAGRAM Left 12/18/2018    Procedure: AORTOGRAM, LEFT LEG ANGIOGRAM, MYNX CLOSURE;  Surgeon: Duncan Lucio MD;  Location: [facility] HYBRID OR 12;  Service: Vascular   • AORTAGRAM Right 12/16/2021    Procedure: AORTAGRAM, RIGHT LOWER EXTREMITY ANGIOGRAM, POSSIBLE INTERVENTION;  Surgeon: Duncan Lucio MD;  Location: [facility] HYBRID OR 12;  Service: Vascular;  Laterality: Right;   • BACK SURGERY     • CHOLECYSTECTOMY     • COLONOSCOPY  09/01/2011    TICS RECALL 5YR   • COLONOSCOPY  09/30/2008    ENTER RESULTS: STOOL THROUGHTOUT THE COLON POOR PREP REC 3 YEAR RECALL   • COLONOSCOPY N/A 11/9/2016    Procedure: COLONOSCOPY;  Surgeon: León Zepeda MD;  Location: [facility] ENDOSCOPY;  Service:    • COLONOSCOPY N/A 4/19/2018    Procedure: COLONOSCOPY WITH ANESTHESIA;  Surgeon: León Zepeda MD;  Location: [facility] ENDOSCOPY;  Service: Gastroenterology   • ENDOSCOPY  06/19/2012    HH   • ENDOSCOPY  08/25/2009    HIATAL HERNIA, DILATED WITH 50 FR MASCORRO   • ENDOSCOPY  06/19/2007    WITHIN NORMAL LIMITS UREA NEG   • FEMORAL ENDARTERECTOMY Left 1/29/2019    Procedure: LEFT FEMORAL ENDARTERECTOMY;  Surgeon: Duncan Lucio MD;  Location: [facility] OR ;  Service: Vascular   • FEMORAL ENDARTERECTOMY Right 2/3/2022    Procedure: RIGHT FEMORAL ENDARTERECTOMY, RIGHT LOWER EXTREMITY ANGIOGRAM, BALLOON ANGIOPLASTY;  Surgeon: Duncan Lucio MD;  Location: [facility] OR 12;  Service: Vascular;  Laterality: Right;   • LUMBAR LAMINECTOMY WITH FUSION N/A 1/23/2017    Procedure: REMOVAL OF INSTRUMENTATION, EXPLORATION OF FUSION L4-S1, REVISION LEFT L5-S1 HEMILAMINECTOMY, FACETECTOMY DECOMPRESSION, REVISION UNINSTRUMENTED POSTERIOR SPINAL FUSION L5-S1;  Surgeon: RHONDA  Kolby Lopes MD;  Location:  [facility] OR;  Service:    • OTHER SURGICAL HISTORY      LUMBAR SACRAL SURGERY WITH FUSION X2   • PILONIDAL CYST / SINUS EXCISION      PILONIDAL CYST REMOVAL     Family History   Problem Relation Age of Onset   • Heart attack Father    • Diabetes Brother    • Colon polyps Sister    • Stomach cancer Neg Hx         GI CNACERS OR DISEASE   • Colon cancer Neg Hx      Social History     Socioeconomic History   • Marital status: Single   Tobacco Use   • Smoking status: Former     Years: 30.00     Types: Cigarettes     Quit date:      Years since quittin.0   • Smokeless tobacco: Never   Vaping Use   • Vaping Use: Never used   Substance and Sexual Activity   • Alcohol use: No   • Drug use: Yes     Frequency: 4.0 times per week     Types: Marijuana   • Sexual activity: Defer     No Known Allergies  Current Outpatient Medications   Medication Sig Dispense Refill   • albuterol sulfate  (90 Base) MCG/ACT inhaler Inhale 2 puffs 4 (Four) Times a Day As Needed.     • amLODIPine (NORVASC) 10 MG tablet Take 10 mg by mouth Daily.     • ascorbic acid (VITAMIN C) 500 MG tablet 500 mg Daily.     • atorvastatin (LIPITOR) 40 MG tablet 1 tablet Every Night.     • clopidogrel (PLAVIX) 75 MG tablet Take 1 tablet by mouth once daily 90 tablet 0   • cyclobenzaprine (FLEXERIL) 5 MG tablet Take 5 mg by mouth 3 (Three) Times a Day As Needed for Muscle Spasms.     • Dulaglutide (Trulicity) 1.5 MG/0.5ML solution pen-injector Inject 1.5 mg under the skin into the appropriate area as directed 1 (One) Time Per Week.     • folic acid (FOLVITE) 1 MG tablet 1 mg.     • hydroCHLOROthiazide (MICROZIDE) 12.5 MG capsule Take 12.5 mg by mouth Daily.     • labetalol (NORMODYNE) 100 MG tablet Take 100 mg by mouth 2 (Two) Times a Day.     • lisinopril (PRINIVIL,ZESTRIL) 40 MG tablet Take 40 mg by mouth Daily.     • pantoprazole (PROTONIX) 40 MG EC tablet Take 40 mg by mouth Daily.     • sildenafil (VIAGRA) 100 MG  tablet Take 50 mg by mouth As Needed for Erectile Dysfunction.     • tamsulosin (FLOMAX) 0.4 MG capsule 24 hr capsule Take 1 capsule by mouth Daily.     • vitamin D (ERGOCALCIFEROL) 1.25 MG (77836 UT) capsule capsule Take 50,000 Units by mouth 1 (One) Time Per Week.       No current facility-administered medications for this visit.     Review of Systems   Constitutional: Negative for chills and fever.   HENT: Negative for congestion.    Respiratory: Negative for shortness of breath.    Cardiovascular: Negative for chest pain and leg swelling.   Gastrointestinal: Negative for constipation, diarrhea, nausea and vomiting.   Musculoskeletal: Positive for arthralgias and gait problem (foot drop).   Skin: Negative for wound.        Left hallux plantar callus   Neurological: Positive for numbness.   Hematological: Bruises/bleeds easily.       OBJECTIVE     Vitals:    01/26/23 1044   BP: 142/58   Pulse: 92   SpO2: 95%       PHYSICAL EXAM  GEN:   Accompanied by none.     Foot/Ankle Exam:       General:   Diabetic Foot Exam Performed    Appearance: appears stated age and healthy and obesity    Orientation: AAOx3    Affect: appropriate    Gait: steppage    Gait comment:  Drop foot left  Assistance: independent    Shoe Gear:  Diabetic shoes (Left AFO)    VASCULAR      Right Foot Vascularity   Dorsalis pedis:  2+  Posterior tibial:  2+  Skin Temperature: warm    Edema Grading:  Trace and non-pitting  CFT:  3  Pedal Hair Growth:  Present  Varicosities: none       Left Foot Vascularity   Dorsalis pedis:  2+  Posterior tibial:  2+  Skin Temperature: warm    Edema Grading:  Trace and non-pitting  CFT:  3  Pedal Hair Growth:  Present  Varicosities: none        NEUROLOGIC     Right Foot Neurologic   Light touch sensation:  Diminished  Vibratory sensation:  Diminished  Hot/Cold sensation: diminished    Protective Sensation using Stevinson-Mirtha Monofilament:  0     Left Foot Neurologic   Light touch sensation:  Diminished  Vibratory  sensation:  Diminished  Hot/cold sensation: diminished    Protective Sensation using Kimball-Mirtha Monofilament:  0     MUSCULOSKELETAL      Right Foot Musculoskeletal   Ecchymosis:  None  Tenderness: none    Arch:  Pes planus  Hallux valgus: No    Hallux limitus: No       Left Foot Musculoskeletal   Ecchymosis:  None  Tenderness: left foot callus    Arch:  Pes planus  Hallux valgus: No    Hallux limitus: No       MUSCLE STRENGTH     Right Foot Muscle Strength   Foot dorsiflexion:  5  Foot plantar flexion:  5  Foot inversion:  5  Foot eversion:  5     Left Foot Muscle Strength   Foot dorsiflexion:  2  Foot plantar flexion:  3  Foot inversion:  3  Foot eversion:  3     RANGE OF MOTION      Right Foot Range of Motion   Foot and ankle ROM within normal limits       Left Foot Range of Motion   Foot and ankle ROM within normal limits       DERMATOLOGIC     Right Foot Dermatologic   Skin: skin intact    Nails: onychomycosis, abnormally thick, subungual debris and dystrophic nails       Left Foot Dermatologic   Skin: corn    Nails: onychomycosis, abnormally thick, subungual debris and dystrophic nails       Image:       RADIOLOGY/NUCLEAR:  US Carotid Bilateral    Result Date: 1/3/2023  Narrative: History: Carotid occlusive disease      Impression: Impression: 1. There is less than 50% stenosis of the right internal carotid artery. 2. There is less than 50% stenosis of the left internal carotid artery. 3. Antegrade flow is demonstrated in bilateral vertebral arteries.  Comments: Bilateral carotid vertebral arterial duplex scan was performed.  Grayscale imaging shows intimal thickening and calcified elements at the carotid bifurcation. The right internal carotid artery peak systolic velocity is 77 cm/sec. The end-diastolic velocity is 17.7 cm/sec. The right ICA/CCA ratio is approximately 1.2 . These findings correlate with less than 50% stenosis of the right internal carotid artery.  Grayscale imaging shows intimal  thickening and calcified elements at the carotid bifurcation. The left internal carotid artery peak systolic velocity is 64.4 cm/sec. The end-diastolic velocity is 14.6 cm/sec. The left ICA/CCA ratio is approximately 0.8 . These findings correlate with less than 50% stenosis of the left internal carotid artery.  Antegrade flow is demonstrated in bilateral vertebral arteries.  This report was finalized on 01/03/2023 13:04 by Dr. Constantino Metzger MD.    US Ankle / Brachial Indices Extremity Complete    Result Date: 1/3/2023  Narrative:  History: [facility]  Comments: Bilateral lower extremity arterial with multi-level pulse volume recordings and segmental pressures were performed at rest and stress.  The right ankle/brachial index is 0.97. The waveforms are biphasic without dampening.These findings are consistent with no significant arterial insufficiency of the right lower extremity at rest.  The left ankle/brachial index is 0.74. The waveforms are biphasic without dampening. These findings are consistent with moderate arterial insufficiency of the left lower extremity at rest.      Impression: Impression: 1. No significant arterial insufficiency of the right lower extremity at rest. 2. Moderate arterial insufficiency of the left lower extremity at rest.   This report was finalized on 01/03/2023 12:58 by Dr. Constantino Metzger MD.      LABORATORY/CULTURE RESULTS:      PATHOLOGY RESULTS:       ASSESSMENT/PLAN     Diagnoses and all orders for this visit:    1. Onychomycosis (Primary)    2. Pre-ulcerative calluses    3. Type 2 diabetes mellitus with diabetic polyneuropathy, with long-term current use of insulin (HCC)    4. Foot drop, left    5. Antiplatelet or antithrombotic long-term use    6. Encounter for diabetic foot exam (HCC)      Comprehensive lower extremity examination and evaluation was performed.  Discussed findings and treatment plan including risks, benefits, and treatment options with patient in detail. Patient  agreed with treatment plan.  Diabetic foot exam performed.  After verbal consent obtained, nail(s) x10 debrided of length and thickness with nail nipper without incidence  After verbal consent obtained, calluses x1 pared utilizing dermal curette and/or scalpel without incidence  Patient may maintain nails and calluses at home utilizing emery board or pumice stone between visits as needed  Reviewed at home diabetic foot care including daily foot checks   Continue AFO for foot drop.   Continue diabetic monitoring and control under direction of PCP.    Monitor callused area closely and return to care or go to wound care if area opens/changes.   An After Visit Summary was printed and given to the patient at discharge, including (if requested) any available informative/educational handouts regarding diagnosis, treatment, or medications. All questions were answered to patient/family satisfaction. Should symptoms fail to improve or worsen they agree to call or return to clinic or to go to the Emergency Department. Discussed the importance of following up with any needed screening tests/labs/specialist appointments and any requested follow-up recommended by me today. Importance of maintaining follow-up discussed and patient accepts that missed appointments can delay diagnosis and potentially lead to worsening of conditions.  Return in about 3 months (around 4/26/2023)., or sooner if acute issues arise.    Lab Frequency Next Occurrence   Diet: Once 04/19/2018   Advance Diet as Tolerated Once 04/19/2018       This document has been electronically signed by PRATIMA Esteves on January 26, 2023 11:40 CST

## 2023-01-25 ENCOUNTER — TELEPHONE (OUTPATIENT)
Dept: PODIATRY | Facility: CLINIC | Age: 68
End: 2023-01-25
Payer: COMMERCIAL

## 2023-01-25 NOTE — TELEPHONE ENCOUNTER
Called patient regarding appt on 01/26/2023. Left message for patient to return call if any questions or concerns arise.

## 2023-01-26 ENCOUNTER — OFFICE VISIT (OUTPATIENT)
Dept: PODIATRY | Facility: CLINIC | Age: 68
End: 2023-01-26
Payer: MEDICARE

## 2023-01-26 VITALS
WEIGHT: 204 LBS | DIASTOLIC BLOOD PRESSURE: 58 MMHG | OXYGEN SATURATION: 95 % | BODY MASS INDEX: 30.21 KG/M2 | SYSTOLIC BLOOD PRESSURE: 142 MMHG | HEART RATE: 92 BPM | HEIGHT: 69 IN

## 2023-01-26 DIAGNOSIS — B35.1 ONYCHOMYCOSIS: Primary | ICD-10-CM

## 2023-01-26 DIAGNOSIS — Z79.4 TYPE 2 DIABETES MELLITUS WITH DIABETIC POLYNEUROPATHY, WITH LONG-TERM CURRENT USE OF INSULIN: ICD-10-CM

## 2023-01-26 DIAGNOSIS — M21.372 FOOT DROP, LEFT: ICD-10-CM

## 2023-01-26 DIAGNOSIS — Z79.02 ANTIPLATELET OR ANTITHROMBOTIC LONG-TERM USE: ICD-10-CM

## 2023-01-26 DIAGNOSIS — L84 PRE-ULCERATIVE CALLUSES: ICD-10-CM

## 2023-01-26 DIAGNOSIS — E11.9 ENCOUNTER FOR DIABETIC FOOT EXAM: ICD-10-CM

## 2023-01-26 DIAGNOSIS — E11.42 TYPE 2 DIABETES MELLITUS WITH DIABETIC POLYNEUROPATHY, WITH LONG-TERM CURRENT USE OF INSULIN: ICD-10-CM

## 2023-01-26 PROCEDURE — 11721 DEBRIDE NAIL 6 OR MORE: CPT | Performed by: NURSE PRACTITIONER

## 2023-01-26 PROCEDURE — 11055 PARING/CUTG B9 HYPRKER LES 1: CPT | Performed by: NURSE PRACTITIONER

## 2023-01-26 PROCEDURE — 99213 OFFICE O/P EST LOW 20 MIN: CPT | Performed by: NURSE PRACTITIONER

## 2023-01-31 RX ORDER — CLOPIDOGREL BISULFATE 75 MG/1
TABLET ORAL
Qty: 90 TABLET | Refills: 0 | Status: SHIPPED | OUTPATIENT
Start: 2023-01-31

## 2023-02-06 DIAGNOSIS — M79.18 ABDOMINAL MUSCLE PAIN: ICD-10-CM

## 2023-02-07 RX ORDER — CYCLOBENZAPRINE HCL 5 MG
TABLET ORAL
Qty: 90 TABLET | Refills: 3 | Status: SHIPPED | OUTPATIENT
Start: 2023-02-07

## 2023-02-08 ENCOUNTER — OFFICE VISIT (OUTPATIENT)
Dept: CARDIOLOGY | Facility: CLINIC | Age: 68
End: 2023-02-08
Payer: MEDICARE

## 2023-02-08 VITALS
BODY MASS INDEX: 30.51 KG/M2 | SYSTOLIC BLOOD PRESSURE: 145 MMHG | HEIGHT: 69 IN | HEART RATE: 87 BPM | WEIGHT: 206 LBS | OXYGEN SATURATION: 98 % | DIASTOLIC BLOOD PRESSURE: 85 MMHG | RESPIRATION RATE: 18 BRPM

## 2023-02-08 DIAGNOSIS — Z79.4 TYPE 2 DIABETES MELLITUS WITH STAGE 3A CHRONIC KIDNEY DISEASE, WITH LONG-TERM CURRENT USE OF INSULIN: Chronic | ICD-10-CM

## 2023-02-08 DIAGNOSIS — N18.31 TYPE 2 DIABETES MELLITUS WITH STAGE 3A CHRONIC KIDNEY DISEASE, WITH LONG-TERM CURRENT USE OF INSULIN: Chronic | ICD-10-CM

## 2023-02-08 DIAGNOSIS — E66.09 CLASS 1 OBESITY DUE TO EXCESS CALORIES WITH SERIOUS COMORBIDITY AND BODY MASS INDEX (BMI) OF 30.0 TO 30.9 IN ADULT: Chronic | ICD-10-CM

## 2023-02-08 DIAGNOSIS — I10 ESSENTIAL HYPERTENSION: Chronic | ICD-10-CM

## 2023-02-08 DIAGNOSIS — E11.22 TYPE 2 DIABETES MELLITUS WITH STAGE 3A CHRONIC KIDNEY DISEASE, WITH LONG-TERM CURRENT USE OF INSULIN: Chronic | ICD-10-CM

## 2023-02-08 DIAGNOSIS — E78.2 MIXED HYPERLIPIDEMIA: Chronic | ICD-10-CM

## 2023-02-08 DIAGNOSIS — I48.0 PAROXYSMAL ATRIAL FIBRILLATION: Primary | Chronic | ICD-10-CM

## 2023-02-08 DIAGNOSIS — I73.9 PVD (PERIPHERAL VASCULAR DISEASE): Chronic | ICD-10-CM

## 2023-02-08 PROBLEM — E66.811 CLASS 1 OBESITY DUE TO EXCESS CALORIES WITH SERIOUS COMORBIDITY AND BODY MASS INDEX (BMI) OF 30.0 TO 30.9 IN ADULT: Chronic | Status: ACTIVE | Noted: 2018-11-19

## 2023-02-08 PROBLEM — E66.811 CLASS 1 OBESITY DUE TO EXCESS CALORIES WITH SERIOUS COMORBIDITY AND BODY MASS INDEX (BMI) OF 30.0 TO 30.9 IN ADULT: Status: ACTIVE | Noted: 2018-11-19

## 2023-02-08 PROCEDURE — 99213 OFFICE O/P EST LOW 20 MIN: CPT | Performed by: NURSE PRACTITIONER

## 2023-02-08 PROCEDURE — 93000 ELECTROCARDIOGRAM COMPLETE: CPT | Performed by: NURSE PRACTITIONER

## 2023-02-08 NOTE — PROGRESS NOTES
Subjective:     Encounter Date:02/08/2023      Patient ID: Edy Cosby is a 67 y.o. male with previous episode of paroxysmal atrial fibrillation following surgery in 2017 and no known recurrence, hypertension, insulin-dependent diabetes, peripheral vascular disease, CKD, obstructive sleep apnea, and obesity.  He presents the office today for routine follow-up.    Chief Complaint: Routine Atrial Fibrillation follow-up  Atrial Fibrillation  Presents for follow-up visit. Symptoms are negative for chest pain, dizziness, hemodynamic instability, hypertension, hypotension, palpitations, shortness of breath, syncope, tachycardia and weakness. The symptoms have been stable. Past medical history includes atrial fibrillation.   Hypertension  This is a chronic problem. The current episode started more than 1 year ago. The problem is controlled. Pertinent negatives include no chest pain, headaches, malaise/fatigue, orthopnea, palpitations, peripheral edema, PND or shortness of breath. Risk factors for coronary artery disease include diabetes mellitus, male gender and obesity. Current antihypertension treatment includes ACE inhibitors, diuretics, calcium channel blockers and beta blockers. The current treatment provides significant improvement. Compliance problems include exercise.  Hypertensive end-organ damage includes kidney disease and PVD. There is no history of angina, CAD/MI or heart failure. Identifiable causes of hypertension include chronic renal disease.     Mr. Cosby follows with our office due to a previously known episode of atrial fibrillation in 2017.  He has had no known recurrence since that time.  He has other chronic stable conditions including diabetes, kidney disease, hypertension, hyperlipidemia.  He has no known coronary artery disease.    Mr. Cosby presents today for routine follow up.  He has been feeling well. He follows with his PCP office and vascular surgery. He has no complaints. He  has been active without complaints. He denies any chest pain, pressure, or similar. He has had no palpitations, lightheadedness, or dizziness.  He has had no known recurrence of atrial fibrillation since the finding in 2017.  He reports compliance with his medications at home. He has no bleeding issues.  He denies any claudication. He denies lower extremity swelling.     The following portions of the patient's history were reviewed and updated as appropriate: allergies, current medications, past family history, past medical history, past social history and past surgical history.     No Known Allergies      Current Outpatient Medications:   •  albuterol sulfate  (90 Base) MCG/ACT inhaler, Inhale 2 puffs 4 (Four) Times a Day As Needed., Disp: , Rfl:   •  amLODIPine (NORVASC) 10 MG tablet, Take 10 mg by mouth Daily., Disp: , Rfl:   •  ascorbic acid (VITAMIN C) 500 MG tablet, 500 mg Daily., Disp: , Rfl:   •  atorvastatin (LIPITOR) 40 MG tablet, 1 tablet Every Night., Disp: , Rfl:   •  clopidogrel (PLAVIX) 75 MG tablet, Take 1 tablet by mouth once daily, Disp: 90 tablet, Rfl: 0  •  cyclobenzaprine (FLEXERIL) 5 MG tablet, Take 5 mg by mouth 3 (Three) Times a Day As Needed for Muscle Spasms., Disp: , Rfl:   •  Dulaglutide (Trulicity) 1.5 MG/0.5ML solution pen-injector, Inject 1.5 mg under the skin into the appropriate area as directed 1 (One) Time Per Week., Disp: , Rfl:   •  folic acid (FOLVITE) 1 MG tablet, 1 mg., Disp: , Rfl:   •  hydroCHLOROthiazide (MICROZIDE) 12.5 MG capsule, Take 12.5 mg by mouth Daily., Disp: , Rfl:   •  labetalol (NORMODYNE) 100 MG tablet, Take 100 mg by mouth 2 (Two) Times a Day., Disp: , Rfl:   •  lisinopril (PRINIVIL,ZESTRIL) 40 MG tablet, Take 40 mg by mouth Daily., Disp: , Rfl:   •  pantoprazole (PROTONIX) 40 MG EC tablet, Take 40 mg by mouth Daily., Disp: , Rfl:   •  sildenafil (VIAGRA) 100 MG tablet, Take 50 mg by mouth As Needed for Erectile Dysfunction., Disp: , Rfl:   •   "tamsulosin (FLOMAX) 0.4 MG capsule 24 hr capsule, Take 1 capsule by mouth Daily., Disp: , Rfl:   •  vitamin D (ERGOCALCIFEROL) 1.25 MG (19313 UT) capsule capsule, Take 50,000 Units by mouth 1 (One) Time Per Week., Disp: , Rfl:       Review of Systems   Constitutional: Negative for diaphoresis, fever and malaise/fatigue.   Eyes: Negative for visual disturbance.   Cardiovascular: Negative for chest pain, claudication, dyspnea on exertion, irregular heartbeat, leg swelling, near-syncope, orthopnea, palpitations, paroxysmal nocturnal dyspnea and syncope.   Respiratory: Negative for cough, shortness of breath and wheezing.    Hematologic/Lymphatic: Negative for bleeding problem.   Gastrointestinal: Negative for bloating, abdominal pain, nausea and vomiting.   Neurological: Negative for dizziness, headaches, light-headedness, loss of balance and weakness.   Psychiatric/Behavioral: Negative for altered mental status.       ECG 12 Lead    Date/Time: 2/8/2023 10:01 AM  Performed by: Aziza Soriano APRN  Authorized by: Aziza Soriano APRN   Comparison: compared with previous ECG from 7/28/2022  Similar to previous ECG  Rhythm: sinus rhythm  Rate: normal  BPM: 87  Conduction: incomplete left bundle branch block  ST Segments: ST segments normal  T Waves: T waves normal  QRS axis: normal  Other findings: poor R wave progression    Clinical impression: abnormal EKG          /85   Pulse 87   Resp 18   Ht 175.3 cm (69\")   Wt 93.4 kg (206 lb)   SpO2 98%   BMI 30.42 kg/m²        Objective:     Vitals reviewed.   Constitutional:       General: Awake. Not in acute distress.     Appearance: Normal appearance. Well-developed, well-groomed and not in distress. Obese. Chronically ill-appearing. Not ill-appearing.   HENT:      Head: Normocephalic and atraumatic.   Pulmonary:      Effort: Pulmonary effort is normal.      Breath sounds: Normal breath sounds. No wheezing. No rhonchi. No rales.   Cardiovascular:      Normal " rate. Regular rhythm.      Murmurs: There is no murmur.      No gallop. No rub.   Musculoskeletal:      Cervical back: Normal range of motion and neck supple. Skin:     General: Skin is warm and dry.   Neurological:      Mental Status: Alert, oriented to person, place, and time and oriented to person, place and time.   Psychiatric:         Attention and Perception: Attention normal.         Mood and Affect: Mood normal.         Speech: Speech normal.         Behavior: Behavior normal. Behavior is cooperative.         Thought Content: Thought content normal.         Cognition and Memory: Cognition normal.         Judgment: Judgment normal.       Lab Review:     Results for orders placed during the hospital encounter of 01/23/17    Adult Transthoracic Echo Complete With Contrast    Interpretation Summary  · All left ventricular wall segments contract normally.  · Left ventricular wall thickness is consistent with moderate concentric hypertrophy.    GROSSLY NORMAL LV AND RV SYSTOLIC FUNCTION  GROSSLY NORMAL VALVE FUNCTION  MODERATE LVH        Assessment:          Diagnosis Plan   1. Paroxysmal atrial fibrillation ([facility])        2. Essential hypertension        3. Mixed hyperlipidemia        4. PVD (peripheral vascular disease) ([facility])        5. Type 2 diabetes mellitus with stage 3a chronic kidney disease, with long-term current use of insulin ([facility])        6. Class 1 obesity due to excess calories with serious comorbidity and body mass index (BMI) of 30.0 to 30.9 in adult               Plan:      Paroxysmal atrial fibrillation: Stable.  He denies any palpitations, prolonged episodes of tachycardia.  He is not anticoagulated due to no recent recurrence.  CHADVASC2 SCORE   [id number] Score: 4 (2/8/2023  9:32 AM)    Hypertension: Stable.  Currently well controlled on his home medications.  Continue follow-up with primary care office.    Hyperlipidemia: Managed on statin therapy follow-up by his PCP office.    Peripheral  vascular disease: The patient follows with vascular surgery.  He has known peripheral disease including bilateral lower extremity disease.  Previous ultrasound of his carotid arteries noted mild plaquing with less than 50% stenosis bilaterally. Follows with Dr. Lucio.    Diabetes Mellitus: with CKD on insulin managed by his primary care provider office. He reports compliance with his medications. His most recent Hgb A1c 6.9%.    Obesity: BMI 30.42 Cardiac diet is recommended with routine exercise.      Continue current medications.    Follow up in 6 months, sooner if needed.    I attest that all portions of this note have been reviewed and updated to reflect the patient's current status.

## 2023-03-28 RX ORDER — LABETALOL 100 MG/1
TABLET, FILM COATED ORAL
Qty: 180 TABLET | Refills: 1 | Status: SHIPPED | OUTPATIENT
Start: 2023-03-28

## 2023-03-28 NOTE — TELEPHONE ENCOUNTER
Pedro Alvarez called to request a refill on his medication.       Last office visit : 10/31/2022   Next office visit : 5/1/2023     Requested Prescriptions     Pending Prescriptions Disp Refills    labetalol (NORMODYNE) 100 MG tablet [Pharmacy Med Name: Labetalol HCl 100 MG Oral Tablet] 180 tablet 0     Sig: TAKE 1 TABLET BY MOUTH IN THE MORNING AND 1 AT BEDTIME            Sophia Steele LPN

## 2023-03-30 ENCOUNTER — TELEPHONE (OUTPATIENT)
Dept: PODIATRY | Facility: CLINIC | Age: 68
End: 2023-03-30
Payer: COMMERCIAL

## 2023-03-30 NOTE — TELEPHONE ENCOUNTER
Called patient and let him know that since his blister is not open at this time maxx wants him to keep it clean with betadine to help dry it out how ever if it opens or starts to bleed he will need to call the office to get an appointment. I told him he was on a cancellation list incase an appointment opens up. Pt verbalized understanding.

## 2023-03-30 NOTE — TELEPHONE ENCOUNTER
Caller: VERENICE    Relationship to patient: SELF    Best call back number:  [phone]    Chief complaint: FOOT PAIN    Type of visit: FOLLOW UP    Requested date: TODAY    If rescheduling, when is the original appointment: 5.8.23    Additional notes: PATIENT IS CALLING STATING HE HAS BLISTER ON HIS FOOT AND WANTS TO BE SEEN TODAY.

## 2023-04-21 DIAGNOSIS — I10 ESSENTIAL HYPERTENSION: ICD-10-CM

## 2023-04-21 RX ORDER — AMLODIPINE BESYLATE 10 MG/1
TABLET ORAL
Qty: 90 TABLET | Refills: 1 | Status: SHIPPED | OUTPATIENT
Start: 2023-04-21 | End: 2023-05-01 | Stop reason: SDUPTHER

## 2023-04-27 RX ORDER — PANTOPRAZOLE SODIUM 40 MG/1
TABLET, DELAYED RELEASE ORAL
Qty: 90 TABLET | Refills: 0 | Status: SHIPPED | OUTPATIENT
Start: 2023-04-27

## 2023-04-28 NOTE — PROGRESS NOTES
[facility] - PODIATRY    Today's Date: 05/08/23    Patient Name: Edy Cosby  MRN: [id number]  CSN: [id number]  PCP: Ana Vincent APRN  Referring Provider: No ref. provider found    SUBJECTIVE     Chief Complaint   Patient presents with   • Follow-up     Ana Vincent,  05/02/2023 3 MTH FU DIABETIC- pt states  feet doing ok, had recent blood blister on left foot but has dried up and doing good- pt denies pain    • Diabetes     220mg/dl BG this am     HPI: Edy Cosby, a 68 y.o.male, comes to clinic as a(n) established patient presenting for diabetic foot exam and complaining of thickened toenails. Patient has h/o AFib, back pain, DM2, Emphysema lung, GERD, HTN, OA. Patient is IDDM with last stated BG level of 220mg/dl.  Reports neuropathy in feet with numbness and tingling.  Continues use of AFO for foot drop of left foot. Callus to left plantar hallux has returned and he states that he had a blood blister form around the callused area.  Denies any redness, drainage, or other issues from the area.. Patient states that toenails are long, thick, and irregular and that he is unable to care for them himself.  Denies open wound or sores.  Denies pain. Relates previous treatment(s) including foot care by podiatrist. Denies any constitutional symptoms. No other pedal complaints at this time.    Past Medical History:   Diagnosis Date   • A-fib    • Atherosclerosis    • Chronic back pain    • Constipation    • Diabetes mellitus     TYPE II   • Dropfoot     wears brace   • Dysphagia    • Emphysema of lung    • Gastroparesis    • GERD (gastroesophageal reflux disease)    • Hyperlipidemia    • Hypertension     ESSENTIAL   • Leg pain    • Muscle spasm    • Nerve pain    • Osteoarthritis    • Sleep apnea     no cpap/bipap (non-compliance)   • Sleeping difficulty    • Ulcer of toe due to diabetes mellitus      Past Surgical History:   Procedure Laterality Date   • ANGIOPLASTY ILIAC  ARTERY Left 1/29/2019    Procedure: ANGIOPLASTY ILIAC ARTERY, STENT PLACEMENT;  Surgeon: Duncan Lucio MD;  Location: [facility] HYBRID OR 12;  Service: Vascular   • AORTAGRAM Left 12/18/2018    Procedure: AORTOGRAM, LEFT LEG ANGIOGRAM, MYNX CLOSURE;  Surgeon: Duncan Lucio MD;  Location: [facility] HYBRID OR 12;  Service: Vascular   • AORTAGRAM Right 12/16/2021    Procedure: AORTAGRAM, RIGHT LOWER EXTREMITY ANGIOGRAM, POSSIBLE INTERVENTION;  Surgeon: Duncan Lucio MD;  Location: [facility] HYBRID OR 12;  Service: Vascular;  Laterality: Right;   • BACK SURGERY     • CHOLECYSTECTOMY     • COLONOSCOPY  09/01/2011    TICS RECALL 5YR   • COLONOSCOPY  09/30/2008    ENTER RESULTS: STOOL THROUGHTOUT THE COLON POOR PREP REC 3 YEAR RECALL   • COLONOSCOPY N/A 11/9/2016    Procedure: COLONOSCOPY;  Surgeon: León Zepeda MD;  Location: [facility] ENDOSCOPY;  Service:    • COLONOSCOPY N/A 4/19/2018    Procedure: COLONOSCOPY WITH ANESTHESIA;  Surgeon: León Zepeda MD;  Location: [facility] ENDOSCOPY;  Service: Gastroenterology   • ENDOSCOPY  06/19/2012    HH   • ENDOSCOPY  08/25/2009    HIATAL HERNIA, DILATED WITH 50 FR MASCORRO   • ENDOSCOPY  06/19/2007    WITHIN NORMAL LIMITS UREA NEG   • FEMORAL ENDARTERECTOMY Left 1/29/2019    Procedure: LEFT FEMORAL ENDARTERECTOMY;  Surgeon: Duncan Lucio MD;  Location: [facility] HYBRID OR 12;  Service: Vascular   • FEMORAL ENDARTERECTOMY Right 2/3/2022    Procedure: RIGHT FEMORAL ENDARTERECTOMY, RIGHT LOWER EXTREMITY ANGIOGRAM, BALLOON ANGIOPLASTY;  Surgeon: Duncan Lucio MD;  Location: [facility] HYBRID OR 12;  Service: Vascular;  Laterality: Right;   • LUMBAR LAMINECTOMY WITH FUSION N/A 1/23/2017    Procedure: REMOVAL OF INSTRUMENTATION, EXPLORATION OF FUSION L4-S1, REVISION LEFT L5-S1 HEMILAMINECTOMY, FACETECTOMY DECOMPRESSION, REVISION UNINSTRUMENTED POSTERIOR SPINAL FUSION L5-S1;  Surgeon: RHONDA Lopes MD;  Location: [facility] OR;  Service:    • OTHER SURGICAL  HISTORY      LUMBAR SACRAL SURGERY WITH FUSION X2   • PILONIDAL CYST / SINUS EXCISION      PILONIDAL CYST REMOVAL     Family History   Problem Relation Age of Onset   • Heart attack Father    • Diabetes Brother    • Colon polyps Sister    • Stomach cancer Neg Hx         GI CNACERS OR DISEASE   • Colon cancer Neg Hx      Social History     Socioeconomic History   • Marital status: Single   Tobacco Use   • Smoking status: Former     Years: 30.00     Types: Cigarettes     Quit date:      Years since quittin.3   • Smokeless tobacco: Never   Vaping Use   • Vaping Use: Never used   Substance and Sexual Activity   • Alcohol use: No   • Drug use: Yes     Frequency: 4.0 times per week     Types: Marijuana   • Sexual activity: Defer     No Known Allergies  Current Outpatient Medications   Medication Sig Dispense Refill   • albuterol sulfate  (90 Base) MCG/ACT inhaler Inhale 2 puffs 4 (Four) Times a Day As Needed.     • amLODIPine (NORVASC) 10 MG tablet Take 1 tablet by mouth Daily.     • ascorbic acid (VITAMIN C) 500 MG tablet 1 tablet Daily.     • atorvastatin (LIPITOR) 40 MG tablet 1 tablet Every Night.     • clopidogrel (PLAVIX) 75 MG tablet Take 1 tablet by mouth once daily 90 tablet 0   • cyclobenzaprine (FLEXERIL) 5 MG tablet Take 1 tablet by mouth 3 (Three) Times a Day As Needed for Muscle Spasms.     • Dulaglutide (Trulicity) 1.5 MG/0.5ML solution pen-injector Inject 1.5 mg under the skin into the appropriate area as directed 1 (One) Time Per Week.     • folic acid (FOLVITE) 1 MG tablet 1 tablet.     • hydroCHLOROthiazide (MICROZIDE) 12.5 MG capsule Take 1 capsule by mouth Daily.     • labetalol (NORMODYNE) 100 MG tablet Take 1 tablet by mouth 2 (Two) Times a Day.     • lisinopril (PRINIVIL,ZESTRIL) 40 MG tablet Take 1 tablet by mouth Daily.     • pantoprazole (PROTONIX) 40 MG EC tablet Take 1 tablet by mouth Daily.     • sildenafil (VIAGRA) 100 MG tablet Take 50 mg by mouth As Needed for Erectile  Dysfunction.     • tamsulosin (FLOMAX) 0.4 MG capsule 24 hr capsule Take 1 capsule by mouth Daily.     • vitamin D (ERGOCALCIFEROL) 1.25 MG (45762 UT) capsule capsule Take 1 capsule by mouth 1 (One) Time Per Week.       No current facility-administered medications for this visit.     Review of Systems   Constitutional: Negative for chills and fever.   HENT: Negative for congestion.    Respiratory: Negative for shortness of breath.    Cardiovascular: Negative for chest pain and leg swelling.   Gastrointestinal: Negative for constipation, diarrhea, nausea and vomiting.   Musculoskeletal: Positive for arthralgias and gait problem (foot drop).   Skin: Negative for wound.        Left hallux plantar callus   Neurological: Positive for numbness.   Hematological: Bruises/bleeds easily.       OBJECTIVE     Vitals:    05/08/23 1047   BP: 162/62   Pulse: 67   SpO2: 97%       PHYSICAL EXAM  GEN:   Accompanied by none.     Foot/Ankle Exam    GENERAL  Appearance:  obese  Orientation:  AAOx3  Affect:  appropriate  Gait:  steppage (Drop foot left)  Assistance:  independent  Right shoe gear: casual shoe  Left shoe gear: casual shoe (With AFO)    VASCULAR     Right Foot Vascularity   Dorsalis pedis:  2+  Posterior tibial:  2+  Skin temperature:  warm  Edema grading:  Trace and non-pitting  CFT:  3  Pedal hair growth:  Present  Varicosities:  none     Left Foot Vascularity   Dorsalis pedis:  2+  Posterior tibial:  2+  Skin temperature:  warm  Edema grading:  Trace and non-pitting  CFT:  3  Pedal hair growth:  Present  Varicosities:  none     NEUROLOGIC     Right Foot Neurologic   Light touch sensation: diminished  Vibratory sensation: diminished  Hot/Cold sensation: diminished  Protective Sensation using [place]-Mirtha Monofilament:   Right Foot Sites Intact: 0.  Sites tested: 10     Left Foot Neurologic   Light touch sensation: diminished  Vibratory sensation: diminished  Hot/Cold sensation:  diminished  Protective Sensation using  Lyman-Mirtha Monofilament:   Left Foot Sites Intact: 0.  Sites tested: 10    MUSCULOSKELETAL     Right Foot Musculoskeletal   Tenderness:  none    Arch:  Pes planus  Hallux valgus: No    Hallux limitus: No       Left Foot Musculoskeletal   Tenderness:  callous left foot  Arch:  Pes planus  Hallux valgus: No    Hallux limitus: No      MUSCLE STRENGTH     Right Foot Muscle Strength   Foot dorsiflexion:  5  Foot plantar flexion:  5  Foot inversion:  5  Foot eversion:  5     Left Foot Muscle Strength   Foot dorsiflexion:  2  Foot plantar flexion:  3  Foot inversion:  3  Foot eversion:  3    RANGE OF MOTION     Right Foot Range of Motion   Foot and ankle ROM within normal limits       Left Foot Range of Motion   Foot and ankle ROM within normal limits      DERMATOLOGIC      Right Foot Dermatologic   Skin  Right foot skin is intact.   Nails  1.  Positive for elongated, onychomycosis, abnormal thickness, subungual debris and dystrophic nail.  2.  Positive for elongated, onychomycosis, abnormal thickness, subungual debris and dystrophic nail.  3.  Positive for elongated, onychomycosis, abnormal thickness, subungual debris and dystrophic nail.  4.  Positive for elongated, onychomycosis, abnormal thickness, subungual debris and dystrophic nail.  5.  Positive for elongated, onychomycosis, abnormal thickness, subungual debris and dystrophic nail.     Left Foot Dermatologic   Skin  Positive for corn.   Nails  1.  Positive for elongated, onychomycosis, abnormal thickness, subungual debris and dystrophic nail.  2.  Positive for elongated, onychomycosis, abnormal thickness, subungual debris and dystrophic nail.  3.  Positive for elongated, onychomycosis, abnormal thickness, subungual debris and dystrophic nail.  4.  Positive for elongated, onychomycosis, abnormally thick, subungual debris and dystrophic nail.  5.  Positive for elongated, onychomycosis, abnormally thick, subungual debris and dystrophic nail.    Image:        RADIOLOGY/NUCLEAR:  No results found.    LABORATORY/CULTURE RESULTS:      PATHOLOGY RESULTS:       ASSESSMENT/PLAN     Diagnoses and all orders for this visit:    1. Onychomycosis (Primary)    2. Pre-ulcerative calluses    3. Type 2 diabetes mellitus with diabetic polyneuropathy, with long-term current use of insulin    4. Foot drop, left    5. Antiplatelet or antithrombotic long-term use      Comprehensive lower extremity examination and evaluation was performed.  Discussed findings and treatment plan including risks, benefits, and treatment options with patient in detail. Patient agreed with treatment plan.  Diabetic foot exam performed.  After verbal consent obtained, nail(s) x10 debrided of length and thickness with nail nipper without incidence  After verbal consent obtained, calluses x1 pared utilizing dermal curette and/or scalpel without incidence  Patient may maintain nails and calluses at home utilizing emery board or pumice stone between visits as needed  Reviewed at home diabetic foot care including daily foot checks   Continue AFO for foot drop.   Continue diabetic monitoring and control under direction of PCP.    Monitor callused area closely and return to care or go to wound care if area opens/changes.   Toe Dispensed for left great toe  An After Visit Summary was printed and given to the patient at discharge, including (if requested) any available informative/educational handouts regarding diagnosis, treatment, or medications. All questions were answered to patient/family satisfaction. Should symptoms fail to improve or worsen they agree to call or return to clinic or to go to the Emergency Department. Discussed the importance of following up with any needed screening tests/labs/specialist appointments and any requested follow-up recommended by me today. Importance of maintaining follow-up discussed and patient accepts that missed appointments can delay diagnosis and potentially lead to  worsening of conditions.  Return in about 2 months (around 7/8/2023)., or sooner if acute issues arise.    Lab Frequency Next Occurrence   Diet: Once 04/19/2018   Advance Diet as Tolerated Once 04/19/2018       This document has been electronically signed by PRATIMA Esteves on May 8, 2023 11:28 CDT

## 2023-05-01 ENCOUNTER — OFFICE VISIT (OUTPATIENT)
Dept: PRIMARY CARE CLINIC | Age: 68
End: 2023-05-01
Payer: MEDICARE

## 2023-05-01 VITALS
HEIGHT: 67 IN | WEIGHT: 208.4 LBS | OXYGEN SATURATION: 96 % | SYSTOLIC BLOOD PRESSURE: 145 MMHG | BODY MASS INDEX: 32.71 KG/M2 | HEART RATE: 99 BPM | DIASTOLIC BLOOD PRESSURE: 68 MMHG | TEMPERATURE: 97 F

## 2023-05-01 DIAGNOSIS — E11.51 TYPE 2 DIABETES MELLITUS WITH DIABETIC PERIPHERAL ANGIOPATHY WITHOUT GANGRENE, WITHOUT LONG-TERM CURRENT USE OF INSULIN (HCC): ICD-10-CM

## 2023-05-01 DIAGNOSIS — I10 ESSENTIAL HYPERTENSION: ICD-10-CM

## 2023-05-01 DIAGNOSIS — J44.9 CHRONIC OBSTRUCTIVE PULMONARY DISEASE, UNSPECIFIED COPD TYPE (HCC): ICD-10-CM

## 2023-05-01 DIAGNOSIS — K59.01 SLOW TRANSIT CONSTIPATION: ICD-10-CM

## 2023-05-01 DIAGNOSIS — E11.51 TYPE 2 DIABETES MELLITUS WITH DIABETIC PERIPHERAL ANGIOPATHY WITHOUT GANGRENE, WITHOUT LONG-TERM CURRENT USE OF INSULIN (HCC): Chronic | ICD-10-CM

## 2023-05-01 DIAGNOSIS — N18.32 STAGE 3B CHRONIC KIDNEY DISEASE (HCC): ICD-10-CM

## 2023-05-01 DIAGNOSIS — I48.0 PAROXYSMAL ATRIAL FIBRILLATION (HCC): ICD-10-CM

## 2023-05-01 DIAGNOSIS — M79.18 ABDOMINAL MUSCLE PAIN: ICD-10-CM

## 2023-05-01 LAB
ALBUMIN SERPL-MCNC: 4.5 G/DL (ref 3.5–5.2)
ALP SERPL-CCNC: 73 U/L (ref 40–130)
ALT SERPL-CCNC: 11 U/L (ref 5–41)
ANION GAP SERPL CALCULATED.3IONS-SCNC: 12 MMOL/L (ref 7–19)
AST SERPL-CCNC: 17 U/L (ref 5–40)
BASOPHILS # BLD: 0 K/UL (ref 0–0.2)
BASOPHILS NFR BLD: 0.6 % (ref 0–1)
BILIRUB SERPL-MCNC: <0.2 MG/DL (ref 0.2–1.2)
BUN SERPL-MCNC: 29 MG/DL (ref 8–23)
CALCIUM SERPL-MCNC: 10 MG/DL (ref 8.8–10.2)
CHLORIDE SERPL-SCNC: 106 MMOL/L (ref 98–111)
CHOLEST SERPL-MCNC: 145 MG/DL (ref 160–199)
CO2 SERPL-SCNC: 24 MMOL/L (ref 22–29)
CREAT SERPL-MCNC: 1.7 MG/DL (ref 0.5–1.2)
CREAT UR-MCNC: 208.4 MG/DL (ref 4.2–622)
EOSINOPHIL # BLD: 0.1 K/UL (ref 0–0.6)
EOSINOPHIL NFR BLD: 2.6 % (ref 0–5)
ERYTHROCYTE [DISTWIDTH] IN BLOOD BY AUTOMATED COUNT: 15.6 % (ref 11.5–14.5)
GLUCOSE SERPL-MCNC: 126 MG/DL (ref 74–109)
HBA1C MFR BLD: 7.1 % (ref 4–6)
HCT VFR BLD AUTO: 34.7 % (ref 42–52)
HDLC SERPL-MCNC: 52 MG/DL (ref 55–121)
HGB BLD-MCNC: 10.9 G/DL (ref 14–18)
IMM GRANULOCYTES # BLD: 0 K/UL
LDLC SERPL CALC-MCNC: 81 MG/DL
LYMPHOCYTES # BLD: 1.6 K/UL (ref 1.1–4.5)
LYMPHOCYTES NFR BLD: 35.3 % (ref 20–40)
MCH RBC QN AUTO: 28.3 PG (ref 27–31)
MCHC RBC AUTO-ENTMCNC: 31.4 G/DL (ref 33–37)
MCV RBC AUTO: 90.1 FL (ref 80–94)
MICROALBUMIN UR-MCNC: 10.5 MG/DL (ref 0–19)
MICROALBUMIN/CREAT UR-RTO: 50.4 MG/G
MONOCYTES # BLD: 0.3 K/UL (ref 0–0.9)
MONOCYTES NFR BLD: 6.5 % (ref 0–10)
NEUTROPHILS # BLD: 2.5 K/UL (ref 1.5–7.5)
NEUTS SEG NFR BLD: 54.8 % (ref 50–65)
PLATELET # BLD AUTO: 198 K/UL (ref 130–400)
PMV BLD AUTO: 12.4 FL (ref 9.4–12.4)
POTASSIUM SERPL-SCNC: 5.1 MMOL/L (ref 3.5–5)
PROT SERPL-MCNC: 8 G/DL (ref 6.6–8.7)
RBC # BLD AUTO: 3.85 M/UL (ref 4.7–6.1)
SODIUM SERPL-SCNC: 142 MMOL/L (ref 136–145)
TRIGL SERPL-MCNC: 61 MG/DL (ref 0–149)
TSH SERPL DL<=0.005 MIU/L-ACNC: 1.92 UIU/ML (ref 0.27–4.2)
WBC # BLD AUTO: 4.6 K/UL (ref 4.8–10.8)

## 2023-05-01 PROCEDURE — 99213 OFFICE O/P EST LOW 20 MIN: CPT | Performed by: NURSE PRACTITIONER

## 2023-05-01 PROCEDURE — 1123F ACP DISCUSS/DSCN MKR DOCD: CPT | Performed by: NURSE PRACTITIONER

## 2023-05-01 PROCEDURE — 3077F SYST BP >= 140 MM HG: CPT | Performed by: NURSE PRACTITIONER

## 2023-05-01 PROCEDURE — 3051F HG A1C>EQUAL 7.0%<8.0%: CPT | Performed by: NURSE PRACTITIONER

## 2023-05-01 PROCEDURE — 3078F DIAST BP <80 MM HG: CPT | Performed by: NURSE PRACTITIONER

## 2023-05-01 RX ORDER — ATORVASTATIN CALCIUM 40 MG/1
TABLET, FILM COATED ORAL
Qty: 90 TABLET | Refills: 3 | Status: SHIPPED | OUTPATIENT
Start: 2023-05-01

## 2023-05-01 RX ORDER — INSULIN ASPART 100 [IU]/ML
25-37 INJECTION, SOLUTION INTRAVENOUS; SUBCUTANEOUS
Qty: 33.3 ML | Refills: 2 | Status: SHIPPED | OUTPATIENT
Start: 2023-05-01 | End: 2023-07-30

## 2023-05-01 RX ORDER — CILOSTAZOL 50 MG/1
50 TABLET ORAL 2 TIMES DAILY
Qty: 180 TABLET | Refills: 2 | Status: SHIPPED | OUTPATIENT
Start: 2023-05-01 | End: 2023-07-30

## 2023-05-01 RX ORDER — INSULIN GLARGINE 100 [IU]/ML
20 INJECTION, SOLUTION SUBCUTANEOUS NIGHTLY
Qty: 6 ML | Refills: 2 | Status: SHIPPED | OUTPATIENT
Start: 2023-05-01 | End: 2023-07-30

## 2023-05-01 RX ORDER — HYDROCHLOROTHIAZIDE 25 MG/1
25 TABLET ORAL DAILY
Qty: 90 TABLET | Refills: 2 | Status: SHIPPED | OUTPATIENT
Start: 2023-05-01

## 2023-05-01 RX ORDER — AMLODIPINE BESYLATE 10 MG/1
10 TABLET ORAL DAILY
Qty: 90 TABLET | Refills: 1 | Status: SHIPPED | OUTPATIENT
Start: 2023-05-01

## 2023-05-01 RX ORDER — LISINOPRIL 40 MG/1
40 TABLET ORAL DAILY
Qty: 90 TABLET | Refills: 1 | Status: SHIPPED | OUTPATIENT
Start: 2023-05-01 | End: 2023-07-30

## 2023-05-01 RX ORDER — PANTOPRAZOLE SODIUM 40 MG/1
40 TABLET, DELAYED RELEASE ORAL DAILY
Qty: 90 TABLET | Refills: 0 | Status: SHIPPED | OUTPATIENT
Start: 2023-05-01 | End: 2023-07-30

## 2023-05-01 RX ORDER — CYCLOBENZAPRINE HCL 5 MG
5 TABLET ORAL 3 TIMES DAILY PRN
Qty: 90 TABLET | Refills: 3 | Status: SHIPPED | OUTPATIENT
Start: 2023-05-01

## 2023-05-01 RX ORDER — LABETALOL 100 MG/1
TABLET, FILM COATED ORAL
Qty: 180 TABLET | Refills: 1 | Status: SHIPPED | OUTPATIENT
Start: 2023-05-01

## 2023-05-01 RX ORDER — AMOXICILLIN 250 MG
2 CAPSULE ORAL DAILY PRN
Qty: 30 TABLET | Refills: 0 | Status: SHIPPED | OUTPATIENT
Start: 2023-05-01 | End: 2023-07-30

## 2023-05-01 SDOH — ECONOMIC STABILITY: INCOME INSECURITY: HOW HARD IS IT FOR YOU TO PAY FOR THE VERY BASICS LIKE FOOD, HOUSING, MEDICAL CARE, AND HEATING?: NOT VERY HARD

## 2023-05-01 SDOH — ECONOMIC STABILITY: FOOD INSECURITY: WITHIN THE PAST 12 MONTHS, THE FOOD YOU BOUGHT JUST DIDN'T LAST AND YOU DIDN'T HAVE MONEY TO GET MORE.: SOMETIMES TRUE

## 2023-05-01 SDOH — ECONOMIC STABILITY: HOUSING INSECURITY
IN THE LAST 12 MONTHS, WAS THERE A TIME WHEN YOU DID NOT HAVE A STEADY PLACE TO SLEEP OR SLEPT IN A SHELTER (INCLUDING NOW)?: NO

## 2023-05-01 SDOH — ECONOMIC STABILITY: FOOD INSECURITY: WITHIN THE PAST 12 MONTHS, YOU WORRIED THAT YOUR FOOD WOULD RUN OUT BEFORE YOU GOT MONEY TO BUY MORE.: SOMETIMES TRUE

## 2023-05-01 ASSESSMENT — ENCOUNTER SYMPTOMS
RHINORRHEA: 0
PHOTOPHOBIA: 0
VOMITING: 0
COUGH: 0
COLOR CHANGE: 0
BACK PAIN: 0
NAUSEA: 0
VOICE CHANGE: 0
SHORTNESS OF BREATH: 0

## 2023-05-01 ASSESSMENT — PATIENT HEALTH QUESTIONNAIRE - PHQ9
1. LITTLE INTEREST OR PLEASURE IN DOING THINGS: 0
SUM OF ALL RESPONSES TO PHQ QUESTIONS 1-9: 0
2. FEELING DOWN, DEPRESSED OR HOPELESS: 0
SUM OF ALL RESPONSES TO PHQ QUESTIONS 1-9: 0
SUM OF ALL RESPONSES TO PHQ QUESTIONS 1-9: 0
SUM OF ALL RESPONSES TO PHQ9 QUESTIONS 1 & 2: 0
SUM OF ALL RESPONSES TO PHQ QUESTIONS 1-9: 0

## 2023-05-01 NOTE — PROGRESS NOTES
[facility] [address] [facility]  [address] [place] [address]  [address] [address]  Dept: [phone]  Dept Fax: [phone]  Loc: [phone]    Cayla Dozier is a 80 y.o. male who presents today for his medical conditions/complaints as noted below. Cayla Dozier is c/o of Follow-up (No new issues or concerns)        HPI:     HPI   Chief Complaint   Patient presents with    Follow-up     No new issues or concerns     Patient presents today for follow-up DM2, hyperlipidemia, hypertension. Blood pressure has been well-controlled. Glucose ranges from 200-250 he states. He has history of afib; he is on plavix and beta blocker. Denies chest pain or SOB. He continues to see Dr Mumtaz Lauren, Dr Whitley Roe (vascular), ALFONSO Weiner (cardiology), Dr Francis Rosa. He also goes to the [place].    Past Medical History:   Diagnosis Date    Arthritis     BiPAP (biphasic positive airway pressure) dependence     8cm to 20cm    Chronic back pain     Emphysema/COPD (HCC)     GERD (gastroesophageal reflux disease)     History of anemia     History of blood transfusion     Hyperlipidemia     Hypertension     Impotence 3/22/2021    Neuropathy     Obstructive sleep apnea     AHI:  95.8 per PSG, 3/2017; repeat PSG, 2017 revealed an AHI of 65.5    Peripheral vascular disease (HCC)     S/P angioplasty     Type II or unspecified type diabetes mellitus without mention of complication, not stated as uncontrolled       Past Surgical History:   Procedure Laterality Date    CHOLECYSTECTOMY      LARYNGOSCOPY N/A 11/10/2017    Direct laryngoscopy with assessment of hypopharynx, larynx, and post-cricoid areas performed by Lio Platt MD at [address] [place] [address] W/WO TRACHEOSCOPY DX EXCEPT  N/A 2018    MICRO DIRECT LARYNGOSCOPY WITH BIOPSY performed by Lio Platt MD at [place] [address] in past, around [address] [facility] [address] 2023 10/31/2022 2022 2022 2021

## 2023-05-05 ENCOUNTER — TELEPHONE (OUTPATIENT)
Dept: PODIATRY | Facility: CLINIC | Age: 68
End: 2023-05-05
Payer: COMMERCIAL

## 2023-05-05 RX ORDER — CLOPIDOGREL BISULFATE 75 MG/1
TABLET ORAL
Qty: 90 TABLET | Refills: 0 | Status: SHIPPED | OUTPATIENT
Start: 2023-05-05

## 2023-05-08 ENCOUNTER — OFFICE VISIT (OUTPATIENT)
Dept: PODIATRY | Facility: CLINIC | Age: 68
End: 2023-05-08
Payer: COMMERCIAL

## 2023-05-08 VITALS
BODY MASS INDEX: 30.51 KG/M2 | SYSTOLIC BLOOD PRESSURE: 162 MMHG | DIASTOLIC BLOOD PRESSURE: 62 MMHG | HEART RATE: 67 BPM | OXYGEN SATURATION: 97 % | WEIGHT: 206 LBS | HEIGHT: 69 IN

## 2023-05-08 DIAGNOSIS — Z79.4 TYPE 2 DIABETES MELLITUS WITH DIABETIC POLYNEUROPATHY, WITH LONG-TERM CURRENT USE OF INSULIN: ICD-10-CM

## 2023-05-08 DIAGNOSIS — L84 PRE-ULCERATIVE CALLUSES: ICD-10-CM

## 2023-05-08 DIAGNOSIS — B35.1 ONYCHOMYCOSIS: Primary | ICD-10-CM

## 2023-05-08 DIAGNOSIS — E11.42 TYPE 2 DIABETES MELLITUS WITH DIABETIC POLYNEUROPATHY, WITH LONG-TERM CURRENT USE OF INSULIN: ICD-10-CM

## 2023-05-08 DIAGNOSIS — M21.372 FOOT DROP, LEFT: ICD-10-CM

## 2023-05-08 DIAGNOSIS — Z79.02 ANTIPLATELET OR ANTITHROMBOTIC LONG-TERM USE: ICD-10-CM

## 2023-07-28 ENCOUNTER — TELEPHONE (OUTPATIENT)
Dept: PODIATRY | Facility: CLINIC | Age: 68
End: 2023-07-28
Payer: MEDICARE

## 2023-07-28 NOTE — TELEPHONE ENCOUNTER
Called patient regarding appt on 07/31/2023. Left message for patient to return call if any questions or concerns arise.

## 2023-07-31 ENCOUNTER — OFFICE VISIT (OUTPATIENT)
Dept: PODIATRY | Facility: CLINIC | Age: 68
End: 2023-07-31
Payer: MEDICARE

## 2023-07-31 VITALS
HEIGHT: 69 IN | HEART RATE: 88 BPM | OXYGEN SATURATION: 95 % | DIASTOLIC BLOOD PRESSURE: 66 MMHG | WEIGHT: 202 LBS | BODY MASS INDEX: 29.92 KG/M2 | SYSTOLIC BLOOD PRESSURE: 168 MMHG

## 2023-07-31 DIAGNOSIS — L84 PRE-ULCERATIVE CALLUSES: ICD-10-CM

## 2023-07-31 DIAGNOSIS — E11.42 TYPE 2 DIABETES MELLITUS WITH DIABETIC POLYNEUROPATHY, WITH LONG-TERM CURRENT USE OF INSULIN: ICD-10-CM

## 2023-07-31 DIAGNOSIS — B35.1 ONYCHOMYCOSIS: Primary | ICD-10-CM

## 2023-07-31 DIAGNOSIS — M21.372 FOOT DROP, LEFT: ICD-10-CM

## 2023-07-31 DIAGNOSIS — Z79.4 TYPE 2 DIABETES MELLITUS WITH DIABETIC POLYNEUROPATHY, WITH LONG-TERM CURRENT USE OF INSULIN: ICD-10-CM

## 2023-07-31 DIAGNOSIS — Z79.02 ANTIPLATELET OR ANTITHROMBOTIC LONG-TERM USE: ICD-10-CM

## 2023-08-07 ENCOUNTER — OFFICE VISIT (OUTPATIENT)
Dept: CARDIOLOGY | Facility: CLINIC | Age: 68
End: 2023-08-07
Payer: MEDICARE

## 2023-08-07 VITALS
BODY MASS INDEX: 32.18 KG/M2 | HEIGHT: 67 IN | HEART RATE: 97 BPM | WEIGHT: 205 LBS | DIASTOLIC BLOOD PRESSURE: 60 MMHG | OXYGEN SATURATION: 97 % | SYSTOLIC BLOOD PRESSURE: 150 MMHG

## 2023-08-07 DIAGNOSIS — E66.09 CLASS 1 OBESITY DUE TO EXCESS CALORIES WITH SERIOUS COMORBIDITY AND BODY MASS INDEX (BMI) OF 32.0 TO 32.9 IN ADULT: ICD-10-CM

## 2023-08-07 DIAGNOSIS — I48.0 PAROXYSMAL ATRIAL FIBRILLATION: Primary | Chronic | ICD-10-CM

## 2023-08-07 DIAGNOSIS — N18.31 TYPE 2 DIABETES MELLITUS WITH STAGE 3A CHRONIC KIDNEY DISEASE, WITH LONG-TERM CURRENT USE OF INSULIN: ICD-10-CM

## 2023-08-07 DIAGNOSIS — I10 ESSENTIAL HYPERTENSION: Chronic | ICD-10-CM

## 2023-08-07 DIAGNOSIS — I73.9 PVD (PERIPHERAL VASCULAR DISEASE): Chronic | ICD-10-CM

## 2023-08-07 DIAGNOSIS — E11.22 TYPE 2 DIABETES MELLITUS WITH STAGE 3A CHRONIC KIDNEY DISEASE, WITH LONG-TERM CURRENT USE OF INSULIN: ICD-10-CM

## 2023-08-07 DIAGNOSIS — Z79.4 TYPE 2 DIABETES MELLITUS WITH STAGE 3A CHRONIC KIDNEY DISEASE, WITH LONG-TERM CURRENT USE OF INSULIN: ICD-10-CM

## 2023-08-07 DIAGNOSIS — E78.2 MIXED HYPERLIPIDEMIA: Chronic | ICD-10-CM

## 2023-08-07 PROCEDURE — 3077F SYST BP >= 140 MM HG: CPT | Performed by: NURSE PRACTITIONER

## 2023-08-07 PROCEDURE — 1160F RVW MEDS BY RX/DR IN RCRD: CPT | Performed by: NURSE PRACTITIONER

## 2023-08-07 PROCEDURE — 93000 ELECTROCARDIOGRAM COMPLETE: CPT | Performed by: NURSE PRACTITIONER

## 2023-08-07 PROCEDURE — 1159F MED LIST DOCD IN RCRD: CPT | Performed by: NURSE PRACTITIONER

## 2023-08-07 PROCEDURE — 3078F DIAST BP <80 MM HG: CPT | Performed by: NURSE PRACTITIONER

## 2023-08-07 PROCEDURE — 99214 OFFICE O/P EST MOD 30 MIN: CPT | Performed by: NURSE PRACTITIONER

## 2023-08-07 RX ORDER — CLOPIDOGREL BISULFATE 75 MG/1
TABLET ORAL
Qty: 90 TABLET | Refills: 0 | Status: SHIPPED | OUTPATIENT
Start: 2023-08-07

## 2023-08-07 NOTE — PROGRESS NOTES
Subjective:     Encounter Date:08/07/2023      Patient ID: Edy Cosby is a 68 y.o. male with previous episode of paroxysmal atrial fibrillation following surgery in 2017 and no known recurrence, hypertension, insulin-dependent diabetes, peripheral vascular disease, CKD, obstructive sleep apnea, and obesity.  He presents the office today for routine follow-up.    Chief Complaint: Routine Atrial Fibrillation follow-up  Atrial Fibrillation  Presents for follow-up visit. Symptoms are negative for chest pain, dizziness, hemodynamic instability, hypertension, hypotension, palpitations, shortness of breath, syncope, tachycardia and weakness. The symptoms have been stable. Past medical history includes atrial fibrillation.   Hypertension  This is a chronic problem. The current episode started more than 1 year ago. The problem is controlled. Pertinent negatives include no chest pain, headaches, malaise/fatigue, orthopnea, palpitations, peripheral edema, PND or shortness of breath. Risk factors for coronary artery disease include diabetes mellitus, male gender and obesity. Current antihypertension treatment includes ACE inhibitors, diuretics, calcium channel blockers and beta blockers. The current treatment provides significant improvement. Compliance problems include exercise.  Hypertensive end-organ damage includes kidney disease and PVD. There is no history of angina, CAD/MI or heart failure. Identifiable causes of hypertension include chronic renal disease.     Mr. Cosby follows with our office due to a previously known episode of atrial fibrillation in 2017.  He has had no known recurrence since that time.  He has other chronic stable conditions including diabetes, kidney disease, hypertension, hyperlipidemia.  He has no known coronary artery disease.    Mr. Cosby presents today for routine follow up. He follows with his PCP office and vascular surgery routinely. He has prior history of atrial  fibrillation with an episode of post operative AF in 2017.  He follows with us for surveillance but has had no known recurrence.  He has been active without complaints. He denies any chest pain, pressure, or similar. He has had no palpitations, lightheadedness, or dizziness.   He reports compliance with his medications at home. He has no bleeding issues and currently on clopidogrel.  He tells me recently that he has had a lot of abdominal gas and bloating. He has gas that causes belching and sometimes chest pressure, relieved with belching. He held some of his home medicines (vitamin c and folic acid) recently and feels that this has improved his symptoms.  He has not yet discussed this with his primary care provider. He denies any complaints today.     The following portions of the patient's history were reviewed and updated as appropriate: allergies, current medications, past family history, past medical history, past social history and past surgical history.     No Known Allergies      Current Outpatient Medications:     albuterol sulfate  (90 Base) MCG/ACT inhaler, Inhale 2 puffs 4 (Four) Times a Day As Needed., Disp: , Rfl:     amLODIPine (NORVASC) 10 MG tablet, Take 1 tablet by mouth Daily., Disp: , Rfl:     ascorbic acid (VITAMIN C) 500 MG tablet, 1 tablet Daily., Disp: , Rfl:     atorvastatin (LIPITOR) 40 MG tablet, 1 tablet Every Night., Disp: , Rfl:     clopidogrel (PLAVIX) 75 MG tablet, Take 1 tablet by mouth once daily, Disp: 90 tablet, Rfl: 0    cyclobenzaprine (FLEXERIL) 5 MG tablet, Take 1 tablet by mouth 3 (Three) Times a Day As Needed for Muscle Spasms., Disp: , Rfl:     Dulaglutide (Trulicity) 1.5 MG/0.5ML solution pen-injector, Inject 1.5 mg under the skin into the appropriate area as directed 1 (One) Time Per Week., Disp: , Rfl:     folic acid (FOLVITE) 1 MG tablet, 1 tablet., Disp: , Rfl:     hydroCHLOROthiazide (MICROZIDE) 12.5 MG capsule, Take 1 capsule by mouth Daily., Disp: , Rfl:      "labetalol (NORMODYNE) 100 MG tablet, Take 1 tablet by mouth 2 (Two) Times a Day., Disp: , Rfl:     lisinopril (PRINIVIL,ZESTRIL) 40 MG tablet, Take 1 tablet by mouth Daily., Disp: , Rfl:     pantoprazole (PROTONIX) 40 MG EC tablet, Take 1 tablet by mouth Daily., Disp: , Rfl:     sildenafil (VIAGRA) 100 MG tablet, Take 0.5 tablets by mouth As Needed for Erectile Dysfunction., Disp: , Rfl:     tamsulosin (FLOMAX) 0.4 MG capsule 24 hr capsule, Take 1 capsule by mouth Daily., Disp: , Rfl:     vitamin D (ERGOCALCIFEROL) 1.25 MG (57335 UT) capsule capsule, Take 1 capsule by mouth 1 (One) Time Per Week., Disp: , Rfl:       Review of Systems   Constitutional: Negative for diaphoresis, fever and malaise/fatigue.   Eyes:  Negative for visual disturbance.   Cardiovascular:  Negative for chest pain, claudication, dyspnea on exertion, irregular heartbeat, leg swelling, near-syncope, orthopnea, palpitations, paroxysmal nocturnal dyspnea and syncope.   Respiratory:  Negative for cough, shortness of breath and wheezing.    Hematologic/Lymphatic: Negative for bleeding problem. Does not bruise/bleed easily.   Gastrointestinal:  Negative for bloating, abdominal pain, nausea and vomiting.        Belching   Neurological:  Negative for dizziness, headaches, light-headedness, loss of balance and weakness.   Psychiatric/Behavioral:  Negative for altered mental status.        ECG 12 Lead    Date/Time: 8/7/2023 11:12 AM  Performed by: Aziza Soriano APRN  Authorized by: Aziza Soriano APRN   Comparison: compared with previous ECG from 2/8/2023  Similar to previous ECG  Rhythm: sinus rhythm  Rate: normal  BPM: 88  Conduction: conduction normal  ST Segments: ST segments normal  T Waves: T waves normal    Clinical impression: normal ECG        /60   Pulse 97   Ht 170.2 cm (67\")   Wt 93 kg (205 lb)   SpO2 97%   BMI 32.11 kg/mý        Objective:     Vitals reviewed.   Constitutional:       General: Awake. Not in acute distress.     " Appearance: Normal appearance. Well-developed, well-groomed and not in distress. Obese. Chronically ill-appearing. Not ill-appearing.   HENT:      Head: Normocephalic and atraumatic.   Pulmonary:      Effort: Pulmonary effort is normal.      Breath sounds: Normal breath sounds. No wheezing. No rhonchi. No rales.   Cardiovascular:      Normal rate. Regular rhythm.      Murmurs: There is no murmur.      No gallop.  No rub.   Musculoskeletal:      Cervical back: Normal range of motion and neck supple. Skin:     General: Skin is warm and dry.   Neurological:      Mental Status: Alert, oriented to person, place, and time and oriented to person, place and time.   Psychiatric:         Attention and Perception: Attention normal.         Mood and Affect: Mood normal.         Speech: Speech normal.         Behavior: Behavior normal. Behavior is cooperative.         Thought Content: Thought content normal.         Cognition and Memory: Cognition normal.         Judgment: Judgment normal.     Lab Review:     Results for orders placed during the hospital encounter of 01/23/17    Adult Transthoracic Echo Complete With Contrast    Interpretation Summary  ú All left ventricular wall segments contract normally.  ú Left ventricular wall thickness is consistent with moderate concentric hypertrophy.    GROSSLY NORMAL LV AND RV SYSTOLIC FUNCTION  GROSSLY NORMAL VALVE FUNCTION  MODERATE LVH        Assessment:          Diagnosis Plan   1. Paroxysmal atrial fibrillation        2. Essential hypertension        3. Mixed hyperlipidemia        4. PVD (peripheral vascular disease)        5. Type 2 diabetes mellitus with stage 3a chronic kidney disease, with long-term current use of insulin        6. Class 1 obesity due to excess calories with serious comorbidity and body mass index (BMI) of 32.0 to 32.9 in adult               Plan:      Paroxysmal atrial fibrillation: Stable.  He denies any palpitations, prolonged episodes of tachycardia.  He  is not anticoagulated currently and has had no recent known recurrence of AF.   CHADVASC2 SCORE   [id number] Score: 4 (8/7/2023 11:14 AM)    Hypertension: Stable.  Currently well controlled on his home medications.  Continue follow-up with primary care office.    Hyperlipidemia: Managed on statin therapy follow-up by his PCP office.    Peripheral vascular disease: The patient follows with vascular surgery.  He has known peripheral disease including bilateral lower extremity disease.     Diabetes Mellitus: with CKD on insulin managed by his primary care provider office. He reports compliance with his medications. His most recent Hgb A1c 7.1%.    Obesity: BMI 32.11 Cardiac diet is recommended with routine exercise.      Continue current medications  Follow Up in 6 months, sooner if needed.     I attest that all portions of this note have been reviewed and updated to reflect the patient's current status.

## 2023-10-30 RX ORDER — PANTOPRAZOLE SODIUM 40 MG/1
40 TABLET, DELAYED RELEASE ORAL DAILY
Qty: 90 TABLET | Refills: 0 | Status: SHIPPED | OUTPATIENT
Start: 2023-10-30

## 2023-11-01 ENCOUNTER — OFFICE VISIT (OUTPATIENT)
Dept: PRIMARY CARE CLINIC | Age: 68
End: 2023-11-01
Payer: MEDICARE

## 2023-11-01 VITALS
DIASTOLIC BLOOD PRESSURE: 86 MMHG | SYSTOLIC BLOOD PRESSURE: 134 MMHG | WEIGHT: 209 LBS | HEIGHT: 67 IN | OXYGEN SATURATION: 94 % | HEART RATE: 87 BPM | BODY MASS INDEX: 32.8 KG/M2 | TEMPERATURE: 97.3 F

## 2023-11-01 DIAGNOSIS — E11.59 TYPE 2 DIABETES MELLITUS WITH OTHER CIRCULATORY COMPLICATION, WITHOUT LONG-TERM CURRENT USE OF INSULIN (HCC): ICD-10-CM

## 2023-11-01 DIAGNOSIS — E11.51 TYPE 2 DIABETES MELLITUS WITH DIABETIC PERIPHERAL ANGIOPATHY WITHOUT GANGRENE, WITHOUT LONG-TERM CURRENT USE OF INSULIN (HCC): ICD-10-CM

## 2023-11-01 DIAGNOSIS — I10 ESSENTIAL HYPERTENSION: ICD-10-CM

## 2023-11-01 DIAGNOSIS — E11.51 TYPE 2 DIABETES MELLITUS WITH DIABETIC PERIPHERAL ANGIOPATHY WITHOUT GANGRENE, WITHOUT LONG-TERM CURRENT USE OF INSULIN (HCC): Primary | ICD-10-CM

## 2023-11-01 DIAGNOSIS — J44.9 CHRONIC OBSTRUCTIVE PULMONARY DISEASE, UNSPECIFIED COPD TYPE (HCC): ICD-10-CM

## 2023-11-01 LAB
25(OH)D3 SERPL-MCNC: 51.7 NG/ML
ALBUMIN SERPL-MCNC: 4.4 G/DL (ref 3.5–5.2)
ALP SERPL-CCNC: 69 U/L (ref 40–130)
ALT SERPL-CCNC: 12 U/L (ref 5–41)
ANION GAP SERPL CALCULATED.3IONS-SCNC: 10 MMOL/L (ref 7–19)
AST SERPL-CCNC: 14 U/L (ref 5–40)
BACTERIA URNS QL MICRO: NEGATIVE /HPF
BASOPHILS # BLD: 0 K/UL (ref 0–0.2)
BASOPHILS NFR BLD: 0.7 % (ref 0–1)
BILIRUB SERPL-MCNC: 0.3 MG/DL (ref 0.2–1.2)
BILIRUB UR QL STRIP: NEGATIVE
BUN SERPL-MCNC: 35 MG/DL (ref 8–23)
CALCIUM SERPL-MCNC: 10.3 MG/DL (ref 8.8–10.2)
CHLORIDE SERPL-SCNC: 104 MMOL/L (ref 98–111)
CHOLEST SERPL-MCNC: 225 MG/DL (ref 160–199)
CLARITY UR: CLEAR
CO2 SERPL-SCNC: 28 MMOL/L (ref 22–29)
COLOR UR: YELLOW
CREAT SERPL-MCNC: 1.7 MG/DL (ref 0.5–1.2)
CREAT UR-MCNC: 165.9 MG/DL (ref 39–259)
CRYSTALS URNS MICRO: ABNORMAL /HPF
EOSINOPHIL # BLD: 0.1 K/UL (ref 0–0.6)
EOSINOPHIL NFR BLD: 1.7 % (ref 0–5)
EPI CELLS #/AREA URNS AUTO: 0 /HPF (ref 0–5)
ERYTHROCYTE [DISTWIDTH] IN BLOOD BY AUTOMATED COUNT: 16.3 % (ref 11.5–14.5)
GLUCOSE SERPL-MCNC: 142 MG/DL (ref 74–109)
GLUCOSE UR STRIP.AUTO-MCNC: NEGATIVE MG/DL
HBA1C MFR BLD: 6.5 % (ref 4–6)
HCT VFR BLD AUTO: 34.7 % (ref 42–52)
HDLC SERPL-MCNC: 64 MG/DL (ref 55–121)
HGB BLD-MCNC: 10.8 G/DL (ref 14–18)
HGB UR STRIP.AUTO-MCNC: NEGATIVE MG/L
HYALINE CASTS #/AREA URNS AUTO: 0 /HPF (ref 0–8)
IMM GRANULOCYTES # BLD: 0 K/UL
KETONES UR STRIP.AUTO-MCNC: NEGATIVE MG/DL
LDLC SERPL CALC-MCNC: 149 MG/DL
LEUKOCYTE ESTERASE UR QL STRIP.AUTO: ABNORMAL
LYMPHOCYTES # BLD: 1.3 K/UL (ref 1.1–4.5)
LYMPHOCYTES NFR BLD: 24.4 % (ref 20–40)
MCH RBC QN AUTO: 28.5 PG (ref 27–31)
MCHC RBC AUTO-ENTMCNC: 31.1 G/DL (ref 33–37)
MCV RBC AUTO: 91.6 FL (ref 80–94)
MICROALBUMIN UR-MCNC: 8.6 MG/DL (ref 0–19)
MICROALBUMIN/CREAT UR-RTO: 51.8 MG/G
MONOCYTES # BLD: 0.3 K/UL (ref 0–0.9)
MONOCYTES NFR BLD: 6.1 % (ref 0–10)
NEUTROPHILS # BLD: 3.6 K/UL (ref 1.5–7.5)
NEUTS SEG NFR BLD: 66.7 % (ref 50–65)
NITRITE UR QL STRIP.AUTO: NEGATIVE
PH UR STRIP.AUTO: 5.5 [PH] (ref 5–8)
PLATELET # BLD AUTO: 193 K/UL (ref 130–400)
PMV BLD AUTO: 12.5 FL (ref 9.4–12.4)
POTASSIUM SERPL-SCNC: 4.8 MMOL/L (ref 3.5–5)
PROT SERPL-MCNC: 7.8 G/DL (ref 6.6–8.7)
PROT UR STRIP.AUTO-MCNC: ABNORMAL MG/DL
PROT UR-MCNC: 22 MG/DL (ref 15–45)
PTH-INTACT SERPL-MCNC: 26.2 PG/ML (ref 15–65)
RBC # BLD AUTO: 3.79 M/UL (ref 4.7–6.1)
RBC #/AREA URNS AUTO: 1 /HPF (ref 0–4)
SODIUM SERPL-SCNC: 142 MMOL/L (ref 136–145)
SP GR UR STRIP.AUTO: 1.02 (ref 1–1.03)
TRIGL SERPL-MCNC: 61 MG/DL (ref 0–149)
TSH SERPL DL<=0.005 MIU/L-ACNC: 2.26 UIU/ML (ref 0.27–4.2)
UROBILINOGEN UR STRIP.AUTO-MCNC: 0.2 E.U./DL
WBC # BLD AUTO: 5.4 K/UL (ref 4.8–10.8)
WBC #/AREA URNS AUTO: 0 /HPF (ref 0–5)

## 2023-11-01 PROCEDURE — 1036F TOBACCO NON-USER: CPT | Performed by: NURSE PRACTITIONER

## 2023-11-01 PROCEDURE — 3074F SYST BP LT 130 MM HG: CPT | Performed by: NURSE PRACTITIONER

## 2023-11-01 PROCEDURE — 3078F DIAST BP <80 MM HG: CPT | Performed by: NURSE PRACTITIONER

## 2023-11-01 PROCEDURE — 3023F SPIROM DOC REV: CPT | Performed by: NURSE PRACTITIONER

## 2023-11-01 PROCEDURE — 1123F ACP DISCUSS/DSCN MKR DOCD: CPT | Performed by: NURSE PRACTITIONER

## 2023-11-01 PROCEDURE — 3044F HG A1C LEVEL LT 7.0%: CPT | Performed by: NURSE PRACTITIONER

## 2023-11-01 PROCEDURE — G8417 CALC BMI ABV UP PARAM F/U: HCPCS | Performed by: NURSE PRACTITIONER

## 2023-11-01 PROCEDURE — 99214 OFFICE O/P EST MOD 30 MIN: CPT | Performed by: NURSE PRACTITIONER

## 2023-11-01 PROCEDURE — G8484 FLU IMMUNIZE NO ADMIN: HCPCS | Performed by: NURSE PRACTITIONER

## 2023-11-01 PROCEDURE — G8427 DOCREV CUR MEDS BY ELIG CLIN: HCPCS | Performed by: NURSE PRACTITIONER

## 2023-11-01 PROCEDURE — 3017F COLORECTAL CA SCREEN DOC REV: CPT | Performed by: NURSE PRACTITIONER

## 2023-11-01 PROCEDURE — 2022F DILAT RTA XM EVC RTNOPTHY: CPT | Performed by: NURSE PRACTITIONER

## 2023-11-01 ASSESSMENT — ENCOUNTER SYMPTOMS
COLOR CHANGE: 0
NAUSEA: 0
SHORTNESS OF BREATH: 0
RHINORRHEA: 0
PHOTOPHOBIA: 0
VOMITING: 0
BACK PAIN: 0
COUGH: 0
VOICE CHANGE: 0

## 2023-11-01 NOTE — PATIENT INSTRUCTIONS
Fasting labs in [address] within 1-2 weeks. Diabetic shoes to . Follow-up in 3 months or sooner if needed.

## 2023-11-13 DIAGNOSIS — M79.18 ABDOMINAL MUSCLE PAIN: ICD-10-CM

## 2023-11-13 NOTE — TELEPHONE ENCOUNTER
Stan Jaffe called requesting a refill of the below medication which has been pended for you:     Requested Prescriptions     Pending Prescriptions Disp Refills    cyclobenzaprine (FLEXERIL) 5 MG tablet [Pharmacy Med Name: Cyclobenzaprine HCl 5 MG Oral Tablet] 90 tablet 0     Sig: Take 1 tablet by mouth three times daily as needed for muscle spasm       Last Appointment Date: 11/1/2023  Next Appointment Date: 5/2/2024    No Known Allergies

## 2023-11-14 RX ORDER — CYCLOBENZAPRINE HCL 5 MG
TABLET ORAL
Qty: 90 TABLET | Refills: 0 | Status: SHIPPED | OUTPATIENT
Start: 2023-11-14

## 2023-11-16 RX ORDER — CLOPIDOGREL BISULFATE 75 MG/1
75 TABLET ORAL DAILY
Qty: 30 TABLET | Refills: 5 | Status: SHIPPED | OUTPATIENT
Start: 2023-11-16

## 2023-11-16 NOTE — TELEPHONE ENCOUNTER
----- Message from [facility] Sylvia sent at 11/16/2023 10:12 AM CST -----  Subject: Refill Request    QUESTIONS  Name of Medication? Other - Clopidrel 75 mg  Patient-reported dosage and instructions? 1 daily  How many days do you have left? 0  Preferred Pharmacy? [facility] phone number (if available)? [phone]  Additional Information for Provider? Pt has been out of meds 2 days  ---------------------------------------------------------------------------  --------------  CALL BACK INFO  What is the best way for the office to contact you? OK to leave message on   voicemail  Preferred Call Back Phone Number? [phone]  ---------------------------------------------------------------------------  --------------  SCRIPT ANSWERS  Relationship to Patient?  Self

## 2023-11-16 NOTE — TELEPHONE ENCOUNTER
Danna Malone called requesting a refill of the below medication which has been pended for you:     Requested Prescriptions     Pending Prescriptions Disp Refills    clopidogrel (PLAVIX) 75 MG tablet 30 tablet 5     Sig: Take 1 tablet by mouth daily       Last Appointment Date: 11/1/2023  Next Appointment Date: 5/2/2024    No Known Allergies

## 2023-12-18 ENCOUNTER — HOSPITAL ENCOUNTER (OUTPATIENT)
Dept: ULTRASOUND IMAGING | Facility: HOSPITAL | Age: 68
Discharge: HOME | End: 2023-12-18
Payer: MEDICARE

## 2023-12-18 DIAGNOSIS — Z98.890 PERIPHERAL ARTERIAL DISEASE WITH HISTORY OF REVASCULARIZATION: ICD-10-CM

## 2023-12-18 DIAGNOSIS — I73.9 PERIPHERAL ARTERIAL DISEASE WITH HISTORY OF REVASCULARIZATION: ICD-10-CM

## 2023-12-18 DIAGNOSIS — I65.23 BILATERAL CAROTID ARTERY STENOSIS: ICD-10-CM

## 2023-12-18 DIAGNOSIS — I73.9 PERIPHERAL ARTERIAL DISEASE WITH HISTORY OF REVASCULARIZATION: Primary | ICD-10-CM

## 2023-12-18 DIAGNOSIS — I65.23 BILATERAL CAROTID ARTERY STENOSIS: Primary | ICD-10-CM

## 2023-12-18 DIAGNOSIS — Z98.890 PERIPHERAL ARTERIAL DISEASE WITH HISTORY OF REVASCULARIZATION: Primary | ICD-10-CM

## 2023-12-18 PROCEDURE — 93880 EXTRACRANIAL BILAT STUDY: CPT | Performed by: SURGERY

## 2023-12-18 PROCEDURE — 93880 EXTRACRANIAL BILAT STUDY: CPT

## 2023-12-18 PROCEDURE — 93923 UPR/LXTR ART STDY 3+ LVLS: CPT | Performed by: SURGERY

## 2023-12-18 PROCEDURE — 93923 UPR/LXTR ART STDY 3+ LVLS: CPT

## 2023-12-21 ENCOUNTER — TELEPHONE (OUTPATIENT)
Dept: VASCULAR SURGERY | Facility: CLINIC | Age: 68
End: 2023-12-21
Payer: MEDICARE

## 2023-12-22 ENCOUNTER — OFFICE VISIT (OUTPATIENT)
Dept: VASCULAR SURGERY | Facility: CLINIC | Age: 68
End: 2023-12-22
Payer: MEDICARE

## 2023-12-22 VITALS
DIASTOLIC BLOOD PRESSURE: 62 MMHG | BODY MASS INDEX: 31.67 KG/M2 | WEIGHT: 209 LBS | HEIGHT: 68 IN | OXYGEN SATURATION: 99 % | HEART RATE: 92 BPM | SYSTOLIC BLOOD PRESSURE: 128 MMHG

## 2023-12-22 DIAGNOSIS — I65.23 BILATERAL CAROTID ARTERY STENOSIS: ICD-10-CM

## 2023-12-22 DIAGNOSIS — I10 ESSENTIAL HYPERTENSION: ICD-10-CM

## 2023-12-22 DIAGNOSIS — E11.22 TYPE 2 DIABETES MELLITUS WITH STAGE 3A CHRONIC KIDNEY DISEASE, WITH LONG-TERM CURRENT USE OF INSULIN: ICD-10-CM

## 2023-12-22 DIAGNOSIS — I73.9 PERIPHERAL ARTERIAL DISEASE WITH HISTORY OF REVASCULARIZATION: Primary | ICD-10-CM

## 2023-12-22 DIAGNOSIS — Z98.890 PERIPHERAL ARTERIAL DISEASE WITH HISTORY OF REVASCULARIZATION: Primary | ICD-10-CM

## 2023-12-22 DIAGNOSIS — Z79.4 TYPE 2 DIABETES MELLITUS WITH STAGE 3A CHRONIC KIDNEY DISEASE, WITH LONG-TERM CURRENT USE OF INSULIN: ICD-10-CM

## 2023-12-22 DIAGNOSIS — N18.31 TYPE 2 DIABETES MELLITUS WITH STAGE 3A CHRONIC KIDNEY DISEASE, WITH LONG-TERM CURRENT USE OF INSULIN: ICD-10-CM

## 2023-12-22 DIAGNOSIS — E78.2 MIXED HYPERLIPIDEMIA: ICD-10-CM

## 2023-12-22 NOTE — PROGRESS NOTES
12/22/2023        Ana Vincent APRN  [address] [place] [address] [address]  [place] [address]        Edy Cosby  1955    Chief Complaint   Patient presents with    PERIPHERAL ARTERY DISEASE WITH HISTORY OF REVAC.     TESTING DONE 12/18/2023, no complaints of no stroke symptoms , per patient medications are unchanged but does not have a list       Dear Ana Vincent APRN:    HPI    I had the pleasure of seeing your patient in the office today for follow up.  As you recall, the patient is a 68 y.o. male who we are currently following for peripheral arterial disease.  He returns today after having undergone repeat NIC/PVR as well as carotid duplex for ongoing surveillance.  He had previously undergone right femoral endarterectomy with angiogram, and prior to that had undergone a similar procedure on the left lower extremity back in 2019.  He continues to feel well after the right femoral endarterectomy and denies any significant claudication in the right lower extremity.    NIC/PVR done recently which I personally reviewed showed values of 0.97 on the right, and 0.79 on the left.  This represents no significant arterial insufficiency in the right lower extremity, and mild arterial insufficiency on the left.  He also had carotid duplex done today which showed some mild plaque disease but less than 50% stenosis of the bilateral internal carotid arteries.  He is asymptomatic from a carotid standpoint with no TIA or strokelike symptoms.  He otherwise feels well with no acute complaints today.  He is maintained on Plavix, and a statin.    Review of Systems   Constitutional: Negative.  Negative for activity change, appetite change, chills and fever.   HENT: Negative.  Negative for congestion, sneezing and sore throat.    Eyes: Negative.    Respiratory: Negative.  Negative for chest tightness and shortness of breath.    Cardiovascular: Negative.  Negative for chest pain, palpitations and leg swelling.  "  Gastrointestinal: Negative.  Negative for abdominal distention, abdominal pain, nausea and vomiting.   Endocrine: Negative.    Genitourinary: Negative.    Musculoskeletal: Negative.    Skin: Negative.    Allergic/Immunologic: Negative.    Neurological: Negative.    Hematological: Negative.    Psychiatric/Behavioral: Negative.         /62   Pulse 92   Ht 172.7 cm (68\")   Wt 94.8 kg (209 lb)   SpO2 99%   BMI 31.78 kg/m²       Physical Exam  Vitals and nursing note reviewed.   Constitutional:       Appearance: Normal appearance.   HENT:      Head: Normocephalic and atraumatic.      Nose: Nose normal.      Mouth/Throat:      Mouth: Mucous membranes are moist.   Eyes:      Extraocular Movements: Extraocular movements intact.      Pupils: Pupils are equal, round, and reactive to light.   Cardiovascular:      Rate and Rhythm: Normal rate. Rhythm irregularly irregular.      Pulses:           Carotid pulses are 2+ on the right side and 2+ on the left side.       Radial pulses are 2+ on the right side and 2+ on the left side.        Femoral pulses are 2+ on the right side and 2+ on the left side.       Dorsalis pedis pulses are 1+ on the right side and 1+ on the left side.        Posterior tibial pulses are detected w/ Doppler on the right side and detected w/ Doppler on the left side.      Comments: Both feet are warm.  He has no wounds.  He has palpable femoral pulses bilaterally and also has palpable dorsalis pedis pulses.  He has Doppler signals of the bilateral PTs.  Pulmonary:      Effort: Pulmonary effort is normal. No respiratory distress.   Abdominal:      General: There is no distension.      Palpations: Abdomen is soft. There is no mass.      Tenderness: There is no abdominal tenderness.   Musculoskeletal:         General: Normal range of motion.      Cervical back: Normal range of motion and neck supple.      Right lower leg: No edema.      Left lower leg: No edema.      Comments: He has known chronic " foot drop of the left foot and wears a brace for this.   Skin:     General: Skin is warm and dry.   Neurological:      General: No focal deficit present.      Mental Status: He is alert and oriented to person, place, and time.   Psychiatric:         Mood and Affect: Mood normal.         Behavior: Behavior normal.         Thought Content: Thought content normal.         Judgment: Judgment normal.         DIAGNOSTIC DATA:        US Carotid Bilateral    Result Date: 12/18/2023  Narrative: History: Carotid occlusive disease      Impression: Impression: 1. There is less than 50% stenosis of the right internal carotid artery. 2. There is less than 50% stenosis of the left internal carotid artery. 3. Antegrade flow is demonstrated in bilateral vertebral arteries.  Comments: Bilateral carotid vertebral arterial duplex scan was performed.  Grayscale imaging shows intimal thickening and calcified elements at the carotid bifurcation. The right internal carotid artery peak systolic velocity is 112.6 cm/sec. The end-diastolic velocity is 24.5 cm/sec. The right ICA/CCA ratio is approximately 1.5. These findings correlate with less than 50% stenosis of the right internal carotid artery.  Grayscale imaging shows intimal thickening and calcified elements at the carotid bifurcation. The left internal carotid artery peak systolic velocity is 89.3 cm/sec. The end-diastolic velocity is 21.1 cm/sec. The left ICA/CCA ratio is approximately 1.1. These findings correlate with less than 50% stenosis of the left internal carotid artery.  Antegrade flow is demonstrated in bilateral vertebral arteries. There is greater than 50% stenosis in the left external carotid artery.  This report was signed and finalized on 12/18/2023 4:30 PM by Dr. Constantino Metzger MD.      US Ankle / Brachial Indices Extremity Complete    Result Date: 12/18/2023  Narrative:  History: PAD  Comments: Bilateral lower extremity arterial with multi-level pulse volume  recordings and segmental pressures were performed at rest and stress.  The right ankle/brachial index is 1.01. The waveforms are biphasic without dampening. These findings are consistent with no significant arterial insufficiency of the right lower extremity at rest.  The left ankle/brachial index is 0.79. The waveforms are biphasic without dampening. These findings are consistent with mild arterial insufficiency of the left lower extremity at rest.      Impression: Impression: 1. No significant arterial insufficiency of the right lower extremity at rest. 2. Mild arterial insufficiency of the left lower extremity at rest.    This report was signed and finalized on 12/18/2023 3:54 PM by Dr. Constantino Metzger MD.         Patient Active Problem List   Diagnosis    Spinal stenosis of lumbar region    Back pain    Degeneration of intervertebral disc of lumbosacral region    Essential hypertension    Lumbar disc herniation with radiculopathy    Type 2 diabetes mellitus with stage 3a chronic kidney disease, with long-term current use of insulin    Constipation due to opioid therapy    Gastroesophageal reflux disease without esophagitis    Panlobular emphysema    Paroxysmal atrial fibrillation    Hx of colonic polyps    Class 1 obesity due to excess calories with serious comorbidity and body mass index (BMI) of 32.0 to 32.9 in adult    Atherosclerosis 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    H/O diabetic foot ulcer    Anemia    Stage 3a chronic kidney disease    Diabetic neuropathy    Difficulty with BiPAP use    Impotence    Insomnia    Left foot drop    Lumbosacral radiculitis    Obstructive sleep apnea    Mixed hyperlipidemia    PVD (peripheral vascular disease)    Vocal cord paralysis    Vocal cord paresis    Atherosclerosis of native artery of right lower extremity with intermittent claudication    Carotid stenosis    Benign prostatic hyperplasia without urinary obstruction    Vitamin D deficiency        No diagnosis found.      Lab Frequency Next Occurrence   Follow Anesthesia Guidelines / Standing Orders Once 12/07/2018   Obtain Informed Consent Once 12/12/2018   Follow Anesthesia Guidelines / Standing Orders Once 12/17/2018   Obtain Informed Consent Once 12/22/2018   Follow Anesthesia Guidelines / Standing Orders Once 01/21/2019   Obtain Informed Consent Once 01/26/2019   Provide NPO Instructions to Patient Once 01/26/2019   Follow Anesthesia Guidelines / Protocol Once 11/19/2021   Obtain Informed Consent Once 11/24/2021   Provide NPO Instructions to Patient Once 11/24/2021   Chlorhexidine Skin Prep Once 11/24/2021   Follow Anesthesia Guidelines / Protocol Once 01/17/2022   Obtain Informed Consent Once 01/22/2022   US Ankle / Brachial Indices Extremity Complete Once 11/19/2022   US Carotid Bilateral Once 11/19/2022       PLAN: After thoroughly evaluating Edy Cosby, I believe the best course of action is to remain conservative from a vascular standpoint.  Overall he has done quite well after his previous right femoral endarterectomy and right lower extremity angiogram.  He notes that his ambulation is much improved and he denies any significant claudication.  He does complain of shooting pains down his legs at times, but he does not want to undergo surgery at this time.  He says he has some back issues and he relates the pain to that.  I will see him back here in 1 year with carotid ultrasound for continued surveillance.  The patient is to continue taking their medications as previously discussed.   I did discuss vascular risk factors as they pertain to the progression of vascular disease including controlling diabetes, hypertension, and hyperlipidemia.   This was all discussed in full with complete understanding.  Thank you for allowing me to participate in the care of your patient.  Please do not hesitate to call with any questions or concerns.  We will keep you aware of any further encounters  with Edy Cosby.      Sincerely Yours,      PRATIMA Russo

## 2023-12-29 DIAGNOSIS — Z98.890 PERIPHERAL ARTERIAL DISEASE WITH HISTORY OF REVASCULARIZATION: Primary | ICD-10-CM

## 2023-12-29 DIAGNOSIS — I73.9 PERIPHERAL ARTERIAL DISEASE WITH HISTORY OF REVASCULARIZATION: Primary | ICD-10-CM

## 2024-01-02 DIAGNOSIS — I10 ESSENTIAL HYPERTENSION: ICD-10-CM

## 2024-01-02 NOTE — TELEPHONE ENCOUNTER
Gavino called requesting a refill of the below medication which has been pended for you:     Requested Prescriptions     Pending Prescriptions Disp Refills    lisinopril (PRINIVIL;ZESTRIL) 40 MG tablet [Pharmacy Med Name: Lisinopril 40 MG Oral Tablet] 90 tablet 1     Sig: Take 1 tablet by mouth once daily       Last Appointment Date: 11/1/2023  Next Appointment Date: 5/2/2024    No Known Allergies

## 2024-01-03 RX ORDER — LISINOPRIL 40 MG/1
40 TABLET ORAL DAILY
Qty: 90 TABLET | Refills: 1 | Status: SHIPPED | OUTPATIENT
Start: 2024-01-03

## 2024-01-15 NOTE — PROGRESS NOTES
[facility] - PODIATRY    Today's Date: 01/25/24    Patient Name: Edy Cosby  MRN: [id number]  CSN: [id number]  PCP: Ana Vincent APRN  Referring Provider: No ref. provider found    SUBJECTIVE     Chief Complaint   Patient presents with    Follow-up     Ana Vincent, 05/02/2023 2 MTH FU- pt states feet doing ok, needs nail care- pt denies pain     Diabetes     HPI: Edy Cosby, a 68 y.o.male, comes to clinic as a(n) established patient presenting for diabetic foot exam and complaining of thickened toenails and callus to left plantar great toe . Patient has h/o AFib, back pain, DM2, Emphysema lung, GERD, HTN, OA . Patient is IDDM and unsure of last BG level.  Notes neuropathy in feet with numbness and tingling.  Continues use of AFO for foot drop of left foot. Callus to left plantar hallux has returned.  Denies any redness, drainage, or other issues from the area. Patient states that toenails are long, thick, and irregular and that he is unable to care for them himself.  Denies open wound or sores.  Denies pain today. Relates previous treatment(s) including foot care by podiatrist . Denies any constitutional symptoms. No other pedal complaints at this time.    Past Medical History:   Diagnosis Date    A-fib     Atherosclerosis     Chronic back pain     Constipation     Diabetes mellitus     TYPE II    Dropfoot     wears brace    Dysphagia     Emphysema of lung     Gastroparesis     GERD (gastroesophageal reflux disease)     Hyperlipidemia     Hypertension     ESSENTIAL    Leg pain     Muscle spasm     Nerve pain     Osteoarthritis     Sleep apnea     no cpap/bipap (non-compliance)    Sleeping difficulty     Ulcer of toe due to diabetes mellitus      Past Surgical History:   Procedure Laterality Date    ANGIOPLASTY ILIAC ARTERY Left 1/29/2019    Procedure: ANGIOPLASTY ILIAC ARTERY, STENT PLACEMENT;  Surgeon: Duncan Lucio MD;  Location: [place] [address];   Service: Vascular    AORTAGRAM Left 12/18/2018    Procedure: AORTOGRAM, LEFT LEG ANGIOGRAM, MYNX CLOSURE;  Surgeon: Duncan Lucio MD;  Location:  [facility] HYBRID OR 12;  Service: Vascular    AORTAGRAM Right 12/16/2021    Procedure: AORTAGRAM, RIGHT LOWER EXTREMITY ANGIOGRAM, POSSIBLE INTERVENTION;  Surgeon: Duncan Lucio MD;  Location: [facility] HYBRID OR 12;  Service: Vascular;  Laterality: Right;    BACK SURGERY      CHOLECYSTECTOMY      COLONOSCOPY  09/01/2011    TICS RECALL 5YR    COLONOSCOPY  09/30/2008    ENTER RESULTS: STOOL THROUGHTOUT THE COLON POOR PREP REC 3 YEAR RECALL    COLONOSCOPY N/A 11/9/2016    Procedure: COLONOSCOPY;  Surgeon: León Zepeda MD;  Location: [facility] ENDOSCOPY;  Service:     COLONOSCOPY N/A 4/19/2018    Procedure: COLONOSCOPY WITH ANESTHESIA;  Surgeon: León Zepeda MD;  Location: [facility] ENDOSCOPY;  Service: Gastroenterology    ENDOSCOPY  06/19/2012    HH    ENDOSCOPY  08/25/2009    HIATAL HERNIA, DILATED WITH 50 FR MASCORRO    ENDOSCOPY  06/19/2007    WITHIN NORMAL LIMITS UREA NEG    FEMORAL ENDARTERECTOMY Left 1/29/2019    Procedure: LEFT FEMORAL ENDARTERECTOMY;  Surgeon: Duncan Lucio MD;  Location: [facility] HYBRID OR 12;  Service: Vascular    FEMORAL ENDARTERECTOMY Right 2/3/2022    Procedure: RIGHT FEMORAL ENDARTERECTOMY, RIGHT LOWER EXTREMITY ANGIOGRAM, BALLOON ANGIOPLASTY;  Surgeon: Duncan Lucio MD;  Location: [facility] HYBRID OR 12;  Service: Vascular;  Laterality: Right;    LUMBAR LAMINECTOMY WITH FUSION N/A 1/23/2017    Procedure: REMOVAL OF INSTRUMENTATION, EXPLORATION OF FUSION L4-S1, REVISION LEFT L5-S1 HEMILAMINECTOMY, FACETECTOMY DECOMPRESSION, REVISION UNINSTRUMENTED POSTERIOR SPINAL FUSION L5-S1;  Surgeon: RHONDA Lopes MD;  Location: [facility] OR;  Service:     OTHER SURGICAL HISTORY      LUMBAR SACRAL SURGERY WITH FUSION X2    PILONIDAL CYST / SINUS EXCISION      PILONIDAL CYST REMOVAL     Family History   Problem Relation Age of Onset     Heart attack Father     Diabetes Brother     Colon polyps Sister     Stomach cancer Neg Hx         GI CNACERS OR DISEASE    Colon cancer Neg Hx      Social History     Socioeconomic History    Marital status: Single   Tobacco Use    Smoking status: Former     Years: 30     Types: Cigarettes     Quit date:      Years since quittin.0    Smokeless tobacco: Never   Vaping Use    Vaping Use: Never used   Substance and Sexual Activity    Alcohol use: No    Drug use: Yes     Frequency: 4.0 times per week     Types: Marijuana    Sexual activity: Defer     No Known Allergies  Current Outpatient Medications   Medication Sig Dispense Refill    albuterol sulfate  (90 Base) MCG/ACT inhaler Inhale 2 puffs 4 (Four) Times a Day As Needed.      amLODIPine (NORVASC) 10 MG tablet Take 1 tablet by mouth Daily.      ascorbic acid (VITAMIN C) 500 MG tablet 1 tablet Daily.      atorvastatin (LIPITOR) 40 MG tablet 1 tablet Every Night.      clopidogrel (PLAVIX) 75 MG tablet Take 1 tablet by mouth once daily 90 tablet 0    cyclobenzaprine (FLEXERIL) 5 MG tablet Take 1 tablet by mouth 3 (Three) Times a Day As Needed for Muscle Spasms.      Dulaglutide (Trulicity) 1.5 MG/0.5ML solution pen-injector Inject 1.5 mg under the skin into the appropriate area as directed 1 (One) Time Per Week.      folic acid (FOLVITE) 1 MG tablet 1 tablet.      hydroCHLOROthiazide (MICROZIDE) 12.5 MG capsule Take 1 capsule by mouth Daily.      labetalol (NORMODYNE) 100 MG tablet Take 1 tablet by mouth 2 (Two) Times a Day.      lisinopril (PRINIVIL,ZESTRIL) 40 MG tablet Take 1 tablet by mouth Daily.      pantoprazole (PROTONIX) 40 MG EC tablet Take 1 tablet by mouth Daily.      sildenafil (VIAGRA) 100 MG tablet Take 0.5 tablets by mouth As Needed for Erectile Dysfunction.      tamsulosin (FLOMAX) 0.4 MG capsule 24 hr capsule Take 1 capsule by mouth Daily.      vitamin D (ERGOCALCIFEROL) 1.25 MG (98473 UT) capsule capsule Take 1 capsule by mouth 1  (One) Time Per Week.       No current facility-administered medications for this visit.     Review of Systems   Constitutional:  Negative for chills and fever.   HENT:  Negative for congestion.    Respiratory:  Negative for shortness of breath.    Cardiovascular:  Negative for chest pain and leg swelling.   Gastrointestinal:  Negative for constipation, diarrhea, nausea and vomiting.   Musculoskeletal:  Positive for arthralgias and gait problem (foot drop).   Skin:  Negative for wound.        Left hallux plantar callus   Neurological:  Positive for numbness.   Hematological:  Bruises/bleeds easily.       OBJECTIVE     Vitals:    01/25/24 1438   BP: 142/64   Pulse: 88   SpO2: 98%         PHYSICAL EXAM  GEN:   Accompanied by none.     Foot/Ankle Exam    GENERAL  Appearance:  obese  Orientation:  AAOx3  Affect:  appropriate  Gait:  steppage (Drop foot left)  Assistance:  independent  Right shoe gear: casual shoe  Left shoe gear: casual shoe (With AFO)    VASCULAR     Right Foot Vascularity   Dorsalis pedis:  2+  Posterior tibial:  2+  Skin temperature:  warm  Edema grading:  Trace and non-pitting  CFT:  3  Pedal hair growth:  Present  Varicosities:  none     Left Foot Vascularity   Dorsalis pedis:  2+  Posterior tibial:  2+  Skin temperature:  warm  Edema grading:  Trace and non-pitting  CFT:  3  Pedal hair growth:  Present  Varicosities:  none     NEUROLOGIC     Right Foot Neurologic   Light touch sensation: diminished  Vibratory sensation: diminished  Hot/Cold sensation: diminished  Protective Sensation using [place]-Mirtha Monofilament:   Right Foot Sites Intact: 0.  Sites tested: 10     Left Foot Neurologic   Light touch sensation: diminished  Vibratory sensation: diminished  Hot/Cold sensation:  diminished  Protective Sensation using [place]-Mirtha Monofilament:   Left Foot Sites Intact: 0.  Sites tested: 10    MUSCULOSKELETAL     Right Foot Musculoskeletal   Tenderness:  none    Arch:  Pes planus  Hallux  valgus: No    Hallux limitus: No       Left Foot Musculoskeletal   Tenderness:  callous left foot  Arch:  Pes planus  Hallux valgus: No    Hallux limitus: No      MUSCLE STRENGTH     Right Foot Muscle Strength   Foot dorsiflexion:  5  Foot plantar flexion:  5  Foot inversion:  5  Foot eversion:  5     Left Foot Muscle Strength   Foot dorsiflexion:  3  Foot plantar flexion:  3  Foot inversion:  3  Foot eversion:  3    RANGE OF MOTION     Right Foot Range of Motion   Foot and ankle ROM within normal limits       Left Foot Range of Motion   Foot and ankle ROM within normal limits      DERMATOLOGIC      Right Foot Dermatologic   Skin  Right foot skin is intact.   Nails  1.  Positive for elongated, onychomycosis, abnormal thickness, subungual debris and dystrophic nail.  2.  Positive for elongated, onychomycosis, abnormal thickness, subungual debris and dystrophic nail.  3.  Positive for elongated, onychomycosis, abnormal thickness, subungual debris and dystrophic nail.  4.  Positive for elongated, onychomycosis, abnormal thickness, subungual debris and dystrophic nail.  5.  Positive for elongated, onychomycosis, abnormal thickness, subungual debris and dystrophic nail.     Left Foot Dermatologic   Skin  Positive for corn.   Nails  1.  Positive for elongated, onychomycosis, abnormal thickness, subungual debris and dystrophic nail.  2.  Positive for elongated, onychomycosis, abnormal thickness, subungual debris and dystrophic nail.  3.  Positive for elongated, onychomycosis, abnormal thickness, subungual debris and dystrophic nail.  4.  Positive for elongated, onychomycosis, abnormally thick, subungual debris and dystrophic nail.  5.  Positive for elongated, onychomycosis, abnormally thick, subungual debris and dystrophic nail.    Image:       RADIOLOGY/NUCLEAR:  No results found.    LABORATORY/CULTURE RESULTS:      PATHOLOGY RESULTS:       ASSESSMENT/PLAN     Diagnoses and all orders for this visit:    1. Onychomycosis  (Primary)    2. Type 2 diabetes mellitus with diabetic polyneuropathy, with long-term current use of insulin    3. Foot drop, left    4. Antiplatelet or antithrombotic long-term use    5. Encounter for diabetic foot exam    6. Callus of foot          Comprehensive lower extremity examination and evaluation was performed.  Discussed findings and treatment plan including risks, benefits, and treatment options with patient in detail. Patient agreed with treatment plan.  After verbal consent obtained, nail(s) x10 debrided of length and thickness with nail nipper without incidence  After verbal consent obtained, calluses x1 pared utilizing dermal curette and/or scalpel without incidence  Patient may maintain nails and calluses at home utilizing emery board or pumice stone between visits as needed  Reviewed at home diabetic foot care including daily foot checks   Continue AFO for foot drop.   Continue diabetic monitoring and control under direction of PCP.    Monitor callused area closely and return to care or go to wound care if area opens/changes.   An After Visit Summary was printed and given to the patient at discharge, including (if requested) any available informative/educational handouts regarding diagnosis, treatment, or medications. All questions were answered to patient/family satisfaction. Should symptoms fail to improve or worsen they agree to call or return to clinic or to go to the Emergency Department. Discussed the importance of following up with any needed screening tests/labs/specialist appointments and any requested follow-up recommended by me today. Importance of maintaining follow-up discussed and patient accepts that missed appointments can delay diagnosis and potentially lead to worsening of conditions.  Return in about 3 months (around 4/25/2024) for Schedule [facility], Follow-up with Podiatry APRN., or sooner if acute issues arise.    Lab Frequency Next Occurrence   Diet: Once 04/19/2018    Advance Diet as Tolerated Once 04/19/2018       This document has been electronically signed by PRATIMA Wilkins on January 25, 2024 16:52 CST

## 2024-01-18 DIAGNOSIS — M79.18 ABDOMINAL MUSCLE PAIN: ICD-10-CM

## 2024-01-19 NOTE — TELEPHONE ENCOUNTER
Gavino Frye called to request a refill on his medication.      Last office visit : 11/1/2023   Next office visit : 1/18/2024     Requested Prescriptions     Pending Prescriptions Disp Refills    cyclobenzaprine (FLEXERIL) 5 MG tablet [Pharmacy Med Name: Cyclobenzaprine HCl 5 MG Oral Tablet] 90 tablet 0     Sig: Take 1 tablet by mouth three times daily as needed for muscle spasm            Cindy June LPN

## 2024-01-22 RX ORDER — CYCLOBENZAPRINE HCL 5 MG
TABLET ORAL
Qty: 90 TABLET | Refills: 0 | Status: SHIPPED | OUTPATIENT
Start: 2024-01-22

## 2024-01-24 ENCOUNTER — TELEPHONE (OUTPATIENT)
Dept: PODIATRY | Facility: CLINIC | Age: 69
End: 2024-01-24
Payer: MEDICARE

## 2024-01-24 NOTE — TELEPHONE ENCOUNTER
Called patient regarding appt on 01/25/2024. Left message for patient to return call if any questions or concerns arise.

## 2024-01-25 ENCOUNTER — OFFICE VISIT (OUTPATIENT)
Dept: PODIATRY | Facility: CLINIC | Age: 69
End: 2024-01-25
Payer: MEDICARE

## 2024-01-25 VITALS
DIASTOLIC BLOOD PRESSURE: 64 MMHG | WEIGHT: 203 LBS | BODY MASS INDEX: 30.77 KG/M2 | HEART RATE: 88 BPM | SYSTOLIC BLOOD PRESSURE: 142 MMHG | OXYGEN SATURATION: 98 % | HEIGHT: 68 IN

## 2024-01-25 DIAGNOSIS — M21.372 FOOT DROP, LEFT: ICD-10-CM

## 2024-01-25 DIAGNOSIS — Z79.4 TYPE 2 DIABETES MELLITUS WITH DIABETIC POLYNEUROPATHY, WITH LONG-TERM CURRENT USE OF INSULIN: ICD-10-CM

## 2024-01-25 DIAGNOSIS — E11.42 TYPE 2 DIABETES MELLITUS WITH DIABETIC POLYNEUROPATHY, WITH LONG-TERM CURRENT USE OF INSULIN: ICD-10-CM

## 2024-01-25 DIAGNOSIS — Z79.02 ANTIPLATELET OR ANTITHROMBOTIC LONG-TERM USE: ICD-10-CM

## 2024-01-25 DIAGNOSIS — L84 CALLUS OF FOOT: ICD-10-CM

## 2024-01-25 DIAGNOSIS — B35.1 ONYCHOMYCOSIS: Primary | ICD-10-CM

## 2024-01-25 DIAGNOSIS — E11.9 ENCOUNTER FOR DIABETIC FOOT EXAM: ICD-10-CM

## 2024-01-29 RX ORDER — PANTOPRAZOLE SODIUM 40 MG/1
40 TABLET, DELAYED RELEASE ORAL DAILY
Qty: 90 TABLET | Refills: 0 | Status: SHIPPED | OUTPATIENT
Start: 2024-01-29

## 2024-01-29 NOTE — TELEPHONE ENCOUNTER
Gavino called requesting a refill of the below medication which has been pended for you:     Requested Prescriptions     Pending Prescriptions Disp Refills    pantoprazole (PROTONIX) 40 MG tablet [Pharmacy Med Name: Pantoprazole Sodium 40 MG Oral Tablet Delayed Release] 90 tablet 0     Sig: Take 1 tablet by mouth once daily       Last Appointment Date: 11/1/2023  Next Appointment Date: 5/2/2024    No Known Allergies

## 2024-02-07 ENCOUNTER — OFFICE VISIT (OUTPATIENT)
Dept: CARDIOLOGY | Facility: CLINIC | Age: 69
End: 2024-02-07
Payer: MEDICARE

## 2024-02-07 VITALS
OXYGEN SATURATION: 98 % | DIASTOLIC BLOOD PRESSURE: 57 MMHG | SYSTOLIC BLOOD PRESSURE: 130 MMHG | HEIGHT: 68 IN | HEART RATE: 88 BPM | WEIGHT: 202 LBS | BODY MASS INDEX: 30.62 KG/M2

## 2024-02-07 DIAGNOSIS — I10 ESSENTIAL HYPERTENSION: Chronic | ICD-10-CM

## 2024-02-07 DIAGNOSIS — E78.2 MIXED HYPERLIPIDEMIA: Chronic | ICD-10-CM

## 2024-02-07 DIAGNOSIS — I48.0 PAROXYSMAL ATRIAL FIBRILLATION: Primary | Chronic | ICD-10-CM

## 2024-02-07 DIAGNOSIS — I73.9 PVD (PERIPHERAL VASCULAR DISEASE): Chronic | ICD-10-CM

## 2024-02-07 PROCEDURE — 3075F SYST BP GE 130 - 139MM HG: CPT | Performed by: NURSE PRACTITIONER

## 2024-02-07 PROCEDURE — 1159F MED LIST DOCD IN RCRD: CPT | Performed by: NURSE PRACTITIONER

## 2024-02-07 PROCEDURE — 3078F DIAST BP <80 MM HG: CPT | Performed by: NURSE PRACTITIONER

## 2024-02-07 PROCEDURE — 99214 OFFICE O/P EST MOD 30 MIN: CPT | Performed by: NURSE PRACTITIONER

## 2024-02-07 PROCEDURE — 93000 ELECTROCARDIOGRAM COMPLETE: CPT | Performed by: NURSE PRACTITIONER

## 2024-02-07 PROCEDURE — 1160F RVW MEDS BY RX/DR IN RCRD: CPT | Performed by: NURSE PRACTITIONER

## 2024-02-07 RX ORDER — ASPIRIN 81 MG/1
TABLET ORAL EVERY 24 HOURS
COMMUNITY

## 2024-02-07 NOTE — PROGRESS NOTES
Subjective:     Encounter Date: 02/07/2024      Patient ID: Edy Cosby is a 68 y.o. male with previous episode of paroxysmal atrial fibrillation following surgery in 2017 and no known recurrence, hypertension, insulin-dependent diabetes, peripheral vascular disease, CKD, obstructive sleep apnea, and obesity.  He presents the office today for routine follow-up.    Chief Complaint: Routine Atrial Fibrillation follow-up  Atrial Fibrillation  Presents for follow-up visit. Symptoms are negative for chest pain, dizziness, hemodynamic instability, hypertension, hypotension, palpitations, shortness of breath, syncope, tachycardia and weakness. The symptoms have been stable. Past medical history includes atrial fibrillation.   Hypertension  This is a chronic problem. The current episode started more than 1 year ago. The problem is controlled. Pertinent negatives include no chest pain, headaches, malaise/fatigue, orthopnea, palpitations, peripheral edema, PND or shortness of breath. Risk factors for coronary artery disease include diabetes mellitus, male gender and obesity. Current antihypertension treatment includes ACE inhibitors, diuretics, calcium channel blockers and beta blockers. The current treatment provides significant improvement. Compliance problems include exercise.  Hypertensive end-organ damage includes kidney disease and PVD. There is no history of angina, CAD/MI or heart failure. Identifiable causes of hypertension include chronic renal disease.       Mr. Cosby follows with our office due to a previously known episode of atrial fibrillation in 2017.  He has had no known recurrence since that time.  He has other chronic stable conditions including diabetes, kidney disease, hypertension, hyperlipidemia.  He has no known coronary artery disease.    Mr. Cosby presents today for routine follow up. He has prior history of atrial fibrillation with an episode of postoperative AF in 2017.  He follows  with us for surveillance but has had no known recurrence.  He has been active without complaints. He denies any chest pain, pressure, or similar. He has had no palpitations, lightheadedness, or dizziness.   He reports compliance with his medications at home. He has no bleeding issues and currently on clopidogrel and aspirin and follows with vascular surgery.     The following portions of the patient's history were reviewed and updated as appropriate: allergies, current medications, past family history, past medical history, past social history and past surgical history.     No Known Allergies      Current Outpatient Medications:     albuterol sulfate  (90 Base) MCG/ACT inhaler, Inhale 2 puffs 4 (Four) Times a Day As Needed., Disp: , Rfl:     amLODIPine (NORVASC) 10 MG tablet, Take 1 tablet by mouth Daily., Disp: , Rfl:     ascorbic acid (VITAMIN C) 500 MG tablet, 1 tablet Daily., Disp: , Rfl:     aspirin 81 MG EC tablet, Daily., Disp: , Rfl:     atorvastatin (LIPITOR) 40 MG tablet, 1 tablet Every Night., Disp: , Rfl:     clopidogrel (PLAVIX) 75 MG tablet, Take 1 tablet by mouth once daily, Disp: 90 tablet, Rfl: 0    cyclobenzaprine (FLEXERIL) 5 MG tablet, Take 1 tablet by mouth 3 (Three) Times a Day As Needed for Muscle Spasms., Disp: , Rfl:     Dulaglutide (Trulicity) 1.5 MG/0.5ML solution pen-injector, Inject 1.5 mg under the skin into the appropriate area as directed 1 (One) Time Per Week., Disp: , Rfl:     folic acid (FOLVITE) 1 MG tablet, 1 tablet., Disp: , Rfl:     hydroCHLOROthiazide (MICROZIDE) 12.5 MG capsule, Take 1 capsule by mouth Daily., Disp: , Rfl:     labetalol (NORMODYNE) 100 MG tablet, Take 1 tablet by mouth 2 (Two) Times a Day., Disp: , Rfl:     lisinopril (PRINIVIL,ZESTRIL) 40 MG tablet, Take 1 tablet by mouth Daily., Disp: , Rfl:     pantoprazole (PROTONIX) 40 MG EC tablet, Take 1 tablet by mouth Daily., Disp: , Rfl:     sildenafil (VIAGRA) 100 MG tablet, Take 0.5 tablets by mouth As  "Needed for Erectile Dysfunction., Disp: , Rfl:     tamsulosin (FLOMAX) 0.4 MG capsule 24 hr capsule, Take 1 capsule by mouth Daily., Disp: , Rfl:     vitamin D (ERGOCALCIFEROL) 1.25 MG (55648 UT) capsule capsule, Take 1 capsule by mouth 1 (One) Time Per Week., Disp: , Rfl:       Review of Systems   Constitutional: Negative for diaphoresis, fever and malaise/fatigue.   Eyes:  Negative for visual disturbance.   Cardiovascular:  Negative for chest pain, claudication, dyspnea on exertion, irregular heartbeat, leg swelling, near-syncope, orthopnea, palpitations, paroxysmal nocturnal dyspnea and syncope.   Respiratory:  Negative for cough, shortness of breath and wheezing.    Gastrointestinal:  Negative for bloating, abdominal pain, nausea and vomiting.   Neurological:  Negative for dizziness, headaches, light-headedness, loss of balance and weakness.   Psychiatric/Behavioral:  Negative for altered mental status.          ECG 12 Lead    Date/Time: 2/7/2024 9:26 AM  Performed by: Aziza Soriano APRN    Authorized by: Aziza Soriano APRN  Comparison: compared with previous ECG from 8/7/2023  Similar to previous ECG  Rhythm: sinus rhythm  Rate: normal  BPM: 88  Conduction: conduction normal  ST Segments: ST segments normal  QRS axis: left  Other findings: non-specific ST-T wave changes    Clinical impression: non-specific ECG          /57   Pulse 88   Ht 172.7 cm (68\")   Wt 91.6 kg (202 lb)   SpO2 98%   BMI 30.71 kg/m²        Objective:     Vitals reviewed.   Constitutional:       General: Awake. Not in acute distress.     Appearance: Well-developed, well-groomed and not in distress. Obese. Chronically ill-appearing. Not ill-appearing.   HENT:      Head: Normocephalic and atraumatic.   Pulmonary:      Effort: Pulmonary effort is normal.      Breath sounds: Normal breath sounds. No wheezing. No rhonchi. No rales.   Cardiovascular:      Normal rate. Regular rhythm.      Murmurs: There is no murmur.      No " gallop.  No rub.   Edema:     Ankle: bilateral trace edema of the ankle.  Musculoskeletal:      Cervical back: Normal range of motion and neck supple. Skin:     General: Skin is warm and dry.   Neurological:      Mental Status: Alert, oriented to person, place, and time and oriented to person, place and time.   Psychiatric:         Attention and Perception: Attention normal.         Mood and Affect: Mood normal.         Speech: Speech normal.         Behavior: Behavior normal. Behavior is cooperative.         Thought Content: Thought content normal.         Cognition and Memory: Cognition normal.         Judgment: Judgment normal.       Lab Review:     Results for orders placed during the hospital encounter of 01/23/17    Adult Transthoracic Echo Complete With Contrast    Interpretation Summary  · All left ventricular wall segments contract normally.  · Left ventricular wall thickness is consistent with moderate concentric hypertrophy.    GROSSLY NORMAL LV AND RV SYSTOLIC FUNCTION  GROSSLY NORMAL VALVE FUNCTION  MODERATE LVH        Assessment:          Diagnosis Plan   1. Paroxysmal atrial fibrillation        2. Essential hypertension        3. Mixed hyperlipidemia        4. PVD (peripheral vascular disease)                 Plan:      Paroxysmal atrial fibrillation: Stable.  He denies any palpitations or prolonged episodes of tachycardia.  He is not anticoagulated currently and has had no recent known recurrence of AF.   CHADVASC2 SCORE   [id number] Score: 4 (2/7/2024  9:22 AM)      Hypertension: Stable.  Currently well controlled on his home medications.  Continue follow-up with primary care office.    Hyperlipidemia: Managed on statin therapy follow-up by his PCP office. He had been taken off of this at one point but after recent labs this was restarted.     Peripheral vascular disease: The patient follows with vascular surgery.  He has known peripheral disease including bilateral lower extremity disease. He is  on aspirin and plavix.     Obesity: BMI 30.71. Cardiac diet is recommended with routine exercise.      Continue current medications.   Follow up in 1 year sooner if needed.       I attest that all portions of this note have been reviewed and updated to reflect the patient's current status.

## 2024-02-21 DIAGNOSIS — M79.18 ABDOMINAL MUSCLE PAIN: ICD-10-CM

## 2024-02-21 NOTE — TELEPHONE ENCOUNTER
Gavino called requesting a refill of the below medication which has been pended for you:     Requested Prescriptions     Pending Prescriptions Disp Refills    cyclobenzaprine (FLEXERIL) 5 MG tablet [Pharmacy Med Name: Cyclobenzaprine HCl 5 MG Oral Tablet] 90 tablet 0     Sig: Take 1 tablet by mouth three times daily as needed for muscle spasm       Last Appointment Date: 11/1/2023  Next Appointment Date: 5/2/2024    No Known Allergies

## 2024-02-22 RX ORDER — CYCLOBENZAPRINE HCL 5 MG
TABLET ORAL
Qty: 90 TABLET | Refills: 0 | Status: SHIPPED | OUTPATIENT
Start: 2024-02-22

## 2024-02-27 DIAGNOSIS — M79.18 ABDOMINAL MUSCLE PAIN: ICD-10-CM

## 2024-02-27 RX ORDER — CYCLOBENZAPRINE HCL 5 MG
TABLET ORAL
Qty: 90 TABLET | Refills: 0 | OUTPATIENT
Start: 2024-02-27

## 2024-03-27 DIAGNOSIS — M79.18 ABDOMINAL MUSCLE PAIN: ICD-10-CM

## 2024-03-27 RX ORDER — LABETALOL 100 MG/1
TABLET, FILM COATED ORAL
Qty: 180 TABLET | Refills: 0 | Status: SHIPPED | OUTPATIENT
Start: 2024-03-27

## 2024-03-27 RX ORDER — CYCLOBENZAPRINE HCL 5 MG
TABLET ORAL
Qty: 90 TABLET | Refills: 0 | Status: SHIPPED | OUTPATIENT
Start: 2024-03-27

## 2024-04-19 DIAGNOSIS — I10 ESSENTIAL HYPERTENSION: ICD-10-CM

## 2024-04-19 NOTE — PROGRESS NOTES
[facility] - PODIATRY    Today's Date: 04/25/24    Patient Name: Edy Cosby  MRN: [id number]  CSN: [id number]  PCP: Ana Vincent APRN  Referring Provider: No ref. provider found    SUBJECTIVE     Chief Complaint   Patient presents with    Follow-up     Ana Vincent  01/13/2024 3 MTH FU- pt states feet doing good- pt denies pain     Diabetes     150mg/dl BG this am      HPI: Edy Cosby, a 69 y.o.male, comes to clinic as a(n) established patient presenting for diabetic foot exam and complaining of thickened toenails and callus to left plantar great toe . Patient has h/o AFib, back pain, DM2, Emphysema lung, GERD, HTN, OA . Patient is IDDM with last stated BG level of 150 mg/dl.  Notes neuropathy in feet with numbness and tingling.  Continues use of AFO for foot drop of left foot.  Reports callus to left plantar hallux has returned.  Denies any redness, drainage, or other issues from the area. Patient states that toenails are long, thick, and irregular and that he is unable to care for them himself.  Denies open wound or sores.  Denies pain today. Relates previous treatment(s) including foot care by podiatrist . Denies any constitutional symptoms. No other pedal complaints at this time.    Past Medical History:   Diagnosis Date    A-fib     Atherosclerosis     Chronic back pain     Constipation     Diabetes mellitus     TYPE II    Dropfoot     wears brace    Dysphagia     Emphysema of lung     Gastroparesis     GERD (gastroesophageal reflux disease)     Hyperlipidemia     Hypertension     ESSENTIAL    Leg pain     Muscle spasm     Nerve pain     Osteoarthritis     Sleep apnea     no cpap/bipap (non-compliance)    Sleeping difficulty     Ulcer of toe due to diabetes mellitus      Past Surgical History:   Procedure Laterality Date    ANGIOPLASTY ILIAC ARTERY Left 1/29/2019    Procedure: ANGIOPLASTY ILIAC ARTERY, STENT PLACEMENT;  Surgeon: Duncan Lucio MD;   Location:  [facility];  Service: Vascular    AORTAGRAM Left 12/18/2018    Procedure: AORTOGRAM, LEFT LEG ANGIOGRAM, MYNX CLOSURE;  Surgeon: Duncan Lucio MD;  Location:  [facility];  Service: Vascular    AORTAGRAM Right 12/16/2021    Procedure: AORTAGRAM, RIGHT LOWER EXTREMITY ANGIOGRAM, POSSIBLE INTERVENTION;  Surgeon: Duncan Lucio MD;  Location:  [facility];  Service: Vascular;  Laterality: Right;    BACK SURGERY      CHOLECYSTECTOMY      COLONOSCOPY  09/01/2011    TICS RECALL 5YR    COLONOSCOPY  09/30/2008    ENTER RESULTS: STOOL THROUGHTOUT THE COLON POOR PREP REC 3 YEAR RECALL    COLONOSCOPY N/A 11/9/2016    Procedure: COLONOSCOPY;  Surgeon: León Zepeda MD;  Location: [facility] ENDOSCOPY;  Service:     COLONOSCOPY N/A 4/19/2018    Procedure: COLONOSCOPY WITH ANESTHESIA;  Surgeon: León Zepeda MD;  Location: [facility] ENDOSCOPY;  Service: Gastroenterology    ENDOSCOPY  06/19/2012    HH    ENDOSCOPY  08/25/2009    HIATAL HERNIA, DILATED WITH 50 FR MASCORRO    ENDOSCOPY  06/19/2007    WITHIN NORMAL LIMITS UREA NEG    FEMORAL ENDARTERECTOMY Left 1/29/2019    Procedure: LEFT FEMORAL ENDARTERECTOMY;  Surgeon: Duncan Lucio MD;  Location:  [facility];  Service: Vascular    FEMORAL ENDARTERECTOMY Right 2/3/2022    Procedure: RIGHT FEMORAL ENDARTERECTOMY, RIGHT LOWER EXTREMITY ANGIOGRAM, BALLOON ANGIOPLASTY;  Surgeon: Duncan Lucio MD;  Location:  [facility];  Service: Vascular;  Laterality: Right;    LUMBAR LAMINECTOMY WITH FUSION N/A 1/23/2017    Procedure: REMOVAL OF INSTRUMENTATION, EXPLORATION OF FUSION L4-S1, REVISION LEFT L5-S1 HEMILAMINECTOMY, FACETECTOMY DECOMPRESSION, REVISION UNINSTRUMENTED POSTERIOR SPINAL FUSION L5-S1;  Surgeon: RHONDA Lopes MD;  Location: [facility] OR;  Service:     OTHER SURGICAL HISTORY      LUMBAR SACRAL SURGERY WITH FUSION X2    PILONIDAL CYST / SINUS EXCISION      PILONIDAL CYST REMOVAL     Family History    Problem Relation Age of Onset    Heart attack Father     Diabetes Brother     Colon polyps Sister     Stomach cancer Neg Hx         GI CNACERS OR DISEASE    Colon cancer Neg Hx      Social History     Socioeconomic History    Marital status: Single   Tobacco Use    Smoking status: Former     Current packs/day: 0.00     Types: Cigarettes     Start date:      Quit date: 2019     Years since quittin.3    Smokeless tobacco: Never   Vaping Use    Vaping status: Never Used   Substance and Sexual Activity    Alcohol use: No    Drug use: Yes     Frequency: 21.0 times per week     Types: Marijuana    Sexual activity: Defer     No Known Allergies  Current Outpatient Medications   Medication Sig Dispense Refill    albuterol sulfate  (90 Base) MCG/ACT inhaler Inhale 2 puffs 4 (Four) Times a Day As Needed.      amLODIPine (NORVASC) 10 MG tablet Take 1 tablet by mouth Daily.      ascorbic acid (VITAMIN C) 500 MG tablet 1 tablet Daily.      aspirin 81 MG EC tablet Daily.      atorvastatin (LIPITOR) 40 MG tablet 1 tablet Every Night.      clopidogrel (PLAVIX) 75 MG tablet Take 1 tablet by mouth once daily 90 tablet 0    cyclobenzaprine (FLEXERIL) 5 MG tablet Take 1 tablet by mouth 3 (Three) Times a Day As Needed for Muscle Spasms.      Dulaglutide (Trulicity) 1.5 MG/0.5ML solution pen-injector Inject 1.5 mg under the skin into the appropriate area as directed 1 (One) Time Per Week.      folic acid (FOLVITE) 1 MG tablet 1 tablet.      hydroCHLOROthiazide (MICROZIDE) 12.5 MG capsule Take 1 capsule by mouth Daily.      labetalol (NORMODYNE) 100 MG tablet Take 1 tablet by mouth 2 (Two) Times a Day.      lisinopril (PRINIVIL,ZESTRIL) 40 MG tablet Take 1 tablet by mouth Daily.      pantoprazole (PROTONIX) 40 MG EC tablet Take 1 tablet by mouth Daily.      sildenafil (VIAGRA) 100 MG tablet Take 0.5 tablets by mouth As Needed for Erectile Dysfunction.      tamsulosin (FLOMAX) 0.4 MG capsule 24 hr capsule Take 1 capsule by  mouth Daily.      vitamin D (ERGOCALCIFEROL) 1.25 MG (70637 UT) capsule capsule Take 1 capsule by mouth 1 (One) Time Per Week.       No current facility-administered medications for this visit.     Review of Systems   Constitutional:  Negative for chills and fever.   HENT:  Negative for congestion.    Respiratory:  Negative for shortness of breath.    Cardiovascular:  Negative for chest pain and leg swelling.   Gastrointestinal:  Negative for constipation, diarrhea, nausea and vomiting.   Musculoskeletal:  Positive for arthralgias and gait problem (foot drop).   Skin:  Negative for wound.        Left hallux plantar callus   Neurological:  Positive for numbness.   Hematological:  Bruises/bleeds easily.       OBJECTIVE     Vitals:    04/25/24 0955   BP: 128/62   Pulse: 86   SpO2: 98%           PHYSICAL EXAM  GEN:   Accompanied by none.     Foot/Ankle Exam    GENERAL  Appearance:  obese  Orientation:  AAOx3  Affect:  appropriate  Gait:  steppage (Drop foot left)  Assistance:  independent  Right shoe gear: casual shoe  Left shoe gear: casual shoe (With AFO)    VASCULAR     Right Foot Vascularity   Dorsalis pedis:  2+  Posterior tibial:  2+  Skin temperature:  warm  Edema grading:  Trace and non-pitting  CFT:  3  Pedal hair growth:  Present  Varicosities:  none     Left Foot Vascularity   Dorsalis pedis:  2+  Posterior tibial:  2+  Skin temperature:  warm  Edema grading:  Trace and non-pitting  CFT:  3  Pedal hair growth:  Present  Varicosities:  none     NEUROLOGIC     Right Foot Neurologic   Light touch sensation: diminished  Vibratory sensation: diminished  Hot/Cold sensation: diminished  Protective Sensation using [place] Monofilament:   Right Foot Sites Intact: 0.  Sites tested: 10     Left Foot Neurologic   Light touch sensation: diminished  Vibratory sensation: diminished  Hot/Cold sensation:  diminished  Protective Sensation using [place] Monofilament:   Left Foot Sites Intact: 0.  Sites tested:  10    MUSCULOSKELETAL     Right Foot Musculoskeletal   Tenderness:  none    Arch:  Pes planus  Hallux valgus: No    Hallux limitus: No       Left Foot Musculoskeletal   Tenderness:  none  Arch:  Pes planus  Hallux valgus: No    Hallux limitus: No      MUSCLE STRENGTH     Right Foot Muscle Strength   Foot dorsiflexion:  5  Foot plantar flexion:  5  Foot inversion:  5  Foot eversion:  5     Left Foot Muscle Strength   Foot dorsiflexion:  3  Foot plantar flexion:  3  Foot inversion:  3  Foot eversion:  3    RANGE OF MOTION     Right Foot Range of Motion   Foot and ankle ROM within normal limits       Left Foot Range of Motion   Foot and ankle ROM within normal limits      DERMATOLOGIC      Right Foot Dermatologic   Skin  Right foot skin is intact.   Nails  1.  Positive for elongated, onychomycosis, abnormal thickness, subungual debris and dystrophic nail.  2.  Positive for elongated, onychomycosis, abnormal thickness, subungual debris and dystrophic nail.  3.  Positive for elongated, onychomycosis, abnormal thickness, subungual debris and dystrophic nail.  4.  Positive for elongated, onychomycosis, abnormal thickness, subungual debris and dystrophic nail.  5.  Positive for elongated, onychomycosis, abnormal thickness, subungual debris and dystrophic nail.     Left Foot Dermatologic   Skin  Positive for wound.   Nails  1.  Positive for elongated, onychomycosis, abnormal thickness, subungual debris and dystrophic nail.  2.  Positive for elongated, onychomycosis, abnormal thickness, subungual debris and dystrophic nail.  3.  Positive for elongated, onychomycosis, abnormal thickness, subungual debris and dystrophic nail.  4.  Positive for elongated, onychomycosis, abnormally thick, subungual debris and dystrophic nail.  5.  Positive for elongated, onychomycosis, abnormally thick, subungual debris and dystrophic nail.     Left foot additional comments: Wound to plantar left hallux  Periwound callus -open area  macerated    Image:       RADIOLOGY/NUCLEAR:  No results found.    LABORATORY/CULTURE RESULTS:      PATHOLOGY RESULTS:       ASSESSMENT/PLAN     Diagnoses and all orders for this visit:    1. Onychomycosis (Primary)    2. Type 2 diabetes mellitus with diabetic polyneuropathy, with long-term current use of insulin    3. Foot drop, left    4. Antiplatelet or antithrombotic long-term use    5. Open wound of left great toe, initial encounter  -     Ambulatory Referral to Wound Clinic        Comprehensive lower extremity examination and evaluation was performed.  Discussed findings and treatment plan including risks, benefits, and treatment options with patient in detail. Patient agreed with treatment plan.  After verbal consent obtained, nail(s) x10 debrided of length and thickness with nail nipper without incidence  Patient may maintain nails and calluses at home utilizing emery board or pumice stone between visits as needed  Reviewed at home diabetic foot care including daily foot checks  After verbal consent obtained, selective debridement performed to remove skin, slough and non-viable tissue utilizing dermal curette and/or scapel. Hemostasis achieved with pressure. Wound area debrided was entire measurement documented.  Entire area of left hallux wound debrided.  Continue AFO for foot drop.   Continue diabetic monitoring and control under direction of PCP.   Referral to wound care center for left hallux wound.  An After Visit Summary was printed and given to the patient at discharge, including (if requested) any available informative/educational handouts regarding diagnosis, treatment, or medications. All questions were answered to patient/family satisfaction. Should symptoms fail to improve or worsen they agree to call or return to clinic or to go to the Emergency Department. Discussed the importance of following up with any needed screening tests/labs/specialist appointments and any requested follow-up  recommended by me today. Importance of maintaining follow-up discussed and patient accepts that missed appointments can delay diagnosis and potentially lead to worsening of conditions.  Return in about 3 months (around 7/25/2024) for Schedule Foot Care Clinic, Follow-up with Podiatry APRN., or sooner if acute issues arise.    Lab Frequency Next Occurrence   Diet: Once 04/19/2018   Advance Diet as Tolerated Once 04/19/2018       This document has been electronically signed by PRATIMA Wilkins on April 25, 2024 10:48 CDT

## 2024-04-22 RX ORDER — AMLODIPINE BESYLATE 10 MG/1
10 TABLET ORAL DAILY
Qty: 90 TABLET | Refills: 0 | Status: SHIPPED | OUTPATIENT
Start: 2024-04-22

## 2024-04-24 ENCOUNTER — TELEPHONE (OUTPATIENT)
Dept: PODIATRY | Facility: CLINIC | Age: 69
End: 2024-04-24
Payer: MEDICARE

## 2024-04-24 NOTE — TELEPHONE ENCOUNTER
Called patient regarding appt on 04/25/2024. Left message for patient to return call if any questions or concerns arise.

## 2024-04-25 ENCOUNTER — OFFICE VISIT (OUTPATIENT)
Dept: PODIATRY | Facility: CLINIC | Age: 69
End: 2024-04-25
Payer: MEDICARE

## 2024-04-25 VITALS
HEIGHT: 68 IN | SYSTOLIC BLOOD PRESSURE: 128 MMHG | DIASTOLIC BLOOD PRESSURE: 62 MMHG | HEART RATE: 86 BPM | WEIGHT: 193 LBS | BODY MASS INDEX: 29.25 KG/M2 | OXYGEN SATURATION: 98 %

## 2024-04-25 DIAGNOSIS — E11.42 TYPE 2 DIABETES MELLITUS WITH DIABETIC POLYNEUROPATHY, WITH LONG-TERM CURRENT USE OF INSULIN: ICD-10-CM

## 2024-04-25 DIAGNOSIS — M79.18 ABDOMINAL MUSCLE PAIN: ICD-10-CM

## 2024-04-25 DIAGNOSIS — B35.1 ONYCHOMYCOSIS: Primary | ICD-10-CM

## 2024-04-25 DIAGNOSIS — M21.372 FOOT DROP, LEFT: ICD-10-CM

## 2024-04-25 DIAGNOSIS — Z79.4 TYPE 2 DIABETES MELLITUS WITH DIABETIC POLYNEUROPATHY, WITH LONG-TERM CURRENT USE OF INSULIN: ICD-10-CM

## 2024-04-25 DIAGNOSIS — S91.102A OPEN WOUND OF LEFT GREAT TOE, INITIAL ENCOUNTER: ICD-10-CM

## 2024-04-25 DIAGNOSIS — Z79.02 ANTIPLATELET OR ANTITHROMBOTIC LONG-TERM USE: ICD-10-CM

## 2024-04-29 RX ORDER — PANTOPRAZOLE SODIUM 40 MG/1
40 TABLET, DELAYED RELEASE ORAL DAILY
Qty: 90 TABLET | Refills: 0 | Status: SHIPPED | OUTPATIENT
Start: 2024-04-29

## 2024-04-29 RX ORDER — CYCLOBENZAPRINE HCL 5 MG
TABLET ORAL
Qty: 90 TABLET | Refills: 0 | Status: SHIPPED | OUTPATIENT
Start: 2024-04-29

## 2024-05-02 ENCOUNTER — OFFICE VISIT (OUTPATIENT)
Dept: PRIMARY CARE CLINIC | Age: 69
End: 2024-05-02
Payer: MEDICARE

## 2024-05-02 ENCOUNTER — OFFICE VISIT (OUTPATIENT)
Dept: WOUND CARE | Facility: HOSPITAL | Age: 69
End: 2024-05-02
Payer: MEDICARE

## 2024-05-02 VITALS
HEIGHT: 67 IN | HEART RATE: 90 BPM | OXYGEN SATURATION: 94 % | DIASTOLIC BLOOD PRESSURE: 60 MMHG | TEMPERATURE: 97.3 F | WEIGHT: 196.4 LBS | BODY MASS INDEX: 30.83 KG/M2 | SYSTOLIC BLOOD PRESSURE: 128 MMHG

## 2024-05-02 DIAGNOSIS — D68.69 SECONDARY HYPERCOAGULABLE STATE (HCC): ICD-10-CM

## 2024-05-02 DIAGNOSIS — I48.0 PAROXYSMAL ATRIAL FIBRILLATION (HCC): ICD-10-CM

## 2024-05-02 DIAGNOSIS — Z12.11 COLON CANCER SCREENING: Primary | ICD-10-CM

## 2024-05-02 DIAGNOSIS — N18.32 STAGE 3B CHRONIC KIDNEY DISEASE (HCC): ICD-10-CM

## 2024-05-02 DIAGNOSIS — E11.51 TYPE 2 DIABETES MELLITUS WITH DIABETIC PERIPHERAL ANGIOPATHY WITHOUT GANGRENE, WITHOUT LONG-TERM CURRENT USE OF INSULIN (HCC): ICD-10-CM

## 2024-05-02 DIAGNOSIS — I10 ESSENTIAL HYPERTENSION: ICD-10-CM

## 2024-05-02 DIAGNOSIS — J44.9 CHRONIC OBSTRUCTIVE PULMONARY DISEASE, UNSPECIFIED COPD TYPE (HCC): ICD-10-CM

## 2024-05-02 DIAGNOSIS — H93.13 TINNITUS OF BOTH EARS: ICD-10-CM

## 2024-05-02 PROCEDURE — 99214 OFFICE O/P EST MOD 30 MIN: CPT | Performed by: NURSE PRACTITIONER

## 2024-05-02 PROCEDURE — 3074F SYST BP LT 130 MM HG: CPT | Performed by: NURSE PRACTITIONER

## 2024-05-02 PROCEDURE — 3078F DIAST BP <80 MM HG: CPT | Performed by: NURSE PRACTITIONER

## 2024-05-02 PROCEDURE — 1123F ACP DISCUSS/DSCN MKR DOCD: CPT | Performed by: NURSE PRACTITIONER

## 2024-05-02 PROCEDURE — G0463 HOSPITAL OUTPT CLINIC VISIT: HCPCS

## 2024-05-02 SDOH — ECONOMIC STABILITY: FOOD INSECURITY: WITHIN THE PAST 12 MONTHS, THE FOOD YOU BOUGHT JUST DIDN'T LAST AND YOU DIDN'T HAVE MONEY TO GET MORE.: SOMETIMES TRUE

## 2024-05-02 SDOH — ECONOMIC STABILITY: FOOD INSECURITY: WITHIN THE PAST 12 MONTHS, YOU WORRIED THAT YOUR FOOD WOULD RUN OUT BEFORE YOU GOT MONEY TO BUY MORE.: SOMETIMES TRUE

## 2024-05-02 SDOH — ECONOMIC STABILITY: INCOME INSECURITY: HOW HARD IS IT FOR YOU TO PAY FOR THE VERY BASICS LIKE FOOD, HOUSING, MEDICAL CARE, AND HEATING?: NOT HARD AT ALL

## 2024-05-02 ASSESSMENT — ENCOUNTER SYMPTOMS
NAUSEA: 0
COLOR CHANGE: 0
RHINORRHEA: 0
VOMITING: 0
COUGH: 0
PHOTOPHOBIA: 0
SHORTNESS OF BREATH: 0
BACK PAIN: 0
VOICE CHANGE: 0

## 2024-05-02 ASSESSMENT — PATIENT HEALTH QUESTIONNAIRE - PHQ9
SUM OF ALL RESPONSES TO PHQ QUESTIONS 1-9: 0
2. FEELING DOWN, DEPRESSED OR HOPELESS: NOT AT ALL
SUM OF ALL RESPONSES TO PHQ9 QUESTIONS 1 & 2: 0
1. LITTLE INTEREST OR PLEASURE IN DOING THINGS: NOT AT ALL
SUM OF ALL RESPONSES TO PHQ QUESTIONS 1-9: 0

## 2024-05-02 NOTE — PATIENT INSTRUCTIONS
Cologuard.   Advise antihistamine/flonase.   Fasting labs in [address].   Follow-up in 6 months or sooner if needed.

## 2024-05-02 NOTE — PROGRESS NOTES
[facility]  [address]  [address]  [place] [address]  Dept: [phone]  Dept Fax: [phone]  Loc: [phone]    Gavino Frye is a 69 y.o. male who presents today for his medical conditions/complaints as noted below.  Gavino Frye is c/o of 6 Month Follow-Up (Pt in office for 6 month follow up - stated that he is concerned with hearing pulse in his ears at times)        HPI:     HPI   Chief Complaint   Patient presents with    6 Month Follow-Up     Pt in office for 6 month follow up - stated that he is concerned with hearing pulse in his ears at times     Patient presents today for follow-up hypertension, DM, CKD, COPD.     He complains of pulsating sensation to bilateral ears. He denies any recent congestion/allergies. No changes in medications. Denies decreased hearing.     He had carotid study in Dec 2023 that showed less than 50% stenosis bilaterally. He sees vascular at [facility] every 6 months. Blood pressure stable. Denies chest pain or SOB.      Past Medical History:   Diagnosis Date    Arthritis     BiPAP (biphasic positive airway pressure) dependence     8cm to 20cm    Chronic back pain     Emphysema/COPD (HCC)     GERD (gastroesophageal reflux disease)     History of anemia     History of blood transfusion     Hyperlipidemia     Hypertension     Impotence 3/22/2021    Neuropathy     Obstructive sleep apnea     AHI:  95.8 per PSG, 3/2017; repeat PSG, 2017 revealed an AHI of 65.5    Peripheral vascular disease (HCC)     S/P angioplasty     Type II or unspecified type diabetes mellitus without mention of complication, not stated as uncontrolled       Past Surgical History:   Procedure Laterality Date    CHOLECYSTECTOMY      LARYNGOSCOPY N/A 11/10/2017    Direct laryngoscopy with assessment of hypopharynx, larynx, and post-cricoid areas performed by Yogesh Finch MD at [facility] OR    NC LARYNGOSCOPY W/WO TRACHEOSCOPY DX EXCEPT

## 2024-05-03 ENCOUNTER — HOSPITAL ENCOUNTER (OUTPATIENT)
Dept: GENERAL RADIOLOGY | Facility: HOSPITAL | Age: 69
Discharge: HOME | End: 2024-05-03
Payer: MEDICARE

## 2024-05-03 ENCOUNTER — TRANSCRIBE ORDERS (OUTPATIENT)
Dept: ADMINISTRATIVE | Facility: HOSPITAL | Age: 69
End: 2024-05-03
Payer: MEDICARE

## 2024-05-03 DIAGNOSIS — E11.621 TYPE 2 DIABETES WITH SKIN ULCER OF FOOT: Primary | ICD-10-CM

## 2024-05-03 DIAGNOSIS — L97.509 TYPE 2 DIABETES WITH SKIN ULCER OF FOOT: ICD-10-CM

## 2024-05-03 DIAGNOSIS — E11.621 TYPE 2 DIABETES WITH SKIN ULCER OF FOOT: ICD-10-CM

## 2024-05-03 DIAGNOSIS — L97.509 TYPE 2 DIABETES WITH SKIN ULCER OF FOOT: Primary | ICD-10-CM

## 2024-05-03 PROCEDURE — 73630 X-RAY EXAM OF FOOT: CPT

## 2024-05-09 DIAGNOSIS — Z12.11 COLON CANCER SCREENING: ICD-10-CM

## 2024-05-09 DIAGNOSIS — E11.51 TYPE 2 DIABETES MELLITUS WITH DIABETIC PERIPHERAL ANGIOPATHY WITHOUT GANGRENE, WITHOUT LONG-TERM CURRENT USE OF INSULIN (HCC): ICD-10-CM

## 2024-05-09 DIAGNOSIS — J44.9 CHRONIC OBSTRUCTIVE PULMONARY DISEASE, UNSPECIFIED COPD TYPE (HCC): ICD-10-CM

## 2024-05-09 DIAGNOSIS — I10 ESSENTIAL HYPERTENSION: ICD-10-CM

## 2024-05-09 DIAGNOSIS — I48.0 PAROXYSMAL ATRIAL FIBRILLATION (HCC): ICD-10-CM

## 2024-05-09 LAB
25(OH)D3 SERPL-MCNC: 46 NG/ML
ALBUMIN SERPL-MCNC: 4.1 G/DL (ref 3.5–5.2)
ALP SERPL-CCNC: 68 U/L (ref 40–130)
ALT SERPL-CCNC: 11 U/L (ref 5–41)
ANION GAP SERPL CALCULATED.3IONS-SCNC: 12 MMOL/L (ref 7–19)
AST SERPL-CCNC: 17 U/L (ref 5–40)
BASOPHILS # BLD: 0.1 K/UL (ref 0–0.2)
BASOPHILS NFR BLD: 0.9 % (ref 0–1)
BILIRUB SERPL-MCNC: 0.2 MG/DL (ref 0.2–1.2)
BUN SERPL-MCNC: 47 MG/DL (ref 8–23)
CALCIUM SERPL-MCNC: 9.4 MG/DL (ref 8.8–10.2)
CHLORIDE SERPL-SCNC: 104 MMOL/L (ref 98–111)
CHOLEST SERPL-MCNC: 120 MG/DL (ref 160–199)
CO2 SERPL-SCNC: 24 MMOL/L (ref 22–29)
CREAT SERPL-MCNC: 2.2 MG/DL (ref 0.5–1.2)
EOSINOPHIL # BLD: 0.1 K/UL (ref 0–0.6)
EOSINOPHIL NFR BLD: 2.3 % (ref 0–5)
ERYTHROCYTE [DISTWIDTH] IN BLOOD BY AUTOMATED COUNT: 15.9 % (ref 11.5–14.5)
GLUCOSE SERPL-MCNC: 109 MG/DL (ref 74–109)
HBA1C MFR BLD: 6.7 % (ref 4–6)
HCT VFR BLD AUTO: 31.6 % (ref 42–52)
HDLC SERPL-MCNC: 47 MG/DL (ref 55–121)
HGB BLD-MCNC: 9.7 G/DL (ref 14–18)
IMM GRANULOCYTES # BLD: 0 K/UL
LDLC SERPL CALC-MCNC: 62 MG/DL
LYMPHOCYTES # BLD: 1.4 K/UL (ref 1.1–4.5)
LYMPHOCYTES NFR BLD: 26.4 % (ref 20–40)
MCH RBC QN AUTO: 27.7 PG (ref 27–31)
MCHC RBC AUTO-ENTMCNC: 30.7 G/DL (ref 33–37)
MCV RBC AUTO: 90.3 FL (ref 80–94)
MONOCYTES # BLD: 0.3 K/UL (ref 0–0.9)
MONOCYTES NFR BLD: 6.4 % (ref 0–10)
NEUTROPHILS # BLD: 3.4 K/UL (ref 1.5–7.5)
NEUTS SEG NFR BLD: 63.8 % (ref 50–65)
PLATELET # BLD AUTO: 212 K/UL (ref 130–400)
PMV BLD AUTO: 12.2 FL (ref 9.4–12.4)
POTASSIUM SERPL-SCNC: 4.5 MMOL/L (ref 3.5–5)
PROT SERPL-MCNC: 7.5 G/DL (ref 6.6–8.7)
RBC # BLD AUTO: 3.5 M/UL (ref 4.7–6.1)
SODIUM SERPL-SCNC: 140 MMOL/L (ref 136–145)
TRIGL SERPL-MCNC: 54 MG/DL (ref 0–149)
TSH SERPL DL<=0.005 MIU/L-ACNC: 2.07 UIU/ML (ref 0.27–4.2)
WBC # BLD AUTO: 5.3 K/UL (ref 4.8–10.8)

## 2024-05-10 ENCOUNTER — OFFICE VISIT (OUTPATIENT)
Dept: WOUND CARE | Facility: HOSPITAL | Age: 69
End: 2024-05-10
Payer: MEDICARE

## 2024-05-10 DIAGNOSIS — I73.9 PERIPHERAL VASCULAR DISEASE, UNSPECIFIED: ICD-10-CM

## 2024-05-10 DIAGNOSIS — L97.522 NON-PRESSURE CHRONIC ULCER OF OTHER PART OF LEFT FOOT WITH FAT LAYER EXPOSED: ICD-10-CM

## 2024-05-10 DIAGNOSIS — E11.621 TYPE 2 DIABETES MELLITUS WITH FOOT ULCER, UNSPECIFIED WHETHER LONG TERM INSULIN USE: Primary | ICD-10-CM

## 2024-05-10 DIAGNOSIS — L97.509 TYPE 2 DIABETES MELLITUS WITH FOOT ULCER, UNSPECIFIED WHETHER LONG TERM INSULIN USE: Primary | ICD-10-CM

## 2024-05-20 NOTE — TELEPHONE ENCOUNTER
Gavino Frye called to request a refill on his medication.      Last office visit : 5/2/2024   Next office visit : 8/5/2024     Requested Prescriptions     Pending Prescriptions Disp Refills    clopidogrel (PLAVIX) 75 MG tablet [Pharmacy Med Name: Clopidogrel Bisulfate 75 MG Oral Tablet] 30 tablet 0     Sig: Take 1 tablet by mouth once daily            Anna Caballero MA

## 2024-05-21 ENCOUNTER — OFFICE VISIT (OUTPATIENT)
Dept: WOUND CARE | Facility: HOSPITAL | Age: 69
End: 2024-05-21
Payer: MEDICARE

## 2024-05-21 DIAGNOSIS — L97.522 NON-PRESSURE CHRONIC ULCER OF OTHER PART OF LEFT FOOT WITH FAT LAYER EXPOSED: ICD-10-CM

## 2024-05-21 DIAGNOSIS — E11.621 TYPE 2 DIABETES MELLITUS WITH FOOT ULCER, UNSPECIFIED WHETHER LONG TERM INSULIN USE: Primary | ICD-10-CM

## 2024-05-21 DIAGNOSIS — L97.509 TYPE 2 DIABETES MELLITUS WITH FOOT ULCER, UNSPECIFIED WHETHER LONG TERM INSULIN USE: Primary | ICD-10-CM

## 2024-05-21 DIAGNOSIS — I73.9 PERIPHERAL VASCULAR DISEASE, UNSPECIFIED: ICD-10-CM

## 2024-05-21 RX ORDER — CLOPIDOGREL BISULFATE 75 MG/1
75 TABLET ORAL DAILY
Qty: 30 TABLET | Refills: 0 | Status: SHIPPED | OUTPATIENT
Start: 2024-05-21

## 2024-05-27 LAB — NONINV COLON CA DNA+OCC BLD SCRN STL QL: NEGATIVE

## 2024-05-28 DIAGNOSIS — M79.18 ABDOMINAL MUSCLE PAIN: ICD-10-CM

## 2024-05-28 RX ORDER — CYCLOBENZAPRINE HCL 5 MG
TABLET ORAL
Qty: 90 TABLET | Refills: 0 | Status: SHIPPED | OUTPATIENT
Start: 2024-05-28

## 2024-06-12 ENCOUNTER — OFFICE VISIT (OUTPATIENT)
Dept: WOUND CARE | Facility: HOSPITAL | Age: 69
End: 2024-06-12
Payer: MEDICARE

## 2024-06-12 PROCEDURE — G0463 HOSPITAL OUTPT CLINIC VISIT: HCPCS

## 2024-06-25 DIAGNOSIS — M79.18 ABDOMINAL MUSCLE PAIN: ICD-10-CM

## 2024-06-26 RX ORDER — CLOPIDOGREL BISULFATE 75 MG/1
75 TABLET ORAL DAILY
Qty: 30 TABLET | Refills: 0 | Status: SHIPPED | OUTPATIENT
Start: 2024-06-26

## 2024-06-26 RX ORDER — CYCLOBENZAPRINE HCL 5 MG
TABLET ORAL
Qty: 90 TABLET | Refills: 0 | Status: SHIPPED | OUTPATIENT
Start: 2024-06-26

## 2024-07-01 DIAGNOSIS — I10 ESSENTIAL HYPERTENSION: ICD-10-CM

## 2024-07-01 RX ORDER — LISINOPRIL 40 MG/1
40 TABLET ORAL DAILY
Qty: 90 TABLET | Refills: 0 | Status: SHIPPED | OUTPATIENT
Start: 2024-07-01

## 2024-07-10 RX ORDER — LABETALOL 100 MG/1
TABLET, FILM COATED ORAL
Qty: 180 TABLET | Refills: 0 | Status: SHIPPED | OUTPATIENT
Start: 2024-07-10

## 2024-07-10 NOTE — TELEPHONE ENCOUNTER
Gavino Frye called to request a refill on his medication.      Last office visit : 5/2/2024   Next office visit : 8/5/2024     Requested Prescriptions     Pending Prescriptions Disp Refills    labetalol (NORMODYNE) 100 MG tablet [Pharmacy Med Name: Labetalol HCl 100 MG Oral Tablet] 180 tablet 0     Sig: TAKE 1 TABLET BY MOUTH IN THE MORNING AND 1 AT BEDTIME            Anna Caballero MA

## 2024-07-19 DIAGNOSIS — I10 ESSENTIAL HYPERTENSION: ICD-10-CM

## 2024-07-22 RX ORDER — AMLODIPINE BESYLATE 10 MG/1
10 TABLET ORAL DAILY
Qty: 90 TABLET | Refills: 1 | Status: SHIPPED | OUTPATIENT
Start: 2024-07-22

## 2024-07-22 NOTE — PROGRESS NOTES
[facility] - PODIATRY    Today's Date: 07/29/24    Patient Name: Edy Cosby  MRN: [id number]  CSN: [id number]  PCP: Ana Vincent APRN  Referring Provider: No ref. provider found    SUBJECTIVE     Chief Complaint   Patient presents with    Follow-up     Ana Vincent 01/13/2024 3 MTH FU- pt states been having some pain in the ball area of left foot. Other than that feet doing ok- pt pain 8/10 at worst     Diabetes     138mg/dl BG this am      HPI: Edy Cosby, a 69 y.o.male, comes to clinic as a(n) established patient presenting for diabetic foot exam and complaining of thickened toenails and callus to left plantar great toe . Patient has h/o AFib, back pain, DM2, Emphysema lung, GERD, HTN, OA . Patient is IDDM with last stated BG level of 138 mg/dl.  Notes neuropathy in feet with numbness and tingling.  Continues use of AFO for foot drop of left foot.  Reports callus to left plantar hallux has returned.  Denies any redness, drainage, or other issues from the area. Patient states that toenails are long, thick, and irregular and that he is unable to care for them himself.  Denies open wound or sores.  Admits pain at 8/10 level and centered around right metatarsals  today. Relates previous treatment(s) including foot care by podiatrist . Denies any constitutional symptoms. No other pedal complaints at this time.    Past Medical History:   Diagnosis Date    A-fib     Atherosclerosis     Chronic back pain     Constipation     Diabetes mellitus     TYPE II    Dropfoot     wears brace    Dysphagia     Emphysema of lung     Gastroparesis     GERD (gastroesophageal reflux disease)     Hyperlipidemia     Hypertension     ESSENTIAL    Leg pain     Muscle spasm     Nerve pain     Osteoarthritis     Sleep apnea     no cpap/bipap (non-compliance)    Sleeping difficulty     Ulcer of toe due to diabetes mellitus      Past Surgical History:   Procedure Laterality Date    ANGIOPLASTY  ILIAC ARTERY Left 1/29/2019    Procedure: ANGIOPLASTY ILIAC ARTERY, STENT PLACEMENT;  Surgeon: Duncan Lucio MD;  Location:  [facility] HYBRID OR 12;  Service: Vascular    AORTAGRAM Left 12/18/2018    Procedure: AORTOGRAM, LEFT LEG ANGIOGRAM, MYNX CLOSURE;  Surgeon: Duncan Lucio MD;  Location:  [facility] HYBRID OR 12;  Service: Vascular    AORTAGRAM Right 12/16/2021    Procedure: AORTAGRAM, RIGHT LOWER EXTREMITY ANGIOGRAM, POSSIBLE INTERVENTION;  Surgeon: Duncan Lucio MD;  Location: [facility] HYBRID OR 12;  Service: Vascular;  Laterality: Right;    BACK SURGERY      CHOLECYSTECTOMY      COLONOSCOPY  09/01/2011    TICS RECALL 5YR    COLONOSCOPY  09/30/2008    ENTER RESULTS: STOOL THROUGHTOUT THE COLON POOR PREP REC 3 YEAR RECALL    COLONOSCOPY N/A 11/9/2016    Procedure: COLONOSCOPY;  Surgeon: León Zepeda MD;  Location: [facility] ENDOSCOPY;  Service:     COLONOSCOPY N/A 4/19/2018    Procedure: COLONOSCOPY WITH ANESTHESIA;  Surgeon: León Zepeda MD;  Location: [facility] ENDOSCOPY;  Service: Gastroenterology    ENDOSCOPY  06/19/2012    HH    ENDOSCOPY  08/25/2009    HIATAL HERNIA, DILATED WITH 50 FR MASCORRO    ENDOSCOPY  06/19/2007    WITHIN NORMAL LIMITS UREA NEG    FEMORAL ENDARTERECTOMY Left 1/29/2019    Procedure: LEFT FEMORAL ENDARTERECTOMY;  Surgeon: Duncan Lucio MD;  Location: [facility] HYBRID OR 12;  Service: Vascular    FEMORAL ENDARTERECTOMY Right 2/3/2022    Procedure: RIGHT FEMORAL ENDARTERECTOMY, RIGHT LOWER EXTREMITY ANGIOGRAM, BALLOON ANGIOPLASTY;  Surgeon: Duncan Lucio MD;  Location: [facility] HYBRID OR 12;  Service: Vascular;  Laterality: Right;    LUMBAR LAMINECTOMY WITH FUSION N/A 1/23/2017    Procedure: REMOVAL OF INSTRUMENTATION, EXPLORATION OF FUSION L4-S1, REVISION LEFT L5-S1 HEMILAMINECTOMY, FACETECTOMY DECOMPRESSION, REVISION UNINSTRUMENTED POSTERIOR SPINAL FUSION L5-S1;  Surgeon: RHONDA Lopes MD;  Location: [facility] OR;  Service:     OTHER SURGICAL HISTORY       LUMBAR SACRAL SURGERY WITH FUSION X2    PILONIDAL CYST / SINUS EXCISION      PILONIDAL CYST REMOVAL     Family History   Problem Relation Age of Onset    Heart attack Father     Diabetes Brother     Colon polyps Sister     Stomach cancer Neg Hx         GI CNACERS OR DISEASE    Colon cancer Neg Hx      Social History     Socioeconomic History    Marital status: Single   Tobacco Use    Smoking status: Former     Current packs/day: 0.00     Types: Cigarettes     Start date:      Quit date:      Years since quittin.5    Smokeless tobacco: Never   Vaping Use    Vaping status: Never Used   Substance and Sexual Activity    Alcohol use: No    Drug use: Yes     Frequency: 21.0 times per week     Types: Marijuana    Sexual activity: Defer     No Known Allergies  Current Outpatient Medications   Medication Sig Dispense Refill    albuterol sulfate  (90 Base) MCG/ACT inhaler Inhale 2 puffs 4 (Four) Times a Day As Needed.      amLODIPine (NORVASC) 10 MG tablet Take 1 tablet by mouth Daily.      ascorbic acid (VITAMIN C) 500 MG tablet 1 tablet Daily.      aspirin 81 MG EC tablet Daily.      atorvastatin (LIPITOR) 40 MG tablet 1 tablet Every Night.      clopidogrel (PLAVIX) 75 MG tablet Take 1 tablet by mouth once daily 90 tablet 0    cyclobenzaprine (FLEXERIL) 5 MG tablet Take 1 tablet by mouth 3 (Three) Times a Day As Needed for Muscle Spasms.      Dulaglutide (Trulicity) 1.5 MG/0.5ML solution pen-injector Inject 1.5 mg under the skin into the appropriate area as directed 1 (One) Time Per Week.      folic acid (FOLVITE) 1 MG tablet 1 tablet.      hydroCHLOROthiazide (MICROZIDE) 12.5 MG capsule Take 1 capsule by mouth Daily.      labetalol (NORMODYNE) 100 MG tablet Take 1 tablet by mouth 2 (Two) Times a Day.      lisinopril (PRINIVIL,ZESTRIL) 40 MG tablet Take 1 tablet by mouth Daily.      pantoprazole (PROTONIX) 40 MG EC tablet Take 1 tablet by mouth Daily.      sildenafil (VIAGRA) 100 MG tablet Take 0.5  tablets by mouth As Needed for Erectile Dysfunction.      tamsulosin (FLOMAX) 0.4 MG capsule 24 hr capsule Take 1 capsule by mouth Daily.      vitamin D (ERGOCALCIFEROL) 1.25 MG (60916 UT) capsule capsule Take 1 capsule by mouth 1 (One) Time Per Week.       No current facility-administered medications for this visit.     Review of Systems   Constitutional:  Negative for chills and fever.   HENT:  Negative for congestion.    Respiratory:  Negative for shortness of breath.    Cardiovascular:  Negative for chest pain and leg swelling.   Gastrointestinal:  Negative for constipation, diarrhea, nausea and vomiting.   Musculoskeletal:  Positive for arthralgias and gait problem (foot drop).   Skin:  Negative for wound.        Left hallux plantar callus   Neurological:  Positive for numbness.   Hematological:  Bruises/bleeds easily.       OBJECTIVE     Vitals:    07/29/24 1034   BP: (!) 186/78   Pulse: 83   SpO2: 98%       PHYSICAL EXAM  GEN:   Accompanied by none.     Foot/Ankle Exam    GENERAL  Appearance:  obese  Orientation:  AAOx3  Affect:  appropriate  Gait:  steppage (Drop foot left)  Assistance:  independent  Right shoe gear: casual shoe  Left shoe gear: casual shoe (With AFO)    VASCULAR     Right Foot Vascularity   Dorsalis pedis:  2+  Posterior tibial:  2+  Skin temperature:  warm  Edema grading:  Trace and non-pitting  CFT:  3  Pedal hair growth:  Present  Varicosities:  none     Left Foot Vascularity   Dorsalis pedis:  2+  Posterior tibial:  2+  Skin temperature:  warm  Edema grading:  Trace and non-pitting  CFT:  3  Pedal hair growth:  Present  Varicosities:  none     NEUROLOGIC     Right Foot Neurologic   Light touch sensation: diminished  Vibratory sensation: diminished  Hot/Cold sensation: diminished  Protective Sensation using [place] Monofilament:   Right Foot Sites Intact: 0.  Sites tested: 10     Left Foot Neurologic   Light touch sensation: diminished  Vibratory sensation: diminished  Hot/Cold  sensation:  diminished  Protective Sensation using [place] Monofilament:   Left Foot Sites Intact: 0.  Sites tested: 10    MUSCULOSKELETAL     Right Foot Musculoskeletal   Tenderness:  none    Arch:  Pes planus  Hallux valgus: No    Hallux limitus: No       Left Foot Musculoskeletal   Tenderness:  none  Arch:  Pes planus  Hallux valgus: No    Hallux limitus: No      MUSCLE STRENGTH     Right Foot Muscle Strength   Foot dorsiflexion:  5  Foot plantar flexion:  5  Foot inversion:  5  Foot eversion:  5     Left Foot Muscle Strength   Foot dorsiflexion:  3  Foot plantar flexion:  3  Foot inversion:  3  Foot eversion:  3    RANGE OF MOTION     Right Foot Range of Motion   Foot and ankle ROM within normal limits       Left Foot Range of Motion   Foot and ankle ROM within normal limits      DERMATOLOGIC      Right Foot Dermatologic   Skin  Right foot skin is intact.   Nails  1.  Positive for elongated, onychomycosis, abnormal thickness, subungual debris and dystrophic nail.  2.  Positive for elongated, onychomycosis, abnormal thickness, subungual debris and dystrophic nail.  3.  Positive for elongated, onychomycosis, abnormal thickness, subungual debris and dystrophic nail.  4.  Positive for elongated, onychomycosis, abnormal thickness, subungual debris and dystrophic nail.  5.  Positive for elongated, onychomycosis, abnormal thickness, subungual debris and dystrophic nail.     Left Foot Dermatologic   Skin  Positive for corn.   Nails  1.  Positive for elongated, onychomycosis, abnormal thickness, subungual debris and dystrophic nail.  2.  Positive for elongated, onychomycosis, abnormal thickness, subungual debris and dystrophic nail.  3.  Positive for elongated, onychomycosis, abnormal thickness, subungual debris and dystrophic nail.  4.  Positive for elongated, onychomycosis, abnormally thick, subungual debris and dystrophic nail.  5.  Positive for elongated, onychomycosis, abnormally thick, subungual debris and  dystrophic nail.     Left foot additional comments: Wound to plantar left hallux  Periwound callus -open area macerated    Image:       RADIOLOGY/NUCLEAR:  No results found.    LABORATORY/CULTURE RESULTS:      PATHOLOGY RESULTS:       ASSESSMENT/PLAN     Diagnoses and all orders for this visit:    1. Onychomycosis (Primary)    2. Type 2 diabetes mellitus with diabetic polyneuropathy, with long-term current use of insulin    3. Antiplatelet or antithrombotic long-term use    4. Foot drop, left    5. Callus of foot          Comprehensive lower extremity examination and evaluation was performed.  Discussed findings and treatment plan including risks, benefits, and treatment options with patient in detail. Patient agreed with treatment plan.  After verbal consent obtained, nail(s) x10 debrided of length and thickness with nail nipper without incidence  After verbal consent obtained, calluses x1 pared utilizing dermal curette and/or scalpel without incidence  Patient may maintain nails and calluses at home utilizing emery board or pumice stone between visits as needed  Reviewed at home diabetic foot care including daily foot checks    Continue AFO for foot drop.   Continue diabetic monitoring and control under direction of PCP.   Referral to wound care center for left hallux wound.  An After Visit Summary was printed and given to the patient at discharge, including (if requested) any available informative/educational handouts regarding diagnosis, treatment, or medications. All questions were answered to patient/family satisfaction. Should symptoms fail to improve or worsen they agree to call or return to clinic or to go to the Emergency Department. Discussed the importance of following up with any needed screening tests/labs/specialist appointments and any requested follow-up recommended by me today. Importance of maintaining follow-up discussed and patient accepts that missed appointments can delay diagnosis and  potentially lead to worsening of conditions.  Return in about 3 months (around 10/29/2024) for With Kylah ANDUJAR, Schedule [facility]., or sooner if acute issues arise.    Lab Frequency Next Occurrence   Diet: Once 04/19/2018   Advance Diet as Tolerated Once 04/19/2018       This document has been electronically signed by PRATIMA Wilkins on July 29, 2024 12:35 CDT

## 2024-07-26 ENCOUNTER — TELEPHONE (OUTPATIENT)
Age: 69
End: 2024-07-26
Payer: COMMERCIAL

## 2024-07-26 NOTE — TELEPHONE ENCOUNTER
Hub to relay  Spoke with patient regarding appt on 07/29/2024. Patient confirmed date and time off appt.

## 2024-07-29 ENCOUNTER — OFFICE VISIT (OUTPATIENT)
Age: 69
End: 2024-07-29
Payer: COMMERCIAL

## 2024-07-29 VITALS
HEIGHT: 68 IN | WEIGHT: 190 LBS | BODY MASS INDEX: 28.79 KG/M2 | OXYGEN SATURATION: 98 % | DIASTOLIC BLOOD PRESSURE: 78 MMHG | SYSTOLIC BLOOD PRESSURE: 186 MMHG | HEART RATE: 83 BPM

## 2024-07-29 DIAGNOSIS — Z79.02 ANTIPLATELET OR ANTITHROMBOTIC LONG-TERM USE: ICD-10-CM

## 2024-07-29 DIAGNOSIS — B35.1 ONYCHOMYCOSIS: Primary | ICD-10-CM

## 2024-07-29 DIAGNOSIS — Z79.4 TYPE 2 DIABETES MELLITUS WITH DIABETIC POLYNEUROPATHY, WITH LONG-TERM CURRENT USE OF INSULIN: ICD-10-CM

## 2024-07-29 DIAGNOSIS — E11.42 TYPE 2 DIABETES MELLITUS WITH DIABETIC POLYNEUROPATHY, WITH LONG-TERM CURRENT USE OF INSULIN: ICD-10-CM

## 2024-07-29 DIAGNOSIS — L84 CALLUS OF FOOT: ICD-10-CM

## 2024-07-29 DIAGNOSIS — M21.372 FOOT DROP, LEFT: ICD-10-CM

## 2024-07-29 DIAGNOSIS — M79.18 ABDOMINAL MUSCLE PAIN: ICD-10-CM

## 2024-07-29 PROCEDURE — 11721 DEBRIDE NAIL 6 OR MORE: CPT | Performed by: NURSE PRACTITIONER

## 2024-07-29 PROCEDURE — 11055 PARING/CUTG B9 HYPRKER LES 1: CPT | Performed by: NURSE PRACTITIONER

## 2024-07-30 ENCOUNTER — OFFICE VISIT (OUTPATIENT)
Dept: ENT CLINIC | Age: 69
End: 2024-07-30
Payer: MEDICARE

## 2024-07-30 VITALS
SYSTOLIC BLOOD PRESSURE: 124 MMHG | DIASTOLIC BLOOD PRESSURE: 56 MMHG | WEIGHT: 191.6 LBS | HEIGHT: 67 IN | BODY MASS INDEX: 30.07 KG/M2

## 2024-07-30 DIAGNOSIS — J38.00 COMPLETE PARALYSIS OF VOCAL CORD: Primary | ICD-10-CM

## 2024-07-30 DIAGNOSIS — R49.0 HOARSENESS OF VOICE: ICD-10-CM

## 2024-07-30 PROCEDURE — 3078F DIAST BP <80 MM HG: CPT | Performed by: PHYSICIAN ASSISTANT

## 2024-07-30 PROCEDURE — 1123F ACP DISCUSS/DSCN MKR DOCD: CPT | Performed by: PHYSICIAN ASSISTANT

## 2024-07-30 PROCEDURE — 31575 DIAGNOSTIC LARYNGOSCOPY: CPT | Performed by: PHYSICIAN ASSISTANT

## 2024-07-30 PROCEDURE — 99203 OFFICE O/P NEW LOW 30 MIN: CPT | Performed by: PHYSICIAN ASSISTANT

## 2024-07-30 PROCEDURE — 3074F SYST BP LT 130 MM HG: CPT | Performed by: PHYSICIAN ASSISTANT

## 2024-07-30 ASSESSMENT — ENCOUNTER SYMPTOMS
VOICE CHANGE: 0
FACIAL SWELLING: 0
PHOTOPHOBIA: 0
EYE PAIN: 0
TROUBLE SWALLOWING: 0
SINUS PRESSURE: 0
SORE THROAT: 0
RHINORRHEA: 0
SINUS PAIN: 0

## 2024-07-30 NOTE — PROGRESS NOTES
[facility] OTOLARYNGOLOGY/ENT  Mr. Frye is a pleasant 69-year-old male that was referred by Merary Lowery for annual follow-up due to chronic vocal hoarseness.  Patient reports that he initially started with Dr. Finch in 2018 due to sudden onset of vocal hoarseness.  After evaluation the patient is found to have a paralyzed left vocal cord with unremarkable CT of neck and chest.  He was seen again for follow-up by Dr. Tariq and underwent a repeat CAT scan series that was also negative.  Currently he reports that his speech seems to be getting weaker and he admits to getting choked sporadically on chicken skin.  Overall he is able to eat with no major issues.        Allergies: Patient has no known allergies.      Current Outpatient Medications   Medication Sig Dispense Refill    amLODIPine (NORVASC) 10 MG tablet Take 1 tablet by mouth once daily 90 tablet 1    labetalol (NORMODYNE) 100 MG tablet TAKE 1 TABLET BY MOUTH IN THE MORNING AND 1 AT BEDTIME 180 tablet 0    lisinopril (PRINIVIL;ZESTRIL) 40 MG tablet Take 1 tablet by mouth once daily 90 tablet 0    cyclobenzaprine (FLEXERIL) 5 MG tablet Take 1 tablet by mouth three times daily as needed for muscle spasm 90 tablet 0    pantoprazole (PROTONIX) 40 MG tablet Take 1 tablet by mouth once daily 90 tablet 0    cilostazol (PLETAL) 50 MG tablet Take 1 tablet by mouth 2 times daily 180 tablet 2    atorvastatin (LIPITOR) 40 MG tablet Take 1 tablet by mouth once daily 90 tablet 3    tamsulosin (FLOMAX) 0.4 MG capsule TAKE 1 CAPSULE BY MOUTH ONCE DAILY      folic acid (FOLVITE) 1 MG tablet 1 tablet      ergocalciferol (ERGOCALCIFEROL) 1.25 MG (54903 UT) capsule Take 1 capsule by mouth once a week      clopidogrel (PLAVIX) 75 MG tablet TAKE 1 TABLET BY MOUTH ONCE DAILY      Cholecalciferol 50 MCG (2000 UT) TABS 50 mcg      ascorbic acid (VITAMIN C) 500 MG tablet 1 tablet      allopurinol (ZYLOPRIM) 100 MG tablet 0.5 tablets      albuterol sulfate HFA (VENTOLIN HFA)

## 2024-07-30 NOTE — ASSESSMENT & PLAN NOTE
Chronic hoarseness of voice secondary to paralyzed left vocal cord-etiology never determined with 2 previous CTs of the neck and chest that were negative-onset in 2018  Plan: Treatment options were discussed with the patient.  I recommended referring him to [facility] for vocal strengthening.  Advised the patient that if his dysphagia worsens, he is to call for a referral to [facility] for further evaluation.  Could also consider a modified barium swallow given his vocal cord issue.  He was advised to call if he has any questions or concerns.  He is follow-up with me in 1 year for repeat laryngoscopy to continue monitoring the cords.

## 2024-07-31 NOTE — TELEPHONE ENCOUNTER
Gavino Frye called to request a refill on his medication.      Last office visit : 5/2/2024   Next office visit : 8/5/2024     Requested Prescriptions     Pending Prescriptions Disp Refills    cyclobenzaprine (FLEXERIL) 5 MG tablet [Pharmacy Med Name: Cyclobenzaprine HCl 5 MG Oral Tablet] 90 tablet 0     Sig: Take 1 tablet by mouth three times daily as needed for muscle spasm    pantoprazole (PROTONIX) 40 MG tablet [Pharmacy Med Name: Pantoprazole Sodium 40 MG Oral Tablet Delayed Release] 90 tablet 0     Sig: Take 1 tablet by mouth once daily            Génesis Gray MA

## 2024-08-01 RX ORDER — CYCLOBENZAPRINE HCL 5 MG
TABLET ORAL
Qty: 90 TABLET | Refills: 0 | Status: SHIPPED | OUTPATIENT
Start: 2024-08-01

## 2024-08-01 RX ORDER — PANTOPRAZOLE SODIUM 40 MG/1
40 TABLET, DELAYED RELEASE ORAL DAILY
Qty: 90 TABLET | Refills: 0 | Status: SHIPPED | OUTPATIENT
Start: 2024-08-01

## 2024-08-05 ENCOUNTER — TELEPHONE (OUTPATIENT)
Dept: PRIMARY CARE CLINIC | Age: 69
End: 2024-08-05

## 2024-08-21 ENCOUNTER — TELEPHONE (OUTPATIENT)
Dept: PRIMARY CARE CLINIC | Age: 69
End: 2024-08-21

## 2024-08-21 ENCOUNTER — HOSPITAL ENCOUNTER (OUTPATIENT)
Dept: SPEECH THERAPY | Age: 69
Setting detail: THERAPIES SERIES
Discharge: HOME | End: 2024-08-21
Payer: MEDICARE

## 2024-08-21 DIAGNOSIS — R49.0 HOARSENESS OF VOICE: ICD-10-CM

## 2024-08-21 DIAGNOSIS — J38.00 COMPLETE PARALYSIS OF VOCAL CORD: Primary | ICD-10-CM

## 2024-08-21 DIAGNOSIS — J38.00 VOCAL CORD PARESIS: ICD-10-CM

## 2024-08-21 PROCEDURE — 92523 SPEECH SOUND LANG COMPREHEN: CPT

## 2024-08-21 PROCEDURE — 92522 EVALUATE SPEECH PRODUCTION: CPT

## 2024-08-21 PROCEDURE — 92610 EVALUATE SWALLOWING FUNCTION: CPT

## 2024-08-21 NOTE — TELEPHONE ENCOUNTER
Heide called from speech therapy requesting order for bariatric swallow test be ordered for patient. Ok for orders?

## 2024-08-21 NOTE — PROGRESS NOTES
[facility]  SPEECH THERAPY   CLINICAL SWALLOW EVALUATION     Date: 2024  Patient Name: Gavino Frye        MRN:   [id number]    Account #: [id number]  : 1955  (69 y.o.)  Gender: male   Referring Provider: Av  Treating Therapist: Heide Berrios, SLP        Past Medical History:    Past Medical History        Past Medical History:   Diagnosis Date    Arthritis      BiPAP (biphasic positive airway pressure) dependence       8cm to 20cm    Chronic back pain      Emphysema/COPD (HCC)      GERD (gastroesophageal reflux disease)      History of anemia      History of blood transfusion      Hyperlipidemia      Hypertension      Impotence 3/22/2021    Neuropathy      Obstructive sleep apnea       AHI:  95.8 per PSG, 3/2017; repeat PSG, 2017 revealed an AHI of 65.5    Peripheral vascular disease (HCC)      S/P angioplasty      Type II or unspecified type diabetes mellitus without mention of complication, not stated as uncontrolled           Insurance/Certification Information:     Visit Information:   [facility] Medicare; [facility] Medicare Supplemental     Diagnosis:  Paralysis Vocal Cord J38.00     Plan of Care Submitted: YES, to [place] and [place]     Current Diet level:     Current Diet : Regular foods but is careful about foods like chicken skin or chewy foods  Current Liquid Diet : Thin liquids     Reason for Referral  Patient had a recent appointment with Tony Ley PA-C. He reports:   \"Mr. Frye is a pleasant 69-year-old male that was referred by Merary Lowery for annual follow-up due to chronic vocal hoarseness.  Patient reports that he initially started with Dr. Finch in 2018 due to sudden onset of vocal hoarseness.  After evaluation the patient is found to have a paralyzed left vocal cord with unremarkable CT of neck and chest.  He was seen again for follow-up by Dr. Tariq and underwent a repeat CAT scan series that was also negative. Currently he reports that his

## 2024-08-23 ENCOUNTER — HOSPITAL ENCOUNTER (OUTPATIENT)
Dept: SPEECH THERAPY | Age: 69
Setting detail: THERAPIES SERIES
Discharge: HOME | End: 2024-08-23
Payer: MEDICARE

## 2024-08-23 PROCEDURE — 92507 TX SP LANG VOICE COMM INDIV: CPT

## 2024-08-23 NOTE — PROGRESS NOTES
understanding   []Demonstrates without assistance  [x]Demonstrates with assistance  [x]Needs further instruction     []No evidence of learning                  [x]No family present      Plan: [x]Continue with current plan of care    []Modify current plan of care as follows:    []Discharge patient    Discharge Location:    Services/Supervision Recommended:        [x]Patient continues to require treatment by a licensed therapist to address functional deficits as outlined in the established plan of care.    TIME   Time In: 1100  Time Out: 1200  Minutes: 60       Electronically signed by HILARIO Cai on 8/23/2024 at 1:00 PM

## 2024-08-28 ENCOUNTER — TELEPHONE (OUTPATIENT)
Dept: ENT CLINIC | Age: 69
End: 2024-08-28

## 2024-08-28 ENCOUNTER — HOSPITAL ENCOUNTER (OUTPATIENT)
Dept: SPEECH THERAPY | Age: 69
Setting detail: THERAPIES SERIES
Discharge: HOME | End: 2024-08-28
Payer: MEDICARE

## 2024-08-28 DIAGNOSIS — R13.10 DYSPHAGIA, UNSPECIFIED TYPE: Primary | ICD-10-CM

## 2024-08-28 PROCEDURE — 92507 TX SP LANG VOICE COMM INDIV: CPT

## 2024-08-28 NOTE — PROGRESS NOTES
Speech therapy has requested an order for a modified barium swallow due to the patient's current issues.    Electronically signed by MARK WASSERMAN PA-C on 8/28/24 at 6:02 PM HERNANT

## 2024-08-28 NOTE — PROGRESS NOTES
[facility]   SPEECH THERAPY  Daily Voice Treatment Note  Date: 2024  Patient Name: Gavino Frye        MRN: [id number]    Account #: [id number]  : 1955  (69 y.o.)  Gender: male   Referring Provider: Av  Treating Therapist: HILARIO Cai     Subjective:   Subjective: Patient reports he feels like his voice has maybe gotten a little bit worse over the past few weeks; he continues to lose his breath     Visit Information:     Visit Information  Onset Date: 24  SLP Insurance Information: [facility] Medicare  Total # of Visits Approved: 8  Total # of Visits to Date: 2  Timeframe Approved From:: 24  Timeframe Approved To:: 10/21/24    Objective:     Voice Therapy:      Relaxation Techniques:    Shoulder rolls  Shrugs with exhales  Flat hand on sternum with breathing and leaning  Laryngeal massage  Jaw massage  Tongue stretches (noted breath catching with mouth breathing)    Breathing Techniques:    Diaphragmatic breathing with exhale quick /sh/ and long /sh/.  On initial long /sh/ patient demonstrated smoother exhale with only 1-2 \"bumps.\" Other long exhales were more effortful.     Voicing Techniques/Exercises:    Attempted lip trills, but these are very difficult for patient.  Attempted tongue trills with min vibration achieved.    Yawn-Sign exercises attempted with good result and less harshness noted.  Humming with instruction to feel vibration in nose and lips (8 seconds, then 7 seconds subsequently without pitch breaks)  Vocal cord stretching with WHOOP from low to high with pitch breaks noted on 5/10 attempts; high to low on WHOA x10 with pitch break noted on 1/10 repetitions  Mmmm production with adding vowels    Strained vocal quality continually noted throughout    Vocal Hygiene:    Encouraged hydration; encouraged using normal voice quality instead of screaming or whispering, avoiding being around cigarette smoke.  Contacted Merary Lowery's office to have the  treatment by a licensed therapist to address functional deficits as outlined in the established plan of care.    TIME   Time In: 1005  Time Out: 1102   57 minutes    Electronically signed by HILARIO Cai on 8/28/2024 at 11:02 AM

## 2024-08-28 NOTE — TELEPHONE ENCOUNTER
Speech therapy called after evaluating this patient and requested for Gabino to put in an order for a modified barium swallow study for this patient.

## 2024-08-30 ENCOUNTER — HOSPITAL ENCOUNTER (OUTPATIENT)
Dept: SPEECH THERAPY | Age: 69
Setting detail: THERAPIES SERIES
Discharge: HOME | End: 2024-08-30
Payer: MEDICARE

## 2024-08-30 ENCOUNTER — TELEPHONE (OUTPATIENT)
Dept: ENT CLINIC | Age: 69
End: 2024-08-30

## 2024-08-30 PROCEDURE — 92507 TX SP LANG VOICE COMM INDIV: CPT

## 2024-08-30 NOTE — TELEPHONE ENCOUNTER
Heide from speech l[facility] stating that she thinks this patient would benefit in a referral to see [facility] due to patient had difficulty with vocal spasms with exercising and breathing.

## 2024-08-30 NOTE — PROGRESS NOTES
[facility]   SPEECH THERAPY  Daily Voice Treatment Note  Date: 2024  Patient Name: Gavino Frye        MRN: [id number]    Account #: [id number]  : 1955  (69 y.o.)  Gender: male   Referring Provider: Av  Treating Therapist: HILARIO Cai     Subjective:   Subjective: Patient reports vocal fatigue the more he eats; states he has been trying to do his exercises.     Visit Information:     Visit Information  Onset Date: 24  SLP Insurance Information: [employer] Medicare  Total # of Visits Approved: 8  Total # of Visits to Date: 3  Timeframe Approved From:: 24  Timeframe Approved To:: 10/21/24    Objective:      Voice Therapy:    Patient read [place] with an average pitch of 193.7Hz and volume average of 79.5db. Note that these measurements were taken via an janet and may not be completely accurate.      Relaxation Techniques:  Shoulder shrugs with exhale  Shoulder rolls forward and back  Jaw massage  Laryngeal massage    Breathing Techniques:  Yawn Sigh without voicing  Diaphragmatic breathing  Effortful exhale with prolonged and short /sh/ - breaks in airflow noted x2 on each repetition  Deep breaths in through nose and aiming for smooth air out through straw to make tissue move - Patient does get frequent hiccups  Patient did show some improvement with exhaling on air, focusing on easy air, without his vocal folds blocking for 2-4 seconds    Voicing Techniques/Exercises:    Yawn/Sigh Technique with voice/breath freezing up frequently  Exhaling through straw with voicing and frequent voice breaks/having to stop with what almost seems like vocal spasm  Vocalizing with glides and vowel prolongation through straw with frequent freezing noted.  Patient states \"It's almost like my voice just cuts off and freezes for a minute and then comes back on.\"  This is happening with all vocal tension reducing exercises/vocal function exercises.  He is almost unable to complete them

## 2024-09-01 DIAGNOSIS — M79.18 ABDOMINAL MUSCLE PAIN: ICD-10-CM

## 2024-09-01 RX ORDER — CYCLOBENZAPRINE HCL 5 MG
TABLET ORAL
Qty: 90 TABLET | Refills: 0 | OUTPATIENT
Start: 2024-09-01

## 2024-09-04 ENCOUNTER — HOSPITAL ENCOUNTER (OUTPATIENT)
Dept: SPEECH THERAPY | Age: 69
Setting detail: THERAPIES SERIES
Discharge: HOME | End: 2024-09-04
Payer: MEDICARE

## 2024-09-04 DIAGNOSIS — J38.5 SPASM OF VOCAL CORDS: Primary | ICD-10-CM

## 2024-09-04 PROCEDURE — 92507 TX SP LANG VOICE COMM INDIV: CPT

## 2024-09-04 NOTE — PROGRESS NOTES
[facility]   SPEECH THERAPY  Daily Voice Treatment Note  Date: 2024  Patient Name: Gavino Frye        MRN: [id number]    Account #: [id number]  : 1955  (69 y.o.)  Gender: male   Referring Provider: Av  Treating Therapist: Heide Berrios, HILARIO     Subjective: Patient states his uncle told him that he could understand him a little bit better.  He does report that he has had some inconsistent pains on the right side of his throat.  He has a swallow study scheduled for 24.        Visit Information:     Visit Information  Onset Date: 24  SLP Insurance Information: [employer] Medicare  Total # of Visits Approved: 8  Total # of Visits to Date: 4  Timeframe Approved From:: 24  Timeframe Approved To:: 10/21/24      Objective:   Primary focus this visit was on relaxed breathing and vocalization due to patient having difficulty producing smooth voicing or breathing.      Breathing Techniques:  Instructed patient in breathing in and out of mouth with tongue out x10    Instructed patient in breathing in and then exhaling with hahaha (no voice) with tongue out x10    Instructed patient in deep belly breathing with exhale on /s/ with the following times:  11 seconds, 7.5 seconds, 9.5 seconds, 8.8 seconds (with 2-3 bumps noted but air did not fully shut off), 8.6, 4.2, 9.7, 7.6 seconds, 13.06, 11.9 = 9.18 seconds average (no voicing)  Measurements were taken when the patient's air \"cut off.\"      Instructed patient in sniff breathing - two sniffs with 4 count exhale to follow. Patient reports his breath supply cutting off periodically throughout the exhale.  X10 repetitions     Introduced patient in how to use Andre Maneuver with no significant differences noted in vocal prolongation (actually less with the maneuver in place).  However, after use, patient felt that his voice was stronger and steadier while talking.  Without Maneuver   With Maneuver  6.6 seconds    3.1 seconds  4.5

## 2024-09-04 NOTE — PROGRESS NOTES
Speech therapy has recommended referring the patient to the vocal clinic at [place] due to vocal cord spasms.  Patient is agreeable and wishes to proceed.  Referral placed.    Electronically signed by MARK WASSERMAN PA-C on 9/4/24 at 5:39 PM CDT

## 2024-09-06 ENCOUNTER — HOSPITAL ENCOUNTER (OUTPATIENT)
Dept: SPEECH THERAPY | Age: 69
Setting detail: THERAPIES SERIES
Discharge: HOME | End: 2024-09-06
Payer: MEDICARE

## 2024-09-06 PROCEDURE — 92507 TX SP LANG VOICE COMM INDIV: CPT

## 2024-09-06 NOTE — PROGRESS NOTES
whispering or yelling    Short Term Goals  Short-term Goals  Timeframe for Short-term Goals: 4-6 Weeks  Goal 1: Patient will complete diaphragmatic breathing exericses with 80% accuracy and min prompts to improve breath support.  Goal 2: Patient will complete vocal warm-up/cool down exercises with good vocal quality and 80% accuracy with no-min cues/prompts  Goal 3: Patient will demonstrate improvement in voice/perception of voice through decrease in points on the VHI.  Goal 4: Patient will complete vocal fold exercises with good vocal quality and 80% accuracy with no-min cues/prompts  Goal 5: Patient will demonstrate understanding of and decrease in vocally abusive activities/habits per patient report  Goal 6: Patient will have a modified barium swallow study to determine the safest, least restrictive diet and/or any needed compensatory strategies.       ASSESSMENT:  Assessment: [x]Progressing towards goals          []Not Progressing towards goals    Patient Tolerance of Treatment:   []Tolerated well [x]Tolerated fair []Required rest breaks []Fatigued    Education:  Learner:  [x]Patient          []Significant Other          []Other  Education provided regarding:  [x]Goals and POC   [x]Diet and swallowing precautions    [x]Home Exercise Program  []Progress and/or discharge information  Method of Education:  [x]Discussion          [x]Demonstration          []Handout          []Other  Evaluation of Education:   [x]Verbalized understanding   []Demonstrates without assistance  [x]Demonstrates with assistance  [x]Needs further instruction     []No evidence of learning                  [x]No family present      Plan: [x]Continue with current plan of care    []Modify current plan of care as follows:    []Discharge patient    Discharge Location:    Services/Supervision Recommended:        [x]Patient continues to require treatment by a licensed therapist to address functional deficits as outlined in the established plan of

## 2024-09-11 ENCOUNTER — HOSPITAL ENCOUNTER (OUTPATIENT)
Dept: SPEECH THERAPY | Age: 69
Setting detail: THERAPIES SERIES
Discharge: HOME | End: 2024-09-11
Payer: MEDICARE

## 2024-09-12 ENCOUNTER — HOSPITAL ENCOUNTER (OUTPATIENT)
Dept: GENERAL RADIOLOGY | Age: 69
Discharge: HOME | End: 2024-09-12
Payer: MEDICARE

## 2024-09-12 ENCOUNTER — HOSPITAL ENCOUNTER (OUTPATIENT)
Dept: SPEECH THERAPY | Age: 69
Setting detail: THERAPIES SERIES
Discharge: HOME | End: 2024-09-12
Payer: MEDICARE

## 2024-09-12 DIAGNOSIS — R13.10 DYSPHAGIA, UNSPECIFIED TYPE: ICD-10-CM

## 2024-09-12 PROCEDURE — 74230 X-RAY XM SWLNG FUNCJ C+: CPT

## 2024-09-12 PROCEDURE — 92611 MOTION FLUOROSCOPY/SWALLOW: CPT

## 2024-09-13 ENCOUNTER — HOSPITAL ENCOUNTER (OUTPATIENT)
Dept: SPEECH THERAPY | Age: 69
Setting detail: THERAPIES SERIES
Discharge: HOME | End: 2024-09-13
Payer: MEDICARE

## 2024-09-18 ENCOUNTER — HOSPITAL ENCOUNTER (OUTPATIENT)
Dept: SPEECH THERAPY | Age: 69
Setting detail: THERAPIES SERIES
Discharge: HOME | End: 2024-09-18
Payer: MEDICARE

## 2024-09-18 PROCEDURE — 92507 TX SP LANG VOICE COMM INDIV: CPT

## 2024-09-18 PROCEDURE — 92526 ORAL FUNCTION THERAPY: CPT

## 2024-09-20 ENCOUNTER — HOSPITAL ENCOUNTER (OUTPATIENT)
Dept: SPEECH THERAPY | Age: 69
Setting detail: THERAPIES SERIES
Discharge: HOME | End: 2024-09-20
Payer: MEDICARE

## 2024-09-20 PROCEDURE — 92507 TX SP LANG VOICE COMM INDIV: CPT

## 2024-09-20 PROCEDURE — 92526 ORAL FUNCTION THERAPY: CPT

## 2024-09-25 ENCOUNTER — HOSPITAL ENCOUNTER (OUTPATIENT)
Dept: SPEECH THERAPY | Age: 69
Setting detail: THERAPIES SERIES
Discharge: HOME | End: 2024-09-25
Payer: MEDICARE

## 2024-09-25 DIAGNOSIS — I10 ESSENTIAL HYPERTENSION: ICD-10-CM

## 2024-09-25 PROCEDURE — 92507 TX SP LANG VOICE COMM INDIV: CPT

## 2024-09-26 RX ORDER — LISINOPRIL 40 MG/1
40 TABLET ORAL DAILY
Qty: 90 TABLET | Refills: 0 | Status: SHIPPED | OUTPATIENT
Start: 2024-09-26

## 2024-09-27 ENCOUNTER — HOSPITAL ENCOUNTER (OUTPATIENT)
Dept: SPEECH THERAPY | Age: 69
Setting detail: THERAPIES SERIES
Discharge: HOME | End: 2024-09-27
Payer: MEDICARE

## 2024-09-27 PROCEDURE — 92526 ORAL FUNCTION THERAPY: CPT

## 2024-10-02 ENCOUNTER — HOSPITAL ENCOUNTER (OUTPATIENT)
Dept: SPEECH THERAPY | Age: 69
Setting detail: THERAPIES SERIES
Discharge: HOME | End: 2024-10-02
Payer: MEDICARE

## 2024-10-02 PROCEDURE — 92526 ORAL FUNCTION THERAPY: CPT

## 2024-10-02 NOTE — PROGRESS NOTES
[facility]   SPEECH THERAPY  Daily Treatment Note    Date: 10/2/2024  Patient Name: Gavino Frye        MRN: [id number]    Account #: [id number]  : 1955  (69 y.o.)  Gender: male   Referring Provider: Av  Treating Therapist: Heide Berrios, SLP    Subjective: The longer I talk, the more it cuts off.  Sometimes I forget to turn my head when I swallow.  The medicine goes down well with the applesauce.     Visit Information:     Visit Information  Onset Date: 24  SLP Insurance Information: [facility] Medicare (8 initially approved; completed 7 before adding swallow code; then additional 6 approved)  Total # of Visits Approved: 6  Total # of Visits to Date: 5  Timeframe Approved From:: 24  Timeframe Approved To:: 10/21/24    Objective:         Diet Solids Recommendation: Regular foods as tolerated with no teeth     Diet Liquid Recommendation: Nectar thickened liquids; patient drinks thin liquids, despite recommendation for nectar thick liquids. He is compliant with head turn left.     Compensatory Swallowing Strategies: Head turn left with all swallows     Dysphagia:    Completion of swallow strengthening exercises including:  effortful swallow while eating a container of applesauce; 8 ounces of nectar thickened water    Patient reports he is doing his exercises at home, including the Gricelda x20 and the EMST for increased breath support, cough strength, and respiratory strength.  He states that as long as he turns his head, he does well and \"things go down easier.\"  Patient completed Supraglottic swallows with head turned left x 20 repetitions.    Patient participated in neuromuscular electrical stimulation (NMES) vital stim. Skin was prepped with skin barrier wipe. Gel was placed on electrodes to decrease discomfort when taking them off. Electrodes were placed in 3a placement. Self adhesive wrap was placed to improve surface adhesion of the electrodes. Parameters of vital stim were:

## 2024-10-04 ENCOUNTER — HOSPITAL ENCOUNTER (OUTPATIENT)
Dept: SPEECH THERAPY | Age: 69
Setting detail: THERAPIES SERIES
Discharge: HOME | End: 2024-10-04
Payer: MEDICARE

## 2024-10-04 PROCEDURE — 92507 TX SP LANG VOICE COMM INDIV: CPT

## 2024-10-04 PROCEDURE — 92526 ORAL FUNCTION THERAPY: CPT

## 2024-10-04 NOTE — PROGRESS NOTES
[facility]   SPEECH THERAPY  Daily Treatment Note    Date: 10/4/2024  Patient Name: Gavino Frye        MRN: [id number]    Account #: [id number]  : 1955  (69 y.o.)  Gender: male   Referring Provider: Av  Treating Therapist: Heide Berrios, SLP        Subjective: Patient reports he is doing well; states his voice still \"comes and goes.\"  States as long as he remembers to turn his head, he swallows \"pretty well.\"         Visit Information:     Visit Information  Onset Date: 24  SLP Insurance Information: [facility] Medicare (8 initially approved; completed 7 before adding swallow code; then additional 6 approved)  Total # of Visits Approved: 6  Total # of Visits to Date: 6  Timeframe Approved From:: 24  Timeframe Approved To:: 10/21/24    Objective:      Consistencies Administered  Consistencies Administered: Mildly Thick - cup  Diet Solids Recommendation: Regular solids      Diet Liquid Recommendation: Nectar thick liquids     Compensatory Swallowing Strategies: Head turn left, nectar thickened liquids, (patient still not thickening at home); upright during and after p.o. intake     Dysphagia:     Patient participated in neuromuscular electrical stimulation (NMES) vital stim. Skin was prepped with skin barrier wipe. Gel was placed on electrodes to decrease discomfort when taking them off. Electrodes were placed in 3b placement. Self adhesive wrap was placed to improve surface adhesion of the electrodes. Parameters of vital stim were: channel mode: co-contract, cycle time at 57/1, frequency at 80pps, ramp at 2 seconds, phase duration at 300 microseconds. Total time vital stim treatment was approximately 45 minutes. At 9.0 mA patient reported a pulling sensation. Treatment was completed at this intensity.    Patient reports compliance with \"a few\" of his voicing and swallowing exercises.  These include the Gricelda and the Supraglottic, as well as use of the EMST daily.    -Use of EMST x25

## 2024-10-09 ENCOUNTER — APPOINTMENT (OUTPATIENT)
Dept: SPEECH THERAPY | Age: 69
End: 2024-10-09
Payer: MEDICARE

## 2024-10-11 ENCOUNTER — APPOINTMENT (OUTPATIENT)
Dept: SPEECH THERAPY | Age: 69
End: 2024-10-11
Payer: MEDICARE

## 2024-10-11 RX ORDER — LABETALOL 100 MG/1
TABLET, FILM COATED ORAL
Qty: 180 TABLET | Refills: 0 | Status: SHIPPED | OUTPATIENT
Start: 2024-10-11

## 2024-10-16 ENCOUNTER — APPOINTMENT (OUTPATIENT)
Dept: SPEECH THERAPY | Age: 69
End: 2024-10-16
Payer: MEDICARE

## 2024-10-18 ENCOUNTER — APPOINTMENT (OUTPATIENT)
Dept: SPEECH THERAPY | Age: 69
End: 2024-10-18
Payer: MEDICARE

## 2024-10-21 ENCOUNTER — OFFICE VISIT (OUTPATIENT)
Dept: PRIMARY CARE CLINIC | Age: 69
End: 2024-10-21
Payer: MEDICARE

## 2024-10-21 VITALS
TEMPERATURE: 97.1 F | HEART RATE: 85 BPM | DIASTOLIC BLOOD PRESSURE: 70 MMHG | HEIGHT: 67 IN | SYSTOLIC BLOOD PRESSURE: 136 MMHG | OXYGEN SATURATION: 97 % | BODY MASS INDEX: 30.26 KG/M2 | WEIGHT: 192.8 LBS

## 2024-10-21 DIAGNOSIS — E11.51 TYPE 2 DIABETES MELLITUS WITH DIABETIC PERIPHERAL ANGIOPATHY WITHOUT GANGRENE, WITHOUT LONG-TERM CURRENT USE OF INSULIN (HCC): Chronic | ICD-10-CM

## 2024-10-21 DIAGNOSIS — I10 ESSENTIAL HYPERTENSION: ICD-10-CM

## 2024-10-21 DIAGNOSIS — M79.18 ABDOMINAL MUSCLE PAIN: ICD-10-CM

## 2024-10-21 DIAGNOSIS — Z23 NEED FOR VACCINATION: ICD-10-CM

## 2024-10-21 DIAGNOSIS — Z00.00 MEDICARE ANNUAL WELLNESS VISIT, SUBSEQUENT: ICD-10-CM

## 2024-10-21 DIAGNOSIS — I10 ESSENTIAL HYPERTENSION: Primary | ICD-10-CM

## 2024-10-21 LAB
25(OH)D3 SERPL-MCNC: 59.8 NG/ML
ALBUMIN SERPL-MCNC: 4.3 G/DL (ref 3.5–5.2)
ALP SERPL-CCNC: 70 U/L (ref 40–129)
ALT SERPL-CCNC: 14 U/L (ref 5–41)
ANION GAP SERPL CALCULATED.3IONS-SCNC: 11 MMOL/L (ref 7–19)
AST SERPL-CCNC: 19 U/L (ref 5–40)
BASOPHILS # BLD: 0 K/UL (ref 0–0.2)
BASOPHILS NFR BLD: 0.7 % (ref 0–1)
BILIRUB SERPL-MCNC: 0.4 MG/DL (ref 0.2–1.2)
BUN SERPL-MCNC: 20 MG/DL (ref 8–23)
CALCIUM SERPL-MCNC: 9.5 MG/DL (ref 8.8–10.2)
CHLORIDE SERPL-SCNC: 105 MMOL/L (ref 98–111)
CHOLEST SERPL-MCNC: 138 MG/DL (ref 0–199)
CO2 SERPL-SCNC: 28 MMOL/L (ref 22–29)
CREAT SERPL-MCNC: 1.7 MG/DL (ref 0.7–1.2)
EOSINOPHIL # BLD: 0.1 K/UL (ref 0–0.6)
EOSINOPHIL NFR BLD: 2.4 % (ref 0–5)
ERYTHROCYTE [DISTWIDTH] IN BLOOD BY AUTOMATED COUNT: 16.3 % (ref 11.5–14.5)
GLUCOSE SERPL-MCNC: 109 MG/DL (ref 70–99)
HBA1C MFR BLD: 6.1 % (ref 4–5.6)
HCT VFR BLD AUTO: 35.2 % (ref 42–52)
HDLC SERPL-MCNC: 57 MG/DL (ref 40–60)
HGB BLD-MCNC: 10.4 G/DL (ref 14–18)
IMM GRANULOCYTES # BLD: 0 K/UL
LDLC SERPL CALC-MCNC: 68 MG/DL
LYMPHOCYTES # BLD: 1.2 K/UL (ref 1.1–4.5)
LYMPHOCYTES NFR BLD: 26.6 % (ref 20–40)
MCH RBC QN AUTO: 27.7 PG (ref 27–31)
MCHC RBC AUTO-ENTMCNC: 29.5 G/DL (ref 33–37)
MCV RBC AUTO: 93.6 FL (ref 80–94)
MONOCYTES # BLD: 0.3 K/UL (ref 0–0.9)
MONOCYTES NFR BLD: 6.6 % (ref 0–10)
NEUTROPHILS # BLD: 2.9 K/UL (ref 1.5–7.5)
NEUTS SEG NFR BLD: 63.5 % (ref 50–65)
PLATELET # BLD AUTO: 174 K/UL (ref 130–400)
PMV BLD AUTO: 11.6 FL (ref 9.4–12.4)
POTASSIUM SERPL-SCNC: 4.3 MMOL/L (ref 3.5–5)
PROT SERPL-MCNC: 7.5 G/DL (ref 6.4–8.3)
RBC # BLD AUTO: 3.76 M/UL (ref 4.7–6.1)
SODIUM SERPL-SCNC: 144 MMOL/L (ref 136–145)
TRIGL SERPL-MCNC: 64 MG/DL (ref 0–149)
TSH SERPL DL<=0.005 MIU/L-ACNC: 2.12 UIU/ML (ref 0.27–4.2)
WBC # BLD AUTO: 4.6 K/UL (ref 4.8–10.8)

## 2024-10-21 PROCEDURE — G0008 ADMIN INFLUENZA VIRUS VAC: HCPCS | Performed by: NURSE PRACTITIONER

## 2024-10-21 PROCEDURE — 1123F ACP DISCUSS/DSCN MKR DOCD: CPT | Performed by: NURSE PRACTITIONER

## 2024-10-21 PROCEDURE — 99213 OFFICE O/P EST LOW 20 MIN: CPT | Performed by: NURSE PRACTITIONER

## 2024-10-21 PROCEDURE — 90677 PCV20 VACCINE IM: CPT | Performed by: NURSE PRACTITIONER

## 2024-10-21 PROCEDURE — G0009 ADMIN PNEUMOCOCCAL VACCINE: HCPCS | Performed by: NURSE PRACTITIONER

## 2024-10-21 PROCEDURE — 3078F DIAST BP <80 MM HG: CPT | Performed by: NURSE PRACTITIONER

## 2024-10-21 PROCEDURE — 90653 IIV ADJUVANT VACCINE IM: CPT | Performed by: NURSE PRACTITIONER

## 2024-10-21 PROCEDURE — 3044F HG A1C LEVEL LT 7.0%: CPT | Performed by: NURSE PRACTITIONER

## 2024-10-21 PROCEDURE — 3075F SYST BP GE 130 - 139MM HG: CPT | Performed by: NURSE PRACTITIONER

## 2024-10-21 PROCEDURE — G0439 PPPS, SUBSEQ VISIT: HCPCS | Performed by: NURSE PRACTITIONER

## 2024-10-21 RX ORDER — CILOSTAZOL 50 MG/1
50 TABLET ORAL 2 TIMES DAILY
Qty: 180 TABLET | Refills: 2 | Status: SHIPPED | OUTPATIENT
Start: 2024-10-21 | End: 2025-07-18

## 2024-10-21 RX ORDER — LISINOPRIL 40 MG/1
40 TABLET ORAL DAILY
Qty: 90 TABLET | Refills: 0 | Status: SHIPPED | OUTPATIENT
Start: 2024-10-21

## 2024-10-21 RX ORDER — PANTOPRAZOLE SODIUM 40 MG/1
40 TABLET, DELAYED RELEASE ORAL DAILY
Qty: 90 TABLET | Refills: 0 | Status: SHIPPED | OUTPATIENT
Start: 2024-10-21

## 2024-10-21 RX ORDER — ATORVASTATIN CALCIUM 40 MG/1
TABLET, FILM COATED ORAL
Qty: 90 TABLET | Refills: 3 | Status: SHIPPED | OUTPATIENT
Start: 2024-10-21

## 2024-10-21 RX ORDER — AMLODIPINE BESYLATE 10 MG/1
10 TABLET ORAL DAILY
Qty: 90 TABLET | Refills: 1 | Status: SHIPPED | OUTPATIENT
Start: 2024-10-21

## 2024-10-21 ASSESSMENT — ENCOUNTER SYMPTOMS
PHOTOPHOBIA: 0
NAUSEA: 0
COUGH: 0
BACK PAIN: 0
SHORTNESS OF BREATH: 0
COLOR CHANGE: 0
RHINORRHEA: 0
VOICE CHANGE: 0
VOMITING: 0

## 2024-10-21 ASSESSMENT — PATIENT HEALTH QUESTIONNAIRE - PHQ9
1. LITTLE INTEREST OR PLEASURE IN DOING THINGS: NOT AT ALL
SUM OF ALL RESPONSES TO PHQ QUESTIONS 1-9: 0
2. FEELING DOWN, DEPRESSED OR HOPELESS: NOT AT ALL
SUM OF ALL RESPONSES TO PHQ QUESTIONS 1-9: 0
SUM OF ALL RESPONSES TO PHQ9 QUESTIONS 1 & 2: 0

## 2024-10-21 ASSESSMENT — LIFESTYLE VARIABLES
HOW OFTEN DO YOU HAVE A DRINK CONTAINING ALCOHOL: PATIENT DECLINED
HOW MANY STANDARD DRINKS CONTAINING ALCOHOL DO YOU HAVE ON A TYPICAL DAY: PATIENT DECLINED

## 2024-10-21 NOTE — PATIENT INSTRUCTIONS
Flu and pneumonia vaccines today.   Fasting labs in [address].   Follow-up in 6 months or sooner if needed.        9 Ways to Cut Back on Drinking  Maybe you've found yourself drinking more alcohol than you'd prefer. If you want to cut back, here are some ideas to try.    Think before you drink.  Do you really want a drink, or is it just a habit? If you're used to having a drink at a certain time, try doing something else then.     Look for substitutes.  Find some no-alcohol drinks that you enjoy, like flavored seltzer water, tea with honey, or tonic with a slice of lime. Or try alcohol-free beer or \"virgin\" cocktails (without the alcohol).     Drink more water.  Use water to quench your thirst. Drink a glass of water before you have any alcohol. Have another glass along with every drink or between drinks.     Shrink your drink.  For example, have a bottle of beer instead of a pint. Use a smaller glass for wine. Choose drinks with lower alcohol content (ABV%). Or use less liquor and more mixer in cocktails.     Slow down.  It's easy to drink quickly and without thinking about it. Pay attention, and make each drink last longer.     Do the math.  Total up how much you spend on alcohol each month. How much is that a year? If you cut back, what could you do with the money you save?     Take a break.  Choose a day or two each week when you won't drink at all. Notice how you feel on those days, physically and emotionally. How did you sleep? Do you feel better? Over time, add more break days.     Count calories.  Would you like to lose some weight? For some people that's a good motivator for cutting back. Figure out how many calories are in each drink. How many does that add up to in a day? In a week? In a month?     Practice saying no.  Be ready when someone offers you a drink. Try: \"Thanks, I've had enough.\" Or \"Thanks, but I'm cutting back.\" Or \"No, thanks. I feel better when I drink less.\"   Current as of: November 15,

## 2024-10-21 NOTE — PROGRESS NOTES
[facility]  [address]  [address]  [place] [address]  Dept: [phone]  Dept Fax: [phone]  Loc: [phone]    Gavino Frye is a 69 y.o. male who presents today for his medical conditions/complaints as noted below.  Gavino Frye is c/o of Follow-up (Pt in office for follow up - stated that he has been going to therapy for his throat)        HPI:     HPI   Chief Complaint   Patient presents with    Follow-up     Pt in office for follow up - stated that he has been going to therapy for his throat     Patient presents today for medicare AWV and for follow-up hypertension, hyperlipidemia, CAD. Blood pressure is well-controlled.     He has been going to speech therapy. He reports this seems to be helping.     He would like flu vaccine today. He is due for fasting labs.     Past Medical History:   Diagnosis Date    Arthritis     BiPAP (biphasic positive airway pressure) dependence     8cm to 20cm    Chronic back pain     Emphysema/COPD (HCC)     GERD (gastroesophageal reflux disease)     History of anemia     History of blood transfusion     Hyperlipidemia     Hypertension     Impotence 3/22/2021    Neuropathy     Obstructive sleep apnea     AHI:  95.8 per PSG, 3/2017; repeat PSG, 2017 revealed an AHI of 65.5    Peripheral vascular disease (HCC)     S/P angioplasty     Type II or unspecified type diabetes mellitus without mention of complication, not stated as uncontrolled       Past Surgical History:   Procedure Laterality Date    CHOLECYSTECTOMY      LARYNGOSCOPY N/A 11/10/2017    Direct laryngoscopy with assessment of hypopharynx, larynx, and post-cricoid areas performed by Yogesh Finch MD at [facility] OR    WI LARYNGOSCOPY W/WO TRACHEOSCOPY DX EXCEPT  N/A 2018    MICRO DIRECT LARYNGOSCOPY WITH BIOPSY performed by Yogesh Finch MD at [facility] OR    SPINE SURGERY      x 2 in past, around 1989           10/21/2024    10:49 AM

## 2024-10-23 ENCOUNTER — HOSPITAL ENCOUNTER (OUTPATIENT)
Dept: SPEECH THERAPY | Age: 69
Setting detail: THERAPIES SERIES
End: 2024-10-23
Payer: MEDICARE

## 2024-10-25 ENCOUNTER — HOSPITAL ENCOUNTER (OUTPATIENT)
Dept: SPEECH THERAPY | Age: 69
Setting detail: THERAPIES SERIES
End: 2024-10-25
Payer: MEDICARE

## 2024-10-28 NOTE — PROGRESS NOTES
[facility] - PODIATRY    Today's Date: 10/30/24    Patient Name: Edy Cosby  MRN: [id number]  CSN: [id number]  PCP: Ana Vincent APRN  Referring Provider: No ref. provider found    SUBJECTIVE     Chief Complaint   Patient presents with    Follow-up     Ana Vincent 01/13/2024 Return in about 3 months-pt states he is here today for diabetic nail/foot care/has pain on the bottom of feet on occasion-pt denies pain today    Diabetes     HPI: Edy Cosby, a 69 y.o.male, comes to clinic as a(n) established patient presenting for diabetic foot exam and complaining of thickened toenails and callus to left plantar great toe . Patient has h/o AFib, back pain, DM2, Emphysema lung, GERD, HTN, OA . Patient is IDDM and unsure of last BG level.  Notes neuropathy in feet with numbness and tingling.  Continues use of AFO for foot drop of left foot.  Reports callus to left plantar hallux has returned.  Denies any redness, drainage, or other issues from the area. Patient states that toenails are long, thick, and irregular and that he is unable to care for them himself.  Denies open wound or sores.  Patient reports he has occasional pain to the bottom of the left foot.  Denies pain today. Relates previous treatment(s) including foot care by podiatrist . Denies any constitutional symptoms. No other pedal complaints at this time.    Past Medical History:   Diagnosis Date    A-fib     Atherosclerosis     Chronic back pain     Constipation     Diabetes mellitus     TYPE II    Dropfoot     wears brace    Dysphagia     Emphysema of lung     Gastroparesis     GERD (gastroesophageal reflux disease)     Hyperlipidemia     Hypertension     ESSENTIAL    Leg pain     Muscle spasm     Nerve pain     Osteoarthritis     Sleep apnea     no cpap/bipap (non-compliance)    Sleeping difficulty     Ulcer of toe due to diabetes mellitus      Past Surgical History:   Procedure Laterality Date    ANGIOPLASTY  ILIAC ARTERY Left 1/29/2019    Procedure: ANGIOPLASTY ILIAC ARTERY, STENT PLACEMENT;  Surgeon: Duncan Lucio MD;  Location:  [facility]BRID OR 12;  Service: Vascular    AORTAGRAM Left 12/18/2018    Procedure: AORTOGRAM, LEFT LEG ANGIOGRAM, MYNX CLOSURE;  Surgeon: Duncan Lucio MD;  Location:  [facility] 12;  Service: Vascular    AORTAGRAM Right 12/16/2021    Procedure: AORTAGRAM, RIGHT LOWER EXTREMITY ANGIOGRAM, POSSIBLE INTERVENTION;  Surgeon: Duncan Lucio MD;  Location: [facility] HYBRID OR 12;  Service: Vascular;  Laterality: Right;    BACK SURGERY      CHOLECYSTECTOMY      COLONOSCOPY  09/01/2011    TICS RECALL 5YR    COLONOSCOPY  09/30/2008    ENTER RESULTS: STOOL THROUGHTOUT THE COLON POOR PREP REC 3 YEAR RECALL    COLONOSCOPY N/A 11/9/2016    Procedure: COLONOSCOPY;  Surgeon: León Zepeda MD;  Location: [facility] ENDOSCOPY;  Service:     COLONOSCOPY N/A 4/19/2018    Procedure: COLONOSCOPY WITH ANESTHESIA;  Surgeon: León Zepeda MD;  Location: [facility] ENDOSCOPY;  Service: Gastroenterology    ENDOSCOPY  06/19/2012    HH    ENDOSCOPY  08/25/2009    HIATAL HERNIA, DILATED WITH 50 FR MASCORRO    ENDOSCOPY  06/19/2007    WITHIN NORMAL LIMITS UREA NEG    FEMORAL ENDARTERECTOMY Left 1/29/2019    Procedure: LEFT FEMORAL ENDARTERECTOMY;  Surgeon: Duncan Lucio MD;  Location: [facility] HYBRID OR 12;  Service: Vascular    FEMORAL ENDARTERECTOMY Right 2/3/2022    Procedure: RIGHT FEMORAL ENDARTERECTOMY, RIGHT LOWER EXTREMITY ANGIOGRAM, BALLOON ANGIOPLASTY;  Surgeon: Duncan Lucio MD;  Location: [facility] HYBRID OR 12;  Service: Vascular;  Laterality: Right;    LUMBAR LAMINECTOMY WITH FUSION N/A 1/23/2017    Procedure: REMOVAL OF INSTRUMENTATION, EXPLORATION OF FUSION L4-S1, REVISION LEFT L5-S1 HEMILAMINECTOMY, FACETECTOMY DECOMPRESSION, REVISION UNINSTRUMENTED POSTERIOR SPINAL FUSION L5-S1;  Surgeon: RHONDA Lopes MD;  Location: [facility] OR;  Service:     OTHER SURGICAL HISTORY       LUMBAR SACRAL SURGERY WITH FUSION X2    PILONIDAL CYST / SINUS EXCISION      PILONIDAL CYST REMOVAL     Family History   Problem Relation Age of Onset    Heart attack Father     Diabetes Brother     Colon polyps Sister     Stomach cancer Neg Hx         GI CNACERS OR DISEASE    Colon cancer Neg Hx      Social History     Socioeconomic History    Marital status: Single   Tobacco Use    Smoking status: Former     Current packs/day: 0.00     Types: Cigarettes     Start date:      Quit date: 2019     Years since quittin.8     Passive exposure: Past    Smokeless tobacco: Never   Vaping Use    Vaping status: Never Used   Substance and Sexual Activity    Alcohol use: No    Drug use: Yes     Frequency: 21.0 times per week     Types: Marijuana    Sexual activity: Defer     No Known Allergies  Current Outpatient Medications   Medication Sig Dispense Refill    albuterol sulfate  (90 Base) MCG/ACT inhaler Inhale 2 puffs 4 (Four) Times a Day As Needed.      amLODIPine (NORVASC) 10 MG tablet Take 1 tablet by mouth Daily.      ascorbic acid (VITAMIN C) 500 MG tablet 1 tablet Daily.      aspirin 81 MG EC tablet Daily.      atorvastatin (LIPITOR) 40 MG tablet 1 tablet Every Night.      clopidogrel (PLAVIX) 75 MG tablet Take 1 tablet by mouth once daily 90 tablet 0    Dulaglutide (Trulicity) 1.5 MG/0.5ML solution pen-injector Inject 1.5 mg under the skin into the appropriate area as directed 1 (One) Time Per Week.      folic acid (FOLVITE) 1 MG tablet 1 tablet.      labetalol (NORMODYNE) 100 MG tablet Take 1 tablet by mouth 2 (Two) Times a Day.      lisinopril (PRINIVIL,ZESTRIL) 40 MG tablet Take 1 tablet by mouth Daily.      pantoprazole (PROTONIX) 40 MG EC tablet Take 1 tablet by mouth Daily.      sildenafil (VIAGRA) 100 MG tablet Take 0.5 tablets by mouth As Needed for Erectile Dysfunction.      tamsulosin (FLOMAX) 0.4 MG capsule 24 hr capsule Take 1 capsule by mouth Daily.      vitamin D (ERGOCALCIFEROL) 1.25 MG  (27832 UT) capsule capsule Take 1 capsule by mouth 1 (One) Time Per Week.      cyclobenzaprine (FLEXERIL) 5 MG tablet Take 1 tablet by mouth 3 (Three) Times a Day As Needed for Muscle Spasms. (Patient not taking: Reported on 10/30/2024)      hydroCHLOROthiazide (MICROZIDE) 12.5 MG capsule Take 1 capsule by mouth Daily. (Patient not taking: Reported on 10/30/2024)      mupirocin (BACTROBAN) 2 % ointment Apply 1 Application topically to the appropriate area as directed 2 (Two) Times a Day. 1 g 3     No current facility-administered medications for this visit.     Review of Systems   Constitutional:  Negative for chills and fever.   HENT:  Negative for congestion.    Respiratory:  Negative for shortness of breath.    Cardiovascular:  Negative for chest pain and leg swelling.   Gastrointestinal:  Negative for constipation, diarrhea, nausea and vomiting.   Musculoskeletal:  Positive for arthralgias and gait problem (foot drop).   Skin:  Positive for wound.        Left hallux plantar callus   Neurological:  Positive for numbness.   Hematological:  Bruises/bleeds easily.       OBJECTIVE     Vitals:    10/30/24 1043   BP: 138/68   Pulse: 78   SpO2: 95%       PHYSICAL EXAM  GEN:   Accompanied by none.     Foot/Ankle Exam    GENERAL  Appearance:  obese  Orientation:  AAOx3  Affect:  appropriate  Gait:  steppage (Drop foot left)  Assistance:  independent  Right shoe gear: casual shoe  Left shoe gear: casual shoe (With AFO)    VASCULAR     Right Foot Vascularity   Dorsalis pedis:  2+  Posterior tibial:  2+  Skin temperature:  warm  Edema grading:  Trace and non-pitting  CFT:  3  Pedal hair growth:  Present  Varicosities:  none     Left Foot Vascularity   Dorsalis pedis:  2+  Posterior tibial:  2+  Skin temperature:  warm  Edema grading:  Trace and non-pitting  CFT:  3  Pedal hair growth:  Present  Varicosities:  none     NEUROLOGIC     Right Foot Neurologic   Light touch sensation: diminished  Vibratory sensation:  diminished  Hot/Cold sensation: diminished  Protective Sensation using Sterling-Mirtha Monofilament:   Right Foot Sites Intact: 0.  Sites tested: 10     Left Foot Neurologic   Light touch sensation: diminished  Vibratory sensation: diminished  Hot/Cold sensation:  diminished  Protective Sensation using Sterling-Mirtha Monofilament:   Left Foot Sites Intact: 0.  Sites tested: 10    MUSCULOSKELETAL     Right Foot Musculoskeletal   Tenderness:  none    Arch:  Pes planus  Hallux valgus: No    Hallux limitus: No       Left Foot Musculoskeletal   Tenderness:  none  Arch:  Pes planus  Hallux valgus: No    Hallux limitus: No      MUSCLE STRENGTH     Right Foot Muscle Strength   Foot dorsiflexion:  5  Foot plantar flexion:  5  Foot inversion:  5  Foot eversion:  5     Left Foot Muscle Strength   Foot dorsiflexion:  3  Foot plantar flexion:  3  Foot inversion:  3  Foot eversion:  3    RANGE OF MOTION     Right Foot Range of Motion   Foot and ankle ROM within normal limits       Left Foot Range of Motion   Foot and ankle ROM within normal limits      DERMATOLOGIC      Right Foot Dermatologic   Skin  Right foot skin is intact.   Nails  1.  Positive for elongated, onychomycosis, abnormal thickness, subungual debris and dystrophic nail.  2.  Positive for elongated, onychomycosis, abnormal thickness, subungual debris and dystrophic nail.  3.  Positive for elongated, onychomycosis, abnormal thickness, subungual debris and dystrophic nail.  4.  Positive for elongated, onychomycosis, abnormal thickness, subungual debris and dystrophic nail.  5.  Positive for elongated, onychomycosis, abnormal thickness, subungual debris and dystrophic nail.     Left Foot Dermatologic   Skin  Positive for corn.   Nails  1.  Positive for elongated, onychomycosis, abnormal thickness, subungual debris and dystrophic nail.  2.  Positive for elongated, onychomycosis, abnormal thickness, subungual debris and dystrophic nail.  3.  Positive for elongated,  onychomycosis, abnormal thickness, subungual debris and dystrophic nail.  4.  Positive for elongated, onychomycosis, abnormally thick, subungual debris and dystrophic nail.  5.  Positive for elongated, onychomycosis, abnormally thick, subungual debris and dystrophic nail.     Left foot additional comments: Wound to plantar left hallux.  Area is approximately 0.5 cm x 0.5 cm  Scant amount of serosanguinous drainage.  Granulation tissue to wound base.  Periwound callused.      Image:       RADIOLOGY/NUCLEAR:  No results found.    LABORATORY/CULTURE RESULTS:      PATHOLOGY RESULTS:       ASSESSMENT/PLAN     Diagnoses and all orders for this visit:    1. Onychomycosis (Primary)    2. Type 2 diabetes mellitus with diabetic polyneuropathy, with long-term current use of insulin    3. Antiplatelet or antithrombotic long-term use    4. Foot drop, left    5. Callus of foot    6. Open wound of left great toe, initial encounter  -     Ambulatory Referral to Wound Clinic  -     mupirocin (BACTROBAN) 2 % ointment; Apply 1 Application topically to the appropriate area as directed 2 (Two) Times a Day.  Dispense: 1 g; Refill: 3      Comprehensive lower extremity examination and evaluation was performed.  Discussed findings and treatment plan including risks, benefits, and treatment options with patient in detail. Patient agreed with treatment plan.  After verbal consent obtained, nail(s) x10 debrided of length and thickness with nail nipper without incidence  Patient may maintain nails and calluses at home utilizing emery board or pumice stone between visits as needed  Reviewed at home diabetic foot care including daily foot checks  After verbal consent obtained, selective debridement performed to remove skin, slough and non-viable tissue utilizing dermal curette and/or scapel. Hemostasis achieved with pressure. Wound area debrided was entire measurement documented.    Continue AFO for foot drop.   Continue diabetic monitoring and  control under direction of PCP.   Patient will begin using mupirocin ointment and a bandage to cover open wounds until he is seen in wound care next week.  Referral to wound care center for left hallux wound.  An After Visit Summary was printed and given to the patient at discharge, including (if requested) any available informative/educational handouts regarding diagnosis, treatment, or medications. All questions were answered to patient/family satisfaction. Should symptoms fail to improve or worsen they agree to call or return to clinic or to go to the Emergency Department. Discussed the importance of following up with any needed screening tests/labs/specialist appointments and any requested follow-up recommended by me today. Importance of maintaining follow-up discussed and patient accepts that missed appointments can delay diagnosis and potentially lead to worsening of conditions.  Return in about 3 months (around 1/30/2025) for With Kylah ANDUJAR, Schedule Foot Care Clinic., or sooner if acute issues arise.    Lab Frequency Next Occurrence   Diet: Once 04/19/2018   Advance Diet as Tolerated Once 04/19/2018       This document has been electronically signed by PRATIMA Wilkins on October 30, 2024 12:07 CDT

## 2024-10-30 ENCOUNTER — HOSPITAL ENCOUNTER (OUTPATIENT)
Dept: SPEECH THERAPY | Age: 69
Setting detail: THERAPIES SERIES
End: 2024-10-30
Payer: MEDICARE

## 2024-10-30 ENCOUNTER — OFFICE VISIT (OUTPATIENT)
Age: 69
End: 2024-10-30
Payer: COMMERCIAL

## 2024-10-30 VITALS
WEIGHT: 190 LBS | DIASTOLIC BLOOD PRESSURE: 68 MMHG | SYSTOLIC BLOOD PRESSURE: 138 MMHG | HEART RATE: 78 BPM | HEIGHT: 68 IN | BODY MASS INDEX: 28.79 KG/M2 | OXYGEN SATURATION: 95 %

## 2024-10-30 DIAGNOSIS — Z79.4 TYPE 2 DIABETES MELLITUS WITH DIABETIC POLYNEUROPATHY, WITH LONG-TERM CURRENT USE OF INSULIN: ICD-10-CM

## 2024-10-30 DIAGNOSIS — Z79.02 ANTIPLATELET OR ANTITHROMBOTIC LONG-TERM USE: ICD-10-CM

## 2024-10-30 DIAGNOSIS — E11.42 TYPE 2 DIABETES MELLITUS WITH DIABETIC POLYNEUROPATHY, WITH LONG-TERM CURRENT USE OF INSULIN: ICD-10-CM

## 2024-10-30 DIAGNOSIS — S91.102A OPEN WOUND OF LEFT GREAT TOE, INITIAL ENCOUNTER: ICD-10-CM

## 2024-10-30 DIAGNOSIS — B35.1 ONYCHOMYCOSIS: Primary | ICD-10-CM

## 2024-10-30 DIAGNOSIS — M21.372 FOOT DROP, LEFT: ICD-10-CM

## 2024-10-30 DIAGNOSIS — L84 CALLUS OF FOOT: ICD-10-CM

## 2024-10-30 RX ORDER — MUPIROCIN 20 MG/G
1 OINTMENT TOPICAL 2 TIMES DAILY
Qty: 1 G | Refills: 3 | Status: SHIPPED | OUTPATIENT
Start: 2024-10-30

## 2024-11-07 ENCOUNTER — OFFICE VISIT (OUTPATIENT)
Dept: WOUND CARE | Facility: HOSPITAL | Age: 69
End: 2024-11-07
Payer: MEDICARE

## 2024-11-07 PROCEDURE — G0463 HOSPITAL OUTPT CLINIC VISIT: HCPCS

## 2024-11-11 ENCOUNTER — OFFICE VISIT (OUTPATIENT)
Dept: ENT CLINIC | Age: 69
End: 2024-11-11

## 2024-11-11 VITALS
DIASTOLIC BLOOD PRESSURE: 78 MMHG | BODY MASS INDEX: 29.82 KG/M2 | SYSTOLIC BLOOD PRESSURE: 140 MMHG | HEIGHT: 67 IN | WEIGHT: 190 LBS

## 2024-11-11 DIAGNOSIS — Q31.8 VOCAL CORD ANOMALY: Primary | ICD-10-CM

## 2024-11-11 DIAGNOSIS — R49.0 HOARSENESS OF VOICE: ICD-10-CM

## 2024-11-11 DIAGNOSIS — J38.00 COMPLETE PARALYSIS OF VOCAL CORD: ICD-10-CM

## 2024-11-11 RX ORDER — FAMOTIDINE 40 MG/1
40 TABLET, FILM COATED ORAL EVERY EVENING
Qty: 30 TABLET | Refills: 1 | Status: SHIPPED | OUTPATIENT
Start: 2024-11-11

## 2024-11-11 ASSESSMENT — ENCOUNTER SYMPTOMS
SORE THROAT: 0
EYE PAIN: 0
PHOTOPHOBIA: 0
SINUS PAIN: 0
RHINORRHEA: 0
SINUS PRESSURE: 0
FACIAL SWELLING: 0
VOICE CHANGE: 1
TROUBLE SWALLOWING: 1

## 2024-11-11 NOTE — PROGRESS NOTES
pain.           Comments:     PHYSICAL EXAM:    BP (!) 140/78   Ht 1.702 m (5' 7.01\")   Wt 86.2 kg (190 lb)   BMI 29.75 kg/m²   Body mass index is 29.75 kg/m².    General Appearance: well developed  and well nourished  Head/ Face: normocephalic and atraumatic  Vocal Quality: coarse and vocals are improved compared to last visit  Ears: Right Ear: External: external ears normal Otoscopy Ear Canal: canal clear Otoscopy TM: TM's normal and TM's mobile Left Ear: External: external ears normal Otoscopy Ear Canal: canal clear Otoscopy TM: TM's normal and TM's mobile  Hearing: grossly intact  Nose: see endoscopy report  Neck: supple and adenopathy none palpable  Thyroid: normal  Oral exam demonstrated no masses to the posterior pharynx with the tongue surface noted to be normal.  Did not appreciate any buccal mucosal lesions.    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 Blankenship.  He recommended close observation of the right vocal cord with repeat laryngoscopy in 1 month.  Patient was reminded to call if he has worsening symptoms or has questions.  He is to continue his Protonix 40 mg once a day and will add 40 mg of Pepcid nightly due to his history of LPR.  I also recommended a CT of the soft tissue neck due to the patient not having 1 for this year.  I will call him the results with further recommendations to follow.            Orders Placed This Encounter

## 2024-11-11 NOTE — ASSESSMENT & PLAN NOTE
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 Blankenship.  He recommended close observation of the right vocal cord with repeat laryngoscopy in 1 month.  Patient was reminded to call if he has worsening symptoms or has questions.  He is to continue his Protonix 40 mg once a day and will add 40 mg of Pepcid nightly due to his history of LPR.  I also recommended a CT of the soft tissue neck due to the patient not having 1 for this year.  I will call him the results with further recommendations to follow.

## 2024-11-14 ENCOUNTER — HOSPITAL ENCOUNTER (OUTPATIENT)
Dept: CT IMAGING | Age: 69
Discharge: HOME | End: 2024-11-14
Payer: MEDICARE

## 2024-11-14 DIAGNOSIS — J38.00 COMPLETE PARALYSIS OF VOCAL CORD: ICD-10-CM

## 2024-11-14 DIAGNOSIS — R49.0 HOARSENESS OF VOICE: ICD-10-CM

## 2024-11-14 PROCEDURE — 70490 CT SOFT TISSUE NECK W/O DYE: CPT

## 2024-11-15 ENCOUNTER — TRANSCRIBE ORDERS (OUTPATIENT)
Dept: ADMINISTRATIVE | Facility: HOSPITAL | Age: 69
End: 2024-11-15
Payer: COMMERCIAL

## 2024-11-15 DIAGNOSIS — I73.9 PERIPHERAL VASCULAR DISEASE, UNSPECIFIED: Primary | ICD-10-CM

## 2024-11-18 ENCOUNTER — OFFICE VISIT (OUTPATIENT)
Dept: WOUND CARE | Facility: HOSPITAL | Age: 69
End: 2024-11-18
Payer: MEDICARE

## 2024-11-18 PROCEDURE — G0463 HOSPITAL OUTPT CLINIC VISIT: HCPCS

## 2024-11-20 ENCOUNTER — HOSPITAL ENCOUNTER (OUTPATIENT)
Dept: ULTRASOUND IMAGING | Facility: HOSPITAL | Age: 69
Discharge: HOME | End: 2024-11-20
Admitting: PODIATRIST
Payer: MEDICARE

## 2024-11-20 DIAGNOSIS — I73.9 PERIPHERAL VASCULAR DISEASE, UNSPECIFIED: ICD-10-CM

## 2024-11-20 PROCEDURE — 93923 UPR/LXTR ART STDY 3+ LVLS: CPT

## 2024-12-13 ENCOUNTER — TELEPHONE (OUTPATIENT)
Dept: ENT CLINIC | Age: 69
End: 2024-12-13

## 2024-12-13 NOTE — TELEPHONE ENCOUNTER
I called and left a message on the patient's cell phone voicemail that the CT of the neck demonstrated no masses or any suspicious findings.  He has an appointment to see me next week for repeat flex laryngoscopy.  Will reevaluate the vocal cord at that time.  He was reminded to call if he has any questions or concerns.      Electronically signed by MARK WASSERMAN PA-C on 12/13/24 at 1:10 PM CST

## 2024-12-16 ENCOUNTER — OFFICE VISIT (OUTPATIENT)
Dept: ENT CLINIC | Age: 69
End: 2024-12-16
Payer: COMMERCIAL

## 2024-12-16 VITALS
BODY MASS INDEX: 30.29 KG/M2 | SYSTOLIC BLOOD PRESSURE: 120 MMHG | WEIGHT: 193 LBS | DIASTOLIC BLOOD PRESSURE: 70 MMHG | HEIGHT: 67 IN

## 2024-12-16 DIAGNOSIS — R49.0 HOARSENESS OF VOICE: ICD-10-CM

## 2024-12-16 DIAGNOSIS — J38.00 COMPLETE PARALYSIS OF VOCAL CORD: ICD-10-CM

## 2024-12-16 DIAGNOSIS — Q31.8 VOCAL CORD ANOMALY: Primary | ICD-10-CM

## 2024-12-16 PROCEDURE — 99213 OFFICE O/P EST LOW 20 MIN: CPT | Performed by: PHYSICIAN ASSISTANT

## 2024-12-16 PROCEDURE — 3078F DIAST BP <80 MM HG: CPT | Performed by: PHYSICIAN ASSISTANT

## 2024-12-16 PROCEDURE — 1123F ACP DISCUSS/DSCN MKR DOCD: CPT | Performed by: PHYSICIAN ASSISTANT

## 2024-12-16 PROCEDURE — 3074F SYST BP LT 130 MM HG: CPT | Performed by: PHYSICIAN ASSISTANT

## 2024-12-16 PROCEDURE — 31575 DIAGNOSTIC LARYNGOSCOPY: CPT | Performed by: PHYSICIAN ASSISTANT

## 2024-12-16 RX ORDER — FAMOTIDINE 40 MG/1
40 TABLET, FILM COATED ORAL EVERY EVENING
Qty: 30 TABLET | Refills: 2 | Status: SHIPPED | OUTPATIENT
Start: 2024-12-16

## 2024-12-16 ASSESSMENT — ENCOUNTER SYMPTOMS
SINUS PAIN: 0
SINUS PRESSURE: 0
EYE PAIN: 0
RHINORRHEA: 0
PHOTOPHOBIA: 0
TROUBLE SWALLOWING: 0
SORE THROAT: 0
VOICE CHANGE: 0
FACIAL SWELLING: 0

## 2024-12-16 NOTE — PROGRESS NOTES
not taking: Reported on 7/30/2024) 33.3 mL 2    insulin glargine (LANTUS SOLOSTAR) 100 UNIT/ML injection pen Inject 20 Units into the skin nightly (Patient not taking: Reported on 7/30/2024) 6 mL 2    hydroCHLOROthiazide (HYDRODIURIL) 25 MG tablet Take 1 tablet by mouth daily (Patient not taking: Reported on 7/30/2024) 90 tablet 2    tamsulosin (FLOMAX) 0.4 MG capsule TAKE 1 CAPSULE BY MOUTH ONCE DAILY      folic acid (FOLVITE) 1 MG tablet 1 tablet      ferrous sulfate (IRON 325) 325 (65 Fe) MG tablet 1 tablet (Patient not taking: Reported on 7/30/2024)      ergocalciferol (ERGOCALCIFEROL) 1.25 MG (14491 UT) capsule Take 1 capsule by mouth once a week      clopidogrel (PLAVIX) 75 MG tablet TAKE 1 TABLET BY MOUTH ONCE DAILY      Cholecalciferol 50 MCG (2000 UT) TABS 50 mcg      ascorbic acid (VITAMIN C) 500 MG tablet 1 tablet      allopurinol (ZYLOPRIM) 100 MG tablet 0.5 tablets      albuterol sulfate HFA (VENTOLIN HFA) 108 (90 Base) MCG/ACT inhaler Inhale 2 puffs into the lungs 4 times daily as needed for Wheezing 1 each 5    blood glucose test strips (GNP EASY TOUCH GLUCOSE TEST) strip TEST 3 TIMES DAILY Dx E11.49 300 strip 0    Insulin Pen Needle 31G X 6 MM MISC 1 each by Does not apply route 4 times daily (before meals and nightly) May substitute to generic for insurance Dx E11.49 120 each 3    Insulin Syringe-Needle U-100 30G X 1/2\" 0.5 ML MISC 1 each by Does not apply route 3 times daily 100 each 3    Diabetic Shoe MISC by Does not apply route 1 each 0    Diabetic Shoe MISC by Does not apply route DISPENSE ONE PAIR DIABETIC SHOES AND THREE PAIRS OF HEAT MOLDED INSERTS 1 each 0    aspirin 325 MG tablet Take 0.5 tablets by mouth daily 30 tablet 2    blood glucose monitor kit and supplies Test 4 times a day & as needed for symptoms of irregular blood glucose. 1 kit 0    Lancets MISC Daily Accu-Chek 100 each 3     No current facility-administered medications for this visit.       Past Surgical History:   Procedure

## 2024-12-19 ENCOUNTER — TELEPHONE (OUTPATIENT)
Dept: VASCULAR SURGERY | Facility: CLINIC | Age: 69
End: 2024-12-19
Payer: COMMERCIAL

## 2024-12-20 ENCOUNTER — APPOINTMENT (OUTPATIENT)
Dept: ULTRASOUND IMAGING | Facility: HOSPITAL | Age: 69
End: 2024-12-20
Payer: MEDICARE

## 2024-12-20 ENCOUNTER — HOSPITAL ENCOUNTER (OUTPATIENT)
Dept: ULTRASOUND IMAGING | Facility: HOSPITAL | Age: 69
Discharge: HOME | End: 2024-12-20
Payer: MEDICARE

## 2024-12-20 ENCOUNTER — OFFICE VISIT (OUTPATIENT)
Dept: VASCULAR SURGERY | Facility: CLINIC | Age: 69
End: 2024-12-20
Payer: COMMERCIAL

## 2024-12-20 VITALS
DIASTOLIC BLOOD PRESSURE: 68 MMHG | OXYGEN SATURATION: 96 % | SYSTOLIC BLOOD PRESSURE: 132 MMHG | BODY MASS INDEX: 29.01 KG/M2 | HEIGHT: 68 IN | WEIGHT: 191.4 LBS | HEART RATE: 95 BPM

## 2024-12-20 DIAGNOSIS — E11.22 TYPE 2 DIABETES MELLITUS WITH STAGE 3A CHRONIC KIDNEY DISEASE, WITH LONG-TERM CURRENT USE OF INSULIN: ICD-10-CM

## 2024-12-20 DIAGNOSIS — Z79.4 TYPE 2 DIABETES MELLITUS WITH STAGE 3A CHRONIC KIDNEY DISEASE, WITH LONG-TERM CURRENT USE OF INSULIN: ICD-10-CM

## 2024-12-20 DIAGNOSIS — I65.23 BILATERAL CAROTID ARTERY STENOSIS: ICD-10-CM

## 2024-12-20 DIAGNOSIS — E78.2 MIXED HYPERLIPIDEMIA: ICD-10-CM

## 2024-12-20 DIAGNOSIS — E66.3 OVERWEIGHT (BMI 25.0-29.9): ICD-10-CM

## 2024-12-20 DIAGNOSIS — Z98.890 PERIPHERAL ARTERIAL DISEASE WITH HISTORY OF REVASCULARIZATION: Primary | ICD-10-CM

## 2024-12-20 DIAGNOSIS — I73.9 PERIPHERAL ARTERIAL DISEASE WITH HISTORY OF REVASCULARIZATION: Primary | ICD-10-CM

## 2024-12-20 DIAGNOSIS — I10 ESSENTIAL HYPERTENSION: ICD-10-CM

## 2024-12-20 DIAGNOSIS — N18.31 TYPE 2 DIABETES MELLITUS WITH STAGE 3A CHRONIC KIDNEY DISEASE, WITH LONG-TERM CURRENT USE OF INSULIN: ICD-10-CM

## 2024-12-20 PROCEDURE — 93880 EXTRACRANIAL BILAT STUDY: CPT

## 2024-12-20 RX ORDER — FAMOTIDINE 40 MG/1
1 TABLET, FILM COATED ORAL EVERY EVENING
COMMUNITY
Start: 2024-12-16

## 2024-12-20 NOTE — PROGRESS NOTES
12/21/2024        Ana Vincent APRN  [address] [place] [address] [facility] [place] [address]        Edy Cosby  1955    Chief Complaint   Patient presents with    Peripheral arterial disease with history of revascularizati     Having trouble with vocal cord on right side Testing this am,no stroke symptoms       Dear Ana Vincent APRN:    HPI    I had the pleasure of seeing your patient in the office today for follow up.  As you recall, the patient is a 69 y.o. male who we are currently following for peripheral arterial disease.  He returns today for a 1 year follow-up with testing.  He previously underwent right femoral endarterectomy with angiogram, on 2/3/2022 by Dr. Lucio and and right lower extremity angiogram on 12/16/2021.  He did have left femoral endarterectomy with left common iliac artery stenting and angiogram on 1/29/2019, left lower extremity angiogram on 12/18/2018 all performed by Dr. Lucio.  He does complain of numbness and tingling to his left lower extremity, and intermittent claudication to the right lower extremity but reports that this does not inhibit his daily activities.  He does have left foot drop, with AFO.  He was recently seen and treated for a diabetic wound/ulcer of the lower extremity on his left plantar great toe, by wound care this is a reoccurring wound.  He denies any strokelike symptoms.  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  He is unsure of when this happened, or how.  He does report speech therapy has helped.  He did have a laryngoscopy performed on 12/16/2024 at [facility], which showed his right vocal cord appeared normal, and edema had decreased he is maintained on aspirin, Lipitor, and Plavix.  He did have noninvasive testing performed, which I did review in office.      Review of Systems   Constitutional: Negative.  Negative for diaphoresis and fever.  "  HENT: Negative.          Left vocal cord paralysis, right vocal cord anomaly   Eyes: Negative.    Respiratory: Negative.  Negative for shortness of breath and wheezing.    Cardiovascular: Negative.  Negative for chest pain and leg swelling.   Gastrointestinal: Negative.  Negative for abdominal pain.   Endocrine: Negative.    Genitourinary: Negative.    Musculoskeletal: Negative.    Skin: Negative.    Allergic/Immunologic: Negative.    Neurological: Negative.  Negative for dizziness and weakness.   Hematological: Negative.    Psychiatric/Behavioral: Negative.         /68   Pulse 95   Ht 172.7 cm (68\")   Wt 86.8 kg (191 lb 6.4 oz)   SpO2 96%   BMI 29.10 kg/m²       Physical Exam  Vitals and nursing note reviewed.   Constitutional:       General: He is not in acute distress.     Appearance: Normal appearance. He is overweight. He is not diaphoretic.   HENT:      Head: Normocephalic. No right periorbital erythema or left periorbital erythema.      Nose: Nose normal.   Eyes:      General: No scleral icterus.     Pupils: Pupils are equal.   Cardiovascular:      Rate and Rhythm: Normal rate and regular rhythm.      Pulses: Normal pulses.           Dorsalis pedis pulses are detected w/ Doppler on the right side and detected w/ Doppler on the left side.        Posterior tibial pulses are detected w/ Doppler on the right side and detected w/ Doppler on the left side.      Heart sounds: Normal heart sounds. No murmur heard.     Comments: Bilateral peroneal signals dopplered  Pulmonary:      Effort: Pulmonary effort is normal. No respiratory distress.      Breath sounds: Normal breath sounds.   Abdominal:      General: Bowel sounds are normal. There is no distension.      Palpations: Abdomen is soft.      Tenderness: There is no abdominal tenderness. There is no guarding.   Musculoskeletal:         General: No swelling or tenderness. Normal range of motion.      Cervical back: Normal range of motion and neck " supple.      Right lower leg: No edema.      Left lower leg: No edema.      Left foot: Foot drop present.   Feet:      Right foot:      Skin integrity: Skin integrity normal.      Left foot:      Skin integrity: Skin integrity normal. Ulcer present.   Skin:     General: Skin is warm and dry.      Findings: No erythema or rash.   Neurological:      General: No focal deficit present.      Mental Status: He is alert and oriented to person, place, and time. Mental status is at baseline.      Cranial Nerves: No cranial nerve deficit.      Gait: Gait normal.   Psychiatric:         Attention and Perception: Attention normal.         Mood and Affect: Mood normal.         Behavior: Behavior normal.         Thought Content: Thought content normal.         Judgment: Judgment normal.         DIAGNOSTIC DATA:  Narrative & Impression   US ANKLE / BRACHIAL INDICES EXTREMITY COMPLETE- 11/20/2024 11:03 AM     HISTORY: i73.9; I73.9-Peripheral vascular disease, unspecified     COMPARISON: 12/18/2023  Images and report are stored in PACS per institutional and state  regulations.  FINDINGS:  Systolic pressures were obtained from bilateral brachial, posterior  tibial and dorsalis pedis arteries. Systolic pressure within the right  brachial artery was measured at 152 mmHg, while pressure within the left  brachial artery was measured at 156 mmHg.     Systolic pressures within bilateral posterior tibial and dorsalis pedis  arteries are less than the corresponding brachial pressures.     Calculated ankle/brachial index is 0.72 on the right and 0.64 on the  left.     There is dampening of the waveforms within the distal runoff vessels  bilaterally with monophasic waveforms present.     IMPRESSION:  1. Abnormal NIC study with a NIC on the right calculated 0.72 and on the  left 0.64. This represents progression of disease when compared to the  previous NIC study of 12/18/2023 at which time the NIC on the right was  1.01 and on the left 0.79.  There is dampening of the waveforms distally  bilaterally with monophasic waveforms present..     High thigh systolic pressure should be more than 30 mmHg above the  brachial pressure.     A 20 mmHg or greater reduction in pressure is indicative of a  flow-limiting lesion if the pressure difference is present either  between segments along the same leg or when compared with the same level  in the opposite leg.      A normal response to exercise is a slight increase or no change in the  NIC compared with baseline. If the patient develops symptoms with  walking on the treadmill and does not have a corresponding decrease in  ankle pressure, arterial obstruction as the cause of symptoms is  essentially ruled out and the clinician should seek other causes for the  leg symptoms.     A fall in ankle systolic pressure by more than 20 percent and/or >30mmHg  from its baseline value, or below an absolute pressure of 60 mmHg that  requires >3 minutes to recover, is considered abnormal [44]. Severe  claudication can be defined as an inability to complete the treadmill  exercise due to leg symptoms and postexercise ankle systolic pressures  below 50 mmHg. Single-level disease is inferred with a recovery time  that is <6 minutes, while a =6 minute recovery time is associated with  multilevel disease, particularly a combination of suprainguinal and  infrainguinal occlusive disease [24].              This report was signed and finalized on 11/20/2024 4:12 PM by Dr. Lam Talley MD.            Narrative & Impression  History: Carotid occlusive disease     IMPRESSION:  Impression:  1. There is 50 to 69% stenosis of the right internal carotid artery.  2. There is less than 50% stenosis of the left internal carotid artery.  3. Antegrade flow is demonstrated in bilateral vertebral arteries.  4. There is heavy plaque burden at the right bifurcation. Further  imaging with an MRI time-of-flight may be warranted.     Comments:  Bilateral carotid vertebral arterial duplex scan was  performed.     Grayscale imaging shows intimal thickening and calcified elements at the  carotid bifurcation. The right internal carotid artery peak systolic  velocity is 199.9 cm/sec. The end-diastolic velocity is 38.5 cm/sec. The  right ICA/CCA ratio is approximately 2.4. These findings correlate with  50 to 69% stenosis of the right internal carotid artery.     Grayscale imaging shows intimal thickening and calcified elements at the  carotid bifurcation. The left internal carotid artery peak systolic  velocity is 84.3 cm/sec. The end-diastolic velocity is 24 cm/sec. The  left ICA/CCA ratio is approximately 1.0. These findings correlate with  less than 50% stenosis of the left internal carotid artery.     Antegrade flow is demonstrated in bilateral vertebral arteries.  There is greater than 50% stenosis in the left external carotid artery.     This report was signed and finalized on 12/20/2024 5:58 PM by Dr. Constantino Metzger MD.     Patient Active Problem List   Diagnosis    Spinal stenosis of lumbar region    Back pain    Degeneration of intervertebral disc of lumbosacral region    Essential hypertension    Lumbar disc herniation with radiculopathy    Type 2 diabetes mellitus with stage 3a chronic kidney disease, with long-term current use of insulin    Constipation due to opioid therapy    Gastroesophageal reflux disease without esophagitis    Panlobular emphysema    Paroxysmal atrial fibrillation    Hx of colonic polyps    Class 1 obesity due to excess calories with serious comorbidity and body mass index (BMI) of 32.0 to 32.9 in adult    Atherosclerosis 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    H/O diabetic foot ulcer    Anemia    Stage 3a chronic kidney disease    Diabetic neuropathy    Difficulty with BiPAP use    Impotence    Insomnia    Left foot drop    Lumbosacral radiculitis    Obstructive sleep apnea    Mixed hyperlipidemia    PVD  (peripheral vascular disease)    Vocal cord paralysis    Vocal cord paresis    Atherosclerosis of native artery of right lower extremity with intermittent claudication    Carotid stenosis    Benign prostatic hyperplasia without urinary obstruction    Vitamin D deficiency         ICD-10-CM ICD-9-CM   1. Peripheral arterial disease with history of revascularization  I73.9 443.9    Z98.890 V45.89   2. Bilateral carotid artery stenosis  I65.23 433.10     433.30   3. Essential hypertension  I10 401.9   4. Mixed hyperlipidemia  E78.2 272.2   5. Type 2 diabetes mellitus with stage 3a chronic kidney disease, with long-term current use of insulin  E11.22 250.40    N18.31 585.3    Z79.4 V58.67   6. Overweight (BMI 25.0-29.9)  E66.3 278.02         PLAN: After thoroughly evaluating Edy Cosby, I believe the best course of action is to remain conservative from vascular surgery standpoint.  ABIs performed in November show moderate arterial insufficiency to the left lower extremity and mild arterial insufficiency to the right.  Carotid duplex shows 50 to 69% right carotid stenosis and less than 50% left carotid stenosis, there is heavy plaque burden noted at the right bifurcation.  I did have a CT soft tissue neck films pushed over I along with Dr. Metzger reviewed, but determined further testing was needed.  We will have him return in 3 weeks to see Dr. Metzger with an MRA neck for further surveillance.  PATIENT REPORTS NO METALLIC IMPLANTABLE DEVICES, HE DID HAVE SCREWS BUT THEY WERE REMOVED.  The patient does have arterial insufficiency to his bilateral lower extremities, however this is not inhibiting his daily activities.  His motor and sensory function is intact, he does have a chronic callus that becomes ulcerated to his left great toe, he was discharged from wound care.  He does continue to have neuropathy to his bilateral lower extremities.  He denies any strokelike symptoms.  He should continue his aspirin 81 mg  daily, Plavix 75 mg daily, and Lipitor 40 mg nightly in addition to his other medications.  I did discuss vascular risk factors as it pertains to the progression of vascular disease including controlling his hypertension, hyperlipidemia, and diabetes.  His blood pressure stable today in office.  Lipid panel from October shows all values within normal limits.  His hemoglobin A1c 2 months ago was well-controlled at 6.1%.  Body mass index is 29.1 kg/m².  BMI is >= 25 and <30. (Overweight) The following options were offered after discussion;: exercise counseling/recommendations and nutrition counseling/recommendations.    This was all discussed in full with complete understanding.    Thank you for allowing me to participate in the care of your patient.  Please do not hesitate to call with any questions or concerns.  We will keep you aware of any further encounters with Edy Cosby.      Sincerely Yours,      PRATIMA Russo

## 2025-01-09 RX ORDER — LABETALOL 100 MG/1
TABLET, FILM COATED ORAL
Qty: 180 TABLET | Refills: 0 | Status: SHIPPED | OUTPATIENT
Start: 2025-01-09

## 2025-01-09 NOTE — TELEPHONE ENCOUNTER
Gavino Frye called to request a refill on his medication.      Last office visit : 10/21/2024   Next office visit : 4/21/2025     Requested Prescriptions     Pending Prescriptions Disp Refills    labetalol (NORMODYNE) 100 MG tablet [Pharmacy Med Name: Labetalol HCl 100 MG Oral Tablet] 180 tablet 0     Sig: TAKE 1 TABLET BY MOUTH ONCE DAILY IN THE MORNING AND 1 AT BEDTIME            Christine Quezada LPN

## 2025-01-20 ENCOUNTER — TELEPHONE (OUTPATIENT)
Dept: VASCULAR SURGERY | Facility: CLINIC | Age: 70
End: 2025-01-20
Payer: MEDICARE

## 2025-01-20 NOTE — TELEPHONE ENCOUNTER
SPOKE WITH PT AS A REMINDER TO ARRIVE 0830 AT THE [facility] FOR MRI THEN OFFICE AT 0945 ON 01/21/25. INFORMED PT WILL SEE APRN DUE TO DR CLIFFORD BEING OUT OF TOWN. STATED UNDERSTANDING.

## 2025-01-21 ENCOUNTER — HOSPITAL ENCOUNTER (OUTPATIENT)
Dept: MRI IMAGING | Facility: HOSPITAL | Age: 70
Discharge: HOME | End: 2025-01-21
Admitting: NURSE PRACTITIONER
Payer: MEDICARE

## 2025-01-21 ENCOUNTER — OFFICE VISIT (OUTPATIENT)
Dept: VASCULAR SURGERY | Facility: CLINIC | Age: 70
End: 2025-01-21
Payer: MEDICARE

## 2025-01-21 VITALS
OXYGEN SATURATION: 99 % | BODY MASS INDEX: 29.03 KG/M2 | HEIGHT: 67 IN | DIASTOLIC BLOOD PRESSURE: 62 MMHG | HEART RATE: 85 BPM | WEIGHT: 185 LBS | SYSTOLIC BLOOD PRESSURE: 130 MMHG

## 2025-01-21 DIAGNOSIS — Z98.890 PERIPHERAL ARTERIAL DISEASE WITH HISTORY OF REVASCULARIZATION: ICD-10-CM

## 2025-01-21 DIAGNOSIS — Z79.4 TYPE 2 DIABETES MELLITUS WITH STAGE 3A CHRONIC KIDNEY DISEASE, WITH LONG-TERM CURRENT USE OF INSULIN: ICD-10-CM

## 2025-01-21 DIAGNOSIS — E66.3 OVERWEIGHT (BMI 25.0-29.9): ICD-10-CM

## 2025-01-21 DIAGNOSIS — J38.00 VOCAL CORD PARESIS: ICD-10-CM

## 2025-01-21 DIAGNOSIS — I65.23 BILATERAL CAROTID ARTERY STENOSIS: Primary | ICD-10-CM

## 2025-01-21 DIAGNOSIS — I10 ESSENTIAL HYPERTENSION: ICD-10-CM

## 2025-01-21 DIAGNOSIS — E11.22 TYPE 2 DIABETES MELLITUS WITH STAGE 3A CHRONIC KIDNEY DISEASE, WITH LONG-TERM CURRENT USE OF INSULIN: ICD-10-CM

## 2025-01-21 DIAGNOSIS — N18.31 TYPE 2 DIABETES MELLITUS WITH STAGE 3A CHRONIC KIDNEY DISEASE, WITH LONG-TERM CURRENT USE OF INSULIN: ICD-10-CM

## 2025-01-21 DIAGNOSIS — E78.2 MIXED HYPERLIPIDEMIA: ICD-10-CM

## 2025-01-21 DIAGNOSIS — I65.23 BILATERAL CAROTID ARTERY STENOSIS: ICD-10-CM

## 2025-01-21 DIAGNOSIS — I73.9 PERIPHERAL ARTERIAL DISEASE WITH HISTORY OF REVASCULARIZATION: ICD-10-CM

## 2025-01-21 DIAGNOSIS — J38.00 VOCAL CORD PARALYSIS: ICD-10-CM

## 2025-01-21 PROCEDURE — 3075F SYST BP GE 130 - 139MM HG: CPT | Performed by: NURSE PRACTITIONER

## 2025-01-21 PROCEDURE — 99214 OFFICE O/P EST MOD 30 MIN: CPT | Performed by: NURSE PRACTITIONER

## 2025-01-21 PROCEDURE — 70547 MR ANGIOGRAPHY NECK W/O DYE: CPT

## 2025-01-21 PROCEDURE — 3078F DIAST BP <80 MM HG: CPT | Performed by: NURSE PRACTITIONER

## 2025-01-21 PROCEDURE — 1160F RVW MEDS BY RX/DR IN RCRD: CPT | Performed by: NURSE PRACTITIONER

## 2025-01-21 PROCEDURE — 1159F MED LIST DOCD IN RCRD: CPT | Performed by: NURSE PRACTITIONER

## 2025-01-21 NOTE — LETTER
January 21, 2025     PRATIMA Armando  [address] [place] [address] [address]  [place] [address]    Patient: Edy Cosby   YOB: 1955   Date of Visit: 1/21/2025     Dear PRATIMA Armando:       Thank you for referring Edy Cosby to me for evaluation. Below are the relevant portions of my assessment and plan of care.    If you have questions, please do not hesitate to call me. I look forward to following Edy along with you.         Sincerely,        PRATIMA Salcido        CC: No Recipients    Nadeen Theodore APRN  01/21/25 1138  Sign when Signing Visit  1/21/2025        Ana Vincent APRN  [address] [place] [address] [address]  [facility] [place] [address]        Edy Cosby  1955    Chief Complaint   Patient presents with   • Follow-up     2 week follow up w/ testing. Last seen 12/20/24. Patient denies any changes since last visit.        Dear Ana Vincent APRN:    HPI    I had the pleasure of seeing your patient in the office today for follow up.  As you recall, the patient is a 69 y.o. male who we are currently following for peripheral arterial disease and carotid occlusive disease.  He was a previous patient of Dr. Lucio and underwent previous intervention of bilateral lower extremities.  He does have continued complaints of numbness and tingling to his left lower extremity with some mild claudication.   He does have left foot drop, with AFO.  He was recently seen and treated for a diabetic wound/ulcer of the lower extremity on his left plantar great toe, by wound care this is a reoccurring wound.  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  He will be following up soon with ENT at [facility].  He is maintained on aspirin, Plavix, and Lipitor.  He did have noninvasive testing  "performed today to further evaluate his carotid disease, which I did review in office.       Review of Systems   Constitutional: Negative.  Negative for diaphoresis and fever.   HENT:  Positive for voice change (Left vocal cord paralysis, right vocal cord anomaly).    Eyes: Negative.    Respiratory: Negative.  Negative for shortness of breath and wheezing.    Cardiovascular: Negative.  Negative for chest pain and leg swelling.   Gastrointestinal: Negative.  Negative for abdominal pain.   Endocrine: Negative.    Genitourinary: Negative.    Musculoskeletal: Negative.    Skin: Negative.    Allergic/Immunologic: Negative.    Neurological: Negative.  Negative for dizziness and weakness.   Hematological: Negative.    Psychiatric/Behavioral: Negative.     All other systems reviewed and are negative.      /62   Pulse 85   Ht 170.2 cm (67\")   Wt 83.9 kg (185 lb)   SpO2 99%   BMI 28.98 kg/m²       Physical Exam  Vitals and nursing note reviewed.   Constitutional:       General: He is not in acute distress.     Appearance: Normal appearance. He is well-developed. He is not diaphoretic.   HENT:      Head: Normocephalic and atraumatic.      Mouth/Throat:      Comments: Voice weakness  Neck:      Vascular: No carotid bruit or JVD.   Cardiovascular:      Rate and Rhythm: Normal rate and regular rhythm.      Pulses:           Femoral pulses are 2+ on the right side and 2+ on the left side.       Dorsalis pedis pulses are detected w/ Doppler on the right side and detected w/ Doppler on the left side.        Posterior tibial pulses are detected w/ Doppler on the right side and detected w/ Doppler on the left side.      Heart sounds: Normal heart sounds, S1 normal and S2 normal. No murmur heard.     No friction rub. No gallop.      Comments: Doppler DP/PT/peroneal signals bilaterally  Pulmonary:      Effort: Pulmonary effort is normal.      Breath sounds: Normal breath sounds.   Abdominal:      General: Bowel sounds are " normal. There is no abdominal bruit.      Palpations: Abdomen is soft.      Tenderness: There is no abdominal tenderness.   Musculoskeletal:         General: Normal range of motion.   Skin:     General: Skin is warm and dry.   Neurological:      Mental Status: He is alert and oriented to person, place, and time.      Cranial Nerves: No cranial nerve deficit.   Psychiatric:         Behavior: Behavior normal.         Thought Content: Thought content normal.         Judgment: Judgment normal.         DIAGNOSTIC DATA:  MRI Angiogram Neck Without Contrast    Result Date: 1/21/2025  Narrative: MRI ANGIOGRAM NECK WO CONTRAST- 1/21/2025 7:58 AM  HISTORY: Carotid artery stenosis  TECHNIQUE: Noncontrast 3-D time-of-flight imaging was performed through the neck. 3-D and maximum intensity projection (MIP) images were created from the axial source data.  COMPARISON: Ultrasound dated 12/20/2024  FINDINGS:  Typical three-vessel branching pattern off the aortic arch. No flow-limiting stenosis at the right brachiocephalic, left common carotid or left subclavian artery origins. Mild atheromatous narrowing along the midportion of the right common carotid artery. There is no flow-limiting stenosis at the level of the carotid bulbs or ICA origins. Patent bilateral vertebral arteries. Vertebral arteries are codominant. Visualized posterior fossa structures are unremarkable.      Impression: 1. No arterial occlusion or flow-limiting stenosis in the neck.   This report was signed and finalized on 1/21/2025 10:06 AM by Dr Torin Cheema.      Narrative & Impression   US ANKLE / BRACHIAL INDICES EXTREMITY COMPLETE- 11/20/2024 11:03 AM     HISTORY: i73.9; I73.9-Peripheral vascular disease, unspecified     COMPARISON: 12/18/2023  Images and report are stored in PACS per institutional and state  regulations.  FINDINGS:  Systolic pressures were obtained from bilateral brachial, posterior  tibial and dorsalis pedis arteries. Systolic pressure  within the right  brachial artery was measured at 152 mmHg, while pressure within the left  brachial artery was measured at 156 mmHg.     Systolic pressures within bilateral posterior tibial and dorsalis pedis  arteries are less than the corresponding brachial pressures.     Calculated ankle/brachial index is 0.72 on the right and 0.64 on the  left.     There is dampening of the waveforms within the distal runoff vessels  bilaterally with monophasic waveforms present.     IMPRESSION:  1. Abnormal NIC study with a NIC on the right calculated 0.72 and on the  left 0.64. This represents progression of disease when compared to the  previous NIC study of 12/18/2023 at which time the NIC on the right was  1.01 and on the left 0.79. There is dampening of the waveforms distally  bilaterally with monophasic waveforms present..     High thigh systolic pressure should be more than 30 mmHg above the  brachial pressure.     A 20 mmHg or greater reduction in pressure is indicative of a  flow-limiting lesion if the pressure difference is present either  between segments along the same leg or when compared with the same level  in the opposite leg.      A normal response to exercise is a slight increase or no change in the  NIC compared with baseline. If the patient develops symptoms with  walking on the treadmill and does not have a corresponding decrease in  ankle pressure, arterial obstruction as the cause of symptoms is  essentially ruled out and the clinician should seek other causes for the  leg symptoms.     A fall in ankle systolic pressure by more than 20 percent and/or >30mmHg  from its baseline value, or below an absolute pressure of 60 mmHg that  requires >3 minutes to recover, is considered abnormal [44]. Severe  claudication can be defined as an inability to complete the treadmill  exercise due to leg symptoms and postexercise ankle systolic pressures  below 50 mmHg. Single-level disease is inferred with a recovery  time  that is <6 minutes, while a =6 minute recovery time is associated with  multilevel disease, particularly a combination of suprainguinal and  infrainguinal occlusive disease [24].              This report was signed and finalized on 11/20/2024 4:12 PM by Dr. Lam Talley MD.            Narrative & Impression  History: Carotid occlusive disease     IMPRESSION:  Impression:  1. There is 50 to 69% stenosis of the right internal carotid artery.  2. There is less than 50% stenosis of the left internal carotid artery.  3. Antegrade flow is demonstrated in bilateral vertebral arteries.  4. There is heavy plaque burden at the right bifurcation. Further  imaging with an MRI time-of-flight may be warranted.     Comments: Bilateral carotid vertebral arterial duplex scan was  performed.     Grayscale imaging shows intimal thickening and calcified elements at the  carotid bifurcation. The right internal carotid artery peak systolic  velocity is 199.9 cm/sec. The end-diastolic velocity is 38.5 cm/sec. The  right ICA/CCA ratio is approximately 2.4. These findings correlate with  50 to 69% stenosis of the right internal carotid artery.     Grayscale imaging shows intimal thickening and calcified elements at the  carotid bifurcation. The left internal carotid artery peak systolic  velocity is 84.3 cm/sec. The end-diastolic velocity is 24 cm/sec. The  left ICA/CCA ratio is approximately 1.0. These findings correlate with  less than 50% stenosis of the left internal carotid artery.     Antegrade flow is demonstrated in bilateral vertebral arteries.  There is greater than 50% stenosis in the left external carotid artery.     This report was signed and finalized on 12/20/2024 5:58 PM by Dr. Constantino Metzger MD.     Patient Active Problem List   Diagnosis   • Spinal stenosis of lumbar region   • Back pain   • Degeneration of intervertebral disc of lumbosacral region   • Essential hypertension   • Lumbar disc herniation with  radiculopathy   • Type 2 diabetes mellitus with stage 3a chronic kidney disease, with long-term current use of insulin   • Constipation due to opioid therapy   • Gastroesophageal reflux disease without esophagitis   • Panlobular emphysema   • Paroxysmal atrial fibrillation   • Hx of colonic polyps   • Class 1 obesity due to excess calories with serious comorbidity and body mass index (BMI) of 32.0 to 32.9 in adult   • Atherosclerosis 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   • H/O diabetic foot ulcer   • Anemia   • Stage 3a chronic kidney disease   • Diabetic neuropathy   • Difficulty with BiPAP use   • Impotence   • Insomnia   • Left foot drop   • Lumbosacral radiculitis   • Obstructive sleep apnea   • Mixed hyperlipidemia   • PVD (peripheral vascular disease)   • Vocal cord paralysis   • Vocal cord paresis   • Atherosclerosis of native artery of right lower extremity with intermittent claudication   • Carotid stenosis   • Benign prostatic hyperplasia without urinary obstruction   • Vitamin D deficiency         ICD-10-CM ICD-9-CM   1. Bilateral carotid artery stenosis  I65.23 433.10     433.30   2. Peripheral arterial disease with history of revascularization  I73.9 443.9    Z98.890 V45.89   3. Essential hypertension  I10 401.9   4. Mixed hyperlipidemia  E78.2 272.2   5. Type 2 diabetes mellitus with stage 3a chronic kidney disease, with long-term current use of insulin  E11.22 250.40    N18.31 585.3    Z79.4 V58.67   6. Overweight (BMI 25.0-29.9)  E66.3 278.02   7. Vocal cord paralysis  J38.00 478.30   8. Vocal cord paresis  J38.00 478.30           PLAN: After thoroughly evaluating Edy Cosby, I believe the best course of action is to remain conservative from vascular surgery standpoint.  I did review her testing radiologist notes no significant stenosis.  I will have Dr. Metzger review MRI of the neck looking at narrowing of right carotid.  His previous ABIs noted moderate arterial  insufficiency to the left lower extremity and mild to the right.  I will notify him next week with Dr. Metzger's review.  I will plan for him to follow-up in 1 year with repeat carotid duplex and ABIs.  He does have some claudication however this is not disabling.  I did discuss vascular risk factors as they pertain to the progression of vascular disease including controlling his hypertension and hyperlipidemia.  His blood pressure is stable on his current medication.  He should continue his aspirin 81 mg daily, Plavix 75 mg daily and Lipitor 40 mg nightly in addition to his other medications.   His hemoglobin A1c 2 months ago was well-controlled at 6.1%.  Body mass index is 28.98 kg/m².      This was all discussed in full with complete understanding.    Thank you for allowing me to participate in the care of your patient.  Please do not hesitate to call with any questions or concerns.  We will keep you aware of any further encounters with Edy Cosby.      Sincerely Yours,      PRATIMA Salcido

## 2025-01-21 NOTE — PROGRESS NOTES
1/21/2025        Ana Vincent APRN  [address] [place] [address] [address]  [place] [address]        Edy Cosby  1955    Chief Complaint   Patient presents with    Follow-up     2 week follow up w/ testing. Last seen 12/20/24. Patient denies any changes since last visit.        Dear Ana Vincent APRN:    HPI    I had the pleasure of seeing your patient in the office today for follow up.  As you recall, the patient is a 69 y.o. male who we are currently following for peripheral arterial disease and carotid occlusive disease.  He was a previous patient of Dr. Lucio and underwent previous intervention of bilateral lower extremities.  He does have continued complaints of numbness and tingling to his left lower extremity with some mild claudication.   He does have left foot drop, with AFO.  He was recently seen and treated for a diabetic wound/ulcer of the lower extremity on his left plantar great toe, by wound care this is a reoccurring wound.  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  He will be following up soon with ENT at [facility].  He is maintained on aspirin, Plavix, and Lipitor.  He did have noninvasive testing performed today to further evaluate his carotid disease, which I did review in office.       Review of Systems   Constitutional: Negative.  Negative for diaphoresis and fever.   HENT:  Positive for voice change (Left vocal cord paralysis, right vocal cord anomaly).    Eyes: Negative.    Respiratory: Negative.  Negative for shortness of breath and wheezing.    Cardiovascular: Negative.  Negative for chest pain and leg swelling.   Gastrointestinal: Negative.  Negative for abdominal pain.   Endocrine: Negative.    Genitourinary: Negative.    Musculoskeletal: Negative.    Skin: Negative.    Allergic/Immunologic:  "Negative.    Neurological: Negative.  Negative for dizziness and weakness.   Hematological: Negative.    Psychiatric/Behavioral: Negative.     All other systems reviewed and are negative.      /62   Pulse 85   Ht 170.2 cm (67\")   Wt 83.9 kg (185 lb)   SpO2 99%   BMI 28.98 kg/m²       Physical Exam  Vitals and nursing note reviewed.   Constitutional:       General: He is not in acute distress.     Appearance: Normal appearance. He is well-developed. He is not diaphoretic.   HENT:      Head: Normocephalic and atraumatic.      Mouth/Throat:      Comments: Voice weakness  Neck:      Vascular: No carotid bruit or JVD.   Cardiovascular:      Rate and Rhythm: Normal rate and regular rhythm.      Pulses:           Femoral pulses are 2+ on the right side and 2+ on the left side.       Dorsalis pedis pulses are detected w/ Doppler on the right side and detected w/ Doppler on the left side.        Posterior tibial pulses are detected w/ Doppler on the right side and detected w/ Doppler on the left side.      Heart sounds: Normal heart sounds, S1 normal and S2 normal. No murmur heard.     No friction rub. No gallop.      Comments: Doppler DP/PT/peroneal signals bilaterally  Pulmonary:      Effort: Pulmonary effort is normal.      Breath sounds: Normal breath sounds.   Abdominal:      General: Bowel sounds are normal. There is no abdominal bruit.      Palpations: Abdomen is soft.      Tenderness: There is no abdominal tenderness.   Musculoskeletal:         General: Normal range of motion.   Skin:     General: Skin is warm and dry.   Neurological:      Mental Status: He is alert and oriented to person, place, and time.      Cranial Nerves: No cranial nerve deficit.   Psychiatric:         Behavior: Behavior normal.         Thought Content: Thought content normal.         Judgment: Judgment normal.         DIAGNOSTIC DATA:  MRI Angiogram Neck Without Contrast    Result Date: 1/21/2025  Narrative: MRI ANGIOGRAM NECK WO " CONTRAST- 1/21/2025 7:58 AM  HISTORY: Carotid artery stenosis  TECHNIQUE: Noncontrast 3-D time-of-flight imaging was performed through the neck. 3-D and maximum intensity projection (MIP) images were created from the axial source data.  COMPARISON: Ultrasound dated 12/20/2024  FINDINGS:  Typical three-vessel branching pattern off the aortic arch. No flow-limiting stenosis at the right brachiocephalic, left common carotid or left subclavian artery origins. Mild atheromatous narrowing along the midportion of the right common carotid artery. There is no flow-limiting stenosis at the level of the carotid bulbs or ICA origins. Patent bilateral vertebral arteries. Vertebral arteries are codominant. Visualized posterior fossa structures are unremarkable.      Impression: 1. No arterial occlusion or flow-limiting stenosis in the neck.   This report was signed and finalized on 1/21/2025 10:06 AM by Dr Torin Cheema.      Narrative & Impression   US ANKLE / BRACHIAL INDICES EXTREMITY COMPLETE- 11/20/2024 11:03 AM     HISTORY: i73.9; I73.9-Peripheral vascular disease, unspecified     COMPARISON: 12/18/2023  Images and report are stored in PACS per institutional and state  regulations.  FINDINGS:  Systolic pressures were obtained from bilateral brachial, posterior  tibial and dorsalis pedis arteries. Systolic pressure within the right  brachial artery was measured at 152 mmHg, while pressure within the left  brachial artery was measured at 156 mmHg.     Systolic pressures within bilateral posterior tibial and dorsalis pedis  arteries are less than the corresponding brachial pressures.     Calculated ankle/brachial index is 0.72 on the right and 0.64 on the  left.     There is dampening of the waveforms within the distal runoff vessels  bilaterally with monophasic waveforms present.     IMPRESSION:  1. Abnormal NIC study with a NIC on the right calculated 0.72 and on the  left 0.64. This represents progression of disease when  compared to the  previous NIC study of 12/18/2023 at which time the NIC on the right was  1.01 and on the left 0.79. There is dampening of the waveforms distally  bilaterally with monophasic waveforms present..     High thigh systolic pressure should be more than 30 mmHg above the  brachial pressure.     A 20 mmHg or greater reduction in pressure is indicative of a  flow-limiting lesion if the pressure difference is present either  between segments along the same leg or when compared with the same level  in the opposite leg.      A normal response to exercise is a slight increase or no change in the  NIC compared with baseline. If the patient develops symptoms with  walking on the treadmill and does not have a corresponding decrease in  ankle pressure, arterial obstruction as the cause of symptoms is  essentially ruled out and the clinician should seek other causes for the  leg symptoms.     A fall in ankle systolic pressure by more than 20 percent and/or >30mmHg  from its baseline value, or below an absolute pressure of 60 mmHg that  requires >3 minutes to recover, is considered abnormal [44]. Severe  claudication can be defined as an inability to complete the treadmill  exercise due to leg symptoms and postexercise ankle systolic pressures  below 50 mmHg. Single-level disease is inferred with a recovery time  that is <6 minutes, while a =6 minute recovery time is associated with  multilevel disease, particularly a combination of suprainguinal and  infrainguinal occlusive disease [24].              This report was signed and finalized on 11/20/2024 4:12 PM by Dr. Lam Talley MD.            Narrative & Impression  History: Carotid occlusive disease     IMPRESSION:  Impression:  1. There is 50 to 69% stenosis of the right internal carotid artery.  2. There is less than 50% stenosis of the left internal carotid artery.  3. Antegrade flow is demonstrated in bilateral vertebral arteries.  4. There is heavy plaque  burden at the right bifurcation. Further  imaging with an MRI time-of-flight may be warranted.     Comments: Bilateral carotid vertebral arterial duplex scan was  performed.     Grayscale imaging shows intimal thickening and calcified elements at the  carotid bifurcation. The right internal carotid artery peak systolic  velocity is 199.9 cm/sec. The end-diastolic velocity is 38.5 cm/sec. The  right ICA/CCA ratio is approximately 2.4. These findings correlate with  50 to 69% stenosis of the right internal carotid artery.     Grayscale imaging shows intimal thickening and calcified elements at the  carotid bifurcation. The left internal carotid artery peak systolic  velocity is 84.3 cm/sec. The end-diastolic velocity is 24 cm/sec. The  left ICA/CCA ratio is approximately 1.0. These findings correlate with  less than 50% stenosis of the left internal carotid artery.     Antegrade flow is demonstrated in bilateral vertebral arteries.  There is greater than 50% stenosis in the left external carotid artery.     This report was signed and finalized on 12/20/2024 5:58 PM by Dr. Constantino Metzger MD.     Patient Active Problem List   Diagnosis    Spinal stenosis of lumbar region    Back pain    Degeneration of intervertebral disc of lumbosacral region    Essential hypertension    Lumbar disc herniation with radiculopathy    Type 2 diabetes mellitus with stage 3a chronic kidney disease, with long-term current use of insulin    Constipation due to opioid therapy    Gastroesophageal reflux disease without esophagitis    Panlobular emphysema    Paroxysmal atrial fibrillation    Hx of colonic polyps    Class 1 obesity due to excess calories with serious comorbidity and body mass index (BMI) of 32.0 to 32.9 in adult    Atherosclerosis 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    H/O diabetic foot ulcer    Anemia    Stage 3a chronic kidney disease    Diabetic neuropathy    Difficulty with BiPAP use    Impotence     Insomnia    Left foot drop    Lumbosacral radiculitis    Obstructive sleep apnea    Mixed hyperlipidemia    PVD (peripheral vascular disease)    Vocal cord paralysis    Vocal cord paresis    Atherosclerosis of native artery of right lower extremity with intermittent claudication    Carotid stenosis    Benign prostatic hyperplasia without urinary obstruction    Vitamin D deficiency         ICD-10-CM ICD-9-CM   1. Bilateral carotid artery stenosis  I65.23 433.10     433.30   2. Peripheral arterial disease with history of revascularization  I73.9 443.9    Z98.890 V45.89   3. Essential hypertension  I10 401.9   4. Mixed hyperlipidemia  E78.2 272.2   5. Type 2 diabetes mellitus with stage 3a chronic kidney disease, with long-term current use of insulin  E11.22 250.40    N18.31 585.3    Z79.4 V58.67   6. Overweight (BMI 25.0-29.9)  E66.3 278.02   7. Vocal cord paralysis  J38.00 478.30   8. Vocal cord paresis  J38.00 478.30           PLAN: After thoroughly evaluating Edy NIKO Cosby, I believe the best course of action is to remain conservative from vascular surgery standpoint.  I did review her testing radiologist notes no significant stenosis.  I will have Dr. Metzger review MRI of the neck looking at narrowing of right carotid.  His previous ABIs noted moderate arterial insufficiency to the left lower extremity and mild to the right.  I will notify him next week with Dr. Metzger's review.  I will plan for him to follow-up in 1 year with repeat carotid duplex and ABIs.  He does have some claudication however this is not disabling.  I did discuss vascular risk factors as they pertain to the progression of vascular disease including controlling his hypertension and hyperlipidemia.  His blood pressure is stable on his current medication.  He should continue his aspirin 81 mg daily, Plavix 75 mg daily and Lipitor 40 mg nightly in addition to his other medications.   His hemoglobin A1c 2 months ago was well-controlled at  6.1%.  Body mass index is 28.98 kg/m².      This was all discussed in full with complete understanding.    Thank you for allowing me to participate in the care of your patient.  Please do not hesitate to call with any questions or concerns.  We will keep you aware of any further encounters with Edy Cosby.      Sincerely Yours,      PRATIMA Salcido

## 2025-01-23 RX ORDER — PANTOPRAZOLE SODIUM 40 MG/1
40 TABLET, DELAYED RELEASE ORAL DAILY
Qty: 90 TABLET | Refills: 0 | Status: SHIPPED | OUTPATIENT
Start: 2025-01-23

## 2025-01-23 NOTE — TELEPHONE ENCOUNTER
Gavino Frye called to request a refill on his medication.      Last office visit : 10/21/2024   Next office visit : 4/21/2025     Requested Prescriptions     Pending Prescriptions Disp Refills    pantoprazole (PROTONIX) 40 MG tablet [Pharmacy Med Name: Pantoprazole Sodium 40 MG Oral Tablet Delayed Release] 90 tablet 0     Sig: Take 1 tablet by mouth once daily            Christine Quezada LPN

## 2025-01-27 ENCOUNTER — OFFICE VISIT (OUTPATIENT)
Dept: ENT CLINIC | Age: 70
End: 2025-01-27
Payer: MEDICARE

## 2025-01-27 VITALS
DIASTOLIC BLOOD PRESSURE: 60 MMHG | BODY MASS INDEX: 29.66 KG/M2 | SYSTOLIC BLOOD PRESSURE: 100 MMHG | HEIGHT: 67 IN | WEIGHT: 189 LBS

## 2025-01-27 DIAGNOSIS — Q31.8 VOCAL CORD ANOMALY: Primary | ICD-10-CM

## 2025-01-27 DIAGNOSIS — J38.00 COMPLETE PARALYSIS OF VOCAL CORD: ICD-10-CM

## 2025-01-27 PROCEDURE — 1160F RVW MEDS BY RX/DR IN RCRD: CPT | Performed by: PHYSICIAN ASSISTANT

## 2025-01-27 PROCEDURE — 1159F MED LIST DOCD IN RCRD: CPT | Performed by: PHYSICIAN ASSISTANT

## 2025-01-27 PROCEDURE — 3074F SYST BP LT 130 MM HG: CPT | Performed by: PHYSICIAN ASSISTANT

## 2025-01-27 PROCEDURE — 99213 OFFICE O/P EST LOW 20 MIN: CPT | Performed by: PHYSICIAN ASSISTANT

## 2025-01-27 PROCEDURE — 1123F ACP DISCUSS/DSCN MKR DOCD: CPT | Performed by: PHYSICIAN ASSISTANT

## 2025-01-27 PROCEDURE — 3078F DIAST BP <80 MM HG: CPT | Performed by: PHYSICIAN ASSISTANT

## 2025-01-27 PROCEDURE — 31575 DIAGNOSTIC LARYNGOSCOPY: CPT | Performed by: PHYSICIAN ASSISTANT

## 2025-01-27 ASSESSMENT — ENCOUNTER SYMPTOMS
RHINORRHEA: 0
VOICE CHANGE: 0
SINUS PRESSURE: 0
TROUBLE SWALLOWING: 0
EYE PAIN: 0
PHOTOPHOBIA: 0
SORE THROAT: 0
FACIAL SWELLING: 0
SINUS PAIN: 0

## 2025-01-27 NOTE — ASSESSMENT & PLAN NOTE
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  Due to the patient improving, he prefers to hold off on the referral and will call if he decides to proceed with evaluation and treatment.

## 2025-01-27 NOTE — PROGRESS NOTES
[facility] - PODIATRY    Today's Date: 01/31/25    Patient Name: Edy Cosby  MRN: [id number]  CSN: [id number]  PCP: Ana Vincent APRN  Referring Provider: No ref. provider found    SUBJECTIVE     Chief Complaint   Patient presents with    Follow-up     Ana Vincent APRN 10/21/24  Return in about 3 months (around 1/30/2025) for With Kylah ANDUJAR, Schedule Foot Care in diabetic foot care clinic...Clinic.Check out comments:May need appt in 1 week if cannot get into wound care pt states here for nail/foot care. Pt states no pain.      Diabetes     145 mg/dl BG     HPI: Edy Cosby, a 69 y.o.male, comes to clinic as a(n) established patient presenting for diabetic foot exam and complaining of thickened toenails and callus to left plantar great toe . Patient has h/o AFib, back pain, DM2, Emphysema lung, GERD, HTN, OA . Patient is IDDM with last stated BG level of 145mg/dl. Last A1c of 6.1%.  Notes neuropathy in feet with numbness and tingling.  Continues use of AFO for foot drop of left foot.  Reports callus to left plantar hallux has returned.  Denies any redness, drainage, or other issues from the area. Patient states that toenails are long, thick, and irregular and that he is unable to care for them himself.  Denies open wound or sores.  Patient reports he has been discharged from wound care.  Denies pain today. Relates previous treatment(s) including foot care by podiatrist . Denies any constitutional symptoms. No other pedal complaints at this time.    Past Medical History:   Diagnosis Date    A-fib     Atherosclerosis     Chronic back pain     Constipation     Diabetes mellitus     TYPE II    Dropfoot     wears brace    Dysphagia     Emphysema of lung     Gastroparesis     GERD (gastroesophageal reflux disease)     Hyperlipidemia     Hypertension     ESSENTIAL    Leg pain     Muscle spasm     Nerve pain     Osteoarthritis     Sleep apnea     no  cpap/bipap (non-compliance)    Sleeping difficulty     Ulcer of toe due to diabetes mellitus      Past Surgical History:   Procedure Laterality Date    ANGIOPLASTY ILIAC ARTERY Left 1/29/2019    Procedure: ANGIOPLASTY ILIAC ARTERY, STENT PLACEMENT;  Surgeon: Duncan Lucio MD;  Location: [facility] HYBRID OR 12;  Service: Vascular    AORTAGRAM Left 12/18/2018    Procedure: AORTOGRAM, LEFT LEG ANGIOGRAM, MYNX CLOSURE;  Surgeon: Duncan Lucio MD;  Location: [facility] HYBRID OR 12;  Service: Vascular    AORTAGRAM Right 12/16/2021    Procedure: AORTAGRAM, RIGHT LOWER EXTREMITY ANGIOGRAM, POSSIBLE INTERVENTION;  Surgeon: Duncan Lucio MD;  Location: [facility] HYBRID OR 12;  Service: Vascular;  Laterality: Right;    BACK SURGERY      CHOLECYSTECTOMY      COLONOSCOPY  09/01/2011    TICS RECALL 5YR    COLONOSCOPY  09/30/2008    ENTER RESULTS: STOOL THROUGHTOUT THE COLON POOR PREP REC 3 YEAR RECALL    COLONOSCOPY N/A 11/9/2016    Procedure: COLONOSCOPY;  Surgeon: León Zepeda MD;  Location: [facility] ENDOSCOPY;  Service:     COLONOSCOPY N/A 4/19/2018    Procedure: COLONOSCOPY WITH ANESTHESIA;  Surgeon: León Zepeda MD;  Location: [facility] ENDOSCOPY;  Service: Gastroenterology    ENDOSCOPY  06/19/2012    HH    ENDOSCOPY  08/25/2009    HIATAL HERNIA, DILATED WITH 50 FR MASCORRO    ENDOSCOPY  06/19/2007    WITHIN NORMAL LIMITS UREA NEG    FEMORAL ENDARTERECTOMY Left 1/29/2019    Procedure: LEFT FEMORAL ENDARTERECTOMY;  Surgeon: Duncan Lucio MD;  Location: [facility] OR 12;  Service: Vascular    FEMORAL ENDARTERECTOMY Right 2/3/2022    Procedure: RIGHT FEMORAL ENDARTERECTOMY, RIGHT LOWER EXTREMITY ANGIOGRAM, BALLOON ANGIOPLASTY;  Surgeon: Duncan Lucio MD;  Location: [facility] OR ;  Service: Vascular;  Laterality: Right;    LUMBAR LAMINECTOMY WITH FUSION N/A 1/23/2017    Procedure: REMOVAL OF INSTRUMENTATION, EXPLORATION OF FUSION L4-S1, REVISION LEFT L5-S1 HEMILAMINECTOMY, FACETECTOMY  DECOMPRESSION, REVISION UNINSTRUMENTED POSTERIOR SPINAL FUSION L5-S1;  Surgeon: RHONDA Lopes MD;  Location:  [facility] OR;  Service:     OTHER SURGICAL HISTORY      LUMBAR SACRAL SURGERY WITH FUSION X2    PILONIDAL CYST / SINUS EXCISION      PILONIDAL CYST REMOVAL     Family History   Problem Relation Age of Onset    Heart attack Father     Diabetes Brother     Colon polyps Sister     Stomach cancer Neg Hx         GI CNACERS OR DISEASE    Colon cancer Neg Hx      Social History     Socioeconomic History    Marital status: Single   Tobacco Use    Smoking status: Former     Current packs/day: 0.00     Types: Cigarettes     Start date:      Quit date:      Years since quittin.0     Passive exposure: Past    Smokeless tobacco: Never   Vaping Use    Vaping status: Never Used   Substance and Sexual Activity    Alcohol use: No    Drug use: Yes     Frequency: 21.0 times per week     Types: Marijuana    Sexual activity: Defer     No Known Allergies  Current Outpatient Medications   Medication Sig Dispense Refill    albuterol sulfate  (90 Base) MCG/ACT inhaler Inhale 2 puffs 4 (Four) Times a Day As Needed.      amLODIPine (NORVASC) 10 MG tablet Take 1 tablet by mouth Daily.      ascorbic acid (VITAMIN C) 500 MG tablet 1 tablet Daily.      aspirin 81 MG EC tablet Daily.      atorvastatin (LIPITOR) 40 MG tablet 1 tablet Every Night.      clopidogrel (PLAVIX) 75 MG tablet Take 1 tablet by mouth once daily 90 tablet 0    cyclobenzaprine (FLEXERIL) 5 MG tablet Take 1 tablet by mouth 3 (Three) Times a Day As Needed for Muscle Spasms.      Dulaglutide (Trulicity) 1.5 MG/0.5ML solution pen-injector Inject 1.5 mg under the skin into the appropriate area as directed 1 (One) Time Per Week.      famotidine (PEPCID) 40 MG tablet Take 1 tablet by mouth Every Evening.      folic acid (FOLVITE) 1 MG tablet 1 tablet.      hydroCHLOROthiazide (MICROZIDE) 12.5 MG capsule Take 1 capsule by mouth Daily.      labetalol  (NORMODYNE) 100 MG tablet Take 1 tablet by mouth 2 (Two) Times a Day.      lisinopril (PRINIVIL,ZESTRIL) 40 MG tablet Take 1 tablet by mouth Daily.      mupirocin (BACTROBAN) 2 % ointment Apply 1 Application topically to the appropriate area as directed 2 (Two) Times a Day. 1 g 3    pantoprazole (PROTONIX) 40 MG EC tablet Take 1 tablet by mouth Daily.      pantoprazole (PROTONIX) 40 MG EC tablet Take 1 tablet by mouth Daily for 20 days. 20 tablet 0    sildenafil (VIAGRA) 100 MG tablet Take 0.5 tablets by mouth As Needed for Erectile Dysfunction.      tamsulosin (FLOMAX) 0.4 MG capsule 24 hr capsule Take 1 capsule by mouth Daily.      vitamin D (ERGOCALCIFEROL) 1.25 MG (68444 UT) capsule capsule Take 1 capsule by mouth 1 (One) Time Per Week.       No current facility-administered medications for this visit.     Review of Systems   Constitutional:  Negative for chills and fever.   HENT:  Negative for congestion.    Respiratory:  Negative for shortness of breath.    Cardiovascular:  Negative for chest pain and leg swelling.   Gastrointestinal:  Negative for constipation, diarrhea, nausea and vomiting.   Musculoskeletal:  Positive for arthralgias and gait problem (foot drop).   Skin:  Positive for wound.        Left hallux plantar callus   Neurological:  Positive for numbness.   Hematological:  Bruises/bleeds easily.       OBJECTIVE     Vitals:    01/31/25 1005   BP: 132/80   Pulse: 85   SpO2: 95%       PHYSICAL EXAM  GEN:   Accompanied by none.     Foot/Ankle Exam    GENERAL  Appearance:  obese  Orientation:  AAOx3  Affect:  appropriate  Gait:  steppage (Drop foot left)  Assistance:  independent  Right shoe gear: casual shoe  Left shoe gear: casual shoe (With AFO)    VASCULAR     Right Foot Vascularity   Dorsalis pedis:  2+  Posterior tibial:  2+  Skin temperature:  warm  Edema grading:  Trace and non-pitting  CFT:  3  Pedal hair growth:  Present  Varicosities:  none     Left Foot Vascularity   Dorsalis pedis:   2+  Posterior tibial:  2+  Skin temperature:  warm  Edema grading:  Trace and non-pitting  CFT:  3  Pedal hair growth:  Present  Varicosities:  none     NEUROLOGIC     Right Foot Neurologic   Light touch sensation: diminished  Vibratory sensation: diminished  Hot/Cold sensation: diminished  Protective Sensation using [place]irtha Monofilament:   Right Foot Sites Intact: 0.  Sites tested: 10     Left Foot Neurologic   Light touch sensation: diminished  Vibratory sensation: diminished  Hot/Cold sensation:  diminished  Protective Sensation using [place]-Mirtha Monofilament:   Left Foot Sites Intact: 0.  Sites tested: 10    MUSCULOSKELETAL     Right Foot Musculoskeletal   Tenderness:  none    Arch:  Pes planus  Hallux valgus: No    Hallux limitus: No       Left Foot Musculoskeletal   Tenderness:  none  Arch:  Pes planus  Hallux valgus: No    Hallux limitus: No      MUSCLE STRENGTH     Right Foot Muscle Strength   Foot dorsiflexion:  5  Foot plantar flexion:  5  Foot inversion:  5  Foot eversion:  5     Left Foot Muscle Strength   Foot dorsiflexion:  3  Foot plantar flexion:  3  Foot inversion:  3  Foot eversion:  3    RANGE OF MOTION     Right Foot Range of Motion   Foot and ankle ROM within normal limits       Left Foot Range of Motion   Foot and ankle ROM within normal limits      DERMATOLOGIC      Right Foot Dermatologic   Skin  Right foot skin is intact.   Nails  1.  Positive for elongated, onychomycosis, abnormal thickness, subungual debris and dystrophic nail.  2.  Positive for elongated, onychomycosis, abnormal thickness, subungual debris and dystrophic nail.  3.  Positive for elongated, onychomycosis, abnormal thickness, subungual debris and dystrophic nail.  4.  Positive for elongated, onychomycosis, abnormal thickness, subungual debris and dystrophic nail.  5.  Positive for elongated, onychomycosis, abnormal thickness, subungual debris and dystrophic nail.     Left Foot Dermatologic   Skin  Positive for  corn.   Nails  1.  Positive for elongated, onychomycosis, abnormal thickness, subungual debris and dystrophic nail.  2.  Positive for elongated, onychomycosis, abnormal thickness, subungual debris and dystrophic nail.  3.  Positive for elongated, onychomycosis, abnormal thickness, subungual debris and dystrophic nail.  4.  Positive for elongated, onychomycosis, abnormally thick, subungual debris and dystrophic nail.  5.  Positive for elongated, onychomycosis, abnormally thick, subungual debris and dystrophic nail.    Image:       RADIOLOGY/NUCLEAR:  US ANKLE / BRACHIAL INDICES EXTREMITY COMPLETE- 11/20/2024 11:03 AM     HISTORY: i73.9; I73.9-Peripheral vascular disease, unspecified     COMPARISON: 12/18/2023  Images and report are stored in PACS per institutional and state  regulations.  FINDINGS:  Systolic pressures were obtained from bilateral brachial, posterior  tibial and dorsalis pedis arteries. Systolic pressure within the right  brachial artery was measured at 152 mmHg, while pressure within the left  brachial artery was measured at 156 mmHg.     Systolic pressures within bilateral posterior tibial and dorsalis pedis  arteries are less than the corresponding brachial pressures.     Calculated ankle/brachial index is 0.72 on the right and 0.64 on the  left.     There is dampening of the waveforms within the distal runoff vessels  bilaterally with monophasic waveforms present.     IMPRESSION:  1. Abnormal NIC study with a NIC on the right calculated 0.72 and on the  left 0.64. This represents progression of disease when compared to the  previous NIC study of 12/18/2023 at which time the NIC on the right was  1.01 and on the left 0.79. There is dampening of the waveforms distally  bilaterally with monophasic waveforms present..     High thigh systolic pressure should be more than 30 mmHg above the  brachial pressure.     A 20 mmHg or greater reduction in pressure is indicative of a  flow-limiting lesion if the  pressure difference is present either  between segments along the same leg or when compared with the same level  in the opposite leg.      A normal response to exercise is a slight increase or no change in the  NIC compared with baseline. If the patient develops symptoms with  walking on the treadmill and does not have a corresponding decrease in  ankle pressure, arterial obstruction as the cause of symptoms is  essentially ruled out and the clinician should seek other causes for the  leg symptoms.     A fall in ankle systolic pressure by more than 20 percent and/or >30mmHg  from its baseline value, or below an absolute pressure of 60 mmHg that  requires >3 minutes to recover, is considered abnormal [44]. Severe  claudication can be defined as an inability to complete the treadmill  exercise due to leg symptoms and postexercise ankle systolic pressures  below 50 mmHg. Single-level disease is inferred with a recovery time  that is <6 minutes, while a =6 minute recovery time is associated with  multilevel disease, particularly a combination of suprainguinal and  infrainguinal occlusive disease [24].        LABORATORY/CULTURE RESULTS:      PATHOLOGY RESULTS:       ASSESSMENT/PLAN     Diagnoses and all orders for this visit:    1. Onychomycosis (Primary)    2. Antiplatelet or antithrombotic long-term use    3. Type 2 diabetes mellitus with diabetic polyneuropathy, with long-term current use of insulin    4. Foot drop, left    5. Callus of foot    6. Encounter for diabetic foot exam        Comprehensive lower extremity examination and evaluation was performed.  Discussed findings and treatment plan including risks, benefits, and treatment options with patient in detail. Patient agreed with treatment plan.  Reviewed wound care note.  Reviewed note from vascular surgery.  Reviewed NIC results.  Diabetic foot exam performed  After verbal consent obtained, nail(s) x10 debrided of length and thickness with nail nipper without  incidence  After verbal consent obtained, calluses x2 pared utilizing dermal curette and/or scalpel without incidence  Patient may maintain nails and calluses at home utilizing emery board or pumice stone between visits as needed  Reviewed at home diabetic foot care including daily foot checks    Continue AFO for foot drop.   Continue diabetic monitoring and control under direction of PCP.   Continue follow-ups with vascular surgery.  If patient notices any drainage or bleeding from the left foot callus, he should contact the office to be seen.  Contact the office with any questions or concerns before next appointment.  An After Visit Summary was printed and given to the patient at discharge, including (if requested) any available informative/educational handouts regarding diagnosis, treatment, or medications. All questions were answered to patient/family satisfaction. Should symptoms fail to improve or worsen they agree to call or return to clinic or to go to the Emergency Department. Discussed the importance of following up with any needed screening tests/labs/specialist appointments and any requested follow-up recommended by me today. Importance of maintaining follow-up discussed and patient accepts that missed appointments can delay diagnosis and potentially lead to worsening of conditions.  Return in about 3 months (around 4/30/2025) for Schedule Foot Care Clinic, With Kylah ANDUJAR., or sooner if acute issues arise.    Lab Frequency Next Occurrence   Diet: Once 04/19/2018   Advance Diet as Tolerated Once 04/19/2018       This document has been electronically signed by PRATIMA Wilkins on January 31, 2025 11:47 CST

## 2025-01-27 NOTE — PROGRESS NOTES
nourished  Head/ Face: normocephalic and atraumatic  Vocal Quality: coarse  Ears: Right Ear: External: external ears normal Otoscopy Ear Canal: canal clear Otoscopy TM: TM's normal and TM's mobile Left Ear: External: external ears normal Otoscopy Ear Canal: canal clear Otoscopy TM: TM's normal and TM's mobile  Hearing: grossly intact  Nose: see endoscopy report  Neck: supple and adenopathy none palpable  Thyroid: normal  Oral exam demonstrated the tongue to be midline with no lesions to the surface of the tongue with no buccal mucosal lesions appreciated.  The posterior pharynx demonstrated no masses or any erythematous changes.      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  Due to the patient improving, he prefers to hold off on the referral and will call if he decides to proceed with evaluation and treatment.         Relevant Orders    LARYNGOSCOPY,FLEX FIBER,DIAGNOSTIC (Completed)       Orders Placed This Encounter   Procedures    LARYNGOSCOPY,FLEX FIBER,DIAGNOSTIC     The nares were anesthetized with 4% lidocaine aerosol bilaterally.  Each nare was individually evaluated where the patient was found to have mild deviation septum to the right therefore I utilized the left side for the procedure.  The posterior wall of the nasopharyngeal region demonstrated no remarkable findings.  The scope was then advanced to the oropharyngeal region where the patient was found to have normal swallowing with no abnormalities.  The scope was then advanced to the epiglottis where the vocal cords were

## 2025-01-30 ENCOUNTER — HOSPITAL ENCOUNTER (EMERGENCY)
Facility: HOSPITAL | Age: 70
Discharge: HOME | End: 2025-01-30
Attending: EMERGENCY MEDICINE
Payer: MEDICARE

## 2025-01-30 ENCOUNTER — APPOINTMENT (OUTPATIENT)
Dept: GENERAL RADIOLOGY | Facility: HOSPITAL | Age: 70
End: 2025-01-30
Payer: MEDICARE

## 2025-01-30 VITALS
HEIGHT: 67 IN | SYSTOLIC BLOOD PRESSURE: 166 MMHG | DIASTOLIC BLOOD PRESSURE: 65 MMHG | WEIGHT: 191 LBS | HEART RATE: 81 BPM | RESPIRATION RATE: 18 BRPM | OXYGEN SATURATION: 96 % | BODY MASS INDEX: 29.98 KG/M2 | TEMPERATURE: 98.6 F

## 2025-01-30 DIAGNOSIS — D64.9 CHRONIC ANEMIA: ICD-10-CM

## 2025-01-30 DIAGNOSIS — K21.9 GASTROESOPHAGEAL REFLUX DISEASE, UNSPECIFIED WHETHER ESOPHAGITIS PRESENT: ICD-10-CM

## 2025-01-30 DIAGNOSIS — N18.9 CHRONIC KIDNEY DISEASE, UNSPECIFIED CKD STAGE: ICD-10-CM

## 2025-01-30 DIAGNOSIS — R07.9 CHEST PAIN, UNSPECIFIED TYPE: ICD-10-CM

## 2025-01-30 DIAGNOSIS — R14.2 BELCHING: Primary | ICD-10-CM

## 2025-01-30 LAB
ALBUMIN SERPL-MCNC: 4.2 G/DL (ref 3.5–5.2)
ALBUMIN/GLOB SERPL: 1.3 G/DL
ALP SERPL-CCNC: 65 U/L (ref 39–117)
ALT SERPL W P-5'-P-CCNC: 12 U/L (ref 1–41)
AMPHET+METHAMPHET UR QL: NEGATIVE
AMPHETAMINES UR QL: NEGATIVE
ANION GAP SERPL CALCULATED.3IONS-SCNC: 9 MMOL/L (ref 5–15)
AST SERPL-CCNC: 18 U/L (ref 1–40)
BARBITURATES UR QL SCN: NEGATIVE
BASOPHILS # BLD AUTO: 0.04 10*3/MM3 (ref 0–0.2)
BASOPHILS NFR BLD AUTO: 0.8 % (ref 0–1.5)
BENZODIAZ UR QL SCN: NEGATIVE
BILIRUB SERPL-MCNC: 0.3 MG/DL (ref 0–1.2)
BUN SERPL-MCNC: 18 MG/DL (ref 8–23)
BUN/CREAT SERPL: 11.6 (ref 7–25)
BUPRENORPHINE SERPL-MCNC: NEGATIVE NG/ML
CALCIUM SPEC-SCNC: 9.5 MG/DL (ref 8.6–10.5)
CANNABINOIDS SERPL QL: POSITIVE
CHLORIDE SERPL-SCNC: 106 MMOL/L (ref 98–107)
CO2 SERPL-SCNC: 28 MMOL/L (ref 22–29)
COCAINE UR QL: NEGATIVE
CREAT SERPL-MCNC: 1.55 MG/DL (ref 0.76–1.27)
DEPRECATED RDW RBC AUTO: 55.4 FL (ref 37–54)
EGFRCR SERPLBLD CKD-EPI 2021: 48.5 ML/MIN/1.73
EOSINOPHIL # BLD AUTO: 0.1 10*3/MM3 (ref 0–0.4)
EOSINOPHIL NFR BLD AUTO: 1.9 % (ref 0.3–6.2)
ERYTHROCYTE [DISTWIDTH] IN BLOOD BY AUTOMATED COUNT: 16.7 % (ref 12.3–15.4)
FENTANYL UR-MCNC: NEGATIVE NG/ML
GEN 5 1HR TROPONIN T REFLEX: 22 NG/L
GLOBULIN UR ELPH-MCNC: 3.2 GM/DL
GLUCOSE SERPL-MCNC: 122 MG/DL (ref 65–99)
HCT VFR BLD AUTO: 31 % (ref 37.5–51)
HGB BLD-MCNC: 9.5 G/DL (ref 13–17.7)
HOLD SPECIMEN: NORMAL
HOLD SPECIMEN: NORMAL
IMM GRANULOCYTES # BLD AUTO: 0.02 10*3/MM3 (ref 0–0.05)
IMM GRANULOCYTES NFR BLD AUTO: 0.4 % (ref 0–0.5)
LIPASE SERPL-CCNC: 43 U/L (ref 13–60)
LYMPHOCYTES # BLD AUTO: 1.12 10*3/MM3 (ref 0.7–3.1)
LYMPHOCYTES NFR BLD AUTO: 21.5 % (ref 19.6–45.3)
MCH RBC QN AUTO: 27.7 PG (ref 26.6–33)
MCHC RBC AUTO-ENTMCNC: 30.6 G/DL (ref 31.5–35.7)
MCV RBC AUTO: 90.4 FL (ref 79–97)
METHADONE UR QL SCN: NEGATIVE
MONOCYTES # BLD AUTO: 0.34 10*3/MM3 (ref 0.1–0.9)
MONOCYTES NFR BLD AUTO: 6.5 % (ref 5–12)
NEUTROPHILS NFR BLD AUTO: 3.6 10*3/MM3 (ref 1.7–7)
NEUTROPHILS NFR BLD AUTO: 68.9 % (ref 42.7–76)
NRBC BLD AUTO-RTO: 0 /100 WBC (ref 0–0.2)
OPIATES UR QL: NEGATIVE
OXYCODONE UR QL SCN: NEGATIVE
PCP UR QL SCN: NEGATIVE
PLATELET # BLD AUTO: 186 10*3/MM3 (ref 140–450)
PMV BLD AUTO: 11.9 FL (ref 6–12)
POTASSIUM SERPL-SCNC: 4 MMOL/L (ref 3.5–5.2)
PROT SERPL-MCNC: 7.4 G/DL (ref 6–8.5)
RBC # BLD AUTO: 3.43 10*6/MM3 (ref 4.14–5.8)
SODIUM SERPL-SCNC: 143 MMOL/L (ref 136–145)
TRICYCLICS UR QL SCN: NEGATIVE
TROPONIN T % DELTA: 0
TROPONIN T NUMERIC DELTA: 0 NG/L
TROPONIN T SERPL HS-MCNC: 22 NG/L
WBC NRBC COR # BLD AUTO: 5.22 10*3/MM3 (ref 3.4–10.8)
WHOLE BLOOD HOLD COAG: NORMAL
WHOLE BLOOD HOLD SPECIMEN: NORMAL

## 2025-01-30 PROCEDURE — 84484 ASSAY OF TROPONIN QUANT: CPT | Performed by: EMERGENCY MEDICINE

## 2025-01-30 PROCEDURE — 99284 EMERGENCY DEPT VISIT MOD MDM: CPT

## 2025-01-30 PROCEDURE — 80053 COMPREHEN METABOLIC PANEL: CPT | Performed by: EMERGENCY MEDICINE

## 2025-01-30 PROCEDURE — 71045 X-RAY EXAM CHEST 1 VIEW: CPT

## 2025-01-30 PROCEDURE — 93005 ELECTROCARDIOGRAM TRACING: CPT | Performed by: EMERGENCY MEDICINE

## 2025-01-30 PROCEDURE — 93010 ELECTROCARDIOGRAM REPORT: CPT | Performed by: INTERNAL MEDICINE

## 2025-01-30 PROCEDURE — 85025 COMPLETE CBC W/AUTO DIFF WBC: CPT | Performed by: EMERGENCY MEDICINE

## 2025-01-30 PROCEDURE — 83690 ASSAY OF LIPASE: CPT | Performed by: EMERGENCY MEDICINE

## 2025-01-30 PROCEDURE — 80307 DRUG TEST PRSMV CHEM ANLYZR: CPT | Performed by: EMERGENCY MEDICINE

## 2025-01-30 PROCEDURE — 36415 COLL VENOUS BLD VENIPUNCTURE: CPT

## 2025-01-30 RX ORDER — SODIUM CHLORIDE 0.9 % (FLUSH) 0.9 %
10 SYRINGE (ML) INJECTION AS NEEDED
Status: DISCONTINUED | OUTPATIENT
Start: 2025-01-30 | End: 2025-01-30 | Stop reason: HOSPADM

## 2025-01-30 RX ORDER — ASPIRIN 81 MG/1
324 TABLET, CHEWABLE ORAL ONCE
Status: COMPLETED | OUTPATIENT
Start: 2025-01-30 | End: 2025-01-30

## 2025-01-30 RX ORDER — PANTOPRAZOLE SODIUM 40 MG/1
40 TABLET, DELAYED RELEASE ORAL DAILY
Qty: 20 TABLET | Refills: 0 | Status: SHIPPED | OUTPATIENT
Start: 2025-01-30 | End: 2025-02-19

## 2025-01-30 RX ADMIN — ASPIRIN 324 MG: 81 TABLET, CHEWABLE ORAL at 08:54

## 2025-01-30 NOTE — DISCHARGE INSTRUCTIONS
It was very nice to meet you, Edy. Thank you for allowing us to take care of you today at [facility].     Today you were seen in the emergency department for your symptoms. Please understand that an ER evaluation is just the start of your evaluation. We do the best we can, but we are often unable to fully find what is causing your symptoms from one evaluation.  Because of this, the goal is to determine whether you need to be evaluated in the hospital or if it is safe for you to go home and see other doctors provided such as primary care physicians or specialist on an outpatient basis.      Like we discussed, I strongly urge that you follow up with your primary care doctor. Please call their office to set up an appointment as soon as possible so that you can be re-evaluated for improvement in your symptoms or for any other questions.  I have provided the information needed, including phone number, to call to set up an appointment below in these discharge papers.      Educational material has also been provided in the following pages regarding what we have discussed today.  I have ordered outpatient stress test for you and wrote a prescription and PPIs please follow-up with the primary MD return there for any worsening symptoms     Please return to the emergency room within 12-48 hours if you experience symptoms such as the following:   Fever, chills, chest pain or shortness of breath, pain with inspiration/expiration, pain that travels to your arms, neck or back, nausea, vomiting, severe headache, tearing pain in your chest, dizziness, feel as though you are about to pass out, OR if you have any worsening symptoms, or any other concerns.

## 2025-01-30 NOTE — ED PROVIDER NOTES
Subjective   History of Present Illness  Patient is 69-year-old gentleman who came to the ER complaining of belching he describes the chest pain but primary says he is belching and came to the ED for evaluation of complaints.  This semicooperative past several days and has had happened in the past several weeks episodically.  Denies any nausea vomiting or diarrhea associate with this.    Chest Pain  Pain location:  Epigastric and L lateral chest  Pain quality: tightness    Pain quality comment:  Belching  Pain radiates to:  Does not radiate  Pain severity:  Moderate  Timing:  Intermittent  Progression:  Waxing and waning  Chronicity:  New  Context: not breathing, not drug use, not lifting, not raising an arm, not at rest and not stress    Relieved by:  Nothing  Worsened by:  Nothing  Ineffective treatments:  None tried  Associated symptoms: no abdominal pain, no AICD problem, no anorexia, no anxiety, no claudication, no cough, no dizziness, no fatigue, no fever, no headache, no nausea, no orthopnea, no palpitations, no syncope and no vomiting    Risk factors: hypertension, male sex and obesity    Risk factors: no diabetes mellitus, no Marfan's syndrome and no surgery        Review of Systems   Constitutional: Negative.  Negative for chills, fatigue and fever.   HENT: Negative.  Negative for congestion.    Respiratory: Negative.  Negative for cough, chest tightness and stridor.    Cardiovascular:  Positive for chest pain. Negative for palpitations, orthopnea, claudication and syncope.   Gastrointestinal: Negative.  Negative for abdominal distention, abdominal pain, anorexia, nausea and vomiting.   Endocrine: Negative.    Genitourinary: Negative.  Negative for difficulty urinating and flank pain.   Musculoskeletal: Negative.    Skin: Negative.  Negative for color change.   Neurological: Negative.  Negative for dizziness and headaches.   All other systems reviewed and are negative.      Past Medical History:    Diagnosis Date    A-fib     Atherosclerosis     Chronic back pain     Constipation     Diabetes mellitus     TYPE II    Dropfoot     wears brace    Dysphagia     Emphysema of lung     Gastroparesis     GERD (gastroesophageal reflux disease)     Hyperlipidemia     Hypertension     ESSENTIAL    Leg pain     Muscle spasm     Nerve pain     Osteoarthritis     Sleep apnea     no cpap/bipap (non-compliance)    Sleeping difficulty     Ulcer of toe due to diabetes mellitus        No Known Allergies    Past Surgical History:   Procedure Laterality Date    ANGIOPLASTY ILIAC ARTERY Left 1/29/2019    Procedure: ANGIOPLASTY ILIAC ARTERY, STENT PLACEMENT;  Surgeon: Duncan Lucio MD;  Location: [facility] HYBRID OR 12;  Service: Vascular    AORTAGRAM Left 12/18/2018    Procedure: AORTOGRAM, LEFT LEG ANGIOGRAM, MYNX CLOSURE;  Surgeon: Duncan Lucio MD;  Location: [facility] HYBRID OR 12;  Service: Vascular    AORTAGRAM Right 12/16/2021    Procedure: AORTAGRAM, RIGHT LOWER EXTREMITY ANGIOGRAM, POSSIBLE INTERVENTION;  Surgeon: Duncan Lucio MD;  Location: [facility] OR 12;  Service: Vascular;  Laterality: Right;    BACK SURGERY      CHOLECYSTECTOMY      COLONOSCOPY  09/01/2011    TICS RECALL 5YR    COLONOSCOPY  09/30/2008    ENTER RESULTS: STOOL THROUGHTOUT THE COLON POOR PREP REC 3 YEAR RECALL    COLONOSCOPY N/A 11/9/2016    Procedure: COLONOSCOPY;  Surgeon: León Zepeda MD;  Location: [facility] ENDOSCOPY;  Service:     COLONOSCOPY N/A 4/19/2018    Procedure: COLONOSCOPY WITH ANESTHESIA;  Surgeon: León Zepeda MD;  Location: [facility] ENDOSCOPY;  Service: Gastroenterology    ENDOSCOPY  06/19/2012        ENDOSCOPY  08/25/2009    HIATAL HERNIA, DILATED WITH 50 FR MASCORRO    ENDOSCOPY  06/19/2007    WITHIN NORMAL LIMITS UREA NEG    FEMORAL ENDARTERECTOMY Left 1/29/2019    Procedure: LEFT FEMORAL ENDARTERECTOMY;  Surgeon: Duncan Lucio MD;  Location: [facility] HYBRID OR 12;  Service: Vascular    FEMORAL  ENDARTERECTOMY Right 2/3/2022    Procedure: RIGHT FEMORAL ENDARTERECTOMY, RIGHT LOWER EXTREMITY ANGIOGRAM, BALLOON ANGIOPLASTY;  Surgeon: Duncan Lucio MD;  Location:  [facility] HYBRID OR 12;  Service: Vascular;  Laterality: Right;    LUMBAR LAMINECTOMY WITH FUSION N/A 2017    Procedure: REMOVAL OF INSTRUMENTATION, EXPLORATION OF FUSION L4-S1, REVISION LEFT L5-S1 HEMILAMINECTOMY, FACETECTOMY DECOMPRESSION, REVISION UNINSTRUMENTED POSTERIOR SPINAL FUSION L5-S1;  Surgeon: RHONDA Lopes MD;  Location:  [facility] OR;  Service:     OTHER SURGICAL HISTORY      LUMBAR SACRAL SURGERY WITH FUSION X2    PILONIDAL CYST / SINUS EXCISION      PILONIDAL CYST REMOVAL       Family History   Problem Relation Age of Onset    Heart attack Father     Diabetes Brother     Colon polyps Sister     Stomach cancer Neg Hx         GI CNACERS OR DISEASE    Colon cancer Neg Hx        Social History     Socioeconomic History    Marital status: Single   Tobacco Use    Smoking status: Former     Current packs/day: 0.00     Types: Cigarettes     Start date:      Quit date:      Years since quittin.0     Passive exposure: Past    Smokeless tobacco: Never   Vaping Use    Vaping status: Never Used   Substance and Sexual Activity    Alcohol use: No    Drug use: Yes     Frequency: 21.0 times per week     Types: Marijuana    Sexual activity: Defer           Objective   Physical Exam  Vitals and nursing note reviewed. Exam conducted with a chaperone present.   Constitutional:       General: He is not in acute distress.     Appearance: Normal appearance. He is well-developed. He is not toxic-appearing.   HENT:      Head: Normocephalic and atraumatic.      Nose: Nose normal.      Mouth/Throat:      Mouth: Mucous membranes are moist.      Pharynx: Uvula midline.   Eyes:      General: Lids are normal. Lids are everted, no foreign bodies appreciated.      Conjunctiva/sclera: Conjunctivae normal.      Pupils: Pupils are equal, round, and  reactive to light.   Neck:      Vascular: Normal carotid pulses. No carotid bruit or JVD.      Trachea: Trachea and phonation normal. No tracheal deviation.   Cardiovascular:      Rate and Rhythm: Normal rate and regular rhythm.      Chest Wall: PMI is not displaced.      Pulses: Normal pulses.      Heart sounds: Normal heart sounds.      No gallop. No S4 sounds.   Pulmonary:      Effort: Pulmonary effort is normal. No tachypnea, accessory muscle usage or respiratory distress.      Breath sounds: Normal breath sounds. No stridor. No decreased breath sounds, wheezing, rhonchi or rales.   Abdominal:      General: Bowel sounds are normal. There is no distension.      Palpations: Abdomen is soft. There is no hepatomegaly or mass.      Tenderness: There is no abdominal tenderness.   Musculoskeletal:         General: No swelling. Normal range of motion.      Cervical back: Full passive range of motion without pain, normal range of motion and neck supple. No rigidity.      Comments: Lower extremity exam bilaterally is unremarkable.  There is no right or left calf tenderness .  There is no palpable venous cord.  No obvious difference in the size of the legs.  No pitting edema.  The dorsalis pedis and posterior tibial femoral and popliteal pulses are palpable and +2 bilaterally.  Homans sign is negative   Skin:     General: Skin is warm and dry.      Capillary Refill: Capillary refill takes less than 2 seconds.      Coloration: Skin is not jaundiced or pale.      Nails: There is no clubbing.   Neurological:      General: No focal deficit present.      Mental Status: He is alert and oriented to person, place, and time.      GCS: GCS eye subscore is 4. GCS verbal subscore is 5. GCS motor subscore is 6.      Cranial Nerves: No cranial nerve deficit.      Motor: Motor function is intact.      Gait: Gait normal.      Deep Tendon Reflexes: Reflexes are normal and symmetric. Reflexes normal.   Psychiatric:         Speech: Speech  normal.         Behavior: Behavior normal.         Procedures           ED Course  ED Course as of 01/30/25 1143   Thu Jan 30, 2025   0826 nsr [TS]   1007 Normal sinus [TS]   1137 Patient's workup is negative he is sleeping in the ED.  Delta troponin negative.  The elevated troponin I is probably because of baseline renal insufficiency is a chronic anemia.  Chest x-ray is negative I have discussed this case at length with the patient and offered him an inpatient evaluation of chest discomfort including cardiac stress test etc.  The patient does not want to do that.  Offered him a stress test while he is in the ED and he does not want to do that he wants to go home he is agreeable to getting outpatient stress test.  Patient is awake and alert and oriented x 3 and seems to be able to make appropriate decisions for himself.  As far as his belching is concerned which with the primary complaint that brought him in we will place him on some PPIs and see how he does with that with a follow-up endoscopy as an outpatient this has been discussed with the patient. [TS]      ED Course User Index  [TS] Patrick Childs MD                                                       Medical Decision Making  Differential Diagnosis:  I considered chest wall pain, muscle strain, costochondritis, pleurisy, rib fracture, herpes zoster, cardiovascular etiology, myocardial infarction, intermediate coronary syndrome, unstable angina, angina, aortic dissection, pericarditis, pulmonary etiology, pulmonary embolism, pneumonia, pneumothorax, lung cancer, gastroesophageal reflux disease, esophagitis, esophageal spasm and gastrointestinal etiology as a possible cause of chest pain in this patient. This is a partial list of diagnoses considered.        Problems Addressed:  Belching: acute illness or injury  Chest pain, unspecified type: acute illness or injury  Chronic anemia: chronic illness or injury  Chronic kidney disease, unspecified CKD stage:  chronic illness or injury    Amount and/or Complexity of Data Reviewed  Labs: ordered.     Details: Labs reviewed  Radiology: ordered.     Details: History was reviewed  ECG/medicine tests: ordered.     Details: No STEMI    Risk  OTC drugs.  Prescription drug management.  Risk Details: Well score is 0  Heart score 3  This patient presents with chest pain , patient arrived hemodynamically stable was placed on the monitor and IV access obtained EKG was obtained and did not reveal any malignant/unstable dysrhythmias any acute ST elevation, no evidence of Brugada, or significantly prolonged QT .  Presentation not consistent with other acute, emergent cause of chest pain at this time.  Low suspicion for acute PE is low risk per Wells and no evidence of DVT such as calf swelling, tenderness, palpable tortuous lower extremity vein, or Homans' sign.  Low suspicion for pneumothorax as the patient is saturating well and has no radiographic evidence of a pneumothorax.  Low suspicion for dissection there is no widened mediastinum, hypotension, pulses deficit, and no tearing back/abdominal pain.  Low suspicion for tamponade as there is no JVD, muffled heart sounds, electrical alternans on EKG, and no hypotension.  Low suspicion for pericarditis as there is no diffuse ST elevation or GA depression and the patient is afebrile.  No evidence of GI bleed per patient's history.  Low suspicion for CHF exacerbation as there is no evidence of volume overload .  Low suspicion for pneumonia since the patient has no fever no productive cough no chest x-ray findings of pneumonia and no leukocytosis.         Final diagnoses:   Belching   Gastroesophageal reflux disease, unspecified whether esophagitis present   Chest pain, unspecified type   Chronic kidney disease, unspecified CKD stage   Chronic anemia       ED Disposition  ED Disposition       ED Disposition   Discharge    Condition   Stable    Comment   --               Ana Vincent  PRATIMA Salas  [address] [place] [address] [address]  [place] [address]  [phone]    Schedule an appointment as soon as possible for a visit            Medication List        Changed      * pantoprazole 40 MG EC tablet  Commonly known as: PROTONIX  What changed: Another medication with the same name was added. Make sure you understand how and when to take each.     * pantoprazole 40 MG EC tablet  Commonly known as: PROTONIX  Take 1 tablet by mouth Daily for 20 days.  What changed: You were already taking a medication with the same name, and this prescription was added. Make sure you understand how and when to take each.           * This list has 2 medication(s) that are the same as other medications prescribed for you. Read the directions carefully, and ask your doctor or other care provider to review them with you.                   Where to Get Your Medications        These medications were sent to [facility] - [place], [place] - [address] - [phone] [facility] [phone]   [address] [facility] [address]      Phone: [phone]   pantoprazole 40 MG EC tablet            Patrick Childs MD  01/30/25 4278

## 2025-01-31 ENCOUNTER — OFFICE VISIT (OUTPATIENT)
Age: 70
End: 2025-01-31
Payer: MEDICARE

## 2025-01-31 VITALS
DIASTOLIC BLOOD PRESSURE: 80 MMHG | SYSTOLIC BLOOD PRESSURE: 132 MMHG | WEIGHT: 191 LBS | HEIGHT: 67 IN | BODY MASS INDEX: 29.98 KG/M2 | HEART RATE: 85 BPM | OXYGEN SATURATION: 95 %

## 2025-01-31 DIAGNOSIS — E11.9 ENCOUNTER FOR DIABETIC FOOT EXAM: ICD-10-CM

## 2025-01-31 DIAGNOSIS — Z79.02 ANTIPLATELET OR ANTITHROMBOTIC LONG-TERM USE: ICD-10-CM

## 2025-01-31 DIAGNOSIS — B35.1 ONYCHOMYCOSIS: Primary | ICD-10-CM

## 2025-01-31 DIAGNOSIS — Z79.4 TYPE 2 DIABETES MELLITUS WITH DIABETIC POLYNEUROPATHY, WITH LONG-TERM CURRENT USE OF INSULIN: ICD-10-CM

## 2025-01-31 DIAGNOSIS — E11.42 TYPE 2 DIABETES MELLITUS WITH DIABETIC POLYNEUROPATHY, WITH LONG-TERM CURRENT USE OF INSULIN: ICD-10-CM

## 2025-01-31 DIAGNOSIS — M21.372 FOOT DROP, LEFT: ICD-10-CM

## 2025-01-31 DIAGNOSIS — L84 CALLUS OF FOOT: ICD-10-CM

## 2025-01-31 LAB
QT INTERVAL: 354 MS
QT INTERVAL: 360 MS
QTC INTERVAL: 415 MS
QTC INTERVAL: 425 MS

## 2025-02-10 ENCOUNTER — OFFICE VISIT (OUTPATIENT)
Dept: CARDIOLOGY | Facility: CLINIC | Age: 70
End: 2025-02-10
Payer: MEDICARE

## 2025-02-10 VITALS
DIASTOLIC BLOOD PRESSURE: 60 MMHG | OXYGEN SATURATION: 96 % | HEART RATE: 89 BPM | BODY MASS INDEX: 29.51 KG/M2 | HEIGHT: 67 IN | WEIGHT: 188 LBS | SYSTOLIC BLOOD PRESSURE: 150 MMHG

## 2025-02-10 DIAGNOSIS — I10 ESSENTIAL HYPERTENSION: Chronic | ICD-10-CM

## 2025-02-10 DIAGNOSIS — I48.0 PAROXYSMAL ATRIAL FIBRILLATION: Primary | Chronic | ICD-10-CM

## 2025-02-10 DIAGNOSIS — I73.9 PVD (PERIPHERAL VASCULAR DISEASE): Chronic | ICD-10-CM

## 2025-02-10 DIAGNOSIS — E78.2 MIXED HYPERLIPIDEMIA: Chronic | ICD-10-CM

## 2025-02-10 PROCEDURE — 1159F MED LIST DOCD IN RCRD: CPT | Performed by: NURSE PRACTITIONER

## 2025-02-10 PROCEDURE — 1160F RVW MEDS BY RX/DR IN RCRD: CPT | Performed by: NURSE PRACTITIONER

## 2025-02-10 PROCEDURE — 3077F SYST BP >= 140 MM HG: CPT | Performed by: NURSE PRACTITIONER

## 2025-02-10 PROCEDURE — 3078F DIAST BP <80 MM HG: CPT | Performed by: NURSE PRACTITIONER

## 2025-02-10 PROCEDURE — 99214 OFFICE O/P EST MOD 30 MIN: CPT | Performed by: NURSE PRACTITIONER

## 2025-02-10 NOTE — PROGRESS NOTES
Subjective:     Encounter Date: 02/10/2025      Patient ID: Edy Cosby is a 69 y.o. male with previous episode of paroxysmal atrial fibrillation following surgery in 2017 and no known recurrence, hypertension, insulin-dependent diabetes, peripheral vascular disease, CKD, obstructive sleep apnea, and obesity.  He presents the office today for routine follow-up.    Chief Complaint: Routine Atrial Fibrillation follow-up  Atrial Fibrillation  Presents for follow-up visit. Symptoms are negative for chest pain, dizziness, hemodynamic instability, hypertension, hypotension, palpitations, shortness of breath, syncope, tachycardia and weakness. The symptoms have been stable. Past medical history includes atrial fibrillation.   Hypertension  This is a chronic problem. The current episode started more than 1 year ago. The problem is controlled. Pertinent negatives include no chest pain, headaches, malaise/fatigue, orthopnea, palpitations, peripheral edema, PND or shortness of breath. Risk factors for coronary artery disease include diabetes mellitus, male gender and obesity. Current antihypertension treatment includes ACE inhibitors, diuretics, calcium channel blockers and beta blockers. The current treatment provides significant improvement. Compliance problems include exercise.  Hypertensive end-organ damage includes kidney disease and PVD. There is no history of angina, CAD/MI or heart failure. Identifiable causes of hypertension include chronic renal disease.       Mr. Cosby follows with our office due to a previously known episode of atrial fibrillation in 2017.  He has had no known recurrence since that time.  He has other chronic stable conditions including diabetes, kidney disease, hypertension, hyperlipidemia.  He has no known coronary artery disease.    Mr. Cosby presents today for routine follow up. He has prior history of atrial fibrillation with an episode of postoperative AF in 2017.  He  follows with us for surveillance but has had no known recurrence.  He has been active without complaints. He denies any chest pain, pressure, or similar. He has had no palpitations, lightheadedness, or dizziness.   He reports compliance with his medications at home. He has no bleeding issues and currently on clopidogrel and aspirin and follows with vascular surgery. He was seen in the ER recently for chest discomfort related to gas. He has this from time to time and plans to discuss further with his PCP. He also notes diaphoresis occurring while eating meals for the past month or so. This is new, not associated with any other activity and denies any other symptoms such as abdominal pain, lightheadedness, chest pain.      The following portions of the patient's history were reviewed and updated as appropriate: allergies, current medications, past family history, past medical history, past social history and past surgical history.     No Known Allergies      Current Outpatient Medications:   •  albuterol sulfate  (90 Base) MCG/ACT inhaler, Inhale 2 puffs 4 (Four) Times a Day As Needed., Disp: , Rfl:   •  amLODIPine (NORVASC) 10 MG tablet, Take 1 tablet by mouth Daily., Disp: , Rfl:   •  ascorbic acid (VITAMIN C) 500 MG tablet, 1 tablet Daily., Disp: , Rfl:   •  aspirin 81 MG EC tablet, Daily., Disp: , Rfl:   •  atorvastatin (LIPITOR) 40 MG tablet, 1 tablet Every Night., Disp: , Rfl:   •  clopidogrel (PLAVIX) 75 MG tablet, Take 1 tablet by mouth once daily, Disp: 90 tablet, Rfl: 0  •  cyclobenzaprine (FLEXERIL) 5 MG tablet, Take 1 tablet by mouth 3 (Three) Times a Day As Needed for Muscle Spasms., Disp: , Rfl:   •  Dulaglutide (Trulicity) 1.5 MG/0.5ML solution pen-injector, Inject 1.5 mg under the skin into the appropriate area as directed 1 (One) Time Per Week., Disp: , Rfl:   •  famotidine (PEPCID) 40 MG tablet, Take 1 tablet by mouth Every Evening., Disp: , Rfl:   •  folic acid (FOLVITE) 1 MG tablet, 1  tablet., Disp: , Rfl:   •  hydroCHLOROthiazide (MICROZIDE) 12.5 MG capsule, Take 1 capsule by mouth Daily., Disp: , Rfl:   •  labetalol (NORMODYNE) 100 MG tablet, Take 1 tablet by mouth 2 (Two) Times a Day., Disp: , Rfl:   •  lisinopril (PRINIVIL,ZESTRIL) 40 MG tablet, Take 1 tablet by mouth Daily., Disp: , Rfl:   •  mupirocin (BACTROBAN) 2 % ointment, Apply 1 Application topically to the appropriate area as directed 2 (Two) Times a Day., Disp: 1 g, Rfl: 3  •  pantoprazole (PROTONIX) 40 MG EC tablet, Take 1 tablet by mouth Daily., Disp: , Rfl:   •  pantoprazole (PROTONIX) 40 MG EC tablet, Take 1 tablet by mouth Daily for 20 days., Disp: 20 tablet, Rfl: 0  •  sildenafil (VIAGRA) 100 MG tablet, Take 0.5 tablets by mouth As Needed for Erectile Dysfunction., Disp: , Rfl:   •  tamsulosin (FLOMAX) 0.4 MG capsule 24 hr capsule, Take 1 capsule by mouth Daily., Disp: , Rfl:   •  vitamin D (ERGOCALCIFEROL) 1.25 MG (13383 UT) capsule capsule, Take 1 capsule by mouth 1 (One) Time Per Week., Disp: , Rfl:       Review of Systems   Constitutional: Negative for diaphoresis, fever and malaise/fatigue.   Eyes:  Negative for visual disturbance.   Cardiovascular:  Negative for chest pain, claudication, dyspnea on exertion, irregular heartbeat, leg swelling, near-syncope, orthopnea, palpitations, paroxysmal nocturnal dyspnea and syncope.   Respiratory:  Negative for cough, shortness of breath and wheezing.    Gastrointestinal:  Negative for bloating, abdominal pain, nausea and vomiting.   Neurological:  Negative for dizziness, headaches, light-headedness, loss of balance and weakness.   Psychiatric/Behavioral:  Negative for altered mental status.        Procedures    Objective:     Vitals reviewed.   Constitutional:       General: Awake. Not in acute distress.     Appearance: Well-developed, well-groomed and not in distress. Obese. Chronically ill-appearing. Not ill-appearing.   HENT:      Head: Normocephalic and atraumatic.  "  Pulmonary:      Effort: Pulmonary effort is normal.      Breath sounds: Normal breath sounds. No wheezing. No rhonchi. No rales.   Cardiovascular:      Normal rate. Regular rhythm.      Murmurs: There is no murmur.      No gallop.  No rub.   Edema:     Ankle: bilateral trace edema of the ankle.  Musculoskeletal:      Cervical back: Normal range of motion and neck supple. Skin:     General: Skin is warm and dry.   Neurological:      Mental Status: Alert, oriented to person, place, and time and oriented to person, place and time.   Psychiatric:         Attention and Perception: Attention normal.         Mood and Affect: Mood normal.         Speech: Speech normal.         Behavior: Behavior normal. Behavior is cooperative.         Thought Content: Thought content normal.         Cognition and Memory: Cognition normal.         Judgment: Judgment normal.       /60   Pulse 89   Ht 170.2 cm (67\")   Wt 85.3 kg (188 lb)   SpO2 96%   BMI 29.44 kg/m²     Lab Review:     Results for orders placed during the hospital encounter of 01/23/17    Adult Transthoracic Echo Complete With Contrast    Interpretation Summary  · All left ventricular wall segments contract normally.  · Left ventricular wall thickness is consistent with moderate concentric hypertrophy.    GROSSLY NORMAL LV AND RV SYSTOLIC FUNCTION  GROSSLY NORMAL VALVE FUNCTION  MODERATE LVH        Assessment:          Diagnosis Plan   1. Paroxysmal atrial fibrillation        2. Essential hypertension        3. Mixed hyperlipidemia        4. PVD (peripheral vascular disease)               Plan:      Paroxysmal atrial fibrillation: Stable.  He denies any palpitations or prolonged episodes of tachycardia.  He is not anticoagulated currently and has had no recent known recurrence of AF.   CHADVASC2 SCORE   [id number] Score: 4 (2/10/2025 11:41 AM)    Hypertension: Stable.  Currently well controlled on his home medications.  Continue follow-up with primary care " office.    Hyperlipidemia: Managed on statin therapy follow-up by his PCP office.     Peripheral vascular disease: The patient follows with vascular surgery.  He has known peripheral disease including bilateral lower extremity disease. He is on aspirin and plavix.       No changes at this time.   Follow up in 1 year, sooner if needed.     I attest that all portions of this note have been reviewed and updated to reflect the patient's current status.

## 2025-03-24 ENCOUNTER — TELEPHONE (OUTPATIENT)
Dept: PHARMACY | Facility: CLINIC | Age: 70
End: 2025-03-24

## 2025-03-24 NOTE — TELEPHONE ENCOUNTER
Patient states he has been getting the medication from the [facility].    However, patient states he is out of it and needs it refilled.    Patient also uses [facility] for other medications and states he is okay with picking Atorvastatin up from [facility] until he is able [facility].        Called [facility] to request Atorvastatin 40mg Rx be refilled per patient request.    Confirmed copay of $0 for 90ds using [place] insurance.  Provided pharmacy details, as patient only had GoodRx on file with [facility].      Elisa Miles, PharmD., BCACP  Population Health Pharmacist  [facility]  Department, toll free: [phone], option 1       =======================================================    For Pharmacy Admin Tracking Only    Program: [facility] XYverify  CPA in place:  No  Recommendation Provided To: Patient/Caregiver: 1 via Telephone and Pharmacy: 1  Intervention Detail: Adherence Monitorin  Intervention Accepted By: Patient/Caregiver: 1 and Pharmacy: 1  Gap Closed?: Yes   Time Spent (min): 20

## 2025-03-24 NOTE — TELEPHONE ENCOUNTER
[facility] CLINICAL PHARMACY: STATIN THERAPY REVIEW  Identified Statin Use in Cardiovascular Disease care gap per [place] Records dated: 03/24/25     Statin Use in Patients with Cardiovascular Disease Definition:   Eligible population:Males 21-75 years of age and females 40-75 years of age as of December 31 of the measurement year    Definition:The percentage of males 21-75 years of age and females 40-75 years of age as of December 31 of the measurement year, diagnosed with clinical atherosclerotic cardiovascular disease (ASCVD) and were dispensed at least one high or moderate-intensity statin medication during the measurement yea    Compliance:Prescribing a high or moderate intensity statin, as appropriate, member must use their insurance card to fill one of the statins or statin combination medications through the last day of the measurement year.    Last Visit:  10/21/2024  Next Visit:  4/21/2025        STATIN GAP IDENTIFIED-SPC    ASCVD: Other revascularization (year prior to measurement year)  Codes dropped by outside hospital and office visits:  (occlusion, stenosis of carotid artery); R079 (chest pain);  (occlusion, stenosis of bilateral carotid arteries);  (abnormal ECG); I739 (PVD with history of revascularization)        Patient prescribed a statin:yes - Atorvastatin 40mg  Per chart review:   Chart Review: Statin is marked as \"Taking\" and Last office visit and active on patient medication list.    No Known Allergies    LIPID EVALUATION AND GUIDELINE ASSESSMENT    Lab Results   Component Value Date    CHOL 138 10/21/2024    TRIG 64 10/21/2024    HDL 57 10/21/2024     ALT   Date Value Ref Range Status   10/21/2024 14 5 - 41 U/L Final     AST   Date Value Ref Range Status   10/21/2024 19 5 - 40 U/L Final     The 10-year ASCVD risk score (Tammy GRIGSBY, et al., 2019) is: 18.1%    Values used to calculate the score:      Age: 69 years      Sex: Male      Is Non- : Yes

## 2025-03-27 DIAGNOSIS — I10 ESSENTIAL HYPERTENSION: ICD-10-CM

## 2025-03-27 NOTE — TELEPHONE ENCOUNTER
Gavino Frye called to request a refill on his medication.      Last office visit : 10/21/2024   Next office visit : 4/21/2025     Requested Prescriptions     Pending Prescriptions Disp Refills    lisinopril (PRINIVIL;ZESTRIL) 40 MG tablet [Pharmacy Med Name: Lisinopril 40 MG Oral Tablet] 90 tablet 0     Sig: Take 1 tablet by mouth once daily            Anna Caballero MA

## 2025-03-31 RX ORDER — FAMOTIDINE 40 MG/1
40 TABLET, FILM COATED ORAL NIGHTLY
Qty: 30 TABLET | Refills: 0 | Status: SHIPPED | OUTPATIENT
Start: 2025-03-31

## 2025-03-31 RX ORDER — LISINOPRIL 40 MG/1
40 TABLET ORAL DAILY
Qty: 90 TABLET | Refills: 0 | Status: SHIPPED | OUTPATIENT
Start: 2025-03-31

## 2025-04-09 NOTE — TELEPHONE ENCOUNTER
Gavino Frye called to request a refill on his medication.      Last office visit : 10/21/2024   Next office visit : 4/21/2025     Requested Prescriptions     Pending Prescriptions Disp Refills    labetalol (NORMODYNE) 100 MG tablet [Pharmacy Med Name: Labetalol HCl 100 MG Oral Tablet] 180 tablet 0     Sig: TAKE 1 TABLET BY MOUTH ONCE DAILY IN THE MORNING AND 1 AT BEDTIME            Anna Caballero MA

## 2025-04-10 RX ORDER — LABETALOL 100 MG/1
TABLET, FILM COATED ORAL
Qty: 180 TABLET | Refills: 0 | Status: SHIPPED | OUTPATIENT
Start: 2025-04-10

## 2025-04-21 ENCOUNTER — OFFICE VISIT (OUTPATIENT)
Dept: PRIMARY CARE CLINIC | Age: 70
End: 2025-04-21
Payer: MEDICARE

## 2025-04-21 ENCOUNTER — RESULTS FOLLOW-UP (OUTPATIENT)
Dept: PRIMARY CARE CLINIC | Age: 70
End: 2025-04-21

## 2025-04-21 VITALS
HEIGHT: 67 IN | HEART RATE: 83 BPM | DIASTOLIC BLOOD PRESSURE: 64 MMHG | WEIGHT: 179 LBS | SYSTOLIC BLOOD PRESSURE: 136 MMHG | OXYGEN SATURATION: 96 % | BODY MASS INDEX: 28.09 KG/M2 | TEMPERATURE: 97.2 F

## 2025-04-21 DIAGNOSIS — N18.32 STAGE 3B CHRONIC KIDNEY DISEASE (HCC): ICD-10-CM

## 2025-04-21 DIAGNOSIS — I10 ESSENTIAL HYPERTENSION: ICD-10-CM

## 2025-04-21 DIAGNOSIS — J44.9 CHRONIC OBSTRUCTIVE PULMONARY DISEASE, UNSPECIFIED COPD TYPE (HCC): ICD-10-CM

## 2025-04-21 DIAGNOSIS — E11.51 TYPE 2 DIABETES MELLITUS WITH DIABETIC PERIPHERAL ANGIOPATHY WITHOUT GANGRENE, WITHOUT LONG-TERM CURRENT USE OF INSULIN (HCC): ICD-10-CM

## 2025-04-21 DIAGNOSIS — I10 ESSENTIAL HYPERTENSION: Primary | ICD-10-CM

## 2025-04-21 DIAGNOSIS — R61 HYPERHIDROSIS: ICD-10-CM

## 2025-04-21 DIAGNOSIS — I48.0 PAROXYSMAL ATRIAL FIBRILLATION (HCC): ICD-10-CM

## 2025-04-21 DIAGNOSIS — Z12.11 COLON CANCER SCREENING: ICD-10-CM

## 2025-04-21 LAB
25(OH)D3 SERPL-MCNC: 58 NG/ML
ALBUMIN SERPL-MCNC: 4.3 G/DL (ref 3.5–5.2)
ALP SERPL-CCNC: 69 U/L (ref 40–129)
ALT SERPL-CCNC: 21 U/L (ref 10–50)
ANION GAP SERPL CALCULATED.3IONS-SCNC: 9 MMOL/L (ref 8–16)
AST SERPL-CCNC: 24 U/L (ref 10–50)
BASOPHILS # BLD: 0 K/UL (ref 0–0.2)
BASOPHILS NFR BLD: 0.6 % (ref 0–1)
BILIRUB SERPL-MCNC: 0.4 MG/DL (ref 0.2–1.2)
BUN SERPL-MCNC: 20 MG/DL (ref 8–23)
CALCIUM SERPL-MCNC: 10.2 MG/DL (ref 8.8–10.2)
CHLORIDE SERPL-SCNC: 107 MMOL/L (ref 98–107)
CHOLEST SERPL-MCNC: 143 MG/DL (ref 0–199)
CO2 SERPL-SCNC: 27 MMOL/L (ref 22–29)
CREAT SERPL-MCNC: 1.5 MG/DL (ref 0.7–1.2)
CREAT UR-MCNC: 103 MG/DL (ref 39–259)
EOSINOPHIL # BLD: 0.1 K/UL (ref 0–0.6)
EOSINOPHIL NFR BLD: 2.2 % (ref 0–5)
ERYTHROCYTE [DISTWIDTH] IN BLOOD BY AUTOMATED COUNT: 16.4 % (ref 11.5–14.5)
GLUCOSE SERPL-MCNC: 111 MG/DL (ref 70–99)
HBA1C MFR BLD: 6.2 % (ref 4–5.6)
HCT VFR BLD AUTO: 35.1 % (ref 42–52)
HDLC SERPL-MCNC: 65 MG/DL (ref 40–60)
HGB BLD-MCNC: 10.7 G/DL (ref 14–18)
IMM GRANULOCYTES # BLD: 0 K/UL
LDLC SERPL CALC-MCNC: 71 MG/DL
LYMPHOCYTES # BLD: 1.2 K/UL (ref 1.1–4.5)
LYMPHOCYTES NFR BLD: 23.8 % (ref 20–40)
MCH RBC QN AUTO: 27.5 PG (ref 27–31)
MCHC RBC AUTO-ENTMCNC: 30.5 G/DL (ref 33–37)
MCV RBC AUTO: 90.2 FL (ref 80–94)
MICROALBUMIN UR-MCNC: 12.7 MG/DL (ref 0–1.99)
MICROALBUMIN/CREAT UR-RTO: 123.3 MG/G (ref 0–29)
MONOCYTES # BLD: 0.4 K/UL (ref 0–0.9)
MONOCYTES NFR BLD: 7.1 % (ref 0–10)
NEUTROPHILS # BLD: 3.3 K/UL (ref 1.5–7.5)
NEUTS SEG NFR BLD: 66.1 % (ref 50–65)
PLATELET # BLD AUTO: 191 K/UL (ref 130–400)
PMV BLD AUTO: 12.7 FL (ref 9.4–12.4)
POTASSIUM SERPL-SCNC: 4.4 MMOL/L (ref 3.5–5.1)
PROT SERPL-MCNC: 7.4 G/DL (ref 6.4–8.3)
RBC # BLD AUTO: 3.89 M/UL (ref 4.7–6.1)
SODIUM SERPL-SCNC: 143 MMOL/L (ref 136–145)
TRIGL SERPL-MCNC: 35 MG/DL (ref 0–149)
TSH SERPL DL<=0.005 MIU/L-ACNC: 2.09 UIU/ML (ref 0.27–4.2)
WBC # BLD AUTO: 5 K/UL (ref 4.8–10.8)

## 2025-04-21 PROCEDURE — 99214 OFFICE O/P EST MOD 30 MIN: CPT | Performed by: NURSE PRACTITIONER

## 2025-04-21 PROCEDURE — 1160F RVW MEDS BY RX/DR IN RCRD: CPT | Performed by: NURSE PRACTITIONER

## 2025-04-21 PROCEDURE — 3078F DIAST BP <80 MM HG: CPT | Performed by: NURSE PRACTITIONER

## 2025-04-21 PROCEDURE — 1159F MED LIST DOCD IN RCRD: CPT | Performed by: NURSE PRACTITIONER

## 2025-04-21 PROCEDURE — 1123F ACP DISCUSS/DSCN MKR DOCD: CPT | Performed by: NURSE PRACTITIONER

## 2025-04-21 PROCEDURE — 3075F SYST BP GE 130 - 139MM HG: CPT | Performed by: NURSE PRACTITIONER

## 2025-04-21 SDOH — ECONOMIC STABILITY: FOOD INSECURITY: WITHIN THE PAST 12 MONTHS, THE FOOD YOU BOUGHT JUST DIDN'T LAST AND YOU DIDN'T HAVE MONEY TO GET MORE.: SOMETIMES TRUE

## 2025-04-21 SDOH — ECONOMIC STABILITY: FOOD INSECURITY: WITHIN THE PAST 12 MONTHS, YOU WORRIED THAT YOUR FOOD WOULD RUN OUT BEFORE YOU GOT MONEY TO BUY MORE.: SOMETIMES TRUE

## 2025-04-21 ASSESSMENT — PATIENT HEALTH QUESTIONNAIRE - PHQ9
SUM OF ALL RESPONSES TO PHQ QUESTIONS 1-9: 0
2. FEELING DOWN, DEPRESSED OR HOPELESS: NOT AT ALL
SUM OF ALL RESPONSES TO PHQ QUESTIONS 1-9: 0
1. LITTLE INTEREST OR PLEASURE IN DOING THINGS: NOT AT ALL
SUM OF ALL RESPONSES TO PHQ QUESTIONS 1-9: 0
SUM OF ALL RESPONSES TO PHQ QUESTIONS 1-9: 0

## 2025-04-21 ASSESSMENT — ENCOUNTER SYMPTOMS
RHINORRHEA: 0
COLOR CHANGE: 0
SHORTNESS OF BREATH: 0
VOMITING: 0
PHOTOPHOBIA: 0
BACK PAIN: 0
VOICE CHANGE: 0
NAUSEA: 0
COUGH: 0

## 2025-04-21 NOTE — PROGRESS NOTES
Negative for ear pain, hearing loss, rhinorrhea and voice change.    Eyes:  Negative for photophobia and visual disturbance.   Respiratory:  Negative for cough and shortness of breath.    Cardiovascular:  Negative for chest pain and palpitations.   Gastrointestinal:  Negative for nausea and vomiting.   Endocrine: Negative.  Negative for cold intolerance and heat intolerance.   Genitourinary:  Negative for difficulty urinating and flank pain.   Musculoskeletal:  Negative for back pain and neck pain.   Skin:  Negative for color change and rash.   Allergic/Immunologic: Negative for environmental allergies and food allergies.   Neurological:  Negative for dizziness, speech difficulty and headaches.   Hematological:  Does not bruise/bleed easily.   Psychiatric/Behavioral:  Negative for sleep disturbance and suicidal ideas.        Objective:     Physical Exam  Neck: Supple, no masses or adenopathy  Respiratory: Clear to auscultation, no wheezing, rales or rhonchi  Cardiovascular: Regular rate and rhythm, no murmurs, rubs, or gallops     Physical Exam  Vitals and nursing note reviewed.   Constitutional:       Appearance: He is well-developed.   HENT:      Head: Atraumatic.      Right Ear: External ear normal.      Left Ear: External ear normal.      Nose: Nose normal.   Eyes:      Conjunctiva/sclera: Conjunctivae normal.      Pupils: Pupils are equal, round, and reactive to light.   Cardiovascular:      Rate and Rhythm: Normal rate and regular rhythm.      Heart sounds: Normal heart sounds, S1 normal and S2 normal.   Pulmonary:      Effort: Pulmonary effort is normal.      Breath sounds: Normal breath sounds.   Abdominal:      General: Bowel sounds are normal.      Palpations: Abdomen is soft.   Musculoskeletal:         General: Normal range of motion.      Cervical back: Normal range of motion and neck supple.   Skin:     General: Skin is warm and dry.   Neurological:      Mental Status: He is alert and oriented to

## 2025-04-23 DIAGNOSIS — E11.42 DIABETIC POLYNEUROPATHY ASSOCIATED WITH TYPE 2 DIABETES MELLITUS (HCC): Primary | ICD-10-CM

## 2025-04-23 DIAGNOSIS — E11.51 TYPE 2 DIABETES MELLITUS WITH DIABETIC PERIPHERAL ANGIOPATHY WITHOUT GANGRENE, WITHOUT LONG-TERM CURRENT USE OF INSULIN (HCC): Chronic | ICD-10-CM

## 2025-04-28 RX ORDER — FAMOTIDINE 40 MG/1
40 TABLET, FILM COATED ORAL NIGHTLY
Qty: 30 TABLET | Refills: 0 | Status: SHIPPED | OUTPATIENT
Start: 2025-04-28

## 2025-05-05 RX ORDER — PANTOPRAZOLE SODIUM 40 MG/1
40 TABLET, DELAYED RELEASE ORAL DAILY
Qty: 90 TABLET | Refills: 1 | Status: SHIPPED | OUTPATIENT
Start: 2025-05-05

## 2025-05-05 NOTE — PROGRESS NOTES
[facility] - PODIATRY    Today's Date: 05/07/25    Patient Name: Edy Cosby  MRN: [id number]  CSN: [id number]  PCP: Ana Vincent APRN  Referring Provider: No ref. provider found    SUBJECTIVE     Chief Complaint   Patient presents with    Follow-up     Ana Vincent APRN 01/23/25  Follow Up 3 months with Kylah  Pt here for nail/foot care. Pt states that his neuropathic pain has gotten worse.     HPI: Edy Cosby, a 70 y.o.male, comes to clinic as a(n) established patient presenting for diabetic foot exam and complaining of thickened toenails and callus to left plantar great toe . Patient has h/o AFib, back pain, DM2, Emphysema lung, GERD, HTN, OA . Patient is IDDM and unsure of last BG level. Last A1c of 6.2%.  Patient reports that he has had increased pain due to peripheral neuropathy in his feet recently.  Reports most pain to the left foot where there is also foot drop and he continues to wear an AFO.  Reports callus to left plantar hallux has returned.  Denies any redness, drainage, or other issues from the area. Patient states that toenails are long, thick, and irregular and that he is unable to care for them himself.  Denies open wound or sores.   Reports pain from diabetic neuropathy  today. Relates previous treatment(s) including foot care by podiatrist . Denies any constitutional symptoms. No other pedal complaints at this time.    Past Medical History:   Diagnosis Date    A-fib     Atherosclerosis     Chronic back pain     Constipation     Diabetes mellitus     TYPE II    Dropfoot     wears brace    Dysphagia     Emphysema of lung     Gastroparesis     GERD (gastroesophageal reflux disease)     Hyperlipidemia     Hypertension     ESSENTIAL    Leg pain     Muscle spasm     Nerve pain     Osteoarthritis     Sleep apnea     no cpap/bipap (non-compliance)    Sleeping difficulty     Ulcer of toe due to diabetes mellitus      Past Surgical History:    Procedure Laterality Date    ANGIOPLASTY ILIAC ARTERY Left 1/29/2019    Procedure: ANGIOPLASTY ILIAC ARTERY, STENT PLACEMENT;  Surgeon: Duncan Lucio MD;  Location: [facility] HYBRID OR 12;  Service: Vascular    AORTAGRAM Left 12/18/2018    Procedure: AORTOGRAM, LEFT LEG ANGIOGRAM, MYNX CLOSURE;  Surgeon: Duncan Lucio MD;  Location: [facility] HYBRID OR 12;  Service: Vascular    AORTAGRAM Right 12/16/2021    Procedure: AORTAGRAM, RIGHT LOWER EXTREMITY ANGIOGRAM, POSSIBLE INTERVENTION;  Surgeon: Duncan Lucio MD;  Location: [facility] HYBRID OR 12;  Service: Vascular;  Laterality: Right;    BACK SURGERY      CHOLECYSTECTOMY      COLONOSCOPY  09/01/2011    TICS RECALL 5YR    COLONOSCOPY  09/30/2008    ENTER RESULTS: STOOL THROUGHTOUT THE COLON POOR PREP REC 3 YEAR RECALL    COLONOSCOPY N/A 11/9/2016    Procedure: COLONOSCOPY;  Surgeon: León Zepeda MD;  Location: [facility] ENDOSCOPY;  Service:     COLONOSCOPY N/A 4/19/2018    Procedure: COLONOSCOPY WITH ANESTHESIA;  Surgeon: León Zepeda MD;  Location: [facility] ENDOSCOPY;  Service: Gastroenterology    ENDOSCOPY  06/19/2012    HH    ENDOSCOPY  08/25/2009    HIATAL HERNIA, DILATED WITH 50 FR MASCORRO    ENDOSCOPY  06/19/2007    WITHIN NORMAL LIMITS UREA NEG    FEMORAL ENDARTERECTOMY Left 1/29/2019    Procedure: LEFT FEMORAL ENDARTERECTOMY;  Surgeon: Duncan Lucio MD;  Location: [facility] HYBRID OR 12;  Service: Vascular    FEMORAL ENDARTERECTOMY Right 2/3/2022    Procedure: RIGHT FEMORAL ENDARTERECTOMY, RIGHT LOWER EXTREMITY ANGIOGRAM, BALLOON ANGIOPLASTY;  Surgeon: Duncan Lucio MD;  Location: [facility] HYBRID OR 12;  Service: Vascular;  Laterality: Right;    LUMBAR LAMINECTOMY WITH FUSION N/A 1/23/2017    Procedure: REMOVAL OF INSTRUMENTATION, EXPLORATION OF FUSION L4-S1, REVISION LEFT L5-S1 HEMILAMINECTOMY, FACETECTOMY DECOMPRESSION, REVISION UNINSTRUMENTED POSTERIOR SPINAL FUSION L5-S1;  Surgeon: RHONDA Lopes MD;  Location: [facility]  OR;  Service:     OTHER SURGICAL HISTORY      LUMBAR SACRAL SURGERY WITH FUSION X2    PILONIDAL CYST / SINUS EXCISION      PILONIDAL CYST REMOVAL     Family History   Problem Relation Age of Onset    Heart attack Father     Diabetes Brother     Colon polyps Sister     Stomach cancer Neg Hx         GI CNACERS OR DISEASE    Colon cancer Neg Hx      Social History     Socioeconomic History    Marital status: Single   Tobacco Use    Smoking status: Former     Current packs/day: 0.00     Types: Cigarettes     Start date:      Quit date:      Years since quittin.3     Passive exposure: Past    Smokeless tobacco: Never   Vaping Use    Vaping status: Never Used   Substance and Sexual Activity    Alcohol use: Yes     Comment: RARELY    Drug use: Yes     Types: Marijuana    Sexual activity: Defer     No Known Allergies  Current Outpatient Medications   Medication Sig Dispense Refill    albuterol sulfate  (90 Base) MCG/ACT inhaler Inhale 2 puffs 4 (Four) Times a Day As Needed.      amLODIPine (NORVASC) 10 MG tablet Take 1 tablet by mouth Daily.      ascorbic acid (VITAMIN C) 500 MG tablet 1 tablet Daily.      aspirin 81 MG EC tablet Daily.      atorvastatin (LIPITOR) 40 MG tablet 1 tablet Every Night.      clopidogrel (PLAVIX) 75 MG tablet Take 1 tablet by mouth once daily 90 tablet 0    cyclobenzaprine (FLEXERIL) 5 MG tablet Take 1 tablet by mouth 3 (Three) Times a Day As Needed for Muscle Spasms.      Dulaglutide (Trulicity) 1.5 MG/0.5ML solution pen-injector Inject 1.5 mg under the skin into the appropriate area as directed 1 (One) Time Per Week.      famotidine (PEPCID) 40 MG tablet Take 1 tablet by mouth Every Evening.      folic acid (FOLVITE) 1 MG tablet 1 tablet.      hydroCHLOROthiazide (MICROZIDE) 12.5 MG capsule Take 1 capsule by mouth Daily.      labetalol (NORMODYNE) 100 MG tablet Take 1 tablet by mouth 2 (Two) Times a Day.      lisinopril (PRINIVIL,ZESTRIL) 40 MG tablet Take 1 tablet by mouth  Daily.      mupirocin (BACTROBAN) 2 % ointment Apply 1 Application topically to the appropriate area as directed 2 (Two) Times a Day. 1 g 3    pantoprazole (PROTONIX) 40 MG EC tablet Take 1 tablet by mouth Daily.      sildenafil (VIAGRA) 100 MG tablet Take 0.5 tablets by mouth As Needed for Erectile Dysfunction.      tamsulosin (FLOMAX) 0.4 MG capsule 24 hr capsule Take 1 capsule by mouth Daily.      vitamin D (ERGOCALCIFEROL) 1.25 MG (74800 UT) capsule capsule Take 1 capsule by mouth 1 (One) Time Per Week.       No current facility-administered medications for this visit.     Review of Systems   Constitutional:  Negative for chills and fever.   HENT:  Negative for congestion.    Respiratory:  Negative for shortness of breath.    Cardiovascular:  Negative for chest pain and leg swelling.   Gastrointestinal:  Negative for constipation, diarrhea, nausea and vomiting.   Musculoskeletal:  Positive for arthralgias and gait problem (foot drop).   Skin:  Positive for wound.        Left hallux plantar callus   Neurological:  Positive for numbness.   Hematological:  Bruises/bleeds easily.       OBJECTIVE     Vitals:    05/07/25 0949   BP: 128/82   Pulse: 82   SpO2: 98%         PHYSICAL EXAM  GEN:   Accompanied by none.     Foot/Ankle Exam    GENERAL  Appearance:  obese  Orientation:  AAOx3  Affect:  appropriate  Gait:  steppage (Drop foot left)  Assistance:  independent  Right shoe gear: casual shoe  Left shoe gear: casual shoe (With AFO)    VASCULAR     Right Foot Vascularity   Dorsalis pedis:  2+  Posterior tibial:  2+  Skin temperature:  warm  Edema grading:  Trace and non-pitting  CFT:  3  Pedal hair growth:  Present  Varicosities:  none     Left Foot Vascularity   Dorsalis pedis:  2+  Posterior tibial:  2+  Skin temperature:  warm  Edema grading:  Trace and non-pitting  CFT:  3  Pedal hair growth:  Present  Varicosities:  none     NEUROLOGIC     Right Foot Neurologic   Light touch sensation: diminished  Vibratory  sensation: diminished  Hot/Cold sensation: diminished  Protective Sensation using [place] Monofilament:   Right Foot Sites Intact: 0.  Sites tested: 10     Left Foot Neurologic   Light touch sensation: diminished  Vibratory sensation: diminished  Hot/Cold sensation:  diminished  Protective Sensation using [place] Monofilament:   Left Foot Sites Intact: 0.  Sites tested: 10    MUSCULOSKELETAL     Right Foot Musculoskeletal   Tenderness:  none    Arch:  Pes planus  Hallux valgus: No    Hallux limitus: No       Left Foot Musculoskeletal   Tenderness:  none  Arch:  Pes planus  Hallux valgus: No    Hallux limitus: No      MUSCLE STRENGTH     Right Foot Muscle Strength   Foot dorsiflexion:  5  Foot plantar flexion:  5  Foot inversion:  5  Foot eversion:  5     Left Foot Muscle Strength   Foot dorsiflexion:  3  Foot plantar flexion:  3  Foot inversion:  3  Foot eversion:  3    RANGE OF MOTION     Right Foot Range of Motion   Foot and ankle ROM within normal limits       Left Foot Range of Motion   Foot and ankle ROM within normal limits      DERMATOLOGIC      Right Foot Dermatologic   Skin  Right foot skin is intact.   Nails  1.  Positive for elongated, onychomycosis, abnormal thickness, subungual debris and dystrophic nail.  2.  Positive for elongated, onychomycosis, abnormal thickness, subungual debris and dystrophic nail.  3.  Positive for elongated, onychomycosis, abnormal thickness, subungual debris and dystrophic nail.  4.  Positive for elongated, onychomycosis, abnormal thickness, subungual debris and dystrophic nail.  5.  Positive for elongated, onychomycosis, abnormal thickness, subungual debris and dystrophic nail.     Left Foot Dermatologic   Skin  Positive for corn.   Nails  1.  Positive for elongated, onychomycosis, abnormal thickness, subungual debris and dystrophic nail.  2.  Positive for elongated, onychomycosis, abnormal thickness, subungual debris and dystrophic nail.  3.  Positive for  elongated, onychomycosis, abnormal thickness, subungual debris and dystrophic nail.  4.  Positive for elongated, onychomycosis, abnormally thick, subungual debris and dystrophic nail.  5.  Positive for elongated, onychomycosis, abnormally thick, subungual debris and dystrophic nail.    Image:       RADIOLOGY/NUCLEAR:  US ANKLE / BRACHIAL INDICES EXTREMITY COMPLETE- 11/20/2024 11:03 AM     HISTORY: i73.9; I73.9-Peripheral vascular disease, unspecified          LABORATORY/CULTURE RESULTS:      PATHOLOGY RESULTS:       ASSESSMENT/PLAN     Diagnoses and all orders for this visit:    1. Onychomycosis (Primary)    2. Antiplatelet or antithrombotic long-term use    3. Foot drop, left    4. Type 2 diabetes mellitus with diabetic polyneuropathy, with long-term current use of insulin    5. Callus of foot    6. Pre-ulcerative calluses    7. Painful diabetic neuropathy        Comprehensive lower extremity examination and evaluation was performed.  Discussed findings and treatment plan including risks, benefits, and treatment options with patient in detail. Patient agreed with treatment plan.  Hemoglobin A1c reviewed.  CMP reviewed.  After verbal consent obtained, nail(s) x10 debrided of length and thickness with nail nipper without incidence  After verbal consent obtained, calluses x2 pared utilizing dermal curette and/or scalpel without incidence  Patient may maintain nails and calluses at home utilizing emery board or pumice stone between visits as needed  Reviewed at home diabetic foot care including daily foot checks    Continue AFO for foot drop.   Continue diabetic monitoring and control under direction of PCP.   Continue follow-ups with vascular surgery.  Discussed with patient that preulcerative callus to the left hallux is at a high risk of opening into a wound.  Discussed that if he notes any drainage or signs of infection to contact our office.  A Zynex Nexwave will be ordered for the patient due to painful diabetic  neuropathy and foot drop.  I feel the patient would benefit from the IFC mode for extended pain relief and reduction of need for pain medication.  Due to the fact that the patient has tried other modalities for diabetic pain relief, with little improvement, I believe the patient would benefit from electrotherapy.   Contact the office with any questions or concerns before next appointment.  An After Visit Summary was printed and given to the patient at discharge, including (if requested) any available informative/educational handouts regarding diagnosis, treatment, or medications. All questions were answered to patient/family satisfaction. Should symptoms fail to improve or worsen they agree to call or return to clinic or to go to the Emergency Department. Discussed the importance of following up with any needed screening tests/labs/specialist appointments and any requested follow-up recommended by me today. Importance of maintaining follow-up discussed and patient accepts that missed appointments can delay diagnosis and potentially lead to worsening of conditions.  I spent 26 minutes caring for Edy on this date of service. This time includes time spent by me in the following activities: preparing for the visit, reviewing tests, counseling and educating the patient/family/caregiver, documenting information in the medical record, and ordering Zynex Nexwave.   I spent 7 minutes on the separately reported service of nail debridement and callus paring. This time is not included in the time used to support the E/M service also reported today.    Return in about 3 months (around 8/7/2025) for Diabetic foot care clinic, With Kylah ANDUJAR., or sooner if acute issues arise.    Lab Frequency Next Occurrence   Diet: Once 04/19/2018   Advance Diet as Tolerated Once 04/19/2018       This document has been electronically signed by PRATIMA Wilkins on May 7, 2025 11:57 CDT

## 2025-05-07 ENCOUNTER — OFFICE VISIT (OUTPATIENT)
Age: 70
End: 2025-05-07
Payer: MEDICARE

## 2025-05-07 VITALS
HEIGHT: 67 IN | BODY MASS INDEX: 29.51 KG/M2 | HEART RATE: 82 BPM | OXYGEN SATURATION: 98 % | WEIGHT: 188 LBS | DIASTOLIC BLOOD PRESSURE: 82 MMHG | SYSTOLIC BLOOD PRESSURE: 128 MMHG

## 2025-05-07 DIAGNOSIS — Z79.4 TYPE 2 DIABETES MELLITUS WITH DIABETIC POLYNEUROPATHY, WITH LONG-TERM CURRENT USE OF INSULIN: ICD-10-CM

## 2025-05-07 DIAGNOSIS — Z79.02 ANTIPLATELET OR ANTITHROMBOTIC LONG-TERM USE: ICD-10-CM

## 2025-05-07 DIAGNOSIS — B35.1 ONYCHOMYCOSIS: Primary | ICD-10-CM

## 2025-05-07 DIAGNOSIS — M21.372 FOOT DROP, LEFT: ICD-10-CM

## 2025-05-07 DIAGNOSIS — L84 PRE-ULCERATIVE CALLUSES: ICD-10-CM

## 2025-05-07 DIAGNOSIS — L84 CALLUS OF FOOT: ICD-10-CM

## 2025-05-07 DIAGNOSIS — E11.40 PAINFUL DIABETIC NEUROPATHY: ICD-10-CM

## 2025-05-07 DIAGNOSIS — E11.42 TYPE 2 DIABETES MELLITUS WITH DIABETIC POLYNEUROPATHY, WITH LONG-TERM CURRENT USE OF INSULIN: ICD-10-CM

## 2025-05-27 RX ORDER — FAMOTIDINE 40 MG/1
40 TABLET, FILM COATED ORAL NIGHTLY
Qty: 30 TABLET | Refills: 2 | Status: SHIPPED | OUTPATIENT
Start: 2025-05-27

## 2025-06-25 DIAGNOSIS — I10 ESSENTIAL HYPERTENSION: ICD-10-CM

## 2025-06-25 RX ORDER — LISINOPRIL 40 MG/1
40 TABLET ORAL DAILY
Qty: 90 TABLET | Refills: 0 | Status: SHIPPED | OUTPATIENT
Start: 2025-06-25

## 2025-06-25 NOTE — TELEPHONE ENCOUNTER
Gavino Frye called to request a refill on his medication.      Last office visit : 4/21/2025   Next office visit : 10/21/2025     Requested Prescriptions     Pending Prescriptions Disp Refills    lisinopril (PRINIVIL;ZESTRIL) 40 MG tablet [Pharmacy Med Name: Lisinopril 40 MG Oral Tablet] 90 tablet 0     Sig: Take 1 tablet by mouth once daily            Kelsey Hoffman MA

## 2025-06-30 ENCOUNTER — TELEPHONE (OUTPATIENT)
Dept: ENT CLINIC | Age: 70
End: 2025-06-30

## 2025-06-30 NOTE — TELEPHONE ENCOUNTER
CALLED TO SCHEDULE ANNUAL FOLLOW UP APPT WITH HOLLAND WASSERMAN AROUND 07/30/2025. NO ANSWER. LEFT VMAIL

## 2025-07-20 DIAGNOSIS — I10 ESSENTIAL HYPERTENSION: ICD-10-CM

## 2025-07-21 RX ORDER — AMLODIPINE BESYLATE 10 MG/1
10 TABLET ORAL DAILY
Qty: 90 TABLET | Refills: 0 | Status: SHIPPED | OUTPATIENT
Start: 2025-07-21

## 2025-07-30 ENCOUNTER — OFFICE VISIT (OUTPATIENT)
Dept: ENT CLINIC | Age: 70
End: 2025-07-30
Payer: MEDICARE

## 2025-07-30 VITALS
WEIGHT: 180 LBS | SYSTOLIC BLOOD PRESSURE: 120 MMHG | BODY MASS INDEX: 28.25 KG/M2 | HEIGHT: 67 IN | DIASTOLIC BLOOD PRESSURE: 60 MMHG

## 2025-07-30 DIAGNOSIS — J38.00 COMPLETE PARALYSIS OF VOCAL CORD: Primary | ICD-10-CM

## 2025-07-30 PROCEDURE — 99213 OFFICE O/P EST LOW 20 MIN: CPT | Performed by: PHYSICIAN ASSISTANT

## 2025-07-30 PROCEDURE — 1159F MED LIST DOCD IN RCRD: CPT | Performed by: PHYSICIAN ASSISTANT

## 2025-07-30 PROCEDURE — 3078F DIAST BP <80 MM HG: CPT | Performed by: PHYSICIAN ASSISTANT

## 2025-07-30 PROCEDURE — 1123F ACP DISCUSS/DSCN MKR DOCD: CPT | Performed by: PHYSICIAN ASSISTANT

## 2025-07-30 PROCEDURE — 1160F RVW MEDS BY RX/DR IN RCRD: CPT | Performed by: PHYSICIAN ASSISTANT

## 2025-07-30 PROCEDURE — 31575 DIAGNOSTIC LARYNGOSCOPY: CPT | Performed by: PHYSICIAN ASSISTANT

## 2025-07-30 PROCEDURE — 3074F SYST BP LT 130 MM HG: CPT | Performed by: PHYSICIAN ASSISTANT

## 2025-07-30 NOTE — PROGRESS NOTES
Mr. Frye is a pleasant 70-year-old male that presents for a 6-month follow-up due to her paralysis of the left vocal cord.  He reports that he is having difficulties swallowing at times and is requesting another evaluation.  Denies any true dysphagia with solid foods or when he drinks.  He was previously seen by speech therapy and was recommended a referral to the vocal clinic at [place].  Unfortunately due to insurance not covering [place], he had no other therapy performed.  He reports that his voice has been about the same with no major changes.      Physical examination demonstrated the patient to have chronic hoarseness of voice from the paralyzed vocal cord that was pre-existing.  No obvious change was appreciated.  Ears demonstrate normal TMs canals bilaterally.  Nasal exam demonstrated no abnormalities-please refer to flex laryngoscopy.  Oral exam demonstrated no masses to the posterior pharynx with normal surface of anterior tongue.  Neck exam demonstrated no lymphadenopathy or thyromegaly.      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  I offered to refer him back to speech therapy for swallowing evaluation.  Patient has a negative history for aspiration.  We also discussed the possibilities of referring him to [facility] for vocal assessment and treatment.  He reports that he will think about it and call me back with the decision.  I recommended a 6-month follow-up.  He was reminded to call if his symptoms worsen or if he has questions.      Electronically signed by MARK WASSERMAN PA-C on 7/30/25 at 5:29 PM CDT

## 2025-08-25 RX ORDER — FAMOTIDINE 40 MG/1
40 TABLET, FILM COATED ORAL NIGHTLY
Qty: 30 TABLET | Refills: 2 | Status: SHIPPED | OUTPATIENT
Start: 2025-08-25

## (undated) DEVICE — MEDI-VAC NON-CONDUCTIVE SUCTION TUBING: Brand: CARDINAL HEALTH

## (undated) DEVICE — PK TURNOVER RM ADV

## (undated) DEVICE — ENDOGATOR AUXILIARY WATER JET CONNECTOR: Brand: ENDOGATOR

## (undated) DEVICE — RADIFOCUS GLIDEWIRE ADVANTAGE GUIDEWIRE: Brand: GLIDEWIRE ADVANTAGE

## (undated) DEVICE — PAD ENDOVASCULAR: Brand: MEDLINE INDUSTRIES, INC.

## (undated) DEVICE — GLIDESHEATH SLENDER STAINLESS STEEL KIT: Brand: GLIDESHEATH SLENDER

## (undated) DEVICE — PINNACLE INTRODUCER SHEATH: Brand: PINNACLE

## (undated) DEVICE — PINNACLE R/O II INTRODUCER SHEATH WITH RADIOPAQUE MARKER: Brand: PINNACLE

## (undated) DEVICE — INFLATION DEVICE: Brand: ENCORE™ 26

## (undated) DEVICE — TOTAL TRAY, 16FR 10ML SIL FOLEY, URN: Brand: MEDLINE

## (undated) DEVICE — PAD,EYE,1-5/8X2 5/8,STERILE,LF,1/PK: Brand: MEDLINE

## (undated) DEVICE — THE CHANNEL CLEANING BRUSH IS A NYLON FLEXI BRUSH ATTACHED TO A FLEXIBLE PLASTIC SHEATH DESIGNED TO SAFELY REMOVE DEBRIS FROM FLEXIBLE ENDOSCOPES.

## (undated) DEVICE — 9165 UNIVERSAL PATIENT PLATE: Brand: 3M™

## (undated) DEVICE — GLV SURG DERMASSURE GRN LF PF 8.0

## (undated) DEVICE — SUT PROLN 7/0 BV1 24IN 4PK M8702

## (undated) DEVICE — KT MINI ACC MAK ECHO ENHNCD NDL 5F 10CM

## (undated) DEVICE — SNAP KOVER: Brand: UNBRANDED

## (undated) DEVICE — GLV SURG SENSICARE W/ALOE PF LF 7.5 STRL

## (undated) DEVICE — SUT PROLN 6/0 4/24IN BV1 MON BL M8805

## (undated) DEVICE — AIRWAY CIRCUIT: Brand: DEROYAL

## (undated) DEVICE — SUT MNCRYL 4/0 PS2 27IN UD MCP426H

## (undated) DEVICE — DRSNG TELFA PAD NONADH STR 1S 3X8IN

## (undated) DEVICE — MYNXGRIP 5F: Brand: MYNXGRIP

## (undated) DEVICE — CVR PROB GEN PURP W ISOSILK 6X48

## (undated) DEVICE — GLOVE SURG SZ 7 CRM LTX FREE POLYISOPRENE POLYMER BEAD ANTI

## (undated) DEVICE — CANN VESL ACRN TP 4MM

## (undated) DEVICE — ANTIBACTERIAL UNDYED BRAIDED (POLYGLACTIN 910), SYNTHETIC ABSORBABLE SUTURE: Brand: COATED VICRYL

## (undated) DEVICE — TBG PENCL TELESCP MEGADYNE SMOKE EVAC 10FT

## (undated) DEVICE — PROXIMATE RH ROTATING HEAD SKIN STAPLERS (35 WIDE) CONTAINS 35 STAINLESS STEEL STAPLES: Brand: PROXIMATE

## (undated) DEVICE — PAD MAJOR VASCULAR: Brand: MEDLINE INDUSTRIES, INC.

## (undated) DEVICE — APPL CHLORAPREP W/TINT 26ML ORNG

## (undated) DEVICE — GEL ULTRASND HI VISC 20G PACKET STRL

## (undated) DEVICE — NDL HYPO PRECISIONGLIDE REG 25G 1 1/2

## (undated) DEVICE — SUT PROLN 5/0 C1 DA 24IN 8725H

## (undated) DEVICE — GLOVE SURG SZ 75 CRM LTX FREE POLYISOPRENE POLYMER BEAD ANTI

## (undated) DEVICE — BALN EVERCROSS OTW .035 5F 5X40 80

## (undated) DEVICE — DRP SPECIAL PROC 4PC W/FC POUCH

## (undated) DEVICE — TABLE COVER: Brand: CONVERTORS

## (undated) DEVICE — 3M™ STERI-STRIP™ REINFORCED ADHESIVE SKIN CLOSURES, R1547, 1/2 IN X 4 IN (12 MM X 100 MM), 6 STRIPS/ENVELOPE: Brand: 3M™ STERI-STRIP™

## (undated) DEVICE — CLTH CLENS READYCLEANSE PERI CARE PK/5

## (undated) DEVICE — DESTINATION RENAL GUIDING SHEATH: Brand: DESTINATION

## (undated) DEVICE — CONTROL SYRINGE LUER-LOCK TIP: Brand: MONOJECT

## (undated) DEVICE — GW STARTER JB STR .035 15X150CM

## (undated) DEVICE — CONVERTED USE 248063 TOWELS OR BL ST

## (undated) DEVICE — MAYO STAND COVER: Brand: CONVERTORS

## (undated) DEVICE — BG BANDED WRUBBER BAND AND TP 36X54IN

## (undated) DEVICE — NEEDLE HYPO 27GA L1.25IN GRY POLYPR HUB S STL REG BVL STR

## (undated) DEVICE — SYR CONTRL LUERLOK 10CC

## (undated) DEVICE — CATH FLSH OMNI SFT 5F 90CM

## (undated) DEVICE — GLV SURG TRIUMPH GREEN W/ALOE PF LTX 8 STRL

## (undated) DEVICE — THE SINGLE USE ETRAP – POLYP TRAP IS USED FOR SUCTION RETRIEVAL OF ENDOSCOPICALLY REMOVED POLYPS.: Brand: ETRAP

## (undated) DEVICE — DRSNG SURESITE WNDW 4X4.5

## (undated) DEVICE — SENSR O2 OXIMAX FNGR A/ 18IN NONSTR

## (undated) DEVICE — Device: Brand: DEFENDO AIR/WATER/SUCTION AND BIOPSY VALVE

## (undated) DEVICE — SPONGE GZ W4XL4IN RAYON POLY FILL CVR W/ NONWOVEN FAB

## (undated) DEVICE — TRAP FLD MINIVAC MEGADYNE 100ML

## (undated) DEVICE — Device

## (undated) DEVICE — DEV CLS VASC MYNXCONTROL 6FTO7F

## (undated) DEVICE — MODEL AT P65, P/N 701554-001KIT CONTENTS: HAND CONTROLLER, 3-WAY HIGH-PRESSURE STOPCOCK WITH ROTATING END AND PREMIUM HIGH-PRESSURE TUBING: Brand: ANGIOTOUCH® KIT

## (undated) DEVICE — ST MIC/INTRO ACC SHRP/NDL TUNG/TP NITNL 5F 45CM 7CM

## (undated) DEVICE — SPNG GZ STRL 2S 4X4 12PLY

## (undated) DEVICE — SUT SILK 2/0 SH 30IN K833H

## (undated) DEVICE — MEDI-VAC YANK SUCT HNDL W/TPRD BULBOUS TIP: Brand: CARDINAL HEALTH

## (undated) DEVICE — SYR LL TP 10ML STRL

## (undated) DEVICE — SNAR POLYP SENSATION MICRO OVL 13 240X40

## (undated) DEVICE — MODEL BT2000 P/N 700287-012KIT CONTENTS: MANIFOLD WITH SALINE AND CONTRAST PORTS, SALINE TUBING WITH SPIKE AND HAND SYRINGE, TRANSDUCER: Brand: BT2000 AUTOMATED MANIFOLD KIT

## (undated) DEVICE — STERILE ULTRASOUND GEL, SAFEWRAP: Brand: ECOVUE

## (undated) DEVICE — A2000 MULTI-USE SYRINGE KIT, P/N 701277-003KIT CONTENTS: 100ML CONTRAST RESERVOIR AND TUBING WITH CONTRAST SPIKE AND CLAMP: Brand: A2000 MULTI-USE SYRINGE KIT

## (undated) DEVICE — KT MINI ACC MAK401

## (undated) DEVICE — MSK O2 MD CONCENTR A/ LF 7FT 1P/U

## (undated) DEVICE — COAGULATOR SUCT 10FR LAIN FTSWCH ACTIVATION DISP VALLEYLAB

## (undated) DEVICE — WIPE INST MEROCEL

## (undated) DEVICE — TBG SMPL FLTR LINE NASL 02/C02 A/ BX/100

## (undated) DEVICE — APPL CHLORAPREP HI/LITE 26ML ORNG

## (undated) DEVICE — CUFF,BP,DISP,1 TUBE,ADULT,HP: Brand: MEDLINE